# Patient Record
Sex: FEMALE | Race: WHITE | NOT HISPANIC OR LATINO | Employment: UNEMPLOYED | URBAN - METROPOLITAN AREA
[De-identification: names, ages, dates, MRNs, and addresses within clinical notes are randomized per-mention and may not be internally consistent; named-entity substitution may affect disease eponyms.]

---

## 2018-06-28 ENCOUNTER — APPOINTMENT (EMERGENCY)
Dept: RADIOLOGY | Facility: HOSPITAL | Age: 63
DRG: 854 | End: 2018-06-28
Payer: COMMERCIAL

## 2018-06-28 ENCOUNTER — HOSPITAL ENCOUNTER (INPATIENT)
Facility: HOSPITAL | Age: 63
LOS: 23 days | Discharge: HOME HEALTH | DRG: 854 | End: 2018-07-21
Attending: EMERGENCY MEDICINE | Admitting: INTERNAL MEDICINE
Payer: COMMERCIAL

## 2018-06-28 DIAGNOSIS — F11.90 METHADONE USE: ICD-10-CM

## 2018-06-28 DIAGNOSIS — I10 BENIGN ESSENTIAL HYPERTENSION: ICD-10-CM

## 2018-06-28 DIAGNOSIS — F31.9 BIPOLAR AFFECTIVE DISORDER (HCC): ICD-10-CM

## 2018-06-28 DIAGNOSIS — L03.90 CELLULITIS: ICD-10-CM

## 2018-06-28 DIAGNOSIS — M65.031: ICD-10-CM

## 2018-06-28 DIAGNOSIS — IMO0002 TYPE 2 DIABETES MELLITUS, UNCONTROLLED: ICD-10-CM

## 2018-06-28 DIAGNOSIS — L03.113 RIGHT ARM CELLULITIS: Primary | ICD-10-CM

## 2018-06-28 DIAGNOSIS — A49.02 MRSA INFECTION: ICD-10-CM

## 2018-06-28 DIAGNOSIS — E11.9 DM (DIABETES MELLITUS) (HCC): ICD-10-CM

## 2018-06-28 DIAGNOSIS — R73.9 HYPERGLYCEMIA: ICD-10-CM

## 2018-06-28 DIAGNOSIS — L02.413 ABSCESS OF RIGHT FOREARM: ICD-10-CM

## 2018-06-28 PROBLEM — E87.1 HYPONATREMIA: Status: ACTIVE | Noted: 2018-06-28

## 2018-06-28 LAB
ALBUMIN SERPL BCP-MCNC: 2.6 G/DL (ref 3.5–5)
ALP SERPL-CCNC: 126 U/L (ref 46–116)
ALT SERPL W P-5'-P-CCNC: 16 U/L (ref 12–78)
ANION GAP SERPL CALCULATED.3IONS-SCNC: 10 MMOL/L (ref 4–13)
AST SERPL W P-5'-P-CCNC: 24 U/L (ref 5–45)
BASOPHILS # BLD AUTO: 0.01 THOUSANDS/ΜL (ref 0–0.1)
BASOPHILS NFR BLD AUTO: 0 % (ref 0–1)
BILIRUB SERPL-MCNC: 0.5 MG/DL (ref 0.2–1)
BUN SERPL-MCNC: 15 MG/DL (ref 5–25)
CALCIUM SERPL-MCNC: 10 MG/DL (ref 8.3–10.1)
CHLORIDE SERPL-SCNC: 92 MMOL/L (ref 100–108)
CO2 SERPL-SCNC: 26 MMOL/L (ref 21–32)
CREAT SERPL-MCNC: 0.86 MG/DL (ref 0.6–1.3)
EOSINOPHIL # BLD AUTO: 0.04 THOUSAND/ΜL (ref 0–0.61)
EOSINOPHIL NFR BLD AUTO: 0 % (ref 0–6)
ERYTHROCYTE [DISTWIDTH] IN BLOOD BY AUTOMATED COUNT: 13.2 % (ref 11.6–15.1)
GFR SERPL CREATININE-BSD FRML MDRD: 73 ML/MIN/1.73SQ M
GLUCOSE SERPL-MCNC: 450 MG/DL (ref 65–140)
GLUCOSE SERPL-MCNC: 466 MG/DL (ref 65–140)
GLUCOSE SERPL-MCNC: >500 MG/DL (ref 65–140)
HCT VFR BLD AUTO: 32 % (ref 34.8–46.1)
HGB BLD-MCNC: 10.8 G/DL (ref 11.5–15.4)
LYMPHOCYTES # BLD AUTO: 1.42 THOUSANDS/ΜL (ref 0.6–4.47)
LYMPHOCYTES NFR BLD AUTO: 11 % (ref 14–44)
MCH RBC QN AUTO: 27.4 PG (ref 26.8–34.3)
MCHC RBC AUTO-ENTMCNC: 33.8 G/DL (ref 31.4–37.4)
MCV RBC AUTO: 81 FL (ref 82–98)
MONOCYTES # BLD AUTO: 0.52 THOUSAND/ΜL (ref 0.17–1.22)
MONOCYTES NFR BLD AUTO: 4 % (ref 4–12)
NEUTROPHILS # BLD AUTO: 10.82 THOUSANDS/ΜL (ref 1.85–7.62)
NEUTS SEG NFR BLD AUTO: 84 % (ref 43–75)
NRBC BLD AUTO-RTO: 0 /100 WBCS
PLATELET # BLD AUTO: 302 THOUSANDS/UL (ref 149–390)
PMV BLD AUTO: 9.4 FL (ref 8.9–12.7)
POTASSIUM SERPL-SCNC: 4.2 MMOL/L (ref 3.5–5.3)
PROT SERPL-MCNC: 7.9 G/DL (ref 6.4–8.2)
RBC # BLD AUTO: 3.94 MILLION/UL (ref 3.81–5.12)
SODIUM SERPL-SCNC: 128 MMOL/L (ref 136–145)
WBC # BLD AUTO: 12.81 THOUSAND/UL (ref 4.31–10.16)

## 2018-06-28 PROCEDURE — 96375 TX/PRO/DX INJ NEW DRUG ADDON: CPT

## 2018-06-28 PROCEDURE — 96365 THER/PROPH/DIAG IV INF INIT: CPT

## 2018-06-28 PROCEDURE — 82948 REAGENT STRIP/BLOOD GLUCOSE: CPT

## 2018-06-28 PROCEDURE — 99223 1ST HOSP IP/OBS HIGH 75: CPT | Performed by: PHYSICIAN ASSISTANT

## 2018-06-28 PROCEDURE — 93005 ELECTROCARDIOGRAM TRACING: CPT

## 2018-06-28 PROCEDURE — 80053 COMPREHEN METABOLIC PANEL: CPT | Performed by: PHYSICIAN ASSISTANT

## 2018-06-28 PROCEDURE — 71046 X-RAY EXAM CHEST 2 VIEWS: CPT

## 2018-06-28 PROCEDURE — 36415 COLL VENOUS BLD VENIPUNCTURE: CPT | Performed by: PHYSICIAN ASSISTANT

## 2018-06-28 PROCEDURE — 99285 EMERGENCY DEPT VISIT HI MDM: CPT

## 2018-06-28 PROCEDURE — 87040 BLOOD CULTURE FOR BACTERIA: CPT | Performed by: PHYSICIAN ASSISTANT

## 2018-06-28 PROCEDURE — 85025 COMPLETE CBC W/AUTO DIFF WBC: CPT | Performed by: PHYSICIAN ASSISTANT

## 2018-06-28 RX ORDER — INSULIN GLARGINE 100 [IU]/ML
50 INJECTION, SOLUTION SUBCUTANEOUS
COMMUNITY
Start: 2016-11-25 | End: 2018-07-21 | Stop reason: HOSPADM

## 2018-06-28 RX ORDER — ROPINIROLE 1 MG/1
0.5 TABLET, FILM COATED ORAL 3 TIMES DAILY
Status: DISCONTINUED | OUTPATIENT
Start: 2018-06-28 | End: 2018-07-21 | Stop reason: HOSPADM

## 2018-06-28 RX ORDER — MORPHINE SULFATE 4 MG/ML
4 INJECTION, SOLUTION INTRAMUSCULAR; INTRAVENOUS EVERY 4 HOURS PRN
Status: DISCONTINUED | OUTPATIENT
Start: 2018-06-28 | End: 2018-06-29

## 2018-06-28 RX ORDER — VANCOMYCIN HYDROCHLORIDE 1 G/200ML
15 INJECTION, SOLUTION INTRAVENOUS ONCE
Status: COMPLETED | OUTPATIENT
Start: 2018-06-28 | End: 2018-06-28

## 2018-06-28 RX ORDER — SODIUM CHLORIDE 9 MG/ML
75 INJECTION, SOLUTION INTRAVENOUS CONTINUOUS
Status: DISCONTINUED | OUTPATIENT
Start: 2018-06-28 | End: 2018-07-05

## 2018-06-28 RX ORDER — ONDANSETRON 2 MG/ML
4 INJECTION INTRAMUSCULAR; INTRAVENOUS EVERY 6 HOURS PRN
Status: DISCONTINUED | OUTPATIENT
Start: 2018-06-28 | End: 2018-07-21 | Stop reason: HOSPADM

## 2018-06-28 RX ORDER — LAMOTRIGINE 100 MG/1
150 TABLET ORAL DAILY
Status: DISCONTINUED | OUTPATIENT
Start: 2018-06-29 | End: 2018-07-21 | Stop reason: HOSPADM

## 2018-06-28 RX ORDER — GABAPENTIN 300 MG/1
600 CAPSULE ORAL 3 TIMES DAILY
COMMUNITY

## 2018-06-28 RX ORDER — CLONAZEPAM 1 MG/1
1 TABLET ORAL 3 TIMES DAILY
COMMUNITY
End: 2021-04-17 | Stop reason: HOSPADM

## 2018-06-28 RX ORDER — LISINOPRIL 10 MG/1
10 TABLET ORAL
COMMUNITY
Start: 2016-02-10 | End: 2018-07-21 | Stop reason: HOSPADM

## 2018-06-28 RX ORDER — LAMOTRIGINE 150 MG/1
150 TABLET ORAL DAILY
COMMUNITY

## 2018-06-28 RX ORDER — ONDANSETRON 2 MG/ML
4 INJECTION INTRAMUSCULAR; INTRAVENOUS ONCE
Status: COMPLETED | OUTPATIENT
Start: 2018-06-28 | End: 2018-06-28

## 2018-06-28 RX ORDER — IBUPROFEN 400 MG/1
800 TABLET ORAL ONCE
Status: COMPLETED | OUTPATIENT
Start: 2018-06-28 | End: 2018-06-28

## 2018-06-28 RX ORDER — LISINOPRIL 10 MG/1
10 TABLET ORAL DAILY
Status: DISCONTINUED | OUTPATIENT
Start: 2018-06-28 | End: 2018-06-30

## 2018-06-28 RX ORDER — INSULIN GLARGINE 100 [IU]/ML
25 INJECTION, SOLUTION SUBCUTANEOUS EVERY MORNING
Status: DISCONTINUED | OUTPATIENT
Start: 2018-06-29 | End: 2018-06-29

## 2018-06-28 RX ORDER — CALCIUM CARBONATE 200(500)MG
1000 TABLET,CHEWABLE ORAL DAILY PRN
Status: DISCONTINUED | OUTPATIENT
Start: 2018-06-28 | End: 2018-07-21 | Stop reason: HOSPADM

## 2018-06-28 RX ORDER — ACETAMINOPHEN 325 MG/1
975 TABLET ORAL ONCE
Status: COMPLETED | OUTPATIENT
Start: 2018-06-28 | End: 2018-06-28

## 2018-06-28 RX ORDER — ROPINIROLE 0.5 MG/1
0.5 TABLET, FILM COATED ORAL 2 TIMES DAILY
COMMUNITY
End: 2021-04-17 | Stop reason: HOSPADM

## 2018-06-28 RX ORDER — ACETAMINOPHEN 325 MG/1
650 TABLET ORAL EVERY 6 HOURS PRN
Status: DISCONTINUED | OUTPATIENT
Start: 2018-06-28 | End: 2018-07-21 | Stop reason: HOSPADM

## 2018-06-28 RX ORDER — GABAPENTIN 300 MG/1
300 CAPSULE ORAL 3 TIMES DAILY
Status: DISCONTINUED | OUTPATIENT
Start: 2018-06-28 | End: 2018-07-02

## 2018-06-28 RX ORDER — CLONAZEPAM 1 MG/1
1 TABLET ORAL 3 TIMES DAILY
Status: DISCONTINUED | OUTPATIENT
Start: 2018-06-28 | End: 2018-07-21 | Stop reason: HOSPADM

## 2018-06-28 RX ORDER — VANCOMYCIN HYDROCHLORIDE 1 G/200ML
15 INJECTION, SOLUTION INTRAVENOUS EVERY 12 HOURS
Status: DISCONTINUED | OUTPATIENT
Start: 2018-06-29 | End: 2018-06-29

## 2018-06-28 RX ADMIN — SERTRALINE HYDROCHLORIDE 50 MG: 50 TABLET ORAL at 22:40

## 2018-06-28 RX ADMIN — CLONAZEPAM 1 MG: 1 TABLET ORAL at 22:40

## 2018-06-28 RX ADMIN — ROPINIROLE 0.5 MG: 1 TABLET, FILM COATED ORAL at 22:40

## 2018-06-28 RX ADMIN — IBUPROFEN 800 MG: 400 TABLET ORAL at 20:18

## 2018-06-28 RX ADMIN — ACETAMINOPHEN 975 MG: 325 TABLET, FILM COATED ORAL at 20:18

## 2018-06-28 RX ADMIN — SODIUM CHLORIDE 125 ML/HR: 0.9 INJECTION, SOLUTION INTRAVENOUS at 20:17

## 2018-06-28 RX ADMIN — INSULIN LISPRO 10 UNITS: 100 INJECTION, SOLUTION INTRAVENOUS; SUBCUTANEOUS at 22:39

## 2018-06-28 RX ADMIN — GABAPENTIN 300 MG: 300 CAPSULE ORAL at 22:41

## 2018-06-28 RX ADMIN — LISINOPRIL 10 MG: 10 TABLET ORAL at 22:45

## 2018-06-28 RX ADMIN — ONDANSETRON 4 MG: 2 INJECTION INTRAMUSCULAR; INTRAVENOUS at 20:19

## 2018-06-28 RX ADMIN — INSULIN LISPRO 5 UNITS: 100 INJECTION, SOLUTION INTRAVENOUS; SUBCUTANEOUS at 23:49

## 2018-06-28 RX ADMIN — VANCOMYCIN HYDROCHLORIDE 1000 MG: 1 INJECTION, SOLUTION INTRAVENOUS at 20:26

## 2018-06-28 RX ADMIN — HYDROMORPHONE HYDROCHLORIDE 0.5 MG: 1 INJECTION, SOLUTION INTRAMUSCULAR; INTRAVENOUS; SUBCUTANEOUS at 20:23

## 2018-06-29 ENCOUNTER — APPOINTMENT (INPATIENT)
Dept: RADIOLOGY | Facility: HOSPITAL | Age: 63
DRG: 854 | End: 2018-06-29
Payer: COMMERCIAL

## 2018-06-29 ENCOUNTER — ANESTHESIA EVENT (OUTPATIENT)
Dept: PERIOP | Facility: HOSPITAL | Age: 63
End: 2018-06-29

## 2018-06-29 ENCOUNTER — APPOINTMENT (INPATIENT)
Dept: MRI IMAGING | Facility: HOSPITAL | Age: 63
DRG: 854 | End: 2018-06-29
Payer: COMMERCIAL

## 2018-06-29 ENCOUNTER — ANESTHESIA (OUTPATIENT)
Dept: PERIOP | Facility: HOSPITAL | Age: 63
End: 2018-06-29

## 2018-06-29 PROBLEM — M65.031: Status: ACTIVE | Noted: 2018-06-28

## 2018-06-29 PROBLEM — L03.113 RIGHT ARM CELLULITIS: Status: ACTIVE | Noted: 2018-06-28

## 2018-06-29 LAB
ABO GROUP BLD: NORMAL
ANION GAP SERPL CALCULATED.3IONS-SCNC: 9 MMOL/L (ref 4–13)
APTT PPP: 19 SECONDS (ref 24–36)
ATRIAL RATE: 76 BPM
BLD GP AB SCN SERPL QL: NEGATIVE
BUN SERPL-MCNC: 14 MG/DL (ref 5–25)
CALCIUM SERPL-MCNC: 9 MG/DL (ref 8.3–10.1)
CHLORIDE SERPL-SCNC: 100 MMOL/L (ref 100–108)
CO2 SERPL-SCNC: 26 MMOL/L (ref 21–32)
CREAT SERPL-MCNC: 0.63 MG/DL (ref 0.6–1.3)
ERYTHROCYTE [DISTWIDTH] IN BLOOD BY AUTOMATED COUNT: 13.4 % (ref 11.6–15.1)
EST. AVERAGE GLUCOSE BLD GHB EST-MCNC: 338 MG/DL
GFR SERPL CREATININE-BSD FRML MDRD: 97 ML/MIN/1.73SQ M
GLUCOSE SERPL-MCNC: 136 MG/DL (ref 65–140)
GLUCOSE SERPL-MCNC: 211 MG/DL (ref 65–140)
GLUCOSE SERPL-MCNC: 215 MG/DL (ref 65–140)
GLUCOSE SERPL-MCNC: 216 MG/DL (ref 65–140)
GLUCOSE SERPL-MCNC: 223 MG/DL (ref 65–140)
HBA1C MFR BLD: 13.4 % (ref 4.2–6.3)
HCT VFR BLD AUTO: 29.5 % (ref 34.8–46.1)
HGB BLD-MCNC: 9.5 G/DL (ref 11.5–15.4)
INR PPP: 1.07 (ref 0.86–1.17)
MCH RBC QN AUTO: 26.6 PG (ref 26.8–34.3)
MCHC RBC AUTO-ENTMCNC: 32.2 G/DL (ref 31.4–37.4)
MCV RBC AUTO: 83 FL (ref 82–98)
P AXIS: 17 DEGREES
PLATELET # BLD AUTO: 258 THOUSANDS/UL (ref 149–390)
PMV BLD AUTO: 9.1 FL (ref 8.9–12.7)
POTASSIUM SERPL-SCNC: 3.6 MMOL/L (ref 3.5–5.3)
PR INTERVAL: 168 MS
PROTHROMBIN TIME: 14 SECONDS (ref 11.8–14.2)
QRS AXIS: 40 DEGREES
QRSD INTERVAL: 84 MS
QT INTERVAL: 358 MS
QTC INTERVAL: 402 MS
RBC # BLD AUTO: 3.57 MILLION/UL (ref 3.81–5.12)
RH BLD: POSITIVE
SODIUM SERPL-SCNC: 135 MMOL/L (ref 136–145)
SPECIMEN EXPIRATION DATE: NORMAL
T WAVE AXIS: 41 DEGREES
VENTRICULAR RATE: 76 BPM
WBC # BLD AUTO: 11.31 THOUSAND/UL (ref 4.31–10.16)

## 2018-06-29 PROCEDURE — 93010 ELECTROCARDIOGRAM REPORT: CPT | Performed by: INTERNAL MEDICINE

## 2018-06-29 PROCEDURE — 82948 REAGENT STRIP/BLOOD GLUCOSE: CPT

## 2018-06-29 PROCEDURE — 80048 BASIC METABOLIC PNL TOTAL CA: CPT | Performed by: PHYSICIAN ASSISTANT

## 2018-06-29 PROCEDURE — 85027 COMPLETE CBC AUTOMATED: CPT | Performed by: PHYSICIAN ASSISTANT

## 2018-06-29 PROCEDURE — 85730 THROMBOPLASTIN TIME PARTIAL: CPT | Performed by: PHYSICIAN ASSISTANT

## 2018-06-29 PROCEDURE — 99232 SBSQ HOSP IP/OBS MODERATE 35: CPT | Performed by: HOSPITALIST

## 2018-06-29 PROCEDURE — 73090 X-RAY EXAM OF FOREARM: CPT

## 2018-06-29 PROCEDURE — 85610 PROTHROMBIN TIME: CPT | Performed by: PHYSICIAN ASSISTANT

## 2018-06-29 PROCEDURE — 86901 BLOOD TYPING SEROLOGIC RH(D): CPT | Performed by: PHYSICIAN ASSISTANT

## 2018-06-29 PROCEDURE — 86850 RBC ANTIBODY SCREEN: CPT | Performed by: PHYSICIAN ASSISTANT

## 2018-06-29 PROCEDURE — 99222 1ST HOSP IP/OBS MODERATE 55: CPT | Performed by: ORTHOPAEDIC SURGERY

## 2018-06-29 PROCEDURE — 86900 BLOOD TYPING SEROLOGIC ABO: CPT | Performed by: PHYSICIAN ASSISTANT

## 2018-06-29 PROCEDURE — 73218 MRI UPPER EXTREMITY W/O DYE: CPT

## 2018-06-29 PROCEDURE — 83036 HEMOGLOBIN GLYCOSYLATED A1C: CPT | Performed by: PHYSICIAN ASSISTANT

## 2018-06-29 RX ORDER — TRAMADOL HYDROCHLORIDE 50 MG/1
50 TABLET ORAL EVERY 6 HOURS PRN
Status: DISCONTINUED | OUTPATIENT
Start: 2018-06-29 | End: 2018-07-21 | Stop reason: HOSPADM

## 2018-06-29 RX ORDER — INSULIN GLARGINE 100 [IU]/ML
30 INJECTION, SOLUTION SUBCUTANEOUS EVERY MORNING
Status: DISCONTINUED | OUTPATIENT
Start: 2018-06-30 | End: 2018-06-30

## 2018-06-29 RX ORDER — KETOROLAC TROMETHAMINE 30 MG/ML
30 INJECTION, SOLUTION INTRAMUSCULAR; INTRAVENOUS ONCE
Status: COMPLETED | OUTPATIENT
Start: 2018-06-29 | End: 2018-06-29

## 2018-06-29 RX ADMIN — ROPINIROLE 0.5 MG: 1 TABLET, FILM COATED ORAL at 20:53

## 2018-06-29 RX ADMIN — MORPHINE SULFATE 4 MG: 4 INJECTION INTRAVENOUS at 15:24

## 2018-06-29 RX ADMIN — CEFAZOLIN SODIUM 1000 MG: 1 SOLUTION INTRAVENOUS at 20:53

## 2018-06-29 RX ADMIN — CLONAZEPAM 1 MG: 1 TABLET ORAL at 15:24

## 2018-06-29 RX ADMIN — GABAPENTIN 300 MG: 300 CAPSULE ORAL at 20:52

## 2018-06-29 RX ADMIN — SODIUM CHLORIDE 125 ML/HR: 0.9 INJECTION, SOLUTION INTRAVENOUS at 04:55

## 2018-06-29 RX ADMIN — SODIUM CHLORIDE 75 ML/HR: 0.9 INJECTION, SOLUTION INTRAVENOUS at 16:56

## 2018-06-29 RX ADMIN — INSULIN GLARGINE 25 UNITS: 100 INJECTION, SOLUTION SUBCUTANEOUS at 08:49

## 2018-06-29 RX ADMIN — GABAPENTIN 300 MG: 300 CAPSULE ORAL at 08:48

## 2018-06-29 RX ADMIN — VANCOMYCIN HYDROCHLORIDE 1000 MG: 1 INJECTION, SOLUTION INTRAVENOUS at 08:47

## 2018-06-29 RX ADMIN — ROPINIROLE 0.5 MG: 1 TABLET, FILM COATED ORAL at 15:24

## 2018-06-29 RX ADMIN — MORPHINE SULFATE 4 MG: 4 INJECTION INTRAVENOUS at 10:04

## 2018-06-29 RX ADMIN — LAMOTRIGINE 150 MG: 100 TABLET ORAL at 08:48

## 2018-06-29 RX ADMIN — MORPHINE SULFATE 4 MG: 4 INJECTION INTRAVENOUS at 01:29

## 2018-06-29 RX ADMIN — KETOROLAC TROMETHAMINE 30 MG: 30 INJECTION, SOLUTION INTRAMUSCULAR at 07:10

## 2018-06-29 RX ADMIN — SERTRALINE HYDROCHLORIDE 50 MG: 50 TABLET ORAL at 21:02

## 2018-06-29 RX ADMIN — LISINOPRIL 10 MG: 10 TABLET ORAL at 08:48

## 2018-06-29 RX ADMIN — INSULIN LISPRO 1 UNITS: 100 INJECTION, SOLUTION INTRAVENOUS; SUBCUTANEOUS at 12:18

## 2018-06-29 RX ADMIN — MORPHINE SULFATE 4 MG: 4 INJECTION INTRAVENOUS at 05:25

## 2018-06-29 RX ADMIN — ROPINIROLE 0.5 MG: 1 TABLET, FILM COATED ORAL at 08:47

## 2018-06-29 RX ADMIN — CLONAZEPAM 1 MG: 1 TABLET ORAL at 08:47

## 2018-06-29 RX ADMIN — GABAPENTIN 300 MG: 300 CAPSULE ORAL at 15:24

## 2018-06-29 RX ADMIN — CLONAZEPAM 1 MG: 1 TABLET ORAL at 20:52

## 2018-06-29 RX ADMIN — INSULIN LISPRO 1 UNITS: 100 INJECTION, SOLUTION INTRAVENOUS; SUBCUTANEOUS at 07:15

## 2018-06-29 RX ADMIN — HYDROMORPHONE HYDROCHLORIDE 1 MG: 1 INJECTION, SOLUTION INTRAMUSCULAR; INTRAVENOUS; SUBCUTANEOUS at 16:53

## 2018-06-29 RX ADMIN — CEFAZOLIN SODIUM 1000 MG: 1 SOLUTION INTRAVENOUS at 13:08

## 2018-06-29 RX ADMIN — HYDROMORPHONE HYDROCHLORIDE 1 MG: 1 INJECTION, SOLUTION INTRAMUSCULAR; INTRAVENOUS; SUBCUTANEOUS at 20:53

## 2018-06-29 RX ADMIN — TRAMADOL HYDROCHLORIDE 50 MG: 50 TABLET, FILM COATED ORAL at 18:06

## 2018-06-29 NOTE — H&P
History and Physical - [facility]    Patient Information: Alysia Ivy 65 y o  female MRN: [id number]  Unit/Bed#: E5 -01 Encounter: [id number]  Admitting Physician: Luis Alberto Rae PA-C  PCP: Melinda Sanders MD  Date of Admission:  06/28/18    Assessment/Plan:    Hospital Problem List:     Principal Problem:    Cellulitis  Active Problems:    DM (diabetes mellitus) ([address] )    HTN (hypertension)    Hyponatremia      Plan for the Primary Problem(s):  · Cellulitis/abscess right forearm  · Admit to med/surg  Continue Vanco  Check blood cultures  ED spoke with ortho who is planning I&D in AM  NPO after midnight  Plan for Additional Problems:   · DM, uncontrolled- patient admits to being noncompliant with insulin for the last month  Will resume lantus at half her reported home dose (patient is NPO)  Order accuchecks with sliding scale  Check A1C  · HTN, uncontrolled- patient noncompliant with lisinopril, will resume and monitor BP closely  · Hyponatremia- continue IV fluids, recheck labs in AM    VTE Prophylaxis: Pharmacologic VTE Prophylaxis contraindicated due to for surgery tomorrow  / sequential compression device   Code Status: full code  POLST: There is no POLST form on file for this patient (pre-hospital)    Anticipated Length of Stay:  Patient will be admitted on an Inpatient basis with an anticipated length of stay of  Greater than 2 midnights  Justification for Hospital Stay: patient requires IV antibiotics    Total Time for Visit, including Counseling / Coordination of Care: 45 minutes  Greater than 50% of this total time spent on direct patient counseling and coordination of care  Chief Complaint:   Arm cellulitis    History of Present Illness: Alysia Ivy is a 65 y o  female who presents with right arm cellulitis  Patient reports she fell 10 days ago and had a bruise and possibly an abrasion  At some point she noticed redness in the area   She was given amoxicillin by her PCP which she completed  She recently noticed drainage  She denies fever  She has an old tattoo in the area  She admits to high blood sugars and being noncompliant with her insulin for the past month      Review of Systems:    Review of Systems   Constitutional: Negative  HENT: Negative  Eyes: Negative  Respiratory: Negative  Cardiovascular: Negative  Gastrointestinal: Negative  Endocrine: Negative  Genitourinary: Negative  Musculoskeletal:        Right arm pain and swelling   Skin: Positive for color change and wound  Allergic/Immunologic: Negative  Neurological: Negative  Hematological: Negative  Psychiatric/Behavioral: Negative  Past Medical and Surgical History:     History reviewed  No pertinent past medical history  History reviewed  No pertinent surgical history  Meds/Allergies:    Prior to Admission medications    Medication Sig Start Date End Date Taking? Authorizing Provider   clonazePAM (KlonoPIN) 1 mg tablet Take 1 mg by mouth 3 (three) times a day   Yes Historical Provider, MD   gabapentin (NEURONTIN) 300 mg capsule Take 300 mg by mouth 3 (three) times a day   Yes Historical Provider, MD   insulin glargine (LANTUS) 100 units/mL subcutaneous injection Inject 50 Units under the skin 11/25/16  Yes Historical Provider, MD   insulin lispro (HumaLOG) 100 units/mL injection Inject 11 Units under the skin 11/25/16  Yes Historical Provider, MD   lamoTRIgine (LaMICtal) 150 MG tablet Take 150 mg by mouth daily   Yes Historical Provider, MD   lisinopril (ZESTRIL) 10 mg tablet Take 10 mg by mouth 2/10/16  Yes Historical Provider, MD   rOPINIRole (REQUIP) 0 5 mg tablet Take 10 mg by mouth 3 (three) times a day   Yes Historical Provider, MD   sertraline (ZOLOFT) 50 mg tablet Take 50 mg by mouth daily   Yes Historical Provider, MD     I have reviewed home medications with patient personally      Allergies: No Known Allergies    Social History:     Marital Status: Single   Occupation: works in   Patient Pre-hospital Living Situation: lives with family  Patient Pre-hospital Level of Mobility: ambalatory  Patient Pre-hospital Diet Restrictions: none  Substance Use History:   History   Alcohol Use No     History   Smoking Status    Never Smoker   Smokeless Tobacco    Never Used     History   Drug Use No     Comment: hx of       Family History:    non-contributory    Physical Exam:     Vitals:   Blood Pressure: 148/69 (06/28/18 2137)  Pulse: 79 (06/28/18 2137)  Temperature: 97 9 °F (36 6 °C) (06/28/18 1913)  Temp Source: Oral (06/28/18 1913)  Respirations: (!) 26 (06/28/18 2137)  Weight - Scale: 65 3 kg (144 lb) (06/28/18 1913)  SpO2: 97 % (06/28/18 2137)    Physical Exam   Constitutional: She is oriented to person, place, and time  She appears well-developed and well-nourished  No distress  HENT:   Head: Normocephalic and atraumatic  Eyes: Conjunctivae are normal  Pupils are equal, round, and reactive to light  Neck: Normal range of motion  Neck supple  No JVD present  No tracheal deviation present  No thyromegaly present  Cardiovascular: Normal rate and regular rhythm  Pulmonary/Chest: Effort normal and breath sounds normal  No respiratory distress  She has no wheezes  Abdominal: Soft  Bowel sounds are normal  She exhibits no distension  There is no tenderness  Musculoskeletal: She exhibits no deformity  Lymphadenopathy:     She has no cervical adenopathy  Neurological: She is alert and oriented to person, place, and time  Skin: She is not diaphoretic  Right forearm with significant swelling, erythema and purulent drainage   Psychiatric: She has a normal mood and affect  Her behavior is normal    Vitals reviewed  Additional Data:     Lab Results: I have personally reviewed pertinent reports          Results from last 7 days  Lab Units 06/28/18 1948   WBC Thousand/uL 12 81*   HEMOGLOBIN g/dL 10 8*   HEMATOCRIT % 32 0*   PLATELETS Thousands/uL 302   NEUTROS PCT % 84*   LYMPHS PCT % 11*   MONOS PCT % 4   EOS PCT % 0       Results from last 7 days  Lab Units 06/28/18  1948   SODIUM mmol/L 128*   POTASSIUM mmol/L 4 2   CHLORIDE mmol/L 92*   CO2 mmol/L 26   BUN mg/dL 15   CREATININE mg/dL 0 86   CALCIUM mg/dL 10 0   TOTAL PROTEIN g/dL 7 9   BILIRUBIN TOTAL mg/dL 0 50   ALK PHOS U/L 126*   ALT U/L 16   AST U/L 24   GLUCOSE RANDOM mg/dL 466*           Imaging: I have personally reviewed pertinent reports  Xr Chest 2 Views    Result Date: 6/28/2018  Narrative: CHEST INDICATION:   pre surgical  COMPARISON:  9/24/2014 EXAM PERFORMED/VIEWS:  XR CHEST PA & LATERAL FINDINGS: Cardiomediastinal silhouette appears unremarkable  The lungs are clear  No pneumothorax or pleural effusion  Osseous structures appear within normal limits for patient age  Impression: No acute cardiopulmonary disease  Workstation performed: [id number]       EKG, Pathology, and Other Studies Reviewed on Admission:   · EKG: pending    Allscripts / Epic Records Reviewed: Yes     ** Please Note: This note has been constructed using a voice recognition system   **

## 2018-06-29 NOTE — CASE MANAGEMENT
Initial Clinical Review    Admission: Date/Time/Statement: 6/28/18 @ 2104     Orders Placed This Encounter   Procedures    Inpatient Admission (expected length of stay for this patient is greater than two midnights)     Standing Status:   Standing     Number of Occurrences:   1     Order Specific Question:   Admitting Physician     Answer:   Moris Esparza     Order Specific Question:   Level of Care     Answer:   Med Surg [16]     Order Specific Question:   Estimated length of stay     Answer:   More than 2 Midnights     Order Specific Question:   Certification     Answer:   I certify that inpatient services are medically necessary for this patient for a duration of greater than two midnights  See H&P and MD Progress Notes for additional information about the patient's course of treatment  ED: Date/Time/Mode of Arrival:   ED Arrival Information     Expected Arrival Acuity Means of Arrival Escorted By Service Admission Type    - 6/28/2018 19:07 Urgent Walk-In Family Member General Medicine Urgent    Arrival Complaint    arm problem          Chief Complaint:   Chief Complaint   Patient presents with    Cellulitis     Cellulitis to right forearm  Completed full course of Amoxicillin without relief  History of Illness: Dima Stout is a 65 y o  female who presents with right arm cellulitis  Patient reports she fell 10 days ago and had a bruise and possibly an abrasion  At some point she noticed redness in the area  She was given amoxicillin by her PCP which she completed  She recently noticed drainage  She denies fever  She has an old tattoo in the area   She admits to high blood sugars and being noncompliant with her insulin for the past month    ED Vital Signs:   ED Triage Vitals [06/28/18 1913]   Temperature Pulse Respirations Blood Pressure SpO2   97 9 °F (36 6 °C) 101 16 (!) 208/82 98 %      Temp Source Heart Rate Source Patient Position - Orthostatic VS BP Location FiO2 (%)   Oral Monitor Sitting Left arm --      Pain Score       9        Wt Readings from Last 1 Encounters:   06/28/18 65 3 kg (144 lb)       Vital Signs (abnormal):    above    Abnormal Labs/Diagnostic Test Results:    Na  128  Chloride   92  BS  466  Alk phos   136  Albumin   2 6  WBC   12 81  H/H  10 8/32  Abs neutro   10 82  CXR:  NAD    ED Treatment:   Medication Administration from 06/28/2018 1907 to 06/28/2018 2200       Date/Time Order Dose Route Action Action by Comments     06/28/2018 2017 sodium chloride 0 9 % infusion 125 mL/hr Intravenous New Bag Justine Fisher RN      06/28/2018 2018 ibuprofen (MOTRIN) tablet 800 mg 800 mg Oral Given Sonia Pack RN      06/28/2018 2018 acetaminophen (TYLENOL) tablet 975 mg 975 mg Oral Given Justine Fisher RN      06/28/2018 2026 vancomycin (VANCOCIN) IVPB (premix) 1,000 mg 1,000 mg Intravenous New Bag Justine Fisher RN      06/28/2018 2019 ondansetron (ZOFRAN) injection 4 mg 4 mg Intravenous Given Justine Fisher RN      06/28/2018 2023 HYDROmorphone (DILAUDID) injection 0 5 mg 0 5 mg Intravenous Given Sonia Pack RN           Past Medical/Surgical History: Active Ambulatory Problems     Diagnosis Date Noted    No Active Ambulatory Problems     Resolved Ambulatory Problems     Diagnosis Date Noted    No Resolved Ambulatory Problems     No Additional Past Medical History       Admitting Diagnosis: Cellulitis [L03 90]  Right arm cellulitis [L03 113]  Abscess of tendon sheath of forearm, right [M65 031]    Age/Sex: 65 y o  female    · Assessment/Plan: Cellulitis/abscess right forearm  ? Admit to med/surg  Continue Vanco  Check blood cultures  ED spoke with ortho who is planning I&D in AM  NPO after midnight       Plan for Additional Problems:   · DM, uncontrolled- patient admits to being noncompliant with insulin for the last month  Will resume lantus at half her reported home dose (patient is NPO)  Order accuchecks with sliding scale   Check A1C  · HTN, uncontrolled- patient noncompliant with lisinopril, will resume and monitor BP closely  · Hyponatremia- continue IV fluids, recheck labs in AM     VTE Prophylaxis: Pharmacologic VTE Prophylaxis contraindicated due to for surgery tomorrow  / sequential compression device   Code Status: full code  POLST: There is no POLST form on file for this patient (pre-hospital)     Anticipated Length of Stay:  Patient will be admitted on an Inpatient basis with an anticipated length of stay of  Greater than 2 midnights  Justification for Hospital Stay: patient requires IV antibiotics    Admission Orders:   IP   6/28  @     2104  Scheduled Meds:   Current Facility-Administered Medications:  acetaminophen 650 mg Oral Q6H PRN Bakari Cheese, PA-C    calcium carbonate 1,000 mg Oral Daily PRN Bakari Cheese, PA-C    clonazePAM 1 mg Oral TID Bakari Cheese, PA-C    gabapentin 300 mg Oral TID Bakari Cheese, PA-C    insulin glargine 25 Units Subcutaneous QAM Bakari Cheese, PA-C    insulin lispro 1-5 Units Subcutaneous Q6H [facility] Bakari Cheese, PA-C    lamoTRIgine 150 mg Oral Daily Bakari Cheese, PA-C    lisinopril 10 mg Oral Daily Bakari Cheese, PA-C    morphine injection 4 mg Intravenous Q4H PRN Bakari Cheese, PA-C    ondansetron 4 mg Intravenous Q6H PRN Bakari Cheese, PA-C    rOPINIRole 0 5 mg Oral TID Bakari Cheese, PA-C    sertraline 50 mg Oral HS Bakari Cheese, PA-C    sodium chloride 125 mL/hr Intravenous Continuous Marco A Pérez PA-C Last Rate: 125 mL/hr (06/29/18 0455)   vancomycin 15 mg/kg Intravenous Q12H Bakari Cheese, PA-C Last Rate: 1,000 mg (06/29/18 0847)     Continuous Infusions:   sodium chloride 125 mL/hr Last Rate: 125 mL/hr (06/29/18 0455)     PRN Meds:   acetaminophen    calcium carbonate    morphine injection    ondansetron    Cons ortho  Plan  OR    6/29      Thank you,  Saulo Aqq  [address] Utilization Review Department  Phone: [phone];  Fax [phone]  ATTENTION: The Network Utilization Review Department is now centralized for our 9 Facilities  Make a note that we have a new phone and fax numbers for our Department  Please call with any questions or concerns to [phone] and carefully follow the prompts so that you are directed to the right person  All voicemails are confidential  Fax any determinations, approvals, denials, and requests for initial or continue stay review clinical to [phone]  Due to HIGH CALL volume, it would be easier if you could please send faxed requests to expedite your requests and in part, help us provide discharge notifications faster

## 2018-06-29 NOTE — PROGRESS NOTES
Progress Note - Bakari Fernandes 65 y o  female MRN: [id number]    Unit/Bed#: E5 -01 Encounter: [id number]    Principal Problem:    Cellulitis  Active Problems:    DM (diabetes mellitus) ([address] )    HTN (hypertension)    Hyponatremia    Right arm cellulitis    Abscess of tendon sheath of forearm, right      Assessment/Plan:  · Cellulitis/abscess right forearm-MRI ordered to rule out any bony involvement  Currently hemodynamically stable  Patient likely to go to the OR for I and D  Sepsis on presentation secondary to 1 with WBC 00814 and heart rate of 101  This is now improved  Continue IV cefazolin  Await blood culture results      Plan for Additional Problems:   · DM type 2, uncontrolled- patient admits to being noncompliant with insulin for the last month  Will resume lantus at half her reported home dose (patient is NPO)  Order accuchecks with sliding scale  Check [id number] resume of full-dose Lantus after I and D done  · HTN, uncontrolled- patient noncompliant with lisinopril, will resume and monitor BP closely  · Hyponatremia-secondary to cellulitis  continue IV fluids, recheck labs in AM      Subjective:  Patient admitted with right forearm abscess  MRI ordered to rule out any bone involvement  Likely will go to the OR for I and D today  Physical Exam:   Vitals: Blood pressure 163/88, pulse 75, temperature 98 2 °F (36 8 °C), temperature source Temporal, resp  rate 19, weight 65 3 kg (144 lb), SpO2 97 %  ,There is no height or weight on file to calculate BMI  Gen:  Pleasant, non-tachypnic, non-dyspnic  Conversant  Heart: regular rate and rhythm, S1S2 present, no murmur, rub or gallop  Lungs: clear to ausculatation bilaterally  No wheezing, crackless, or rhonchi  No accessory muscle use or respiratory distress  Abd: soft, non-tender, non-distended  NABS, no guarding, rebound or peritoneal signs  Extremities:  Open wound on the right forearm draining pus    Neuro: awake, alert and oriented  Cranial nerves 2-12 intact  Strength and sensation grossly intact  Skin: warm and dry: no petechiae, purpura and rash      LABS:     Results from last 7 days  Lab Units 06/29/18  0958 06/28/18 1948   WBC Thousand/uL 11 31* 12 81*   HEMOGLOBIN g/dL 9 5* 10 8*   HEMATOCRIT % 29 5* 32 0*   PLATELETS Thousands/uL 258 302       Results from last 7 days  Lab Units 06/29/18  0958 06/28/18 1948   SODIUM mmol/L 135* 128*   POTASSIUM mmol/L 3 6 4 2   CHLORIDE mmol/L 100 92*   CO2 mmol/L 26 26   BUN mg/dL 14 15   CREATININE mg/dL 0 63 0 86   GLUCOSE RANDOM mg/dL 216* 466*   CALCIUM mg/dL 9 0 10 0       Intake/Output Summary (Last 24 hours) at 06/29/18 1229  Last data filed at 06/29/18 0455   Gross per 24 hour   Intake              900 ml   Output                0 ml   Net              900 ml           Current Facility-Administered Medications:  acetaminophen 650 mg Oral Q6H PRN Mg Bruno PA-C    calcium carbonate 1,000 mg Oral Daily PRN Mg Bruno PA-C    clonazePAM 1 mg Oral TID Mg Bruno PA-C    gabapentin 300 mg Oral TID Mg Bruno PA-C    insulin glargine 25 Units Subcutaneous QAM Mg Bruno PA-C    insulin lispro 1-5 Units Subcutaneous Q6H [facility] Mg Bruno PA-C    lamoTRIgine 150 mg Oral Daily Mg Bruno PA-C    lisinopril 10 mg Oral Daily Mg Bruno PA-C    morphine injection 4 mg Intravenous Q4H PRN SHI Emery-NEAL    ondansetron 4 mg Intravenous Q6H PRN Mg Bruno PA-C    rOPINIRole 0 5 mg Oral TID SHI Emery-NEAL    sertraline 50 mg Oral HS Mg Bruno PA-C    sodium chloride 125 mL/hr Intravenous Continuous Lugene TABITHA Cm Last Rate: 125 mL/hr (06/29/18 0455)   vancomycin 15 mg/kg Intravenous Q12H Mg Bruno PA-C Last Rate: 1,000 mg (06/29/18 0863)       Family update-daughter in the room-updated

## 2018-06-29 NOTE — CONSULTS
Consultation - Yani Camp 65 y o  female MRN: [id number]  Unit/Bed#: E5 -01 Encounter: [id number]      Assessment/Plan     Assessment:  64 yo female right forearm abscess  Plan:  NPO  To OR possibly today for I&D  Pain control prn  Med mgt per SLIM  X-rays forearm and Mri ordered  IV antibiotics per SLIM      History of Present Illness   Physician Requesting Consult: Meena Joe MD  Reason for Consult / Principal Problem: right forearm abscess  HPI: Neyda Eaton is a 65y o  year old female who presents with right forearm abscess after she fell 10 days ago and hit her arm on the wooden sidding  She did have a cut that got infected  She took her friends amoxicillin  She states she took amoxicillin for 7 days  She states it has gotten worse and yesterday it began draining  She then came to the ER  She denies any fevers chills or sweats  She denies any numbness or tingling in her hand  She denies any pain in her wrist or fingers  She denies any pain in her elbow  She is diabetic and states her sugars have been uncontrolled  Consults    ROS: see HPI, all other systems negative  Historical Information   History reviewed  No pertinent past medical history  History reviewed  No pertinent surgical history  Social History   History   Alcohol Use No     History   Drug Use No     Comment: hx of     History   Smoking Status    Never Smoker   Smokeless Tobacco    Never Used     Family History: History reviewed  No pertinent family history      Meds/Allergies   Prescriptions Prior to Admission   Medication    clonazePAM (KlonoPIN) 1 mg tablet    gabapentin (NEURONTIN) 300 mg capsule    insulin glargine (LANTUS) 100 units/mL subcutaneous injection    insulin lispro (HumaLOG) 100 units/mL injection    lamoTRIgine (LaMICtal) 150 MG tablet    lisinopril (ZESTRIL) 10 mg tablet    rOPINIRole (REQUIP) 0 5 mg tablet    sertraline (ZOLOFT) 50 mg tablet     No Known Allergies    Objective   Vitals: Blood pressure 163/88, pulse 75, temperature 98 2 °F (36 8 °C), temperature source Temporal, resp  rate 19, weight 65 3 kg (144 lb), SpO2 97 %  ,There is no height or weight on file to calculate BMI  Intake/Output Summary (Last 24 hours) at 06/29/18 1124  Last data filed at 06/29/18 0455   Gross per 24 hour   Intake              900 ml   Output                0 ml   Net              900 ml     I/O last 24 hours: In: 900 [I V :900]  Out: -     Invasive Devices     Peripheral Intravenous Line            Peripheral IV 06/28/18 Left Antecubital less than 1 day                PE:  Gen:  Awake and alert  HEENT:  Hearing intact  Heart:  Regular rate  Lungs: no audible wheezing  GI: no abdominal distension    Ortho ExamRUE  Erythema from wrist to elbow   Open wound on forearm that is draining puss  Sensation median, ulnar, radial nerves intact  AROM of fingers and wrist limited  Elbow ROM full  No pain with PROM of fingers, wrist, or elbow   Palpable  radial pulse     Lab Results:   I have personally reviewed pertinent lab results  CBC:   Lab Results   Component Value Date    WBC 11 31 (H) 06/29/2018    HGB 9 5 (L) 06/29/2018    HCT 29 5 (L) 06/29/2018    MCV 83 06/29/2018     06/29/2018    MCH 26 6 (L) 06/29/2018    MCHC 32 2 06/29/2018    RDW 13 4 06/29/2018    MPV 9 1 06/29/2018    NRBC 0 06/28/2018     CMP:   Lab Results   Component Value Date     (L) 06/29/2018     06/29/2018    CO2 26 06/29/2018    ANIONGAP 9 06/29/2018    BUN 14 06/29/2018    CREATININE 0 63 06/29/2018    GLUCOSE 216 (H) 06/29/2018    CALCIUM 9 0 06/29/2018    AST 24 06/28/2018    ALT 16 06/28/2018    ALKPHOS 126 (H) 06/28/2018    PROT 7 9 06/28/2018    BILITOT 0 50 06/28/2018    EGFR 97 06/29/2018     Imaging Studies: I have personally reviewed pertinent reports     and I have personally reviewed pertinent films in PACS  X-rays right forearm- no osseous abnormality   Code Status: Level 1 - Full Code  Advance Directive and Living Will:      Power of :    POLST:

## 2018-06-29 NOTE — PROGRESS NOTES
Patient's fingerstick resulted >500  [place] PA made aware  Given 10U Humalog as scheduled  Fingerstick rechecked 450  Given additional 5U coverage per sliding scale order  Fingerstick rechecked 211  Will continue to monitor and endorse to day shift RN

## 2018-06-29 NOTE — ANESTHESIA PREPROCEDURE EVALUATION
Review of Systems/Medical History  Patient summary reviewed  Chart reviewed      Cardiovascular  Hypertension ,    Pulmonary  Negative pulmonary ROS        GI/Hepatic  Negative GI/hepatic ROS          Negative  ROS        Endo/Other  Diabetes type 2 Insulin,      GYN       Hematology  Negative hematology ROS      Musculoskeletal    Comment: Right arm cellulitis      Neurology  Negative neurology ROS      Psychology           Physical Exam    Airway    Mallampati score: I  TM Distance: <3 FB       Dental   upper dentures,     Cardiovascular  Rhythm: regular, Rate: normal, Cardiovascular exam normal    Pulmonary  Pulmonary exam normal     Other Findings        Anesthesia Plan  ASA Score- 2     Anesthesia Type- general with ASA Monitors  Additional Monitors:   Airway Plan: LMA  Plan Factors- Patient instructed to abstain from smoking on day of procedure  Patient did not smoke on day of surgery  Induction- intravenous  Postoperative Plan- Plan for postoperative opioid use  Informed Consent- Anesthetic plan and risks discussed with patient

## 2018-06-30 ENCOUNTER — ANESTHESIA EVENT (INPATIENT)
Dept: PERIOP | Facility: HOSPITAL | Age: 63
DRG: 854 | End: 2018-06-30
Payer: COMMERCIAL

## 2018-06-30 PROBLEM — L02.413 ABSCESS OF RIGHT FOREARM: Status: ACTIVE | Noted: 2018-06-28

## 2018-06-30 LAB
ANION GAP SERPL CALCULATED.3IONS-SCNC: 6 MMOL/L (ref 4–13)
BASOPHILS # BLD AUTO: 0.02 THOUSANDS/ΜL (ref 0–0.1)
BASOPHILS NFR BLD AUTO: 0 % (ref 0–1)
BUN SERPL-MCNC: 10 MG/DL (ref 5–25)
CALCIUM SERPL-MCNC: 8.9 MG/DL (ref 8.3–10.1)
CHLORIDE SERPL-SCNC: 98 MMOL/L (ref 100–108)
CO2 SERPL-SCNC: 26 MMOL/L (ref 21–32)
CREAT SERPL-MCNC: 0.58 MG/DL (ref 0.6–1.3)
EOSINOPHIL # BLD AUTO: 0.08 THOUSAND/ΜL (ref 0–0.61)
EOSINOPHIL NFR BLD AUTO: 1 % (ref 0–6)
ERYTHROCYTE [DISTWIDTH] IN BLOOD BY AUTOMATED COUNT: 13.3 % (ref 11.6–15.1)
GFR SERPL CREATININE-BSD FRML MDRD: 100 ML/MIN/1.73SQ M
GLUCOSE SERPL-MCNC: 178 MG/DL (ref 65–140)
GLUCOSE SERPL-MCNC: 198 MG/DL (ref 65–140)
GLUCOSE SERPL-MCNC: 204 MG/DL (ref 65–140)
GLUCOSE SERPL-MCNC: 234 MG/DL (ref 65–140)
GLUCOSE SERPL-MCNC: 251 MG/DL (ref 65–140)
GLUCOSE SERPL-MCNC: 267 MG/DL (ref 65–140)
GLUCOSE SERPL-MCNC: 354 MG/DL (ref 65–140)
HCT VFR BLD AUTO: 29.2 % (ref 34.8–46.1)
HGB BLD-MCNC: 9.3 G/DL (ref 11.5–15.4)
LYMPHOCYTES # BLD AUTO: 1.63 THOUSANDS/ΜL (ref 0.6–4.47)
LYMPHOCYTES NFR BLD AUTO: 16 % (ref 14–44)
MCH RBC QN AUTO: 26.3 PG (ref 26.8–34.3)
MCHC RBC AUTO-ENTMCNC: 31.8 G/DL (ref 31.4–37.4)
MCV RBC AUTO: 83 FL (ref 82–98)
MONOCYTES # BLD AUTO: 0.62 THOUSAND/ΜL (ref 0.17–1.22)
MONOCYTES NFR BLD AUTO: 6 % (ref 4–12)
NEUTROPHILS # BLD AUTO: 8.1 THOUSANDS/ΜL (ref 1.85–7.62)
NEUTS SEG NFR BLD AUTO: 78 % (ref 43–75)
NRBC BLD AUTO-RTO: 0 /100 WBCS
PLATELET # BLD AUTO: 266 THOUSANDS/UL (ref 149–390)
PMV BLD AUTO: 8.9 FL (ref 8.9–12.7)
POTASSIUM SERPL-SCNC: 4 MMOL/L (ref 3.5–5.3)
RBC # BLD AUTO: 3.53 MILLION/UL (ref 3.81–5.12)
SODIUM SERPL-SCNC: 130 MMOL/L (ref 136–145)
WBC # BLD AUTO: 10.45 THOUSAND/UL (ref 4.31–10.16)

## 2018-06-30 PROCEDURE — 82948 REAGENT STRIP/BLOOD GLUCOSE: CPT

## 2018-06-30 PROCEDURE — 80048 BASIC METABOLIC PNL TOTAL CA: CPT | Performed by: PHYSICIAN ASSISTANT

## 2018-06-30 PROCEDURE — 85025 COMPLETE CBC W/AUTO DIFF WBC: CPT | Performed by: PHYSICIAN ASSISTANT

## 2018-06-30 PROCEDURE — 99232 SBSQ HOSP IP/OBS MODERATE 35: CPT | Performed by: HOSPITALIST

## 2018-06-30 PROCEDURE — 99231 SBSQ HOSP IP/OBS SF/LOW 25: CPT | Performed by: ORTHOPAEDIC SURGERY

## 2018-06-30 RX ORDER — INSULIN GLARGINE 100 [IU]/ML
35 INJECTION, SOLUTION SUBCUTANEOUS EVERY MORNING
Status: DISCONTINUED | OUTPATIENT
Start: 2018-07-01 | End: 2018-07-02

## 2018-06-30 RX ORDER — LISINOPRIL 5 MG/1
5 TABLET ORAL DAILY
Status: DISCONTINUED | OUTPATIENT
Start: 2018-06-30 | End: 2018-07-03

## 2018-06-30 RX ORDER — LISINOPRIL 20 MG/1
20 TABLET ORAL DAILY
Status: DISCONTINUED | OUTPATIENT
Start: 2018-07-01 | End: 2018-06-30

## 2018-06-30 RX ADMIN — INSULIN LISPRO 5 UNITS: 100 INJECTION, SOLUTION INTRAVENOUS; SUBCUTANEOUS at 22:37

## 2018-06-30 RX ADMIN — INSULIN LISPRO 1 UNITS: 100 INJECTION, SOLUTION INTRAVENOUS; SUBCUTANEOUS at 17:14

## 2018-06-30 RX ADMIN — INSULIN LISPRO 2 UNITS: 100 INJECTION, SOLUTION INTRAVENOUS; SUBCUTANEOUS at 11:34

## 2018-06-30 RX ADMIN — ROPINIROLE 0.5 MG: 1 TABLET, FILM COATED ORAL at 17:15

## 2018-06-30 RX ADMIN — GABAPENTIN 300 MG: 300 CAPSULE ORAL at 08:33

## 2018-06-30 RX ADMIN — ROPINIROLE 0.5 MG: 1 TABLET, FILM COATED ORAL at 21:35

## 2018-06-30 RX ADMIN — HYDROMORPHONE HYDROCHLORIDE 1 MG: 1 INJECTION, SOLUTION INTRAMUSCULAR; INTRAVENOUS; SUBCUTANEOUS at 09:06

## 2018-06-30 RX ADMIN — GABAPENTIN 300 MG: 300 CAPSULE ORAL at 17:15

## 2018-06-30 RX ADMIN — INSULIN LISPRO 3 UNITS: 100 INJECTION, SOLUTION INTRAVENOUS; SUBCUTANEOUS at 22:37

## 2018-06-30 RX ADMIN — HYDROMORPHONE HYDROCHLORIDE 1 MG: 1 INJECTION, SOLUTION INTRAMUSCULAR; INTRAVENOUS; SUBCUTANEOUS at 21:32

## 2018-06-30 RX ADMIN — HYDROMORPHONE HYDROCHLORIDE 1 MG: 1 INJECTION, SOLUTION INTRAMUSCULAR; INTRAVENOUS; SUBCUTANEOUS at 01:04

## 2018-06-30 RX ADMIN — GABAPENTIN 300 MG: 300 CAPSULE ORAL at 21:34

## 2018-06-30 RX ADMIN — HYDROMORPHONE HYDROCHLORIDE 1 MG: 1 INJECTION, SOLUTION INTRAMUSCULAR; INTRAVENOUS; SUBCUTANEOUS at 05:06

## 2018-06-30 RX ADMIN — SODIUM CHLORIDE 75 ML/HR: 0.9 INJECTION, SOLUTION INTRAVENOUS at 08:37

## 2018-06-30 RX ADMIN — LISINOPRIL 5 MG: 5 TABLET ORAL at 08:53

## 2018-06-30 RX ADMIN — INSULIN LISPRO 2 UNITS: 100 INJECTION, SOLUTION INTRAVENOUS; SUBCUTANEOUS at 01:08

## 2018-06-30 RX ADMIN — CLONAZEPAM 1 MG: 1 TABLET ORAL at 21:34

## 2018-06-30 RX ADMIN — INSULIN LISPRO 1 UNITS: 100 INJECTION, SOLUTION INTRAVENOUS; SUBCUTANEOUS at 05:13

## 2018-06-30 RX ADMIN — HYDROMORPHONE HYDROCHLORIDE 1 MG: 1 INJECTION, SOLUTION INTRAMUSCULAR; INTRAVENOUS; SUBCUTANEOUS at 17:15

## 2018-06-30 RX ADMIN — INSULIN GLARGINE 30 UNITS: 100 INJECTION, SOLUTION SUBCUTANEOUS at 08:34

## 2018-06-30 RX ADMIN — LISINOPRIL 10 MG: 10 TABLET ORAL at 08:33

## 2018-06-30 RX ADMIN — CEFAZOLIN SODIUM 1000 MG: 1 SOLUTION INTRAVENOUS at 13:07

## 2018-06-30 RX ADMIN — INSULIN LISPRO 5 UNITS: 100 INJECTION, SOLUTION INTRAVENOUS; SUBCUTANEOUS at 17:13

## 2018-06-30 RX ADMIN — SERTRALINE HYDROCHLORIDE 50 MG: 50 TABLET ORAL at 22:22

## 2018-06-30 RX ADMIN — CEFAZOLIN SODIUM 1000 MG: 1 SOLUTION INTRAVENOUS at 05:06

## 2018-06-30 RX ADMIN — CLONAZEPAM 1 MG: 1 TABLET ORAL at 17:14

## 2018-06-30 RX ADMIN — HYDROMORPHONE HYDROCHLORIDE 1 MG: 1 INJECTION, SOLUTION INTRAMUSCULAR; INTRAVENOUS; SUBCUTANEOUS at 13:08

## 2018-06-30 RX ADMIN — METHADONE HYDROCHLORIDE 15 MG: 10 TABLET ORAL at 08:33

## 2018-06-30 RX ADMIN — LAMOTRIGINE 150 MG: 100 TABLET ORAL at 08:32

## 2018-06-30 RX ADMIN — ROPINIROLE 0.5 MG: 1 TABLET, FILM COATED ORAL at 08:33

## 2018-06-30 RX ADMIN — CLONAZEPAM 1 MG: 1 TABLET ORAL at 08:33

## 2018-06-30 RX ADMIN — TRAMADOL HYDROCHLORIDE 50 MG: 50 TABLET, FILM COATED ORAL at 08:32

## 2018-06-30 RX ADMIN — CEFAZOLIN SODIUM 1000 MG: 1 SOLUTION INTRAVENOUS at 21:35

## 2018-06-30 NOTE — PROGRESS NOTES
"Alize" at [address] [place] [address] ([phone]) [email or web address] Methadone dosage  Last administration 6/28/18  Will endorse to day shift RN

## 2018-06-30 NOTE — PROGRESS NOTES
Progress Note - Orthopedics   Uma Roth 65 y o  female MRN: [id number]  Unit/Bed#: E5 -01 Encounter: [id number]      Assessment & Plan:  Right forearm abscess  1  OR this afternoon or tomorrow morning for I&D  2  Continue IV antibiotics  3  Dressing change daily and prn   4  Ice and elevation  5  Pain medication prn  Subjective: Patient reports pain in the right forearm and persistent drainage  Objective:    Vitals:    06/30/18 0711   BP: (!) 184/82   Pulse: 82   Resp: 19   Temp: 99 3 °F (37 4 °C)   SpO2: 95%       Physical Exam:  Right forearm: 3 cm wound dorsal aspect with grossly purulent drainage  Moderate surrounding erythema  Tenderness to palpation diffusely  Elbow and wrist range of motion limited by pain  NV intact distally  Lab Results   Component Value Date     (L) 06/30/2018    CL 98 (L) 06/30/2018    CO2 26 06/30/2018    BUN 10 06/30/2018    CREATININE 0 58 (L) 06/30/2018    GLUCOSE 178 (H) 06/30/2018    WBC 10 45 (H) 06/30/2018    HGB 9 3 (L) 06/30/2018     06/30/2018    INR 1 07 06/29/2018    PTT 19 (L) 06/29/2018       MRI right forearm 6/29/18: Study terminated early secondary to patient discomfort  Superficial abscess mid-forearm without deep fluid collection

## 2018-06-30 NOTE — PROGRESS NOTES
Progress Note - Noah Bullock 65 y o  female MRN: [id number]    Unit/Bed#: E5 -01 Encounter: [id number]    Principal Problem:    Abscess of right forearm  Active Problems:    Hyponatremia    Right arm cellulitis    Benign essential hypertension    Type 2 diabetes mellitus, uncontrolled (HCC)    Assessment/Plan:    Cellulitis/abscess right forearm-MRI reveals soft tissue swelling along the radial aspect of the right forearm  hemodynamically stable  Patient likely to go to the OR for I and D on 07/01/2018  Jean Pierre Edwards No fever  WBC trending down      Sepsis on presentation secondary to 1 with WBC 19113 and heart rate of 101  This is now improved  Continue IV cefazolin  Await blood culture and wound culture results      Plan for Additional Problems:   · DM type 2, uncontrolled- patient admits to being noncompliant with insulin for the last month  Probably longer than that since glycosylated hemoglobin of 13 4  On Lantus 35 units daily  Blood sugars now ranging between 130 and 180  Her records revealed that she was on Lantus 50 units twice a day and Humalog 11 units 3 times a day with meals at home however compliance is very doubtful  ·  HTN, uncontrolled- patient noncompliant with lisinopril-15 mg daily  Will titrate dose to get blood pressure down to 512-816 systolic  · Hyponatremia-secondary to cellulitis as well as SIADH  continue IV fluids, recheck labs in AM    Hepatitis-C--being followed up by her primary care physician    Bipolar disorder-mood stable  Continue sertraline and lamotrigine              Subjective:  Patient to go to the OR on 07/01/2018 for I&D  Confirmed with the methadone clinic that the patient is on 15 mg of methadone daily  Pain is better controlled 5/10  Has persistent drainage right forearm  Continue IV antibiotics  Patient hemodynamically stable  Blood sugar better controlled with Lantus  Will increase to 35 units in the a m    Hold it tomorrow morning and give her the dose in the afternoon  Physical Exam:   Vitals: Blood pressure (!) 184/82, pulse 82, temperature 99 3 °F (37 4 °C), temperature source Temporal, resp  rate 19, weight 65 3 kg (144 lb), SpO2 95 %  ,There is no height or weight on file to calculate BMI  Gen:  Pleasant, non-tachypnic, non-dyspnic  Conversant  Heart: regular rate and rhythm, S1S2 present, no murmur, rub or gallop  Lungs: clear to ausculatation bilaterally  No wheezing, crackless, or rhonchi  No accessory muscle use or respiratory distress  Abd: soft, non-tender, non-distended  NABS, no guarding, rebound or peritoneal signs  Extremities: no clubbing, cyanosis or edema right forearm 3 cm wound dorsal aspect with purulent drainage  Diffuse tenderness to palpation  Elbow wrist and finger movements restricted  Neuro: awake, alert and oriented  Cranial nerves 2-12 intact  Strength and sensation grossly intact  Skin: warm and dry: no petechiae, purpura and rash      LABS:     Results from last 7 days  Lab Units 06/30/18  0533 06/29/18  0958 06/28/18 1948   WBC Thousand/uL 10 45* 11 31* 12 81*   HEMOGLOBIN g/dL 9 3* 9 5* 10 8*   HEMATOCRIT % 29 2* 29 5* 32 0*   PLATELETS Thousands/uL 266 258 302       Results from last 7 days  Lab Units 06/30/18  0533 06/29/18  0958 06/28/18 1948   SODIUM mmol/L 130* 135* 128*   POTASSIUM mmol/L 4 0 3 6 4 2   CHLORIDE mmol/L 98* 100 92*   CO2 mmol/L 26 26 26   BUN mg/dL 10 14 15   CREATININE mg/dL 0 58* 0 63 0 86   GLUCOSE RANDOM mg/dL 178* 216* 466*   CALCIUM mg/dL 8 9 9 0 10 0       Intake/Output Summary (Last 24 hours) at 06/30/18 0846  Last data filed at 06/30/18 0836   Gross per 24 hour   Intake             1980 ml   Output                0 ml   Net             1980 ml           Current Facility-Administered Medications:  acetaminophen 650 mg Oral Q6H PRN Rain Hernandez PA-C    calcium carbonate 1,000 mg Oral Daily PRN Rain Hernandze PA-C    cefazolin 1,000 mg Intravenous Gallo Tran MD Last Rate: 1,000 mg (06/30/18 0506)   clonazePAM 1 mg Oral TID Millicent Camp PA-C    gabapentin 300 mg Oral TID Millicent Camp PA-C    HYDROmorphone 1 mg Intravenous Q4H PRN Greer Levin MD    [START ON 7/1/2018] insulin glargine 35 Units Subcutaneous QAM Greer Levin MD    insulin lispro 1-5 Units Subcutaneous Q6H [address] Millicent Camp PA-C    lamoTRIgine 150 mg Oral Daily Millicent Camp PA-C    [START ON 7/1/2018] lisinopril 20 mg Oral Daily Maricel Carbajal MD    lisinopril 5 mg Oral Daily Greer Levin MD    methadone 15 mg Oral Daily Maricel Carbajla MD    ondansetron 4 mg Intravenous Q6H PRN Millicent Camp PA-C    rOPINIRole 0 5 mg Oral TID Millicent Camp PA-C    sertraline 50 mg Oral HS Millicent Camp PA-C    sodium chloride 75 mL/hr Intravenous Continuous Greer Levin MD Last Rate: 75 mL/hr (06/30/18 0837)   traMADol 50 mg Oral Q6H PRN Greer Levin MD

## 2018-07-01 ENCOUNTER — ANESTHESIA (INPATIENT)
Dept: PERIOP | Facility: HOSPITAL | Age: 63
DRG: 854 | End: 2018-07-01
Payer: COMMERCIAL

## 2018-07-01 LAB
ANION GAP SERPL CALCULATED.3IONS-SCNC: 10 MMOL/L (ref 4–13)
BASOPHILS # BLD AUTO: 0.02 THOUSANDS/ΜL (ref 0–0.1)
BASOPHILS NFR BLD AUTO: 0 % (ref 0–1)
BUN SERPL-MCNC: 8 MG/DL (ref 5–25)
CALCIUM SERPL-MCNC: 8.9 MG/DL (ref 8.3–10.1)
CHLORIDE SERPL-SCNC: 100 MMOL/L (ref 100–108)
CO2 SERPL-SCNC: 27 MMOL/L (ref 21–32)
CREAT SERPL-MCNC: 0.6 MG/DL (ref 0.6–1.3)
EOSINOPHIL # BLD AUTO: 0.08 THOUSAND/ΜL (ref 0–0.61)
EOSINOPHIL NFR BLD AUTO: 1 % (ref 0–6)
ERYTHROCYTE [DISTWIDTH] IN BLOOD BY AUTOMATED COUNT: 13.1 % (ref 11.6–15.1)
GFR SERPL CREATININE-BSD FRML MDRD: 99 ML/MIN/1.73SQ M
GLUCOSE SERPL-MCNC: 129 MG/DL (ref 65–140)
GLUCOSE SERPL-MCNC: 134 MG/DL (ref 65–140)
GLUCOSE SERPL-MCNC: 140 MG/DL (ref 65–140)
GLUCOSE SERPL-MCNC: 219 MG/DL (ref 65–140)
GLUCOSE SERPL-MCNC: 262 MG/DL (ref 65–140)
GLUCOSE SERPL-MCNC: 343 MG/DL (ref 65–140)
HCT VFR BLD AUTO: 28.3 % (ref 34.8–46.1)
HGB BLD-MCNC: 9.2 G/DL (ref 11.5–15.4)
LYMPHOCYTES # BLD AUTO: 1.85 THOUSANDS/ΜL (ref 0.6–4.47)
LYMPHOCYTES NFR BLD AUTO: 18 % (ref 14–44)
MCH RBC QN AUTO: 26.7 PG (ref 26.8–34.3)
MCHC RBC AUTO-ENTMCNC: 32.5 G/DL (ref 31.4–37.4)
MCV RBC AUTO: 82 FL (ref 82–98)
MONOCYTES # BLD AUTO: 0.53 THOUSAND/ΜL (ref 0.17–1.22)
MONOCYTES NFR BLD AUTO: 5 % (ref 4–12)
NEUTROPHILS # BLD AUTO: 7.56 THOUSANDS/ΜL (ref 1.85–7.62)
NEUTS SEG NFR BLD AUTO: 75 % (ref 43–75)
NRBC BLD AUTO-RTO: 0 /100 WBCS
PLATELET # BLD AUTO: 293 THOUSANDS/UL (ref 149–390)
PMV BLD AUTO: 9.1 FL (ref 8.9–12.7)
POTASSIUM SERPL-SCNC: 3.3 MMOL/L (ref 3.5–5.3)
RBC # BLD AUTO: 3.44 MILLION/UL (ref 3.81–5.12)
SODIUM SERPL-SCNC: 137 MMOL/L (ref 136–145)
WBC # BLD AUTO: 10.04 THOUSAND/UL (ref 4.31–10.16)

## 2018-07-01 PROCEDURE — 80048 BASIC METABOLIC PNL TOTAL CA: CPT | Performed by: HOSPITALIST

## 2018-07-01 PROCEDURE — 87186 SC STD MICRODIL/AGAR DIL: CPT | Performed by: ORTHOPAEDIC SURGERY

## 2018-07-01 PROCEDURE — 82948 REAGENT STRIP/BLOOD GLUCOSE: CPT

## 2018-07-01 PROCEDURE — 87075 CULTR BACTERIA EXCEPT BLOOD: CPT | Performed by: ORTHOPAEDIC SURGERY

## 2018-07-01 PROCEDURE — 87070 CULTURE OTHR SPECIMN AEROBIC: CPT | Performed by: ORTHOPAEDIC SURGERY

## 2018-07-01 PROCEDURE — 87205 SMEAR GRAM STAIN: CPT | Performed by: ORTHOPAEDIC SURGERY

## 2018-07-01 PROCEDURE — 87147 CULTURE TYPE IMMUNOLOGIC: CPT | Performed by: ORTHOPAEDIC SURGERY

## 2018-07-01 PROCEDURE — 0JBG0ZZ EXCISION OF RIGHT LOWER ARM SUBCUTANEOUS TISSUE AND FASCIA, OPEN APPROACH: ICD-10-PCS | Performed by: ORTHOPAEDIC SURGERY

## 2018-07-01 PROCEDURE — 85025 COMPLETE CBC W/AUTO DIFF WBC: CPT | Performed by: HOSPITALIST

## 2018-07-01 PROCEDURE — 11404 EXC TR-EXT B9+MARG 3.1-4 CM: CPT | Performed by: ORTHOPAEDIC SURGERY

## 2018-07-01 PROCEDURE — 87176 TISSUE HOMOGENIZATION CULTR: CPT | Performed by: ORTHOPAEDIC SURGERY

## 2018-07-01 PROCEDURE — 99232 SBSQ HOSP IP/OBS MODERATE 35: CPT | Performed by: HOSPITALIST

## 2018-07-01 RX ORDER — MIDAZOLAM HYDROCHLORIDE 1 MG/ML
INJECTION INTRAMUSCULAR; INTRAVENOUS AS NEEDED
Status: DISCONTINUED | OUTPATIENT
Start: 2018-07-01 | End: 2018-07-01 | Stop reason: SURG

## 2018-07-01 RX ORDER — MAGNESIUM HYDROXIDE 1200 MG/15ML
LIQUID ORAL AS NEEDED
Status: DISCONTINUED | OUTPATIENT
Start: 2018-07-01 | End: 2018-07-01 | Stop reason: HOSPADM

## 2018-07-01 RX ORDER — ONDANSETRON 2 MG/ML
4 INJECTION INTRAMUSCULAR; INTRAVENOUS ONCE AS NEEDED
Status: DISCONTINUED | OUTPATIENT
Start: 2018-07-01 | End: 2018-07-01 | Stop reason: HOSPADM

## 2018-07-01 RX ORDER — FENTANYL CITRATE 50 UG/ML
INJECTION, SOLUTION INTRAMUSCULAR; INTRAVENOUS AS NEEDED
Status: DISCONTINUED | OUTPATIENT
Start: 2018-07-01 | End: 2018-07-01 | Stop reason: SURG

## 2018-07-01 RX ORDER — LABETALOL HYDROCHLORIDE 5 MG/ML
INJECTION, SOLUTION INTRAVENOUS AS NEEDED
Status: DISCONTINUED | OUTPATIENT
Start: 2018-07-01 | End: 2018-07-01 | Stop reason: SURG

## 2018-07-01 RX ORDER — ONDANSETRON 2 MG/ML
INJECTION INTRAMUSCULAR; INTRAVENOUS AS NEEDED
Status: DISCONTINUED | OUTPATIENT
Start: 2018-07-01 | End: 2018-07-01 | Stop reason: SURG

## 2018-07-01 RX ORDER — FENTANYL CITRATE/PF 50 MCG/ML
25 SYRINGE (ML) INJECTION
Status: COMPLETED | OUTPATIENT
Start: 2018-07-01 | End: 2018-07-01

## 2018-07-01 RX ORDER — PROPOFOL 10 MG/ML
INJECTION, EMULSION INTRAVENOUS AS NEEDED
Status: DISCONTINUED | OUTPATIENT
Start: 2018-07-01 | End: 2018-07-01 | Stop reason: SURG

## 2018-07-01 RX ORDER — HYDROMORPHONE HCL 110MG/55ML
0.5 PATIENT CONTROLLED ANALGESIA SYRINGE INTRAVENOUS
Status: COMPLETED | OUTPATIENT
Start: 2018-07-01 | End: 2018-07-01

## 2018-07-01 RX ORDER — SODIUM CHLORIDE 9 MG/ML
INJECTION, SOLUTION INTRAVENOUS AS NEEDED
Status: DISCONTINUED | OUTPATIENT
Start: 2018-07-01 | End: 2018-07-01 | Stop reason: HOSPADM

## 2018-07-01 RX ADMIN — PROPOFOL 200 MG: 10 INJECTION, EMULSION INTRAVENOUS at 08:21

## 2018-07-01 RX ADMIN — LAMOTRIGINE 150 MG: 100 TABLET ORAL at 10:41

## 2018-07-01 RX ADMIN — CEFAZOLIN SODIUM 1000 MG: 1 SOLUTION INTRAVENOUS at 13:04

## 2018-07-01 RX ADMIN — GABAPENTIN 300 MG: 300 CAPSULE ORAL at 21:30

## 2018-07-01 RX ADMIN — GABAPENTIN 300 MG: 300 CAPSULE ORAL at 10:41

## 2018-07-01 RX ADMIN — CLONAZEPAM 1 MG: 1 TABLET ORAL at 21:31

## 2018-07-01 RX ADMIN — METHADONE HYDROCHLORIDE 15 MG: 10 TABLET ORAL at 10:40

## 2018-07-01 RX ADMIN — INSULIN LISPRO 3 UNITS: 100 INJECTION, SOLUTION INTRAVENOUS; SUBCUTANEOUS at 16:14

## 2018-07-01 RX ADMIN — SERTRALINE HYDROCHLORIDE 50 MG: 50 TABLET ORAL at 21:30

## 2018-07-01 RX ADMIN — FENTANYL CITRATE 25 MCG: 50 INJECTION, SOLUTION INTRAMUSCULAR; INTRAVENOUS at 08:33

## 2018-07-01 RX ADMIN — HYDROMORPHONE HYDROCHLORIDE 0.5 MG: 2 INJECTION INTRAMUSCULAR; INTRAVENOUS; SUBCUTANEOUS at 09:44

## 2018-07-01 RX ADMIN — CEFAZOLIN SODIUM 1000 MG: 1 SOLUTION INTRAVENOUS at 05:20

## 2018-07-01 RX ADMIN — INSULIN LISPRO 1 UNITS: 100 INJECTION, SOLUTION INTRAVENOUS; SUBCUTANEOUS at 21:31

## 2018-07-01 RX ADMIN — CLONAZEPAM 1 MG: 1 TABLET ORAL at 10:40

## 2018-07-01 RX ADMIN — ONDANSETRON HYDROCHLORIDE 4 MG: 2 INJECTION, SOLUTION INTRAVENOUS at 08:28

## 2018-07-01 RX ADMIN — HYDROMORPHONE HYDROCHLORIDE 1 MG: 1 INJECTION, SOLUTION INTRAMUSCULAR; INTRAVENOUS; SUBCUTANEOUS at 23:48

## 2018-07-01 RX ADMIN — FENTANYL CITRATE 25 MCG: 50 INJECTION, SOLUTION INTRAMUSCULAR; INTRAVENOUS at 08:31

## 2018-07-01 RX ADMIN — HYDROMORPHONE HYDROCHLORIDE 1 MG: 1 INJECTION, SOLUTION INTRAMUSCULAR; INTRAVENOUS; SUBCUTANEOUS at 02:13

## 2018-07-01 RX ADMIN — FENTANYL CITRATE 25 MCG: 50 INJECTION, SOLUTION INTRAMUSCULAR; INTRAVENOUS at 08:26

## 2018-07-01 RX ADMIN — SODIUM CHLORIDE: 0.9 INJECTION, SOLUTION INTRAVENOUS at 08:57

## 2018-07-01 RX ADMIN — HYDROMORPHONE HYDROCHLORIDE 0.5 MG: 2 INJECTION INTRAMUSCULAR; INTRAVENOUS; SUBCUTANEOUS at 09:57

## 2018-07-01 RX ADMIN — MIDAZOLAM 2 MG: 1 INJECTION INTRAMUSCULAR; INTRAVENOUS at 08:15

## 2018-07-01 RX ADMIN — INSULIN LISPRO 2 UNITS: 100 INJECTION, SOLUTION INTRAVENOUS; SUBCUTANEOUS at 12:16

## 2018-07-01 RX ADMIN — FENTANYL CITRATE 50 MCG: 50 INJECTION, SOLUTION INTRAMUSCULAR; INTRAVENOUS at 09:11

## 2018-07-01 RX ADMIN — SODIUM CHLORIDE 75 ML/HR: 0.9 INJECTION, SOLUTION INTRAVENOUS at 15:09

## 2018-07-01 RX ADMIN — HYDROMORPHONE HYDROCHLORIDE 1 MG: 1 INJECTION, SOLUTION INTRAMUSCULAR; INTRAVENOUS; SUBCUTANEOUS at 12:39

## 2018-07-01 RX ADMIN — FENTANYL CITRATE 25 MCG: 50 INJECTION, SOLUTION INTRAMUSCULAR; INTRAVENOUS at 08:35

## 2018-07-01 RX ADMIN — FENTANYL CITRATE 25 MCG: 50 INJECTION, SOLUTION INTRAMUSCULAR; INTRAVENOUS at 09:26

## 2018-07-01 RX ADMIN — FENTANYL CITRATE 25 MCG: 50 INJECTION, SOLUTION INTRAMUSCULAR; INTRAVENOUS at 08:29

## 2018-07-01 RX ADMIN — FENTANYL CITRATE 25 MCG: 50 INJECTION, SOLUTION INTRAMUSCULAR; INTRAVENOUS at 09:21

## 2018-07-01 RX ADMIN — ROPINIROLE 0.5 MG: 1 TABLET, FILM COATED ORAL at 21:30

## 2018-07-01 RX ADMIN — CEFAZOLIN SODIUM 1000 MG: 1 SOLUTION INTRAVENOUS at 21:29

## 2018-07-01 RX ADMIN — HYDROMORPHONE HYDROCHLORIDE 0.5 MG: 2 INJECTION INTRAMUSCULAR; INTRAVENOUS; SUBCUTANEOUS at 09:51

## 2018-07-01 RX ADMIN — HYDROMORPHONE HYDROCHLORIDE 1 MG: 1 INJECTION, SOLUTION INTRAMUSCULAR; INTRAVENOUS; SUBCUTANEOUS at 16:13

## 2018-07-01 RX ADMIN — CLONAZEPAM 1 MG: 1 TABLET ORAL at 16:13

## 2018-07-01 RX ADMIN — FENTANYL CITRATE 25 MCG: 50 INJECTION, SOLUTION INTRAMUSCULAR; INTRAVENOUS at 09:33

## 2018-07-01 RX ADMIN — HYDROMORPHONE HYDROCHLORIDE 1 MG: 1 INJECTION, SOLUTION INTRAMUSCULAR; INTRAVENOUS; SUBCUTANEOUS at 19:42

## 2018-07-01 RX ADMIN — FENTANYL CITRATE 25 MCG: 50 INJECTION, SOLUTION INTRAMUSCULAR; INTRAVENOUS at 09:30

## 2018-07-01 RX ADMIN — GABAPENTIN 300 MG: 300 CAPSULE ORAL at 16:13

## 2018-07-01 RX ADMIN — LIDOCAINE HYDROCHLORIDE 100 MG: 20 INJECTION, SOLUTION INTRAVENOUS at 08:21

## 2018-07-01 RX ADMIN — HYDROMORPHONE HYDROCHLORIDE 1 MG: 1 INJECTION, SOLUTION INTRAMUSCULAR; INTRAVENOUS; SUBCUTANEOUS at 06:26

## 2018-07-01 RX ADMIN — ROPINIROLE 0.5 MG: 1 TABLET, FILM COATED ORAL at 10:41

## 2018-07-01 RX ADMIN — FENTANYL CITRATE 25 MCG: 50 INJECTION, SOLUTION INTRAMUSCULAR; INTRAVENOUS at 08:42

## 2018-07-01 RX ADMIN — LABETALOL HYDROCHLORIDE 5 MG: 5 INJECTION, SOLUTION INTRAVENOUS at 08:37

## 2018-07-01 RX ADMIN — HYDROMORPHONE HYDROCHLORIDE 0.5 MG: 2 INJECTION INTRAMUSCULAR; INTRAVENOUS; SUBCUTANEOUS at 09:38

## 2018-07-01 RX ADMIN — ROPINIROLE 0.5 MG: 1 TABLET, FILM COATED ORAL at 16:13

## 2018-07-01 RX ADMIN — SODIUM CHLORIDE 75 ML/HR: 0.9 INJECTION, SOLUTION INTRAVENOUS at 21:30

## 2018-07-01 RX ADMIN — LISINOPRIL 15 MG: 5 TABLET ORAL at 10:39

## 2018-07-01 NOTE — PROGRESS NOTES
Progress Note - Rober Jacob 65 y o  female MRN: [id number]    Unit/Bed#: E5 -01 Encounter: [id number]    Principal Problem:    Abscess of right forearm  Active Problems:    Hyponatremia    Right arm cellulitis    Benign essential hypertension    Type 2 diabetes mellitus, uncontrolled ([address] )  Resolved Problems:    * No resolved hospital problems  *      Assessment/Plan:  Cellulitis/abscess right forearm-MRI reveals soft tissue swelling along the radial aspect of the right forearm  hemodynamically stable    Patient underwent I&D of the deep forearm abscess today  Two drains have been placed  A sent for cultures  Continues on cefazolin to which she seems to be responding well patient without any fever or leukocytosis  Await culture results to determine sensitivities and switched to oral antibiotics      Sepsis on presentation secondary to 1 with WBC 67679 and heart rate of 101   This is now improved   Continue IV cefazolin   Await blood culture and wound culture results      Plan for Additional Problems:   DM type 2, uncontrolled- patient admits to being noncompliant with insulin for the last month  Probably longer than that since glycosylated hemoglobin of 13 4  On Lantus 35 units daily and Humalog 3 times with meals  Blood sugars now ranging between 130 and 160  Her records revealed that she was on Lantus 50 units twice a day and Humalog 11 units 3 times a day with meals at home however compliance is very doubtful      HTN, uncontrolled- patient noncompliant with lisinopril-restart at 15 mg daily  Will titrate dose to get blood pressure down to 725-845 systolic      Hyponatremia-secondary to cellulitis as well as SIADH  Resolved     Hepatitis-C--being followed up by her primary care physician     Bipolar disorder-mood stable  Continue sertraline and lamotrigine    On methadone for drug use--confirmed with methadone clinic that she was on 15 mg a day      Await culture results to determine sensitivity to antibiotic          Subjective:  Patient underwent I&D of the right forearm deep abscess  Cultures have been sent  Drains have been placed  Patient complaining of significant pain following the surgery  Will increase her dose of Dilaudid  No fever  Leukocytosis has resolved    Physical Exam:   Vitals: Blood pressure 166/74, pulse 77, temperature 97 6 °F (36 4 °C), temperature source Temporal, resp  rate 17, weight 65 3 kg (144 lb), SpO2 96 %  ,There is no height or weight on file to calculate BMI  Gen:  Pleasant, non-tachypnic, non-dyspnic  Conversant  Heart: regular rate and rhythm, S1S2 present, no murmur, rub or gallop  Lungs: clear to ausculatation bilaterally  No wheezing, crackless, or rhonchi  No accessory muscle use or respiratory distress  Abd: soft, non-tender, non-distended  NABS, no guarding, rebound or peritoneal signs  Extremities:  Right arm bandaged with 2 drains  Neuro: awake, alert and oriented  Cranial nerves 2-12 intact  Strength and sensation grossly intact  Skin: warm and dry: no petechiae, purpura and rash      LABS:     Results from last 7 days  Lab Units 07/01/18  0601 06/30/18  0533 06/29/18  0958   WBC Thousand/uL 10 04 10 45* 11 31*   HEMOGLOBIN g/dL 9 2* 9 3* 9 5*   HEMATOCRIT % 28 3* 29 2* 29 5*   PLATELETS Thousands/uL 293 266 258       Results from last 7 days  Lab Units 07/01/18  0601 06/30/18  0533 06/29/18  0958   SODIUM mmol/L 137 130* 135*   POTASSIUM mmol/L 3 3* 4 0 3 6   CHLORIDE mmol/L 100 98* 100   CO2 mmol/L 27 26 26   BUN mg/dL 8 10 14   CREATININE mg/dL 0 60 0 58* 0 63   GLUCOSE RANDOM mg/dL 129 178* 216*   CALCIUM mg/dL 8 9 8 9 9 0       Intake/Output Summary (Last 24 hours) at 07/01/18 1149  Last data filed at 07/01/18 0919   Gross per 24 hour   Intake              800 ml   Output                0 ml   Net              800 ml           Current Facility-Administered Medications:  acetaminophen 650 mg Oral Q6H PRN Manav Swartz PA-C calcium carbonate 1,000 mg Oral Daily PRN Shanta Bright PA-C    cefazolin 1,000 mg Intravenous Q8H Tan Porter MD Last Rate: Stopped (07/01/18 0750)   clonazePAM 1 mg Oral TID Shanta Bright PA-C    gabapentin 300 mg Oral TID Shanta Bright PA-C    HYDROmorphone 1 mg Intravenous Q4H PRN Maricel Carbajal MD    insulin glargine 35 Units Subcutaneous QAM Maricel Carbajal MD    insulin lispro 1-5 Units Subcutaneous 4x Daily (AC & HS) Maricel Carbajal MD    insulin lispro 5 Units Subcutaneous 4x Daily (AC & HS) Tan Porter MD    lamoTRIgine 150 mg Oral Daily Shanta Bright PA-C    lisinopril 15 mg Oral Daily Maricel Carbajal MD    lisinopril 5 mg Oral Daily Tan Porter MD    methadone 15 mg Oral Daily Maricel Carbajal MD    ondansetron 4 mg Intravenous Q6H PRN Shanta Bright PA-C    rOPINIRole 0 5 mg Oral TID Shanta Bright PA-C    sertraline 50 mg Oral HS Shanta Bright PA-C    sodium chloride 75 mL/hr Intravenous Continuous Tan Porter MD Last Rate: 75 mL/hr (06/30/18 0837)   traMADol 50 mg Oral Q6H PRN Tan Porter MD        Family update-daughter in the room-updated

## 2018-07-01 NOTE — ANESTHESIA POSTPROCEDURE EVALUATION
Post-Op Assessment Note      CV Status:  Stable    Mental Status:  Alert and awake    Hydration Status:  Euvolemic    PONV Controlled:  Controlled    Airway Patency:  Patent    Post Op Vitals Reviewed: Yes          Staff: Anesthesiologist           BP (!) 180/84 (07/01/18 0915)    Temp 98 5 °F (36 9 °C) (07/01/18 0915)    Pulse 71 (07/01/18 0915)   Resp 18 (07/01/18 0915)    SpO2 99 % (07/01/18 0915)

## 2018-07-01 NOTE — ANESTHESIA PREPROCEDURE EVALUATION
Review of Systems/Medical History  Patient summary reviewed  Chart reviewed      Cardiovascular  Hyperlipidemia, Hypertension ,    Pulmonary  Negative pulmonary ROS        GI/Hepatic  Negative GI/hepatic ROS          Negative  ROS        Endo/Other  Diabetes type 2 Insulin,      GYN       Hematology  Negative hematology ROS Anemia ,     Musculoskeletal    Comment: Right arm cellulitis      Neurology  Negative neurology ROS      Psychology           Physical Exam    Airway    Mallampati score: I  TM Distance: <3 FB       Dental   upper dentures,     Cardiovascular  Rhythm: regular, Rate: normal, Cardiovascular exam normal    Pulmonary  Pulmonary exam normal     Other Findings        Anesthesia Plan  ASA Score- 3     Anesthesia Type- general with ASA Monitors  Additional Monitors:   Airway Plan: LMA  Plan Factors- Patient instructed to abstain from smoking on day of procedure  Patient did not smoke on day of surgery  Induction- intravenous  Postoperative Plan- Plan for postoperative opioid use  Informed Consent- Anesthetic plan and risks discussed with patient

## 2018-07-01 NOTE — OP NOTE
OPERATIVE REPORT  PATIENT NAME: Dez Sung    :  1955  MRN: [id number]  Pt Location: [facility] OR ROOM 01    SURGERY DATE: 2018    Surgeon(s) and Role:     Grace Coombs MD - Primary    Pre-Op Diagnosis Codes:     * Abscess of right forearm [L02 413]    Post-Op Diagnosis Codes:     * Abscess of right forearm [L02 413]    Procedure(s) (LRB):  EXCISIONAL DEBRIDEMENT OF SKIN AND SUBCUTANEOUS TISSUE, INCISION AND DRAINAGE RIGHT FOREARM    Specimen(s):  ID Type Source Tests Collected by Time Destination   A : RIGHT FOREARM DEEP ABSCESS Tissue Soft Tissue, Other ANAEROBIC CULTURE AND GRAM STAIN, CULTURE, TISSUE AND GRAM STAIN Bob Stanley MD 2018 [id number]        Estimated Blood Loss:   Minimal    Drains:  Closed/Suction Drain Right Other (Comment) Accordion 10 Fr  (Active)   Number of days: 0     Anesthesia Type:   General    Drains:  Medium Hemovac    Complications:   None    Procedure and Technique:  The patient was identified in the preoperative holding area, the surgical site was identified by the surgeon  The patient was transported to the operating room and transferred to the OR table in a supine position  General anesthesia was administered, and an LMA was placed  The right upper extremity was prepped and draped in the usual sterile fashion  The patient was receiving intravenous antibiotics preoperatively, and no additional antibiotics were indicated prior to incision  Pre-operative range of motion measured under anesthesia was elbow flexion 130°, elbow extension -30° (flexion contracture), forearm supination 10°, forearm pronation 60°, wrist extension 10°, and wrist flexion 50°  Following surgical time-out, two 3-4 cm incisions were performed proximal and distal to the 3 cm dorsal-radial forearm wound, which was excised in an elliptical fashion  Approximately 4 square cm of skin and subcutaneous tissue was excised using the 15 blade scalpel    Aerobic and anaerobic cultures were obtained from the abscess, which extended along the volar surface of the radius and ulnar shafts  The fascia, muscle, tendon, and bone appeared viable  The wound was irrigated with 6 L of sterile saline solution using the pulse   A medium Hemovac drain was inserted in the deep abscess exiting 1 cm distal to the incision  The skin was closed with 0 Prolene suture  The wound was dressed with sterile 4 x 4 gauze and Kerlix  Anesthesia was reversed, and the patient was extubated  The patient was transported to the recovery area in stable condition      Patient Disposition:  PACU  and extubated and stable    SIGNATURE: Felisha Garcia MD  DATE: July 1, 2018  TIME: 9:09 AM

## 2018-07-01 NOTE — PROGRESS NOTES
Progress Note - Orthopedics   Uma Roth 65 y o  female MRN: [id number]  Unit/Bed#: OR POOL Encounter: [id number]      Assessment & Plan:  Right forearm superficial abscess  1  OR today for I&D right forearm  2  Continue IV antibiotics  3  Will send new cultures from OR  4  Ice and elevation  5  Dressing change daily and prn  Subjective: Patient reports persistent pain, swelling, and drainage from her right forearm  Objective:    Vitals:    07/01/18 0714   BP: 166/74   Pulse: 80   Resp: 18   Temp: 98 8 °F (37 1 °C)   SpO2: 97%       Physical Exam:  Right forearm: Moderate soft tissue swelling  Compartments are compressible  Mild to moderate erythema with 3 cm wound dorsal forearm  Moderate purulent drainage  Elbow and wrist ROM limited by pain  Capillary refill is brisk distally  Sensation intact to light touch median, ulnar, and radial nerve        Lab Results   Component Value Date     07/01/2018     07/01/2018    CO2 27 07/01/2018    BUN 8 07/01/2018    CREATININE 0 60 07/01/2018    GLUCOSE 129 07/01/2018    WBC 10 04 07/01/2018    HGB 9 2 (L) 07/01/2018     07/01/2018

## 2018-07-02 LAB
GLUCOSE SERPL-MCNC: 247 MG/DL (ref 65–140)
GLUCOSE SERPL-MCNC: 251 MG/DL (ref 65–140)
GLUCOSE SERPL-MCNC: 294 MG/DL (ref 65–140)
GLUCOSE SERPL-MCNC: 325 MG/DL (ref 65–140)

## 2018-07-02 PROCEDURE — 99024 POSTOP FOLLOW-UP VISIT: CPT | Performed by: PHYSICIAN ASSISTANT

## 2018-07-02 PROCEDURE — 82948 REAGENT STRIP/BLOOD GLUCOSE: CPT

## 2018-07-02 PROCEDURE — 99232 SBSQ HOSP IP/OBS MODERATE 35: CPT | Performed by: INTERNAL MEDICINE

## 2018-07-02 RX ORDER — INSULIN GLARGINE 100 [IU]/ML
40 INJECTION, SOLUTION SUBCUTANEOUS EVERY MORNING
Status: DISCONTINUED | OUTPATIENT
Start: 2018-07-03 | End: 2018-07-03

## 2018-07-02 RX ORDER — GABAPENTIN 300 MG/1
600 CAPSULE ORAL 3 TIMES DAILY
Status: DISCONTINUED | OUTPATIENT
Start: 2018-07-02 | End: 2018-07-21 | Stop reason: HOSPADM

## 2018-07-02 RX ADMIN — INSULIN LISPRO 2 UNITS: 100 INJECTION, SOLUTION INTRAVENOUS; SUBCUTANEOUS at 08:50

## 2018-07-02 RX ADMIN — CLONAZEPAM 1 MG: 1 TABLET ORAL at 21:40

## 2018-07-02 RX ADMIN — CLONAZEPAM 1 MG: 1 TABLET ORAL at 15:44

## 2018-07-02 RX ADMIN — INSULIN GLARGINE 35 UNITS: 100 INJECTION, SOLUTION SUBCUTANEOUS at 08:50

## 2018-07-02 RX ADMIN — CLONAZEPAM 1 MG: 1 TABLET ORAL at 08:49

## 2018-07-02 RX ADMIN — HYDROMORPHONE HYDROCHLORIDE 1 MG: 1 INJECTION, SOLUTION INTRAMUSCULAR; INTRAVENOUS; SUBCUTANEOUS at 15:45

## 2018-07-02 RX ADMIN — ROPINIROLE 0.5 MG: 1 TABLET, FILM COATED ORAL at 21:40

## 2018-07-02 RX ADMIN — INSULIN LISPRO 2 UNITS: 100 INJECTION, SOLUTION INTRAVENOUS; SUBCUTANEOUS at 12:13

## 2018-07-02 RX ADMIN — CEFAZOLIN SODIUM 1000 MG: 1 SOLUTION INTRAVENOUS at 05:23

## 2018-07-02 RX ADMIN — LAMOTRIGINE 150 MG: 100 TABLET ORAL at 08:49

## 2018-07-02 RX ADMIN — INSULIN LISPRO 3 UNITS: 100 INJECTION, SOLUTION INTRAVENOUS; SUBCUTANEOUS at 17:18

## 2018-07-02 RX ADMIN — HYDROMORPHONE HYDROCHLORIDE 1 MG: 1 INJECTION, SOLUTION INTRAMUSCULAR; INTRAVENOUS; SUBCUTANEOUS at 06:32

## 2018-07-02 RX ADMIN — CEFAZOLIN SODIUM 1000 MG: 1 SOLUTION INTRAVENOUS at 21:40

## 2018-07-02 RX ADMIN — SODIUM CHLORIDE 75 ML/HR: 0.9 INJECTION, SOLUTION INTRAVENOUS at 11:44

## 2018-07-02 RX ADMIN — HYDROMORPHONE HYDROCHLORIDE 1 MG: 1 INJECTION, SOLUTION INTRAMUSCULAR; INTRAVENOUS; SUBCUTANEOUS at 03:23

## 2018-07-02 RX ADMIN — HYDROMORPHONE HYDROCHLORIDE 1 MG: 1 INJECTION, SOLUTION INTRAMUSCULAR; INTRAVENOUS; SUBCUTANEOUS at 21:43

## 2018-07-02 RX ADMIN — TRAMADOL HYDROCHLORIDE 50 MG: 50 TABLET, FILM COATED ORAL at 08:47

## 2018-07-02 RX ADMIN — INSULIN LISPRO 2 UNITS: 100 INJECTION, SOLUTION INTRAVENOUS; SUBCUTANEOUS at 21:42

## 2018-07-02 RX ADMIN — HYDROMORPHONE HYDROCHLORIDE 1 MG: 1 INJECTION, SOLUTION INTRAMUSCULAR; INTRAVENOUS; SUBCUTANEOUS at 09:39

## 2018-07-02 RX ADMIN — ROPINIROLE 0.5 MG: 1 TABLET, FILM COATED ORAL at 08:48

## 2018-07-02 RX ADMIN — SERTRALINE HYDROCHLORIDE 50 MG: 50 TABLET ORAL at 21:40

## 2018-07-02 RX ADMIN — GABAPENTIN 300 MG: 300 CAPSULE ORAL at 08:48

## 2018-07-02 RX ADMIN — ROPINIROLE 0.5 MG: 1 TABLET, FILM COATED ORAL at 15:44

## 2018-07-02 RX ADMIN — CEFAZOLIN SODIUM 1000 MG: 1 SOLUTION INTRAVENOUS at 12:12

## 2018-07-02 RX ADMIN — GABAPENTIN 600 MG: 300 CAPSULE ORAL at 21:40

## 2018-07-02 RX ADMIN — METHADONE HYDROCHLORIDE 15 MG: 10 TABLET ORAL at 08:48

## 2018-07-02 RX ADMIN — LISINOPRIL 15 MG: 5 TABLET ORAL at 08:49

## 2018-07-02 RX ADMIN — GABAPENTIN 600 MG: 300 CAPSULE ORAL at 15:44

## 2018-07-02 NOTE — POST OP PROGRESS NOTES
Orthopaedic Surgery - Progress Note  Sabrina Fiore (89 y o  female)   : 1955   MRN: [id number]  Date: 2018   Encounter: [id number]   Unit/Bed#: E5 -01    Assessment / Plan  Postop day 1 status post I and D of right forearm abscess    · Dressing reapplied today  Will continue to monitor drain output at this time  50 ml serous sanguinous fluid emptied from drain this morning  · Continue with Ancef per Medicine at this time culture still pending at this time  · Continue with ice and analgesics as needed  · Continue with activity as tolerated per patient  Subjective  Postop day 1 status post I and D of right forearm abscess  Patient is doing well at this time  She feels overall her pain is somewhat better than yesterday though not greatly improved  After removing dressings for dressing change patient is feels that overall her arm appears much less swollen and much less red  She is able to move her left hand slightly more than she could yesterday those very limited still due to pain  She is denying any numbness or tingling at this time  Vitals  Temp:  [97 6 °F (36 4 °C)-99 °F (37 2 °C)] 98 9 °F (37 2 °C)  HR:  [71-83] 76  Resp:  [15-21] 18  BP: (158-183)/(74-90) 171/75  There is no height or weight on file to calculate BMI  I/O last 24 hours: In: 800 [I V :800]  Out: 50 [Drains:50]    Right Hand & Wrist Exam  Alignment:  Normal resting hand posture  Inspection:  Mild to moderate as expected swelling  Mild edema  Mild erythema  No deformity  Incision Well proximally at this time with no active drainage  Palpation:  Moderate tenderness at The area around the incision and all surfaces of the hand and forearm, though the patient does feel this is improved since yesterday     ROM:  Wrist and hand range of motion decreased at this time again the patient does feel is improved since yesterday but she does move minimally due to her pain  Strength:  Not tested  Stability:  Not tested    Tests: No pertinent positive or negative tests  · Neurovascular:  Sensation intact in Ax/R/M/U nerve distributions  Sensation intact in all digital nerve distributions  Fingers warm and perfused  Lab Results  (I have personally reviewed pertinent lab results )    Results from last 7 days  Lab Units 07/01/18 0601 06/30/18 0533 06/29/18 0958 06/28/18 1948   WBC Thousand/uL 10 04 10 45* 11 31* 12 81*   HEMOGLOBIN g/dL 9 2* 9 3* 9 5* 10 8*   HEMATOCRIT % 28 3* 29 2* 29 5* 32 0*   PLATELETS Thousands/uL 293 266 258 302       Results from last 7 days  Lab Units 06/29/18 0958   PTT seconds 19*   INR  1 07       Results from last 7 days  Lab Units 07/01/18 0601 06/30/18 0533 06/29/18 0958 06/28/18 1948   SODIUM mmol/L 137 130* 135* 128*   POTASSIUM mmol/L 3 3* 4 0 3 6 4 2   CHLORIDE mmol/L 100 98* 100 92*   CO2 mmol/L 27 26 26 26   ANION GAP mmol/L 10 6 9 10   BUN mg/dL 8 10 14 15   CREATININE mg/dL 0 60 0 58* 0 63 0 86   EGFR ml/min/1 73sq m 99 100 97 73   CALCIUM mg/dL 8 9 8 9 9 0 10 0   ALK PHOS U/L  --   --   --  126*   TOTAL PROTEIN g/dL  --   --   --  7 9   ALT U/L  --   --   --  16   AST U/L  --   --   --  24   BILIRUBIN TOTAL mg/dL  --   --   --  0 50   GLUCOSE RANDOM mg/dL 129 178* 216* 466*           Results from last 7 days  Lab Units 07/01/18  0904 06/28/18 2016 06/28/18 1949   BLOOD CULTURE   --  No Growth at 72 hrs  No Growth at 72 hrs     GRAM STAIN RESULT  2+ Polys  1+ Gram positive cocci in pairs  --   --        Momo Fernandes PA-C

## 2018-07-02 NOTE — PROGRESS NOTES
[facility] Internal Medicine Progress Note  Patient: Jose Martinez 65 y o  female   MRN: [id number]  PCP: Mckayla Tatum MD  Unit/Bed#: E5 -01 Encounter: [id number]  Date Of Visit: 18    Assessment:    Principal Problem:    Abscess of right forearm  Active Problems:    Hyponatremia    Right arm cellulitis    Benign essential hypertension    Type 2 diabetes mellitus, uncontrolled ([facility] [address] )      Plan:    1 )  Right upper extremity abscess/cellulitis  -MRI reveals soft tissue swelling along the radial aspect of right forearm  -status post I and D and drain placement on 2018  -will continue with empiric antibiotics cefazolin, awaiting cultures  -orthopedic follow-up appreciated    2 )  Sepsis-present on admission  -with leukocytosis, tachycardia  -now improved    3 )  Diabetes mellitus  -HbA1c 13 4  -patient not compliant with home insulin  -Accu-Chek still elevated there for will increase Lantus to 40 units daily and Humalog to 8 units t i d   -monitor Accu-Cheks, sliding scale for coverage    4 )  Hypertension  -continue with current medications, monitor blood pressure    5 )  Hepatitis-C    6 )  Bipolar disorder  -continue with home medication    7 )  Chronic opioid dependence  -maintained on methadone      Patient Centered Rounds: I have performed bedside rounds with nursing staff today  Time Spent for Care: 20 minutes  More than 50% of total time spent on counseling and coordination of care as described above  Current Length of Stay: 4 day(s)    Current Patient Status: Inpatient   Certification Statement: The patient will continue to require additional inpatient hospital stay due to RUE abscess s/p I&D and drian    Discharge Plan / Estimated Discharge Date: 2-3 days    Code Status: Level 1 - Full Code      Subjective:   Patient seen and examined at bedside, complains of occasional pain and right upper extremity and also difficulty sleeping      Objective:     Vitals:   Temp (24hrs), Av 4 °F (36 9 °C), Min:97 8 °F (36 6 °C), Max:98 9 °F (37 2 °C)    HR:  [76-83] 76  Resp:  [18] 18  BP: (158-171)/(75-81) 171/75  SpO2:  [94 %-97 %] 97 %  There is no height or weight on file to calculate BMI  Input and Output Summary (last 24 hours): Intake/Output Summary (Last 24 hours) at 07/02/18 1143  Last data filed at 07/02/18 0523   Gross per 24 hour   Intake                0 ml   Output               50 ml   Net              -50 ml       Physical Exam:    Constitutional: Patient is oriented to person, place and time, no acute distress  HEENT:  Normocephalic, atraumatic, EOMI, PERRLA, no scleral icterus, no pallor, moist oral mucosa  Neck:  Supple, no masses, no thyromegaly, no bruits Normal range of motion  Lymph nodes:  No lymphadenopathy  Cardiovascular: Normal S1S2, RRR, No murmurs/rubs/gallops appreciated  Pulmonary: Clear to auscultation bilaterally, No rhonchi/rales/wheezing appreciated  Abdominal: Soft, Bowel sounds present, Non-tender, Non-distended, No rebound/guarding, no hepatomegaly   Musculoskeletal: No tenderness/abnormality   Extremities:  No cyanosis, clubbing or edema  Peripheral pulses palpable and equal bilaterally  Neurological: Cranial nerves II-XII grossly intact, sensation intact, otherwise no focal neurological symptoms     Skin:  Right upper extremity with dressing in place, drain in place    Additional Data:     Labs:      Results from last 7 days  Lab Units 07/01/18  0601   WBC Thousand/uL 10 04   HEMOGLOBIN g/dL 9 2*   HEMATOCRIT % 28 3*   PLATELETS Thousands/uL 293   NEUTROS PCT % 75   LYMPHS PCT % 18   MONOS PCT % 5   EOS PCT % 1       Results from last 7 days  Lab Units 07/01/18  0601  06/28/18  1948   SODIUM mmol/L 137  < > 128*   POTASSIUM mmol/L 3 3*  < > 4 2   CHLORIDE mmol/L 100  < > 92*   CO2 mmol/L 27  < > 26   BUN mg/dL 8  < > 15   CREATININE mg/dL 0 60  < > 0 86   CALCIUM mg/dL 8 9  < > 10 0   TOTAL PROTEIN g/dL  --   --  7 9   BILIRUBIN TOTAL mg/dL  --   -- 0  50   ALK PHOS U/L  --   --  126*   ALT U/L  --   --  16   AST U/L  --   --  24   GLUCOSE RANDOM mg/dL 129  < > 466*   < > = values in this interval not displayed  Results from last 7 days  Lab Units 06/29/18  0958   INR  1 07        I Have Reviewed All Lab Data Listed Above  Recent Cultures (last 7 days):       Results from last 7 days  Lab Units 07/01/18  0904 06/28/18 2016 06/28/18  1949   BLOOD CULTURE   --  No Growth at 72 hrs  No Growth at 72 hrs     GRAM STAIN RESULT  2+ Polys  1+ Gram positive cocci in pairs  --   --        Last 24 Hours Medication List:     Current Facility-Administered Medications:  acetaminophen 650 mg Oral Q6H PRN Susie Luke PA-C    calcium carbonate 1,000 mg Oral Daily PRN Susie Luke PA-C    cefazolin 1,000 mg Intravenous Q8H Meena Joe MD Last Rate: 1,000 mg (07/02/18 0523)   clonazePAM 1 mg Oral TID Susie Luke PA-C    gabapentin 600 mg Oral TID Kirill Florence MD    HYDROmorphone 1 mg Intravenous Q3H PRN Meena Joe MD    [START ON 7/3/2018] insulin glargine 40 Units Subcutaneous QAM Kiirll Florence MD    insulin lispro 1-5 Units Subcutaneous 4x Daily (AC & HS) Meena Joe MD    insulin lispro 8 Units Subcutaneous TID AC Kirill Florence MD    lamoTRIgine 150 mg Oral Daily Susie Luke PA-C    lisinopril 15 mg Oral Daily Meena Joe MD    lisinopril 5 mg Oral Daily Meena Joe MD    methadone 15 mg Oral Daily Meena Joe MD    ondansetron 4 mg Intravenous Q6H PRN Susie Luke PA-C    rOPINIRole 0 5 mg Oral TID Susie Luke PA-C    sertraline 50 mg Oral HS Susie Luke PA-C    sodium chloride 75 mL/hr Intravenous Continuous Meena Joe MD Last Rate: Stopped (07/02/18 1141)   traMADol 50 mg Oral Q6H PRN Meena Joe MD         Today, Patient Was Seen By: Kirill Florence MD

## 2018-07-02 NOTE — PLAN OF CARE
Problem: Nutrition/Hydration-ADULT  Goal: Nutrient/Hydration intake appropriate for improving, restoring or maintaining nutritional needs  Monitor and assess patient's nutrition/hydration status for malnutrition (ex- brittle hair, bruises, dry skin, pale skin and conjunctiva, muscle wasting, smooth red tongue, and disorientation)  Collaborate with interdisciplinary team and initiate plan and interventions as ordered  Monitor patient's weight and dietary intake as ordered or per policy  Utilize nutrition screening tool and intervene per policy  Determine patient's food preferences and provide high-protein, high-caloric foods as appropriate  INTERVENTIONS:  - Monitor oral intake, urinary output, labs, and treatment plans  - Assess nutrition and hydration status and recommend course of action  - Evaluate amount of meals eaten  - Assist patient with eating if necessary   - Allow adequate time for meals  - Recommend/ encourage appropriate diets, oral nutritional supplements, and vitamin/mineral supplements  - Order, calculate, and assess calorie counts as needed  - Recommend, monitor, and adjust tube feedings and TPN/PPN based on assessed needs  - Assess need for intravenous fluids  - Provide specific nutrition/hydration education as appropriate  - Include patient/family/caregiver in decisions related to nutrition   Outcome: Progressing  Pt po intake is good, no new changes desired, will yoko to monitor

## 2018-07-03 LAB
BACTERIA BLD CULT: NORMAL
BACTERIA BLD CULT: NORMAL
BACTERIA SPEC ANAEROBE CULT: NORMAL
GLUCOSE SERPL-MCNC: 104 MG/DL (ref 65–140)
GLUCOSE SERPL-MCNC: 117 MG/DL (ref 65–140)
GLUCOSE SERPL-MCNC: 257 MG/DL (ref 65–140)
GLUCOSE SERPL-MCNC: 88 MG/DL (ref 65–140)

## 2018-07-03 PROCEDURE — 82948 REAGENT STRIP/BLOOD GLUCOSE: CPT

## 2018-07-03 PROCEDURE — 99232 SBSQ HOSP IP/OBS MODERATE 35: CPT | Performed by: INTERNAL MEDICINE

## 2018-07-03 PROCEDURE — 99024 POSTOP FOLLOW-UP VISIT: CPT | Performed by: PHYSICIAN ASSISTANT

## 2018-07-03 RX ORDER — LISINOPRIL 20 MG/1
20 TABLET ORAL DAILY
Status: DISCONTINUED | OUTPATIENT
Start: 2018-07-04 | End: 2018-07-21

## 2018-07-03 RX ORDER — INSULIN GLARGINE 100 [IU]/ML
50 INJECTION, SOLUTION SUBCUTANEOUS EVERY MORNING
Status: DISCONTINUED | OUTPATIENT
Start: 2018-07-04 | End: 2018-07-05

## 2018-07-03 RX ORDER — HEPARIN SODIUM 5000 [USP'U]/ML
5000 INJECTION, SOLUTION INTRAVENOUS; SUBCUTANEOUS EVERY 8 HOURS SCHEDULED
Status: DISCONTINUED | OUTPATIENT
Start: 2018-07-03 | End: 2018-07-11

## 2018-07-03 RX ADMIN — HYDROMORPHONE HYDROCHLORIDE 1 MG: 1 INJECTION, SOLUTION INTRAMUSCULAR; INTRAVENOUS; SUBCUTANEOUS at 06:48

## 2018-07-03 RX ADMIN — CLONAZEPAM 1 MG: 1 TABLET ORAL at 23:18

## 2018-07-03 RX ADMIN — SODIUM CHLORIDE 75 ML/HR: 0.9 INJECTION, SOLUTION INTRAVENOUS at 16:22

## 2018-07-03 RX ADMIN — HYDROMORPHONE HYDROCHLORIDE 1 MG: 1 INJECTION, SOLUTION INTRAMUSCULAR; INTRAVENOUS; SUBCUTANEOUS at 16:13

## 2018-07-03 RX ADMIN — LISINOPRIL 15 MG: 5 TABLET ORAL at 08:57

## 2018-07-03 RX ADMIN — GABAPENTIN 600 MG: 300 CAPSULE ORAL at 16:13

## 2018-07-03 RX ADMIN — ROPINIROLE 0.5 MG: 1 TABLET, FILM COATED ORAL at 08:54

## 2018-07-03 RX ADMIN — HYDROMORPHONE HYDROCHLORIDE 1 MG: 1 INJECTION, SOLUTION INTRAMUSCULAR; INTRAVENOUS; SUBCUTANEOUS at 23:19

## 2018-07-03 RX ADMIN — TRAMADOL HYDROCHLORIDE 50 MG: 50 TABLET, FILM COATED ORAL at 18:07

## 2018-07-03 RX ADMIN — ROPINIROLE 0.5 MG: 1 TABLET, FILM COATED ORAL at 23:18

## 2018-07-03 RX ADMIN — HYDROMORPHONE HYDROCHLORIDE 1 MG: 1 INJECTION, SOLUTION INTRAMUSCULAR; INTRAVENOUS; SUBCUTANEOUS at 03:34

## 2018-07-03 RX ADMIN — METHADONE HYDROCHLORIDE 15 MG: 10 TABLET ORAL at 08:55

## 2018-07-03 RX ADMIN — CLONAZEPAM 1 MG: 1 TABLET ORAL at 08:52

## 2018-07-03 RX ADMIN — CLONAZEPAM 1 MG: 1 TABLET ORAL at 16:13

## 2018-07-03 RX ADMIN — HYDROMORPHONE HYDROCHLORIDE 1 MG: 1 INJECTION, SOLUTION INTRAMUSCULAR; INTRAVENOUS; SUBCUTANEOUS at 20:19

## 2018-07-03 RX ADMIN — HEPARIN SODIUM 5000 UNITS: 5000 INJECTION, SOLUTION INTRAVENOUS; SUBCUTANEOUS at 23:18

## 2018-07-03 RX ADMIN — ROPINIROLE 0.5 MG: 1 TABLET, FILM COATED ORAL at 16:13

## 2018-07-03 RX ADMIN — GABAPENTIN 600 MG: 300 CAPSULE ORAL at 23:17

## 2018-07-03 RX ADMIN — CEFAZOLIN SODIUM 1000 MG: 1 SOLUTION INTRAVENOUS at 12:27

## 2018-07-03 RX ADMIN — HEPARIN SODIUM 5000 UNITS: 5000 INJECTION, SOLUTION INTRAVENOUS; SUBCUTANEOUS at 16:13

## 2018-07-03 RX ADMIN — CEFAZOLIN SODIUM 1000 MG: 1 SOLUTION INTRAVENOUS at 23:15

## 2018-07-03 RX ADMIN — GABAPENTIN 600 MG: 300 CAPSULE ORAL at 08:53

## 2018-07-03 RX ADMIN — CEFAZOLIN SODIUM 1000 MG: 1 SOLUTION INTRAVENOUS at 05:38

## 2018-07-03 RX ADMIN — INSULIN LISPRO 2 UNITS: 100 INJECTION, SOLUTION INTRAVENOUS; SUBCUTANEOUS at 12:28

## 2018-07-03 RX ADMIN — SERTRALINE HYDROCHLORIDE 50 MG: 50 TABLET ORAL at 23:17

## 2018-07-03 RX ADMIN — HYDROMORPHONE HYDROCHLORIDE 1 MG: 1 INJECTION, SOLUTION INTRAMUSCULAR; INTRAVENOUS; SUBCUTANEOUS at 10:43

## 2018-07-03 RX ADMIN — LAMOTRIGINE 150 MG: 100 TABLET ORAL at 08:53

## 2018-07-03 RX ADMIN — INSULIN GLARGINE 40 UNITS: 100 INJECTION, SOLUTION SUBCUTANEOUS at 12:27

## 2018-07-03 NOTE — SOCIAL WORK
Met with pt who provided information for initial assessment and discharge planning needs  Pt stated she livesin a multilevel home with 1 TRUPTI and 6 steps to bedroom/bath with her adult daughter and 3 grandchildren (ages 12, 15, &4)  Prior to admission, pt reported to be independent with ambulation and ADLs  No hx of VNA or STR  PCP- Dr Kisha Lawson  Pharmacy- [facility] [address]  Pt goes to a [address]  Pt anticipates to return home when medically cleared  Will follow for discharge planning needs

## 2018-07-03 NOTE — PLAN OF CARE

## 2018-07-03 NOTE — CASE MANAGEMENT
Continued Stay Review    Date/POD#:    7/3/2018   POD  #  2    Vital Signs: BP (!) 184/87 (BP Location: Left arm)   Pulse 71   Temp 99 3 °F (37 4 °C) (Temporal)   Resp 18   Wt 65 3 kg (144 lb)   SpO2 100%     Medication:   Scheduled Meds:   Current Facility-Administered Medications:  acetaminophen 650 mg Oral Q6H PRN Prince Farris PA-C    calcium carbonate 1,000 mg Oral Daily PRN Prince Farris PA-C    cefazolin 1,000 mg Intravenous Q8H Nancy Stover MD Last Rate: 1,000 mg (07/03/18 1227)   clonazePAM 1 mg Oral TID Prince Farris PA-C    gabapentin 600 mg Oral TID Uriah Chandra MD    HYDROmorphone 1 mg Intravenous Q3H PRN Nancy Stover MD    insulin glargine 40 Units Subcutaneous QAM Uriah Chandra MD    insulin lispro 1-5 Units Subcutaneous 4x Daily (AC & HS) Nancy Stover MD    insulin lispro 8 Units Subcutaneous TID AC Uriah Chandra MD    lamoTRIgine 150 mg Oral Daily Prince Farris PA-C    [START ON 7/4/2018] lisinopril 20 mg Oral Daily Uriah Chandra MD    methadone 15 mg Oral Daily Nancy Stover MD    ondansetron 4 mg Intravenous Q6H PRN Prince Farris PA-C    rOPINIRole 0 5 mg Oral TID Prince Farris PA-C    sertraline 50 mg Oral HS Prince Farris PA-C    sodium chloride 75 mL/hr Intravenous Continuous Nancy Stover MD Last Rate: 75 mL/hr (07/02/18 1144)   traMADol 50 mg Oral Q6H PRN Nancy Stover MD      Continuous Infusions:   sodium chloride 75 mL/hr Last Rate: 75 mL/hr (07/02/18 1144)     PRN Meds:   acetaminophen    calcium carbonate    HYDROmorphone    ondansetron    traMADol    Abnormal Labs/Diagnostic Results:    No labs  7/3    Age/Sex: 65 y o  female     Assessment/Plan: Postop day 2 status post I and D of right forearm abscess     · Dressing reapplied today   Will continue to monitor drain output at this time  40 ml serous sanguinous fluid emptied from drain this morning    · Continue with Ancef per Medicine at this time culture still pending at this time   · Continue with ice and analgesics as needed  · Continue with activity as tolerated per patient      Subjective  Postop day 2 status post I and D of right forearm abscess   Patient is doing well at this time is progressing but very slowly  Pain continues to improve and the patient is able to move her arm and hand better than yesterday  She is denying any numbness or tingling at this time    Discharge Plan:    Home    Thank you,  Saulo Ferreira  [facility] Utilization Review Department  Phone: [phone]; Fax [phone]  ATTENTION: The Network Utilization Review Department is now centralized for our 9 Facilities  Make a note that we have a new phone and fax numbers for our Department  Please call with any questions or concerns to [phone] and carefully follow the prompts so that you are directed to the right person  All voicemails are confidential  Fax any determinations, approvals, denials, and requests for initial or continue stay review clinical to [phone]  Due to HIGH CALL volume, it would be easier if you could please send faxed requests to expedite your requests and in part, help us provide discharge notifications faster

## 2018-07-03 NOTE — PROGRESS NOTES
Orthopaedic Surgery - Progress Note  Alysia Ivy (89 y o  female)   : 1955   MRN: [id number]  Date: 7/3/2018   Encounter: [id number]   Unit/Bed#: E5 -01    Assessment / Plan  Postop day 2 status post I and D of right forearm abscess     · Dressing reapplied today  Will continue to monitor drain output at this time  40 ml serous sanguinous fluid emptied from drain this morning  · Continue with Ancef per Medicine at this time culture still pending at this time  · Continue with ice and analgesics as needed  · Continue with activity as tolerated per patient      Subjective  Postop day 2 status post I and D of right forearm abscess  Patient is doing well at this time is progressing but very slowly  Pain continues to improve and the patient is able to move her arm and hand better than yesterday  She is denying any numbness or tingling at this time        Vitals  Temp:  [97 1 °F (36 2 °C)-98 4 °F (36 9 °C)] 98 4 °F (36 9 °C)  HR:  [74] 74  Resp:  [18] 18  BP: (132-148)/(60-65) 148/65  There is no height or weight on file to calculate BMI  I/O last 24 hours: In: 600 [I V :600]  Out: 40 [Drains:40]    · Right Hand & Wrist Exam  · Alignment:  Normal resting hand posture  · Inspection:  Mild to moderate as expected swelling  Mild edema  Mild erythema  No deformity  Incision Well proximally at this time with no active drainage  · Palpation:  Moderate tenderness at The area around the incision and all surfaces of the hand and forearm, though the patient does feel this is improved since yesterday     · ROM:  Wrist and hand range of motion decreased at this time again the patient does feel is improved since yesterday but she does move minimally due to her pain  · Strength:  Not tested  · Stability:  Not tested  · Tests:  No pertinent positive or negative tests  · Neurovascular:  Sensation intact in Ax/R/M/U nerve distributions  Sensation intact in all digital nerve distributions   Fingers warm and perfused  Lab Results  (I have personally reviewed pertinent lab results )    Results from last 7 days  Lab Units 07/01/18  0601 06/30/18  0533 06/29/18  0958 06/28/18 1948   WBC Thousand/uL 10 04 10 45* 11 31* 12 81*   HEMOGLOBIN g/dL 9 2* 9 3* 9 5* 10 8*   HEMATOCRIT % 28 3* 29 2* 29 5* 32 0*   PLATELETS Thousands/uL 293 266 258 302       Results from last 7 days  Lab Units 06/29/18  0958   PTT seconds 19*   INR  1 07       Results from last 7 days  Lab Units 07/01/18  0601 06/30/18  0533 06/29/18  0958 06/28/18 1948   SODIUM mmol/L 137 130* 135* 128*   POTASSIUM mmol/L 3 3* 4 0 3 6 4 2   CHLORIDE mmol/L 100 98* 100 92*   CO2 mmol/L 27 26 26 26   ANION GAP mmol/L 10 6 9 10   BUN mg/dL 8 10 14 15   CREATININE mg/dL 0 60 0 58* 0 63 0 86   EGFR ml/min/1 73sq m 99 100 97 73   CALCIUM mg/dL 8 9 8 9 9 0 10 0   ALK PHOS U/L  --   --   --  126*   TOTAL PROTEIN g/dL  --   --   --  7 9   ALT U/L  --   --   --  16   AST U/L  --   --   --  24   BILIRUBIN TOTAL mg/dL  --   --   --  0 50   GLUCOSE RANDOM mg/dL 129 178* 216* 466*           Results from last 7 days  Lab Units 07/01/18  0904 06/28/18 2016 06/28/18 1949   BLOOD CULTURE   --  No Growth at 72 hrs  No Growth at 72 hrs     GRAM STAIN RESULT  2+ Polys  1+ Gram positive cocci in pairs  --   --        Marissa Arnold PA-C

## 2018-07-03 NOTE — PROGRESS NOTES
[facility] Internal Medicine Progress Note  Patient: Dima Stout 65 y o  female   MRN: [id number]  PCP: Sosa Truong MD  Unit/Bed#: E5 -01 Encounter: [id number]  Date Of Visit: 07/03/18    Assessment:    Principal Problem:    Abscess of right forearm  Active Problems:    Hyponatremia    Right arm cellulitis    Benign essential hypertension    Type 2 diabetes mellitus, uncontrolled ([address] )      Plan:    1 )  Right upper extremity abscess/cellulitis  -MRI reveals soft tissue swelling along the radial aspect of right forearm  -status post I and D and drain placement on 07/01/2018  -pre limb wound cultures revealed Staph aureus, will follow up official  -will continue with empiric antibiotics cefazolin, awaiting cultures  -orthopedic follow-up appreciated     2 )  Sepsis-present on admission  -with leukocytosis, tachycardia  -now improved     3 )  Diabetes mellitus  -HbA1c 13 4  -patient not compliant with home insulin  -Accu-Chek still elevated there for will increase Lantus to 40 units daily and Humalog to 8 units t i d   -monitor Accu-Cheks, sliding scale for coverage     4 )  Hypertension  -continue with current medications, monitor blood pressure     5 )  Hepatitis-C     6 )  Bipolar disorder  -continue with home medication     7 )  Chronic opioid dependence  -maintained on methadone       VTE Pharmacologic Prophylaxis:   Pharmacologic: heparin    Patient Centered Rounds: I have performed bedside rounds with nursing staff today  Time Spent for Care: 20 minutes  More than 50% of total time spent on counseling and coordination of care as described above      Current Length of Stay: 5 day(s)    Current Patient Status: Inpatient   Certification Statement: The patient will continue to require additional inpatient hospital stay due to abscess    Discharge Plan / Estimated Discharge Date: TBD    Code Status: Level 1 - Full Code      Subjective:   Patient seen and examined at bedside, complaining of pain in right arm    Objective:     Vitals:   Temp (24hrs), Av 5 °F (36 9 °C), Min:97 8 °F (36 6 °C), Max:99 3 °F (37 4 °C)    HR:  [71-75] 75  Resp:  [18] 18  BP: (139-184)/(64-87) 139/64  SpO2:  [96 %-100 %] 96 %  There is no height or weight on file to calculate BMI  Input and Output Summary (last 24 hours): Intake/Output Summary (Last 24 hours) at 18 1516  Last data filed at 18 0538   Gross per 24 hour   Intake                0 ml   Output               40 ml   Net              -40 ml       Physical Exam:    Constitutional: Patient is oriented to person, place and time, no acute distress  HEENT:  Normocephalic, atraumatic, EOMI, PERRLA, no scleral icterus, no pallor, moist oral mucosa  Neck:  Supple, no masses, no thyromegaly, no bruits Normal range of motion  Lymph nodes:  No lymphadenopathy  Cardiovascular: Normal S1S2, RRR, No murmurs/rubs/gallops appreciated  Pulmonary: Clear to auscultation bilaterally, No rhonchi/rales/wheezing appreciated  Abdominal: Soft, Bowel sounds present, Non-tender, Non-distended, No rebound/guarding, no hepatomegaly   Musculoskeletal: No tenderness/abnormality   Extremities:  No cyanosis, clubbing or edema  Peripheral pulses palpable and equal bilaterally  Neurological: Cranial nerves II-XII grossly intact, sensation intact, otherwise no focal neurological symptoms     Skin: RUE with dressing and drain in place    Additional Data:     Labs:      Results from last 7 days  Lab Units 18  0601   WBC Thousand/uL 10 04   HEMOGLOBIN g/dL 9 2*   HEMATOCRIT % 28 3*   PLATELETS Thousands/uL 293   NEUTROS PCT % 75   LYMPHS PCT % 18   MONOS PCT % 5   EOS PCT % 1       Results from last 7 days  Lab Units 18  0601  18  1948   SODIUM mmol/L 137  < > 128*   POTASSIUM mmol/L 3 3*  < > 4 2   CHLORIDE mmol/L 100  < > 92*   CO2 mmol/L 27  < > 26   BUN mg/dL 8  < > 15   CREATININE mg/dL 0 60  < > 0 86   CALCIUM mg/dL 8 9  < > 10 0   TOTAL PROTEIN g/dL  --   -- 7  9   BILIRUBIN TOTAL mg/dL  --   --  0 50   ALK PHOS U/L  --   --  126*   ALT U/L  --   --  16   AST U/L  --   --  24   GLUCOSE RANDOM mg/dL 129  < > 466*   < > = values in this interval not displayed  Results from last 7 days  Lab Units 06/29/18  0958   INR  1 07        I Have Reviewed All Lab Data Listed Above  Recent Cultures (last 7 days):       Results from last 7 days  Lab Units 07/01/18  0904 06/28/18 2016 06/28/18  1949   BLOOD CULTURE   --  No Growth After 4 Days  No Growth After 4 Days     GRAM STAIN RESULT  2+ Polys  1+ Gram positive cocci in pairs  --   --        Last 24 Hours Medication List:     Current Facility-Administered Medications:  acetaminophen 650 mg Oral Q6H PRN Marianela Bustos PA-C    calcium carbonate 1,000 mg Oral Daily PRN Marianela Bustos PA-C    cefazolin 1,000 mg Intravenous Q8H Yumiko Rivero MD Last Rate: 1,000 mg (07/03/18 1227)   clonazePAM 1 mg Oral TID Marianela Bustos PA-C    gabapentin 600 mg Oral TID Sanjeev Larson MD    HYDROmorphone 1 mg Intravenous Q3H PRN Yumiko Rivero MD    insulin glargine 40 Units Subcutaneous QAM Sanjeev Larson MD    insulin lispro 1-5 Units Subcutaneous 4x Daily (AC & HS) Yumiko Rivero MD    insulin lispro 8 Units Subcutaneous TID AC Sanjeev Larson MD    lamoTRIgine 150 mg Oral Daily Marianela Bustos PA-C    [START ON 7/4/2018] lisinopril 20 mg Oral Daily Sanjeev Larson MD    methadone 15 mg Oral Daily Yumiko Rivero MD    ondansetron 4 mg Intravenous Q6H PRN Marianela Bustos PA-C    rOPINIRole 0 5 mg Oral TID Marianela Bustos PA-C    sertraline 50 mg Oral HS Marianela Bustos PA-C    sodium chloride 75 mL/hr Intravenous Continuous Yumiko Rivero MD Last Rate: 75 mL/hr (07/02/18 1144)   traMADol 50 mg Oral Q6H PRN Yumiko Rivero MD         Today, Patient Was Seen By: Sanjeev Larson MD

## 2018-07-04 LAB
BACTERIA TISS AEROBE CULT: ABNORMAL
GLUCOSE SERPL-MCNC: 125 MG/DL (ref 65–140)
GLUCOSE SERPL-MCNC: 206 MG/DL (ref 65–140)
GLUCOSE SERPL-MCNC: 61 MG/DL (ref 65–140)
GLUCOSE SERPL-MCNC: 65 MG/DL (ref 65–140)
GLUCOSE SERPL-MCNC: 85 MG/DL (ref 65–140)
GRAM STN SPEC: ABNORMAL
GRAM STN SPEC: ABNORMAL

## 2018-07-04 PROCEDURE — 99223 1ST HOSP IP/OBS HIGH 75: CPT | Performed by: INTERNAL MEDICINE

## 2018-07-04 PROCEDURE — 99024 POSTOP FOLLOW-UP VISIT: CPT | Performed by: ORTHOPAEDIC SURGERY

## 2018-07-04 PROCEDURE — 82948 REAGENT STRIP/BLOOD GLUCOSE: CPT

## 2018-07-04 PROCEDURE — 99232 SBSQ HOSP IP/OBS MODERATE 35: CPT | Performed by: INTERNAL MEDICINE

## 2018-07-04 RX ORDER — VANCOMYCIN HYDROCHLORIDE 1 G/200ML
1000 INJECTION, SOLUTION INTRAVENOUS EVERY 12 HOURS
Status: COMPLETED | OUTPATIENT
Start: 2018-07-04 | End: 2018-07-04

## 2018-07-04 RX ADMIN — SERTRALINE HYDROCHLORIDE 50 MG: 50 TABLET ORAL at 22:26

## 2018-07-04 RX ADMIN — ROPINIROLE 0.5 MG: 1 TABLET, FILM COATED ORAL at 16:53

## 2018-07-04 RX ADMIN — HEPARIN SODIUM 5000 UNITS: 5000 INJECTION, SOLUTION INTRAVENOUS; SUBCUTANEOUS at 06:36

## 2018-07-04 RX ADMIN — HEPARIN SODIUM 5000 UNITS: 5000 INJECTION, SOLUTION INTRAVENOUS; SUBCUTANEOUS at 22:26

## 2018-07-04 RX ADMIN — SODIUM CHLORIDE 75 ML/HR: 0.9 INJECTION, SOLUTION INTRAVENOUS at 20:23

## 2018-07-04 RX ADMIN — ROPINIROLE 0.5 MG: 1 TABLET, FILM COATED ORAL at 22:26

## 2018-07-04 RX ADMIN — HYDROMORPHONE HYDROCHLORIDE 1 MG: 1 INJECTION, SOLUTION INTRAMUSCULAR; INTRAVENOUS; SUBCUTANEOUS at 16:53

## 2018-07-04 RX ADMIN — CLONAZEPAM 1 MG: 1 TABLET ORAL at 16:53

## 2018-07-04 RX ADMIN — GABAPENTIN 600 MG: 300 CAPSULE ORAL at 10:19

## 2018-07-04 RX ADMIN — CEFAZOLIN SODIUM 1000 MG: 1 SOLUTION INTRAVENOUS at 05:01

## 2018-07-04 RX ADMIN — HEPARIN SODIUM 5000 UNITS: 5000 INJECTION, SOLUTION INTRAVENOUS; SUBCUTANEOUS at 13:47

## 2018-07-04 RX ADMIN — GABAPENTIN 600 MG: 300 CAPSULE ORAL at 16:53

## 2018-07-04 RX ADMIN — ROPINIROLE 0.5 MG: 1 TABLET, FILM COATED ORAL at 10:20

## 2018-07-04 RX ADMIN — INSULIN GLARGINE 50 UNITS: 100 INJECTION, SOLUTION SUBCUTANEOUS at 10:27

## 2018-07-04 RX ADMIN — GABAPENTIN 600 MG: 300 CAPSULE ORAL at 22:26

## 2018-07-04 RX ADMIN — HYDROMORPHONE HYDROCHLORIDE 1 MG: 1 INJECTION, SOLUTION INTRAMUSCULAR; INTRAVENOUS; SUBCUTANEOUS at 13:47

## 2018-07-04 RX ADMIN — CLONAZEPAM 1 MG: 1 TABLET ORAL at 22:26

## 2018-07-04 RX ADMIN — HYDROMORPHONE HYDROCHLORIDE 1 MG: 1 INJECTION, SOLUTION INTRAMUSCULAR; INTRAVENOUS; SUBCUTANEOUS at 10:30

## 2018-07-04 RX ADMIN — LAMOTRIGINE 150 MG: 100 TABLET ORAL at 10:18

## 2018-07-04 RX ADMIN — HYDROMORPHONE HYDROCHLORIDE 1 MG: 1 INJECTION, SOLUTION INTRAMUSCULAR; INTRAVENOUS; SUBCUTANEOUS at 20:27

## 2018-07-04 RX ADMIN — VANCOMYCIN HYDROCHLORIDE 1000 MG: 1 INJECTION, SOLUTION INTRAVENOUS at 11:46

## 2018-07-04 RX ADMIN — METHADONE HYDROCHLORIDE 15 MG: 10 TABLET ORAL at 10:19

## 2018-07-04 RX ADMIN — CLONAZEPAM 1 MG: 1 TABLET ORAL at 10:21

## 2018-07-04 RX ADMIN — LISINOPRIL 20 MG: 20 TABLET ORAL at 10:20

## 2018-07-04 RX ADMIN — HYDROMORPHONE HYDROCHLORIDE 1 MG: 1 INJECTION, SOLUTION INTRAMUSCULAR; INTRAVENOUS; SUBCUTANEOUS at 06:37

## 2018-07-04 RX ADMIN — TRAMADOL HYDROCHLORIDE 50 MG: 50 TABLET, FILM COATED ORAL at 12:18

## 2018-07-04 NOTE — CONSULTS
Consultation - Infectious Disease   Maliha Marcus 65 y o  female MRN: [id number]  Unit/Bed#: E5 -01 Encounter: [id number]      IMPRESSION & RECOMMENDATIONS:   Impression/Recommendations: This is a 65 y o  female, with poorly controlled DM, admitted on 06/28 with right forearm cellulitis and abscess  She is status post I&D on 07/01  Response has been slow on IV cefazolin  Wound culture grew MRSA and antibiotic was changed to IV vancomycin yesterday  1   Right forearm cellulitis and abscess  Patient is status post I&D on 07/01  Patient's clinical response has been slow on IV cefazolin  Operative culture grew MRSA  This is the most likely reason for slow response  Antibiotic has not been changed to IV vancomycin  Will monitor patient closely for improvement  She remains systemically well, without sepsis or systemic toxicity  Admission blood cultures remain negative  Continue IV vancomycin  Monitor temperature/WBC  Serial forearm exams  2   Restriction to right finger extension  This is most likely secondary to right forearm abscess  There was no obvious tenosynovitis at surgery  Slow in response is most likely secondary to MRSA, not optimally treated with antibiotic  Will monitor for improvement on IV vancomycin  If patient's finger mobility is not improved on IV vancomycin, she will need imaging of her forearm to assess for tenosynovitis or recurrent abscess  Antibiotic plan as in above  Serial forearm exams  Monitor finger flexion and extension  3   DM, poorly controlled, with hyperglycemia on admission  Hemoglobin A1c is 13 4  I discussed with patient in great detail regarding this  Poorly control hyperglycemia clearly contributes to development of cellulitis and abscess above after the fall above  Blood sugar is much better controlled now  Management per Medicine Service  Hospitalization records reviewed in detail    Discussed with patient and family in detail regarding above plan  Thank you for this consultation  We will follow along with you  HISTORY OF PRESENT ILLNESS:  Reason for Consult:  Right forearm abscess  HPI: Marcelino Hoskins is a 65 y o  female, with poorly controlled DM, fell on her right forearm 10 days prior to admission and developed a bruise there  A few days later, she had noticed redness in the area and was treated with amoxicillin by her PCP  She started noticing increasing right forearm pain, swelling and redness  For these reasons, patient came to the ER on 06/28  On presentation, patient did not have fever but had leukocytosis  She had clinical signs of right forearm cellulitis and abscess  Patient was admitted  IV cefazolin was started  Patient with taking to the OR on 07/01 to undergo I&D  Patient only had modest improvement after surgery  Operative culture subsequently grew MRSA  Antibiotic was changed to vancomycin yesterday  We are asked to evaluate the patient  Since antibiotic change, patient states that the pain in right forearm is a little better  She is able to straighten her fingers better  She is able to flex the fingers and make a full fist now  Temperature stays down  She is tolerating antibiotic well  No nausea, vomiting or diarrhea  No dizziness or hearing loss  REVIEW OF SYSTEMS:  A complete 12 point system-based review was done  Except for what is noted on HPI above, ROS of systems is otherwise negative  PAST MEDICAL HISTORY:  History reviewed  No pertinent past medical history  Past Surgical History:   Procedure Laterality Date    INCISION AND DRAINAGE OF WOUND Right 7/1/2018    Procedure: INCISION AND DRAINAGE (I&D) RIGHT FOREARM;  Surgeon: Fransisco Escobar MD;  Location: [facility] OR;  Service: Orthopedics     Problem list reviewed      FAMILY HISTORY:  Non-contributory    SOCIAL HISTORY:  History   Alcohol Use No     History   Drug Use No     Comment: hx of     History   Smoking Status    Never Smoker Smokeless Tobacco    Never Used       ALLERGIES:  No Known Allergies    MEDICATIONS:  All current active medications have been reviewed  Patient is currently on IV vancomycin  PHYSICAL EXAM:  Vitals:  HR:  [75-78] 78  Resp:  [18] 18  BP: (138-139)/(61-64) 138/61  SpO2:  [96 %-97 %] 97 %  Temp (24hrs), Av 7 °F (36 5 °C), Min:97 5 °F (36 4 °C), Max:97 8 °F (36 6 °C)  Current: Temperature: 97 5 °F (36 4 °C)     Physical Exam:  General:  Well-nourished, well-developed, in no acute distress  Awake, alert and oriented x 3  Eyes:  Conjunctive clear with no hemorrhages or effusions  Oropharynx:  No ulcers, no lesions, pharynx benign, no tonsillitis  Neck:  Supple, no lymphadenopathy, no mass, nontender  Lungs:  Expansion symmetric, no rales, no wheezing, no accessory muscle use  Cardiac:  Regular rate and rhythm, normal S1, normal S2, no murmurs  Abdomen:  Soft, nondistended, non-tender, no HSM  Extremities:  Right forearm with mild edema  Incision clean, with moderate serous sanguinous drainage  Moderate erythema/warmth  Mild to moderate tenderness  No fluctuance  Some restriction to finger extension  Skin:  No rashes, no ulcers  Neurological:  Moves all four extremities spontaneously, sensation grossly intact    LABS, IMAGING, & OTHER STUDIES:  Lab Results:  I have personally reviewed pertinent labs      Results from last 7 days  Lab Units 18  0601 18  0533 18  0958 18  1948   SODIUM mmol/L 137 130* 135* 128*   POTASSIUM mmol/L 3 3* 4 0 3 6 4 2   CHLORIDE mmol/L 100 98* 100 92*   CO2 mmol/L 27 26 26 26   ANION GAP mmol/L 10 6 9 10   BUN mg/dL 8 10 14 15   CREATININE mg/dL 0 60 0 58* 0 63 0 86   EGFR ml/min/1 73sq m 99 100 97 73   GLUCOSE RANDOM mg/dL 129 178* 216* 466*   CALCIUM mg/dL 8 9 8 9 9 0 10 0   AST U/L  --   --   --  24   ALT U/L  --   --   --  16   ALK PHOS U/L  --   --   --  126*   TOTAL PROTEIN g/dL  --   --   --  7 9   BILIRUBIN TOTAL mg/dL  --   --   --  0 50 Results from last 7 days  Lab Units 07/01/18  0601 06/30/18  0533 06/29/18  0958   WBC Thousand/uL 10 04 10 45* 11 31*   HEMOGLOBIN g/dL 9 2* 9 3* 9 5*   PLATELETS Thousands/uL 293 266 258       Results from last 7 days  Lab Units 07/01/18  0904 06/28/18 2016 06/28/18  1949   BLOOD CULTURE   --  No Growth After 5 Days  No Growth After 5 Days  GRAM STAIN RESULT  2+ Polys  1+ Gram positive cocci in pairs  --   --        Imaging Studies:   I have personally reviewed pertinent imaging study reports and images in PACS  CXR reviewed personally  No infiltrates or consolidations  Right forearm x-ray reviewed personally  There is soft tissue edema  No soft tissue gas  No bony abnormalities  Right forearm MRI reviewed personally  Nondiagnostic  EKG, Pathology, and Other Studies:   I have personally reviewed pertinent reports

## 2018-07-04 NOTE — PROGRESS NOTES
[facility] Internal Medicine Progress Note  Patient: Jesús Thorpe 65 y o  female   MRN: [id number]  PCP: Vivian Roque MD  Unit/Bed#: E5 -01 Encounter: [id number]  Date Of Visit: 07/04/18    Assessment:    Principal Problem:    Abscess of right forearm  Active Problems:    Hyponatremia    Right arm cellulitis    Benign essential hypertension    Type 2 diabetes mellitus, uncontrolled ([address] )      Plan:    1 )  Right upper extremity abscess/cellulitis  -MRI reveals soft tissue swelling along the radial aspect of right forearm  -status post I and D and drain placement on 07/01/2018  -cultures revealed MRSA  -antibiotic changed to IV vancomycin, infectious Disease input appreciated  -orthopedic follow-up appreciated     2 )  Sepsis-present on admission  -with leukocytosis, tachycardia  -now improved     3 )  Diabetes mellitus  -HbA1c 13 4  -patient not compliant with home insulin  -insulin changed to Lantus 50 units at bedtime and Humalog 10 units t i d  with improvement of Accu-Cheks  -monitor Accu-Cheks, sliding scale for coverage     4 )  Hypertension  -continue with current medications, monitor blood pressure     5 )  Hepatitis-C     6 )  Bipolar disorder  -continue with home medication     7 )  Chronic opioid dependence  -maintained on methadone    VTE Pharmacologic Prophylaxis:   Pharmacologic: heparin    Patient Centered Rounds: I have performed bedside rounds with nursing staff today  Time Spent for Care: 20 minutes  More than 50% of total time spent on counseling and coordination of care as described above  Current Length of Stay: 6 day(s)    Current Patient Status: Inpatient   Certification Statement: The patient will continue to require additional inpatient hospital stay due to abscess    Discharge Plan / Estimated Discharge Date: 2-3 dasy    Code Status: Level 1 - Full Code      Subjective:   Patient seen and examined at bedside, appears emotional stating she is upset about her daughter  Denies any complaints  Objective:     Vitals:   Temp (24hrs), Av 7 °F (36 5 °C), Min:97 5 °F (36 4 °C), Max:97 8 °F (36 6 °C)    HR:  [75-78] 78  Resp:  [18] 18  BP: (138-139)/(61-64) 138/61  SpO2:  [96 %-97 %] 97 %  There is no height or weight on file to calculate BMI  Input and Output Summary (last 24 hours): Intake/Output Summary (Last 24 hours) at 18 1245  Last data filed at 18 1257   Gross per 24 hour   Intake               50 ml   Output                0 ml   Net               50 ml       Physical Exam:    Constitutional: Patient is oriented to person, place and time, no acute distress  HEENT:  Normocephalic, atraumatic, EOMI, PERRLA, no scleral icterus, no pallor, moist oral mucosa  Neck:  Supple, no masses, no thyromegaly, no bruits Normal range of motion  Lymph nodes:  No lymphadenopathy  Cardiovascular: Normal S1S2, RRR, No murmurs/rubs/gallops appreciated  Pulmonary: Clear to auscultation bilaterally, No rhonchi/rales/wheezing appreciated  Abdominal: Soft, Bowel sounds present, Non-tender, Non-distended, No rebound/guarding, no hepatomegaly   Musculoskeletal: No tenderness/abnormality   Extremities:  No cyanosis, clubbing or edema  Peripheral pulses palpable and equal bilaterally  Neurological: Cranial nerves II-XII grossly intact, sensation intact, otherwise no focal neurological symptoms     Skin:  Right upper extremity dressing    Additional Data:     Labs:      Results from last 7 days  Lab Units 18  0601   WBC Thousand/uL 10 04   HEMOGLOBIN g/dL 9 2*   HEMATOCRIT % 28 3*   PLATELETS Thousands/uL 293   NEUTROS PCT % 75   LYMPHS PCT % 18   MONOS PCT % 5   EOS PCT % 1       Results from last 7 days  Lab Units 18  0601  18  1948   SODIUM mmol/L 137  < > 128*   POTASSIUM mmol/L 3 3*  < > 4 2   CHLORIDE mmol/L 100  < > 92*   CO2 mmol/L 27  < > 26   BUN mg/dL 8  < > 15   CREATININE mg/dL 0 60  < > 0 86   CALCIUM mg/dL 8 9  < > 10 0   TOTAL PROTEIN g/dL  -- --  7 9   BILIRUBIN TOTAL mg/dL  --   --  0 50   ALK PHOS U/L  --   --  126*   ALT U/L  --   --  16   AST U/L  --   --  24   GLUCOSE RANDOM mg/dL 129  < > 466*   < > = values in this interval not displayed  Results from last 7 days  Lab Units 06/29/18  0958   INR  1 07        I Have Reviewed All Lab Data Listed Above  Recent Cultures (last 7 days):       Results from last 7 days  Lab Units 07/01/18  0904 06/28/18 2016 06/28/18  1949   BLOOD CULTURE   --  No Growth After 5 Days  No Growth After 5 Days     GRAM STAIN RESULT  2+ Polys  1+ Gram positive cocci in pairs  --   --        Last 24 Hours Medication List:     Current Facility-Administered Medications:  acetaminophen 650 mg Oral Q6H PRN Manav Swartz PA-C    calcium carbonate 1,000 mg Oral Daily PRN Manav Swartz PA-C    clonazePAM 1 mg Oral TID Manav Swartz PA-C    gabapentin 600 mg Oral TID Sandra Griffith MD    heparin (porcine) 5,000 Units Subcutaneous Q8H [facility] Sandra Griffith MD    HYDROmorphone 1 mg Intravenous Q3H PRN Isabela Chahal MD    insulin glargine 50 Units Subcutaneous QAM Sandra Griffith MD    insulin lispro 1-5 Units Subcutaneous 4x Daily (AC & HS) Isabela Chahal MD    insulin lispro 10 Units Subcutaneous TID AC Sandra Griffith MD    lamoTRIgine 150 mg Oral Daily Manav Swartz PA-C    lisinopril 20 mg Oral Daily Sandra Griffith MD    methadone 15 mg Oral Daily Isabela Chahal MD    ondansetron 4 mg Intravenous Q6H PRN Manav Swartz PA-C    rOPINIRole 0 5 mg Oral TID Manav Swartz PA-C    sertraline 50 mg Oral HS Manav Swartz PA-C    sodium chloride 75 mL/hr Intravenous Continuous Isabela Chahal MD Last Rate: 75 mL/hr (07/03/18 1622)   traMADol 50 mg Oral Q6H PRN Isabela Chahal MD    vancomycin 1,000 mg Intravenous Q12H Jorge Lawson MD Last Rate: 1,000 mg (07/04/18 1146)        Today, Patient Was Seen By: Sandra Griffith MD

## 2018-07-04 NOTE — PROGRESS NOTES
Progress Note - Orthopedics   Alek Brock 65 y o  female MRN: [id number]  Unit/Bed#: E5 -01 Encounter: [id number]      Assessment & Plan:  Right forearm deep abscess, MRSA infection  1  IV antibiotics changed from Ancef to Vancomycin 1 gm IV q12h  2  Await ID recs  3  Dressing changes daily and prn with dry gauze +/- ABD and Kerlix  Avoid occlusive dressings such as Xeroform or Adaptic  4  Hemovac dislodged overnight  Plan was to discontinue drain this morning regardless  5  Will follow for now  Subjective: Patient reports continued improvement with respect to pain and finger movement  She reports that the drain was dislodged overnight while she slept  No numbness or tingling reported  Objective:    Vitals:    07/03/18 2359   BP: 138/61   Pulse: 78   Resp: 18   Temp: 97 5 °F (36 4 °C)   SpO2: 97%       Physical Exam:  Right forearm: Moderate soft tissue swelling and tenderness to palpation diffusely, improved compared with pre-operative exam  Incision is clean and intact  Moderate serosanguinous drainage  Moderate erythema diffusely in the forearm  Elbow, wrist, and finger range of motion is limited by pain with contractures as noted in op note  Forearm compartments are compressible  Capillary refill is less than 2 seconds distally  Sensation intact to light touch median, ulnar, and radial nerve distribution  Tissue culture right forearm 7/1/18: 3+ growth of MRSA  Anaerobic culture right forearm 7/1/18: No anaerobes isolated  Blood culture 6/28/18: No growth x2      No results found for: NA, CL, CO2, BUN, CREATININE, GLUCOSE, WBC, HGB, PLT, PT, INR, PTT

## 2018-07-04 NOTE — PROGRESS NOTES
Pt woke, with complaint of Hemovac that had been placed in her arm after an I/D came out while she was sleeping  Drain has little serosanguinous drainage, approximately 15 mL  The site is covered with gauze, and appears clean and dry except for small area noted earlier in evening  RR NP on call was notified  Will pass on to day shift for assessment by dr Amena Sanchez

## 2018-07-05 LAB
ANION GAP SERPL CALCULATED.3IONS-SCNC: 6 MMOL/L (ref 4–13)
BASOPHILS # BLD AUTO: 0.04 THOUSANDS/ΜL (ref 0–0.1)
BASOPHILS NFR BLD AUTO: 1 % (ref 0–1)
BUN SERPL-MCNC: 4 MG/DL (ref 5–25)
CALCIUM SERPL-MCNC: 8.8 MG/DL (ref 8.3–10.1)
CHLORIDE SERPL-SCNC: 104 MMOL/L (ref 100–108)
CO2 SERPL-SCNC: 28 MMOL/L (ref 21–32)
CREAT SERPL-MCNC: 0.69 MG/DL (ref 0.6–1.3)
EOSINOPHIL # BLD AUTO: 0.18 THOUSAND/ΜL (ref 0–0.61)
EOSINOPHIL NFR BLD AUTO: 3 % (ref 0–6)
ERYTHROCYTE [DISTWIDTH] IN BLOOD BY AUTOMATED COUNT: 13.9 % (ref 11.6–15.1)
GFR SERPL CREATININE-BSD FRML MDRD: 94 ML/MIN/1.73SQ M
GLUCOSE SERPL-MCNC: 105 MG/DL (ref 65–140)
GLUCOSE SERPL-MCNC: 120 MG/DL (ref 65–140)
GLUCOSE SERPL-MCNC: 147 MG/DL (ref 65–140)
GLUCOSE SERPL-MCNC: 191 MG/DL (ref 65–140)
GLUCOSE SERPL-MCNC: 196 MG/DL (ref 65–140)
GLUCOSE SERPL-MCNC: 56 MG/DL (ref 65–140)
GLUCOSE SERPL-MCNC: 94 MG/DL (ref 65–140)
HCT VFR BLD AUTO: 26.8 % (ref 34.8–46.1)
HGB BLD-MCNC: 8.3 G/DL (ref 11.5–15.4)
LYMPHOCYTES # BLD AUTO: 1.85 THOUSANDS/ΜL (ref 0.6–4.47)
LYMPHOCYTES NFR BLD AUTO: 33 % (ref 14–44)
MCH RBC QN AUTO: 26.5 PG (ref 26.8–34.3)
MCHC RBC AUTO-ENTMCNC: 31 G/DL (ref 31.4–37.4)
MCV RBC AUTO: 86 FL (ref 82–98)
MONOCYTES # BLD AUTO: 0.33 THOUSAND/ΜL (ref 0.17–1.22)
MONOCYTES NFR BLD AUTO: 6 % (ref 4–12)
NEUTROPHILS # BLD AUTO: 3.22 THOUSANDS/ΜL (ref 1.85–7.62)
NEUTS SEG NFR BLD AUTO: 57 % (ref 43–75)
NRBC BLD AUTO-RTO: 0 /100 WBCS
PLATELET # BLD AUTO: 448 THOUSANDS/UL (ref 149–390)
PMV BLD AUTO: 8.9 FL (ref 8.9–12.7)
POTASSIUM SERPL-SCNC: 3.6 MMOL/L (ref 3.5–5.3)
RBC # BLD AUTO: 3.13 MILLION/UL (ref 3.81–5.12)
SODIUM SERPL-SCNC: 138 MMOL/L (ref 136–145)
WBC # BLD AUTO: 5.62 THOUSAND/UL (ref 4.31–10.16)

## 2018-07-05 PROCEDURE — 80048 BASIC METABOLIC PNL TOTAL CA: CPT | Performed by: INTERNAL MEDICINE

## 2018-07-05 PROCEDURE — 85025 COMPLETE CBC W/AUTO DIFF WBC: CPT | Performed by: INTERNAL MEDICINE

## 2018-07-05 PROCEDURE — 82948 REAGENT STRIP/BLOOD GLUCOSE: CPT

## 2018-07-05 PROCEDURE — 99233 SBSQ HOSP IP/OBS HIGH 50: CPT | Performed by: INTERNAL MEDICINE

## 2018-07-05 PROCEDURE — 99232 SBSQ HOSP IP/OBS MODERATE 35: CPT | Performed by: INTERNAL MEDICINE

## 2018-07-05 PROCEDURE — 99024 POSTOP FOLLOW-UP VISIT: CPT | Performed by: ORTHOPAEDIC SURGERY

## 2018-07-05 RX ORDER — HYDROMORPHONE HYDROCHLORIDE 2 MG/1
1 TABLET ORAL EVERY 4 HOURS PRN
Status: DISCONTINUED | OUTPATIENT
Start: 2018-07-05 | End: 2018-07-08

## 2018-07-05 RX ORDER — VANCOMYCIN HYDROCHLORIDE 1 G/200ML
15 INJECTION, SOLUTION INTRAVENOUS EVERY 12 HOURS
Status: DISCONTINUED | OUTPATIENT
Start: 2018-07-05 | End: 2018-07-08

## 2018-07-05 RX ORDER — INSULIN GLARGINE 100 [IU]/ML
45 INJECTION, SOLUTION SUBCUTANEOUS EVERY MORNING
Status: DISCONTINUED | OUTPATIENT
Start: 2018-07-05 | End: 2018-07-06

## 2018-07-05 RX ADMIN — HYDROMORPHONE HYDROCHLORIDE 1 MG: 1 INJECTION, SOLUTION INTRAMUSCULAR; INTRAVENOUS; SUBCUTANEOUS at 06:29

## 2018-07-05 RX ADMIN — CLONAZEPAM 1 MG: 1 TABLET ORAL at 15:14

## 2018-07-05 RX ADMIN — HYDROMORPHONE HYDROCHLORIDE 1 MG: 2 TABLET ORAL at 22:16

## 2018-07-05 RX ADMIN — INSULIN LISPRO 1 UNITS: 100 INJECTION, SOLUTION INTRAVENOUS; SUBCUTANEOUS at 11:55

## 2018-07-05 RX ADMIN — ROPINIROLE 0.5 MG: 1 TABLET, FILM COATED ORAL at 22:16

## 2018-07-05 RX ADMIN — HYDROMORPHONE HYDROCHLORIDE 1 MG: 2 TABLET ORAL at 17:44

## 2018-07-05 RX ADMIN — TEDIZOLID PHOSPHATE 200 MG: 200 TABLET, FILM COATED ORAL at 15:39

## 2018-07-05 RX ADMIN — SERTRALINE HYDROCHLORIDE 50 MG: 50 TABLET ORAL at 22:37

## 2018-07-05 RX ADMIN — INSULIN GLARGINE 45 UNITS: 100 INJECTION, SOLUTION SUBCUTANEOUS at 08:44

## 2018-07-05 RX ADMIN — CLONAZEPAM 1 MG: 1 TABLET ORAL at 22:38

## 2018-07-05 RX ADMIN — LISINOPRIL 20 MG: 20 TABLET ORAL at 08:46

## 2018-07-05 RX ADMIN — HEPARIN SODIUM 5000 UNITS: 5000 INJECTION, SOLUTION INTRAVENOUS; SUBCUTANEOUS at 22:17

## 2018-07-05 RX ADMIN — GABAPENTIN 600 MG: 300 CAPSULE ORAL at 15:15

## 2018-07-05 RX ADMIN — GABAPENTIN 600 MG: 300 CAPSULE ORAL at 22:16

## 2018-07-05 RX ADMIN — ROPINIROLE 0.5 MG: 1 TABLET, FILM COATED ORAL at 08:45

## 2018-07-05 RX ADMIN — HYDROMORPHONE HYDROCHLORIDE 1 MG: 1 INJECTION, SOLUTION INTRAMUSCULAR; INTRAVENOUS; SUBCUTANEOUS at 10:03

## 2018-07-05 RX ADMIN — METHADONE HYDROCHLORIDE 15 MG: 10 TABLET ORAL at 08:44

## 2018-07-05 RX ADMIN — CLONAZEPAM 1 MG: 1 TABLET ORAL at 08:45

## 2018-07-05 RX ADMIN — ROPINIROLE 0.5 MG: 1 TABLET, FILM COATED ORAL at 15:15

## 2018-07-05 RX ADMIN — SODIUM CHLORIDE 75 ML/HR: 0.9 INJECTION, SOLUTION INTRAVENOUS at 08:44

## 2018-07-05 RX ADMIN — HEPARIN SODIUM 5000 UNITS: 5000 INJECTION, SOLUTION INTRAVENOUS; SUBCUTANEOUS at 13:27

## 2018-07-05 RX ADMIN — VANCOMYCIN HYDROCHLORIDE 1000 MG: 1 INJECTION, SOLUTION INTRAVENOUS at 11:20

## 2018-07-05 RX ADMIN — HYDROMORPHONE HYDROCHLORIDE 1 MG: 1 INJECTION, SOLUTION INTRAMUSCULAR; INTRAVENOUS; SUBCUTANEOUS at 00:54

## 2018-07-05 RX ADMIN — GABAPENTIN 600 MG: 300 CAPSULE ORAL at 08:44

## 2018-07-05 RX ADMIN — HEPARIN SODIUM 5000 UNITS: 5000 INJECTION, SOLUTION INTRAVENOUS; SUBCUTANEOUS at 06:25

## 2018-07-05 RX ADMIN — LAMOTRIGINE 150 MG: 100 TABLET ORAL at 08:45

## 2018-07-05 NOTE — PROGRESS NOTES
[facility] Internal Medicine Progress Note  Patient: Marcelino Hoskins 65 y o  female   MRN: [id number]  PCP: Tank Shen MD  Unit/Bed#: E5 -01 Encounter: [id number]  Date Of Visit: 07/05/18    Assessment:    Principal Problem:    Abscess of right forearm  Active Problems:    Hyponatremia    Right arm cellulitis    Benign essential hypertension    Type 2 diabetes mellitus, uncontrolled ([facility] [address] )      Plan:    1 )  Right upper extremity abscess/cellulitis  -MRI reveals soft tissue swelling along the radial aspect of right forearm  -status post I and D and drain placement on 07/01/2018  -cultures revealed MRSA  -antibiotic changed to IV vancomycin, however patient currently refusing there for antibiotic changed to tedizolid  -orthopedic follow-up appreciated     2 )  Sepsis-present on admission  -with leukocytosis, tachycardia  -now improved     3 )  Diabetes mellitus  -HbA1c 13 4  -patient not compliant with home insulin  -insulin changed to Lantus 50 units at bedtime and Humalog 10 units t i d  with improvement of Accu-Cheks  -monitor Accu-Cheks, sliding scale for coverage     4 )  Hypertension  -continue with current medications, monitor blood pressure     5 )  Hepatitis-C     6 )  Bipolar disorder  -continue with home medication     7 )  Chronic opioid dependence  -maintained on methadone      VTE Pharmacologic Prophylaxis:   Pharmacologic: heparin    Patient Centered Rounds: I have performed bedside rounds with nursing staff today  Time Spent for Care: 20 minutes  More than 50% of total time spent on counseling and coordination of care as described above      Current Length of Stay: 7 day(s)    Current Patient Status: Inpatient   Certification Statement: The patient will continue to require additional inpatient hospital stay due to MRSA in wound    Discharge Plan / Estimated Discharge Date: 2-3 days    Code Status: Level 1 - Full Code      Subjective:   Patient seen and examined at bedside, currently denies any complaints  Objective:     Vitals:   Temp (24hrs), Av 8 °F (36 6 °C), Min:97 6 °F (36 4 °C), Max:98 °F (36 7 °C)    HR:  [65-74] 74  Resp:  [18] 18  BP: (127-134)/(64-65) 134/64  SpO2:  [95 %-96 %] 96 %  There is no height or weight on file to calculate BMI  Input and Output Summary (last 24 hours): Intake/Output Summary (Last 24 hours) at 18 1421  Last data filed at 18   Gross per 24 hour   Intake              900 ml   Output                0 ml   Net              900 ml       Physical Exam:    Constitutional: Patient is oriented to person, place and time, no acute distress  HEENT:  Normocephalic, atraumatic, EOMI, PERRLA, no scleral icterus, no pallor, moist oral mucosa  Neck:  Supple, no masses, no thyromegaly, no bruits Normal range of motion  Lymph nodes:  No lymphadenopathy  Cardiovascular: Normal S1S2, RRR, No murmurs/rubs/gallops appreciated  Pulmonary: Clear to auscultation bilaterally, No rhonchi/rales/wheezing appreciated  Abdominal: Soft, Bowel sounds present, Non-tender, Non-distended, No rebound/guarding, no hepatomegaly   Musculoskeletal: No tenderness/abnormality   Extremities:  No cyanosis, clubbing or edema  Peripheral pulses palpable and equal bilaterally  Neurological: Cranial nerves II-XII grossly intact, sensation intact, otherwise no focal neurological symptoms     Skin:  Right upper extremity in dressing    Additional Data:     Labs:      Results from last 7 days  Lab Units 18  1205   WBC Thousand/uL 5 62   HEMOGLOBIN g/dL 8 3*   HEMATOCRIT % 26 8*   PLATELETS Thousands/uL 448*   NEUTROS PCT % 57   LYMPHS PCT % 33   MONOS PCT % 6   EOS PCT % 3       Results from last 7 days  Lab Units 18  1205  18  1948   SODIUM mmol/L 138  < > 128*   POTASSIUM mmol/L 3 6  < > 4 2   CHLORIDE mmol/L 104  < > 92*   CO2 mmol/L 28  < > 26   BUN mg/dL 4*  < > 15   CREATININE mg/dL 0 69  < > 0 86   CALCIUM mg/dL 8 8  < > 10 0   TOTAL PROTEIN g/dL  --   -- 7  9   BILIRUBIN TOTAL mg/dL  --   --  0 50   ALK PHOS U/L  --   --  126*   ALT U/L  --   --  16   AST U/L  --   --  24   GLUCOSE RANDOM mg/dL 196*  < > 466*   < > = values in this interval not displayed  Results from last 7 days  Lab Units 06/29/18  0958   INR  1 07        I Have Reviewed All Lab Data Listed Above  Recent Cultures (last 7 days):       Results from last 7 days  Lab Units 07/01/18  0904 06/28/18 2016 06/28/18  1949   BLOOD CULTURE   --  No Growth After 5 Days  No Growth After 5 Days     GRAM STAIN RESULT  2+ Polys  1+ Gram positive cocci in pairs  --   --        Last 24 Hours Medication List:     Current Facility-Administered Medications:  acetaminophen 650 mg Oral Q6H PRN Geroldine Pickle, PA-C    calcium carbonate 1,000 mg Oral Daily PRN Geroldine Pickle, PA-C    clonazePAM 1 mg Oral TID Geroldine Pickle, PA-C    gabapentin 600 mg Oral TID Glorine Leyden, MD    heparin (porcine) 5,000 Units Subcutaneous Q8H [address] Glorine Leyden, MD    HYDROmorphone 1 mg Intravenous Q3H PRN Ann England MD    insulin glargine 45 Units Subcutaneous QAM Glorine Leyden, MD    insulin lispro 1-5 Units Subcutaneous 4x Daily (AC & HS) Ann England MD    insulin lispro 8 Units Subcutaneous TID AC Glorine Leyden, MD    lamoTRIgine 150 mg Oral Daily Geroldine Kyra, PA-C    lisinopril 20 mg Oral Daily Glorine Leyden, MD    methadone 15 mg Oral Daily Ann England MD    NON FORMULARY 200 mg Oral Daily Tan Perez MD    ondansetron 4 mg Intravenous Q6H PRN Geroldine Kyra, PA-C    rOPINIRole 0 5 mg Oral TID Geroldine Pickle, PA-C    sertraline 50 mg Oral HS Geroldine Kyra, PA-C    traMADol 50 mg Oral Q6H PRN Ann England MD    vancomycin 15 mg/kg Intravenous Q12H Tan Perez MD Last Rate: 1,000 mg (07/05/18 1120)        Today, Patient Was Seen By: Glorine Leyden, MD

## 2018-07-05 NOTE — PROGRESS NOTES
Orthopaedic Surgery - Progress Note  Rober Jacob (39 y o  female)   : 1955   MRN: [id number]  Date: 2018   Encounter: [id number]   Unit/Bed#: E5 -01    Assessment / Plan  S/p I&D right forearm abscess on 18    · Continue dry gauze dressing changes daily and prn drainage  · Continue vancomycin per ID service  · Encouraged PROM and AROM of wrist and fingers  · Will continue to monitor closely    Subjective  Reports moderate right forearm pain, but with continued improvement from initial presentation    Vitals  Temp:  [97 6 °F (36 4 °C)-98 °F (36 7 °C)] 97 6 °F (36 4 °C)  HR:  [65-74] 74  Resp:  [18] 18  BP: (127-134)/(64-65) 134/64  There is no height or weight on file to calculate BMI  I/O last 24 hours:   In: 900 [I V :900]  Out: -     Ortho Exam - Right Upper Extremity  · Incision with central area of seropurulent drainage  · Moderate dorsal forearm erythema and tenderness  · + active DF/VF of wrist and fingers  · Wrist ROM neutral to 40 of volarflexion  · Sensation intact R/M/U nerves  · 2+ radial pulse    Lab Results  (I have personally reviewed pertinent lab results )    Results from last 7 days  Lab Units 18  0618  0533 1858 18   WBC Thousand/uL 10 04 10 45* 11 31* 12 81*   HEMOGLOBIN g/dL 9 2* 9 3* 9 5* 10 8*   HEMATOCRIT % 28 3* 29 2* 29 5* 32 0*   PLATELETS Thousands/uL 293 266 258 302       Results from last 7 days  Lab Units 18  0958   PTT seconds 19*   INR  1 07       Results from last 7 days  Lab Units 18  0601 18  0533 18  0958 18   SODIUM mmol/L 137 130* 135* 128*   POTASSIUM mmol/L 3 3* 4 0 3 6 4 2   CHLORIDE mmol/L 100 98* 100 92*   CO2 mmol/L 27 26 26 26   ANION GAP mmol/L 10 6 9 10   BUN mg/dL 8 10 14 15   CREATININE mg/dL 0 60 0 58* 0 63 0 86   EGFR ml/min/1 73sq m 99 100 97 73   CALCIUM mg/dL 8 9 8 9 9 0 10 0   ALK PHOS U/L  --   --   --  126*   TOTAL PROTEIN g/dL  --   --   --  7 9   ALT U/L  --   -- --  16   AST U/L  --   --   --  24   BILIRUBIN TOTAL mg/dL  --   --   --  0 50   GLUCOSE RANDOM mg/dL 129 178* 216* 466*           Results from last 7 days  Lab Units 07/01/18 0904 06/28/18 2016 06/28/18  1949   BLOOD CULTURE   --  No Growth After 5 Days  No Growth After 5 Days     GRAM STAIN RESULT  2+ Polys  1+ Gram positive cocci in pairs  --   --        Klaudia Ward MD

## 2018-07-05 NOTE — PROGRESS NOTES
Addendum:  Notify but RN the patient refuses to have her IV site changed, although it has been in for 6 days  She is also refusing to get blood draw for labs  As stated above, patient is not ready for routine p  o  antibiotic transition yet  With inability to give IV vancomycin, I will go with p o  tedizolid for now  Progress Note - Infectious Disease   Ebb Mitchell 65 y o  female MRN: [id number]  Unit/Bed#: E5 -01 Encounter: [id number]      Impression/Recommendations:  1  Right forearm cellulitis and abscess  Patient is status post I&D on 07/01  Patient's clinical response has been slow on IV cefazolin  Operative culture grew MRSA  This is the most likely reason for slow response  Antibiotic has been changed to IV vancomycin  She is clinically improved on IV vancomycin  She remains systemically well  Admission blood cultures remain negative  Patient is not ready for p  o  antibiotic transition yet  Continue IV vancomycin  Monitor temperature/WBC  Serial forearm exams      2  Restriction to right finger extension  This is most likely secondary to right forearm abscess  There was no obvious tenosynovitis at surgery  Slow in response is most likely secondary to MRSA, not optimally treated with antibiotic  Patient is clinically improving on IV vancomycin  Will continue to monitor closely for now  Antibiotic plan as in above  Serial forearm exams  Monitor finger flexion and extension      3   DM, poorly controlled, with hyperglycemia on admission  Hemoglobin A1c is 13 4  I discussed with patient in great detail regarding this  Poorly control hyperglycemia clearly contributes to development of cellulitis and abscess above after the fall above  Blood sugar is much better controlled now  Management per Medicine Service      Discussed with patient and family in detail regarding above plan      Antibiotics:  Vancomycin # 2    Subjective:  Patient states right forearm pain is better today   She is able to flex and extend his fingers better  Temperature stays down  No further chills  She is tolerating antibiotic well  No nausea, vomiting or diarrhea  No dizziness or hearing loss  Objective:  Vitals:  HR:  [65-74] 74  Resp:  [18] 18  BP: (127-134)/(64-65) 134/64  SpO2:  [95 %-96 %] 96 %  Temp (24hrs), Av 8 °F (36 6 °C), Min:97 6 °F (36 4 °C), Max:98 °F (36 7 °C)  Current: Temperature: 97 6 °F (36 4 °C)    Physical Exam:     General: Awake, alert, cooperative, no distress  Neck:  Supple  No mass  No lymphadenopathy  Lungs: Expansion symmetric, no rales, no wheezing, respirations unlabored  Heart:  Regular rate and rhythm, S1 and S2 normal, no murmur  Abdomen: Soft, nondistended, non-tender, bowel sounds active all four quadrants,        no masses, no organomegaly  Extremities: Right forearm with dressing in place  Mild serosanguineous staining  Improve edema  Improved erythema  No fluctuance  Improved tenderness  Improve ROM of fingers  Skin:  No rash  Neuro: Moves all extremities  Invasive Devices     Drain            Closed/Suction Drain Right Other (Comment) Accordion 10 Fr  4 days                Labs studies:   I have personally reviewed pertinent labs      Results from last 7 days  Lab Units 18  1205 18  0601 18  0533  18  1948   SODIUM mmol/L 138 137 130*  < > 128*   POTASSIUM mmol/L 3 6 3 3* 4 0  < > 4 2   CHLORIDE mmol/L 104 100 98*  < > 92*   CO2 mmol/L 28 27 26  < > 26   ANION GAP mmol/L 6 10 6  < > 10   BUN mg/dL 4* 8 10  < > 15   CREATININE mg/dL 0 69 0 60 0 58*  < > 0 86   EGFR ml/min/1 73sq m 94 99 100  < > 73   GLUCOSE RANDOM mg/dL 196* 129 178*  < > 466*   CALCIUM mg/dL 8 8 8 9 8 9  < > 10 0   AST U/L  --   --   --   --  24   ALT U/L  --   --   --   --  16   ALK PHOS U/L  --   --   --   --  126*   TOTAL PROTEIN g/dL  --   --   --   --  7 9   BILIRUBIN TOTAL mg/dL  --   --   --   --  0 50   < > = values in this interval not displayed  Results from last 7 days  Lab Units 07/05/18  1205 07/01/18  0601 06/30/18  0533   WBC Thousand/uL 5 62 10 04 10 45*   HEMOGLOBIN g/dL 8 3* 9 2* 9 3*   PLATELETS Thousands/uL 448* 293 266       Results from last 7 days  Lab Units 07/01/18  0904 06/28/18 2016 06/28/18  1949   BLOOD CULTURE   --  No Growth After 5 Days  No Growth After 5 Days  GRAM STAIN RESULT  2+ Polys  1+ Gram positive cocci in pairs  --   --        Imaging Studies:   I have personally reviewed pertinent imaging study reports and images in PACS  EKG, Pathology, and Other Studies:   I have personally reviewed pertinent reports

## 2018-07-06 LAB
ANION GAP SERPL CALCULATED.3IONS-SCNC: 6 MMOL/L (ref 4–13)
BASOPHILS # BLD AUTO: 0.04 THOUSANDS/ΜL (ref 0–0.1)
BASOPHILS NFR BLD AUTO: 1 % (ref 0–1)
BUN SERPL-MCNC: 3 MG/DL (ref 5–25)
CALCIUM SERPL-MCNC: 8.7 MG/DL (ref 8.3–10.1)
CHLORIDE SERPL-SCNC: 108 MMOL/L (ref 100–108)
CO2 SERPL-SCNC: 30 MMOL/L (ref 21–32)
CREAT SERPL-MCNC: 0.58 MG/DL (ref 0.6–1.3)
EOSINOPHIL # BLD AUTO: 0.13 THOUSAND/ΜL (ref 0–0.61)
EOSINOPHIL NFR BLD AUTO: 2 % (ref 0–6)
ERYTHROCYTE [DISTWIDTH] IN BLOOD BY AUTOMATED COUNT: 14 % (ref 11.6–15.1)
GFR SERPL CREATININE-BSD FRML MDRD: 100 ML/MIN/1.73SQ M
GLUCOSE SERPL-MCNC: 135 MG/DL (ref 65–140)
GLUCOSE SERPL-MCNC: 222 MG/DL (ref 65–140)
GLUCOSE SERPL-MCNC: 351 MG/DL (ref 65–140)
GLUCOSE SERPL-MCNC: 44 MG/DL (ref 65–140)
GLUCOSE SERPL-MCNC: 51 MG/DL (ref 65–140)
GLUCOSE SERPL-MCNC: 74 MG/DL (ref 65–140)
HCT VFR BLD AUTO: 26.1 % (ref 34.8–46.1)
HGB BLD-MCNC: 8 G/DL (ref 11.5–15.4)
LYMPHOCYTES # BLD AUTO: 2.22 THOUSANDS/ΜL (ref 0.6–4.47)
LYMPHOCYTES NFR BLD AUTO: 38 % (ref 14–44)
MCH RBC QN AUTO: 26.4 PG (ref 26.8–34.3)
MCHC RBC AUTO-ENTMCNC: 30.7 G/DL (ref 31.4–37.4)
MCV RBC AUTO: 86 FL (ref 82–98)
MONOCYTES # BLD AUTO: 0.37 THOUSAND/ΜL (ref 0.17–1.22)
MONOCYTES NFR BLD AUTO: 6 % (ref 4–12)
NEUTROPHILS # BLD AUTO: 3.06 THOUSANDS/ΜL (ref 1.85–7.62)
NEUTS SEG NFR BLD AUTO: 53 % (ref 43–75)
NRBC BLD AUTO-RTO: 0 /100 WBCS
PLATELET # BLD AUTO: 415 THOUSANDS/UL (ref 149–390)
PMV BLD AUTO: 8.8 FL (ref 8.9–12.7)
POTASSIUM SERPL-SCNC: 3.2 MMOL/L (ref 3.5–5.3)
RBC # BLD AUTO: 3.03 MILLION/UL (ref 3.81–5.12)
SODIUM SERPL-SCNC: 144 MMOL/L (ref 136–145)
WBC # BLD AUTO: 5.82 THOUSAND/UL (ref 4.31–10.16)

## 2018-07-06 PROCEDURE — 80048 BASIC METABOLIC PNL TOTAL CA: CPT | Performed by: INTERNAL MEDICINE

## 2018-07-06 PROCEDURE — 99233 SBSQ HOSP IP/OBS HIGH 50: CPT | Performed by: INTERNAL MEDICINE

## 2018-07-06 PROCEDURE — 82948 REAGENT STRIP/BLOOD GLUCOSE: CPT

## 2018-07-06 PROCEDURE — 99024 POSTOP FOLLOW-UP VISIT: CPT | Performed by: PHYSICIAN ASSISTANT

## 2018-07-06 PROCEDURE — 85025 COMPLETE CBC W/AUTO DIFF WBC: CPT | Performed by: INTERNAL MEDICINE

## 2018-07-06 PROCEDURE — 99232 SBSQ HOSP IP/OBS MODERATE 35: CPT | Performed by: INTERNAL MEDICINE

## 2018-07-06 RX ORDER — INSULIN GLARGINE 100 [IU]/ML
30 INJECTION, SOLUTION SUBCUTANEOUS EVERY MORNING
Status: DISCONTINUED | OUTPATIENT
Start: 2018-07-07 | End: 2018-07-19

## 2018-07-06 RX ADMIN — HEPARIN SODIUM 5000 UNITS: 5000 INJECTION, SOLUTION INTRAVENOUS; SUBCUTANEOUS at 22:10

## 2018-07-06 RX ADMIN — CLONAZEPAM 1 MG: 1 TABLET ORAL at 17:03

## 2018-07-06 RX ADMIN — SERTRALINE HYDROCHLORIDE 50 MG: 50 TABLET ORAL at 22:10

## 2018-07-06 RX ADMIN — HEPARIN SODIUM 5000 UNITS: 5000 INJECTION, SOLUTION INTRAVENOUS; SUBCUTANEOUS at 14:34

## 2018-07-06 RX ADMIN — INSULIN LISPRO 3 UNITS: 100 INJECTION, SOLUTION INTRAVENOUS; SUBCUTANEOUS at 22:10

## 2018-07-06 RX ADMIN — VANCOMYCIN HYDROCHLORIDE 1000 MG: 1 INJECTION, SOLUTION INTRAVENOUS at 09:47

## 2018-07-06 RX ADMIN — CLONAZEPAM 1 MG: 1 TABLET ORAL at 09:17

## 2018-07-06 RX ADMIN — METHADONE HYDROCHLORIDE 15 MG: 10 TABLET ORAL at 09:17

## 2018-07-06 RX ADMIN — INSULIN LISPRO 1 UNITS: 100 INJECTION, SOLUTION INTRAVENOUS; SUBCUTANEOUS at 11:40

## 2018-07-06 RX ADMIN — GABAPENTIN 600 MG: 300 CAPSULE ORAL at 20:05

## 2018-07-06 RX ADMIN — LAMOTRIGINE 150 MG: 100 TABLET ORAL at 09:18

## 2018-07-06 RX ADMIN — GABAPENTIN 600 MG: 300 CAPSULE ORAL at 09:16

## 2018-07-06 RX ADMIN — CLONAZEPAM 1 MG: 1 TABLET ORAL at 20:04

## 2018-07-06 RX ADMIN — GABAPENTIN 600 MG: 300 CAPSULE ORAL at 17:00

## 2018-07-06 RX ADMIN — HEPARIN SODIUM 5000 UNITS: 5000 INJECTION, SOLUTION INTRAVENOUS; SUBCUTANEOUS at 07:30

## 2018-07-06 RX ADMIN — VANCOMYCIN HYDROCHLORIDE 1000 MG: 1 INJECTION, SOLUTION INTRAVENOUS at 20:08

## 2018-07-06 RX ADMIN — HYDROMORPHONE HYDROCHLORIDE 1 MG: 2 TABLET ORAL at 07:30

## 2018-07-06 RX ADMIN — HYDROMORPHONE HYDROCHLORIDE 1 MG: 2 TABLET ORAL at 20:06

## 2018-07-06 RX ADMIN — ROPINIROLE 0.5 MG: 1 TABLET, FILM COATED ORAL at 17:01

## 2018-07-06 RX ADMIN — LISINOPRIL 20 MG: 20 TABLET ORAL at 09:18

## 2018-07-06 RX ADMIN — ROPINIROLE 0.5 MG: 1 TABLET, FILM COATED ORAL at 20:04

## 2018-07-06 RX ADMIN — HYDROMORPHONE HYDROCHLORIDE 1 MG: 2 TABLET ORAL at 13:33

## 2018-07-06 RX ADMIN — ROPINIROLE 0.5 MG: 1 TABLET, FILM COATED ORAL at 09:15

## 2018-07-06 RX ADMIN — HYDROMORPHONE HYDROCHLORIDE 1 MG: 2 TABLET ORAL at 03:22

## 2018-07-06 NOTE — PROGRESS NOTES
[facility] Internal Medicine Progress Note  Patient: Dima Stout 65 y o  female   MRN: [id number]  PCP: Sosa Truong MD  Unit/Bed#: E5 -01 Encounter: [id number]  Date Of Visit: 07/06/18    Assessment:    Principal Problem:    Abscess of right forearm  Active Problems:    Hyponatremia    Right arm cellulitis    Benign essential hypertension    Type 2 diabetes mellitus, uncontrolled ([facility] [address] )      Plan:    1 )  Right upper extremity abscess/cellulitis  -MRI reveals soft tissue swelling along the radial aspect of right forearm  -status post I and D and drain placement on 07/01/2018  -cultures revealed MRSA  -antibiotics changed to IV vancomycin as patient agreeable for new IV  -orthopedic follow-up appreciated, patient continues to have drainage from abscess possible OR tomorrow for I and D     2 )  Sepsis-present on admission  -with leukocytosis, tachycardia  -now improved     3 )  Diabetes mellitus  -HbA1c 13 4  -patient not compliant with home insulin  -patient is Accu-Cheks are waxing and waning  -will decrease Lantus to 35 units q day and Humalog to 8 units t i d    -this morning Lantus was held due to hypoglycemia, will monitor Accu-Cheks  And adjust further as needed     4 )  Hypertension  -continue with current medications, monitor blood pressure     5 )  Hepatitis-C     6 )  Bipolar disorder  -continue with home medication     7 )  Chronic opioid dependence  -maintained on methadone      VTE Pharmacologic Prophylaxis:   Pharmacologic: heparin    Patient Centered Rounds: I have performed bedside rounds with nursing staff today  Discussions with Specialists or Other Care Team Provider: George Soto    Time Spent for Care: 20 minutes  More than 50% of total time spent on counseling and coordination of care as described above      Current Length of Stay: 8 day(s)    Current Patient Status: Inpatient   Certification Statement: The patient will continue to require additional inpatient hospital stay due to abscess, mrsa wound    Discharge Plan / Estimated Discharge Date: 2-3 days    Code Status: Level 1 - Full Code      Subjective:   Patient seen and examined at bedside, complaining of some mild right upper extremity pain  Objective:     Vitals:   Temp (24hrs), Av 6 °F (37 °C), Min:98 5 °F (36 9 °C), Max:98 7 °F (37 1 °C)    HR:  [74-76] 75  Resp:  [18] 18  BP: (136-147)/(62-68) 146/66  SpO2:  [95 %-99 %] 99 %  There is no height or weight on file to calculate BMI  Input and Output Summary (last 24 hours): Intake/Output Summary (Last 24 hours) at 18 1637  Last data filed at 18 1100   Gross per 24 hour   Intake              480 ml   Output                0 ml   Net              480 ml       Physical Exam:    Constitutional: Patient is oriented to person, place and time, no acute distress  HEENT:  Normocephalic, atraumatic, EOMI, PERRLA, no scleral icterus, no pallor, moist oral mucosa  Neck:  Supple, no masses, no thyromegaly, no bruits Normal range of motion  Lymph nodes:  No lymphadenopathy  Cardiovascular: Normal S1S2, RRR, No murmurs/rubs/gallops appreciated  Pulmonary: Clear to auscultation bilaterally, No rhonchi/rales/wheezing appreciated  Abdominal: Soft, Bowel sounds present, Non-tender, Non-distended, No rebound/guarding, no hepatomegaly   Musculoskeletal: No tenderness/abnormality   Extremities:  No cyanosis, clubbing or edema  Peripheral pulses palpable and equal bilaterally  Neurological: Cranial nerves II-XII grossly intact, sensation intact, otherwise no focal neurological symptoms     Skin:  Right upper extremity in dressing    Additional Data:     Labs:      Results from last 7 days  Lab Units 18  0604   WBC Thousand/uL 5 82   HEMOGLOBIN g/dL 8 0*   HEMATOCRIT % 26 1*   PLATELETS Thousands/uL 415*   NEUTROS PCT % 53   LYMPHS PCT % 38   MONOS PCT % 6   EOS PCT % 2       Results from last 7 days  Lab Units 18  0604   SODIUM mmol/L 144   POTASSIUM mmol/L 3 2* CHLORIDE mmol/L 108   CO2 mmol/L 30   BUN mg/dL 3*   CREATININE mg/dL 0 58*   CALCIUM mg/dL 8 7   GLUCOSE RANDOM mg/dL 51*            I Have Reviewed All Lab Data Listed Above        Recent Cultures (last 7 days):       Results from last 7 days  Lab Units 07/01/18  0904   GRAM STAIN RESULT  2+ Polys  1+ Gram positive cocci in pairs       Last 24 Hours Medication List:     Current Facility-Administered Medications:  acetaminophen 650 mg Oral Q6H PRN Kade Swenson PA-C    calcium carbonate 1,000 mg Oral Daily PRN Kade Swenson PA-C    clonazePAM 1 mg Oral TID Kade Swenson PA-C    gabapentin 600 mg Oral TID Massimo Allred MD    heparin (porcine) 5,000 Units Subcutaneous [facility] Massimo Allred MD    HYDROmorphone 1 mg Oral Q4H PRN Massimo Allred MD    [START ON 7/7/2018] insulin glargine 30 Units Subcutaneous QAM Massimo Allred MD    insulin lispro 1-5 Units Subcutaneous 4x Daily (AC & HS) Arlyne Schaumann, MD    insulin lispro 8 Units Subcutaneous TID AC Massimo Allred MD    lamoTRIgine 150 mg Oral Daily Kade Swenson PA-C    lisinopril 20 mg Oral Daily Massimo Allred MD    methadone 15 mg Oral Daily Arlyne Schaumann, MD    ondansetron 4 mg Intravenous Q6H PRN Kade Swenson PA-C    rOPINIRole 0 5 mg Oral TID Kade Swenson PA-C    sertraline 50 mg Oral HS Kade Swenson PA-C    traMADol 50 mg Oral Q6H PRN Arlyne Schaumann, MD    vancomycin 15 mg/kg Intravenous Q12H Marcelino Sr MD Last Rate: 1,000 mg (07/06/18 0947)        Today, Patient Was Seen By: Massimo Allred MD

## 2018-07-06 NOTE — PROGRESS NOTES
Progress Note - Infectious Disease   Sabrina Reveal 65 y o  female MRN: [id number]  Unit/Bed#: E5 -01 Encounter: [id number]      Impression/Recommendations:  1   Right forearm cellulitis and abscess   Patient is status post I&D on 07/01   Patient's clinical response has been slow on IV cefazolin   Operative culture grew MRSA   This is the most likely reason for slow response   Antibiotic has been changed to IV vancomycin  She was clinically improving on IV vancomycin  However, forearm is little worse today  This may be secondary to interruption of IV antibiotic course  She remains systemically well  Sandrea Bent blood cultures remain negative  now the patient has a new IV and back on IV antibiotic, will need to monitor forearm closely  She may need another I&D  Continue IV vancomycin  Monitor temperature/WBC  Serial forearm exams  Possible need for further ID      2   Restriction to right finger extension   This is most likely secondary to right forearm abscess   There was no obvious tenosynovitis at surgery   Slow in response is most likely secondary to MRSA, not optimally treated with antibiotic  Patient was clinically improving on IV vancomycin, although a little worse over last 24 hours, as in above  Will continue to monitor closely for now  Antibiotic plan as in above  Serial forearm exams  Monitor finger flexion and extension      3   DM, poorly controlled, with hyperglycemia on admission   Hemoglobin A1c is 13 4   I discussed with patient in great detail regarding this  Myron Billet control hyperglycemia clearly contributes to development of cellulitis and abscess above after the fall above   Blood sugar is much better controlled now  Management per Medicine Service        4   Noncompliance  I discussed with patient in great detail regarding the need to be compliant with treatment plan  Otherwise, her infection can worsen, as we are seeing      Discussed with patient in detail regarding the above plan      Antibiotics:  Vancomycin # 3     Subjective:  Patient is finally agreeable to have and new IV placed  She missed 1 dose of IV vancomycin last night  Patient states right forearm pain is worse today  Mobility of fingers unchanged  Temperature stays down  No further chills  She is tolerating antibiotic well  No nausea, vomiting or diarrhea  No dizziness or hearing loss  Objective:  Vitals:  HR:  [71-76] 74  Resp:  [18] 18  BP: (134-147)/(61-68) 136/62  SpO2:  [95 %-96 %] 96 %  Temp (24hrs), Av 3 °F (36 8 °C), Min:97 8 °F (36 6 °C), Max:98 7 °F (37 1 °C)  Current: Temperature: 98 5 °F (36 9 °C)    Physical Exam:     General: Awake, alert, cooperative, no distress  Neck:  Supple  No mass  No lymphadenopathy  Lungs: Expansion symmetric, no rales, no wheezing, respirations unlabored  Heart:  Regular rate and rhythm, S1 and S2 normal, no murmur  Abdomen: Soft, nondistended, non-tender, bowel sounds active all four quadrants,        no masses, no organomegaly  Extremities: Right forearm wound with further worse edema, erythema, seropurulent drainage and tenderness      Skin:  No rash  Neuro: Moves all extremities  Invasive Devices     Peripheral Intravenous Line            Peripheral IV 18 Left Antecubital less than 1 day          Drain            Closed/Suction Drain Right Other (Comment) Accordion 10 Fr  5 days                Labs studies:   I have personally reviewed pertinent labs      Results from last 7 days  Lab Units 18  0604 18  1205 18  0601   SODIUM mmol/L 144 138 137   POTASSIUM mmol/L 3 2* 3 6 3 3*   CHLORIDE mmol/L 108 104 100   CO2 mmol/L 30 28 27   ANION GAP mmol/L 6 6 10   BUN mg/dL 3* 4* 8   CREATININE mg/dL 0 58* 0 69 0 60   EGFR ml/min/1 73sq m 100 94 99   GLUCOSE RANDOM mg/dL 51* 196* 129   CALCIUM mg/dL 8 7 8 8 8 9       Results from last 7 days  Lab Units 18  0604 18  1205 18  0601   WBC Thousand/uL 5 82 5 62 10 04 HEMOGLOBIN g/dL 8 0* 8 3* 9 2*   PLATELETS Thousands/uL 415* 448* 293       Results from last 7 days  Lab Units 07/01/18  0904   GRAM STAIN RESULT  2+ Polys  1+ Gram positive cocci in pairs       Imaging Studies:   I have personally reviewed pertinent imaging study reports and images in PACS  EKG, Pathology, and Other Studies:   I have personally reviewed pertinent reports

## 2018-07-06 NOTE — PROGRESS NOTES
Progress Note - Orthopedics   Guadalupe Jordan 65 y o  female MRN: [id number]  Unit/Bed#: E5 -01 Encounter: [id number]    Assessment:  64 yo female s/p I&D right forearm abscess on 7/1/18    Plan:  -continue dry gauze dressing changes daily and prn drainage  -OT- encouraged PROM and AROM of wrist and fingers  Continue vancomycin per ID service  -med mgt per SLIM  -NPO after midnight- possible OR tomorrow for wash out       Subjective: Pt seen and examined  Complains of forearm pain, but with improvement  Vitals: Blood pressure 136/62, pulse 74, temperature 98 5 °F (36 9 °C), temperature source Temporal, resp  rate 18, weight 65 3 kg (144 lb), SpO2 96 %  ,There is no height or weight on file to calculate BMI  No intake or output data in the 24 hours ending 07/06/18 0824    Invasive Devices     Drain            Closed/Suction Drain Right Other (Comment) Accordion 10 Fr  4 days                Ortho Exam: RUE  -moderate dorsal forearm erythema and tenderness  -small area proximal to incision, no open, no draining, pustule  -wrist ROM limited  -sensation R/M/U nerves intact  -palpable radial pulse   -incision with central area of seropurulent drainage     Lab, Imaging and other studies:   I have personally reviewed pertinent lab results    CBC:   Lab Results   Component Value Date    WBC 5 82 07/06/2018    HGB 8 0 (L) 07/06/2018    HCT 26 1 (L) 07/06/2018    MCV 86 07/06/2018     (H) 07/06/2018    MCH 26 4 (L) 07/06/2018    MCHC 30 7 (L) 07/06/2018    RDW 14 0 07/06/2018    MPV 8 8 (L) 07/06/2018    NRBC 0 07/06/2018     CMP:   Lab Results   Component Value Date     07/06/2018     07/06/2018    CO2 30 07/06/2018    ANIONGAP 6 07/06/2018    BUN 3 (L) 07/06/2018    CREATININE 0 58 (L) 07/06/2018    GLUCOSE 51 (L) 07/06/2018    CALCIUM 8 7 07/06/2018    EGFR 100 07/06/2018

## 2018-07-07 ENCOUNTER — ANESTHESIA (INPATIENT)
Dept: PERIOP | Facility: HOSPITAL | Age: 63
DRG: 854 | End: 2018-07-07
Payer: COMMERCIAL

## 2018-07-07 ENCOUNTER — ANESTHESIA EVENT (INPATIENT)
Dept: PERIOP | Facility: HOSPITAL | Age: 63
DRG: 854 | End: 2018-07-07
Payer: COMMERCIAL

## 2018-07-07 PROBLEM — M65.031: Status: ACTIVE | Noted: 2018-06-28

## 2018-07-07 LAB
ABO GROUP BLD: NORMAL
BLD GP AB SCN SERPL QL: NEGATIVE
GLUCOSE SERPL-MCNC: 157 MG/DL (ref 65–140)
GLUCOSE SERPL-MCNC: 165 MG/DL (ref 65–140)
GLUCOSE SERPL-MCNC: 180 MG/DL (ref 65–140)
GLUCOSE SERPL-MCNC: 99 MG/DL (ref 65–140)
RH BLD: POSITIVE
SPECIMEN EXPIRATION DATE: NORMAL
VANCOMYCIN TROUGH SERPL-MCNC: 10.5 UG/ML (ref 10–20)

## 2018-07-07 PROCEDURE — 99232 SBSQ HOSP IP/OBS MODERATE 35: CPT | Performed by: INTERNAL MEDICINE

## 2018-07-07 PROCEDURE — 86900 BLOOD TYPING SEROLOGIC ABO: CPT | Performed by: ANESTHESIOLOGY

## 2018-07-07 PROCEDURE — 86923 COMPATIBILITY TEST ELECTRIC: CPT

## 2018-07-07 PROCEDURE — 82948 REAGENT STRIP/BLOOD GLUCOSE: CPT

## 2018-07-07 PROCEDURE — 80202 ASSAY OF VANCOMYCIN: CPT | Performed by: INTERNAL MEDICINE

## 2018-07-07 PROCEDURE — 86850 RBC ANTIBODY SCREEN: CPT | Performed by: ANESTHESIOLOGY

## 2018-07-07 PROCEDURE — 0KB90ZZ EXCISION OF RIGHT LOWER ARM AND WRIST MUSCLE, OPEN APPROACH: ICD-10-PCS | Performed by: ORTHOPAEDIC SURGERY

## 2018-07-07 PROCEDURE — 86901 BLOOD TYPING SEROLOGIC RH(D): CPT | Performed by: ANESTHESIOLOGY

## 2018-07-07 PROCEDURE — 25028 I&D F/ARM&/WRST DP ABSC/HMTM: CPT | Performed by: ORTHOPAEDIC SURGERY

## 2018-07-07 PROCEDURE — 99024 POSTOP FOLLOW-UP VISIT: CPT | Performed by: ORTHOPAEDIC SURGERY

## 2018-07-07 RX ORDER — LORAZEPAM 2 MG/ML
0.5 INJECTION INTRAMUSCULAR
Status: DISCONTINUED | OUTPATIENT
Start: 2018-07-07 | End: 2018-07-07 | Stop reason: HOSPADM

## 2018-07-07 RX ORDER — MAGNESIUM HYDROXIDE 1200 MG/15ML
LIQUID ORAL AS NEEDED
Status: DISCONTINUED | OUTPATIENT
Start: 2018-07-07 | End: 2018-07-07 | Stop reason: HOSPADM

## 2018-07-07 RX ORDER — PROPOFOL 10 MG/ML
INJECTION, EMULSION INTRAVENOUS AS NEEDED
Status: DISCONTINUED | OUTPATIENT
Start: 2018-07-07 | End: 2018-07-07 | Stop reason: SURG

## 2018-07-07 RX ORDER — SODIUM CHLORIDE 9 MG/ML
INJECTION, SOLUTION INTRAVENOUS CONTINUOUS PRN
Status: DISCONTINUED | OUTPATIENT
Start: 2018-07-07 | End: 2018-07-07 | Stop reason: SURG

## 2018-07-07 RX ORDER — ONDANSETRON 2 MG/ML
INJECTION INTRAMUSCULAR; INTRAVENOUS AS NEEDED
Status: DISCONTINUED | OUTPATIENT
Start: 2018-07-07 | End: 2018-07-07 | Stop reason: SURG

## 2018-07-07 RX ORDER — ONDANSETRON 2 MG/ML
4 INJECTION INTRAMUSCULAR; INTRAVENOUS EVERY 6 HOURS PRN
Status: DISCONTINUED | OUTPATIENT
Start: 2018-07-07 | End: 2018-07-07 | Stop reason: HOSPADM

## 2018-07-07 RX ORDER — FENTANYL CITRATE 50 UG/ML
INJECTION, SOLUTION INTRAMUSCULAR; INTRAVENOUS AS NEEDED
Status: DISCONTINUED | OUTPATIENT
Start: 2018-07-07 | End: 2018-07-07 | Stop reason: SURG

## 2018-07-07 RX ORDER — LIDOCAINE HYDROCHLORIDE 10 MG/ML
INJECTION, SOLUTION INFILTRATION; PERINEURAL AS NEEDED
Status: DISCONTINUED | OUTPATIENT
Start: 2018-07-07 | End: 2018-07-07 | Stop reason: SURG

## 2018-07-07 RX ADMIN — CLONAZEPAM 1 MG: 1 TABLET ORAL at 08:16

## 2018-07-07 RX ADMIN — VANCOMYCIN HYDROCHLORIDE 1000 MG: 1 INJECTION, SOLUTION INTRAVENOUS at 22:46

## 2018-07-07 RX ADMIN — ROPINIROLE 0.5 MG: 1 TABLET, FILM COATED ORAL at 20:27

## 2018-07-07 RX ADMIN — GABAPENTIN 600 MG: 300 CAPSULE ORAL at 20:27

## 2018-07-07 RX ADMIN — HYDROMORPHONE HYDROCHLORIDE 0.5 MG: 1 INJECTION, SOLUTION INTRAMUSCULAR; INTRAVENOUS; SUBCUTANEOUS at 12:48

## 2018-07-07 RX ADMIN — LISINOPRIL 20 MG: 20 TABLET ORAL at 08:12

## 2018-07-07 RX ADMIN — HYDROMORPHONE HYDROCHLORIDE 0.5 MG: 1 INJECTION, SOLUTION INTRAMUSCULAR; INTRAVENOUS; SUBCUTANEOUS at 12:27

## 2018-07-07 RX ADMIN — VANCOMYCIN HYDROCHLORIDE 1000 MG: 1 INJECTION, SOLUTION INTRAVENOUS at 07:55

## 2018-07-07 RX ADMIN — LAMOTRIGINE 150 MG: 100 TABLET ORAL at 08:10

## 2018-07-07 RX ADMIN — INSULIN LISPRO 1 UNITS: 100 INJECTION, SOLUTION INTRAVENOUS; SUBCUTANEOUS at 22:21

## 2018-07-07 RX ADMIN — LORAZEPAM 0.5 MG: 2 INJECTION INTRAMUSCULAR; INTRAVENOUS at 13:17

## 2018-07-07 RX ADMIN — INSULIN GLARGINE 30 UNITS: 100 INJECTION, SOLUTION SUBCUTANEOUS at 08:14

## 2018-07-07 RX ADMIN — METHADONE HYDROCHLORIDE 15 MG: 10 TABLET ORAL at 08:11

## 2018-07-07 RX ADMIN — LIDOCAINE HYDROCHLORIDE 60 MG: 10 INJECTION, SOLUTION INFILTRATION; PERINEURAL at 10:55

## 2018-07-07 RX ADMIN — FENTANYL CITRATE 50 MCG: 50 INJECTION, SOLUTION INTRAMUSCULAR; INTRAVENOUS at 11:11

## 2018-07-07 RX ADMIN — ROPINIROLE 0.5 MG: 1 TABLET, FILM COATED ORAL at 08:10

## 2018-07-07 RX ADMIN — SERTRALINE HYDROCHLORIDE 50 MG: 50 TABLET ORAL at 23:46

## 2018-07-07 RX ADMIN — CLONAZEPAM 1 MG: 1 TABLET ORAL at 20:30

## 2018-07-07 RX ADMIN — GABAPENTIN 600 MG: 300 CAPSULE ORAL at 16:05

## 2018-07-07 RX ADMIN — CLONAZEPAM 1 MG: 1 TABLET ORAL at 16:05

## 2018-07-07 RX ADMIN — HYDROMORPHONE HYDROCHLORIDE 1 MG: 2 TABLET ORAL at 00:42

## 2018-07-07 RX ADMIN — SODIUM CHLORIDE: 0.9 INJECTION, SOLUTION INTRAVENOUS at 10:45

## 2018-07-07 RX ADMIN — ROPINIROLE 0.5 MG: 1 TABLET, FILM COATED ORAL at 16:03

## 2018-07-07 RX ADMIN — HYDROMORPHONE HYDROCHLORIDE 0.5 MG: 1 INJECTION, SOLUTION INTRAMUSCULAR; INTRAVENOUS; SUBCUTANEOUS at 13:09

## 2018-07-07 RX ADMIN — FENTANYL CITRATE 50 MCG: 50 INJECTION, SOLUTION INTRAMUSCULAR; INTRAVENOUS at 10:59

## 2018-07-07 RX ADMIN — LORAZEPAM 0.5 MG: 2 INJECTION INTRAMUSCULAR; INTRAVENOUS at 12:58

## 2018-07-07 RX ADMIN — HYDROMORPHONE HYDROCHLORIDE 1 MG: 2 TABLET ORAL at 21:32

## 2018-07-07 RX ADMIN — HYDROMORPHONE HYDROCHLORIDE 0.5 MG: 1 INJECTION, SOLUTION INTRAMUSCULAR; INTRAVENOUS; SUBCUTANEOUS at 13:32

## 2018-07-07 RX ADMIN — GABAPENTIN 600 MG: 300 CAPSULE ORAL at 08:10

## 2018-07-07 RX ADMIN — HEPARIN SODIUM 5000 UNITS: 5000 INJECTION, SOLUTION INTRAVENOUS; SUBCUTANEOUS at 21:41

## 2018-07-07 RX ADMIN — ONDANSETRON HYDROCHLORIDE 4 MG: 2 INJECTION, SOLUTION INTRAVENOUS at 11:03

## 2018-07-07 RX ADMIN — INSULIN LISPRO 1 UNITS: 100 INJECTION, SOLUTION INTRAVENOUS; SUBCUTANEOUS at 07:56

## 2018-07-07 RX ADMIN — PROPOFOL 150 MG: 10 INJECTION, EMULSION INTRAVENOUS at 10:55

## 2018-07-07 RX ADMIN — FENTANYL CITRATE 50 MCG: 50 INJECTION, SOLUTION INTRAMUSCULAR; INTRAVENOUS at 10:53

## 2018-07-07 RX ADMIN — HEPARIN SODIUM 5000 UNITS: 5000 INJECTION, SOLUTION INTRAVENOUS; SUBCUTANEOUS at 05:26

## 2018-07-07 RX ADMIN — FENTANYL CITRATE 50 MCG: 50 INJECTION, SOLUTION INTRAMUSCULAR; INTRAVENOUS at 11:17

## 2018-07-07 RX ADMIN — INSULIN LISPRO 1 UNITS: 100 INJECTION, SOLUTION INTRAVENOUS; SUBCUTANEOUS at 16:08

## 2018-07-07 RX ADMIN — TRAMADOL HYDROCHLORIDE 50 MG: 50 TABLET, FILM COATED ORAL at 16:04

## 2018-07-07 NOTE — ANESTHESIA POSTPROCEDURE EVALUATION
Post-Op Assessment Note      CV Status:  Stable    Mental Status:  Alert, awake and anxious    Hydration Status:  Euvolemic    PONV Controlled:  Controlled    Airway Patency:  Patent    Post Op Vitals Reviewed: Yes          Staff: Anesthesiologist           BP     Temp      Pulse     Resp      SpO2

## 2018-07-07 NOTE — OP NOTE
OPERATIVE REPORT    PATIENT NAME: Rober Jacob   :  1955  MRN: [id number]  Pt Location: [facility] OR ROOM 02    SURGERY DATE: 2018    SURGEON(S) and ROLE:  Primary: Irma Yeh MD    NOTE:  No qualified resident or physician assistant was available for the case  PREOPERATIVE DIAGNOSES:  Right Forearm Abscess    POSTOPERATIVE DIAGNOSES:  Right Forearm Abscess  Right Forearm Muscle Necrosis    PROCEDURES:  Right Forearm Irrigation and Excisional Debridement      ANESTHESIA TYPE:  General endotracheal    ANESTHESIA STAFF:   Anesthesiologist: Carlos Thomas DO  CRNA: Chandana Turner CRNA    ESTIMATED BLOOD LOSS:  30 mL    TOURNIQUET TIME:  na    PERIOPERATIVE ANTIBIOTICS:  Pt received scheduled vancomycin within 1 hour of surgery    IMPLANTS:  none    * No implants in log *    SPECIMENS:  * No specimens in log *    DRAINS:  None      OPERATIVE INDICATIONS:  The patient is a 65 y o  female with persistent right forearm pain and purulent drainage after undergoing incision and drainage of a dorsal forearm abscess  Surgical treatment was indicated due to persistent wound drainage and infection  After a thorough discussion of the potential risks, benefits, and alternative treatments, the patient agreed to proceed with surgery  The patient understands that the risks of surgery include, but are not limited to: infection, neurovascular injury, wound healing complications, venous thromboembolism, persistent pain, stiffness, instability, recurrence of symptoms, potential need for additional surgeries, and loss of limb or life  Oral and written consent for surgery was obtained from the patient preoperatively  PROCEDURE AND TECHNIQUE:  On the day of surgery, the patient was identified in the preoperative holding area  The operative site was marked by the surgeon  The patient was taken into the operating room    A time-out was conducted to confirm the patient's identity, the operative site, and the proposed procedure  The patient was anesthetized, and perioperative antibiotics were administered  The patient was positioned supine on the OR table  All bony prominences were padded  A tourniquet was not used  The operative site was prepped and draped using standard sterile technique  The existing incision on the dorsal forearm was opened and extended proximally and distally  There was purulent fluid expressed  There was moderate muscle necrosis present, as demonstrated by lack of contractility and dusky color  The ECRL and ECRB were predominantly affected on the dorsal side of the forearm  The wound bed extended radially to the volar compartment, where the FPL was also found to lack contractility  All non-viable muscle was debrided with metzenbaum scissors  3 L of sterile saline were irrigated through the wound  The proximal and distal aspects of the wound were closed with 2-0 nylon  A wound vac was applied to the central portion of the wound with a second sponge placed deep within the dorsal compartment  The radial nerve and artery and the median nerve were visualized and protected throughout the procedure  The drapes were removed and the patient was awakened from anesthesia        COMPLICATIONS:  None    PATIENT DISPOSITION:  PACU       SIGNATURE:  Jerome Oleary MD  DATE:  July 7, 2018  TIME:  5:31 PM

## 2018-07-07 NOTE — PROGRESS NOTES
[facility] Internal Medicine Progress Note  Patient: Adrianna Olvera 65 y o  female   MRN: [id number]  PCP: Neli Lemos MD  Unit/Bed#: OR [place] Encounter: [id number]  Date Of Visit: 07/07/18    Assessment:    Principal Problem:    Abscess of right forearm  Active Problems:    Hyponatremia    Right arm cellulitis    Benign essential hypertension    Type 2 diabetes mellitus, uncontrolled ([address] )    Abscess of tendon sheath of forearm, right      Plan:    1 )  Right upper extremity abscess/cellulitis  -MRI reveals soft tissue swelling along the radial aspect of right forearm  -status post I and D and drain placement on 07/01/2018  -cultures revealed MRSA  -antibiotics changed to IV vancomycin as patient agreeable for new IV  -orthopedic follow-up appreciated, patient continues to have drainage from abscess possible OR tomorrow for I and D     2 )  Sepsis-present on admission  -with leukocytosis, tachycardia  -now improved     3 )  Diabetes mellitus  -HbA1c 13 4  -patient not compliant with home insulin  -patient is Accu-Cheks are waxing and waning  -will decrease Lantus to 35 units q day and Humalog to 8 units t i d    -this morning Lantus was held for OR    4 )  Hypertension  -continue with current medications, monitor blood pressure     5 )  Hepatitis-C     6 )  Bipolar disorder  -continue with home medication     7 )  Chronic opioid dependence  -maintained on methadone       VTE Pharmacologic Prophylaxis:   Pharmacologic: heparin    Patient Centered Rounds: I have performed bedside rounds with nursing staff today  Time Spent for Care: 20 minutes  More than 50% of total time spent on counseling and coordination of care as described above      Current Length of Stay: 9 day(s)    Current Patient Status: Inpatient   Certification Statement: The patient will continue to require additional inpatient hospital stay due to MRSA abscess    Discharge Plan / Estimated Discharge Date: TBD    Code Status: Level 1 - Full Code      Subjective:   Patient seen and examined at bedside, currently resting comfortably in bed, denies any complaints    Objective:     Vitals:   Temp (24hrs), Av 5 °F (36 9 °C), Min:97 6 °F (36 4 °C), Max:100 °F (37 8 °C)    HR:  [72-76] 76  Resp:  [18] 18  BP: (144-178)/(66-86) 178/86  SpO2:  [94 %-99 %] 94 %  There is no height or weight on file to calculate BMI  Input and Output Summary (last 24 hours):     No intake or output data in the 24 hours ending 18 1128    Physical Exam:    Constitutional: Patient is oriented to person, place and time, no acute distress  HEENT:  Normocephalic, atraumatic, EOMI, PERRLA, no scleral icterus, no pallor, moist oral mucosa  Neck:  Supple, no masses, no thyromegaly, no bruits Normal range of motion  Lymph nodes:  No lymphadenopathy  Cardiovascular: Normal S1S2, RRR, No murmurs/rubs/gallops appreciated  Pulmonary: Clear to auscultation bilaterally, No rhonchi/rales/wheezing appreciated  Abdominal: Soft, Bowel sounds present, Non-tender, Non-distended, No rebound/guarding, no hepatomegaly   Musculoskeletal: No tenderness/abnormality   Extremities:  No cyanosis, clubbing or edema  Peripheral pulses palpable and equal bilaterally  Neurological: Cranial nerves II-XII grossly intact, sensation intact, otherwise no focal neurological symptoms  Skin:  Right upper extremity in dressing    Additional Data:     Labs:      Results from last 7 days  Lab Units 18  0604   WBC Thousand/uL 5 82   HEMOGLOBIN g/dL 8 0*   HEMATOCRIT % 26 1*   PLATELETS Thousands/uL 415*   NEUTROS PCT % 53   LYMPHS PCT % 38   MONOS PCT % 6   EOS PCT % 2       Results from last 7 days  Lab Units 18  0604   SODIUM mmol/L 144   POTASSIUM mmol/L 3 2*   CHLORIDE mmol/L 108   CO2 mmol/L 30   BUN mg/dL 3*   CREATININE mg/dL 0 58*   CALCIUM mg/dL 8 7   GLUCOSE RANDOM mg/dL 51*            I Have Reviewed All Lab Data Listed Above        Recent Cultures (last 7 days):       Results from last 7 days  Lab Units 07/01/18  0904   GRAM STAIN RESULT  2+ Polys  1+ Gram positive cocci in pairs       Last 24 Hours Medication List:     Current Facility-Administered Medications:  [MAR Hold] acetaminophen 650 mg Oral Q6H PRN Kourtney Arriaga PA-C    [MAR Hold] calcium carbonate 1,000 mg Oral Daily PRN Kourtney Arriaga PA-C    [MAR Hold] clonazePAM 1 mg Oral TID Kourtney Arriaga PA-C    [facility] Hold] gabapentin 600 mg Oral TID Rebeka Rosado MD    [facility] Hold] heparin (porcine) 5,000 Units Subcutaneous [facility] Rebeka Rosado MD    HYDROmorphone 0 5 mg Intravenous Q10 Min PRN Abelardo Walsh DO    [facility] Hold] HYDROmorphone 1 mg Oral Q4H PRN Rebeka Rosado MD    [facility] Hold] insulin glargine 30 Units Subcutaneous QAM Rebeka Rosado MD    [facility] Hold] insulin lispro 1-5 Units Subcutaneous 4x Daily (AC & HS) Dorina Denver, MD    [MAR Hold] insulin lispro 8 Units Subcutaneous TID AC Rebeka Rosado MD    [facility] Hold] lamoTRIgine 150 mg Oral Daily Kourtney Arriaga PA-C    [MAR Hold] lisinopril 20 mg Oral Daily Rebeka Rosado MD    [facility] Hold] methadone 15 mg Oral Daily Dorina Denver, MD    [MAR Hold] ondansetron 4 mg Intravenous Q6H PRN Kourtney Arriaga PA-C    ondansetron 4 mg Intravenous Q6H PRN Abelardo Walsh DO    [MAR Hold] rOPINIRole 0 5 mg Oral TID Kourtney Arriaga PA-C    [facility] Hold] sertraline 50 mg Oral HS Kourtney Arriaga PA-C    sodium chloride   PRN Matt Escobar MD    sodium chloride   PRN Matt Escobar MD    [facility] Hold] traMADol 50 mg Oral Q6H PRN Dorina Denver, MD    [facility] Hold] vancomycin 15 mg/kg Intravenous Q12H Reva Voss MD Last Rate: 1,000 mg (07/07/18 6527)     Facility-Administered Medications Ordered in Other Encounters:  fentanyl citrate (PF)  Intravenous PRN [place] Anis, CRNA   lidocaine   PRN Inga Anis, CRNA   ondansetron   PRN [place] Anis, CRNA   propofol  Intravenous PRN Inga Anis, CRNA   sodium chloride   Continuous PRN [place] Dominic, CRNA        Today, Patient Was Seen By: Romeo Monaco Brent Neville MD

## 2018-07-07 NOTE — OCCUPATIONAL THERAPY NOTE
Occupational Therapy Cancellation Note        OT consult received  Pt currently in OR for I&D and not available for eval at this time  Will follow to complete OT evaluation      Yessi Hu MS, OTR/L

## 2018-07-07 NOTE — ANESTHESIA PREPROCEDURE EVALUATION
Review of Systems/Medical History  Patient summary reviewed  Chart reviewed  No history of anesthetic complications     Cardiovascular  Hyperlipidemia, Hypertension poorly controlled,    Pulmonary  Negative pulmonary ROS        GI/Hepatic  Negative GI/hepatic ROS          Negative  ROS        Endo/Other  Diabetes poorly controlled type 1 Insulin, History of thyroid disease , hypothyroidism,   Comment: HgbA1C  13 4     GYN       Hematology  Negative hematology ROS Anemia ,     Musculoskeletal    Comment: Right arm cellulitis      Neurology      Comment: On methadone therapy  Psychology   Anxiety, Depression , bipolar disorder and being treated for depression,              Physical Exam    Airway    Mallampati score: II  TM Distance: <3 FB       Dental   upper dentures,     Cardiovascular  Rhythm: regular, Rate: normal, Cardiovascular exam normal    Pulmonary  Pulmonary exam normal     Other Findings        Anesthesia Plan  ASA Score- 3     Anesthesia Type- general with ASA Monitors  Additional Monitors:   Airway Plan: LMA  Plan Factors- Patient instructed to abstain from smoking on day of procedure  Patient did not smoke on day of surgery  Induction- intravenous  Postoperative Plan- Plan for postoperative opioid use  Informed Consent- Anesthetic plan and risks discussed with patient

## 2018-07-07 NOTE — PROGRESS NOTES
Orthopaedic Surgery - Progress Note  Maliha Marcus (72 y o  female)   : 1955   MRN: [id number]  Date: 2018   Encounter: [id number]   Unit/Bed#: E5 -01    Assessment / Plan  S/p I&D right forearm abscess 18, now with persistent infection    · Plan return to OR today for repeat I&D with wound vac placement  · NPO  · Continue IV vancomycin per ID service  · Consent signed on chart      Subjective  Reports persistent moderate right forearm pain  Initially she felt better after her I&D on 18, but she has not progressed for the past 48 hours and has had persistent drainage from her wound  Vitals  Temp:  [97 6 °F (36 4 °C)-100 °F (37 8 °C)] 97 8 °F (36 6 °C)  HR:  [72-76] 76  Resp:  [18] 18  BP: (144-178)/(66-86) 178/86  There is no height or weight on file to calculate BMI  I/O last 24 hours:   In: 480 [P O :480]  Out: -     Ortho Exam - Right Upper Extremity  · Wound has purulent drainage from central portion  · Significant tenderness at right forearm dorsally  · No volar forearm tenderness  · Sensation intact R/M/U nerves  · Able to F/E wrist and fingers actively, but with limited ROM due to pain  · Brisk cap refill all fingertips    Lab Results  (I have personally reviewed pertinent lab results )    Results from last 7 days  Lab Units 18  0604 18  1205 18  0601   WBC Thousand/uL 5 82 5 62 10 04   HEMOGLOBIN g/dL 8 0* 8 3* 9 2*   HEMATOCRIT % 26 1* 26 8* 28 3*   PLATELETS Thousands/uL 415* 448* 293           Results from last 7 days  Lab Units 18  0604 18  1205 18  0601   SODIUM mmol/L 144 138 137   POTASSIUM mmol/L 3 2* 3 6 3 3*   CHLORIDE mmol/L 108 104 100   CO2 mmol/L 30 28 27   ANION GAP mmol/L 6 6 10   BUN mg/dL 3* 4* 8   CREATININE mg/dL 0 58* 0 69 0 60   EGFR ml/min/1 73sq m 100 94 99   CALCIUM mg/dL 8 7 8 8 8 9   GLUCOSE RANDOM mg/dL 51* 196* 129           Results from last 7 days  Lab Units 18  0904   GRAM STAIN RESULT  2+ Polys  1+ Gram positive cocci in pairs       Irma Yeh MD

## 2018-07-08 ENCOUNTER — ANESTHESIA EVENT (INPATIENT)
Dept: PERIOP | Facility: HOSPITAL | Age: 63
DRG: 854 | End: 2018-07-08
Payer: COMMERCIAL

## 2018-07-08 LAB
GLUCOSE SERPL-MCNC: 100 MG/DL (ref 65–140)
GLUCOSE SERPL-MCNC: 121 MG/DL (ref 65–140)
GLUCOSE SERPL-MCNC: 173 MG/DL (ref 65–140)
GLUCOSE SERPL-MCNC: 197 MG/DL (ref 65–140)
GLUCOSE SERPL-MCNC: 59 MG/DL (ref 65–140)
GLUCOSE SERPL-MCNC: 64 MG/DL (ref 65–140)
GLUCOSE SERPL-MCNC: 87 MG/DL (ref 65–140)

## 2018-07-08 PROCEDURE — 97166 OT EVAL MOD COMPLEX 45 MIN: CPT

## 2018-07-08 PROCEDURE — 99232 SBSQ HOSP IP/OBS MODERATE 35: CPT | Performed by: INTERNAL MEDICINE

## 2018-07-08 PROCEDURE — 82948 REAGENT STRIP/BLOOD GLUCOSE: CPT

## 2018-07-08 PROCEDURE — G8987 SELF CARE CURRENT STATUS: HCPCS

## 2018-07-08 PROCEDURE — G8988 SELF CARE GOAL STATUS: HCPCS

## 2018-07-08 PROCEDURE — 99024 POSTOP FOLLOW-UP VISIT: CPT | Performed by: ORTHOPAEDIC SURGERY

## 2018-07-08 RX ORDER — NYSTATIN 100000 [USP'U]/G
POWDER TOPICAL 3 TIMES DAILY
Status: DISCONTINUED | OUTPATIENT
Start: 2018-07-08 | End: 2018-07-21 | Stop reason: HOSPADM

## 2018-07-08 RX ORDER — ONDANSETRON 4 MG/1
4 TABLET, ORALLY DISINTEGRATING ORAL EVERY 6 HOURS PRN
Status: DISCONTINUED | OUTPATIENT
Start: 2018-07-08 | End: 2018-07-21 | Stop reason: HOSPADM

## 2018-07-08 RX ADMIN — NYSTATIN: 100000 POWDER TOPICAL at 01:31

## 2018-07-08 RX ADMIN — HYDROMORPHONE HYDROCHLORIDE 1 MG: 2 TABLET ORAL at 13:03

## 2018-07-08 RX ADMIN — LAMOTRIGINE 150 MG: 100 TABLET ORAL at 09:07

## 2018-07-08 RX ADMIN — NYSTATIN: 100000 POWDER TOPICAL at 09:06

## 2018-07-08 RX ADMIN — METHADONE HYDROCHLORIDE 15 MG: 10 TABLET ORAL at 09:07

## 2018-07-08 RX ADMIN — NYSTATIN: 100000 POWDER TOPICAL at 17:12

## 2018-07-08 RX ADMIN — CLONAZEPAM 1 MG: 1 TABLET ORAL at 09:10

## 2018-07-08 RX ADMIN — GABAPENTIN 600 MG: 300 CAPSULE ORAL at 09:09

## 2018-07-08 RX ADMIN — GABAPENTIN 600 MG: 300 CAPSULE ORAL at 17:13

## 2018-07-08 RX ADMIN — VANCOMYCIN HYDROCHLORIDE 1250 MG: 5 INJECTION, POWDER, LYOPHILIZED, FOR SOLUTION INTRAVENOUS at 19:50

## 2018-07-08 RX ADMIN — TRAMADOL HYDROCHLORIDE 50 MG: 50 TABLET, FILM COATED ORAL at 09:19

## 2018-07-08 RX ADMIN — CLONAZEPAM 1 MG: 1 TABLET ORAL at 17:13

## 2018-07-08 RX ADMIN — ACETAMINOPHEN 650 MG: 325 TABLET, FILM COATED ORAL at 19:41

## 2018-07-08 RX ADMIN — HYDROMORPHONE HYDROCHLORIDE 1 MG: 2 TABLET ORAL at 19:40

## 2018-07-08 RX ADMIN — ROPINIROLE 0.5 MG: 1 TABLET, FILM COATED ORAL at 09:09

## 2018-07-08 RX ADMIN — GABAPENTIN 600 MG: 300 CAPSULE ORAL at 21:22

## 2018-07-08 RX ADMIN — LISINOPRIL 20 MG: 20 TABLET ORAL at 09:09

## 2018-07-08 RX ADMIN — ROPINIROLE 0.5 MG: 1 TABLET, FILM COATED ORAL at 17:11

## 2018-07-08 RX ADMIN — HYDROMORPHONE HYDROCHLORIDE 1 MG: 2 TABLET ORAL at 06:36

## 2018-07-08 RX ADMIN — HEPARIN SODIUM 5000 UNITS: 5000 INJECTION, SOLUTION INTRAVENOUS; SUBCUTANEOUS at 06:37

## 2018-07-08 RX ADMIN — INSULIN LISPRO 1 UNITS: 100 INJECTION, SOLUTION INTRAVENOUS; SUBCUTANEOUS at 21:39

## 2018-07-08 RX ADMIN — CLONAZEPAM 1 MG: 1 TABLET ORAL at 21:25

## 2018-07-08 RX ADMIN — ROPINIROLE 0.5 MG: 1 TABLET, FILM COATED ORAL at 21:22

## 2018-07-08 RX ADMIN — SERTRALINE HYDROCHLORIDE 50 MG: 50 TABLET ORAL at 21:22

## 2018-07-08 RX ADMIN — ONDANSETRON 4 MG: 4 TABLET, ORALLY DISINTEGRATING ORAL at 22:47

## 2018-07-08 RX ADMIN — INSULIN GLARGINE 30 UNITS: 100 INJECTION, SOLUTION SUBCUTANEOUS at 09:06

## 2018-07-08 NOTE — OCCUPATIONAL THERAPY NOTE
OccupationalTherapy Evaluation     Patient Name: Guadalupe Jordan  KCAOJ'R Date: 7/8/2018  Problem List  Patient Active Problem List   Diagnosis    Hyponatremia    Right arm cellulitis    Abscess of right forearm    Bipolar affective disorder ([facility] [address] )    Corn or callus    Benign essential hypertension    Hyperlipidemia    Hypothyroidism    Persistent insomnia    Type 2 diabetes mellitus, uncontrolled ([facility] [address] )    Visual loss    Abscess of tendon sheath of forearm, right     Past Medical History  History reviewed  No pertinent past medical history  Past Surgical History  Past Surgical History:   Procedure Laterality Date    INCISION AND DRAINAGE OF WOUND Right 7/1/2018    Procedure: INCISION AND DRAINAGE (I&D) RIGHT FOREARM;  Surgeon: Brittany Hutchison MD;  Location: [facility];  Service: Orthopedics           07/08/18 1103   Note Type   Note type Eval/Treat   Restrictions/Precautions   Weight Bearing Precautions Per Order No   Other Precautions Cognitive; Bed Alarm; Fall Risk;Pain;Multiple lines;Contact/isolation  (wound vac)   Pain Assessment   Pain Assessment 0-10   Pain Score Worst Possible Pain   Pain Type Surgical pain;Acute pain   Pain Location Arm;Hand   Pain Orientation Right   Pain Descriptors Throbbing   Hospital Pain Intervention(s) Ambulation/increased activity;Repositioned   Response to Interventions tolerated   Pain Rating: FLACC (Rest) - Face 1   Pain Rating: FLACC (Rest) - Legs 1   Pain Rating: FLACC (Rest) - Activity 1   Pain Rating: FLACC (Rest) - Cry 1   Pain Rating: FLACC (Rest) - Consolability 1   Score: FLACC (Rest) 5   Home Living   Type of Home House   Home Layout One level  (0STE)   Bathroom Shower/Tub Tub/shower unit   Bathroom Toilet Standard   Bathroom Accessibility Accessible   Home Equipment (no DME)   Additional Comments pt reports takes bus and other transportation    Prior Function   Level of [place] Independent with ADLs and functional mobility   Lives With Daughter  ([email or web address])   [address] Help From Family   ADL Assistance Independent   IADLs Independent   Falls in the last 6 months 1 to 4   Vocational Retired   Comments pt reports babysits granddaughter during the day; reports independent PTA   Lifestyle   Autonomy per pt independent w/ ADLs, independent w/ IADLs, independent w/ functional transfers and mobility w/ no AD, use of public transportation   Reciprocal Relationships daughter and granddaughter   Service to Others retired helped put in floors w/    Intrinsic Gratification spending time w/ grandkids   ADL   Where Assessed Edge of bed   Eating Assistance 4  Minimal Assistance   Grooming Assistance 4  Minimal Assistance   UB Bathing Assistance 3  Moderate Assistance   LB [address] 3  Moderate Assistance   [address] 3  Moderate Assistance    [facility] 3  Moderate [address]  3  Moderate Assistance   Bed Mobility   Supine to Sit 4  Minimal assistance   Additional items Assist x 1; Increased time required;Verbal cues;LE management; Bedrails;HOB elevated   Sit to Supine 4  Minimal assistance   Additional items Assist x 1; Increased time required;Verbal cues; Bedrails   Additional Comments increased time to complete   Transfers   Sit to Stand 4  Minimal assistance   Additional items Assist x 1; Increased time required;Verbal cues   Stand to Sit 4  Minimal assistance   Additional items Assist x 1; Increased time required;Verbal cues   Additional Comments VCs for positioning and safety   Functional Mobility   Functional Mobility 3  Moderate assistance   Additional Comments assist x1 handheld assist w/ cues for safety; pt very unsteady   Additional items Hand hold assistance   Balance   Static Sitting Fair   Dynamic Sitting Fair -   Static Standing Fair -   Dynamic Standing Poor +   Ambulatory Poor   Activity Tolerance   Activity Tolerance Patient limited by fatigue;Patient limited by pain   Nurse Made Aware appropriate to see per Angie VEGA   RUE Assessment   RUE Assessment X   RUE Overall AROM   R Shoulder Flexion 60   R Shoulder Extension 60   R Elbow Flexion 60   R Elbow Extension 20   R Elbow Supination 10   R Elbow Pronation 10   R Finger Extension 0   R Finger ABduction 0   R Finger ADduction 0   LUE Assessment   LUE Assessment WFL  (impaired shoulder elevation, 3+/5)   Hand Function   Gross Motor Coordination Impaired   Fine Motor Coordination Impaired  (impaired L hand)   Sensation   Additional Comments sensation intact on L hand   Vision-Basic Assessment   Current Vision No visual deficits   Vision - Complex Assessment   Ocular Range of Motion WFL   Acuity Able to read clock/calendar on wall without difficulty   Cognition   Overall Cognitive Status Impaired   Arousal/Participation Responsive;Lethargic   Attention Attends with cues to redirect   Orientation Level Oriented to person;Oriented to place;Oriented to time  (not oriented to specific date)   Memory Decreased recall of recent events;Decreased recall of precautions   Following Commands Follows one step commands with increased time or repetition   Comments pt w/ increased lethargy, impaired insight, impaired safety awareness; pt lethargic and closing eyes t/o session requiring cues to respond and daughter answers most questions as pt closing eyes t/o   Assessment   Limitation Decreased ADL status; Decreased UE ROM; Decreased UE strength;Decreased Safe judgement during ADL;Decreased cognition;Decreased endurance;Decreased self-care trans;Decreased high-level ADLs   Prognosis Good   Assessment Pt is a 65 y o  female seen for OT evaluation s/p admit to [address] on 6/28/2018 w/ cellulitis/ Abscess of right forearm  Comorbidities affecting pt's functional performance at time of assessment include: DM (uncontrolled), HTN, hyponatremia  Pt w/ I&D on 7/1 and recent I&D on 7/7 w/ wound vac placement on R forearm w/ ace wrap/splint in place   Personal factors affecting pt at time of IE include:fall risk, increased lethargy, noncompliant w/ medications  Prior to admission, pt was living w/ daughter and reports alone during day except now since school is out and baby sits granddaughter during day  Pt reports independent w/ ADLs, independent w/ functional transfers and mobility w/ no AD, independent IADLs, use of public transportation  Upon evaluation: Pt requires MIN assist supine<>sit bed mobility, MIN assist sit<>stand w/ VCs for positioning, MOD assist LB ADLS, MOD assist functional mobility w/ hand held assist and unsteadiness, MOD assist UB ADLS 2* the following deficits impacting occupational performance: increased pain, decreased strength and endurance, impaired activity tolerance, decreased functional reach, impaired AROM of R digits &UE, impaired balance, impaired cognition (lethargy,closing eyes t/o session, wound vac placement, decreased insight and safety awareness)  Pt to benefit from continued skilled OT tx while in the hospital to address deficits as defined above and maximize level of functional independence w ADL's and functional mobility  Occupational Performance areas to address include: grooming, bathing/shower, toilet hygiene, dressing, functional mobility, clothing management, cleaning and meal prep, R UE hand and digit ROM exercises, formal cognitive assessment, home safety education  Pt educated on importance of exercising digits; pt reports she will try, completed PROM of extension of R digits x3 and pt reported increased pain; however while seated EOB pt WB through digits  Pt educated on importance on elevating R UE on pillow w/ digits extended and educated on importance of moving digits to prevent contracture formation; pt verbalized understanding  From OT standpoint, recommendation at time of d/c would be home w/ family support and outpatient OT for R UE     Goals   Patient Goals "go home"   LTG Time Frame 10-14   Long Term Goal please see below goals   Plan   Treatment Interventions ADL retraining;Functional transfer training;UE strengthening/ROM; Endurance training;Equipment evaluation/education;Cognitive reorientation;Patient/family training; Compensatory technique education; Energy conservation; Activityengagement   Goal Expiration Date 07/22/18   OT Frequency 3-5x/wk   Recommendation   OT Discharge Recommendation Outpatient OT   Barthel Index   Feeding 5   Bathing 0   Grooming Score 0   Dressing Score 5   Bladder Score 10   Bowels Score 10   Toilet Use Score 5   Transfers (Bed/Chair) Score 10   Mobility (Level Surface) Score 0   Stairs Score 0   Barthel Index Score 45   Modified [place] Scale   Modified [place] Scale 4     Occupational Therapy Goals to be met in 10-14 days:  1) Pt will improve activity tolerance to G for min 30 min txment sessions  2) Pt will complete ADLs/self care w/ mod I   3) Pt will complete toileting w/ mod I w/ G hygiene/thoroughness using DME PRN  4) Pt will improve functional transfers on/off all surfaces using DME PRN w/ G balance/safety including toileting w/ mod I  5) Pt will improve fx'l mobility during I/ADl/leisure tasks  In homelike environment using DME PRN w/ g balance/safety w/ mod I  6) Pt will engage in ongoing cognitive assessment w/ G participation to A w/ safe d/c planning/recommendations  7) Pt will demonstrate G carryover of pt/caregiver education and training as appropriate w/ mod I  w/ G tolerance  8) Pt will engage in depression screen/leisure interest checklist w/ G participation to monitor s/s depression and ID 3 positive coping strategies to A w/ emotional regulation and management  9) Pt will demonstrate improved bed mobility to MOD I to enhance ADLs  10) Pt will demonstrate improved standing tolerance to 3-5 minutes during functional tasks w/ no LOB to enhance ADL performance  11) Pt will demonstrate improved R UE strength by 1 MMT grade to enhance ADLS and functional transfers  12) Pt will demonstrate improved R digit flexion, wrist flexion/extension, forearm pronation/supination to [facility] to enhance ADLs and functional activities; pt will complete home exercise program at [facility]    Documentation completed by: Sharri Zimmerman MS, OTR/L

## 2018-07-08 NOTE — PROGRESS NOTES
Addendum:  Increase vancomycin dose for low trough level  Progress Note - Infectious Disease   Dania Marti 65 y o  female MRN: [id number]  Unit/Bed#: E5 -01 Encounter: [id number]      Impression/Recommendations:  1   Right forearm cellulitis and abscess   Patient is status post I&D on    Operative culture grew MRSA  Patient's response on IV antibiotic has been slow  She is now status post repeat I&D, with wound left open with VAC in place  She remains clinically and systemically well  Continue IV vancomycin  Monitor temperature/WBC  Serial forearm exams  Possible need for further ID      2   Right forearm muscle necrosis, noted at surgery  This is secondary to MRSA abscess above  Consider mild cystitis  Antibiotic plan as in above  Will most likely treat patient with them longer course of IV antibiotic  Serial exams      3   DM, poorly controlled, with hyperglycemia on admission   Hemoglobin A1c is 13 4   I discussed with patient in great detail regarding this  Nathan Sayres control hyperglycemia clearly contributes to development of cellulitis and abscess above after the fall above   Blood sugar is much better controlled now  Management per Medicine Service       4   Noncompliance  I discussed with patient in great detail regarding the need to be compliant with treatment plan  Otherwise, her infection can worsen, as we are seeing      Discussed with patient in detail regarding the above plan      Antibiotics:  Vancomycin # 5     Subjective:  Patient is status post repeat right forearm I and D yesterday  Pain in right forearm is a little worse today, as expected  Temperature stays down   No further chills  She is tolerating antibiotic well   No nausea, vomiting or diarrhea   No dizziness or hearing loss      Objective:  Vitals:  HR:  [73-86] 86  Resp:  [16-20] 18  BP: (148-176)/() 163/71  SpO2:  [92 %-97 %] 92 %  Temp (24hrs), Av 1 °F (36 7 °C), Min:97 7 °F (36 5 °C), Max:99 3 °F (37 4 °C)  Current: Temperature: 98 2 °F (36 8 °C)    Physical Exam:     General: Awake, alert, cooperative, no distress  Neck:  Supple  No mass  No lymphadenopathy  Lungs: Expansion symmetric, no rales, no wheezing, respirations unlabored  Heart:  Regular rate and rhythm, S1 and S2 normal, no murmur  Abdomen: Soft, nondistended, non-tender, bowel sounds active all four quadrants,        no masses, no organomegaly  Extremities: Right forearm with VAC in place  No purulence  Mild to moderate edema  Moderate tenderness  Decreased ROM of fingers  Skin:  No rash  Neuro: Moves all extremities  Invasive Devices     Peripheral Intravenous Line            Peripheral IV 07/07/18 Left Hand less than 1 day          Drain            Closed/Suction Drain Right Other (Comment) Accordion 10 Fr  7 days    Negative Pressure Wound Therapy (V A C ) Arm Right less than 1 day                Labs studies:   I have personally reviewed pertinent labs  Results from last 7 days  Lab Units 07/06/18  0604 07/05/18  1205   SODIUM mmol/L 144 138   POTASSIUM mmol/L 3 2* 3 6   CHLORIDE mmol/L 108 104   CO2 mmol/L 30 28   ANION GAP mmol/L 6 6   BUN mg/dL 3* 4*   CREATININE mg/dL 0 58* 0 69   EGFR ml/min/1 73sq m 100 94   GLUCOSE RANDOM mg/dL 51* 196*   CALCIUM mg/dL 8 7 8 8       Results from last 7 days  Lab Units 07/06/18  0604 07/05/18  1205   WBC Thousand/uL 5 82 5 62   HEMOGLOBIN g/dL 8 0* 8 3*   PLATELETS Thousands/uL 415* 448*           Imaging Studies:   I have personally reviewed pertinent imaging study reports and images in PACS  EKG, Pathology, and Other Studies:   I have personally reviewed pertinent reports

## 2018-07-08 NOTE — PROGRESS NOTES
Orthopaedic Surgery - Progress Note  Jose Martinez (89 y o  female)   : 1955   MRN: [id number]  Date: 2018   Encounter: [id number]   Unit/Bed#: E5 -01    Assessment / Plan  S/p I&D right forearm abscess on 18    S/p I&D right forearm abscess with excisional debridement of non-viable muscle on 18    · Will plan for return to OR on 18 for repeat I&D with possible wound closure vs  Vac change  · NPO after midnight  · Continue wound vac dressing and splint   · Elevate R arm at heart level  · Encourage AROM and PROM of all fingers  · Continue vancomycin per ID    Subjective  Reports moderate right forearm pain  Denies numbness in the hand / fingers  Vitals  Temp:  [97 7 °F (36 5 °C)-99 3 °F (37 4 °C)] 98 2 °F (36 8 °C)  HR:  [73-86] 86  Resp:  [16-20] 18  BP: (148-176)/() 163/71  There is no height or weight on file to calculate BMI  I/O last 24 hours: In: 1400 [P O :600;  I V :800]  Out: 700 [Urine:200; Blood:500]    Ortho Exam - Right Upper Extremity  · Vac dressing and splint in place  · Marked edema in the fingers / hand  · Sensation intact R/M/U nerves  · Brisk cap refill all fingertips  · Able to actively extend all fingers, but limited by pain  · No detectable active finger or thumb extension    Lab Results  (I have personally reviewed pertinent lab results )    Results from last 7 days  Lab Units 18  0604 18  1205   WBC Thousand/uL 5 82 5 62   HEMOGLOBIN g/dL 8 0* 8 3*   HEMATOCRIT % 26 1* 26 8*   PLATELETS Thousands/uL 415* 448*           Results from last 7 days  Lab Units 18  0604 18  1205   SODIUM mmol/L 144 138   POTASSIUM mmol/L 3 2* 3 6   CHLORIDE mmol/L 108 104   CO2 mmol/L 30 28   ANION GAP mmol/L 6 6   BUN mg/dL 3* 4*   CREATININE mg/dL 0 58* 0 69   EGFR ml/min/1 73sq m 100 94   CALCIUM mg/dL 8 7 8 8   GLUCOSE RANDOM mg/dL 51* 196*               Segundo Moore MD

## 2018-07-08 NOTE — PROGRESS NOTES
Patient resting comfortably in bed, granddaughter at bedside  Pt states pain has decreased a bit  Wound vac running, minimal output  No changes from previous assessment, will continue to monitor for changes

## 2018-07-08 NOTE — PLAN OF CARE
Problem: OCCUPATIONAL THERAPY ADULT  Goal: Performs self-care activities at highest level of function for planned discharge setting  See evaluation for individualized goals  Treatment Interventions: ADL retraining, Functional transfer training, UE strengthening/ROM, Endurance training, Equipment evaluation/education, Cognitive reorientation, Patient/family training, Compensatory technique education, Energy conservation, Activityengagement          See flowsheet documentation for full assessment, interventions and recommendations  Limitation: Decreased ADL status, Decreased UE ROM, Decreased UE strength, Decreased Safe judgement during ADL, Decreased cognition, Decreased endurance, Decreased self-care trans, Decreased high-level ADLs  Prognosis: Good  Assessment: Pt is a 65 y o  female seen for OT evaluation s/p admit to [facility] on 6/28/2018 w/ cellulitis/ Abscess of right forearm  Comorbidities affecting pt's functional performance at time of assessment include: DM (uncontrolled), HTN, hyponatremia  Pt w/ I&D on 7/1 and recent I&D on 7/7 w/ wound vac placement on R forearm w/ ace wrap/splint in place  Personal factors affecting pt at time of IE include:fall risk, increased lethargy, noncompliant w/ medications  Prior to admission, pt was living w/ daughter and reports alone during day except now since school is out and baby sits granddaughter during day  Pt reports independent w/ ADLs, independent w/ functional transfers and mobility w/ no AD, independent IADLs, use of public transportation   Upon evaluation: Pt requires MIN assist supine<>sit bed mobility, MIN assist sit<>stand w/ VCs for positioning, MOD assist LB ADLS, MOD assist functional mobility w/ hand held assist and unsteadiness, MOD assist UB ADLS 2* the following deficits impacting occupational performance: increased pain, decreased strength and endurance, impaired activity tolerance, decreased functional reach, impaired AROM of R digits &UE, impaired balance, impaired cognition (lethargy,closing eyes t/o session, decreased insight and safety awareness)  Pt to benefit from continued skilled OT tx while in the hospital to address deficits as defined above and maximize level of functional independence w ADL's and functional mobility  Occupational Performance areas to address include: grooming, bathing/shower, toilet hygiene, dressing, functional mobility, clothing management, cleaning and meal prep, R UE hand and digit ROM exercises, formal cognitive assessment, home safety education  Pt educated on importance of exercising digits; pt reports she will try, completed PROM of extension of R digits x3 and pt reported increased pain; however while seated EOB pt WB through digits  Pt educated on importance on elevating R UE on pillow w/ digits extended and educated on importance of moving digits to prevent contracture formation; pt verbalized understanding    From OT standpoint, recommendation at time of d/c would be home w/ family support and outpatient OT for R UE      OT Discharge Recommendation: Outpatient OT         Comments: Ovidio Waldrop MS, OTR/L

## 2018-07-08 NOTE — PROGRESS NOTES
[facility] Internal Medicine Progress Note  Patient: Won Rosario 65 y o  female   MRN: [id number]  PCP: Holli Luke MD  Unit/Bed#: E5 -01 Encounter: [id number]  Date Of Visit: 07/08/18    Assessment:    Principal Problem:    Abscess of right forearm  Active Problems:    Hyponatremia    Right arm cellulitis    Benign essential hypertension    Type 2 diabetes mellitus, uncontrolled ([address] )    Abscess of tendon sheath of forearm, right      Plan:    1 )  Right upper extremity abscess/cellulitis  -MRI reveals soft tissue swelling along the radial aspect of right forearm  -status post I and D and drain placement on 07/01/2018, second I&D on 7/7/18  -plan for OR on 7/9/18 for repeat I&D with possible wound closure v  Vac change  -NPO past midnight  -c/w Vancomycin for MRSA in wound culture    2 )  Sepsis-present on admission  -with leukocytosis, tachycardia  -now improved     3 )  Diabetes mellitus  -HbA1c 13 4, noncompliant with insulib  -accuchecks [phone]-  -patient is Accu-Cheks are waxing and waning  -c/w Lantus 30U and lispro 8U TID     4 )  Hypertension  -continue with current medications, monitor blood pressure     5 )  Hepatitis-C     6 )  Bipolar disorder  -continue with home medication     7 )  Chronic opioid dependence  -maintained on methadone       VTE Pharmacologic Prophylaxis:   Pharmacologic: heparin    Patient Centered Rounds: I have performed bedside rounds with nursing staff today  Time Spent for Care: 20 minutes  More than 50% of total time spent on counseling and coordination of care as described above      Current Length of Stay: 10 day(s)    Current Patient Status: Inpatient   Certification Statement: The patient will continue to require additional inpatient hospital stay due to RUE abscess, MRSA in wound    Discharge Plan / Estimated Discharge Date: TBD    Code Status: Level 1 - Full Code      Subjective:   Patient seen and examined at bedside, resting comfortably in bed, denies any complaints    Objective:     Vitals:   Temp (24hrs), Av 1 °F (36 7 °C), Min:97 7 °F (36 5 °C), Max:99 3 °F (37 4 °C)    HR:  [73-86] 86  Resp:  [16-18] 18  BP: (148-176)/(65-87) 163/71  SpO2:  [92 %-97 %] 92 %  There is no height or weight on file to calculate BMI  Input and Output Summary (last 24 hours): Intake/Output Summary (Last 24 hours) at 18 1252  Last data filed at 18 0919   Gross per 24 hour   Intake              700 ml   Output              200 ml   Net              500 ml       Physical Exam:    Constitutional: Patient is oriented to person, place and time, no acute distress  HEENT:  Normocephalic, atraumatic, EOMI, PERRLA, no scleral icterus, no pallor, moist oral mucosa  Neck:  Supple, no masses, no thyromegaly, no bruits Normal range of motion  Lymph nodes:  No lymphadenopathy  Cardiovascular: Normal S1S2, RRR, No murmurs/rubs/gallops appreciated  Pulmonary: Clear to auscultation bilaterally, No rhonchi/rales/wheezing appreciated  Abdominal: Soft, Bowel sounds present, Non-tender, Non-distended, No rebound/guarding, no hepatomegaly   Musculoskeletal: No tenderness/abnormality   Extremities:  No cyanosis, clubbing or edema  Peripheral pulses palpable and equal bilaterally  Neurological: Cranial nerves II-XII grossly intact, sensation intact, otherwise no focal neurological symptoms  Skin: RUE in dressing, wound vac in place    Additional Data:     Labs:      Results from last 7 days  Lab Units 18  0604   WBC Thousand/uL 5 82   HEMOGLOBIN g/dL 8 0*   HEMATOCRIT % 26 1*   PLATELETS Thousands/uL 415*   NEUTROS PCT % 53   LYMPHS PCT % 38   MONOS PCT % 6   EOS PCT % 2       Results from last 7 days  Lab Units 18  0604   SODIUM mmol/L 144   POTASSIUM mmol/L 3 2*   CHLORIDE mmol/L 108   CO2 mmol/L 30   BUN mg/dL 3*   CREATININE mg/dL 0 58*   CALCIUM mg/dL 8 7   GLUCOSE RANDOM mg/dL 51*            I Have Reviewed All Lab Data Listed Above        Recent Cultures (last 7 days):           Last 24 Hours Medication List:     Current Facility-Administered Medications:  acetaminophen 650 mg Oral Q6H PRN Becca Pila, PA-C   calcium carbonate 1,000 mg Oral Daily PRN Becca Pila, PA-C   clonazePAM 1 mg Oral TID Becca Pila, PA-C   gabapentin 600 mg Oral TID Jc Julio MD   heparin (porcine) 5,000 Units Subcutaneous [facility] Jc Julio MD   HYDROmorphone 1 mg Oral Q4H PRN Jc Julio MD   insulin glargine 30 Units Subcutaneous QAM Jc Julio MD   insulin lispro 1-5 Units Subcutaneous 4x Daily (AC & HS) Fredis Rush MD   insulin lispro 8 Units Subcutaneous TID AC Jc Julio MD   lamoTRIgine 150 mg Oral Daily Becca Pila, PA-C   lisinopril 20 mg Oral Daily Jc Julio MD   methadone 15 mg Oral Daily Fredis Rush MD   nystatin  Topical TID HilbertSKY [place]   ondansetron 4 mg Intravenous Q6H PRN Becca Pila, PA-C   rOPINIRole 0 5 mg Oral TID Becca Pila, PA-C   sertraline 50 mg Oral HS Becca Pila, PA-C   traMADol 50 mg Oral Q6H PRN Fredis Rush MD   vancomycin 17 5 mg/kg Intravenous Q12H Leonel Spring MD        Today, Patient Was Seen By: Jc Julio MD

## 2018-07-09 ENCOUNTER — ANESTHESIA (INPATIENT)
Dept: PERIOP | Facility: HOSPITAL | Age: 63
DRG: 854 | End: 2018-07-09
Payer: COMMERCIAL

## 2018-07-09 LAB
ANION GAP SERPL CALCULATED.3IONS-SCNC: 5 MMOL/L (ref 4–13)
BASOPHILS # BLD AUTO: 0.02 THOUSANDS/ΜL (ref 0–0.1)
BASOPHILS NFR BLD AUTO: 0 % (ref 0–1)
BUN SERPL-MCNC: 8 MG/DL (ref 5–25)
CALCIUM SERPL-MCNC: 9.1 MG/DL (ref 8.3–10.1)
CHLORIDE SERPL-SCNC: 103 MMOL/L (ref 100–108)
CO2 SERPL-SCNC: 34 MMOL/L (ref 21–32)
CREAT SERPL-MCNC: 0.58 MG/DL (ref 0.6–1.3)
EOSINOPHIL # BLD AUTO: 0.12 THOUSAND/ΜL (ref 0–0.61)
EOSINOPHIL NFR BLD AUTO: 2 % (ref 0–6)
ERYTHROCYTE [DISTWIDTH] IN BLOOD BY AUTOMATED COUNT: 15.1 % (ref 11.6–15.1)
GFR SERPL CREATININE-BSD FRML MDRD: 100 ML/MIN/1.73SQ M
GLUCOSE SERPL-MCNC: 132 MG/DL (ref 65–140)
GLUCOSE SERPL-MCNC: 170 MG/DL (ref 65–140)
GLUCOSE SERPL-MCNC: 198 MG/DL (ref 65–140)
GLUCOSE SERPL-MCNC: 88 MG/DL (ref 65–140)
GLUCOSE SERPL-MCNC: 92 MG/DL (ref 65–140)
HCT VFR BLD AUTO: 22.4 % (ref 34.8–46.1)
HGB BLD-MCNC: 6.9 G/DL (ref 11.5–15.4)
LYMPHOCYTES # BLD AUTO: 1.54 THOUSANDS/ΜL (ref 0.6–4.47)
LYMPHOCYTES NFR BLD AUTO: 23 % (ref 14–44)
MCH RBC QN AUTO: 27 PG (ref 26.8–34.3)
MCHC RBC AUTO-ENTMCNC: 30.8 G/DL (ref 31.4–37.4)
MCV RBC AUTO: 88 FL (ref 82–98)
MONOCYTES # BLD AUTO: 0.54 THOUSAND/ΜL (ref 0.17–1.22)
MONOCYTES NFR BLD AUTO: 8 % (ref 4–12)
NEUTROPHILS # BLD AUTO: 4.54 THOUSANDS/ΜL (ref 1.85–7.62)
NEUTS SEG NFR BLD AUTO: 67 % (ref 43–75)
NRBC BLD AUTO-RTO: 0 /100 WBCS
PLATELET # BLD AUTO: 408 THOUSANDS/UL (ref 149–390)
PMV BLD AUTO: 8.9 FL (ref 8.9–12.7)
POTASSIUM SERPL-SCNC: 3.8 MMOL/L (ref 3.5–5.3)
RBC # BLD AUTO: 2.56 MILLION/UL (ref 3.81–5.12)
SODIUM SERPL-SCNC: 142 MMOL/L (ref 136–145)
WBC # BLD AUTO: 6.76 THOUSAND/UL (ref 4.31–10.16)

## 2018-07-09 PROCEDURE — 99233 SBSQ HOSP IP/OBS HIGH 50: CPT | Performed by: INTERNAL MEDICINE

## 2018-07-09 PROCEDURE — 99024 POSTOP FOLLOW-UP VISIT: CPT | Performed by: ORTHOPAEDIC SURGERY

## 2018-07-09 PROCEDURE — 80048 BASIC METABOLIC PNL TOTAL CA: CPT | Performed by: INTERNAL MEDICINE

## 2018-07-09 PROCEDURE — 99232 SBSQ HOSP IP/OBS MODERATE 35: CPT | Performed by: INTERNAL MEDICINE

## 2018-07-09 PROCEDURE — 85025 COMPLETE CBC W/AUTO DIFF WBC: CPT | Performed by: INTERNAL MEDICINE

## 2018-07-09 PROCEDURE — 82948 REAGENT STRIP/BLOOD GLUCOSE: CPT

## 2018-07-09 RX ADMIN — ROPINIROLE 0.5 MG: 1 TABLET, FILM COATED ORAL at 09:55

## 2018-07-09 RX ADMIN — CLONAZEPAM 1 MG: 1 TABLET ORAL at 21:05

## 2018-07-09 RX ADMIN — NYSTATIN: 100000 POWDER TOPICAL at 16:19

## 2018-07-09 RX ADMIN — GABAPENTIN 600 MG: 300 CAPSULE ORAL at 21:04

## 2018-07-09 RX ADMIN — INSULIN LISPRO 1 UNITS: 100 INJECTION, SOLUTION INTRAVENOUS; SUBCUTANEOUS at 12:39

## 2018-07-09 RX ADMIN — TRAMADOL HYDROCHLORIDE 50 MG: 50 TABLET, FILM COATED ORAL at 21:18

## 2018-07-09 RX ADMIN — LAMOTRIGINE 150 MG: 100 TABLET ORAL at 09:56

## 2018-07-09 RX ADMIN — SERTRALINE HYDROCHLORIDE 50 MG: 50 TABLET ORAL at 21:04

## 2018-07-09 RX ADMIN — VANCOMYCIN HYDROCHLORIDE 1250 MG: 5 INJECTION, POWDER, LYOPHILIZED, FOR SOLUTION INTRAVENOUS at 19:40

## 2018-07-09 RX ADMIN — HEPARIN SODIUM 5000 UNITS: 5000 INJECTION, SOLUTION INTRAVENOUS; SUBCUTANEOUS at 21:12

## 2018-07-09 RX ADMIN — GABAPENTIN 600 MG: 300 CAPSULE ORAL at 16:18

## 2018-07-09 RX ADMIN — INSULIN LISPRO 1 UNITS: 100 INJECTION, SOLUTION INTRAVENOUS; SUBCUTANEOUS at 21:11

## 2018-07-09 RX ADMIN — NYSTATIN: 100000 POWDER TOPICAL at 09:57

## 2018-07-09 RX ADMIN — GABAPENTIN 600 MG: 300 CAPSULE ORAL at 09:56

## 2018-07-09 RX ADMIN — METHADONE HYDROCHLORIDE 15 MG: 10 TABLET ORAL at 09:54

## 2018-07-09 RX ADMIN — LISINOPRIL 20 MG: 20 TABLET ORAL at 09:56

## 2018-07-09 RX ADMIN — HEPARIN SODIUM 5000 UNITS: 5000 INJECTION, SOLUTION INTRAVENOUS; SUBCUTANEOUS at 13:21

## 2018-07-09 RX ADMIN — VANCOMYCIN HYDROCHLORIDE 1250 MG: 5 INJECTION, POWDER, LYOPHILIZED, FOR SOLUTION INTRAVENOUS at 11:05

## 2018-07-09 RX ADMIN — CLONAZEPAM 1 MG: 1 TABLET ORAL at 09:54

## 2018-07-09 RX ADMIN — NYSTATIN: 100000 POWDER TOPICAL at 21:18

## 2018-07-09 RX ADMIN — ROPINIROLE 0.5 MG: 1 TABLET, FILM COATED ORAL at 21:03

## 2018-07-09 NOTE — PROGRESS NOTES
Pt's IV infiltrated, so IV was removed from Left hand  Pt is a known difficult stick so the ED Gisela Ballard) was contacted, and attempted IV insertion 2 X  Unable to get a good vein  Bianca Mchugh  recommends possible PICC  RR NP on call Tino Ledesma), who stated this would need to wait till the morning for placement  Hosp Supervisor notified(Melissa), who states that she will have ICU come up in AM to attempt another IV, as patient has a 0730 antibiotic IV to be administered, and blood to be drawn    Will follow up with following nurse in AM

## 2018-07-09 NOTE — PROGRESS NOTES
Called RR NP Samantha Tai on call for change in mental status for patient  Pt appears to be more lethargic than normal, able to be woken, but nods off within seconds  Unable to process thoughts and needs repeating  Had a fever of 101 2 at 1900  Was given tylenol, and reassessed, temp down to  99 8 and 99 6 at 2227  Granddaughter reports she seems more tired than normal   NP states to stop giving dilaudid and other mind-altering medications  Will continue to monitor, and reassess frequently during night

## 2018-07-09 NOTE — PROGRESS NOTES
Orthopaedic Surgery - Progress Note  Chris Lowe (89 y o  female)   : 1955   MRN: [id number]  Date: 2018   Encounter: [id number]   Unit/Bed#: E5 -01    Assessment / Plan  S/p I&D right forearm abscess on 18    S/p I&D right forearm abscess with excisional debridement of non-viable muscle on 18    · Will plan for return to OR today for repeat I&D with possible wound closure vs  Vac change  · Encourage AROM and PROM of all fingers  · Continue vancomycin per ID    Subjective  Reports moderate right forearm pain  Denies numbness in the hand / fingers  No interval changes from yesterday  Vitals  Temp:  [97 9 °F (36 6 °C)-101 2 °F (38 4 °C)] 97 9 °F (36 6 °C)  HR:  [80-94] 80  Resp:  [18-24] 18  BP: (123-139)/(58-70) 130/70  There is no height or weight on file to calculate BMI  I/O last 24 hours:   In: 480 [P O :480]  Out: 0     Ortho Exam - Right Upper Extremity  · Vac dressing and splint in place  · Marked edema in the fingers / hand  · Sensation intact R/M/U nerves  · Brisk cap refill all fingertips  · Able to actively extend all fingers, but limited by pain  · No detectable active finger or thumb extension    Lab Results  (I have personally reviewed pertinent lab results )    Results from last 7 days  Lab Units 18  1038 18  0604 18  1205   WBC Thousand/uL 6 76 5 82 5 62   HEMOGLOBIN g/dL 6 9* 8 0* 8 3*   HEMATOCRIT % 22 4* 26 1* 26 8*   PLATELETS Thousands/uL 408* 415* 448*           Results from last 7 days  Lab Units 18  1038 18  0604 18  1205   SODIUM mmol/L 142 144 138   POTASSIUM mmol/L 3 8 3 2* 3 6   CHLORIDE mmol/L 103 108 104   CO2 mmol/L 34* 30 28   ANION GAP mmol/L 5 6 6   BUN mg/dL 8 3* 4*   CREATININE mg/dL 0 58* 0 58* 0 69   EGFR ml/min/1 73sq m 100 100 94   CALCIUM mg/dL 9 1 8 7 8 8   GLUCOSE RANDOM mg/dL 92 51* 196*               Rajeev Vizcarra MD

## 2018-07-09 NOTE — PROGRESS NOTES
Dr Endy Vega aware hemoglobin 6 9 and patient eating non complaint for or today? Vitals stable call bell in reach

## 2018-07-09 NOTE — PROGRESS NOTES
Patient upset about iv not being in difficult stick placed by icu nurse  Patient aware npo still non complaint eating  Patient angry verbally aggressive  Granddaughter at bedside  Contact isolation maintained  Right arm wound vac intact at 125mmhg continous  Aware to call when ambulating  Nurse call bell in reach

## 2018-07-09 NOTE — PROGRESS NOTES
The patient ate lunch today and so surgery was cancelled by anesthesia  She explained that she "was hungry "  I reminded her that she has an active infection with muscle loss and an open wound involving her forearm, and that this may cause permanent functional loss if not treated appropriately  I will discuss the situation with our hand specialist at [facility], as I feel she will ultimately require his expertise

## 2018-07-09 NOTE — PROGRESS NOTES
Progress Note - Infectious Disease   Irene Jeffery 65 y o  female MRN: [id number]  Unit/Bed#: E5 -01 Encounter: [id number]      Impression/Recommendations:  1   Right forearm cellulitis and abscess   Patient is status post I&D on , and repeat I&D on    Operative culture grew MRSA  Patient's response on IV antibiotic has been slow  She is slowly improving again  Continue IV vancomycin  Monitor temperature/WBC  Serial forearm exams      2   Right forearm muscle necrosis, noted at surgery  This is secondary to MRSA abscess above  Consider myositis  Patient will most likely need a longer course of IV antibiotic, perhaps 2 weeks  Given lack of per for IV access, will answer PICC  Antibiotic plan as in above  Serial exams  Treat x6 weeks total, another 12 days  PICC insertion      3   DM, poorly controlled, with hyperglycemia on admission   Hemoglobin A1c is 13 4   I discussed with patient in great detail regarding this  Michial Barrientos control hyperglycemia clearly contributes to development of cellulitis and abscess above after the fall above   Blood sugar is much better controlled now  Management per [address] discussed with patient in great detail regarding the need to be compliant with treatment plan   Otherwise, her infection can worsen, as we are seeing      Discussed with patient and family in detail regarding the above plan      Antibiotics:  Vancomycin # 6  POD # 2     Subjective:  Patient lost IV access again  She has not received IV antibiotics since yesterday afternoon     Pain in right forearm is improved  Temperature was up last night, down today   No further chills  She is tolerating antibiotic well   No nausea, vomiting or diarrhea   No dizziness or hearing loss      Objective:  Vitals:  HR:  [80-94] 80  Resp:  [18-24] 18  BP: (123-139)/(58-70) 130/70  SpO2:  [89 %-93 %] 89 %  Temp (24hrs), Av 2 °F (37 3 °C), Min:97 9 °F (36 6 °C), Max:101 2 °F (38 4 °C)  Current: Temperature: 97 9 °F (36 6 °C)    Physical Exam:     General: Awake, alert, cooperative, no distress  Neck:  Supple  No mass  No lymphadenopathy  Lungs: Expansion symmetric, no rales, no wheezing, respirations unlabored  Heart:  Regular rate and rhythm, S1 and S2 normal, no murmur  Abdomen: Soft, nondistended, non-tender, bowel sounds active all four quadrants,        no masses, no organomegaly  Extremities: Right forearm with dressing in place  Dressing is dry  Stable edema  Stable tenderness  Slowly improving ROM of fingers again  Skin:  No rash  Neuro: Moves all extremities  Invasive Devices     Peripheral Intravenous Line            Peripheral IV 07/09/18 Left Arm less than 1 day          Drain            Closed/Suction Drain Right Other (Comment) Accordion 10 Fr  8 days    Negative Pressure Wound Therapy (V A C ) Arm Right 1 day                Labs studies:   I have personally reviewed pertinent labs  Results from last 7 days  Lab Units 07/06/18  0604 07/05/18  1205   SODIUM mmol/L 144 138   POTASSIUM mmol/L 3 2* 3 6   CHLORIDE mmol/L 108 104   CO2 mmol/L 30 28   ANION GAP mmol/L 6 6   BUN mg/dL 3* 4*   CREATININE mg/dL 0 58* 0 69   EGFR ml/min/1 73sq m 100 94   GLUCOSE RANDOM mg/dL 51* 196*   CALCIUM mg/dL 8 7 8 8       Results from last 7 days  Lab Units 07/09/18  1038 07/06/18  0604 07/05/18  1205   WBC Thousand/uL 6 76 5 82 5 62   HEMOGLOBIN g/dL 6 9* 8 0* 8 3*   PLATELETS Thousands/uL 408* 415* 448*           Imaging Studies:   I have personally reviewed pertinent imaging study reports and images in PACS  EKG, Pathology, and Other Studies:   I have personally reviewed pertinent reports

## 2018-07-09 NOTE — PROGRESS NOTES
Patient received two new visitors at approximately [phone]  Visitors decided to give pt a bath  Since patient was due for a Chlorhexidine bath in AM, the visitors stated they would do this for patient  When they were completed, and patient was back in bed, ICU IV team had come up to put IV into patient  Vein finder was used, and found what appeared to be a good spot for IV  At that point, patients two visitors stated they needed to put lotion and come the hair of the patient, and told the IV team to wait while they did this  They pulled the curtain  The IV team was not able to wait, as they had other commitments  They left, and called this RN reporting this  I returned to the patient's room to see why they had not allowed the IV placement first   Pt stated she is sick of being stuck, and wants a PICC line  It was explained to the patient that an IV was going to be placed so she could stay on schedule with her antibiotics, with next one due at 0730, and she had a blood draw scheduled in AM also  Pt's visitors became upset and stated they needed to know exactly when doctor was going to show up, and when this PICC line was going to be placed and that they demanded we stay on schedule with the antibiotics, even though no IV was available at this time  Visitors accused staff of neglecting patient  Visitors stated they were certified nurses, (later stated they were CNA's) and observed a bed sore on the behind of patient  This RN inspected area indicated, and there is a reddened area, which was blanchable  No pressure ulcer was observed  Pillows were placed to turn the patient to the right  Patient states she is no longer able to ambulate, she is too weak, and asked to be turned every two hours  I recommend the patient be placed on bed alarm so that she does not ambulate as she feels un steady  We put her on bed alarm, and asked the patient to call whenever she needed to urinate    Patient's visitors and patient asked that patient no longer be considered independent, and needs to be bathed daily by staff and have her sheets changed by staff  This writer has spoken to the ICU IV team, and they will attempt to return depending on the work flow in the ICU  Patient has stated that she will allow them to try again, but she would really like a PICC line  This entire situation was witnessed by Lynn Jaime  Patient's visitors left, but stated they would be following up with doctor tomorrow regarding patient's care

## 2018-07-10 LAB
GLUCOSE SERPL-MCNC: 108 MG/DL (ref 65–140)
GLUCOSE SERPL-MCNC: 121 MG/DL (ref 65–140)
GLUCOSE SERPL-MCNC: 128 MG/DL (ref 65–140)
GLUCOSE SERPL-MCNC: 133 MG/DL (ref 65–140)
GLUCOSE SERPL-MCNC: 182 MG/DL (ref 65–140)

## 2018-07-10 PROCEDURE — 99233 SBSQ HOSP IP/OBS HIGH 50: CPT | Performed by: INTERNAL MEDICINE

## 2018-07-10 PROCEDURE — 36569 INSJ PICC 5 YR+ W/O IMAGING: CPT

## 2018-07-10 PROCEDURE — C1751 CATH, INF, PER/CENT/MIDLINE: HCPCS

## 2018-07-10 PROCEDURE — 82948 REAGENT STRIP/BLOOD GLUCOSE: CPT

## 2018-07-10 PROCEDURE — 99232 SBSQ HOSP IP/OBS MODERATE 35: CPT | Performed by: INTERNAL MEDICINE

## 2018-07-10 PROCEDURE — 97535 SELF CARE MNGMENT TRAINING: CPT

## 2018-07-10 PROCEDURE — 97530 THERAPEUTIC ACTIVITIES: CPT

## 2018-07-10 PROCEDURE — 05HC33Z INSERTION OF INFUSION DEVICE INTO LEFT BASILIC VEIN, PERCUTANEOUS APPROACH: ICD-10-PCS | Performed by: INTERNAL MEDICINE

## 2018-07-10 RX ADMIN — HEPARIN SODIUM 5000 UNITS: 5000 INJECTION, SOLUTION INTRAVENOUS; SUBCUTANEOUS at 13:53

## 2018-07-10 RX ADMIN — VANCOMYCIN HYDROCHLORIDE 1250 MG: 5 INJECTION, POWDER, LYOPHILIZED, FOR SOLUTION INTRAVENOUS at 20:13

## 2018-07-10 RX ADMIN — ROPINIROLE 0.5 MG: 1 TABLET, FILM COATED ORAL at 09:07

## 2018-07-10 RX ADMIN — INSULIN GLARGINE 30 UNITS: 100 INJECTION, SOLUTION SUBCUTANEOUS at 09:06

## 2018-07-10 RX ADMIN — HEPARIN SODIUM 5000 UNITS: 5000 INJECTION, SOLUTION INTRAVENOUS; SUBCUTANEOUS at 05:43

## 2018-07-10 RX ADMIN — NYSTATIN: 100000 POWDER TOPICAL at 20:10

## 2018-07-10 RX ADMIN — CLONAZEPAM 1 MG: 1 TABLET ORAL at 16:46

## 2018-07-10 RX ADMIN — NYSTATIN: 100000 POWDER TOPICAL at 16:45

## 2018-07-10 RX ADMIN — METHADONE HYDROCHLORIDE 15 MG: 10 TABLET ORAL at 09:08

## 2018-07-10 RX ADMIN — LISINOPRIL 20 MG: 20 TABLET ORAL at 09:08

## 2018-07-10 RX ADMIN — GABAPENTIN 600 MG: 300 CAPSULE ORAL at 20:18

## 2018-07-10 RX ADMIN — LAMOTRIGINE 150 MG: 100 TABLET ORAL at 09:07

## 2018-07-10 RX ADMIN — INSULIN LISPRO 1 UNITS: 100 INJECTION, SOLUTION INTRAVENOUS; SUBCUTANEOUS at 09:06

## 2018-07-10 RX ADMIN — SERTRALINE HYDROCHLORIDE 50 MG: 50 TABLET ORAL at 21:39

## 2018-07-10 RX ADMIN — TRAMADOL HYDROCHLORIDE 50 MG: 50 TABLET, FILM COATED ORAL at 17:35

## 2018-07-10 RX ADMIN — HEPARIN SODIUM 5000 UNITS: 5000 INJECTION, SOLUTION INTRAVENOUS; SUBCUTANEOUS at 21:38

## 2018-07-10 RX ADMIN — VANCOMYCIN HYDROCHLORIDE 1250 MG: 5 INJECTION, POWDER, LYOPHILIZED, FOR SOLUTION INTRAVENOUS at 09:13

## 2018-07-10 RX ADMIN — HYDROMORPHONE HYDROCHLORIDE 0.5 MG: 1 INJECTION, SOLUTION INTRAMUSCULAR; INTRAVENOUS; SUBCUTANEOUS at 20:13

## 2018-07-10 RX ADMIN — TRAMADOL HYDROCHLORIDE 50 MG: 50 TABLET, FILM COATED ORAL at 05:44

## 2018-07-10 RX ADMIN — ROPINIROLE 0.5 MG: 1 TABLET, FILM COATED ORAL at 20:19

## 2018-07-10 RX ADMIN — CLONAZEPAM 1 MG: 1 TABLET ORAL at 20:19

## 2018-07-10 RX ADMIN — GABAPENTIN 600 MG: 300 CAPSULE ORAL at 09:07

## 2018-07-10 RX ADMIN — GABAPENTIN 600 MG: 300 CAPSULE ORAL at 16:41

## 2018-07-10 RX ADMIN — NYSTATIN: 100000 POWDER TOPICAL at 09:17

## 2018-07-10 RX ADMIN — ROPINIROLE 0.5 MG: 1 TABLET, FILM COATED ORAL at 16:40

## 2018-07-10 RX ADMIN — CLONAZEPAM 1 MG: 1 TABLET ORAL at 09:11

## 2018-07-10 NOTE — PROCEDURES
PICC Line Insertion  Date/Time: 7/10/2018 9:00 AM  Performed by: Keven Wade by: Zulay Dillon     Patient location:  Bedside  Consent:     Consent obtained:  Written    Consent given by:  Patient    Procedural risks discussed: consent obtained by physician  [place] protocol:     Procedure explained and questions answered to patient or proxy's satisfaction: yes      Relevant documents present and verified: yes      Test results available and properly labeled: yes      Radiology Images displayed and confirmed  If images not available, report reviewed: yes      Required blood products, implants, devices, and special equipment available: yes      Site/side marked: yes      Immediately prior to procedure, a time out was called: yes      Patient identity confirmed:  Verbally with patient, arm band, provided demographic data and hospital-assigned identification number  Pre-procedure details:     Hand hygiene: Hand hygiene performed prior to insertion      Sterile barrier technique: All elements of maximal sterile technique followed      Skin preparation:  ChloraPrep    Skin preparation agent: Skin preparation agent completely dried prior to procedure    Indications:     PICC line indications: long term antibiotics    Anesthesia (see MAR for exact dosages):      Anesthesia method:  Local infiltration (3ml)    Local anesthetic:  Lidocaine 1% w/o epi  Procedure details:     Location:  [place]    Vessel type: vein      Laterality:  Left    Site selection rationale:  RUE cellulitis    Patient position:  Flat    Procedural supplies:  Double lumen    Catheter size:  5 Fr    Landmarks identified: yes      Ultrasound guidance: yes      Sterile ultrasound techniques: Sterile gel and sterile probe covers were used      Number of attempts:  1    Successful placement: yes      Vessel of catheter tip end:  Sherlock 3CG confirmed (sherlock 3cg procedure record confirmed placement sent to medical records)    Total catheter length (cm):  42    Catheter out on skin (cm):  2    Max flow rate:  999ml/hr    Arm circumference:  27cm  Post-procedure details:     Post-procedure:  Dressing applied and securement device placed    Assessment:  Blood return through all ports and free fluid flow (sherlock 3cg confirmed placement)    Post-procedure complications: none      Patient tolerance of procedure:   Tolerated well, no immediate complications  Comments:      DAVID flynn for picc placement  Lot#[id number] 2019-08-31

## 2018-07-10 NOTE — PLAN OF CARE
Problem: OCCUPATIONAL THERAPY ADULT  Goal: Performs self-care activities at highest level of function for planned discharge setting  See evaluation for individualized goals  Treatment Interventions: ADL retraining, Functional transfer training, UE strengthening/ROM, Endurance training, Equipment evaluation/education, Cognitive reorientation, Patient/family training, Compensatory technique education, Energy conservation, Activityengagement          See flowsheet documentation for full assessment, interventions and recommendations  Outcome: Progressing  Limitation: Decreased ADL status, Decreased UE ROM, Decreased UE strength, Decreased Safe judgement during ADL, Decreased cognition, Decreased endurance, Decreased self-care trans, Decreased high-level ADLs  Prognosis: Good  Assessment: Pt was seen for skilled OT with focus on completion of self care tasks, functional mobility, review of fall prevention, completion of the [facility] Cognitive Assessment version 7 1 and review of current plan of care  Pt scored 20/30 on the MOCA indicating mild cognitive deficits  Deficits noted in areas of visuospatial/executive functioning scoring 2/5, 1/3 in area of attention, delayed recall 4/5 and orientation 4/6  Pt displayed increased fatigue and slurred speech this tx session  Reported findings to nursing staff  See above levels of A required for all functional tasks  Pt with LOB with functional transfers and self toileting  Poor attention to tasks noted warranting redirection and hands on A  Pt will require further rehab based on recent changes with functional balance  Reported all findings to nursing staff and Physical Therapy        OT Discharge Recommendation: Short Term Rehab         Comments: Laurie Petit, [address]

## 2018-07-10 NOTE — OCCUPATIONAL THERAPY NOTE
Occupational Therapy Treatment Note:         07/10/18 1507   Restrictions/Precautions   Weight Bearing Precautions Per Order No   Other Precautions Fall Risk;Pain;Multiple lines;Cognitive; Chair Alarm; Bed Alarm   Pain Assessment   Pain Assessment 0-10   Pain Score 5   Pain Type Chronic pain   Pain Location Arm   Pain Orientation Right   ADL   Where Assessed Edge of bed   Grooming Assistance 4  Minimal Assistance   Grooming Deficit Setup; Impulsive;Steadying;Verbal cueing;Supervision/safety; Increased time to complete;Wash/dry hands; Wash/dry face;Brushing hair   Grooming Comments cues for safety required   LB Dressing Assistance 4  Minimal Assistance   LB Dressing Deficit Setup; Requires assistive device for steadying;Steadying; Increased time to complete;Supervision/safety;Verbal cueing; Don/doff L sock; Don/doff R sock   LB Dressing Comments cues for safety reqiured  Toileting Assistance  4  Minimal Assistance   Toileting Deficit Setup;Steadying;Verbal cueing;Supervison/safety; Increased time to complete;Clothing management down;Clothing management up;Perineal hygiene; Bedside commode   Toileting Comments cues for safety required  Functional Standing Tolerance   Time 5 mins  Activity dynamic stand balance    Comments cues for safety required to achieve    Bed Mobility   Supine to Sit 4  Minimal assistance   Additional items Assist x 1   Sit to Supine 4  Minimal assistance   Additional items Assist x 1   Additional Comments cues for safety required      Transfers   Sit to Stand 4  Minimal assistance   Additional items Assist x 1   Stand to Sit 4  Minimal assistance   Additional items Assist x 1   Stand pivot 4  Minimal assistance   Additional items Assist x 1   Toilet transfer 4  Minimal assistance   Additional items Assist x 1   Functional Mobility   Functional Mobility 3  Moderate assistance   Additional Comments x1   Additional items (hand held A required  )   Cognition   Overall Cognitive Status Impaired Arousal/Participation Responsive   Attention Attends with cues to redirect   Orientation Level Oriented to person;Oriented to place   Memory Decreased short term memory;Decreased recall of precautions;Decreased recall of recent events   Following Commands Follows multistep commands without difficulty   Activity Tolerance   Activity Tolerance Patient limited by fatigue;Patient limited by pain   Medical Staff Made Aware Reported all findings to nursing staff  Assessment   Assessment Pt was seen for skilled OT with focus on completion of self care tasks, functional mobility, review of fall prevention, completion of the [facility] Cognitive Assessment version 7 1 and review of current plan of care  Pt scored 20/30 on the MOCA indicating mild cognitive deficits  Deficits noted in areas of visuospatial/executive functioning scoring 2/5, 1/3 in area of attention, delayed recall 4/5 and orientation 4/6  Pt displayed increased fatigue and slurred speech this tx session  Reported findings to nursing staff  See above levels of A required for all functional tasks  Pt with LOB with functional transfers and self toileting  Poor attention to tasks noted warranting redirection and hands on A  Pt will require further rehab based on recent changes with functional balance  Reported all findings to nursing staff and Physical Therapy  Plan   Treatment Interventions ADL retraining;Functional transfer training; Endurance training;Cognitive reorientation   Goal Expiration Date 07/22/18   Treatment Day 1   OT Frequency 3-5x/wk   Recommendation   OT Discharge Recommendation Short Term Rehab   Barthel Index   Feeding 5   Bathing 0   Grooming Score 0   Dressing Score 5   Bladder Score 10   Bowels Score 10   Toilet Use Score 5   Transfers (Bed/Chair) Score 10   Mobility (Level Surface) Score 0   Stairs Score 0   Barthel Index Score 45   Modified Mary Ann Scale   Modified Mary Ann Scale 4   Sabrina Marion, [address]

## 2018-07-10 NOTE — PROGRESS NOTES
[facility] Internal Medicine Progress Note  Patient: Adrianna Olvera 65 y o  female   MRN: [id number]  PCP: Neli Lemos MD  Unit/Bed#: E5 -01 Encounter: [id number]  Date Of Visit: 07/10/18    Assessment:    Principal Problem:    Abscess of right forearm  Active Problems:    Hyponatremia    Right arm cellulitis    Benign essential hypertension    Type 2 diabetes mellitus, uncontrolled ([address] )    Abscess of tendon sheath of forearm, right      Plan:    1 )  Right upper extremity abscess/cellulitis  -MRI reveals soft tissue swelling along the radial aspect of right forearm  -status post I and D and drain placement on 07/01/2018, second I&D on 7/7/18  -plan for OR on 7/9/18 for repeat I&D with possible wound closure v  Vac change  -NPO past midnight, was canceled by patient "I was hungry and I was not going to wait for her procedure so I 8 my food "  I had extensive conversation with patient in the posterior seriousness of her condition and explained that it would not be in her best interest given findings of MRI to further delay her surgery  Patient is tomorrow for a washout tomorrow versus her state as per orthopedics    -c/w Vancomycin for MRSA in wound culture    2 )  Sepsis-present on admission  -with leukocytosis, tachycardia  -now improved     3 )  Diabetes mellitus  -HbA1c 13 4, noncompliant with insulib  -accuchecks 700-980-  -patient is Accu-Cheks are waxing and waning  -c/w Lantus 30U and lispro 8U TID     4 )  Hypertension  -continue with current medications, monitor blood pressure     5 )  Hepatitis-C  Established diagnosis, I did explain the importance of screening for hyper cell carcinoma and appropriate treatment      6 )  Bipolar disorder  -continue with home medication  Mood and affect is currently within normal limits, denies suicidal or homicidal ideation      7 )  Chronic opioid dependence  -maintained on methadone     8 ) anemia of chronic disease  I discussed the hemoglobin resolved including hemoglobin of 6 9/dL over transfusion  Patient does not want it today will consider if needed of surgery  She has long-standing history of anemia she is currently asymptomatic, repeat labs in a m  If you agree we will would like to transfuse 1-2 units    Disposition: Plan for washout tomorrow versus Thursday, case discussed with orthopedic surgery in detail and subsequently with a patient  VTE Pharmacologic Prophylaxis:   Pharmacologic: heparin    Patient Centered Rounds: I have performed bedside rounds with nursing staff today  Time Spent for Care: 20 minutes  More than 50% of total time spent on counseling and coordination of care as described above  Current Length of Stay: 12 day(s)    Current Patient Status: Inpatient   Certification Statement: The patient will continue to require additional inpatient hospital stay due to RUE abscess, MRSA in wound    Discharge Plan / Estimated Discharge Date: TBD    Code Status: Level 1 - Full Code      Subjective:   No complaints today    Objective:     Vitals:   Temp (24hrs), Av °F (37 2 °C), Min:98 3 °F (36 8 °C), Max:99 9 °F (37 7 °C)    HR:  [78-93] 78  Resp:  [18-20] 20  BP: (132-145)/(59-71) 145/66  SpO2:  [91 %-98 %] 94 %  There is no height or weight on file to calculate BMI  Input and Output Summary (last 24 hours): Intake/Output Summary (Last 24 hours) at 07/10/18 1418  Last data filed at 07/10/18 1353   Gross per 24 hour   Intake                0 ml   Output              200 ml   Net             -200 ml       Physical Exam:    Constitutional: Patient is oriented to person, place and time, no acute distress  HEENT:  Normocephalic, atraumatic, EOMI, PERRLA, no scleral icterus, no pallor, moist oral mucosa  Neck:  Supple, no masses, no thyromegaly, no bruits Normal range of motion  Lymph nodes:  No lymphadenopathy  Cardiovascular: Normal S1S2, RRR, No murmurs/rubs/gallops appreciated    Pulmonary: Clear to auscultation bilaterally, No rhonchi/rales/wheezing appreciated  Abdominal: Soft, Bowel sounds present, Non-tender, Non-distended, No rebound/guarding, no hepatomegaly   Musculoskeletal: No tenderness/abnormality   Extremities:  No cyanosis, clubbing or edema  Peripheral pulses palpable and equal bilaterally, right upper extremity with a cast  Neurological: Cranial nerves II-XII grossly intact, sensation intact, otherwise no focal neurological symptoms  Skin: RUE in dressing, wound vac in place    Additional Data:     Labs:      Results from last 7 days  Lab Units 07/09/18  1038   WBC Thousand/uL 6 76   HEMOGLOBIN g/dL 6 9*   HEMATOCRIT % 22 4*   PLATELETS Thousands/uL 408*   NEUTROS PCT % 67   LYMPHS PCT % 23   MONOS PCT % 8   EOS PCT % 2       Results from last 7 days  Lab Units 07/09/18  1038   SODIUM mmol/L 142   POTASSIUM mmol/L 3 8   CHLORIDE mmol/L 103   CO2 mmol/L 34*   BUN mg/dL 8   CREATININE mg/dL 0 58*   CALCIUM mg/dL 9 1   GLUCOSE RANDOM mg/dL 92            I Have Reviewed All Lab Data Listed Above        Recent Cultures (last 7 days):           Last 24 Hours Medication List:     Current Facility-Administered Medications:  acetaminophen 650 mg Oral Q6H PRN Geroldine Kyra, PA-C    calcium carbonate 1,000 mg Oral Daily PRN Geroldine Eusebiole, PA-C    clonazePAM 1 mg Oral TID Geroldine Eusebiole, PA-C    gabapentin 600 mg Oral TID Glorine Leyden, MD    heparin (porcine) 5,000 Units Subcutaneous [facility] Glorine Leyden, MD    insulin glargine 30 Units Subcutaneous QAM Glorine Leyden, MD    insulin lispro 1-5 Units Subcutaneous 4x Daily (AC & HS) Ann England MD    insulin lispro 8 Units Subcutaneous TID AC Glorine Leyden, MD    lamoTRIgine 150 mg Oral Daily Geroldine Kyra, PA-C    lisinopril 20 mg Oral Daily Glorine Leyden, MD    methadone 15 mg Oral Daily Ann England MD    nystatin  Topical TID SKY Bradshaw    ondansetron 4 mg Intravenous Q6H PRN Geroldine Kyra, TABITHA    ondansetron 4 mg Oral Q6H PRN SKY Browning rOPINIRole 0 5 mg Oral TID Millicent Camp PA-C    sertraline 50 mg Oral HS Millicent Camp PA-C    traMADol 50 mg Oral Q6H PRN Greer Levin MD    vancomycin 17 5 mg/kg Intravenous Q12H Laurent Laguerre MD Last Rate: 1,250 mg (07/10/18 0913)        Today, Patient Was Seen By: Dale Coello MD

## 2018-07-10 NOTE — PROGRESS NOTES
[facility] Internal Medicine Progress Note  Patient: Guadalupe Jordan 65 y o  female   MRN: [id number]  PCP: Bhupinder Alvarez MD  Unit/Bed#: E5 -01 Encounter: [id number]  Date Of Visit: 07/09/18    Assessment:    Principal Problem:    Abscess of right forearm  Active Problems:    Hyponatremia    Right arm cellulitis    Benign essential hypertension    Type 2 diabetes mellitus, uncontrolled ([address] )    Abscess of tendon sheath of forearm, right      Plan:    1 )  Right upper extremity abscess/cellulitis  -MRI reveals soft tissue swelling along the radial aspect of right forearm  -status post I and D and drain placement on 07/01/2018, second I&D on 7/7/18  -plan for OR on 7/9/18 for repeat I&D with possible wound closure v  Vac change  -NPO past midnight, was canceled by patient "I was hungry and I was not going to wait for her procedure so I 8 my food "  I had extensive conversation with patient in the posterior seriousness of her condition and explained that it would not be in her best interest given findings of MRI to further delay her surgery    -c/w Vancomycin for MRSA in wound culture    2 )  Sepsis-present on admission  -with leukocytosis, tachycardia  -now improved     3 )  Diabetes mellitus  -HbA1c 13 4, noncompliant with insulib  -accuchecks [phone]-  -patient is Accu-Cheks are waxing and waning  -c/w Lantus 30U and lispro 8U TID     4 )  Hypertension  -continue with current medications, monitor blood pressure     5 )  Hepatitis-C  Established diagnosis, I did explain the importance of screening for hyper cell carcinoma and appropriate treatment      6 )  Bipolar disorder  -continue with home medication  Mood and affect is currently within normal limits, denies suicidal or homicidal ideation      7 )  Chronic opioid dependence  -maintained on methadone     8 ) anemia of chronic disease  I discussed the hemoglobin resolved including hemoglobin of 6 9/dL over transfusion  Patient does not want it today will consider if needed of surgery  She has long-standing history of anemia she is currently asymptomatic, repeat labs in a m  If you agree we will would like to transfuse 1-2 units    VTE Pharmacologic Prophylaxis:   Pharmacologic: heparin    Patient Centered Rounds: I have performed bedside rounds with nursing staff today  Time Spent for Care: 20 minutes  More than 50% of total time spent on counseling and coordination of care as described above  Current Length of Stay: 11 day(s)    Current Patient Status: Inpatient   Certification Statement: The patient will continue to require additional inpatient hospital stay due to RUE abscess, MRSA in wound    Discharge Plan / Estimated Discharge Date: TBD    Code Status: Level 1 - Full Code      Subjective:   No complaints today    Objective:     Vitals:   Temp (24hrs), Av 8 °F (37 1 °C), Min:97 9 °F (36 6 °C), Max:99 6 °F (37 6 °C)    HR:  [80-93] 87  Resp:  [18-20] 20  BP: (123-136)/(58-71) 132/59  SpO2:  [89 %-98 %] 98 %  There is no height or weight on file to calculate BMI  Input and Output Summary (last 24 hours):     No intake or output data in the 24 hours ending 18 [id number]    Physical Exam:    Constitutional: Patient is oriented to person, place and time, no acute distress  HEENT:  Normocephalic, atraumatic, EOMI, PERRLA, no scleral icterus, no pallor, moist oral mucosa  Neck:  Supple, no masses, no thyromegaly, no bruits Normal range of motion  Lymph nodes:  No lymphadenopathy  Cardiovascular: Normal S1S2, RRR, No murmurs/rubs/gallops appreciated  Pulmonary: Clear to auscultation bilaterally, No rhonchi/rales/wheezing appreciated  Abdominal: Soft, Bowel sounds present, Non-tender, Non-distended, No rebound/guarding, no hepatomegaly   Musculoskeletal: No tenderness/abnormality   Extremities:  No cyanosis, clubbing or edema   Peripheral pulses palpable and equal bilaterally  Neurological: Cranial nerves II-XII grossly intact, sensation intact, otherwise no focal neurological symptoms  Skin: RUE in dressing, wound vac in place    Additional Data:     Labs:      Results from last 7 days  Lab Units 07/09/18  1038   WBC Thousand/uL 6 76   HEMOGLOBIN g/dL 6 9*   HEMATOCRIT % 22 4*   PLATELETS Thousands/uL 408*   NEUTROS PCT % 67   LYMPHS PCT % 23   MONOS PCT % 8   EOS PCT % 2       Results from last 7 days  Lab Units 07/09/18  1038   SODIUM mmol/L 142   POTASSIUM mmol/L 3 8   CHLORIDE mmol/L 103   CO2 mmol/L 34*   BUN mg/dL 8   CREATININE mg/dL 0 58*   CALCIUM mg/dL 9 1   GLUCOSE RANDOM mg/dL 92            I Have Reviewed All Lab Data Listed Above        Recent Cultures (last 7 days):           Last 24 Hours Medication List:     Current Facility-Administered Medications:  acetaminophen 650 mg Oral Q6H PRN SHI Berger-NEAL    calcium carbonate 1,000 mg Oral Daily PRN Kristopher Foster PA-C    clonazePAM 1 mg Oral TID SHI Berger-NEAL    gabapentin 600 mg Oral TID Damari Burnett MD    heparin (porcine) 5,000 Units Subcutaneous [facility] Damari Burnett MD    insulin glargine 30 Units Subcutaneous QAM Damari Burnett MD    insulin lispro 1-5 Units Subcutaneous 4x Daily (AC & HS) Vladimir Sotelo MD    insulin lispro 8 Units Subcutaneous TID AC Damari Burnett MD    lamoTRIgine 150 mg Oral Daily Kristopher Foster PA-C    lisinopril 20 mg Oral Daily Damari Burnett MD    methadone 15 mg Oral Daily Vladimir Sotelo MD    nystatin  Topical TID SKY Dickey    ondansetron 4 mg Intravenous Q6H PRN Kristopher Foster PA-C    ondansetron 4 mg Oral Q6H PRN SKY Elaine    rOPINIRole 0 5 mg Oral TID Kristopher Foster PA-C    sertraline 50 mg Oral HS Kristopher Foster PA-C    traMADol 50 mg Oral Q6H PRN Vladimir Sotelo MD    vancomycin 17 5 mg/kg Intravenous Q12H Miladis Hurtado MD Last Rate: 1,250 mg (07/09/18 1940)        Today, Patient Was Seen By: Cristiano Aguilar MD

## 2018-07-10 NOTE — PROGRESS NOTES
Progress Note - Infectious Disease   Cindy Lizama 65 y o  female MRN: [id number]  Unit/Bed#: E5 -01 Encounter: [id number]      Impression/Recommendations:  1   Right forearm cellulitis and abscess   Patient is status post I&D on , and repeat I&D on    Operative culture grew MRSA   Patient's response on IV antibiotic has been slow  She is slowly improving again  Continue IV vancomycin  Monitor temperature/WBC  Serial forearm exams      2   Right forearm muscle necrosis, noted at surgery   This is secondary to MRSA abscess above   Consider myositis  Will treat with somewhat prolonged IV antibiotic course  Antibiotic plan as in above  Serial exams  Treat x 2 weeks total, another 11 days      3   DM, poorly controlled, with hyperglycemia on admission   Hemoglobin A1c is 13 4   I discussed with patient in great detail regarding this   Poorly control hyperglycemia clearly contributes to development of cellulitis and abscess above after the fall above   Blood sugar is much better controlled now  Management per Medicine Service      [address] discussed with patient in great detail regarding the need to be compliant with treatment plan   Otherwise, her infection can worsen, as we are seeing      Discussed with patient and family in detail regarding the above plan      Antibiotics:  Vancomycin # 7  POD # 3     Subjective:  Patient's right forearm pain is slowly improving  Temperature w down again   No further chills  She is tolerating antibiotic well   No nausea, vomiting or diarrhea   No dizziness or hearing loss  Objective:  Vitals:  HR:  [78-93] 78  Resp:  [18-20] 20  BP: (132-145)/(59-71) 145/66  SpO2:  [91 %-98 %] 94 %  Temp (24hrs), Av °F (37 2 °C), Min:98 3 °F (36 8 °C), Max:99 9 °F (37 7 °C)  Current: Temperature: 99 9 °F (37 7 °C)    Physical Exam:     General: Awake, alert, cooperative, no distress  Neck:  Supple  No mass  No lymphadenopathy     Lungs: Expansion symmetric, no rales, no wheezing, respirations unlabored  Heart:  Regular rate and rhythm, S1 and S2 normal, no murmur  Abdomen: Soft, nondistended, non-tender, bowel sounds active all four quadrants,        no masses, no organomegaly  Extremities: Right arm with dressing in place  Dressing is dry  Improve edema  Improved tenderness  Improved ROM of fingers  Skin:  No rash  Neuro: Moves all extremities  Invasive Devices     Peripherally Inserted Central Catheter Line            PICC Line 93/74/27 Left Basilic less than 1 day          Drain            Closed/Suction Drain Right Other (Comment) Accordion 10 Fr  9 days    Negative Pressure Wound Therapy (V A C ) Arm Right 3 days                Labs studies:   I have personally reviewed pertinent labs  Results from last 7 days  Lab Units 07/09/18  1038 07/06/18  0604 07/05/18  1205   SODIUM mmol/L 142 144 138   POTASSIUM mmol/L 3 8 3 2* 3 6   CHLORIDE mmol/L 103 108 104   CO2 mmol/L 34* 30 28   ANION GAP mmol/L 5 6 6   BUN mg/dL 8 3* 4*   CREATININE mg/dL 0 58* 0 58* 0 69   EGFR ml/min/1 73sq m 100 100 94   GLUCOSE RANDOM mg/dL 92 51* 196*   CALCIUM mg/dL 9 1 8 7 8 8       Results from last 7 days  Lab Units 07/09/18  1038 07/06/18  0604 07/05/18  1205   WBC Thousand/uL 6 76 5 82 5 62   HEMOGLOBIN g/dL 6 9* 8 0* 8 3*   PLATELETS Thousands/uL 408* 415* 448*           Imaging Studies:   I have personally reviewed pertinent imaging study reports and images in PACS  EKG, Pathology, and Other Studies:   I have personally reviewed pertinent reports

## 2018-07-11 LAB
ABO GROUP BLD BPU: NORMAL
ABO GROUP BLD: NORMAL
ANION GAP SERPL CALCULATED.3IONS-SCNC: 4 MMOL/L (ref 4–13)
BASOPHILS # BLD AUTO: 0.01 THOUSANDS/ΜL (ref 0–0.1)
BASOPHILS NFR BLD AUTO: 0 % (ref 0–1)
BLD GP AB SCN SERPL QL: NEGATIVE
BPU ID: NORMAL
BUN SERPL-MCNC: 11 MG/DL (ref 5–25)
CALCIUM SERPL-MCNC: 8.9 MG/DL (ref 8.3–10.1)
CHLORIDE SERPL-SCNC: 105 MMOL/L (ref 100–108)
CO2 SERPL-SCNC: 32 MMOL/L (ref 21–32)
CREAT SERPL-MCNC: 0.72 MG/DL (ref 0.6–1.3)
EOSINOPHIL # BLD AUTO: 0.12 THOUSAND/ΜL (ref 0–0.61)
EOSINOPHIL NFR BLD AUTO: 2 % (ref 0–6)
ERYTHROCYTE [DISTWIDTH] IN BLOOD BY AUTOMATED COUNT: 15.2 % (ref 11.6–15.1)
GFR SERPL CREATININE-BSD FRML MDRD: 91 ML/MIN/1.73SQ M
GLUCOSE SERPL-MCNC: 106 MG/DL (ref 65–140)
GLUCOSE SERPL-MCNC: 106 MG/DL (ref 65–140)
GLUCOSE SERPL-MCNC: 111 MG/DL (ref 65–140)
GLUCOSE SERPL-MCNC: 72 MG/DL (ref 65–140)
GLUCOSE SERPL-MCNC: 85 MG/DL (ref 65–140)
HCT VFR BLD AUTO: 21.3 % (ref 34.8–46.1)
HGB BLD-MCNC: 6.3 G/DL (ref 11.5–15.4)
LYMPHOCYTES # BLD AUTO: 1.49 THOUSANDS/ΜL (ref 0.6–4.47)
LYMPHOCYTES NFR BLD AUTO: 30 % (ref 14–44)
MCH RBC QN AUTO: 26.3 PG (ref 26.8–34.3)
MCHC RBC AUTO-ENTMCNC: 29.6 G/DL (ref 31.4–37.4)
MCV RBC AUTO: 89 FL (ref 82–98)
MONOCYTES # BLD AUTO: 0.33 THOUSAND/ΜL (ref 0.17–1.22)
MONOCYTES NFR BLD AUTO: 7 % (ref 4–12)
NEUTROPHILS # BLD AUTO: 2.97 THOUSANDS/ΜL (ref 1.85–7.62)
NEUTS SEG NFR BLD AUTO: 60 % (ref 43–75)
NRBC BLD AUTO-RTO: 0 /100 WBCS
PLATELET # BLD AUTO: 360 THOUSANDS/UL (ref 149–390)
PMV BLD AUTO: 8.9 FL (ref 8.9–12.7)
POTASSIUM SERPL-SCNC: 3.8 MMOL/L (ref 3.5–5.3)
RBC # BLD AUTO: 2.4 MILLION/UL (ref 3.81–5.12)
RH BLD: POSITIVE
SODIUM SERPL-SCNC: 141 MMOL/L (ref 136–145)
SPECIMEN EXPIRATION DATE: NORMAL
UNIT DISPENSE STATUS: NORMAL
UNIT PRODUCT CODE: NORMAL
UNIT RH: NORMAL
WBC # BLD AUTO: 4.92 THOUSAND/UL (ref 4.31–10.16)

## 2018-07-11 PROCEDURE — 82948 REAGENT STRIP/BLOOD GLUCOSE: CPT

## 2018-07-11 PROCEDURE — 85025 COMPLETE CBC W/AUTO DIFF WBC: CPT | Performed by: ORTHOPAEDIC SURGERY

## 2018-07-11 PROCEDURE — P9021 RED BLOOD CELLS UNIT: HCPCS

## 2018-07-11 PROCEDURE — 97606 NEG PRS WND THER DME>50 SQCM: CPT | Performed by: ORTHOPAEDIC SURGERY

## 2018-07-11 PROCEDURE — 86901 BLOOD TYPING SEROLOGIC RH(D): CPT | Performed by: INTERNAL MEDICINE

## 2018-07-11 PROCEDURE — 99024 POSTOP FOLLOW-UP VISIT: CPT | Performed by: ORTHOPAEDIC SURGERY

## 2018-07-11 PROCEDURE — 86923 COMPATIBILITY TEST ELECTRIC: CPT

## 2018-07-11 PROCEDURE — 99232 SBSQ HOSP IP/OBS MODERATE 35: CPT | Performed by: INTERNAL MEDICINE

## 2018-07-11 PROCEDURE — 86900 BLOOD TYPING SEROLOGIC ABO: CPT | Performed by: INTERNAL MEDICINE

## 2018-07-11 PROCEDURE — 86850 RBC ANTIBODY SCREEN: CPT | Performed by: INTERNAL MEDICINE

## 2018-07-11 PROCEDURE — 99233 SBSQ HOSP IP/OBS HIGH 50: CPT | Performed by: INTERNAL MEDICINE

## 2018-07-11 PROCEDURE — 80048 BASIC METABOLIC PNL TOTAL CA: CPT | Performed by: ORTHOPAEDIC SURGERY

## 2018-07-11 PROCEDURE — 30233N1 TRANSFUSION OF NONAUTOLOGOUS RED BLOOD CELLS INTO PERIPHERAL VEIN, PERCUTANEOUS APPROACH: ICD-10-PCS | Performed by: INTERNAL MEDICINE

## 2018-07-11 PROCEDURE — 0XJD0ZZ INSPECTION OF RIGHT LOWER ARM, OPEN APPROACH: ICD-10-PCS | Performed by: ORTHOPAEDIC SURGERY

## 2018-07-11 PROCEDURE — 01JY0ZZ INSPECTION OF PERIPHERAL NERVE, OPEN APPROACH: ICD-10-PCS | Performed by: ORTHOPAEDIC SURGERY

## 2018-07-11 RX ORDER — MAGNESIUM HYDROXIDE 1200 MG/15ML
LIQUID ORAL AS NEEDED
Status: DISCONTINUED | OUTPATIENT
Start: 2018-07-11 | End: 2018-07-11 | Stop reason: HOSPADM

## 2018-07-11 RX ORDER — SODIUM CHLORIDE 9 MG/ML
125 INJECTION, SOLUTION INTRAVENOUS CONTINUOUS
Status: DISCONTINUED | OUTPATIENT
Start: 2018-07-11 | End: 2018-07-11

## 2018-07-11 RX ORDER — CEFAZOLIN SODIUM 1 G/3ML
INJECTION, POWDER, FOR SOLUTION INTRAMUSCULAR; INTRAVENOUS AS NEEDED
Status: DISCONTINUED | OUTPATIENT
Start: 2018-07-11 | End: 2018-07-11 | Stop reason: SURG

## 2018-07-11 RX ORDER — ONDANSETRON 2 MG/ML
4 INJECTION INTRAMUSCULAR; INTRAVENOUS EVERY 6 HOURS PRN
Status: DISCONTINUED | OUTPATIENT
Start: 2018-07-11 | End: 2018-07-11 | Stop reason: HOSPADM

## 2018-07-11 RX ORDER — PROPOFOL 10 MG/ML
INJECTION, EMULSION INTRAVENOUS AS NEEDED
Status: DISCONTINUED | OUTPATIENT
Start: 2018-07-11 | End: 2018-07-11 | Stop reason: SURG

## 2018-07-11 RX ORDER — MIDAZOLAM HYDROCHLORIDE 1 MG/ML
INJECTION INTRAMUSCULAR; INTRAVENOUS AS NEEDED
Status: DISCONTINUED | OUTPATIENT
Start: 2018-07-11 | End: 2018-07-11 | Stop reason: SURG

## 2018-07-11 RX ORDER — HYDROMORPHONE HYDROCHLORIDE 2 MG/ML
INJECTION, SOLUTION INTRAMUSCULAR; INTRAVENOUS; SUBCUTANEOUS AS NEEDED
Status: DISCONTINUED | OUTPATIENT
Start: 2018-07-11 | End: 2018-07-11 | Stop reason: SURG

## 2018-07-11 RX ORDER — FENTANYL CITRATE 50 UG/ML
INJECTION, SOLUTION INTRAMUSCULAR; INTRAVENOUS AS NEEDED
Status: DISCONTINUED | OUTPATIENT
Start: 2018-07-11 | End: 2018-07-11 | Stop reason: SURG

## 2018-07-11 RX ORDER — SODIUM CHLORIDE 9 MG/ML
75 INJECTION, SOLUTION INTRAVENOUS CONTINUOUS
Status: DISCONTINUED | OUTPATIENT
Start: 2018-07-11 | End: 2018-07-19

## 2018-07-11 RX ORDER — ONDANSETRON 2 MG/ML
INJECTION INTRAMUSCULAR; INTRAVENOUS AS NEEDED
Status: DISCONTINUED | OUTPATIENT
Start: 2018-07-11 | End: 2018-07-11 | Stop reason: SURG

## 2018-07-11 RX ADMIN — VANCOMYCIN HYDROCHLORIDE 1250 MG: 5 INJECTION, POWDER, LYOPHILIZED, FOR SOLUTION INTRAVENOUS at 11:11

## 2018-07-11 RX ADMIN — HYDROMORPHONE HYDROCHLORIDE 0.5 MG: 1 INJECTION, SOLUTION INTRAMUSCULAR; INTRAVENOUS; SUBCUTANEOUS at 19:34

## 2018-07-11 RX ADMIN — METHADONE HYDROCHLORIDE 15 MG: 10 TABLET ORAL at 08:20

## 2018-07-11 RX ADMIN — SODIUM CHLORIDE: 0.9 INJECTION, SOLUTION INTRAVENOUS at 16:16

## 2018-07-11 RX ADMIN — GABAPENTIN 600 MG: 300 CAPSULE ORAL at 08:20

## 2018-07-11 RX ADMIN — HYDROMORPHONE HYDROCHLORIDE 0.5 MG: 1 INJECTION, SOLUTION INTRAMUSCULAR; INTRAVENOUS; SUBCUTANEOUS at 16:53

## 2018-07-11 RX ADMIN — GABAPENTIN 600 MG: 300 CAPSULE ORAL at 22:09

## 2018-07-11 RX ADMIN — HYDROMORPHONE HYDROCHLORIDE 0.5 MG: 1 INJECTION, SOLUTION INTRAMUSCULAR; INTRAVENOUS; SUBCUTANEOUS at 03:48

## 2018-07-11 RX ADMIN — CLONAZEPAM 1 MG: 1 TABLET ORAL at 08:20

## 2018-07-11 RX ADMIN — PROPOFOL 200 MG: 10 INJECTION, EMULSION INTRAVENOUS at 14:57

## 2018-07-11 RX ADMIN — HYDROMORPHONE HYDROCHLORIDE 0.5 MG: 1 INJECTION, SOLUTION INTRAMUSCULAR; INTRAVENOUS; SUBCUTANEOUS at 09:48

## 2018-07-11 RX ADMIN — FENTANYL CITRATE 100 MCG: 50 INJECTION, SOLUTION INTRAMUSCULAR; INTRAVENOUS at 14:57

## 2018-07-11 RX ADMIN — NYSTATIN: 100000 POWDER TOPICAL at 22:10

## 2018-07-11 RX ADMIN — LIDOCAINE HYDROCHLORIDE 40 MG: 20 INJECTION, SOLUTION INTRAVENOUS at 14:57

## 2018-07-11 RX ADMIN — ROPINIROLE 0.5 MG: 1 TABLET, FILM COATED ORAL at 22:09

## 2018-07-11 RX ADMIN — HEPARIN SODIUM 5000 UNITS: 5000 INJECTION, SOLUTION INTRAVENOUS; SUBCUTANEOUS at 05:54

## 2018-07-11 RX ADMIN — SODIUM CHLORIDE 125 ML/HR: 0.9 INJECTION, SOLUTION INTRAVENOUS at 14:25

## 2018-07-11 RX ADMIN — ROPINIROLE 0.5 MG: 1 TABLET, FILM COATED ORAL at 08:21

## 2018-07-11 RX ADMIN — CEFAZOLIN 1000 MG: 1 INJECTION, POWDER, FOR SOLUTION INTRAVENOUS at 14:58

## 2018-07-11 RX ADMIN — CLONAZEPAM 1 MG: 1 TABLET ORAL at 22:10

## 2018-07-11 RX ADMIN — HYDROMORPHONE HYDROCHLORIDE 0.5 MG: 2 INJECTION, SOLUTION INTRAMUSCULAR; INTRAVENOUS; SUBCUTANEOUS at 16:27

## 2018-07-11 RX ADMIN — MIDAZOLAM 2 MG: 1 INJECTION INTRAMUSCULAR; INTRAVENOUS at 14:53

## 2018-07-11 RX ADMIN — HYDROMORPHONE HYDROCHLORIDE 0.5 MG: 1 INJECTION, SOLUTION INTRAMUSCULAR; INTRAVENOUS; SUBCUTANEOUS at 17:03

## 2018-07-11 RX ADMIN — HYDROMORPHONE HYDROCHLORIDE 0.5 MG: 1 INJECTION, SOLUTION INTRAMUSCULAR; INTRAVENOUS; SUBCUTANEOUS at 17:26

## 2018-07-11 RX ADMIN — LISINOPRIL 20 MG: 20 TABLET ORAL at 08:20

## 2018-07-11 RX ADMIN — ONDANSETRON HYDROCHLORIDE 4 MG: 2 INJECTION, SOLUTION INTRAVENOUS at 16:17

## 2018-07-11 RX ADMIN — LAMOTRIGINE 150 MG: 100 TABLET ORAL at 08:21

## 2018-07-11 RX ADMIN — VANCOMYCIN HYDROCHLORIDE 1250 MG: 5 INJECTION, POWDER, LYOPHILIZED, FOR SOLUTION INTRAVENOUS at 19:35

## 2018-07-11 RX ADMIN — HYDROMORPHONE HYDROCHLORIDE 0.5 MG: 1 INJECTION, SOLUTION INTRAMUSCULAR; INTRAVENOUS; SUBCUTANEOUS at 17:36

## 2018-07-11 RX ADMIN — SERTRALINE HYDROCHLORIDE 50 MG: 50 TABLET ORAL at 22:09

## 2018-07-11 RX ADMIN — TRAMADOL HYDROCHLORIDE 50 MG: 50 TABLET, FILM COATED ORAL at 22:09

## 2018-07-11 NOTE — PROGRESS NOTES
Progress Note - Orthopedics   Noah Bullock 65 y o  female MRN: [id number]  Unit/Bed#: OR POOL Encounter: [id number]    Assessment:  S/p I&D R forearm abscess 7/1/18, s/p I&D right forearm abscess with excisional debridement of nonviable tissue on 07/07/2018    Plan:  Return to the OR for removal of VAC, irrigation debridement of right forearm, possible re-application of wound VAC  Discussed treatment options with the patient as well as risks and benefits  She elected to move forward with surgical treatment  Risks include but are not limited to infection, blood clot, wound healing problems, blood loss, contracture, permanent dysfunction of right arm and/or hand, persistent pain and stiffness, need for additional surgery, damage to blood vessels and nerves, heart attack, stroke, death  The patient understood and agreed to by oral route consent  NPO  Pain control  Patient received 2 units of packed red blood cells today, previously was refusing blood transfusion  Discussed with SLIM-benefits of surgery outweigh risks, OK to proceed to OR    Subjective:  Patient seen examined  Patient admits right forearm pain  It is a 9/10  She is having difficulty with ROM of fingers  Vitals: Blood pressure 162/75, pulse 71, temperature (!) 96 7 °F (35 9 °C), temperature source Temporal, resp  rate 14, weight 65 3 kg (144 lb), SpO2 94 %  ,There is no height or weight on file to calculate BMI  Intake/Output Summary (Last 24 hours) at 07/11/18 1437  Last data filed at 07/11/18 1431   Gross per 24 hour   Intake              100 ml   Output                0 ml   Net              100 ml       Invasive Devices     Peripherally Inserted Central Catheter Line            PICC Line 22/18/50 Left Basilic 1 day          Drain            Closed/Suction Drain Right Other (Comment) Accordion 10 Fr   10 days    Negative Pressure Wound Therapy (V A C ) Arm Right 4 days                Ortho Exam:  RUE:  Median/radial/ulnar nerves sensory intact, minimal finger F/E, pain with passive extension, no pain with passive flexion, brisk capillary refill  Wound vac and splint intact    Lab, Imaging and other studies:   CBC:   Lab Results   Component Value Date    WBC 4 92 07/11/2018    HGB 6 3 (LL) 07/11/2018    HCT 21 3 (L) 07/11/2018    MCV 89 07/11/2018     07/11/2018    MCH 26 3 (L) 07/11/2018    MCHC 29 6 (L) 07/11/2018    RDW 15 2 (H) 07/11/2018    MPV 8 9 07/11/2018    NRBC 0 07/11/2018     CMP:   Lab Results   Component Value Date     07/11/2018     07/11/2018    CO2 32 07/11/2018    ANIONGAP 4 07/11/2018    BUN 11 07/11/2018    CREATININE 0 72 07/11/2018    GLUCOSE 106 07/11/2018    CALCIUM 8 9 07/11/2018    EGFR 91 07/11/2018

## 2018-07-11 NOTE — OP NOTE
OPERATIVE REPORT  PATIENT NAME: Dez Sung    :  1955  MRN: [id number]  Pt Location: [facility] OR ROOM 03    SURGERY DATE: 2018    Surgeon(s) and Role:     DO Siri Todd Primary    Preop Diagnosis:  Right forearm abscess    Post-Op Diagnosis Codes:   Right forearm abscess    Procedure:   Irrigation and debridement right forearm with exchange of wound vac    Specimen(s):  * No specimens in log *    Estimated Blood Loss:   Minimal    Drains:  Closed/Suction Drain Right Other (Comment) Accordion 10 Fr  (Active)   Site Description Unable to view 7/3/2018  5:38 AM   Dressing Status Old drainage 7/3/2018  5:38 AM   Drainage Appearance Serosanguineous 7/3/2018  5:38 AM   Status Accordion suction 7/3/2018  5:38 AM   Output (mL) 40 mL 7/3/2018  5:38 AM   Number of days: 10       Negative Pressure Wound Therapy (V A C ) Arm Right (Active)   Unit Type wound vac 2018  4:43 PM   Black foam- # applied 1 2018  4:08 PM   Cycle Continuous 2018  4:43 PM   Target Pressure (mmHg) 125 2018  4:43 PM   Dressing Status Clean;Dry; Intact 2018  4:43 PM   Black foam- # removed 2 2018  4:08 PM   Output (mL) 0 mL 2018 12:45 PM   Number of days: 4       Anesthesia Type:   GA     Operative Indications:  Right forearm abscess    Operative Findings:  See below    Complications:   None    Procedure and Technique:  INDICATIONS FOR PROCEDURE:  The patients is a 65 y o  yo female who presented with a right forearm abscess  She underwent an I&D on  with drain placement and then a repeat I&D on  with wound vac placement  It was felt best to return to the OR for repeat I&D and wound vac exchange  Lakesha Yip Extensive counseling in regards to the reasons for surgical intervention as well as the risks and benefits of surgery were reviewed    The risks include, but are not limited to further infection, extensive blood loss, blood clots, wound healing problems, need for additional surgery, persistent pain and stiffness, contracture, loss of function to right arm and/or hand, heart attack, stroke, death  The patient understood and agreed to by oral and written consent  OPERATIVE PROCEDURE:  The patient was identified as Maliha Marcus by Her ID bracelet by the surgical staff in the preoperative area at [address] [facility]   The patient was wheeled back to the surgical room and placed on the operative table  Anesthesia was administered  The patient remained in the supine position and all bony prominences were carefully protected  The wound vac was removed  The patient was noted to have some discoloration over the radial and volar aspect of her forearm adjacent to the wound with some induration  A skin tear was noted adjacent to the wound  The right forearm was then prepped and draped in the usual sterile fashion  A timeout was performed where the patients name and surgical site were once again identified  It was made sure that the deep and superficial sponges were removed  Some sutures were removed to gain access  The wound was explored  It was about 24cm long and at its widest section it was about 6cm wide over the dorsum of the right forearm  The fluid encountered appeared serous, not purulent  A small amount of necrotic tissue was removed  Otherwise the soft tissues and muscle appeared healthy  The radial artery was found intact  A tendon without proximal attachment was encountered  After testing it it appeared to be the FPL  This was left in place  It appeared healthy otherwise  9L of NSS was irrigated through the wound  The first 3L had bacitracin  The ends of the incision were approximated with nylon suture  One superficial black wound vac sponge was placed in the remaining wound  It measured approximately 12 x 6 x 3 cm  Gauze was placed over the sutures to protect them  The wound vac was attached and appeared to be working well        The patient was awakened and transferred to a hospital bed and then to the recovery room in stable condition  I attest that I was present and performed this procedure        Patient Disposition:  extubated and stable    SIGNATURE: Sonido Zavala,   DATE: July 11, 2018  TIME: 6:19 PM

## 2018-07-11 NOTE — PROGRESS NOTES
Progress Note - Orthopedics   Monae Baker 65 y o  female MRN: [id number]  Unit/Bed#: OR POOL Encounter: [id number]    Assessment:  S/p I&D forearm abscess 7/1/18, s/p I7D right forearm abscess with excisional debridement of nonviable tissue on 7/7/18, S/p I&D right forearm 7/11/18    Plan:  Pain control prn  Med mgt per SLIM  Pt received 2 units of packed red blood cells today before surgery, previously pt was refusing a blood transfusion  Monitor hemogoblin  Continue IV antibiotics as per ID  Maintain splint       Subjective: PT seen and examined in PACU  She complains of some pan in her right hand  No other complaints  Vitals: Blood pressure 162/75, pulse 71, temperature (!) 96 7 °F (35 9 °C), temperature source Temporal, resp  rate 14, weight 65 3 kg (144 lb), SpO2 94 %  ,There is no height or weight on file to calculate BMI  Intake/Output Summary (Last 24 hours) at 07/11/18 1649  Last data filed at 07/11/18 1616   Gross per 24 hour   Intake             1100 ml   Output                0 ml   Net             1100 ml       Invasive Devices     Peripherally Inserted Central Catheter Line            PICC Line 62/54/24 Left Basilic 1 day          Drain            Closed/Suction Drain Right Other (Comment) Accordion 10 Fr  10 days    Negative Pressure Wound Therapy (V A C ) Arm Right 4 days                Ortho Exam: RUE  -splint C/D/I  -sensation Median, ulnar, radial nerves intact  -brisk cap refill  -ROM of fingers limited  -vac dressing in place      Lab, Imaging and other studies:   I have personally reviewed pertinent lab results    CBC:   Lab Results   Component Value Date    WBC 4 92 07/11/2018    HGB 6 3 (LL) 07/11/2018    HCT 21 3 (L) 07/11/2018    MCV 89 07/11/2018     07/11/2018    MCH 26 3 (L) 07/11/2018    MCHC 29 6 (L) 07/11/2018    RDW 15 2 (H) 07/11/2018    MPV 8 9 07/11/2018    NRBC 0 07/11/2018     CMP:   Lab Results   Component Value Date     07/11/2018     07/11/2018 CO2 32 07/11/2018    ANIONGAP 4 07/11/2018    BUN 11 07/11/2018    CREATININE 0 72 07/11/2018    GLUCOSE 106 07/11/2018    CALCIUM 8 9 07/11/2018    EGFR 91 07/11/2018

## 2018-07-11 NOTE — PROGRESS NOTES
Progress Note - Infectious Disease   Shell Reese 65 y o  female MRN: [id number]  Unit/Bed#: OR [place] Encounter: [id number]      Impression/Recommendations:  1   Right forearm cellulitis and abscess   Patient is status post I&D on , and repeat I&D on    Operative culture grew MRSA   Patient's response on IV antibiotic has been slow   She is slowly improving again  Continue IV vancomycin  Recheck level  Monitor temperature/WBC  Serial forearm exams      2   Right forearm muscle necrosis, noted at surgery   This is secondary to MRSA abscess above   Consider myositis  Will treat with somewhat prolonged IV antibiotic course  Antibiotic plan as in above  Serial exams  Treat x 2 weeks total, another 10 days  Repeat I&D per Orthopedic Service      3   DM, poorly controlled, with hyperglycemia on admission   Hemoglobin A1c is 13 4   I discussed with patient in great detail regarding this  Kathi Earthly control hyperglycemia clearly contributes to development of cellulitis and abscess above   Blood sugar is much better controlled now  Management per Medicine Service       4   Noncompliance        Discussed with patient and family in detail regarding the above plan      Antibiotics:  Vancomycin # 8  POD # 4     Subjective:  Patient's right forearm pain continues to improve  Temperature stays down   No further chills  She is tolerating antibiotic well   No nausea, vomiting or diarrhea   No dizziness or hearing loss  Objective:  Vitals:  HR:  [71-88] 71  Resp:  [13-20] 14  BP: (113-170)/(57-77) 162/75  SpO2:  [91 %-95 %] 94 %  Temp (24hrs), Av 6 °F (36 4 °C), Min:96 3 °F (35 7 °C), Max:98 8 °F (37 1 °C)  Current: Temperature: (!) 96 7 °F (35 9 °C)    Physical Exam:     General: Awake, alert, cooperative, no distress  Neck:  Supple  No mass  No lymphadenopathy  Lungs: Expansion symmetric, no rales, no wheezing, respirations unlabored     Heart:  Regular rate and rhythm, S1 and S2 normal, no murmur  Abdomen: Soft, nondistended, non-tender, bowel sounds active all four quadrants,        no masses, no organomegaly  Extremities: Right forearm wound with VAC in place  No purulence  Improve edema  Improved tenderness  Slowly improving ROM of fingers  Skin:  No rash  Neuro: Moves all extremities  Invasive Devices     Peripherally Inserted Central Catheter Line            PICC Line 80/55/15 Left Basilic 1 day          Drain            Closed/Suction Drain Right Other (Comment) Accordion 10 Fr  10 days          Airway            Supraglottic Airway LMA 3 less than 1 day                Labs studies:   I have personally reviewed pertinent labs  Results from last 7 days  Lab Units 07/11/18  0559 07/09/18  1038 07/06/18  0604   SODIUM mmol/L 141 142 144   POTASSIUM mmol/L 3 8 3 8 3 2*   CHLORIDE mmol/L 105 103 108   CO2 mmol/L 32 34* 30   ANION GAP mmol/L 4 5 6   BUN mg/dL 11 8 3*   CREATININE mg/dL 0 72 0 58* 0 58*   EGFR ml/min/1 73sq m 91 100 100   GLUCOSE RANDOM mg/dL 106 92 51*   CALCIUM mg/dL 8 9 9 1 8 7       Results from last 7 days  Lab Units 07/11/18  0558 07/09/18  1038 07/06/18  0604   WBC Thousand/uL 4 92 6 76 5 82   HEMOGLOBIN g/dL 6 3* 6 9* 8 0*   PLATELETS Thousands/uL 360 408* 415*           Imaging Studies:   I have personally reviewed pertinent imaging study reports and images in PACS  EKG, Pathology, and Other Studies:   I have personally reviewed pertinent reports

## 2018-07-11 NOTE — PROGRESS NOTES
[facility] Internal Medicine Progress Note  Patient: Rober Jacob 65 y o  female   MRN: [id number]  PCP: Alesha Wagner MD  Unit/Bed#: E5 -01 Encounter: [id number]  Date Of Visit: 07/11/18    Assessment:    Principal Problem:    Abscess of right forearm  Active Problems:    Hyponatremia    Right arm cellulitis    Benign essential hypertension    Type 2 diabetes mellitus, uncontrolled ([address] )    Abscess of tendon sheath of forearm, right      Plan:    1 )  Right upper extremity abscess/cellulitis  -MRI reveals soft tissue swelling along the radial aspect of right forearm  -status post I and D and drain placement on 07/01/2018, second I&D on 7/7/18  -plan for OR on 7/9/18 for repeat I&D with possible wound closure v  Vac change  -NPO past midnight, was canceled by patient "I was hungry and I was not going to wait for her procedure so I 8 my food "  I had extensive conversation with patient in the posterior seriousness of her condition and explained that it would not be in her best interest given findings of MRI to further delay her surgery  Patient is tomorrow for a washout tomorrow versus her state as per orthopedics    -c/w Vancomycin for MRSA in wound culture    2 )  Sepsis-present on admission  -with leukocytosis, tachycardia  -now improved     3 )  Diabetes mellitus  -HbA1c 13 4, noncompliant with insulib  -accuchecks [phone]  -patient is Accu-Cheks are waxing and waning  -c/w Lantus 30U and lispro 8U TID     4 )  Hypertension  -continue with current medications, monitor blood pressure     5 )  Hepatitis-C  Established diagnosis, I did explain the importance of screening for hyper cell carcinoma and appropriate treatment      6 )  Bipolar disorder  -continue with home medication  Mood and affect is currently within normal limits, denies suicidal or homicidal ideation      7 )  Chronic opioid dependence  -maintained on methadone     8 ) anemia of chronic disease  Transfusion 2 units today  If hemoglobin drops to less than 7 g/dL postop may consider another 2 units  "Last surgery 500+ cc were lost   "      Patient is Risk stratified as high risk for noncardiac surgery she does Understands risks and benefits associated with procedure and willing to proceed forward  This was discussed with surgical team as well  Disposition: Plan for washout Today may need further antibiotic course awaiting for ID to recommend final plans in terms of IV antibiotics, given history of IV drug abuse most likely patient will need to stay inpatient for the duration of antibiotic, case discussed with orthopedic surgery in detail and subsequently with a patient  VTE Pharmacologic Prophylaxis:   Pharmacologic: heparin    Patient Centered Rounds: I have performed bedside rounds with nursing staff today  Time Spent for Care: 20 minutes  More than 50% of total time spent on counseling and coordination of care as described above  Current Length of Stay: 13 day(s)    Current Patient Status: Inpatient   Certification Statement: The patient will continue to require additional inpatient hospital stay due to RUE abscess, MRSA in wound    Discharge Plan / Estimated Discharge Date: TBD    Code Status: Level 1 - Full Code      Subjective:   No complaints today  Afebrile and stable overnight  Objective:     Vitals:   Temp (24hrs), Av 7 °F (36 5 °C), Min:96 3 °F (35 7 °C), Max:98 8 °F (37 1 °C)    HR:  [73-88] 74  Resp:  [13-20] 15  BP: (113-170)/(57-77) 170/77  SpO2:  [91 %-95 %] 94 %  There is no height or weight on file to calculate BMI  Input and Output Summary (last 24 hours):        Intake/Output Summary (Last 24 hours) at 18 1220  Last data filed at 07/10/18 1353   Gross per 24 hour   Intake                0 ml   Output              200 ml   Net             -200 ml       Physical Exam:    Constitutional: Patient is oriented to person, place and time, no acute distress  HEENT:  Normocephalic, atraumatic, EOMI, PERRLA, no scleral icterus, no pallor, moist oral mucosa  Neck:  Supple, no masses, no thyromegaly, no bruits Normal range of motion  Lymph nodes:  No lymphadenopathy  Cardiovascular: Normal S1S2, RRR, No murmurs/rubs/gallops appreciated  Pulmonary: Clear to auscultation bilaterally, No rhonchi/rales/wheezing appreciated  Abdominal: Soft, Bowel sounds present, Non-tender, Non-distended, No rebound/guarding, no hepatomegaly   Musculoskeletal: No tenderness/abnormality   Extremities:  No cyanosis, clubbing or edema  Peripheral pulses palpable and equal bilaterally, right upper extremity with a cast  Neurological: Cranial nerves II-XII grossly intact, sensation intact, otherwise no focal neurological symptoms  Skin: RUE in dressing, wound vac in place    Additional Data:     Labs:      Results from last 7 days  Lab Units 07/11/18  0558   WBC Thousand/uL 4 92   HEMOGLOBIN g/dL 6 3*   HEMATOCRIT % 21 3*   PLATELETS Thousands/uL 360   NEUTROS PCT % 60   LYMPHS PCT % 30   MONOS PCT % 7   EOS PCT % 2       Results from last 7 days  Lab Units 07/11/18  0559   SODIUM mmol/L 141   POTASSIUM mmol/L 3 8   CHLORIDE mmol/L 105   CO2 mmol/L 32   BUN mg/dL 11   CREATININE mg/dL 0 72   CALCIUM mg/dL 8 9   GLUCOSE RANDOM mg/dL 106            I Have Reviewed All Lab Data Listed Above        Recent Cultures (last 7 days):           Last 24 Hours Medication List:     Current Facility-Administered Medications:  acetaminophen 650 mg Oral Q6H PRN Becca Pila, PA-C    calcium carbonate 1,000 mg Oral Daily PRN Becca Pila, PA-C    clonazePAM 1 mg Oral TID Becca Pila, PA-C    gabapentin 600 mg Oral TID Jc Julio MD    HYDROmorphone 0 5 mg Intravenous Q6H PRN SKY Mcginnis    insulin glargine 30 Units Subcutaneous QAM Jc Julio MD    insulin lispro 1-5 Units Subcutaneous 4x Daily (AC & HS) Fredis Rush MD    insulin lispro 8 Units Subcutaneous TID AC Jc Julio MD    lamoTRIgine 150 mg Oral Daily Manav Swartz PA-C    lisinopril 20 mg Oral Daily Sandra Griffith MD    methadone 15 mg Oral Daily Isabela Chahal MD    nystatin  Topical TID SKY Blackwood    ondansetron 4 mg Intravenous Q6H PRN Manav Swartz PA-C    ondansetron 4 mg Oral Q6H PRN SKY Gamez    rOPINIRole 0 5 mg Oral TID Manav Swartz PA-C    sertraline 50 mg Oral HS Manav Swartz PA-C    traMADol 50 mg Oral Q6H PRN Isabela Chahal MD    vancomycin 17 5 mg/kg Intravenous Q12H Patrick Smith MD Last Rate: 1,250 mg (07/11/18 1111)        Today, Patient Was Seen By: Reza Medina MD

## 2018-07-11 NOTE — PROGRESS NOTES
I spoke with the patient prior to surgery in preop holding  She did not want anyone called after surgery

## 2018-07-12 ENCOUNTER — APPOINTMENT (INPATIENT)
Dept: RADIOLOGY | Facility: HOSPITAL | Age: 63
DRG: 854 | End: 2018-07-12
Payer: COMMERCIAL

## 2018-07-12 PROBLEM — D64.9 ANEMIA: Status: ACTIVE | Noted: 2018-07-12

## 2018-07-12 LAB
ABO GROUP BLD BPU: NORMAL
ANION GAP SERPL CALCULATED.3IONS-SCNC: 7 MMOL/L (ref 4–13)
BASOPHILS # BLD AUTO: 0.03 THOUSANDS/ΜL (ref 0–0.1)
BASOPHILS NFR BLD AUTO: 1 % (ref 0–1)
BPU ID: NORMAL
BUN SERPL-MCNC: 9 MG/DL (ref 5–25)
CALCIUM SERPL-MCNC: 9.1 MG/DL (ref 8.3–10.1)
CHLORIDE SERPL-SCNC: 104 MMOL/L (ref 100–108)
CO2 SERPL-SCNC: 29 MMOL/L (ref 21–32)
CREAT SERPL-MCNC: 0.53 MG/DL (ref 0.6–1.3)
EOSINOPHIL # BLD AUTO: 0.12 THOUSAND/ΜL (ref 0–0.61)
EOSINOPHIL NFR BLD AUTO: 2 % (ref 0–6)
ERYTHROCYTE [DISTWIDTH] IN BLOOD BY AUTOMATED COUNT: 15.5 % (ref 11.6–15.1)
GFR SERPL CREATININE-BSD FRML MDRD: 103 ML/MIN/1.73SQ M
GLUCOSE SERPL-MCNC: 100 MG/DL (ref 65–140)
GLUCOSE SERPL-MCNC: 116 MG/DL (ref 65–140)
GLUCOSE SERPL-MCNC: 124 MG/DL (ref 65–140)
GLUCOSE SERPL-MCNC: 86 MG/DL (ref 65–140)
GLUCOSE SERPL-MCNC: 92 MG/DL (ref 65–140)
HCT VFR BLD AUTO: 27.1 % (ref 34.8–46.1)
HGB BLD-MCNC: 8.4 G/DL (ref 11.5–15.4)
LYMPHOCYTES # BLD AUTO: 1.65 THOUSANDS/ΜL (ref 0.6–4.47)
LYMPHOCYTES NFR BLD AUTO: 30 % (ref 14–44)
MCH RBC QN AUTO: 27.4 PG (ref 26.8–34.3)
MCHC RBC AUTO-ENTMCNC: 31 G/DL (ref 31.4–37.4)
MCV RBC AUTO: 88 FL (ref 82–98)
MONOCYTES # BLD AUTO: 0.3 THOUSAND/ΜL (ref 0.17–1.22)
MONOCYTES NFR BLD AUTO: 5 % (ref 4–12)
NEUTROPHILS # BLD AUTO: 3.45 THOUSANDS/ΜL (ref 1.85–7.62)
NEUTS SEG NFR BLD AUTO: 62 % (ref 43–75)
NRBC BLD AUTO-RTO: 0 /100 WBCS
PLATELET # BLD AUTO: 316 THOUSANDS/UL (ref 149–390)
PMV BLD AUTO: 8.9 FL (ref 8.9–12.7)
POTASSIUM SERPL-SCNC: 4.1 MMOL/L (ref 3.5–5.3)
RBC # BLD AUTO: 3.07 MILLION/UL (ref 3.81–5.12)
SODIUM SERPL-SCNC: 140 MMOL/L (ref 136–145)
UNIT DISPENSE STATUS: NORMAL
UNIT PRODUCT CODE: NORMAL
UNIT RH: NORMAL
VANCOMYCIN TROUGH SERPL-MCNC: 15.5 UG/ML (ref 10–20)
WBC # BLD AUTO: 5.55 THOUSAND/UL (ref 4.31–10.16)

## 2018-07-12 PROCEDURE — 99232 SBSQ HOSP IP/OBS MODERATE 35: CPT | Performed by: FAMILY MEDICINE

## 2018-07-12 PROCEDURE — 85025 COMPLETE CBC W/AUTO DIFF WBC: CPT | Performed by: PHYSICIAN ASSISTANT

## 2018-07-12 PROCEDURE — 73060 X-RAY EXAM OF HUMERUS: CPT

## 2018-07-12 PROCEDURE — 73090 X-RAY EXAM OF FOREARM: CPT

## 2018-07-12 PROCEDURE — 99232 SBSQ HOSP IP/OBS MODERATE 35: CPT | Performed by: INTERNAL MEDICINE

## 2018-07-12 PROCEDURE — 99024 POSTOP FOLLOW-UP VISIT: CPT | Performed by: ORTHOPAEDIC SURGERY

## 2018-07-12 PROCEDURE — 80048 BASIC METABOLIC PNL TOTAL CA: CPT | Performed by: PHYSICIAN ASSISTANT

## 2018-07-12 PROCEDURE — 82948 REAGENT STRIP/BLOOD GLUCOSE: CPT

## 2018-07-12 PROCEDURE — 80202 ASSAY OF VANCOMYCIN: CPT | Performed by: INTERNAL MEDICINE

## 2018-07-12 RX ADMIN — CLONAZEPAM 1 MG: 1 TABLET ORAL at 21:27

## 2018-07-12 RX ADMIN — VANCOMYCIN HYDROCHLORIDE 1250 MG: 5 INJECTION, POWDER, LYOPHILIZED, FOR SOLUTION INTRAVENOUS at 08:41

## 2018-07-12 RX ADMIN — ROPINIROLE 0.5 MG: 1 TABLET, FILM COATED ORAL at 16:42

## 2018-07-12 RX ADMIN — CLONAZEPAM 1 MG: 1 TABLET ORAL at 16:42

## 2018-07-12 RX ADMIN — GABAPENTIN 600 MG: 300 CAPSULE ORAL at 16:42

## 2018-07-12 RX ADMIN — SODIUM CHLORIDE 125 ML/HR: 0.9 INJECTION, SOLUTION INTRAVENOUS at 08:48

## 2018-07-12 RX ADMIN — VANCOMYCIN HYDROCHLORIDE 1250 MG: 5 INJECTION, POWDER, LYOPHILIZED, FOR SOLUTION INTRAVENOUS at 19:59

## 2018-07-12 RX ADMIN — SERTRALINE HYDROCHLORIDE 50 MG: 50 TABLET ORAL at 21:28

## 2018-07-12 RX ADMIN — GABAPENTIN 600 MG: 300 CAPSULE ORAL at 08:29

## 2018-07-12 RX ADMIN — NYSTATIN: 100000 POWDER TOPICAL at 15:42

## 2018-07-12 RX ADMIN — HYDROMORPHONE HYDROCHLORIDE 0.5 MG: 1 INJECTION, SOLUTION INTRAMUSCULAR; INTRAVENOUS; SUBCUTANEOUS at 16:41

## 2018-07-12 RX ADMIN — HYDROMORPHONE HYDROCHLORIDE 0.5 MG: 1 INJECTION, SOLUTION INTRAMUSCULAR; INTRAVENOUS; SUBCUTANEOUS at 01:54

## 2018-07-12 RX ADMIN — ROPINIROLE 0.5 MG: 1 TABLET, FILM COATED ORAL at 08:26

## 2018-07-12 RX ADMIN — METHADONE HYDROCHLORIDE 15 MG: 10 TABLET ORAL at 08:26

## 2018-07-12 RX ADMIN — LAMOTRIGINE 150 MG: 100 TABLET ORAL at 08:27

## 2018-07-12 RX ADMIN — HYDROMORPHONE HYDROCHLORIDE 0.5 MG: 1 INJECTION, SOLUTION INTRAMUSCULAR; INTRAVENOUS; SUBCUTANEOUS at 08:37

## 2018-07-12 RX ADMIN — INSULIN GLARGINE 30 UNITS: 100 INJECTION, SOLUTION SUBCUTANEOUS at 08:29

## 2018-07-12 RX ADMIN — LISINOPRIL 20 MG: 20 TABLET ORAL at 08:28

## 2018-07-12 RX ADMIN — CLONAZEPAM 1 MG: 1 TABLET ORAL at 08:29

## 2018-07-12 RX ADMIN — HYDROMORPHONE HYDROCHLORIDE 0.5 MG: 1 INJECTION, SOLUTION INTRAMUSCULAR; INTRAVENOUS; SUBCUTANEOUS at 23:28

## 2018-07-12 RX ADMIN — ROPINIROLE 0.5 MG: 1 TABLET, FILM COATED ORAL at 21:26

## 2018-07-12 RX ADMIN — TRAMADOL HYDROCHLORIDE 50 MG: 50 TABLET, FILM COATED ORAL at 11:58

## 2018-07-12 RX ADMIN — NYSTATIN: 100000 POWDER TOPICAL at 21:28

## 2018-07-12 RX ADMIN — GABAPENTIN 600 MG: 300 CAPSULE ORAL at 21:28

## 2018-07-12 RX ADMIN — NYSTATIN: 100000 POWDER TOPICAL at 08:38

## 2018-07-12 NOTE — PROGRESS NOTES
[facility] Internal Medicine Progress Note  Patient: Yoseph Peguero 65 y o  female   MRN: [id number]  PCP: Kasey Yadav MD  Unit/Bed#: E5 -01 Encounter: [id number]  Date Of Visit: 07/12/18    Assessment:    Principal Problem:    Abscess of right forearm  Active Problems:    Hyponatremia    Right arm cellulitis    Benign essential hypertension    Type 2 diabetes mellitus, uncontrolled ([address] )    Abscess of tendon sheath of forearm, right      Plan:    1  Abscess of the right forearm/cellulitis with possible right forearm muscle necrosis  MRI reveals soft tissue swelling along the radial aspect of the right forearm-status post I&D on 07/01 and 7/7  Status post I&D yesterday by Orthopedics  Continue with the antibiotics  Wound culture growing MRSA-  Antibiotics per Infectious Disease  2  Sepsis-present at the time of admission-appears to be improving  3  Diabetes mellitus type 2-blood sugar acceptable currently  Hemoglobin A1c 13 4 at the time of admission  4  Anemia-status post packed RBCs yesterday  Hemoglobin improving at 8 4 today  The we secondary to acute blood loss anemia  5  Hypertension-monitor blood pressure  6  Hepatitis-C-follow-up with PCP/Gastroenterology as an outpatient  Trend LFTs  7  Bipolar disorder-continue with home medication  8  Chronic opioid dependence-maintained on methadone       VTE Pharmacologic Prophylaxis:   Pharmacologic: on hold due to anemia  Mechanical VTE Prophylaxis in Place: Yes    Patient Centered Rounds: I have performed bedside rounds with nursing staff today  Discussions with Specialists or Other Care Team Provider:     Education and Discussions with Family / Patient: daughter in the room    Time Spent for Care: 30 minutes  More than 50% of total time spent on counseling and coordination of care as described above      Current Length of Stay: 14 day(s)    Current Patient Status: Inpatient   Certification Statement: The patient will continue to require additional inpatient hospital stay due to Continued need for IV antibiotics    Discharge Plan / Estimated Discharge Date:     Code Status: Level 1 - Full Code      Subjective:   Patient seen and examined  Nursing notified that patient had a fall earlier  Patient was trying to go to the bathroom and he fell on his right upper extremity  Complaining of pain in the arm and forearm on the right side  Denies hitting her head  No neck pain or pain in the pelvis  Patient reported that her daughter was able to help her off of the floor and to the bed  Objective:     Vitals:   Temp (24hrs), Av 6 °F (37 °C), Min:98 °F (36 7 °C), Max:99 3 °F (37 4 °C)    HR:  [67-92] 67  Resp:  [13-18] 18  BP: (140-180)/(58-87) 162/75  SpO2:  [91 %-98 %] 93 %  Body mass index is 23 24 kg/m²  Input and Output Summary (last 24 hours): Intake/Output Summary (Last 24 hours) at 18 1627  Last data filed at 18 1759   Gross per 24 hour   Intake              300 ml   Output              100 ml   Net              200 ml       Physical Exam:     Physical Exam   Constitutional: She is oriented to person, place, and time  She appears well-developed  HENT:   Head: Normocephalic and atraumatic  Eyes: Pupils are equal, round, and reactive to light  Neck: Neck supple  No tenderness on palpation   Cardiovascular: Normal rate and regular rhythm  No murmur heard  Pulmonary/Chest: Effort normal and breath sounds normal    Abdominal: Soft  Bowel sounds are normal  She exhibits no distension  Musculoskeletal: Normal range of motion  Right upper extremity in splint/VAC dressing   Neurological: She is alert and oriented to person, place, and time  No cranial nerve deficit  No gross neurological deficits   Skin: Skin is warm and dry             Additional Data:     Labs:      Results from last 7 days  Lab Units 18  0616   WBC Thousand/uL 5 55   HEMOGLOBIN g/dL 8 4*   HEMATOCRIT % 27 1*   PLATELETS Thousands/uL 316   NEUTROS PCT % 62   LYMPHS PCT % 30   MONOS PCT % 5   EOS PCT % 2       Results from last 7 days  Lab Units 07/12/18  0616   SODIUM mmol/L 140   POTASSIUM mmol/L 4 1   CHLORIDE mmol/L 104   CO2 mmol/L 29   BUN mg/dL 9   CREATININE mg/dL 0 53*   CALCIUM mg/dL 9 1   GLUCOSE RANDOM mg/dL 116           * I Have Reviewed All Lab Data Listed Above  * Additional Pertinent Lab Tests Reviewed:  [address] Admission Reviewed    Imaging:    Imaging Reports Reviewed Today Include:   Imaging Personally Reviewed by Myself Includes:     Recent Cultures (last 7 days):           Last 24 Hours Medication List:     Current Facility-Administered Medications:  acetaminophen 650 mg Oral Q6H PRN Prince Farris PA-C    calcium carbonate 1,000 mg Oral Daily PRN Prince Farris PA-C    clonazePAM 1 mg Oral TID Prince Farris PA-C    gabapentin 600 mg Oral TID Uriah Chandra MD    HYDROmorphone 0 5 mg Intravenous Q6H PRN SKY Estevez    insulin glargine 30 Units Subcutaneous QAM Uriah Chandra MD    insulin lispro 1-5 Units Subcutaneous 4x Daily (AC & HS) Nancy Stover MD    insulin lispro 8 Units Subcutaneous TID AC Uriah Chandra MD    lamoTRIgine 150 mg Oral Daily Prince Farris PA-C    lisinopril 20 mg Oral Daily Uriah Chandra MD    methadone 15 mg Oral Daily Nancy Stover MD    nystatin  Topical TID SKY Beasley    ondansetron 4 mg Intravenous Q6H PRN Prince Farris PA-C    ondansetron 4 mg Oral Q6H PRN SKY Estevez    rOPINIRole 0 5 mg Oral TID Prince Farris PA-C    sertraline 50 mg Oral HS Prince Farris PA-C    sodium chloride 125 mL/hr Intravenous Continuous Jann Ying PA-C Last Rate: 125 mL/hr (07/12/18 0848)   traMADol 50 mg Oral Q6H PRN Nancy Stover MD    vancomycin 17 5 mg/kg Intravenous Q12H Vandana Red MD Last Rate: 1,250 mg (07/12/18 0841)        Today, Patient Was Seen By: Mahnaz Galaviz MD    ** Please Note: This note has been constructed using a voice recognition system   **

## 2018-07-12 NOTE — PROGRESS NOTES
Progress Note - Orthopedics   Bakari Fernandes 65 y o  female MRN: [id number]  Unit/Bed#: E5 -01 Encounter: [id number]    Assessment:  64 yo female s/p I&D forearm abscess 7/1/18, s/p I&D right forearm with excisional debridement of nonviable tissue on 7/7/18, s/p I&D right forearm on 7/11/18    Plan:  Pain control prn  Continue IV antibiotics as per ID  Maintain splint  Med mgt per SLIM  Monitor hgb- pt received 2 units of PRBC before surgery yesterday, hgb today 8 4  Ok to D/c from ortho standpoint  Pt will need home health care with wound vac changes M/W/F  Continue IV antibiotics per ID  F/u with Dr Lucillie Cabot     Subjective: PT seen and examined today  She complains of pain in her right arm  She is concerned that  she can not have surgery today due to her pain level  No other complaints      Vitals: Blood pressure 166/82, pulse 76, temperature 98 °F (36 7 °C), temperature source Temporal, resp  rate 18, weight 65 3 kg (144 lb), SpO2 91 %  ,There is no height or weight on file to calculate BMI  Intake/Output Summary (Last 24 hours) at 07/12/18 0749  Last data filed at 07/11/18 1759   Gross per 24 hour   Intake             1400 ml   Output              100 ml   Net             1300 ml       Invasive Devices     Peripherally Inserted Central Catheter Line            PICC Line 83/95/56 Left Basilic 1 day          Drain            Closed/Suction Drain Right Other (Comment) Accordion 10 Fr  10 days    Negative Pressure Wound Therapy (V A C ) Arm Right 4 days                Ortho Exam: RUE  -splint C/D/I  -sensation median/ulnar/radial nerves intact  -brisk cap refill  -ROM fingers limited     Lab, Imaging and other studies:   I have personally reviewed pertinent lab results    CBC:   Lab Results   Component Value Date    WBC 5 55 07/12/2018    HGB 8 4 (L) 07/12/2018    HCT 27 1 (L) 07/12/2018    MCV 88 07/12/2018     07/12/2018    MCH 27 4 07/12/2018    MCHC 31 0 (L) 07/12/2018    RDW 15 5 (H) 07/12/2018    MPV 8 9 07/12/2018    NRBC 0 07/12/2018     CMP:   Lab Results   Component Value Date     07/12/2018     07/12/2018    CO2 29 07/12/2018    ANIONGAP 7 07/12/2018    BUN 9 07/12/2018    CREATININE 0 53 (L) 07/12/2018    GLUCOSE 116 07/12/2018    CALCIUM 9 1 07/12/2018    EGFR 103 07/12/2018

## 2018-07-13 LAB
ALBUMIN SERPL BCP-MCNC: 2.3 G/DL (ref 3.5–5)
ALP SERPL-CCNC: 82 U/L (ref 46–116)
ALT SERPL W P-5'-P-CCNC: 8 U/L (ref 12–78)
ANION GAP SERPL CALCULATED.3IONS-SCNC: 7 MMOL/L (ref 4–13)
AST SERPL W P-5'-P-CCNC: 19 U/L (ref 5–45)
BASOPHILS # BLD AUTO: 0.03 THOUSANDS/ΜL (ref 0–0.1)
BASOPHILS NFR BLD AUTO: 1 % (ref 0–1)
BILIRUB SERPL-MCNC: 0.41 MG/DL (ref 0.2–1)
BUN SERPL-MCNC: 9 MG/DL (ref 5–25)
CALCIUM SERPL-MCNC: 9.1 MG/DL (ref 8.3–10.1)
CHLORIDE SERPL-SCNC: 104 MMOL/L (ref 100–108)
CO2 SERPL-SCNC: 30 MMOL/L (ref 21–32)
CREAT SERPL-MCNC: 0.63 MG/DL (ref 0.6–1.3)
EOSINOPHIL # BLD AUTO: 0.14 THOUSAND/ΜL (ref 0–0.61)
EOSINOPHIL NFR BLD AUTO: 3 % (ref 0–6)
ERYTHROCYTE [DISTWIDTH] IN BLOOD BY AUTOMATED COUNT: 15.6 % (ref 11.6–15.1)
GFR SERPL CREATININE-BSD FRML MDRD: 97 ML/MIN/1.73SQ M
GLUCOSE SERPL-MCNC: 101 MG/DL (ref 65–140)
GLUCOSE SERPL-MCNC: 116 MG/DL (ref 65–140)
GLUCOSE SERPL-MCNC: 124 MG/DL (ref 65–140)
GLUCOSE SERPL-MCNC: 140 MG/DL (ref 65–140)
GLUCOSE SERPL-MCNC: 81 MG/DL (ref 65–140)
HCT VFR BLD AUTO: 28.2 % (ref 34.8–46.1)
HGB BLD-MCNC: 8.7 G/DL (ref 11.5–15.4)
LYMPHOCYTES # BLD AUTO: 1.71 THOUSANDS/ΜL (ref 0.6–4.47)
LYMPHOCYTES NFR BLD AUTO: 35 % (ref 14–44)
MCH RBC QN AUTO: 26.9 PG (ref 26.8–34.3)
MCHC RBC AUTO-ENTMCNC: 30.9 G/DL (ref 31.4–37.4)
MCV RBC AUTO: 87 FL (ref 82–98)
MONOCYTES # BLD AUTO: 0.32 THOUSAND/ΜL (ref 0.17–1.22)
MONOCYTES NFR BLD AUTO: 7 % (ref 4–12)
NEUTROPHILS # BLD AUTO: 2.65 THOUSANDS/ΜL (ref 1.85–7.62)
NEUTS SEG NFR BLD AUTO: 55 % (ref 43–75)
NRBC BLD AUTO-RTO: 0 /100 WBCS
PLATELET # BLD AUTO: 304 THOUSANDS/UL (ref 149–390)
PMV BLD AUTO: 9.1 FL (ref 8.9–12.7)
POTASSIUM SERPL-SCNC: 4 MMOL/L (ref 3.5–5.3)
PROT SERPL-MCNC: 6.9 G/DL (ref 6.4–8.2)
RBC # BLD AUTO: 3.23 MILLION/UL (ref 3.81–5.12)
SODIUM SERPL-SCNC: 141 MMOL/L (ref 136–145)
WBC # BLD AUTO: 4.85 THOUSAND/UL (ref 4.31–10.16)

## 2018-07-13 PROCEDURE — 82948 REAGENT STRIP/BLOOD GLUCOSE: CPT

## 2018-07-13 PROCEDURE — 80053 COMPREHEN METABOLIC PANEL: CPT | Performed by: FAMILY MEDICINE

## 2018-07-13 PROCEDURE — 85025 COMPLETE CBC W/AUTO DIFF WBC: CPT | Performed by: FAMILY MEDICINE

## 2018-07-13 PROCEDURE — 99232 SBSQ HOSP IP/OBS MODERATE 35: CPT | Performed by: INTERNAL MEDICINE

## 2018-07-13 PROCEDURE — 99233 SBSQ HOSP IP/OBS HIGH 50: CPT | Performed by: INTERNAL MEDICINE

## 2018-07-13 PROCEDURE — 99024 POSTOP FOLLOW-UP VISIT: CPT | Performed by: ORTHOPAEDIC SURGERY

## 2018-07-13 RX ORDER — HYDRALAZINE HYDROCHLORIDE 20 MG/ML
10 INJECTION INTRAMUSCULAR; INTRAVENOUS EVERY 6 HOURS PRN
Status: DISCONTINUED | OUTPATIENT
Start: 2018-07-13 | End: 2018-07-21 | Stop reason: HOSPADM

## 2018-07-13 RX ADMIN — GABAPENTIN 600 MG: 300 CAPSULE ORAL at 10:09

## 2018-07-13 RX ADMIN — VANCOMYCIN HYDROCHLORIDE 1250 MG: 5 INJECTION, POWDER, LYOPHILIZED, FOR SOLUTION INTRAVENOUS at 07:52

## 2018-07-13 RX ADMIN — CLONAZEPAM 1 MG: 1 TABLET ORAL at 21:12

## 2018-07-13 RX ADMIN — ROPINIROLE 0.5 MG: 1 TABLET, FILM COATED ORAL at 10:07

## 2018-07-13 RX ADMIN — GABAPENTIN 600 MG: 300 CAPSULE ORAL at 16:17

## 2018-07-13 RX ADMIN — GABAPENTIN 600 MG: 300 CAPSULE ORAL at 21:12

## 2018-07-13 RX ADMIN — LAMOTRIGINE 150 MG: 100 TABLET ORAL at 10:10

## 2018-07-13 RX ADMIN — HYDROMORPHONE HYDROCHLORIDE 0.5 MG: 1 INJECTION, SOLUTION INTRAMUSCULAR; INTRAVENOUS; SUBCUTANEOUS at 06:29

## 2018-07-13 RX ADMIN — SERTRALINE HYDROCHLORIDE 50 MG: 50 TABLET ORAL at 21:12

## 2018-07-13 RX ADMIN — NYSTATIN: 100000 POWDER TOPICAL at 10:12

## 2018-07-13 RX ADMIN — CLONAZEPAM 1 MG: 1 TABLET ORAL at 16:17

## 2018-07-13 RX ADMIN — LISINOPRIL 20 MG: 20 TABLET ORAL at 10:09

## 2018-07-13 RX ADMIN — ROPINIROLE 0.5 MG: 1 TABLET, FILM COATED ORAL at 21:11

## 2018-07-13 RX ADMIN — HYDROMORPHONE HYDROCHLORIDE 0.5 MG: 1 INJECTION, SOLUTION INTRAMUSCULAR; INTRAVENOUS; SUBCUTANEOUS at 16:10

## 2018-07-13 RX ADMIN — CLONAZEPAM 1 MG: 1 TABLET ORAL at 10:10

## 2018-07-13 RX ADMIN — INSULIN GLARGINE 30 UNITS: 100 INJECTION, SOLUTION SUBCUTANEOUS at 10:18

## 2018-07-13 RX ADMIN — NYSTATIN 1 APPLICATION: 100000 POWDER TOPICAL at 21:13

## 2018-07-13 RX ADMIN — ROPINIROLE 0.5 MG: 1 TABLET, FILM COATED ORAL at 16:17

## 2018-07-13 RX ADMIN — NYSTATIN: 100000 POWDER TOPICAL at 16:18

## 2018-07-13 RX ADMIN — VANCOMYCIN HYDROCHLORIDE 1250 MG: 5 INJECTION, POWDER, LYOPHILIZED, FOR SOLUTION INTRAVENOUS at 21:11

## 2018-07-13 RX ADMIN — METHADONE HYDROCHLORIDE 15 MG: 10 TABLET ORAL at 10:08

## 2018-07-13 RX ADMIN — TRAMADOL HYDROCHLORIDE 50 MG: 50 TABLET, FILM COATED ORAL at 10:18

## 2018-07-13 NOTE — PROGRESS NOTES
Progress Note - Infectious Disease   Cindy Lizama 65 y o  female MRN: [id number]  Unit/Bed#: E5 -01 Encounter: [id number]      Impression/Recommendations:  1   Right forearm cellulitis and abscess   Patient is status post I&D   Operative culture grew MRSA   Patient is slowly improving  Continue IV vancomycin    Monitor temperature/WBC  Serial forearm exams      2   Right forearm muscle necrosis, noted at surgery   This is secondary to MRSA abscess above   Consider myositis   Will treat with somewhat prolonged IV antibiotic course  Antibiotic plan as in above  Serial exams  Treat x 2 weeks total, another 8 days  Orthopedics plan for no further I&D noted      3   DM, poorly controlled, with hyperglycemia on admission   Hemoglobin A1c is 13 4   I discussed with patient in great detail regarding this  Lenice  control hyperglycemia clearly contributes to development of cellulitis and abscess above   Blood sugar is much better controlled now  Management per Medicine Service       4   Noncompliance        Discussed with patient and family in detail regarding the above plan      Antibiotics:  Vancomycin # 10  POD # 6     Subjective:  Patient's right forearm pain continues to improve  She is moving her fingers better  Temperature stays down   No further chills  She is tolerating antibiotic well   No nausea, vomiting or diarrhea   No dizziness or hearing loss  Objective:  Vitals:  HR:  [67-69] 69  Resp:  [18-20] 20  BP: (162-180)/(75-82) 180/76  SpO2:  [93 %-97 %] 97 %  Temp (24hrs), Av 8 °F (36 6 °C), Min:97 5 °F (36 4 °C), Max:98 2 °F (36 8 °C)  Current: Temperature: 97 5 °F (36 4 °C)    Physical Exam:     General: Awake, alert, cooperative, no distress  Neck:  Supple  No mass  No lymphadenopathy  Lungs: Expansion symmetric, no rales, no wheezing, respirations unlabored  Heart:  Regular rate and rhythm, S1 and S2 normal, no murmur     Abdomen: Soft, nondistended, non-tender, bowel sounds active all four quadrants,        no masses, no organomegaly  Extremities: Right forearm with VAC in place  No purulence  Improve edema  Improved tenderness  Improved ROM of fingers  Skin:  No rash  Neuro: Moves all extremities  Invasive Devices     Peripherally Inserted Central Catheter Line            PICC Line 02/21/00 Left Basilic 3 days          Drain            Closed/Suction Drain Right Other (Comment) Accordion 10 Fr  12 days    Negative Pressure Wound Therapy (V A C ) Arm Right 6 days                Labs studies:   I have personally reviewed pertinent labs  Results from last 7 days  Lab Units 07/13/18  0638 07/12/18  0616 07/11/18  0559   SODIUM mmol/L 141 140 141   POTASSIUM mmol/L 4 0 4 1 3 8   CHLORIDE mmol/L 104 104 105   CO2 mmol/L 30 29 32   ANION GAP mmol/L 7 7 4   BUN mg/dL 9 9 11   CREATININE mg/dL 0 63 0 53* 0 72   EGFR ml/min/1 73sq m 97 103 91   GLUCOSE RANDOM mg/dL 116 116 106   CALCIUM mg/dL 9 1 9 1 8 9   AST U/L 19  --   --    ALT U/L 8*  --   --    ALK PHOS U/L 82  --   --    TOTAL PROTEIN g/dL 6 9  --   --    BILIRUBIN TOTAL mg/dL 0 41  --   --        Results from last 7 days  Lab Units 07/13/18  0638 07/12/18  0616 07/11/18  0558   WBC Thousand/uL 4 85 5 55 4 92   HEMOGLOBIN g/dL 8 7* 8 4* 6 3*   PLATELETS Thousands/uL 304 316 360           Imaging Studies:   I have personally reviewed pertinent imaging study reports and images in PACS  EKG, Pathology, and Other Studies:   I have personally reviewed pertinent reports

## 2018-07-13 NOTE — PROGRESS NOTES
[facility] Internal Medicine Progress Note  Patient: Bakari Fernandes 65 y o  female   MRN: [id number]  PCP: Andrew Serrano MD  Unit/Bed#: E5 -01 Encounter: [id number]  Date Of Visit: 07/13/18    Assessment:    Principal Problem:    Abscess of right forearm  Active Problems:    Hyponatremia    Right arm cellulitis    Benign essential hypertension    Type 2 diabetes mellitus, uncontrolled ([address] )    Abscess of tendon sheath of forearm, right    Anemia      Plan:    1 )  Right upper extremity abscess/cellulitis  -MRI reveals soft tissue swelling along the radial aspect of right forearm  -status post I and D and drain placement on 07/01/2018, second I&D on 7/9/18  Case discussed with pulse infection disease and orthopedic surgery  As per infectious disease recommendations patient will be here receiving current antibiotic regimen until 07/20/2018       2 )  Sepsis-present on admission  -with leukocytosis, tachycardia  -now improved     3 )  Diabetes mellitus  -HbA1c 13 4, noncompliant with insulib  -accuchecks [phone]  -patient is Accu-Cheks are waxing and waning  -c/w Lantus 30U and lispro 8U TID     4 )  Hypertension  -continue with current medications, monitor blood pressure     5 )  Hepatitis-C  Established diagnosis, I did explain the importance of screening for hyper cell carcinoma and appropriate treatment      6 )  Bipolar disorder  -continue with home medication  Mood and affect is currently within normal limits, denies suicidal or homicidal ideation      7 )  Chronic opioid dependence  -maintained on methadone     8 ) anemia of chronic disease  If hemoglobin drops to less than 7 g/dL postop may consider another 2 units  "Last surgery 500+ cc were lost   "        Disposition:  At this time we plan to continue current IV antibiotics, given history of IV drug abuse most likely patient will need to stay inpatient for the duration of antibiotic, case discussed with orthopedic surgery in detail and subsequently with a patient  Patient require IV vancomycin treatment until 18 will discuss this with case management to see if skilled nursing facility would be available  VTE Pharmacologic Prophylaxis:   Pharmacologic: heparin    Patient Centered Rounds: I have performed bedside rounds with nursing staff today  Time Spent for Care: 20 minutes  More than 50% of total time spent on counseling and coordination of care as described above  Current Length of Stay: 15 day(s)    Current Patient Status: Inpatient   Certification Statement: The patient will continue to require additional inpatient hospital stay due to RUE abscess, MRSA in wound    Discharge Plan / Estimated Discharge Date: TBD    Code Status: Level 1 - Full Code      Subjective:   No complaints today  Afebrile and stable overnight  Objective:     Vitals:   Temp (24hrs), Av 8 °F (36 6 °C), Min:97 5 °F (36 4 °C), Max:98 2 °F (36 8 °C)    HR:  [67-69] 69  Resp:  [18-20] 20  BP: (162-180)/(75-82) 180/76  SpO2:  [93 %-97 %] 97 %  Body mass index is 23 24 kg/m²  Input and Output Summary (last 24 hours):     No intake or output data in the 24 hours ending 18 1442    Physical Exam:    Constitutional: Patient is oriented to person, place and time, no acute distress  HEENT:  Normocephalic, atraumatic, EOMI, PERRLA, no scleral icterus, no pallor, moist oral mucosa  Neck:  Supple, no masses, no thyromegaly, no bruits Normal range of motion  Lymph nodes:  No lymphadenopathy  Cardiovascular: Normal S1S2, RRR, No murmurs/rubs/gallops appreciated  Pulmonary: Clear to auscultation bilaterally, No rhonchi/rales/wheezing appreciated  Abdominal: Soft, Bowel sounds present, Non-tender, Non-distended, No rebound/guarding, no hepatomegaly   Musculoskeletal: No tenderness/abnormality   Extremities:  No cyanosis, clubbing or edema   Peripheral pulses palpable and equal bilaterally, right upper extremity with a cast  Neurological: Cranial nerves II-XII grossly intact, sensation intact, otherwise no focal neurological symptoms  Skin: RUE in dressing, wound vac in place    Additional Data:     Labs:      Results from last 7 days  Lab Units 07/13/18  0638   WBC Thousand/uL 4 85   HEMOGLOBIN g/dL 8 7*   HEMATOCRIT % 28 2*   PLATELETS Thousands/uL 304   NEUTROS PCT % 55   LYMPHS PCT % 35   MONOS PCT % 7   EOS PCT % 3       Results from last 7 days  Lab Units 07/13/18  0638   SODIUM mmol/L 141   POTASSIUM mmol/L 4 0   CHLORIDE mmol/L 104   CO2 mmol/L 30   BUN mg/dL 9   CREATININE mg/dL 0 63   CALCIUM mg/dL 9 1   TOTAL PROTEIN g/dL 6 9   BILIRUBIN TOTAL mg/dL 0 41   ALK PHOS U/L 82   ALT U/L 8*   AST U/L 19   GLUCOSE RANDOM mg/dL 116            I Have Reviewed All Lab Data Listed Above        Recent Cultures (last 7 days):           Last 24 Hours Medication List:     Current Facility-Administered Medications:  acetaminophen 650 mg Oral Q6H PRN Jeff Heart, PA-C    calcium carbonate 1,000 mg Oral Daily PRN Jeff Heart, PA-C    clonazePAM 1 mg Oral TID Jeff Heart, PA-C    gabapentin 600 mg Oral TID Jenae Silverio MD    hydrALAZINE 10 mg Intravenous Q6H PRN Marisol Nelson MD    HYDROmorphone 0 5 mg Intravenous Q6H PRN SKY Qureshi    insulin glargine 30 Units Subcutaneous QAM Jenae Silverio MD    insulin lispro 1-5 Units Subcutaneous 4x Daily (AC & HS) Donnie Coyne MD    insulin lispro 8 Units Subcutaneous TID AC Jenae Silverio MD    lamoTRIgine 150 mg Oral Daily Jeff Heart, TABITHA    lisinopril 20 mg Oral Daily Jenae Silverio MD    methadone 15 mg Oral Daily Donnie Coyne MD    nystatin  Topical TID SKY Garces    ondansetron 4 mg Intravenous Q6H PRN Jeff Heart, TABITHA    ondansetron 4 mg Oral Q6H PRN SKY Qureshi    rOPINIRole 0 5 mg Oral TID Jeff Heart, PA-C    sertraline 50 mg Oral HS Jeff Heart, PA-C    sodium chloride 125 mL/hr Intravenous Continuous [place] Park[place] Verma PA-C Last Rate: Stopped (07/12/18 1810) traMADol 50 mg Oral Q6H PRN Zenaida Krishnamurthy MD    vancomycin 17 5 mg/kg Intravenous Q12H Shandra Rodriguez MD Last Rate: 1,250 mg (07/13/18 1162)        Today, Patient Was Seen By: Helena Burton MD

## 2018-07-13 NOTE — SOCIAL WORK
Call received from [facility] stating that pt's wound vac will need to be changed MWF  Pt is still currently on IV Vanco and will be needing about 9 more days of this and will plan for discharge once completed  Pt unable to go home with pic as pt has a hx of DA  Will continue to follow

## 2018-07-13 NOTE — PROGRESS NOTES
Progress Note - Orthopedics   Monae Baker 65 y o  female MRN: [id number]  Unit/Bed#: E5 -01 Encounter: [id number]    Assessment:  66 yo female s/p I&D forearm abscess 7/1/18, s/p I&D right forearm with excisional debridement of nonviable tissue on 7/7/18, s/p I&D right forearm on 7/11/18     Plan:  No new fractures  Do not think any further I&D needed at this time  Recommend wound care for wound vac changes Q Monday, Wednesday, Friday  F/u with hand specialist- Dr Anupam Lezama as outpatient  Pain control prn  abx per ID  Maintain splint for now, may remove for wound vac changes and then replaced  Med mgt per SLIM    Subjective:  Patient seen examined this morning  She had a fall last night  She states she had x-rays  At this time she admits right forearm pain  She has no other complaints today  Vitals: Blood pressure 158/80, pulse 91, temperature 98 4 °F (36 9 °C), temperature source Temporal, resp  rate 18, height 5' 6" (1 676 m), weight 65 3 kg (144 lb), SpO2 (!) 88 %  ,Body mass index is 23 24 kg/m²      No intake or output data in the 24 hours ending 07/13/18 1642    Invasive Devices     Peripherally Inserted Central Catheter Line            PICC Line 49/36/88 Left Basilic 3 days          Drain            Closed/Suction Drain Right Other (Comment) Accordion 10 Fr  12 days    Negative Pressure Wound Therapy (V A C ) Arm Right 6 days                Ortho Exam:   RUE:  Drsg: C/D/I, wound vac intact, sensation grossly intact radial, ulnar, median nerves, limited flexion extension of fingers and thumb secondary to pain, brisk capillary refill    Lab, Imaging and other studies:   CBC:   Lab Results   Component Value Date    WBC 4 85 07/13/2018    HGB 8 7 (L) 07/13/2018    HCT 28 2 (L) 07/13/2018    MCV 87 07/13/2018     07/13/2018    MCH 26 9 07/13/2018    MCHC 30 9 (L) 07/13/2018    RDW 15 6 (H) 07/13/2018    MPV 9 1 07/13/2018    NRBC 0 07/13/2018     CMP:   Lab Results   Component Value Date  07/13/2018     07/13/2018    CO2 30 07/13/2018    ANIONGAP 7 07/13/2018    BUN 9 07/13/2018    CREATININE 0 63 07/13/2018    GLUCOSE 116 07/13/2018    CALCIUM 9 1 07/13/2018    AST 19 07/13/2018    ALT 8 (L) 07/13/2018    ALKPHOS 82 07/13/2018    PROT 6 9 07/13/2018    BILITOT 0 41 07/13/2018    EGFR 97 07/13/2018         x-ray right humerus:  No acute osseous abnormality  X-ray right forearm:  No acute osseous abnormality

## 2018-07-13 NOTE — CASE MANAGEMENT
Continued Stay Review    Date/POD#:    7/13/2018    POD  #   2    Vital Signs: BP (!) 180/76 (BP Location: Left arm)   Pulse 69   Temp 97 5 °F (36 4 °C) (Temporal)   Resp 20   Ht 5' 6" (1 676 m)   Wt 65 3 kg (144 lb)   SpO2 97%   BMI 23 24 kg/m²     Medication:   Scheduled Meds:   Current Facility-Administered Medications:  acetaminophen 650 mg Oral Q6H PRN Kristopher Foster PA-C    calcium carbonate 1,000 mg Oral Daily PRN Kristopher Foster PA-C    clonazePAM 1 mg Oral TID Kristopher Foster PA-C    gabapentin 600 mg Oral TID Damari Burnett MD    hydrALAZINE 10 mg Intravenous Q6H PRN Cristiano Aguilar MD    HYDROmorphone 0 5 mg Intravenous Q6H PRN SKY Elaine    insulin glargine 30 Units Subcutaneous QAM Damari Burnett MD    insulin lispro 1-5 Units Subcutaneous 4x Daily (AC & HS) Vladimir Sotelo MD    insulin lispro 8 Units Subcutaneous TID AC Damari Burnett MD    lamoTRIgine 150 mg Oral Daily Kristopher Foster PA-C    lisinopril 20 mg Oral Daily Damari Burnett MD    methadone 15 mg Oral Daily Vladimir Sotelo MD    nystatin  Topical TID SKY Dickey    ondansetron 4 mg Intravenous Q6H PRN Kristopher Foster PA-C    ondansetron 4 mg Oral Q6H PRN SKY Elaine    rOPINIRole 0 5 mg Oral TID Kristopher Foster PA-C    sertraline 50 mg Oral HS Kristopher Foster PA-C    sodium chloride 125 mL/hr Intravenous Continuous Stefania Lira PA-C Last Rate: Stopped (07/12/18 1810)   traMADol 50 mg Oral Q6H PRN Vladimir Sotelo MD    vancomycin 17 5 mg/kg Intravenous Q12H Miladis Hurtado MD Last Rate: 1,250 mg (07/13/18 0752)     Continuous Infusions:   sodium chloride 125 mL/hr Last Rate: Stopped (07/12/18 1810)     PRN Meds:   acetaminophen    calcium carbonate    hydrALAZINE    HYDROmorphone    ondansetron    ondansetron    traMADol    Abnormal Labs/Diagnostic Results:   H/H    8 7/28 2  RBC    3 23  Albumin   2 3    Age/Sex: 65 y o  female     Assessment/Plan: 66 yo female s/p I&D forearm abscess 7/1/18, s/p I&D right forearm with excisional debridement of nonviable tissue on 7/7/18, s/p I&D right forearm on 7/11/18     Plan:  Do not think any further I&D needed at this time  Recommend wound care for wound vac changes Q Monday, Wednesday, Friday  F/u with hand specialist- Dr Ignacio Liao as outpatient  Pain control prn  abx per ID  Maintain splint for now, may remove for wound vac changes and then replaced  Med mgt per SLIM  Received PRBCs yesterday, monitor hgb    Discharge Plan:    Home    Thank you,  Saulo Aqq  [facility] Utilization Review Department  Phone: [phone]; Fax [phone]  ATTENTION: The Network Utilization Review Department is now centralized for our 9 Facilities  Make a note that we have a new phone and fax numbers for our Department  Please call with any questions or concerns to [phone] and carefully follow the prompts so that you are directed to the right person  All voicemails are confidential  Fax any determinations, approvals, denials, and requests for initial or continue stay review clinical to [phone]  Due to HIGH CALL volume, it would be easier if you could please send faxed requests to expedite your requests and in part, help us provide discharge notifications faster

## 2018-07-13 NOTE — CONSULTS
Consult Note - Wound   Alysia Ivy 65 y o  female MRN: [id number]  Unit/Bed#: E5 -01 Encounter: [id number]      History and Present Illness:  65year old female presented to the hospital with right arm cellulitis status post a fall  Patient has history of uncontrolled diabetes  Assessment Findings:   Right arm is erythematous and tender  Mild (resolving) edema per nursing team   VAC dressing intact draining scant serosanguinous drainage  Patient pre-medicated for dressing change by primary RN, Jamshid Talavera  VAC dressing saturated with saline and removed  Open aspect of wound measures 12 x 4 5 x 2 with visible tendon  Tissue around tendon is beefy red with granulation  No purulent drainage noted  Proximally there is an incision approximated with sutures that extends 9cm toward 12 o'clock  Distally there is an incision approximated with sutures that extends 2cm toward 6 o'clock  Periwound is fragile, erythematous and very tender  Wound irrigated with saline  3m no sting skin barrier applied to periwound  Adaptic placed in wound bed and covered with 1 piece of black sponge  VAC drape and trac pad applied  Suction with no leak at 125 mmHG attained  Patient tolerated the procedure fair--crying at times due to pain, held primary RNs hand for entire procedure  Splint and ACE wrap applied per orthopedics  Right hand is edematous and stiff--patient has little movement of right fingers  Plan:   1  Elevate right arm on pillows  2  VAC dressing change every Monday, Wednesday, and Friday  Negative Pressure Wound Therapy (V A C ) Arm Right (Active)   Unit Type Ulta 7/13/2018  4:00 PM   Black foam- # applied 1 7/13/2018  4:00 PM   White foam- # applied 0 7/13/2018  4:00 PM   Nonadherent (Adaptic)- # applied 1 7/13/2018  4:00 PM   Other- # applied 0 7/13/2018  4:00 PM   Cycle Continuous; On 7/13/2018  4:00 PM   Target Pressure (mmHg) 125 7/13/2018  4:00 PM   Canister Changed No 7/13/2018 4:00 PM   Dressing Status Clean;Dry; Intact 7/13/2018  4:00 PM   Black foam- # removed 1 7/13/2018  4:00 PM   White foam- # removed 0 7/13/2018  4:00 PM   Nonadherent (Adaptic)- # removed 0 7/13/2018  4:00 PM   Other- # removed 0 7/13/2018  4:00 PM   Output (mL) 0 mL 7/8/2018 12:45 PM       Incision 07/01/18 Arm Right (Active)   Incision Description Clean;Bleeding; Other (Comment) 7/13/2018  4:00 PM   Mari-wound Assessment Fragile;Erythema;Edema 7/13/2018  4:00 PM   Wound Length (cm) 12 cm 7/13/2018  4:00 PM   Wound Width (cm) 4 5 cm 7/13/2018  4:00 PM   Wound Depth (cm) 2 7/13/2018  4:00 PM   Calculated Wound Volume (cm^3) 108 cm^3 7/13/2018  4:00 PM   Closure Unapproximated 7/13/2018  4:00 PM   Drainage Amount Scant 7/13/2018  4:00 PM   Drainage Description Serosanguineous 7/13/2018  4:00 PM   Treatments Cleansed;Irrigation with NSS;Site care 7/13/2018  4:00 PM   Dressing Wound V A C  7/13/2018  4:00 PM   Dressing Changed Changed 7/13/2018  4:00 PM   Patient Tolerance Tolerated well 7/13/2018  4:00 PM   Dressing Status Clean;Dry; Intact 7/13/2018  4:00 PM     Rhina BENITEZN, RN, [employer]

## 2018-07-13 NOTE — PROGRESS NOTES
Progress Note - Infectious Disease   Bakari Fernandes 65 y o  female MRN: [id number]  Unit/Bed#: E5 -01 Encounter: [id number]      Impression/Recommendations:  1   Right forearm cellulitis and abscess   Patient is status post I&D on , and repeat I&D on    Operative culture grew MRSA   Patient's response on IV antibiotic has been slow   She is slowly improving again  Continue IV vancomycin  level therapeutic  Monitor temperature/WBC  Serial forearm exams      2   Right forearm muscle necrosis, noted at surgery   This is secondary to MRSA abscess above   Consider myositis   Will treat with somewhat prolonged IV antibiotic course  Antibiotic plan as in above  Serial exams  Treat x 2 weeks total, another 9 days  Orthopedics plan for no further I&D noted      3   DM, poorly controlled, with hyperglycemia on admission   Hemoglobin A1c is 13 4   I discussed with patient in great detail regarding this  Allyne Jewels control hyperglycemia clearly contributes to development of cellulitis and abscess above   Blood sugar is much better controlled now  Management per Medicine Service       4   Noncompliance        Discussed with patient and family in detail regarding the above plan      Antibiotics:  Vancomycin # 9  POD # 5     Subjective:  Patient's right forearm pain continues to improve  She is moving her fingers better  Temperature stays down   No further chills  She is tolerating antibiotic well   No nausea, vomiting or diarrhea   No dizziness or hearing loss  Objective:  Vitals:  HR:  [67-80] 67  Resp:  [18] 18  BP: (162-166)/(64-82) 162/75  SpO2:  [91 %-98 %] 93 %  Temp (24hrs), Av 3 °F (36 8 °C), Min:98 °F (36 7 °C), Max:98 7 °F (37 1 °C)  Current: Temperature: 98 2 °F (36 8 °C)    Physical Exam:     General: Awake, alert, cooperative, no distress  Neck:  Supple  No mass  No lymphadenopathy  Lungs: Expansion symmetric, no rales, no wheezing, respirations unlabored     Heart:  Regular rate and rhythm, S1 and S2 normal, no murmur  Abdomen: Soft, nondistended, non-tender, bowel sounds active all four quadrants,        no masses, no organomegaly  Extremities: Right forearm wound with VAC in place  No purulence  Decreased edema  Decreased tenderness  Improved ROM of fingers  Skin:  No rash  Neuro: Moves all extremities  Invasive Devices     Peripherally Inserted Central Catheter Line            PICC Line 58/64/20 Left Basilic 2 days          Drain            Closed/Suction Drain Right Other (Comment) Accordion 10 Fr  11 days    Negative Pressure Wound Therapy (V A C ) Arm Right 5 days                Labs studies:   I have personally reviewed pertinent labs  Results from last 7 days  Lab Units 07/12/18  0616 07/11/18  0559 07/09/18  1038   SODIUM mmol/L 140 141 142   POTASSIUM mmol/L 4 1 3 8 3 8   CHLORIDE mmol/L 104 105 103   CO2 mmol/L 29 32 34*   ANION GAP mmol/L 7 4 5   BUN mg/dL 9 11 8   CREATININE mg/dL 0 53* 0 72 0 58*   EGFR ml/min/1 73sq m 103 91 100   GLUCOSE RANDOM mg/dL 116 106 92   CALCIUM mg/dL 9 1 8 9 9 1       Results from last 7 days  Lab Units 07/12/18  0616 07/11/18  0558 07/09/18  1038   WBC Thousand/uL 5 55 4 92 6 76   HEMOGLOBIN g/dL 8 4* 6 3* 6 9*   PLATELETS Thousands/uL 316 360 408*           Imaging Studies:   I have personally reviewed pertinent imaging study reports and images in PACS  EKG, Pathology, and Other Studies:   I have personally reviewed pertinent reports

## 2018-07-14 LAB
GLUCOSE SERPL-MCNC: 105 MG/DL (ref 65–140)
GLUCOSE SERPL-MCNC: 111 MG/DL (ref 65–140)
GLUCOSE SERPL-MCNC: 138 MG/DL (ref 65–140)
GLUCOSE SERPL-MCNC: 81 MG/DL (ref 65–140)

## 2018-07-14 PROCEDURE — 99233 SBSQ HOSP IP/OBS HIGH 50: CPT | Performed by: INTERNAL MEDICINE

## 2018-07-14 PROCEDURE — 82948 REAGENT STRIP/BLOOD GLUCOSE: CPT

## 2018-07-14 PROCEDURE — 99232 SBSQ HOSP IP/OBS MODERATE 35: CPT | Performed by: INTERNAL MEDICINE

## 2018-07-14 RX ADMIN — GABAPENTIN 600 MG: 300 CAPSULE ORAL at 15:40

## 2018-07-14 RX ADMIN — ROPINIROLE 0.5 MG: 1 TABLET, FILM COATED ORAL at 15:40

## 2018-07-14 RX ADMIN — GABAPENTIN 600 MG: 300 CAPSULE ORAL at 08:54

## 2018-07-14 RX ADMIN — VANCOMYCIN HYDROCHLORIDE 1250 MG: 5 INJECTION, POWDER, LYOPHILIZED, FOR SOLUTION INTRAVENOUS at 21:00

## 2018-07-14 RX ADMIN — NYSTATIN: 100000 POWDER TOPICAL at 15:44

## 2018-07-14 RX ADMIN — SERTRALINE HYDROCHLORIDE 50 MG: 50 TABLET ORAL at 22:09

## 2018-07-14 RX ADMIN — CLONAZEPAM 1 MG: 1 TABLET ORAL at 08:54

## 2018-07-14 RX ADMIN — HYDROMORPHONE HYDROCHLORIDE 0.5 MG: 1 INJECTION, SOLUTION INTRAMUSCULAR; INTRAVENOUS; SUBCUTANEOUS at 08:24

## 2018-07-14 RX ADMIN — METHADONE HYDROCHLORIDE 15 MG: 10 TABLET ORAL at 08:54

## 2018-07-14 RX ADMIN — INSULIN GLARGINE 30 UNITS: 100 INJECTION, SOLUTION SUBCUTANEOUS at 08:53

## 2018-07-14 RX ADMIN — GABAPENTIN 600 MG: 300 CAPSULE ORAL at 22:00

## 2018-07-14 RX ADMIN — LAMOTRIGINE 150 MG: 100 TABLET ORAL at 08:53

## 2018-07-14 RX ADMIN — ROPINIROLE 0.5 MG: 1 TABLET, FILM COATED ORAL at 08:52

## 2018-07-14 RX ADMIN — CLONAZEPAM 1 MG: 1 TABLET ORAL at 15:40

## 2018-07-14 RX ADMIN — CLONAZEPAM 1 MG: 1 TABLET ORAL at 22:00

## 2018-07-14 RX ADMIN — NYSTATIN: 100000 POWDER TOPICAL at 08:56

## 2018-07-14 RX ADMIN — VANCOMYCIN HYDROCHLORIDE 1250 MG: 5 INJECTION, POWDER, LYOPHILIZED, FOR SOLUTION INTRAVENOUS at 08:45

## 2018-07-14 RX ADMIN — HYDROMORPHONE HYDROCHLORIDE 0.5 MG: 1 INJECTION, SOLUTION INTRAMUSCULAR; INTRAVENOUS; SUBCUTANEOUS at 02:29

## 2018-07-14 RX ADMIN — TRAMADOL HYDROCHLORIDE 50 MG: 50 TABLET, FILM COATED ORAL at 17:23

## 2018-07-14 RX ADMIN — NYSTATIN: 100000 POWDER TOPICAL at 22:00

## 2018-07-14 RX ADMIN — ROPINIROLE 0.5 MG: 1 TABLET, FILM COATED ORAL at 22:00

## 2018-07-14 RX ADMIN — LISINOPRIL 20 MG: 20 TABLET ORAL at 08:54

## 2018-07-14 RX ADMIN — HYDROMORPHONE HYDROCHLORIDE 0.5 MG: 1 INJECTION, SOLUTION INTRAMUSCULAR; INTRAVENOUS; SUBCUTANEOUS at 19:32

## 2018-07-14 NOTE — PROGRESS NOTES
Progress Note - Infectious Disease   Sabrina Reveal 65 y o  female MRN: [id number]  Unit/Bed#: E5 -01 Encounter: [id number]      Impression/Recommendations:  1   Right forearm cellulitis and abscess   Patient is status post I&D   Operative culture grew MRSA   Patient is slowly improving  Continue IV vancomycin    Monitor temperature/WBC  Serial forearm exams      2   Right forearm muscle necrosis, noted at surgery   This is secondary to MRSA abscess above   Consider myositis   Will treat with somewhat prolonged IV antibiotic course  Antibiotic plan as in above  Serial exams  Treat x 2 weeks total, another 7 days  Orthopedics plan for no further I&D noted  Continue VAC per Orthopedics Service      3   DM, poorly controlled, with hyperglycemia on admission   Hemoglobin A1c is 13 4   I discussed with patient in great detail regarding this  Jacqulin Billet control hyperglycemia clearly contributes to development of cellulitis and abscess above   Blood sugar is much better controlled now  Management per Medicine Service       4   Noncompliance        Discussed with patient and family in detail regarding the above plan  Discussed with Dr Ryan Gifford from [facility] earlier      Antibiotics:  Vancomycin # 11  POD # 7     Subjective:  Patient tolerated VAC change at bedside yesterday  Her right forearm pain continues to improve  She is moving her fingers better  Temperature stays down   No further chills  She is tolerating antibiotic well   No nausea, vomiting or diarrhea   No dizziness or hearing loss  Objective:  Vitals:  HR:  [68-86] 86  Resp:  [18-19] 19  BP: (152-163)/(70-92) 163/92  SpO2:  [96 %-98 %] 98 %  Temp (24hrs), Av °F (37 2 °C), Min:98 8 °F (37 1 °C), Max:99 2 °F (37 3 °C)  Current: Temperature: 99 2 °F (37 3 °C)    Physical Exam:     General: Awake, alert, cooperative, no distress  Neck:  Supple  No mass  No lymphadenopathy     Lungs: Expansion symmetric, no rales, no wheezing, respirations unlabored  Heart:  Regular rate and rhythm, S1 and S2 normal, no murmur  Abdomen: Soft, nondistended, non-tender, bowel sounds active all four quadrants,        no masses, no organomegaly  Extremities: Right forearm with VAC in place  No purulence  Improved edema  Improved erythema  Improved tenderness  ROM of fingers better  Skin:  No rash  Neuro: Moves all extremities  Invasive Devices     Peripherally Inserted Central Catheter Line            PICC Line 63/41/38 Left Basilic 4 days          Drain            Closed/Suction Drain Right Other (Comment) Accordion 10 Fr  13 days    Negative Pressure Wound Therapy (V A C ) Arm Right 7 days                Labs studies:   I have personally reviewed pertinent labs  Results from last 7 days  Lab Units 07/13/18  0638 07/12/18  0616 07/11/18  0559   SODIUM mmol/L 141 140 141   POTASSIUM mmol/L 4 0 4 1 3 8   CHLORIDE mmol/L 104 104 105   CO2 mmol/L 30 29 32   ANION GAP mmol/L 7 7 4   BUN mg/dL 9 9 11   CREATININE mg/dL 0 63 0 53* 0 72   EGFR ml/min/1 73sq m 97 103 91   GLUCOSE RANDOM mg/dL 116 116 106   CALCIUM mg/dL 9 1 9 1 8 9   AST U/L 19  --   --    ALT U/L 8*  --   --    ALK PHOS U/L 82  --   --    TOTAL PROTEIN g/dL 6 9  --   --    BILIRUBIN TOTAL mg/dL 0 41  --   --        Results from last 7 days  Lab Units 07/13/18  0638 07/12/18  0616 07/11/18  0558   WBC Thousand/uL 4 85 5 55 4 92   HEMOGLOBIN g/dL 8 7* 8 4* 6 3*   PLATELETS Thousands/uL 304 316 360           Imaging Studies:   I have personally reviewed pertinent imaging study reports and images in PACS  EKG, Pathology, and Other Studies:   I have personally reviewed pertinent reports

## 2018-07-14 NOTE — PROGRESS NOTES
[facility] Internal Medicine Progress Note  Patient: Dez Sung 65 y o  female   MRN: [id number]  PCP: Ventura Hopper MD  Unit/Bed#: E5 -01 Encounter: [id number]  Date Of Visit: 07/14/18    Assessment:    Principal Problem:    Abscess of right forearm  Active Problems:    Hyponatremia    Right arm cellulitis    Benign essential hypertension    Type 2 diabetes mellitus, uncontrolled ([address] )    Abscess of tendon sheath of forearm, right    Anemia      Plan:    1 )  Right upper extremity abscess/cellulitis  -MRI reveals soft tissue swelling along the radial aspect of right forearm  -status post I and D and drain placement on 07/01/2018, second I&D on 7/11/18  Case discussed with pulse infection disease and orthopedic surgery  As per infectious disease recommendations patient will be here receiving current antibiotic regimen until 07/20/2018       2 )  Sepsis-present on admission  -with leukocytosis, tachycardia  -now improved     3 )  Diabetes mellitus  -HbA1c 13 4, noncompliant with insulib  -accuchecks [phone]  -patient is Accu-Cheks are waxing and waning  -c/w Lantus 30U and lispro 8U TID     4 )  Hypertension  -continue with current medications, monitor blood pressure     5 )  Hepatitis-C  Established diagnosis, I did explain the importance of screening for hyper cell carcinoma and appropriate treatment      6 )  Bipolar disorder  -continue with home medication  Mood and affect is currently within normal limits, denies suicidal or homicidal ideation      7 )  Chronic opioid dependence  -maintained on methadone     8 ) anemia of chronic disease  If hemoglobin drops to less than 7 g/dL postop may consider another 2 units  "Last surgery 500+ cc were lost   "        Disposition:  At this time we plan to continue current IV antibiotics, given history of IV drug abuse most likely patient will need to stay inpatient for the duration of antibiotic, case discussed with orthopedic surgery in detail and subsequently with a patient  Patient require IV vancomycin treatment until 18 will discuss this with case management to see if skilled nursing facility would be available  VTE Pharmacologic Prophylaxis:   Pharmacologic: heparin    Patient Centered Rounds: I have performed bedside rounds with nursing staff today  Time Spent for Care: 20 minutes  More than 50% of total time spent on counseling and coordination of care as described above  Current Length of Stay: 16 day(s)    Current Patient Status: Inpatient   Certification Statement: The patient will continue to require additional inpatient hospital stay due to RUE abscess, MRSA in wound    Discharge Plan / Estimated Discharge Date: TBD    Code Status: Level 1 - Full Code      Subjective:   No complaints today  Afebrile and stable overnight  Objective:     Vitals:   Temp (24hrs), Av 7 °F (37 1 °C), Min:98 1 °F (36 7 °C), Max:99 2 °F (37 3 °C)    HR:  [68-86] 86  Resp:  [18-20] 19  BP: (152-167)/(70-92) 163/92  SpO2:  [96 %-98 %] 98 %  Body mass index is 23 24 kg/m²  Input and Output Summary (last 24 hours): Intake/Output Summary (Last 24 hours) at 18 1459  Last data filed at 18 2307   Gross per 24 hour   Intake              960 ml   Output                0 ml   Net              960 ml       Physical Exam:    Constitutional: Patient is oriented to person, place and time, no acute distress  HEENT:  Normocephalic, atraumatic, EOMI, PERRLA, no scleral icterus, no pallor, moist oral mucosa  Neck:  Supple, no masses, no thyromegaly, no bruits Normal range of motion  Lymph nodes:  No lymphadenopathy  Cardiovascular: Normal S1S2, RRR, No murmurs/rubs/gallops appreciated    Pulmonary: Clear to auscultation bilaterally, No rhonchi/rales/wheezing appreciated  Abdominal: Soft, Bowel sounds present, Non-tender, Non-distended, No rebound/guarding, no hepatomegaly   Musculoskeletal: No tenderness/abnormality   Extremities:  No cyanosis, clubbing or edema  Peripheral pulses palpable and equal bilaterally, right upper extremity with a cast  Neurological: Cranial nerves II-XII grossly intact, sensation intact, otherwise no focal neurological symptoms  Skin: RUE in dressing, wound vac in place    Additional Data:     Labs:      Results from last 7 days  Lab Units 07/13/18  0638   WBC Thousand/uL 4 85   HEMOGLOBIN g/dL 8 7*   HEMATOCRIT % 28 2*   PLATELETS Thousands/uL 304   NEUTROS PCT % 55   LYMPHS PCT % 35   MONOS PCT % 7   EOS PCT % 3       Results from last 7 days  Lab Units 07/13/18  0638   SODIUM mmol/L 141   POTASSIUM mmol/L 4 0   CHLORIDE mmol/L 104   CO2 mmol/L 30   BUN mg/dL 9   CREATININE mg/dL 0 63   CALCIUM mg/dL 9 1   TOTAL PROTEIN g/dL 6 9   BILIRUBIN TOTAL mg/dL 0 41   ALK PHOS U/L 82   ALT U/L 8*   AST U/L 19   GLUCOSE RANDOM mg/dL 116            I Have Reviewed All Lab Data Listed Above        Recent Cultures (last 7 days):           Last 24 Hours Medication List:     Current Facility-Administered Medications:  acetaminophen 650 mg Oral Q6H PRN [facility], PA-C    calcium carbonate 1,000 mg Oral Daily PRN [facility], PA-C    clonazePAM 1 mg Oral TID [facility], PA-C    gabapentin 600 mg Oral TID Analia Devi MD    hydrALAZINE 10 mg Intravenous Q6H PRN Patricia Stiles MD    HYDROmorphone 0 5 mg Intravenous Q6H PRN SKY Woodward    insulin glargine 30 Units Subcutaneous QAM Analia Devi MD    insulin lispro 1-5 Units Subcutaneous 4x Daily (AC & HS) Anjum Stern MD    insulin lispro 8 Units Subcutaneous TID AC Analia Devi MD    lamoTRIgine 150 mg Oral Daily [facility], TABITHA    lisinopril 20 mg Oral Daily Analia Devi MD    methadone 15 mg Oral Daily Anjum Stern MD    nystatin  Topical TID SKY Young    ondansetron 4 mg Intravenous Q6H PRN [facility], PA-NEAL    ondansetron 4 mg Oral Q6H PRN SKY Woodward    rOPINIRole 0 5 mg Oral TID [facility], PA-C    sertraline 50 mg Oral HS Bakarisydney Alex PA-C    sodium chloride 125 mL/hr Intravenous Continuous Lodema TABITHA Romero Last Rate: Stopped (07/12/18 1810)   traMADol 50 mg Oral Q6H PRN Ramez Carrasquillo MD    vancomycin 17 5 mg/kg Intravenous Q12H Sinai Maria MD Last Rate: Stopped (07/14/18 1210)        Today, Patient Was Seen By: Suma Montenegro MD

## 2018-07-15 LAB
GLUCOSE SERPL-MCNC: 109 MG/DL (ref 65–140)
GLUCOSE SERPL-MCNC: 127 MG/DL (ref 65–140)
GLUCOSE SERPL-MCNC: 131 MG/DL (ref 65–140)
GLUCOSE SERPL-MCNC: 139 MG/DL (ref 65–140)

## 2018-07-15 PROCEDURE — 97530 THERAPEUTIC ACTIVITIES: CPT

## 2018-07-15 PROCEDURE — 99232 SBSQ HOSP IP/OBS MODERATE 35: CPT | Performed by: INTERNAL MEDICINE

## 2018-07-15 PROCEDURE — 97535 SELF CARE MNGMENT TRAINING: CPT

## 2018-07-15 PROCEDURE — 99233 SBSQ HOSP IP/OBS HIGH 50: CPT | Performed by: INTERNAL MEDICINE

## 2018-07-15 PROCEDURE — 82948 REAGENT STRIP/BLOOD GLUCOSE: CPT

## 2018-07-15 RX ADMIN — NYSTATIN: 100000 POWDER TOPICAL at 20:15

## 2018-07-15 RX ADMIN — ROPINIROLE 0.5 MG: 1 TABLET, FILM COATED ORAL at 16:40

## 2018-07-15 RX ADMIN — INSULIN GLARGINE 30 UNITS: 100 INJECTION, SOLUTION SUBCUTANEOUS at 08:10

## 2018-07-15 RX ADMIN — SERTRALINE HYDROCHLORIDE 50 MG: 50 TABLET ORAL at 22:51

## 2018-07-15 RX ADMIN — LISINOPRIL 20 MG: 20 TABLET ORAL at 08:05

## 2018-07-15 RX ADMIN — CLONAZEPAM 1 MG: 1 TABLET ORAL at 20:14

## 2018-07-15 RX ADMIN — HYDROMORPHONE HYDROCHLORIDE 0.5 MG: 1 INJECTION, SOLUTION INTRAMUSCULAR; INTRAVENOUS; SUBCUTANEOUS at 21:10

## 2018-07-15 RX ADMIN — GABAPENTIN 600 MG: 300 CAPSULE ORAL at 16:40

## 2018-07-15 RX ADMIN — TRAMADOL HYDROCHLORIDE 50 MG: 50 TABLET, FILM COATED ORAL at 15:54

## 2018-07-15 RX ADMIN — ROPINIROLE 0.5 MG: 1 TABLET, FILM COATED ORAL at 20:12

## 2018-07-15 RX ADMIN — GABAPENTIN 600 MG: 300 CAPSULE ORAL at 08:04

## 2018-07-15 RX ADMIN — HYDROMORPHONE HYDROCHLORIDE 0.5 MG: 1 INJECTION, SOLUTION INTRAMUSCULAR; INTRAVENOUS; SUBCUTANEOUS at 03:50

## 2018-07-15 RX ADMIN — CLONAZEPAM 1 MG: 1 TABLET ORAL at 08:06

## 2018-07-15 RX ADMIN — VANCOMYCIN HYDROCHLORIDE 1250 MG: 5 INJECTION, POWDER, LYOPHILIZED, FOR SOLUTION INTRAVENOUS at 08:10

## 2018-07-15 RX ADMIN — METHADONE HYDROCHLORIDE 15 MG: 10 TABLET ORAL at 08:05

## 2018-07-15 RX ADMIN — CLONAZEPAM 1 MG: 1 TABLET ORAL at 16:40

## 2018-07-15 RX ADMIN — TRAMADOL HYDROCHLORIDE 50 MG: 50 TABLET, FILM COATED ORAL at 08:04

## 2018-07-15 RX ADMIN — GABAPENTIN 600 MG: 300 CAPSULE ORAL at 20:12

## 2018-07-15 RX ADMIN — NYSTATIN: 100000 POWDER TOPICAL at 08:10

## 2018-07-15 RX ADMIN — VANCOMYCIN HYDROCHLORIDE 1250 MG: 5 INJECTION, POWDER, LYOPHILIZED, FOR SOLUTION INTRAVENOUS at 20:11

## 2018-07-15 RX ADMIN — LAMOTRIGINE 150 MG: 100 TABLET ORAL at 08:05

## 2018-07-15 RX ADMIN — ROPINIROLE 0.5 MG: 1 TABLET, FILM COATED ORAL at 08:04

## 2018-07-15 RX ADMIN — NYSTATIN 1 APPLICATION: 100000 POWDER TOPICAL at 16:43

## 2018-07-15 NOTE — PROGRESS NOTES
Agree with the previous nurse assessment  Pain medication was given  Pt is laying in bed comfortably  Call bell and water within reach

## 2018-07-15 NOTE — PROGRESS NOTES
[facility] Internal Medicine Progress Note  Patient: Dariana Older 65 y o  female   MRN: [id number]  PCP: Leyla Delvalle MD  Unit/Bed#: E5 -01 Encounter: [id number]  Date Of Visit: 07/15/18    Assessment:    Principal Problem:    Abscess of right forearm  Active Problems:    Hyponatremia    Right arm cellulitis    Benign essential hypertension    Type 2 diabetes mellitus, uncontrolled ([address] )    Abscess of tendon sheath of forearm, right    Anemia      Plan:    1 )  Right upper extremity abscess/cellulitis  -MRI reveals soft tissue swelling along the radial aspect of right forearm concerning for myositis  -status post I and D and drain placement on 07/01/2018, second I&D on 7/11/18  Case discussed with pulse infection disease and orthopedic surgery  No further procedures currently planned by orthopedic surgery  As per infectious disease recommendations patient will be here receiving current antibiotic regimen until 07/20/2018  Case discussed with patient and patient's father was currently by bedside  All parties agreeable and patient agrees to stay here to continue antibiotic regimen/vancomycin until 07/20/18    2 )  Sepsis-present on admission  -with leukocytosis, tachycardia  -now improved/resolving     3 )  Diabetes mellitus  -HbA1c 13 4, noncompliant with insulib  -accuchecks [phone]  -patient is Accu-Cheks are waxing and waning  -c/w Lantus 30U and lispro 8U TID  -extreme noncompliance including "eating when I can and what ever I can"appropriate last saw the facial modification has been discussed with the patient and family, I also expressed my concerns about delayed wound healing due to ongoing hyperglycemia patient expressed verbal understanding  Patient's current blood glucose level has excellent control indicating elevated hemoglobin A1c is purely due to noncompliance at home        4 )  Hypertension  -continue with current medications, monitor blood pressure, Currently normotensive      5 )  Hepatitis-C  Established diagnosis, I did explain the importance of screening for hyper cell carcinoma and appropriate treatment  Patient follow-up with gastroenterology on outpatient basis      6 )  Bipolar disorder  -continue with home medication  Mood and affect is currently within normal limits, denies suicidal or homicidal ideation      7 )  Chronic opioid dependence  -maintained on methadone, or she does have occasional spike in pain currently pain control is excellent      8 ) anemia of chronic disease  If hemoglobin drops to less than 7 g/dL postop may consider another 2 units  "Last surgery 500+ cc were lost   "    9 )Polysubstance abuse: It is critical that patient stops drinking, smoking, and using narcotics in order to avoid further damage to the heart and overall health  Although patient has been previously educated about the negative effects of substance abuse regarding to overall health, heart failure, etc multiple times in the past  Patient continues to use  We are requesting a consult with SW to discuss various drug rehab programs within the community that patient can access  If patient is agreeable to go into rehab  We will continue with the nicotine patch for smoking cessation and encourage patient to stop drinking, smoking, and polysubstance abuse  We have ordered a urine drug screen to document for this hospital visit  Well continue to monitor patient for signs symptoms of withdrawal and treat as needed  For now continue with current methadone dose  Disposition: At this time we plan to continue current IV antibiotics, given history of IV drug abuse most likely patient will need to stay inpatient for the duration of antibiotic, case discussed with orthopedic surgery in detail and subsequently with a patient   Patient require IV vancomycin treatment until 07/20/18 will discuss this with case management to see if skilled nursing facility would be available  VTE Pharmacologic Prophylaxis:   Pharmacologic: heparin    Patient Centered Rounds: I have performed bedside rounds with nursing staff today  Time Spent for Care: 20 minutes  More than 50% of total time spent on counseling and coordination of care as described above  Current Length of Stay: 17 day(s)    Current Patient Status: Inpatient   Certification Statement: The patient will continue to require additional inpatient hospital stay due to RUE abscess, MRSA in wound    Discharge Plan / Estimated Discharge Date: TBD    Code Status: Level 1 - Full Code      Subjective:   No complaints today  Afebrile and stable overnight  Objective:     Vitals:   Temp (24hrs), Av 9 °F (36 6 °C), Min:97 6 °F (36 4 °C), Max:98 1 °F (36 7 °C)    HR:  [78-79] 78  Resp:  [19-20] 19  BP: (152-157)/(67-83) 157/83  SpO2:  [97 %-98 %] 98 %  Body mass index is 23 24 kg/m²  Input and Output Summary (last 24 hours):     No intake or output data in the 24 hours ending 07/15/18 1200    Physical Exam:    Constitutional: Patient is oriented to person, place and time, no acute distress  HEENT:  Normocephalic, atraumatic, EOMI, PERRLA, no scleral icterus, no pallor, moist oral mucosa, extremely poor dentition noted  Neck:  Supple, no masses, no thyromegaly, no bruits Normal range of motion  ,  Lymph nodes:  No lymphadenopathy  Cardiovascular: Normal S1S2, RRR, No murmurs/rubs/gallops appreciated  Pulmonary: Clear to auscultation bilaterally, No rhonchi/rales/wheezing appreciated  Abdominal: Soft, Bowel sounds present, Non-tender, Non-distended, No rebound/guarding, no hepatomegaly   Musculoskeletal: No tenderness/abnormality   Extremities:  No cyanosis, clubbing or edema  Peripheral pulses palpable and equal bilaterally, right upper extremity with a cast, Right forearm with VAC in place  No purulence noted, significantly improved edema noted  Improved erythema  Improved tenderness    ROM of fingers better  Neurological: Cranial nerves II-XII grossly intact, sensation intact, otherwise no focal neurological symptoms  Skin: RUE in dressing, wound vac in place    Additional Data:     Labs:      Results from last 7 days  Lab Units 07/13/18  0638   WBC Thousand/uL 4 85   HEMOGLOBIN g/dL 8 7*   HEMATOCRIT % 28 2*   PLATELETS Thousands/uL 304   NEUTROS PCT % 55   LYMPHS PCT % 35   MONOS PCT % 7   EOS PCT % 3       Results from last 7 days  Lab Units 07/13/18  0638   SODIUM mmol/L 141   POTASSIUM mmol/L 4 0   CHLORIDE mmol/L 104   CO2 mmol/L 30   BUN mg/dL 9   CREATININE mg/dL 0 63   CALCIUM mg/dL 9 1   TOTAL PROTEIN g/dL 6 9   BILIRUBIN TOTAL mg/dL 0 41   ALK PHOS U/L 82   ALT U/L 8*   AST U/L 19   GLUCOSE RANDOM mg/dL 116            I Have Reviewed All Lab Data Listed Above        Recent Cultures (last 7 days):           Last 24 Hours Medication List:     Current Facility-Administered Medications:  acetaminophen 650 mg Oral Q6H PRN Kristopher Foster PA-C    calcium carbonate 1,000 mg Oral Daily PRN Kristopher Foster PA-C    clonazePAM 1 mg Oral TID Kristopher Foster PA-C    gabapentin 600 mg Oral TID Damari Burnett MD    hydrALAZINE 10 mg Intravenous Q6H PRN Cristiano Aguilar MD    HYDROmorphone 0 5 mg Intravenous Q6H PRN SKY Elaine    insulin glargine 30 Units Subcutaneous QAM Damari Burnett MD    insulin lispro 1-5 Units Subcutaneous 4x Daily (AC & HS) Vldaimir Sotelo MD    insulin lispro 8 Units Subcutaneous TID AC Damari Burnett MD    lamoTRIgine 150 mg Oral Daily Kristopher Foster PA-C    lisinopril 20 mg Oral Daily Damari Burnett MD    methadone 15 mg Oral Daily Vladimir Sotelo MD    nystatin  Topical TID SKY Dickey    ondansetron 4 mg Intravenous Q6H PRN Kristopher Foster PA-C    ondansetron 4 mg Oral Q6H PRN SKY Elaine    rOPINIRole 0 5 mg Oral TID Kristopher Foster PA-C    sertraline 50 mg Oral HS Kristopher Foster PA-C    sodium chloride 125 mL/hr Intravenous Continuous Stefania Lira TABITHA Last Rate: Stopped (07/12/18 1810)   traMADol 50 mg Oral Q6H PRN Donnie Coyne MD    vancomycin 17 5 mg/kg Intravenous Q12H Chaparro Yoo MD Last Rate: 1,250 mg (07/15/18 0810)        Today, Patient Was Seen By: Marisol Nelson MD

## 2018-07-15 NOTE — PLAN OF CARE
Problem: OCCUPATIONAL THERAPY ADULT  Goal: Performs self-care activities at highest level of function for planned discharge setting  See evaluation for individualized goals  Treatment Interventions: ADL retraining, Functional transfer training, UE strengthening/ROM, Endurance training, Equipment evaluation/education, Cognitive reorientation, Patient/family training, Compensatory technique education, Energy conservation, Activityengagement          See flowsheet documentation for full assessment, interventions and recommendations  Outcome: Progressing  Limitation: Decreased ADL status, Decreased UE ROM, Decreased UE strength, Decreased Safe judgement during ADL, Decreased cognition, Decreased endurance, Decreased self-care trans, Decreased high-level ADLs  Prognosis: Good  Assessment: Pt was seen for skilled OT with focus on completion of self care tasks, functional mobility, review of fall prevention and review of current plan of care  Min A overall for self care tasks, and functional transfers  Cues for safety required due to impulsive movements noted  Pt was educated on one handed dressing techs  F carry over noted with returned demonstration  Further review will be required  Pt reports having fear of having her, "arm", worked on without pain medications  Reported findings to nursing staff  Pt with limited safety awareness  No LOB noted with functional reach instance  Pt does not require use of AD with functional mobility  Reviewed fall prevention checklist with Pt to carry over with functional tasks   Anjelica Landry [address]     OT Discharge Recommendation: Short Term Rehab         Comments: Anjeliac Landry [address]

## 2018-07-15 NOTE — OCCUPATIONAL THERAPY NOTE
Occupational Therapy Treatment Note:         07/15/18 1410   Restrictions/Precautions   Other Precautions Fall Risk;Pain;Cognitive   Pain Assessment   Pain Assessment 0-10   Pain Score 6   Pain Type Surgical pain   Pain Location Arm   Pain Orientation Right   ADL   Where Assessed Edge of bed   Grooming Assistance 5  Supervision/Setup   Grooming Deficit Setup   LB Bathing Assistance 5  Supervision/Setup   LB Bathing Deficit Setup;Steadying;Verbal cueing;Supervision/safety; Increased time to complete;Perineal area; Buttocks   UB Dressing Assistance 4  Minimal Assistance   UB Dressing Deficit Setup;Verbal cueing;Supervision/safety; Thread RUE; Increased time to complete   UB Dressing Comments cues for use of one handed dressing techs provided  LB Dressing Assistance 4  Minimal Assistance   LB Dressing Deficit Setup;Steadying; Requires assistive device for steadying;Supervision/safety;Verbal cueing; Increased time to complete; Don/doff L sock; Don/doff R sock; Thread LLE into underwear; Thread RLE into underwear;Pull up over hips   LB Dressing Comments cues for safety required  Toileting Assistance  4  Minimal Assistance   Toileting Deficit Setup;Steadying;Verbal cueing;Supervison/safety; Increased time to complete; Bedside commode;Clothing management up;Clothing management down;Perineal hygiene   Functional Standing Tolerance   Time 4 mins  Activity dynamic stand balance activity  Comments cues for safety required  Bed Mobility   Supine to Sit 4  Minimal assistance   Additional items Assist x 1   Sit to Supine 4  Minimal assistance   Additional items Assist x 1   Transfers   Sit to Stand 4  Minimal assistance   Additional items Assist x 1   Stand to Sit 4  Minimal assistance   Additional items Assist x 1   Stand pivot 4  Minimal assistance   Additional items Assist x 1   Toilet transfer 4  Minimal assistance   Additional items Assist x 1   Additional Comments cues for safety required      Cognition   Overall Cognitive Status Impaired   Arousal/Participation Responsive; Cooperative   Attention Attends with cues to redirect   Orientation Level Oriented X4   Memory Decreased recall of precautions;Decreased short term memory;Decreased recall of recent events   Following Commands Follows multistep commands with increased time or repetition   Additional Activities   Additional Activities (reviewed current plan of care  )   Additional Activities Comments Pt reports having F understanding   Activity Tolerance   Activity Tolerance Patient limited by fatigue;Patient limited by pain   Medical Staff Made Aware Reported all findings to nursing staff  Assessment   Assessment Pt was seen for skilled OT with focus on completion of self care tasks, functional mobility, review of fall prevention and review of current plan of care  Min A overall for self care tasks, and functional transfers  Cues for safety required due to impulsive movements noted  Pt was educated on one handed dressing techs  F carry over noted with returned demonstration  Further review will be required  Pt reports having fear of having her, "arm", worked on without pain medications  Reported findings to nursing staff  Pt with limited safety awareness  No LOB noted with functional reach instance  Pt does not require use of AD with functional mobility  Reviewed fall prevention checklist with Pt to carry over with functional tasks  JEAN Grossman   Plan   Treatment Interventions ADL retraining;Functional transfer training; Endurance training;Cognitive reorientation   Goal Expiration Date 07/22/18   Treatment Day 2   OT Frequency 3-5x/wk   Recommendation   OT Discharge Recommendation Short Term Rehab   Barthel Index   Feeding 5   Bathing 0   Grooming Score 0   Dressing Score 5   Bladder Score 10   Bowels Score 10   Toilet Use Score 5   Transfers (Bed/Chair) Score 10   Mobility (Level Surface) Score 0   Stairs Score 0   Barthel Index Score 45   Modified [place] Scale   Modified [place] Scale [address] [facility], [address]

## 2018-07-15 NOTE — PROGRESS NOTES
Progress Note - Infectious Disease   Shell Reese 65 y o  female MRN: [id number]  Unit/Bed#: E5 -01 Encounter: [id number]      Impression/Recommendations:  1   Right forearm cellulitis and abscess   Patient is status post I&D   Operative culture grew MRSA   Patient is slowly improving  Continue IV vancomycin    Monitor temperature/WBC  Serial forearm exams      2   Right forearm muscle necrosis, noted at surgery   This is secondary to MRSA abscess above   Consider myositis   Will treat with somewhat prolonged IV antibiotic course  Antibiotic plan as in above  Serial exams  Treat x 2 weeks total, another 6 days  Orthopedics plan for no further I&D noted  Continue VAC per Orthopedics Service      3   DM, poorly controlled, with hyperglycemia on admission   Hemoglobin A1c is 13 4   I discussed with patient in great detail regarding this  Kathi Earthly control hyperglycemia clearly contributes to development of cellulitis and abscess above   Blood sugar is much better controlled now  Management per Medicine Service       4   Noncompliance        Discussed with patient and family in detail regarding the above plan      Antibiotics:  Vancomycin # 12  POD # 8     Subjective:  Patient  is comfortable  Her right forearm pain continues to improve  She is moving her fingers better  Temperature stays down   No further chills  She is tolerating antibiotic well   No nausea, vomiting or diarrhea   No dizziness or hearing loss  Objective:  Vitals:  HR:  [78-79] 78  Resp:  [19-20] 19  BP: (152-157)/(67-83) 157/83  SpO2:  [97 %-98 %] 98 %  Temp (24hrs), Av 9 °F (36 6 °C), Min:97 6 °F (36 4 °C), Max:98 1 °F (36 7 °C)  Current: Temperature: 97 6 °F (36 4 °C)    Physical Exam:     General: Awake, alert, cooperative, no distress  Neck:  Supple  No mass  No lymphadenopathy  Lungs: Expansion symmetric, no rales, no wheezing, respirations unlabored     Heart:  Regular rate and rhythm, S1 and S2 normal, no murmur  Abdomen: Soft, nondistended, non-tender, bowel sounds active all four quadrants,        no masses, no organomegaly  Extremities: Improved right arm edema  Wound with VAC  No purulence  Improved tenderness  Improved ROM of fingers  Skin:  No rash  Neuro: Moves all extremities  Invasive Devices     Peripherally Inserted Central Catheter Line            PICC Line 88/87/77 Left Basilic 5 days          Drain            Closed/Suction Drain Right Other (Comment) Accordion 10 Fr  14 days    Negative Pressure Wound Therapy (V A C ) Arm Right 8 days                Labs studies:   I have personally reviewed pertinent labs  Results from last 7 days  Lab Units 07/13/18  0638 07/12/18  0616 07/11/18  0559   SODIUM mmol/L 141 140 141   POTASSIUM mmol/L 4 0 4 1 3 8   CHLORIDE mmol/L 104 104 105   CO2 mmol/L 30 29 32   ANION GAP mmol/L 7 7 4   BUN mg/dL 9 9 11   CREATININE mg/dL 0 63 0 53* 0 72   EGFR ml/min/1 73sq m 97 103 91   GLUCOSE RANDOM mg/dL 116 116 106   CALCIUM mg/dL 9 1 9 1 8 9   AST U/L 19  --   --    ALT U/L 8*  --   --    ALK PHOS U/L 82  --   --    TOTAL PROTEIN g/dL 6 9  --   --    BILIRUBIN TOTAL mg/dL 0 41  --   --        Results from last 7 days  Lab Units 07/13/18  0638 07/12/18  0616 07/11/18  0558   WBC Thousand/uL 4 85 5 55 4 92   HEMOGLOBIN g/dL 8 7* 8 4* 6 3*   PLATELETS Thousands/uL 304 316 360           Imaging Studies:   I have personally reviewed pertinent imaging study reports and images in PACS  EKG, Pathology, and Other Studies:   I have personally reviewed pertinent reports

## 2018-07-16 LAB
ANION GAP SERPL CALCULATED.3IONS-SCNC: 6 MMOL/L (ref 4–13)
BASOPHILS # BLD AUTO: 0.04 THOUSANDS/ΜL (ref 0–0.1)
BASOPHILS NFR BLD AUTO: 1 % (ref 0–1)
BUN SERPL-MCNC: 20 MG/DL (ref 5–25)
CALCIUM SERPL-MCNC: 10.1 MG/DL (ref 8.3–10.1)
CHLORIDE SERPL-SCNC: 102 MMOL/L (ref 100–108)
CO2 SERPL-SCNC: 28 MMOL/L (ref 21–32)
CREAT SERPL-MCNC: 0.7 MG/DL (ref 0.6–1.3)
EOSINOPHIL # BLD AUTO: 0.23 THOUSAND/ΜL (ref 0–0.61)
EOSINOPHIL NFR BLD AUTO: 4 % (ref 0–6)
ERYTHROCYTE [DISTWIDTH] IN BLOOD BY AUTOMATED COUNT: 16.1 % (ref 11.6–15.1)
GFR SERPL CREATININE-BSD FRML MDRD: 94 ML/MIN/1.73SQ M
GLUCOSE SERPL-MCNC: 103 MG/DL (ref 65–140)
GLUCOSE SERPL-MCNC: 110 MG/DL (ref 65–140)
GLUCOSE SERPL-MCNC: 115 MG/DL (ref 65–140)
GLUCOSE SERPL-MCNC: 128 MG/DL (ref 65–140)
GLUCOSE SERPL-MCNC: 63 MG/DL (ref 65–140)
GLUCOSE SERPL-MCNC: 80 MG/DL (ref 65–140)
HCT VFR BLD AUTO: 34.7 % (ref 34.8–46.1)
HGB BLD-MCNC: 10.9 G/DL (ref 11.5–15.4)
LYMPHOCYTES # BLD AUTO: 2.12 THOUSANDS/ΜL (ref 0.6–4.47)
LYMPHOCYTES NFR BLD AUTO: 34 % (ref 14–44)
MCH RBC QN AUTO: 27.9 PG (ref 26.8–34.3)
MCHC RBC AUTO-ENTMCNC: 31.4 G/DL (ref 31.4–37.4)
MCV RBC AUTO: 89 FL (ref 82–98)
MONOCYTES # BLD AUTO: 0.32 THOUSAND/ΜL (ref 0.17–1.22)
MONOCYTES NFR BLD AUTO: 5 % (ref 4–12)
NEUTROPHILS # BLD AUTO: 3.56 THOUSANDS/ΜL (ref 1.85–7.62)
NEUTS SEG NFR BLD AUTO: 57 % (ref 43–75)
NRBC BLD AUTO-RTO: 0 /100 WBCS
PLATELET # BLD AUTO: 395 THOUSANDS/UL (ref 149–390)
PMV BLD AUTO: 9.4 FL (ref 8.9–12.7)
POTASSIUM SERPL-SCNC: 4.8 MMOL/L (ref 3.5–5.3)
RBC # BLD AUTO: 3.91 MILLION/UL (ref 3.81–5.12)
SODIUM SERPL-SCNC: 136 MMOL/L (ref 136–145)
WBC # BLD AUTO: 6.27 THOUSAND/UL (ref 4.31–10.16)

## 2018-07-16 PROCEDURE — 85025 COMPLETE CBC W/AUTO DIFF WBC: CPT | Performed by: INTERNAL MEDICINE

## 2018-07-16 PROCEDURE — 99232 SBSQ HOSP IP/OBS MODERATE 35: CPT | Performed by: INTERNAL MEDICINE

## 2018-07-16 PROCEDURE — 80048 BASIC METABOLIC PNL TOTAL CA: CPT | Performed by: INTERNAL MEDICINE

## 2018-07-16 PROCEDURE — 82948 REAGENT STRIP/BLOOD GLUCOSE: CPT

## 2018-07-16 RX ORDER — HEPARIN SODIUM 5000 [USP'U]/ML
5000 INJECTION, SOLUTION INTRAVENOUS; SUBCUTANEOUS EVERY 12 HOURS SCHEDULED
Status: DISCONTINUED | OUTPATIENT
Start: 2018-07-16 | End: 2018-07-21 | Stop reason: HOSPADM

## 2018-07-16 RX ADMIN — NYSTATIN: 100000 POWDER TOPICAL at 08:15

## 2018-07-16 RX ADMIN — ROPINIROLE 0.5 MG: 1 TABLET, FILM COATED ORAL at 08:13

## 2018-07-16 RX ADMIN — METHADONE HYDROCHLORIDE 15 MG: 10 TABLET ORAL at 08:14

## 2018-07-16 RX ADMIN — VANCOMYCIN HYDROCHLORIDE 1250 MG: 5 INJECTION, POWDER, LYOPHILIZED, FOR SOLUTION INTRAVENOUS at 08:15

## 2018-07-16 RX ADMIN — TRAMADOL HYDROCHLORIDE 50 MG: 50 TABLET, FILM COATED ORAL at 01:22

## 2018-07-16 RX ADMIN — SERTRALINE HYDROCHLORIDE 50 MG: 50 TABLET ORAL at 22:10

## 2018-07-16 RX ADMIN — CLONAZEPAM 1 MG: 1 TABLET ORAL at 17:23

## 2018-07-16 RX ADMIN — ROPINIROLE 0.5 MG: 1 TABLET, FILM COATED ORAL at 17:22

## 2018-07-16 RX ADMIN — ROPINIROLE 0.5 MG: 1 TABLET, FILM COATED ORAL at 20:26

## 2018-07-16 RX ADMIN — HYDROMORPHONE HYDROCHLORIDE 0.5 MG: 1 INJECTION, SOLUTION INTRAMUSCULAR; INTRAVENOUS; SUBCUTANEOUS at 04:54

## 2018-07-16 RX ADMIN — LISINOPRIL 20 MG: 20 TABLET ORAL at 08:15

## 2018-07-16 RX ADMIN — GABAPENTIN 600 MG: 300 CAPSULE ORAL at 17:22

## 2018-07-16 RX ADMIN — CLONAZEPAM 1 MG: 1 TABLET ORAL at 20:26

## 2018-07-16 RX ADMIN — NYSTATIN: 100000 POWDER TOPICAL at 20:27

## 2018-07-16 RX ADMIN — GABAPENTIN 600 MG: 300 CAPSULE ORAL at 08:13

## 2018-07-16 RX ADMIN — HEPARIN SODIUM 5000 UNITS: 5000 INJECTION, SOLUTION INTRAVENOUS; SUBCUTANEOUS at 20:27

## 2018-07-16 RX ADMIN — SODIUM CHLORIDE 75 ML/HR: 0.9 INJECTION, SOLUTION INTRAVENOUS at 14:29

## 2018-07-16 RX ADMIN — LAMOTRIGINE 150 MG: 100 TABLET ORAL at 08:14

## 2018-07-16 RX ADMIN — NYSTATIN: 100000 POWDER TOPICAL at 17:23

## 2018-07-16 RX ADMIN — GABAPENTIN 600 MG: 300 CAPSULE ORAL at 20:25

## 2018-07-16 RX ADMIN — CLONAZEPAM 1 MG: 1 TABLET ORAL at 08:13

## 2018-07-16 RX ADMIN — VANCOMYCIN HYDROCHLORIDE 1250 MG: 5 INJECTION, POWDER, LYOPHILIZED, FOR SOLUTION INTRAVENOUS at 20:27

## 2018-07-16 RX ADMIN — HYDROMORPHONE HYDROCHLORIDE 1 MG: 1 INJECTION, SOLUTION INTRAMUSCULAR; INTRAVENOUS; SUBCUTANEOUS at 13:49

## 2018-07-16 RX ADMIN — HEPARIN SODIUM 5000 UNITS: 5000 INJECTION, SOLUTION INTRAVENOUS; SUBCUTANEOUS at 10:26

## 2018-07-16 RX ADMIN — HYDROMORPHONE HYDROCHLORIDE 1 MG: 1 INJECTION, SOLUTION INTRAMUSCULAR; INTRAVENOUS; SUBCUTANEOUS at 20:34

## 2018-07-16 RX ADMIN — INSULIN GLARGINE 30 UNITS: 100 INJECTION, SOLUTION SUBCUTANEOUS at 08:31

## 2018-07-16 NOTE — WOUND OSTOMY CARE
Progress Note - Wound   Alysia Acrtato 65 y o  female MRN: [id number]  Unit/Bed#: E5 -01 Encounter: [id number]      Assessment:   Patient seen for wound assessment and VAC dressing change  Patient pre-medicated and sterile normal saline was instilled in VAC sponge--soaked for 25 minutes to decrease sponge removal pain  Right arm is erythematous (imprvoved from previous assessment)  Scant amount of serosanguinous drainage noted in VAC drainage canister  Open wound with approximated incision proximally and distally with sutures intact  Open aspect of wound measures 12 5 x 4 2 x 0 4 cm with exposed tendon  There is a 3 x 0 5 cm darkened area of tendon proximally  Wound bed is otherwise 100% beefy red granulation tissue  Wound irrigated with normal saline solution  3m no sting skin barrier film placed to periwound  Thin layer of white foam (1 piece) placed over exposed tendon and covered with 1 piece black sponge  VAC drape and trac pad applied  Suction resumed to 125 mmHg  Patient tolerated procedure better than Friday  Splint and ACE wrap applied per orthopedics  1 piece of Optifoam taped in placed around edge of splint where a sharp edge was noted  Patient continues to have limited movement of her right fingers--recommend OT consultation  Plan:   1  Elevate right arm on pillows  2  VAC dressing change every Monday, Wednesday, and Friday    Covering orthopedic Josselin OSULLIVAN, made aware of area of darkened exposed tendon and placement of white foam via tiger text  Vitals: Blood pressure 138/65, pulse 71, temperature 97 5 °F (36 4 °C), temperature source Temporal, resp  rate 19, height 5' 6" (1 676 m), weight 65 3 kg (144 lb), SpO2 96 %  ,Body mass index is 23 24 kg/m²      Negative Pressure Wound Therapy (V A C ) Arm Right (Active)   Unit Type Ulta 7/16/2018  1:00 PM   Black foam- # applied 1 7/16/2018  1:00 PM   White foam- # applied 1 7/16/2018  1:00 PM   Nonadherent (Adaptic)- # applied 0 7/16/2018  1:00 PM   Other- # applied 0 7/16/2018  1:00 PM   Cycle Continuous; On 7/16/2018  1:00 PM   Target Pressure (mmHg) 125 7/16/2018  1:00 PM   Canister Changed Yes 7/16/2018  1:00 PM   Dressing Status Clean;Dry; Intact 7/16/2018  1:00 PM   Black foam- # removed 1 7/16/2018  1:00 PM   White foam- # removed 0 7/16/2018  1:00 PM   Nonadherent (Adaptic)- # removed 1 7/16/2018  1:00 PM   Other- # removed 0 7/16/2018  1:00 PM   Output (mL) 50 mL 7/16/2018  1:00 PM       Incision 07/01/18 Arm Right (Active)   Incision Description Clean 7/16/2018  1:00 PM   Color Percentage 100% beefy red with exposed tendon  3 x 0 5 cm area of proximal exposed tendon is dark (brown) 7/16/2018  1:00 PM   Mari-wound Assessment Clean;Fragile;Erythema 7/16/2018  1:00 PM   Wound Length (cm) 1 5 cm 7/16/2018  1:00 PM   Wound Width (cm) 4 2 cm 7/16/2018  1:00 PM   Wound Depth (cm) 0 4 7/16/2018  1:00 PM   Calculated Wound Volume (cm^3) 2 52 cm^3 7/16/2018  1:00 PM   Change in Wound Size % 97 67 7/16/2018  1:00 PM   Closure Unapproximated 7/16/2018  1:00 PM   Drainage Amount Scant 7/16/2018  1:00 PM   Drainage Description Serosanguineous 7/16/2018  1:00 PM   Treatments Cleansed;Irrigation with NSS;Site care 7/16/2018  1:00 PM   Dressing Wound V A C  7/16/2018  1:00 PM   Dressing Changed Changed 7/16/2018  1:00 PM   Patient Tolerance Tolerated well 7/16/2018  1:00 PM   Dressing Status Dry;Clean; Intact 7/16/2018  1:00 PM     Priscilla Kwong BSN, RN, [employer]

## 2018-07-16 NOTE — PROGRESS NOTES
[facility] Internal Medicine Progress Note  Patient: Adrianna Olvera 65 y o  female   MRN: [id number]  PCP: Neli Lemos MD  Unit/Bed#: E5 -01 Encounter: [id number]  Date Of Visit: 18    Assessment:    Principal Problem:    Abscess of right forearm  Active Problems:    Hyponatremia    Right arm cellulitis    Benign essential hypertension    Type 2 diabetes mellitus, uncontrolled (HCC)    Abscess of tendon sheath of forearm, right    Anemia      Plan:    · Abscess of right forearm continues with wound VAC and dressing changed  will continue treatment for MRSA status post I and D through  has PICC line cannot be discharged home due to drug abuse history/she agrees to stay here till antibiotic course finished  · Diabetes mellitus type 2 poorly controlled due to noncompliance with glycohemoglobin as high as 13 4 adequate with coverage scale here and Lantus 30 units along with mealtime 8 units Humalog  · Hypertension continue lisinopril 20 mg daily  · Hepatitis-C is been explained to her the risk and and need for screening for hepatic carcinoma and suggested get GI follow-up  · Bipolar disorder continue Lamictal sertraline  · Chronic pain on methadone maintenance here, Requip, gabapentin also clonazepam      VTE Pharmacologic Prophylaxis:   Pharmacologic: Heparin  Mechanical VTE Prophylaxis in Place: Yes    Discussions with Specialists or Other Care Team Provider:  No    Time Spent for Care: 30 minutes  More than 50% of total time spent on counseling and coordination of care as described above  Subjective:   Some discomfort refer to the right forearm but pain relief mostly in check with methadone left arm PICC line in place stable    Objective:     Vitals:   Temp (24hrs), Av 6 °F (36 4 °C), Min:97 4 °F (36 3 °C), Max:97 9 °F (36 6 °C)    HR:  [63-78] 68  Resp:  [19-20] 20  BP: (157-158)/(72-93) 157/72  SpO2:  [97 %-98 %] 97 %  Body mass index is 23 24 kg/m²       Input and Output Summary (last 24 hours):     No intake or output data in the 24 hours ending 07/16/18 9042    Physical Exam:     Physical Exam:   General appearance: alert, appears stated age and cooperative  Head: Normocephalic, without obvious abnormality, atraumatic  Lungs: clear to auscultation bilaterally  Heart: regular rate and rhythm  Abdomen: soft, non-tender; bowel sounds normal; no masses,  no organomegaly  Back: negative  Extremities: Right forearm with Ace wrap and wound VAC in place and extremities normal, atraumatic, no cyanosis or edema  Neurologic: Grossly normal      Additional Data:     Labs:      Results from last 7 days  Lab Units 07/16/18  0451   WBC Thousand/uL 6 27   HEMOGLOBIN g/dL 10 9*   HEMATOCRIT % 34 7*   PLATELETS Thousands/uL 395*   NEUTROS PCT % 57   LYMPHS PCT % 34   MONOS PCT % 5   EOS PCT % 4       Results from last 7 days  Lab Units 07/16/18  0451 07/13/18  0638   SODIUM mmol/L 136 141   POTASSIUM mmol/L 4 8 4 0   CHLORIDE mmol/L 102 104   CO2 mmol/L 28 30   BUN mg/dL 20 9   CREATININE mg/dL 0 70 0 63   CALCIUM mg/dL 10 1 9 1   TOTAL PROTEIN g/dL  --  6 9   BILIRUBIN TOTAL mg/dL  --  0 41   ALK PHOS U/L  --  82   ALT U/L  --  8*   AST U/L  --  19   GLUCOSE RANDOM mg/dL 115 116           * I Have Reviewed All Lab Data Listed Above  * Additional Pertinent Lab Tests Reviewed: All Labs For Current Hospital Admission Reviewed    Imaging:  Xr Chest 2 Views    Result Date: 6/28/2018  Narrative: CHEST INDICATION:   pre surgical  COMPARISON:  9/24/2014 EXAM PERFORMED/VIEWS:  XR CHEST PA & LATERAL FINDINGS: Cardiomediastinal silhouette appears unremarkable  The lungs are clear  No pneumothorax or pleural effusion  Osseous structures appear within normal limits for patient age  Impression: No acute cardiopulmonary disease  Workstation performed: [id number]     Xr Humerus Right    Result Date: 7/13/2018  Narrative: RIGHT HUMERUS INDICATION:   Status post fall with pain   COMPARISON:  None VIEWS:  XR HUMERUS RIGHT Images: 3 FINDINGS: There is no acute fracture or dislocation  No degenerative changes  No lytic or blastic lesions are seen  Soft tissues are unremarkable  Impression: No acute osseous abnormality  Workstation performed: [id number]     Xr Forearm 2 Vw Right    Result Date: 7/13/2018  Narrative: RIGHT FOREARM INDICATION:   Status post fall with new pain  COMPARISON:  None VIEWS:  XR FOREARM 2 VW RIGHT Images: 2 FINDINGS: There is no acute fracture or dislocation  No degenerative changes  No lytic or blastic lesions are seen  There is a suction drain projecting over the mid forearm  Previously seen soft tissue swelling no longer present  Impression: No acute osseous abnormality  Workstation performed: [id number]     Xr Forearm 2 Vw Right    Result Date: 6/29/2018  Narrative: RIGHT FOREARM INDICATION:   right arm abscess  Cellulitis  COMPARISON:  None VIEWS:  XR FOREARM 2 VW RIGHT FINDINGS: There is no acute fracture or dislocation  No degenerative changes  No lytic or blastic lesions are seen  Cellulitis along the lateral and dorsal aspect of the forearm with a focal lump in the mid region  No gas  No osseous involvement  Impression: Cellulitis along the lateral and dorsal aspect of the forearm with a focal lump in the mid region  No gas  No osseous involvement  Abscess cannot be excluded on plain film and if there is continued clinical concern, consider ultrasound  Workstation performed: [id number]     Mri Forearm Right Wo Contrast    Result Date: 6/29/2018  Narrative: MRI RIGHT UPPER EXTREMITY WITHOUT CONTRAST - ( ) INDICATION:   abscess  COMPARISON:  None  TECHNIQUE:  MR examination of the right forearm was performed  Pulse sequences IV contrast was not given  Scan was performed on a 1 5 Selin Unit   FINDINGS: Unfortunately the patient could not tolerate the procedure secondary to discomfort and only localizer images and coronal T1 images were obtained prior to the termination of the exam  SUBCUTANEOUS TISSUES: There is subcutaneous edema of the soft tissues of the radial aspect of the right forearm  Questionable 1 9 x 1 3 cm collection along the radial aspect of the right forearm at the level of the distal diaphysis  BONES:  No fracture, dislocation or destructive osseous lesion on this limited evaluation  ARTICULAR SURFACES:  Unremarkable given exam limitations  VISUALIZED MUSCULATURE:  Poorly evaluated on single sequence  OTHER SOFT TISSUES:  Unremarkable  OTHER PERTINENT FINDINGS:  None  Impression: Nondiagnostic exam as only coronal T1 images were obtained before patient had to stop the exam secondary to discomfort  There is soft tissue swelling present along the radial aspect of the right forearm  Workstation performed: [id number]     Imaging Reports Reviewed Today Include:  Reviewed MRI of right upper extremity June 29th  Imaging Personally Reviewed by Myself Includes:    No results found       Recent Cultures (last 7 days):           Last 24 Hours Medication List:     Current Facility-Administered Medications:  acetaminophen 650 mg Oral Q6H PRN Yani Elizabeth PA-C    calcium carbonate 1,000 mg Oral Daily PRN Yani Elizabeth PA-C    clonazePAM 1 mg Oral TID Yani Elizabeth PA-C    gabapentin 600 mg Oral TID Ryan Guevara MD    hydrALAZINE 10 mg Intravenous Q6H PRN Claudette Blare, MD    HYDROmorphone 0 5 mg Intravenous Q6H PRN SKY Olivares    insulin glargine 30 Units Subcutaneous QAM Ryan Guevara MD    insulin lispro 1-5 Units Subcutaneous 4x Daily (AC & HS) David Arteaga MD    insulin lispro 8 Units Subcutaneous TID AC Ryan Guevara MD    lamoTRIgine 150 mg Oral Daily Yani Elizabeth PA-C    lisinopril 20 mg Oral Daily Ryan Guevara MD    methadone 15 mg Oral Daily David Arteaga MD    nystatin  Topical TID SKY Lopez    ondansetron 4 mg Intravenous Q6H PRN Yani Elizabeth PA-C    ondansetron 4 mg Oral Q6H PRN SKY Olivares    rOPINIRole 0 5 mg Oral TID SHI Greene-C    sertraline 50 mg Oral HS Becca Latonya, PA-C    sodium chloride 125 mL/hr Intravenous Continuous Zeus White PA-C Last Rate: Stopped (07/12/18 1810)   traMADol 50 mg Oral Q6H PRN Fredis Rush MD    vancomycin 17 5 mg/kg Intravenous Q12H Leonel Spring MD Last Rate: 1,250 mg (07/16/18 0815)        Today, Patient Was Seen By: Kristofer Pantoja MD    ** Please Note: Dragon 360 Dictation voice to text software may have been used in the creation of this document   **

## 2018-07-16 NOTE — WOUND OSTOMY CARE
Attempted to see patient for [facility] dressing change--patient refusing to have VAC dressing changed "unless I am knocked out   asleep "  Explained VAC dressing procedure to patient and that it is typically not performed with the patient asleep  Reviewed pain medication will be give just prior to dressing change and that adaptic was placed under VAC during last change which should aid in less painful removal   Reviewed that we will soak the sponge for 30 minutes with saline prior to VAC sponge removal to decrease pain  Patient agreeable to "try changing it one more time only if more pain medication is provided "  Also states if that does not help, patient will refuse any further VAC changes at bedside  Discussed patient concerns with Dr Luis F Marin medication increased for dressing changes  Plan in place to change dressing between 1-2 pm this afternoon        Miguel A Rose BSN, RN, Eric Tam ()

## 2018-07-16 NOTE — PROGRESS NOTES
Progress Note - Infectious Disease   Gianni Chowdary 65 y o  female MRN: [id number]  Unit/Bed#: E5 -01 Encounter: [id number]      Impression/Recommendations:  1   Right forearm cellulitis and abscess   Patient is status post I&D   Operative culture grew MRSA   Patient is slowly improving  Continue IV vancomycin    Monitor temperature/WBC  Serial forearm exams      2   Right forearm muscle necrosis, noted at surgery   This is secondary to MRSA abscess above   Consider myositis   Will treat with somewhat prolonged IV antibiotic course  Antibiotic plan as in above  Serial exams  Treat x 2 weeks total, another 5 days  Orthopedics plan for no further I&D noted  Continue VAC per Orthopedics Service      3   DM, poorly controlled, with hyperglycemia on admission   Hemoglobin A1c is 13 4   I discussed with patient in great detail regarding this  Theola Justice control hyperglycemia clearly contributes to development of cellulitis and abscess above   Blood sugar is much better controlled now  Management per Medicine Service       4   Noncompliance        Discussed with patient and family in detail regarding the above plan      Antibiotics:  Vancomycin # 13  POD # 9     Subjective:  Patient  is comfortable at rest   Her right forearm pain continues to improve  She is moving her fingers better  Temperature stays down   No further chills  She is tolerating antibiotic well   No nausea, vomiting or diarrhea   No dizziness or hearing loss  Patient's refusal of VAC change at bedside noted  Objective:  Vitals:  HR:  [63-68] 68  Resp:  [20] 20  BP: (157-158)/(72-93) 157/72  SpO2:  [97 %] 97 %  Temp (24hrs), Av 7 °F (36 5 °C), Min:97 4 °F (36 3 °C), Max:97 9 °F (36 6 °C)  Current: Temperature: 97 9 °F (36 6 °C)    Physical Exam:     General: Awake, alert, cooperative, no distress  Neck:  Supple  No mass  No lymphadenopathy     Lungs: Expansion symmetric, no rales, no wheezing, respirations unlabored  Heart:  Regular rate and rhythm, S1 and S2 normal, no murmur  Abdomen: Soft, nondistended, non-tender, bowel sounds active all four quadrants,        no masses, no organomegaly  Extremities: Right forearm wound with VAC  No purulence  Improve edema  Improved tenderness  Slowly improving ROM of fingers  Skin:  No rash  Neuro: Moves all extremities  Invasive Devices     Peripherally Inserted Central Catheter Line            PICC Line 35/16/17 Left Basilic 6 days          Drain            Closed/Suction Drain Right Other (Comment) Accordion 10 Fr  15 days    Negative Pressure Wound Therapy (V A C ) Arm Right 9 days                Labs studies:   I have personally reviewed pertinent labs  Results from last 7 days  Lab Units 07/16/18  0451 07/13/18  9378 07/12/18  0616   SODIUM mmol/L 136 141 140   POTASSIUM mmol/L 4 8 4 0 4 1   CHLORIDE mmol/L 102 104 104   CO2 mmol/L 28 30 29   ANION GAP mmol/L 6 7 7   BUN mg/dL 20 9 9   CREATININE mg/dL 0 70 0 63 0 53*   EGFR ml/min/1 73sq m 94 97 103   GLUCOSE RANDOM mg/dL 115 116 116   CALCIUM mg/dL 10 1 9 1 9 1   AST U/L  --  19  --    ALT U/L  --  8*  --    ALK PHOS U/L  --  82  --    TOTAL PROTEIN g/dL  --  6 9  --    BILIRUBIN TOTAL mg/dL  --  0 41  --        Results from last 7 days  Lab Units 07/16/18  0451 07/13/18  0638 07/12/18  0616   WBC Thousand/uL 6 27 4 85 5 55   HEMOGLOBIN g/dL 10 9* 8 7* 8 4*   PLATELETS Thousands/uL 395* 304 316           Imaging Studies:   I have personally reviewed pertinent imaging study reports and images in PACS  EKG, Pathology, and Other Studies:   I have personally reviewed pertinent reports

## 2018-07-17 LAB
ANION GAP SERPL CALCULATED.3IONS-SCNC: 9 MMOL/L (ref 4–13)
BASOPHILS # BLD AUTO: 0.03 THOUSANDS/ΜL (ref 0–0.1)
BASOPHILS NFR BLD AUTO: 1 % (ref 0–1)
BUN SERPL-MCNC: 24 MG/DL (ref 5–25)
CALCIUM SERPL-MCNC: 9.2 MG/DL (ref 8.3–10.1)
CHLORIDE SERPL-SCNC: 106 MMOL/L (ref 100–108)
CO2 SERPL-SCNC: 24 MMOL/L (ref 21–32)
CREAT SERPL-MCNC: 0.68 MG/DL (ref 0.6–1.3)
EOSINOPHIL # BLD AUTO: 0.15 THOUSAND/ΜL (ref 0–0.61)
EOSINOPHIL NFR BLD AUTO: 4 % (ref 0–6)
ERYTHROCYTE [DISTWIDTH] IN BLOOD BY AUTOMATED COUNT: 16 % (ref 11.6–15.1)
GFR SERPL CREATININE-BSD FRML MDRD: 95 ML/MIN/1.73SQ M
GLUCOSE SERPL-MCNC: 118 MG/DL (ref 65–140)
GLUCOSE SERPL-MCNC: 159 MG/DL (ref 65–140)
GLUCOSE SERPL-MCNC: 320 MG/DL (ref 65–140)
GLUCOSE SERPL-MCNC: 71 MG/DL (ref 65–140)
GLUCOSE SERPL-MCNC: 78 MG/DL (ref 65–140)
HCT VFR BLD AUTO: 38.6 % (ref 34.8–46.1)
HGB BLD-MCNC: 11.9 G/DL (ref 11.5–15.4)
LYMPHOCYTES # BLD AUTO: 1.41 THOUSANDS/ΜL (ref 0.6–4.47)
LYMPHOCYTES NFR BLD AUTO: 37 % (ref 14–44)
MCH RBC QN AUTO: 27.5 PG (ref 26.8–34.3)
MCHC RBC AUTO-ENTMCNC: 30.8 G/DL (ref 31.4–37.4)
MCV RBC AUTO: 89 FL (ref 82–98)
MONOCYTES # BLD AUTO: 0.18 THOUSAND/ΜL (ref 0.17–1.22)
MONOCYTES NFR BLD AUTO: 5 % (ref 4–12)
NEUTROPHILS # BLD AUTO: 2.04 THOUSANDS/ΜL (ref 1.85–7.62)
NEUTS SEG NFR BLD AUTO: 54 % (ref 43–75)
NRBC BLD AUTO-RTO: 0 /100 WBCS
PLATELET # BLD AUTO: 292 THOUSANDS/UL (ref 149–390)
PMV BLD AUTO: 9.5 FL (ref 8.9–12.7)
POTASSIUM SERPL-SCNC: 4.7 MMOL/L (ref 3.5–5.3)
RBC # BLD AUTO: 4.32 MILLION/UL (ref 3.81–5.12)
SODIUM SERPL-SCNC: 139 MMOL/L (ref 136–145)
WBC # BLD AUTO: 3.81 THOUSAND/UL (ref 4.31–10.16)

## 2018-07-17 PROCEDURE — 82948 REAGENT STRIP/BLOOD GLUCOSE: CPT

## 2018-07-17 PROCEDURE — 99232 SBSQ HOSP IP/OBS MODERATE 35: CPT | Performed by: INTERNAL MEDICINE

## 2018-07-17 PROCEDURE — 80048 BASIC METABOLIC PNL TOTAL CA: CPT | Performed by: INTERNAL MEDICINE

## 2018-07-17 PROCEDURE — 85025 COMPLETE CBC W/AUTO DIFF WBC: CPT | Performed by: INTERNAL MEDICINE

## 2018-07-17 RX ADMIN — SERTRALINE HYDROCHLORIDE 50 MG: 50 TABLET ORAL at 21:01

## 2018-07-17 RX ADMIN — CLONAZEPAM 1 MG: 1 TABLET ORAL at 20:50

## 2018-07-17 RX ADMIN — CLONAZEPAM 1 MG: 1 TABLET ORAL at 08:14

## 2018-07-17 RX ADMIN — NYSTATIN: 100000 POWDER TOPICAL at 20:51

## 2018-07-17 RX ADMIN — LAMOTRIGINE 150 MG: 100 TABLET ORAL at 08:12

## 2018-07-17 RX ADMIN — GABAPENTIN 600 MG: 300 CAPSULE ORAL at 08:13

## 2018-07-17 RX ADMIN — ROPINIROLE 0.5 MG: 1 TABLET, FILM COATED ORAL at 20:50

## 2018-07-17 RX ADMIN — HEPARIN SODIUM 5000 UNITS: 5000 INJECTION, SOLUTION INTRAVENOUS; SUBCUTANEOUS at 20:51

## 2018-07-17 RX ADMIN — ROPINIROLE 0.5 MG: 1 TABLET, FILM COATED ORAL at 15:36

## 2018-07-17 RX ADMIN — NYSTATIN: 100000 POWDER TOPICAL at 08:15

## 2018-07-17 RX ADMIN — INSULIN LISPRO 1 UNITS: 100 INJECTION, SOLUTION INTRAVENOUS; SUBCUTANEOUS at 08:14

## 2018-07-17 RX ADMIN — INSULIN GLARGINE 30 UNITS: 100 INJECTION, SOLUTION SUBCUTANEOUS at 08:15

## 2018-07-17 RX ADMIN — GABAPENTIN 600 MG: 300 CAPSULE ORAL at 20:53

## 2018-07-17 RX ADMIN — HYDROMORPHONE HYDROCHLORIDE 1 MG: 1 INJECTION, SOLUTION INTRAMUSCULAR; INTRAVENOUS; SUBCUTANEOUS at 09:57

## 2018-07-17 RX ADMIN — HYDROMORPHONE HYDROCHLORIDE 1 MG: 1 INJECTION, SOLUTION INTRAMUSCULAR; INTRAVENOUS; SUBCUTANEOUS at 20:52

## 2018-07-17 RX ADMIN — HEPARIN SODIUM 5000 UNITS: 5000 INJECTION, SOLUTION INTRAVENOUS; SUBCUTANEOUS at 08:12

## 2018-07-17 RX ADMIN — LISINOPRIL 20 MG: 20 TABLET ORAL at 08:13

## 2018-07-17 RX ADMIN — VANCOMYCIN HYDROCHLORIDE 1250 MG: 5 INJECTION, POWDER, LYOPHILIZED, FOR SOLUTION INTRAVENOUS at 20:51

## 2018-07-17 RX ADMIN — NYSTATIN: 100000 POWDER TOPICAL at 15:37

## 2018-07-17 RX ADMIN — METHADONE HYDROCHLORIDE 15 MG: 10 TABLET ORAL at 08:13

## 2018-07-17 RX ADMIN — VANCOMYCIN HYDROCHLORIDE 1250 MG: 5 INJECTION, POWDER, LYOPHILIZED, FOR SOLUTION INTRAVENOUS at 08:15

## 2018-07-17 RX ADMIN — ROPINIROLE 0.5 MG: 1 TABLET, FILM COATED ORAL at 08:13

## 2018-07-17 RX ADMIN — CLONAZEPAM 1 MG: 1 TABLET ORAL at 15:36

## 2018-07-17 RX ADMIN — INSULIN LISPRO 3 UNITS: 100 INJECTION, SOLUTION INTRAVENOUS; SUBCUTANEOUS at 21:01

## 2018-07-17 RX ADMIN — GABAPENTIN 600 MG: 300 CAPSULE ORAL at 15:35

## 2018-07-17 NOTE — CASE MANAGEMENT
Continued Stay Review    Date/POD#:    7/17/2018   POD   #  6    Vital Signs: /63 (BP Location: Left arm)   Pulse 65   Temp 98 4 °F (36 9 °C) (Temporal)   Resp 20   Ht 5' 6" (1 676 m)   Wt 65 3 kg (144 lb)   SpO2 98%   BMI 23 24 kg/m²     Medication:   Scheduled Meds:   Current Facility-Administered Medications:  acetaminophen 650 mg Oral Q6H PRN Delcia Lines, PA-C    calcium carbonate 1,000 mg Oral Daily PRN Delcia Lines, PA-C    clonazePAM 1 mg Oral TID Delcia Lines, PA-C    gabapentin 600 mg Oral TID Minda Armando MD    heparin (porcine) 5,000 Units Subcutaneous Q12H [address] Avelino Rico MD    hydrALAZINE 10 mg Intravenous Q6H PRN Tez Delgado MD    HYDROmorphone 1 mg Intravenous Q6H PRN Avelino Rico MD    insulin glargine 30 Units Subcutaneous QAM Minda Armando MD    insulin lispro 1-5 Units Subcutaneous 4x Daily (AC & HS) Jeffrey Flood MD    insulin lispro 8 Units Subcutaneous TID AC Minda Armando MD    lamoTRIgine 150 mg Oral Daily Delcia Arcenio, TABITHA    lisinopril 20 mg Oral Daily Minda Armando MD    methadone 15 mg Oral Daily Jeffrey Flood MD    nystatin  Topical TID SKY Valles    ondansetron 4 mg Intravenous Q6H PRN Delcia Lines, PA-C    ondansetron 4 mg Oral Q6H PRN SKY Salgado    rOPINIRole 0 5 mg Oral TID Delcia Lines, PA-C    sertraline 50 mg Oral HS Delcia Lines, PA-C    sodium chloride 75 mL/hr Intravenous Continuous Avelino Rico MD Last Rate: 75 mL/hr (07/16/18 1429)   traMADol 50 mg Oral Q6H PRN Jeffrey Flood MD    vancomycin 17 5 mg/kg Intravenous Q12H Jayme Guzman MD Last Rate: 1,250 mg (07/17/18 0815)     Continuous Infusions:   sodium chloride 75 mL/hr Last Rate: 75 mL/hr (07/16/18 1429)     PRN Meds:   acetaminophen    calcium carbonate    hydrALAZINE    HYDROmorphone    ondansetron    ondansetron    traMADol    Abnormal Labs/Diagnostic Results:   WBC   3 81    Age/Sex: 65 y o  female     · Assessment/Plan: Abscess of right forearm continues with wound VAC and dressing changed Donna Lorenzana by wound care every 3rd day will continue treatment for MRSA status post I and D through July 20th has PICC line cannot be discharged home due to drug abuse history/she agrees to stay here till antibiotic course finished  · Diabetes mellitus type 2 poorly controlled due to noncompliance with glycohemoglobin as high as 13 4 adequate with coverage scale here and Lantus 30 units along with mealtime 8 units Humalog and significantly improved  · Hypertension continue lisinopril 20 mg daily  · Hepatitis-C is been explained to her the risk and and need for screening for hepatic carcinoma and suggested get GI follow-up  · Bipolar disorder continue Lamictal sertraline  · Chronic pain on methadone maintenance here, Requip, gabapentin also clonazepam needs increased dosing of Dilaudid prior to wound care over right forearm       Discharge Plan:    Home    Thank you,  Saulo Ferreira  [facility] Utilization Review Department  Phone: [phone]; Fax [phone]  ATTENTION: The Network Utilization Review Department is now centralized for our 9 Facilities  Make a note that we have a new phone and fax numbers for our Department  Please call with any questions or concerns to [phone] and carefully follow the prompts so that you are directed to the right person  All voicemails are confidential  Fax any determinations, approvals, denials, and requests for initial or continue stay review clinical to [phone]  Due to HIGH CALL volume, it would be easier if you could please send faxed requests to expedite your requests and in part, help us provide discharge notifications faster

## 2018-07-17 NOTE — PROGRESS NOTES
Progress Note - Infectious Disease   Uma Roth 65 y o  female MRN: [id number]  Unit/Bed#: E5 -01 Encounter: [id number]      Impression/Recommendations:  1   Right forearm cellulitis and abscess   Patient is status post I&D   Operative culture grew MRSA   Patient is slowly improving  Continue IV vancomycin    Monitor temperature/WBC  Serial forearm exams      2   Right forearm muscle necrosis, noted at surgery   This is secondary to MRSA abscess above   Consider myositis   Will treat with somewhat prolonged IV antibiotic course  Darkened tendon at [facility] dressing change yesterday noted  Antibiotic plan as in above  Serial exams  Treat x 2 weeks total, another 4 days  Continue VAC per Orthopedics Service      3   DM, poorly controlled, with hyperglycemia on admission   Hemoglobin A1c is 13 4   I discussed with patient in great detail regarding this  Audrey Shi control hyperglycemia clearly contributes to development of cellulitis and abscess above   Blood sugar is much better controlled now  Management per Medicine Service       4   Noncompliance        Discussed with patient and family in detail regarding the above plan      Antibiotics:  Vancomycin # 14  POD # 10     Subjective:  Patient consented to vac dressing change yesterday afternoon  At present, pain is well controlled  She is moving her fingers better  Temperature stays down   No further chills  She is tolerating antibiotic well   No nausea, vomiting or diarrhea   No dizziness or hearing loss  Objective:  Vitals:  HR:  [65-71] 65  Resp:  [18-20] 20  BP: (138-146)/(63-67) 146/63  SpO2:  [95 %-98 %] 98 %  Temp (24hrs), Av °F (36 7 °C), Min:97 5 °F (36 4 °C), Max:98 4 °F (36 9 °C)  Current: Temperature: 98 4 °F (36 9 °C)    Physical Exam:     General: Awake, alert, cooperative, no distress  Neck:  Supple  No mass  No lymphadenopathy  Lungs: Expansion symmetric, no rales, no wheezing, respirations unlabored     Heart:  Regular rate and rhythm, S1 and S2 normal, no murmur  Abdomen: Soft, nondistended, non-tender, bowel sounds active all four quadrants,        no masses, no organomegaly  Extremities: Right arm wound with VAC minimal serous sanguinous drainage  Improve edema  Improved tenderness  Improving finger ROM  Skin:  No rash  Neuro: Moves all extremities  Invasive Devices     Peripherally Inserted Central Catheter Line            PICC Line 87/81/36 Left Basilic 7 days          Drain            Closed/Suction Drain Right Other (Comment) Accordion 10 Fr  16 days    Negative Pressure Wound Therapy (V A C ) Arm Right 10 days                Labs studies:   I have personally reviewed pertinent labs  Results from last 7 days  Lab Units 07/17/18  0538 07/16/18  0451 07/13/18  0638   SODIUM mmol/L 139 136 141   POTASSIUM mmol/L 4 7 4 8 4 0   CHLORIDE mmol/L 106 102 104   CO2 mmol/L 24 28 30   ANION GAP mmol/L 9 6 7   BUN mg/dL 24 20 9   CREATININE mg/dL 0 68 0 70 0 63   EGFR ml/min/1 73sq m 95 94 97   GLUCOSE RANDOM mg/dL 118 115 116   CALCIUM mg/dL 9 2 10 1 9 1   AST U/L  --   --  19   ALT U/L  --   --  8*   ALK PHOS U/L  --   --  82   TOTAL PROTEIN g/dL  --   --  6 9   BILIRUBIN TOTAL mg/dL  --   --  0 41       Results from last 7 days  Lab Units 07/17/18  0836 07/16/18  0451 07/13/18  0638   WBC Thousand/uL 3 81* 6 27 4 85   HEMOGLOBIN g/dL 11 9 10 9* 8 7*   PLATELETS Thousands/uL 292 395* 304           Imaging Studies:   I have personally reviewed pertinent imaging study reports and images in PACS  EKG, Pathology, and Other Studies:   I have personally reviewed pertinent reports

## 2018-07-17 NOTE — PROGRESS NOTES
[facility] Internal Medicine Progress Note  Patient: Uma Roth 65 y o  female   MRN: [id number]  PCP: Lisa Garcia MD  Unit/Bed#: E5 -01 Encounter: [id number]  Date Of Visit: 18    Assessment:    Principal Problem:    Abscess of right forearm  Active Problems:    Hyponatremia    Right arm cellulitis    Benign essential hypertension    Type 2 diabetes mellitus, uncontrolled ([address] )    Abscess of tendon sheath of forearm, right    Anemia      Plan:    · Abscess of right forearm continues with wound VAC and dressing changed Lou Shaker by wound care every 3rd day will continue treatment for MRSA status post I and D through  has PICC line cannot be discharged home due to drug abuse history/she agrees to stay here till antibiotic course finished  · Diabetes mellitus type 2 poorly controlled due to noncompliance with glycohemoglobin as high as 13 4 adequate with coverage scale here and Lantus 30 units along with mealtime 8 units Humalog and significantly improved  · Hypertension continue lisinopril 20 mg daily  · Hepatitis-C is been explained to her the risk and and need for screening for hepatic carcinoma and suggested get GI follow-up  · Bipolar disorder continue Lamictal sertraline  · Chronic pain on methadone maintenance here, Requip, gabapentin also clonazepam needs increased dosing of Dilaudid prior to wound care over right forearm      VTE Pharmacologic Prophylaxis:   Pharmacologic: Heparin  Mechanical VTE Prophylaxis in Place: Yes    Discussions with Specialists or Other Care Team Provider:  No    Time Spent for Care: 30 minutes  More than 50% of total time spent on counseling and coordination of care as described above        Subjective:   Some discomfort refer to the right forearm but pain relief mostly in check with methadone left arm PICC line in place stable    Objective:     Vitals:   Temp (24hrs), Av °F (36 7 °C), Min:97 5 °F (36 4 °C), Max:98 4 °F (36 9 °C)    HR:  [65-71] 65  Resp:  [18-20] 20  BP: (138-146)/(63-67) 146/63  SpO2:  [95 %-98 %] 98 %  Body mass index is 23 24 kg/m²  Input and Output Summary (last 24 hours): Intake/Output Summary (Last 24 hours) at 07/17/18 0818  Last data filed at 07/16/18 1300   Gross per 24 hour   Intake                0 ml   Output               50 ml   Net              -50 ml       Physical Exam:     Physical Exam:   General appearance: alert, appears stated age and cooperative  Head: Normocephalic, without obvious abnormality, atraumatic  Lungs: clear to auscultation bilaterally  Heart: regular rate and rhythm  Abdomen: soft, non-tender; bowel sounds normal; no masses,  no organomegaly  Back: negative  Extremities: Right forearm with Ace wrap and wound VAC in place and extremities normal, atraumatic, no cyanosis or edema  Neurologic: Grossly normal      Additional Data:     Labs:      Results from last 7 days  Lab Units 07/16/18  0451   WBC Thousand/uL 6 27   HEMOGLOBIN g/dL 10 9*   HEMATOCRIT % 34 7*   PLATELETS Thousands/uL 395*   NEUTROS PCT % 57   LYMPHS PCT % 34   MONOS PCT % 5   EOS PCT % 4       Results from last 7 days  Lab Units 07/17/18  0538  07/13/18  0638   SODIUM mmol/L 139  < > 141   POTASSIUM mmol/L 4 7  < > 4 0   CHLORIDE mmol/L 106  < > 104   CO2 mmol/L 24  < > 30   BUN mg/dL 24  < > 9   CREATININE mg/dL 0 68  < > 0 63   CALCIUM mg/dL 9 2  < > 9 1   TOTAL PROTEIN g/dL  --   --  6 9   BILIRUBIN TOTAL mg/dL  --   --  0 41   ALK PHOS U/L  --   --  82   ALT U/L  --   --  8*   AST U/L  --   --  19   GLUCOSE RANDOM mg/dL 118  < > 116   < > = values in this interval not displayed  * I Have Reviewed All Lab Data Listed Above  * Additional Pertinent Lab Tests Reviewed:  All Labs For Current Hospital Admission Reviewed    Imaging:  Xr Chest 2 Views    Result Date: 6/28/2018  Narrative: CHEST INDICATION:   pre surgical  COMPARISON:  9/24/2014 EXAM PERFORMED/VIEWS:  XR CHEST PA & LATERAL FINDINGS: Cardiomediastinal silhouette appears unremarkable  The lungs are clear  No pneumothorax or pleural effusion  Osseous structures appear within normal limits for patient age  Impression: No acute cardiopulmonary disease  Workstation performed: [id number]     Xr Humerus Right    Result Date: 7/13/2018  Narrative: RIGHT HUMERUS INDICATION:   Status post fall with pain  COMPARISON:  None VIEWS:  XR HUMERUS RIGHT Images: 3 FINDINGS: There is no acute fracture or dislocation  No degenerative changes  No lytic or blastic lesions are seen  Soft tissues are unremarkable  Impression: No acute osseous abnormality  Workstation performed: [id number]     Xr Forearm 2 Vw Right    Result Date: 7/13/2018  Narrative: RIGHT FOREARM INDICATION:   Status post fall with new pain  COMPARISON:  None VIEWS:  XR FOREARM 2 VW RIGHT Images: 2 FINDINGS: There is no acute fracture or dislocation  No degenerative changes  No lytic or blastic lesions are seen  There is a suction drain projecting over the mid forearm  Previously seen soft tissue swelling no longer present  Impression: No acute osseous abnormality  Workstation performed: [id number]     Xr Forearm 2 Vw Right    Result Date: 6/29/2018  Narrative: RIGHT FOREARM INDICATION:   right arm abscess  Cellulitis  COMPARISON:  None VIEWS:  XR FOREARM 2 VW RIGHT FINDINGS: There is no acute fracture or dislocation  No degenerative changes  No lytic or blastic lesions are seen  Cellulitis along the lateral and dorsal aspect of the forearm with a focal lump in the mid region  No gas  No osseous involvement  Impression: Cellulitis along the lateral and dorsal aspect of the forearm with a focal lump in the mid region  No gas  No osseous involvement  Abscess cannot be excluded on plain film and if there is continued clinical concern, consider ultrasound   Workstation performed: [id number]     Mri Forearm Right Wo Contrast    Result Date: 6/29/2018  Narrative: MRI RIGHT UPPER EXTREMITY WITHOUT CONTRAST - ( ) INDICATION:   abscess  COMPARISON:  None  TECHNIQUE:  MR examination of the right forearm was performed  Pulse sequences IV contrast was not given  Scan was performed on a 1 5 Selin Unit  FINDINGS: Unfortunately the patient could not tolerate the procedure secondary to discomfort and only localizer images and coronal T1 images were obtained prior to the termination of the exam  SUBCUTANEOUS TISSUES: There is subcutaneous edema of the soft tissues of the radial aspect of the right forearm  Questionable 1 9 x 1 3 cm collection along the radial aspect of the right forearm at the level of the distal diaphysis  BONES:  No fracture, dislocation or destructive osseous lesion on this limited evaluation  ARTICULAR SURFACES:  Unremarkable given exam limitations  VISUALIZED MUSCULATURE:  Poorly evaluated on single sequence  OTHER SOFT TISSUES:  Unremarkable  OTHER PERTINENT FINDINGS:  None  Impression: Nondiagnostic exam as only coronal T1 images were obtained before patient had to stop the exam secondary to discomfort  There is soft tissue swelling present along the radial aspect of the right forearm  Workstation performed: [id number]     Imaging Reports Reviewed Today Include:  Reviewed MRI of right upper extremity June 29th  Imaging Personally Reviewed by Myself Includes:    No results found       Recent Cultures (last 7 days):           Last 24 Hours Medication List:     Current Facility-Administered Medications:  acetaminophen 650 mg Oral Q6H PRN Kristopher Foster PA-C    calcium carbonate 1,000 mg Oral Daily PRN Kristopher Foster PA-C    clonazePAM 1 mg Oral TID Kristopher Foster PA-C    gabapentin 600 mg Oral TID Damari Burnett MD    heparin (porcine) 5,000 Units Subcutaneous Q12H [address] Guillermo Spring MD    hydrALAZINE 10 mg Intravenous Q6H PRN Cristiano Aguilar MD    HYDROmorphone 1 mg Intravenous Q6H PRN Guillermo pSring MD    insulin glargine 30 Units Subcutaneous QAM Damari Burnett MD    insulin lispro 1-5 Units Subcutaneous 4x Daily (AC & HS) Dorina Denver, MD    insulin lispro 8 Units Subcutaneous TID AC Rebeka Rosado MD    lamoTRIgine 150 mg Oral Daily Kourtney CranTABITHA maya    lisinopril 20 mg Oral Daily Rebeka Rosado MD    methadone 15 mg Oral Daily Dorina Denver, MD    nystatin  Topical TID SKY Ruggiero    ondansetron 4 mg Intravenous Q6H PRN Kourtney Crank, PA-NEAL    ondansetron 4 mg Oral Q6H PRN SKY Baker    rOPINIRole 0 5 mg Oral TID Kourtney Crank, TABITHA    sertraline 50 mg Oral HS Kourtney Cranfrancesco, TABITHA    sodium chloride 75 mL/hr Intravenous Continuous Ramses De Guzman MD Last Rate: 75 mL/hr (07/16/18 1429)   traMADol 50 mg Oral Q6H PRN Dorina Denver, MD    vancomycin 17 5 mg/kg Intravenous Q12H Reva Voss MD Last Rate: 1,250 mg (07/17/18 0815)        Today, Patient Was Seen By: Ramses De Guzman MD    ** Please Note: Dragon 360 Dictation voice to text software may have been used in the creation of this document   **

## 2018-07-18 LAB
ANION GAP SERPL CALCULATED.3IONS-SCNC: 6 MMOL/L (ref 4–13)
BASOPHILS # BLD AUTO: 0.04 THOUSANDS/ΜL (ref 0–0.1)
BASOPHILS NFR BLD AUTO: 1 % (ref 0–1)
BUN SERPL-MCNC: 23 MG/DL (ref 5–25)
CALCIUM SERPL-MCNC: 9.7 MG/DL (ref 8.3–10.1)
CHLORIDE SERPL-SCNC: 106 MMOL/L (ref 100–108)
CO2 SERPL-SCNC: 28 MMOL/L (ref 21–32)
CREAT SERPL-MCNC: 0.71 MG/DL (ref 0.6–1.3)
EOSINOPHIL # BLD AUTO: 0.13 THOUSAND/ΜL (ref 0–0.61)
EOSINOPHIL NFR BLD AUTO: 3 % (ref 0–6)
ERYTHROCYTE [DISTWIDTH] IN BLOOD BY AUTOMATED COUNT: 16 % (ref 11.6–15.1)
GFR SERPL CREATININE-BSD FRML MDRD: 92 ML/MIN/1.73SQ M
GLUCOSE SERPL-MCNC: 106 MG/DL (ref 65–140)
GLUCOSE SERPL-MCNC: 110 MG/DL (ref 65–140)
GLUCOSE SERPL-MCNC: 115 MG/DL (ref 65–140)
GLUCOSE SERPL-MCNC: 141 MG/DL (ref 65–140)
GLUCOSE SERPL-MCNC: 61 MG/DL (ref 65–140)
HCT VFR BLD AUTO: 32 % (ref 34.8–46.1)
HGB BLD-MCNC: 10 G/DL (ref 11.5–15.4)
LYMPHOCYTES # BLD AUTO: 1.84 THOUSANDS/ΜL (ref 0.6–4.47)
LYMPHOCYTES NFR BLD AUTO: 40 % (ref 14–44)
MCH RBC QN AUTO: 27.9 PG (ref 26.8–34.3)
MCHC RBC AUTO-ENTMCNC: 31.3 G/DL (ref 31.4–37.4)
MCV RBC AUTO: 89 FL (ref 82–98)
MONOCYTES # BLD AUTO: 0.25 THOUSAND/ΜL (ref 0.17–1.22)
MONOCYTES NFR BLD AUTO: 6 % (ref 4–12)
NEUTROPHILS # BLD AUTO: 2.31 THOUSANDS/ΜL (ref 1.85–7.62)
NEUTS SEG NFR BLD AUTO: 51 % (ref 43–75)
NRBC BLD AUTO-RTO: 0 /100 WBCS
PLATELET # BLD AUTO: 244 THOUSANDS/UL (ref 149–390)
PMV BLD AUTO: 9.7 FL (ref 8.9–12.7)
POTASSIUM SERPL-SCNC: 4.2 MMOL/L (ref 3.5–5.3)
RBC # BLD AUTO: 3.59 MILLION/UL (ref 3.81–5.12)
SODIUM SERPL-SCNC: 140 MMOL/L (ref 136–145)
WBC # BLD AUTO: 4.57 THOUSAND/UL (ref 4.31–10.16)

## 2018-07-18 PROCEDURE — 99232 SBSQ HOSP IP/OBS MODERATE 35: CPT | Performed by: INTERNAL MEDICINE

## 2018-07-18 PROCEDURE — 82948 REAGENT STRIP/BLOOD GLUCOSE: CPT

## 2018-07-18 PROCEDURE — 97535 SELF CARE MNGMENT TRAINING: CPT

## 2018-07-18 PROCEDURE — 97530 THERAPEUTIC ACTIVITIES: CPT

## 2018-07-18 PROCEDURE — 80048 BASIC METABOLIC PNL TOTAL CA: CPT | Performed by: INTERNAL MEDICINE

## 2018-07-18 PROCEDURE — 85025 COMPLETE CBC W/AUTO DIFF WBC: CPT | Performed by: INTERNAL MEDICINE

## 2018-07-18 PROCEDURE — 99024 POSTOP FOLLOW-UP VISIT: CPT | Performed by: ORTHOPAEDIC SURGERY

## 2018-07-18 RX ADMIN — LAMOTRIGINE 150 MG: 100 TABLET ORAL at 08:15

## 2018-07-18 RX ADMIN — ROPINIROLE 0.5 MG: 1 TABLET, FILM COATED ORAL at 20:37

## 2018-07-18 RX ADMIN — CLONAZEPAM 1 MG: 1 TABLET ORAL at 08:22

## 2018-07-18 RX ADMIN — LISINOPRIL 20 MG: 20 TABLET ORAL at 08:16

## 2018-07-18 RX ADMIN — INSULIN GLARGINE 30 UNITS: 100 INJECTION, SOLUTION SUBCUTANEOUS at 08:16

## 2018-07-18 RX ADMIN — VANCOMYCIN HYDROCHLORIDE 1250 MG: 5 INJECTION, POWDER, LYOPHILIZED, FOR SOLUTION INTRAVENOUS at 20:36

## 2018-07-18 RX ADMIN — GABAPENTIN 600 MG: 300 CAPSULE ORAL at 08:16

## 2018-07-18 RX ADMIN — NYSTATIN: 100000 POWDER TOPICAL at 20:41

## 2018-07-18 RX ADMIN — HEPARIN SODIUM 5000 UNITS: 5000 INJECTION, SOLUTION INTRAVENOUS; SUBCUTANEOUS at 08:16

## 2018-07-18 RX ADMIN — CLONAZEPAM 1 MG: 1 TABLET ORAL at 16:27

## 2018-07-18 RX ADMIN — GABAPENTIN 600 MG: 300 CAPSULE ORAL at 20:37

## 2018-07-18 RX ADMIN — SODIUM CHLORIDE 75 ML/HR: 0.9 INJECTION, SOLUTION INTRAVENOUS at 20:37

## 2018-07-18 RX ADMIN — HYDROMORPHONE HYDROCHLORIDE 1 MG: 1 INJECTION, SOLUTION INTRAMUSCULAR; INTRAVENOUS; SUBCUTANEOUS at 13:55

## 2018-07-18 RX ADMIN — NYSTATIN: 100000 POWDER TOPICAL at 08:07

## 2018-07-18 RX ADMIN — SERTRALINE HYDROCHLORIDE 50 MG: 50 TABLET ORAL at 21:33

## 2018-07-18 RX ADMIN — ROPINIROLE 0.5 MG: 1 TABLET, FILM COATED ORAL at 16:25

## 2018-07-18 RX ADMIN — METHADONE HYDROCHLORIDE 15 MG: 10 TABLET ORAL at 08:14

## 2018-07-18 RX ADMIN — GABAPENTIN 600 MG: 300 CAPSULE ORAL at 16:25

## 2018-07-18 RX ADMIN — SODIUM CHLORIDE 75 ML/HR: 0.9 INJECTION, SOLUTION INTRAVENOUS at 07:59

## 2018-07-18 RX ADMIN — ROPINIROLE 0.5 MG: 1 TABLET, FILM COATED ORAL at 08:14

## 2018-07-18 RX ADMIN — HYDROMORPHONE HYDROCHLORIDE 1 MG: 1 INJECTION, SOLUTION INTRAMUSCULAR; INTRAVENOUS; SUBCUTANEOUS at 21:33

## 2018-07-18 RX ADMIN — CLONAZEPAM 1 MG: 1 TABLET ORAL at 20:37

## 2018-07-18 RX ADMIN — HEPARIN SODIUM 5000 UNITS: 5000 INJECTION, SOLUTION INTRAVENOUS; SUBCUTANEOUS at 20:41

## 2018-07-18 RX ADMIN — NYSTATIN: 100000 POWDER TOPICAL at 16:27

## 2018-07-18 RX ADMIN — VANCOMYCIN HYDROCHLORIDE 1250 MG: 5 INJECTION, POWDER, LYOPHILIZED, FOR SOLUTION INTRAVENOUS at 07:59

## 2018-07-18 NOTE — PROGRESS NOTES
Progress Note - Infectious Disease   Leighton Cano 65 y o  female MRN: [id number]  Unit/Bed#: E5 -01 Encounter: [id number]      Impression/Recommendations:  1   Right forearm cellulitis and abscess   Patient is status post I&D   Operative culture grew MRSA   Patient is slowly improving  Continue IV vancomycin    Monitor temperature/WBC  Serial forearm exams      2   Right forearm muscle necrosis, noted at surgery   This is secondary to MRSA abscess above   Consider myositis   Will treat with somewhat prolonged IV antibiotic course  Darkened tendon at [facility] dressing change noted  Antibiotic plan as in above  Serial exams  Treat x 2 weeks total, another 3 days  Continue VAC per Orthopedics Service      3   DM, poorly controlled, with hyperglycemia on admission   Hemoglobin A1c is 13 4   I discussed with patient in great detail regarding this  Suezanne Finical control hyperglycemia clearly contributes to development of cellulitis and abscess above   Blood sugar is much better controlled now  Management per Medicine Service       4   Noncompliance        Discussed with patient and family in detail regarding the above plan  Discussed with Dr Keila Lund from [facility] service      Antibiotics:  Vancomycin # 15  POD # 11     Subjective:  Patient feels well  Right forearm pain controlled  She is moving her fingers better  Temperature stays down   No further chills  She is tolerating antibiotic well   No nausea, vomiting or diarrhea   No dizziness or hearing loss  Objective:  Vitals:  HR:  [62-97] 62  Resp:  [18-20] 18  BP: (108-179)/(55-79) 179/79  SpO2:  [94 %-99 %] 99 %  Temp (24hrs), Av 1 °F (36 7 °C), Min:97 2 °F (36 2 °C), Max:99 °F (37 2 °C)  Current: Temperature: 98 °F (36 7 °C)    Physical Exam:     General: Awake, alert, cooperative, no distress  Neck:  Supple  No mass  No lymphadenopathy  Lungs: Expansion symmetric, no rales, no wheezing, respirations unlabored     Heart:  Regular rate and rhythm, S1 and S2 normal, no murmur  Abdomen: Soft, nondistended, non-tender, bowel sounds active all four quadrants,        no masses, no organomegaly  Extremities: Right forearm wound with VAC in place  No purulence  Improve edema  Improved tenderness  The improving finger ROM  Skin:  No rash  Neuro: Moves all extremities  Invasive Devices     Peripherally Inserted Central Catheter Line            PICC Line 36/96/59 Left Basilic 8 days          Drain            Closed/Suction Drain Right Other (Comment) Accordion 10 Fr  17 days    Negative Pressure Wound Therapy (V A C ) Arm Right 11 days                Labs studies:   I have personally reviewed pertinent labs  Results from last 7 days  Lab Units 07/18/18  0544 07/17/18  0538 07/16/18  0451 07/13/18  0638   SODIUM mmol/L 140 139 136 141   POTASSIUM mmol/L 4 2 4 7 4 8 4 0   CHLORIDE mmol/L 106 106 102 104   CO2 mmol/L 28 24 28 30   ANION GAP mmol/L 6 9 6 7   BUN mg/dL 23 24 20 9   CREATININE mg/dL 0 71 0 68 0 70 0 63   EGFR ml/min/1 73sq m 92 95 94 97   GLUCOSE RANDOM mg/dL 110 118 115 116   CALCIUM mg/dL 9 7 9 2 10 1 9 1   AST U/L  --   --   --  19   ALT U/L  --   --   --  8*   ALK PHOS U/L  --   --   --  82   TOTAL PROTEIN g/dL  --   --   --  6 9   BILIRUBIN TOTAL mg/dL  --   --   --  0 41       Results from last 7 days  Lab Units 07/18/18  0544 07/17/18  0836 07/16/18  0451   WBC Thousand/uL 4 57 3 81* 6 27   HEMOGLOBIN g/dL 10 0* 11 9 10 9*   PLATELETS Thousands/uL 244 292 395*           Imaging Studies:   I have personally reviewed pertinent imaging study reports and images in PACS  EKG, Pathology, and Other Studies:   I have personally reviewed pertinent reports

## 2018-07-18 NOTE — WOUND OSTOMY CARE
Progress Note - Wound   Yoseph Marielena 65 y o  female MRN: [id number]  Unit/Bed#: E5 -01 Encounter: [id number]      Assessment:   Patient seen for wound assessment and VAC dressing change  Patient pre-medicated by primary RN  60 ml sterile normal saline were instilled into VAC sponge, allowed to soak for 25 minutes to aid in [facility] dressing removal       Right arm is mildly erythematous  Patient reports pain when hand is dependent  No drainage present in [facility]  Canister  Periwound skin is fragile, but intact  Patient has approximate incision with sutures intact proximal and distal to open wound over forearm  Open area measures 11 5 x 4 x 0 4 with tendon exposed  Darkened area of tendon proximally unchanged from prior assessment on 7/16/18  Wound base is otherwise 100% beefy red granulation tissue  Wound irrigated with normal saline solution  3m no sting skin barrier film placed to periwound  Thin layer of white foam (1 piece) placed over exposed tendon and covered with 1 piece black sponge  VAC drape and trac pad applied  Suction resumed at 125 mmHg  Patient tolerated procedure well  Daughter and granddaughter at bedside for emotional support during dressing change  Splint and ACE wrap applied per orthopedics--patient reports that the foam applied to distal end of splint is working well to cover the sharp edge  Plan:   1   VAC dressing change every Monday, Wednesday, and Friday  2   Plastic surgery consult for possible graft  Negative Pressure Wound Therapy (V A C ) Arm Right (Active)   Unit Type Ulta 7/18/2018  1:30 PM   Black foam- # applied 1 7/18/2018  1:30 PM   White foam- # applied 1 7/18/2018  1:30 PM   Nonadherent (Adaptic)- # applied 0 7/18/2018  1:30 PM   Other- # applied 0 7/18/2018  1:30 PM   Cycle Continuous; On 7/18/2018  1:30 PM   Target Pressure (mmHg) 125 7/18/2018  1:30 PM   Canister Changed No 7/18/2018  1:30 PM   Dressing Status Clean;Dry; Intact 7/18/2018  1:30 PM Black foam- # removed 1 7/18/2018  1:30 PM   White foam- # removed 1 7/18/2018  1:30 PM   Nonadherent (Adaptic)- # removed 0 7/18/2018  1:30 PM   Other- # removed 0 7/18/2018  1:30 PM   Output (mL) 0 mL 7/18/2018  1:30 PM       Incision 07/01/18 Arm Right (Active)   Incision Description Clean 7/18/2018  1:30 PM   Color Percentage 100% beefy red with exposed tendon  Proximal portion of tendon is dark (unchanged from 7/16) 7/18/2018  1:30 PM   Mari-wound Assessment Erythema;Fragile 7/18/2018  1:30 PM   Wound Length (cm) 11 5 cm 7/18/2018  1:30 PM   Wound Width (cm) 4 cm 7/18/2018  1:30 PM   Wound Depth (cm) 0 4 7/18/2018  1:30 PM   Calculated Wound Volume (cm^3) 18 4 cm^3 7/18/2018  1:30 PM   Change in Wound Size % 82 96 7/18/2018  1:30 PM   Closure Unapproximated 7/18/2018  1:30 PM   Drainage Amount None 7/18/2018  1:30 PM   Drainage Description Serosanguineous 7/16/2018  1:00 PM   Treatments Cleansed;Site care;Irrigation with NSS 7/18/2018  1:30 PM   Dressing Wound V A C  7/18/2018  1:30 PM   Dressing Changed Changed 7/18/2018  1:30 PM   Patient Tolerance Tolerated well 7/18/2018  1:30 PM   Dressing Status Clean;Dry; Intact 7/18/2018  1:30 PM     Elizabeth BENITEZN, RN, Valdo Garcia

## 2018-07-18 NOTE — NURSING NOTE
Last blood sugar taken was 61  Insulin with dinner was not given  Patient given orange juice and ordered dinner

## 2018-07-18 NOTE — PROGRESS NOTES
Progress Note - Orthopedics   Yoseph Peguero 65 y o  female MRN: [id number]  Unit/Bed#: E5 -01 Encounter: [id number]    Assessment:    64 yo female s/p I&D forearm abscess 7/1/18, s/p I&D right forearm with excisional debridement of nonviable tissue on 7/7/18, s/p I&D right forearm on 7/11/18    Plan:   Continue with wound vac changes Q Mon, Wed, Fri  Pain control prn  abx per ID  Med mgt per SLIM  Maintain splint for now  F/u with plastic surgery/hand surgery as outpatient as soon as possible    Subjective:  Patient seen examined  Admits pain in right forearm  Vitals: Blood pressure 135/69, pulse 85, temperature 98 9 °F (37 2 °C), resp  rate 18, height 5' 6" (1 676 m), weight 65 3 kg (144 lb), SpO2 95 %  ,Body mass index is 23 24 kg/m²  Intake/Output Summary (Last 24 hours) at 07/18/18 1826  Last data filed at 07/18/18 1330   Gross per 24 hour   Intake             1000 ml   Output                0 ml   Net             1000 ml       Invasive Devices     Peripherally Inserted Central Catheter Line            PICC Line 62/41/04 Left Basilic 8 days          Drain            Closed/Suction Drain Right Other (Comment) Accordion 10 Fr  17 days    Negative Pressure Wound Therapy (V A C ) Arm Right 11 days                Ortho Exam:   R forearm:  Splint/wound vac:   Intact  +F/E finger  Sensation grossly intact median/ulnar/radial nerves  Brisk capillary refill    Lab, Imaging and other studies:   CBC:   Lab Results   Component Value Date    WBC 4 57 07/18/2018    HGB 10 0 (L) 07/18/2018    HCT 32 0 (L) 07/18/2018    MCV 89 07/18/2018     07/18/2018    MCH 27 9 07/18/2018    MCHC 31 3 (L) 07/18/2018    RDW 16 0 (H) 07/18/2018    MPV 9 7 07/18/2018    NRBC 0 07/18/2018     CMP:   Lab Results   Component Value Date     07/18/2018     07/18/2018    CO2 28 07/18/2018    ANIONGAP 6 07/18/2018    BUN 23 07/18/2018    CREATININE 0 71 07/18/2018    GLUCOSE 110 07/18/2018    CALCIUM 9 7 07/18/2018 EGFR 92 07/18/2018

## 2018-07-18 NOTE — OCCUPATIONAL THERAPY NOTE
Occupational Therapy Treatment Note:         07/18/18 0848   Restrictions/Precautions   Weight Bearing Precautions Per Order No   Other Precautions Fall Risk;Pain;Cognitive   Pain Assessment   Pain Assessment 0-10   Pain Score 8   Pain Type Surgical pain   Pain Location Arm   Pain Orientation Right   ADL   Where Assessed Edge of bed   Grooming Assistance 5  Supervision/Setup   Grooming Deficit Setup   LB Bathing Assistance 5  Supervision/Setup   LB Bathing Deficit Setup   UB Dressing Assistance 5  Supervision/Setup   UB Dressing Deficit Setup   LB Dressing Assistance 5  Supervision/Setup   LB Dressing Deficit Setup   Toileting Assistance  5  Supervision/Setup   Toileting Deficit Setup   Functional Standing Tolerance   Time 6 mins   Activity dynamic stand balance activity  Comments cues for safety reqiured with activity  Bed Mobility   Supine to Sit 5  Supervision   Additional items Assist x 1   Sit to Supine 5  Supervision   Additional items Assist x 1   Transfers   Sit to Stand 5  Supervision   Additional items Assist x 1   Stand to Sit 5  Supervision   Additional items Assist x 1   Stand pivot 5  Supervision   Additional items Assist x 1   Toilet transfer 5  Supervision   Additional items Assist x 1   Functional Mobility   Functional Mobility 5  Supervision   Additional Comments x1   Additional items (cues for safety required  )   Cognition   Overall Cognitive Status Impaired   Arousal/Participation Alert; Cooperative;Responsive   Attention Attends with cues to redirect   Orientation Level Oriented X4   Memory Decreased short term memory;Decreased recall of precautions   Following Commands Follows multistep commands with increased time or repetition   Comments Pt with slow processing noted  Pt's family present during tx session  Activity Tolerance   Activity Tolerance Patient limited by fatigue;Patient limited by pain   Medical Staff Made Aware Reported all findings to nursing staff      Assessment Assessment Pt was seen for skilled OT with focus on completion of self care tasks, BUE ROM exercises, PROM exercises to RUE/digits and review of current plan of care  Pt's  and family present during tx session  Reviewed Pt need not only to elevate affected RUE but to engage AROM to shoulder/elbow and PROM to digits in RUE  Pt was educated on towel glide exercises to promote flexion/extension exercises in digits 1-5 on RUE  Pt with noted increase in pain levels with activity  Educated Pt on PROM for isolated digit flexion/extension exercises to inhibit noted edema  Pt able to return demonstration with F carry over noted  Pt was provided with both playing cards and stress ball to encourage usage with EOB positioning  Encouraged activity as tolerated BID at a minimum for 3 sets of 10 reps with towel glide and stress ball exercises  Encouraged functional use of RUE with playing cards using supination and pronation of affected RUE  See above levels of A required for all functional tasks  Pt was able to complete self toileting and LE dressing with SBA overall  No LOB noted with functional tasks  Pt stated, "I've been doing everything for myself"  Pt will benefit from outpatient services upon discharge as well as a home safety evaluation  Plan   Treatment Interventions ADL retraining;Functional transfer training;Cognitive reorientation; Endurance training   Goal Expiration Date 07/22/18   Treatment Day 3   OT Frequency 3-5x/wk   Recommendation   OT Discharge Recommendation Short Term Rehab   Barthel Index   Feeding 5   Bathing 0   Grooming Score 0   Dressing Score 5   Bladder Score 10   Bowels Score 10   Toilet Use Score 5   Transfers (Bed/Chair) Score 10   Mobility (Level Surface) Score 0   Stairs Score 0   Barthel Index Score 45   Modified [place] Scale   Modified [place] Scale 4   Taryn Henriquez, [address]

## 2018-07-18 NOTE — PLAN OF CARE
Problem: OCCUPATIONAL THERAPY ADULT  Goal: Performs self-care activities at highest level of function for planned discharge setting  See evaluation for individualized goals  Treatment Interventions: ADL retraining, Functional transfer training, UE strengthening/ROM, Endurance training, Equipment evaluation/education, Cognitive reorientation, Patient/family training, Compensatory technique education, Energy conservation, Activityengagement          See flowsheet documentation for full assessment, interventions and recommendations  Outcome: Progressing  Limitation: Decreased ADL status, Decreased UE ROM, Decreased UE strength, Decreased Safe judgement during ADL, Decreased cognition, Decreased endurance, Decreased self-care trans, Decreased high-level ADLs  Prognosis: Good  Assessment: Pt was seen for skilled OT with focus on completion of self care tasks, BUE ROM exercises, PROM exercises to RUE/digits and review of current plan of care  Pt's  and family present during tx session  Reviewed Pt need not only to elevate affected RUE but to engage AROM to shoulder/elbow and PROM to digits in RUE  Pt was educated on towel glide exercises to promote flexion/extension exercises in digits 1-5 on RUE  Pt with noted increase in pain levels with activity  Educated Pt on PROM for isolated digit flexion/extension exercises to inhibit noted edema  Pt able to return demonstration with F carry over noted  Pt was provided with both playing cards and stress ball to encourage usage with EOB positioning  Encouraged activity as tolerated BID at a minimum for 3 sets of 10 reps with towel glide and stress ball exercises  Encouraged functional use of RUE with playing cards using supination and pronation of affected RUE  See above levels of A required for all functional tasks  Pt was able to complete self toileting and LE dressing with SBA overall  No LOB noted with functional tasks   Pt stated, "I've been doing everything for myself"  Pt will benefit from outpatient services upon discharge as well as a home safety evaluation         OT Discharge Recommendation: Short Term Rehab         Comments: Ang Kumar, [address]

## 2018-07-18 NOTE — PROGRESS NOTES
[facility] Internal Medicine Progress Note  Patient:  Neyda Eaton 65 y o  female   MRN: [id number]  PCP: Chai Klein MD  Unit/Bed#: E5 -01 Encounter: [id number]  Date Of Visit: 07/18/18    Assessment:    Principal Problem:    Abscess of right forearm  Active Problems:    Hyponatremia    Right arm cellulitis    Benign essential hypertension    Type 2 diabetes mellitus, uncontrolled ([address] )    Abscess of tendon sheath of forearm, right    Anemia      Plan:    · Abscess of right forearm continues with wound VAC and dressing changed [id number] and do today by wound care every other day will continue treatment for MRSA status post I and D through July 20th has PICC line cannot be discharged home due to drug abuse history/she agrees to stay here till antibiotic course finished/again stressed to her the importance of follow-up and awaiting Plastic surgery input for outpatient follow-up care  · Diabetes mellitus type 2 poorly controlled due to noncompliance with glycohemoglobin as high as 13 4 adequate with coverage scale here and Lantus 30 units along with mealtime 8 units Humalog and significantly improved and I again a stress the importance of vigilant on her blood sugar management as essential for wound care  · Hypertension continue lisinopril 20 mg daily  · Hepatitis-C is been explained to her the risk and and need for screening for hepatic carcinoma and suggested get GI follow-up  · Bipolar disorder continue Lamictal sertraline  · Chronic pain on methadone maintenance here, Requip, gabapentin also clonazepam needs increased dosing of Dilaudid prior to wound care over right forearm but continues to be an issue in spite of this and yesterday stated to staff that she will bring her own pain medications into the hospital       VTE Pharmacologic Prophylaxis:   Pharmacologic: Heparin  Mechanical VTE Prophylaxis in Place: Yes    Discussions with Specialists or Other Care Team Provider:  No    Time Spent for Care: 30 minutes  More than 50% of total time spent on counseling and coordination of care as described above  Subjective:   Some discomfort refer to the right forearm but pain relief mostly in check with methadone left arm PICC line in place stable/I am not altogether sure that this patient will be compliant with follow-up plans including care of wound insulin maintenance and need for follow-up with plastics service she keeps dressing her inability to get transportation a tried to assure also that VNA will be set up for wound care as outpatient but will also need outpatient follow-up with Plastic surgery/Orthopedics    Objective:     Vitals:   Temp (24hrs), Av 1 °F (36 7 °C), Min:97 2 °F (36 2 °C), Max:99 °F (37 2 °C)    HR:  [62-97] 62  Resp:  [18-20] 18  BP: (108-179)/(55-79) 179/79  SpO2:  [94 %-99 %] 99 %  Body mass index is 23 24 kg/m²  Input and Output Summary (last 24 hours):        Intake/Output Summary (Last 24 hours) at 18 4422  Last data filed at 18 0756   Gross per 24 hour   Intake             1640 ml   Output                0 ml   Net             1640 ml       Physical Exam:     Physical Exam:   General appearance: alert, appears stated age and cooperative  Head: Normocephalic, without obvious abnormality, atraumatic  Lungs: clear to auscultation bilaterally  Heart: regular rate and rhythm  Abdomen: soft, non-tender; bowel sounds normal; no masses,  no organomegaly  Back: negative  Extremities: Right forearm with Ace wrap and wound VAC in place and extremities normal, atraumatic, no cyanosis or edema  Neurologic: Grossly normal      Additional Data:     Labs:      Results from last 7 days  Lab Units 18  0544   WBC Thousand/uL 4 57   HEMOGLOBIN g/dL 10 0*   HEMATOCRIT % 32 0*   PLATELETS Thousands/uL 244   NEUTROS PCT % 51   LYMPHS PCT % 40   MONOS PCT % 6   EOS PCT % 3       Results from last 7 days  Lab Units 18  0544  18  0638   SODIUM mmol/L 140  < > 141   POTASSIUM mmol/L 4 2  < > 4 0   CHLORIDE mmol/L 106  < > 104   CO2 mmol/L 28  < > 30   BUN mg/dL 23  < > 9   CREATININE mg/dL 0 71  < > 0 63   CALCIUM mg/dL 9 7  < > 9 1   TOTAL PROTEIN g/dL  --   --  6 9   BILIRUBIN TOTAL mg/dL  --   --  0 41   ALK PHOS U/L  --   --  82   ALT U/L  --   --  8*   AST U/L  --   --  19   GLUCOSE RANDOM mg/dL 110  < > 116   < > = values in this interval not displayed  * I Have Reviewed All Lab Data Listed Above  * Additional Pertinent Lab Tests Reviewed: All Labs For Current Hospital Admission Reviewed    Imaging:  Xr Chest 2 Views    Result Date: 6/28/2018  Narrative: CHEST INDICATION:   pre surgical  COMPARISON:  9/24/2014 EXAM PERFORMED/VIEWS:  XR CHEST PA & LATERAL FINDINGS: Cardiomediastinal silhouette appears unremarkable  The lungs are clear  No pneumothorax or pleural effusion  Osseous structures appear within normal limits for patient age  Impression: No acute cardiopulmonary disease  Workstation performed: [id number]     Xr Humerus Right    Result Date: 7/13/2018  Narrative: RIGHT HUMERUS INDICATION:   Status post fall with pain  COMPARISON:  None VIEWS:  XR HUMERUS RIGHT Images: 3 FINDINGS: There is no acute fracture or dislocation  No degenerative changes  No lytic or blastic lesions are seen  Soft tissues are unremarkable  Impression: No acute osseous abnormality  Workstation performed: [id number]     Xr Forearm 2 Vw Right    Result Date: 7/13/2018  Narrative: RIGHT FOREARM INDICATION:   Status post fall with new pain  COMPARISON:  None VIEWS:  XR FOREARM 2 VW RIGHT Images: 2 FINDINGS: There is no acute fracture or dislocation  No degenerative changes  No lytic or blastic lesions are seen  There is a suction drain projecting over the mid forearm  Previously seen soft tissue swelling no longer present  Impression: No acute osseous abnormality   Workstation performed: [id number]     Xr Forearm 2 Vw Right    Result Date: 6/29/2018  Narrative: RIGHT FOREARM INDICATION: right arm abscess  Cellulitis  COMPARISON:  None VIEWS:  XR FOREARM 2 VW RIGHT FINDINGS: There is no acute fracture or dislocation  No degenerative changes  No lytic or blastic lesions are seen  Cellulitis along the lateral and dorsal aspect of the forearm with a focal lump in the mid region  No gas  No osseous involvement  Impression: Cellulitis along the lateral and dorsal aspect of the forearm with a focal lump in the mid region  No gas  No osseous involvement  Abscess cannot be excluded on plain film and if there is continued clinical concern, consider ultrasound  Workstation performed: [id number]     Mri Forearm Right Wo Contrast    Result Date: 6/29/2018  Narrative: MRI RIGHT UPPER EXTREMITY WITHOUT CONTRAST - ( ) INDICATION:   abscess  COMPARISON:  None  TECHNIQUE:  MR examination of the right forearm was performed  Pulse sequences IV contrast was not given  Scan was performed on a 1 5 Selin Unit  FINDINGS: Unfortunately the patient could not tolerate the procedure secondary to discomfort and only localizer images and coronal T1 images were obtained prior to the termination of the exam  SUBCUTANEOUS TISSUES: There is subcutaneous edema of the soft tissues of the radial aspect of the right forearm  Questionable 1 9 x 1 3 cm collection along the radial aspect of the right forearm at the level of the distal diaphysis  BONES:  No fracture, dislocation or destructive osseous lesion on this limited evaluation  ARTICULAR SURFACES:  Unremarkable given exam limitations  VISUALIZED MUSCULATURE:  Poorly evaluated on single sequence  OTHER SOFT TISSUES:  Unremarkable  OTHER PERTINENT FINDINGS:  None  Impression: Nondiagnostic exam as only coronal T1 images were obtained before patient had to stop the exam secondary to discomfort  There is soft tissue swelling present along the radial aspect of the right forearm   Workstation performed: [id number]     Imaging Reports Reviewed Today Include:  Reviewed MRI of right upper extremity June 29th  Imaging Personally Reviewed by Myself Includes:    No results found  Recent Cultures (last 7 days):           Last 24 Hours Medication List:     Current Facility-Administered Medications:  acetaminophen 650 mg Oral Q6H PRN Saadia Cellar, PA-C    calcium carbonate 1,000 mg Oral Daily PRN Saadia Cellar, PA-C    clonazePAM 1 mg Oral TID Saadia Cellar, PA-C    gabapentin 600 mg Oral TID Cathi Hitchcock MD    heparin (porcine) 5,000 Units Subcutaneous Q12H [address] Sebastian Buenrostro MD    hydrALAZINE 10 mg Intravenous Q6H PRN Rafael Moore MD    HYDROmorphone 1 mg Intravenous Q6H PRN Sebastian Buenrostro MD    insulin glargine 30 Units Subcutaneous QAM Cathi Hitchcock MD    insulin lispro 1-5 Units Subcutaneous 4x Daily (AC & HS) Emilio Hauser MD    insulin lispro 8 Units Subcutaneous TID AC Cathi Hitchcock MD    lamoTRIgine 150 mg Oral Daily Saadia Cellar, PA-C    lisinopril 20 mg Oral Daily Cathi Hitchcock MD    methadone 15 mg Oral Daily Emilio Hauser MD    nystatin  Topical TID Delmy Font, CRNP    ondansetron 4 mg Intravenous Q6H PRN Saadia Cellar, PA-C    ondansetron 4 mg Oral Q6H PRN Pina Cornelius, CRNP    rOPINIRole 0 5 mg Oral TID Saadia Cellar, PA-C    sertraline 50 mg Oral HS Saadia Cellar, PA-C    sodium chloride 75 mL/hr Intravenous Continuous Sebastian Buenrostro MD Last Rate: 75 mL/hr (07/18/18 0759)   traMADol 50 mg Oral Q6H PRN Emilio Hauser MD    vancomycin 17 5 mg/kg Intravenous Q12H Kenya Kraus MD Last Rate: 1,250 mg (07/18/18 0759)        Today, Patient Was Seen By: Sebastian Buenrostro MD    ** Please Note: Dragon 360 Dictation voice to text software may have been used in the creation of this document   **

## 2018-07-18 NOTE — SOCIAL WORK
Spoke with SHI Al from orthopedics regarding [employer] wound vac order form as pt is to be discharging with wound vac when cleared for discharge  Faxed over the order form to her at [phone]  She will fax it back once completed  Pt is anticipated to complete her IV abx x3 more days as per last ID note  Will continue to follow

## 2018-07-19 ENCOUNTER — APPOINTMENT (INPATIENT)
Dept: CT IMAGING | Facility: HOSPITAL | Age: 63
DRG: 854 | End: 2018-07-19
Payer: COMMERCIAL

## 2018-07-19 DIAGNOSIS — L02.413 ABSCESS OF RIGHT FOREARM: Primary | ICD-10-CM

## 2018-07-19 LAB
GLUCOSE SERPL-MCNC: 105 MG/DL (ref 65–140)
GLUCOSE SERPL-MCNC: 141 MG/DL (ref 65–140)
GLUCOSE SERPL-MCNC: 158 MG/DL (ref 65–140)
GLUCOSE SERPL-MCNC: 184 MG/DL (ref 65–140)
GLUCOSE SERPL-MCNC: 47 MG/DL (ref 65–140)
GLUCOSE SERPL-MCNC: 55 MG/DL (ref 65–140)

## 2018-07-19 PROCEDURE — 99232 SBSQ HOSP IP/OBS MODERATE 35: CPT | Performed by: INTERNAL MEDICINE

## 2018-07-19 PROCEDURE — 82948 REAGENT STRIP/BLOOD GLUCOSE: CPT

## 2018-07-19 PROCEDURE — 70450 CT HEAD/BRAIN W/O DYE: CPT

## 2018-07-19 RX ORDER — NALOXONE HYDROCHLORIDE 0.4 MG/ML
0.1 INJECTION, SOLUTION INTRAMUSCULAR; INTRAVENOUS; SUBCUTANEOUS ONCE
Status: COMPLETED | OUTPATIENT
Start: 2018-07-19 | End: 2018-07-19

## 2018-07-19 RX ORDER — DEXTROSE MONOHYDRATE 25 G/50ML
INJECTION, SOLUTION INTRAVENOUS
Status: COMPLETED
Start: 2018-07-19 | End: 2018-07-19

## 2018-07-19 RX ORDER — INSULIN GLARGINE 100 [IU]/ML
25 INJECTION, SOLUTION SUBCUTANEOUS EVERY MORNING
Status: DISCONTINUED | OUTPATIENT
Start: 2018-07-20 | End: 2018-07-21 | Stop reason: HOSPADM

## 2018-07-19 RX ORDER — NALOXONE HYDROCHLORIDE 0.4 MG/ML
INJECTION, SOLUTION INTRAMUSCULAR; INTRAVENOUS; SUBCUTANEOUS
Status: COMPLETED
Start: 2018-07-19 | End: 2018-07-19

## 2018-07-19 RX ORDER — DEXTROSE MONOHYDRATE 25 G/50ML
25 INJECTION, SOLUTION INTRAVENOUS ONCE
Status: COMPLETED | OUTPATIENT
Start: 2018-07-19 | End: 2018-07-19

## 2018-07-19 RX ADMIN — NYSTATIN: 100000 POWDER TOPICAL at 08:27

## 2018-07-19 RX ADMIN — LAMOTRIGINE 150 MG: 100 TABLET ORAL at 08:19

## 2018-07-19 RX ADMIN — GABAPENTIN 600 MG: 300 CAPSULE ORAL at 21:04

## 2018-07-19 RX ADMIN — ROPINIROLE 0.5 MG: 1 TABLET, FILM COATED ORAL at 08:18

## 2018-07-19 RX ADMIN — NALOXONE HYDROCHLORIDE 0.1 MG: 0.4 INJECTION, SOLUTION INTRAMUSCULAR; INTRAVENOUS; SUBCUTANEOUS at 18:44

## 2018-07-19 RX ADMIN — LISINOPRIL 20 MG: 20 TABLET ORAL at 08:20

## 2018-07-19 RX ADMIN — HYDROMORPHONE HYDROCHLORIDE 1 MG: 1 INJECTION, SOLUTION INTRAMUSCULAR; INTRAVENOUS; SUBCUTANEOUS at 05:20

## 2018-07-19 RX ADMIN — NYSTATIN: 100000 POWDER TOPICAL at 16:55

## 2018-07-19 RX ADMIN — VANCOMYCIN HYDROCHLORIDE 1250 MG: 5 INJECTION, POWDER, LYOPHILIZED, FOR SOLUTION INTRAVENOUS at 08:18

## 2018-07-19 RX ADMIN — NYSTATIN: 100000 POWDER TOPICAL at 21:05

## 2018-07-19 RX ADMIN — GABAPENTIN 600 MG: 300 CAPSULE ORAL at 08:19

## 2018-07-19 RX ADMIN — HEPARIN SODIUM 5000 UNITS: 5000 INJECTION, SOLUTION INTRAVENOUS; SUBCUTANEOUS at 08:20

## 2018-07-19 RX ADMIN — METHADONE HYDROCHLORIDE 15 MG: 10 TABLET ORAL at 08:19

## 2018-07-19 RX ADMIN — SERTRALINE HYDROCHLORIDE 50 MG: 50 TABLET ORAL at 21:06

## 2018-07-19 RX ADMIN — DEXTROSE MONOHYDRATE 25 ML: 25 INJECTION, SOLUTION INTRAVENOUS at 18:44

## 2018-07-19 RX ADMIN — VANCOMYCIN HYDROCHLORIDE 1250 MG: 5 INJECTION, POWDER, LYOPHILIZED, FOR SOLUTION INTRAVENOUS at 21:07

## 2018-07-19 RX ADMIN — CLONAZEPAM 1 MG: 1 TABLET ORAL at 16:52

## 2018-07-19 RX ADMIN — GABAPENTIN 600 MG: 300 CAPSULE ORAL at 16:48

## 2018-07-19 RX ADMIN — ROPINIROLE 0.5 MG: 1 TABLET, FILM COATED ORAL at 16:48

## 2018-07-19 RX ADMIN — HYDROMORPHONE HYDROCHLORIDE 1 MG: 1 INJECTION, SOLUTION INTRAMUSCULAR; INTRAVENOUS; SUBCUTANEOUS at 23:38

## 2018-07-19 RX ADMIN — ROPINIROLE 0.5 MG: 1 TABLET, FILM COATED ORAL at 21:06

## 2018-07-19 RX ADMIN — CLONAZEPAM 1 MG: 1 TABLET ORAL at 21:03

## 2018-07-19 RX ADMIN — TRAMADOL HYDROCHLORIDE 50 MG: 50 TABLET, FILM COATED ORAL at 16:52

## 2018-07-19 RX ADMIN — INSULIN GLARGINE 30 UNITS: 100 INJECTION, SOLUTION SUBCUTANEOUS at 08:25

## 2018-07-19 RX ADMIN — CLONAZEPAM 1 MG: 1 TABLET ORAL at 08:25

## 2018-07-19 RX ADMIN — HEPARIN SODIUM 5000 UNITS: 5000 INJECTION, SOLUTION INTRAVENOUS; SUBCUTANEOUS at 21:04

## 2018-07-19 NOTE — PROGRESS NOTES
Progress Note - Infectious Disease   Dany Roots 65 y o  female MRN: [id number]  Unit/Bed#: E5 -01 Encounter: [id number]      Impression/Recommendations:  1   Right forearm cellulitis and abscess   Patient is status post I&D   Operative culture grew MRSA   Patient is slowly improving  Continue IV vancomycin    Monitor temperature/WBC  Serial forearm exams      2   Right forearm muscle necrosis, noted at surgery   This is secondary to MRSA abscess above   Consider myositis   Will treat with somewhat prolonged IV antibiotic course   Patient is clinically improved  Antibiotic plan as in above  Serial exams  Treat x 2 weeks total, another 2 days  Continue VAC per Orthopedics Service      3   DM, poorly controlled, with hyperglycemia on admission   Hemoglobin A1c is 13 4   I discussed with patient in great detail regarding this  Fayetta Poser control hyperglycemia clearly contributes to development of cellulitis and abscess above   Blood sugar is much better controlled now  Management per Medicine Service       4   Noncompliance        Discussed with patient and family in detail regarding the above plan      Antibiotics:  Vancomycin # 16  POD # 12     Subjective:  Patient feels well  Right forearm pain continued to improve, controlled  She is moving her fingers better  Temperature stays down   No further chills  She is tolerating antibiotic well   No nausea, vomiting or diarrhea   No dizziness or hearing loss  Objective:  Vitals:  HR:  [64] 64  Resp:  [18] 18  BP: (130-174)/(60-82) 174/82  SpO2:  [97 %] 97 %  Temp (24hrs), Av 9 °F (36 6 °C), Min:97 6 °F (36 4 °C), Max:98 1 °F (36 7 °C)  Current: Temperature: 98 1 °F (36 7 °C)    Physical Exam:     General: Awake, alert, cooperative, no distress  Neck:  Supple  No mass  No lymphadenopathy  Lungs: Expansion symmetric, no rales, no wheezing, respirations unlabored  Heart:  Regular rate and rhythm, S1 and S2 normal, no murmur     Abdomen: Soft, nondistended, non-tender, bowel sounds active all four quadrants,        no masses, no organomegaly  Extremities: Right forearm wound with VAC  No purulence  Improved tenderness  Improved mobility of fingers  Skin:  No rash  Neuro: Moves all extremities  Invasive Devices     Peripherally Inserted Central Catheter Line            PICC Line 08/34/08 Left Basilic 9 days          Drain            Closed/Suction Drain Right Other (Comment) Accordion 10 Fr  18 days    Negative Pressure Wound Therapy (V A C ) Arm Right 12 days                Labs studies:   I have personally reviewed pertinent labs  Results from last 7 days  Lab Units 07/18/18  0544 07/17/18  0538 07/16/18  0451 07/13/18  0638   SODIUM mmol/L 140 139 136 141   POTASSIUM mmol/L 4 2 4 7 4 8 4 0   CHLORIDE mmol/L 106 106 102 104   CO2 mmol/L 28 24 28 30   ANION GAP mmol/L 6 9 6 7   BUN mg/dL 23 24 20 9   CREATININE mg/dL 0 71 0 68 0 70 0 63   EGFR ml/min/1 73sq m 92 95 94 97   GLUCOSE RANDOM mg/dL 110 118 115 116   CALCIUM mg/dL 9 7 9 2 10 1 9 1   AST U/L  --   --   --  19   ALT U/L  --   --   --  8*   ALK PHOS U/L  --   --   --  82   TOTAL PROTEIN g/dL  --   --   --  6 9   BILIRUBIN TOTAL mg/dL  --   --   --  0 41       Results from last 7 days  Lab Units 07/18/18  0544 07/17/18  0836 07/16/18  0451   WBC Thousand/uL 4 57 3 81* 6 27   HEMOGLOBIN g/dL 10 0* 11 9 10 9*   PLATELETS Thousands/uL 244 292 395*           Imaging Studies:   I have personally reviewed pertinent imaging study reports and images in PACS  EKG, Pathology, and Other Studies:   I have personally reviewed pertinent reports

## 2018-07-19 NOTE — TREATMENT PLAN
I call Plastic surgery service  and Dr Bhavani Borges related to him need for follow-up of this patient at discharge he express his wishes for the patient to continue to be monitored by the wound clinic and once decision made for need for wound graft to be referred to his office as outpatient  I made him aware the patient's probable discharge date will be in the a m  after last wound VAC change and discontinuation of IV antibiotics    I will confer with Infectious Disease service as to outpatient course and follow

## 2018-07-19 NOTE — NURSING NOTE
Blood sugar was taken after breakfast was eaten and sugar was 55  Orange juice was given to increase glucose level  Will recheck sugar and order lunch

## 2018-07-19 NOTE — PROGRESS NOTES
Earlier in shift (approx 2000 ), this writer walked into patient room and noticed that patient had a prescription bottle on her table and was counting out pills (small, green, with a break/cut line and some numbers, not able to be seen)  Script bottle was labeled Carolann Niño (this writer was unable to read last name)  Patient had a visitor who when noticed that I saw bottle ran over to patient and grabbed bottle saying "That's her husbands pills" and proceeded to  pills and put back in bottle  The visitor put bottle into her handbag  After visitor left, this writer asked [facility] what were the pills for, and she said they were her visitor's 's pills  Since the stories didn't agree, this writer reminded patient that she should only be taking what was prescribed by her doctor here, and patient responded that's all she is taking  Hospital supervisor notified

## 2018-07-19 NOTE — PROGRESS NOTES
[facility] Internal Medicine Progress Note  Patient:  Alek Brock 65 y o  female   MRN: [id number]  PCP: Carmell Fleischer, MD  Unit/Bed#: E5 -01 Encounter: [id number]  Date Of Visit: 07/19/18    Assessment:    Principal Problem:    Abscess of right forearm  Active Problems:    Hyponatremia    Right arm cellulitis    Benign essential hypertension    Type 2 diabetes mellitus, uncontrolled ([address] )    Abscess of tendon sheath of forearm, right    Anemia      Plan:    · Abscess of right forearm continues with wound VAC and dressing changed [id number] and do today by wound care every other day will continue treatment for MRSA status post I and D through July 20th has PICC line cannot be discharged home due to drug abuse history/she agrees to stay here till antibiotic course finished/again stressed to her the importance of follow-up and awaiting Plastic surgery input for outpatient follow-up care  · Diabetes mellitus type 2 poorly controlled due to noncompliance with glycohemoglobin as high as 13 4 adequate with coverage scale here and Lantus 30 units along with mealtime 8 units Humalog and significantly improved and I again a stress the importance of vigilant on her blood sugar management as essential for wound care  · Hypertension continue lisinopril 20 mg daily  · Hepatitis-C is been explained to her the risk and and need for screening for hepatic carcinoma and suggested get GI follow-up  · Bipolar disorder continue Lamictal sertraline  · Chronic pain on methadone maintenance , Requip, gabapentin also clonazepam needs increased dosing of Dilaudid prior to wound care over right forearm but continues to be an issue in spite of this and 2 days ago stated to staff that she will bring her own pain medications into the hospital which now apparently was attempted last night will need to discontinue Dilaudid prior to discharge presently on tramadol p r n  but not being used I reinforced the need to transition to this and try and stop Dilaudid today once I informed her and her  of this they said they would get  involved  VTE Pharmacologic Prophylaxis:   Pharmacologic: Heparin  Mechanical VTE Prophylaxis in Place: Yes    Discussions with Specialists or Other Care Team Provider:  No    Time Spent for Care: 30 minutes  More than 50% of total time spent on counseling and coordination of care as described above  Subjective:   Some discomfort refer to the right forearm but pain relief mostly in check with methadone left arm PICC line in place stable/I am not altogether sure that this patient will be compliant with follow-up plans including care of wound insulin maintenance and need for follow-up with plastics service she keeps stressing her inability to get transportation a tried to assure also that [facility] will be set up for wound care as outpatient but will also need outpatient follow-up with Plastic surgery/Orthopedics  Patient's  also engaged me an extended conversation stating why she was being discharged while she still had active infection I tried to explain to him the fact that though we are to stopping IV antibiotics tomorrow it would be up to the Infectious Disease service to pursue further care as outpatient in regards to wound care and treatment of any infection or further antibiotic  Along with the need for follow-up  of the Plastic surgery and Orthopedic Services  Staffing continues to be concerned over family members in attendance at night and some inappropriate behavior in regards to witnessed by staff of family bringing outside medication management for her pain  Please refer to notes from nursing staff on 10/24p m  of July 18th and and 4:41 a m  of the 19th of July patient and family noncompliant to standards of care of the floor in regards to bring in an outside medications    They had requested transfer to another floor but see no criteria for this as other floor standard are same as this for and would just reinforce their behavior  Objective:     Vitals:   Temp (24hrs), Av 3 °F (36 8 °C), Min:97 6 °F (36 4 °C), Max:98 9 °F (37 2 °C)    HR:  [64-85] 64  Resp:  [18] 18  BP: (135-140)/(65-69) 140/65  SpO2:  [95 %-97 %] 97 %  Body mass index is 23 24 kg/m²  Input and Output Summary (last 24 hours): Intake/Output Summary (Last 24 hours) at 18 08  Last data filed at 18   Gross per 24 hour   Intake              945 ml   Output                0 ml   Net              945 ml       Physical Exam:     Physical Exam:   General appearance: alert, appears stated age and cooperative  Head: Normocephalic, without obvious abnormality, atraumatic  Lungs: clear to auscultation bilaterally  Heart: regular rate and rhythm  Abdomen: soft, non-tender; bowel sounds normal; no masses,  no organomegaly  Back: negative  Extremities: Right forearm with Ace wrap and wound VAC in place and extremities normal, atraumatic, no cyanosis or edema  Neurologic: Grossly normal      Additional Data:     Labs:      Results from last 7 days  Lab Units 18  0544   WBC Thousand/uL 4 57   HEMOGLOBIN g/dL 10 0*   HEMATOCRIT % 32 0*   PLATELETS Thousands/uL 244   NEUTROS PCT % 51   LYMPHS PCT % 40   MONOS PCT % 6   EOS PCT % 3       Results from last 7 days  Lab Units 18  0544  18  0638   SODIUM mmol/L 140  < > 141   POTASSIUM mmol/L 4 2  < > 4 0   CHLORIDE mmol/L 106  < > 104   CO2 mmol/L 28  < > 30   BUN mg/dL 23  < > 9   CREATININE mg/dL 0 71  < > 0 63   CALCIUM mg/dL 9 7  < > 9 1   TOTAL PROTEIN g/dL  --   --  6 9   BILIRUBIN TOTAL mg/dL  --   --  0 41   ALK PHOS U/L  --   --  82   ALT U/L  --   --  8*   AST U/L  --   --  19   GLUCOSE RANDOM mg/dL 110  < > 116   < > = values in this interval not displayed  * I Have Reviewed All Lab Data Listed Above  * Additional Pertinent Lab Tests Reviewed:  All Labs For Current Hospital Admission Reviewed    Imaging:  Xr Chest 2 Views    Result Date: 6/28/2018  Narrative: CHEST INDICATION:   pre surgical  COMPARISON:  9/24/2014 EXAM PERFORMED/VIEWS:  XR CHEST PA & LATERAL FINDINGS: Cardiomediastinal silhouette appears unremarkable  The lungs are clear  No pneumothorax or pleural effusion  Osseous structures appear within normal limits for patient age  Impression: No acute cardiopulmonary disease  Workstation performed: [id number]     Xr Humerus Right    Result Date: 7/13/2018  Narrative: RIGHT HUMERUS INDICATION:   Status post fall with pain  COMPARISON:  None VIEWS:  XR HUMERUS RIGHT Images: 3 FINDINGS: There is no acute fracture or dislocation  No degenerative changes  No lytic or blastic lesions are seen  Soft tissues are unremarkable  Impression: No acute osseous abnormality  Workstation performed: [id number]     Xr Forearm 2 Vw Right    Result Date: 7/13/2018  Narrative: RIGHT FOREARM INDICATION:   Status post fall with new pain  COMPARISON:  None VIEWS:  XR FOREARM 2 VW RIGHT Images: 2 FINDINGS: There is no acute fracture or dislocation  No degenerative changes  No lytic or blastic lesions are seen  There is a suction drain projecting over the mid forearm  Previously seen soft tissue swelling no longer present  Impression: No acute osseous abnormality  Workstation performed: [id number]     Xr Forearm 2 Vw Right    Result Date: 6/29/2018  Narrative: RIGHT FOREARM INDICATION:   right arm abscess  Cellulitis  COMPARISON:  None VIEWS:  XR FOREARM 2 VW RIGHT FINDINGS: There is no acute fracture or dislocation  No degenerative changes  No lytic or blastic lesions are seen  Cellulitis along the lateral and dorsal aspect of the forearm with a focal lump in the mid region  No gas  No osseous involvement  Impression: Cellulitis along the lateral and dorsal aspect of the forearm with a focal lump in the mid region  No gas  No osseous involvement    Abscess cannot be excluded on plain film and if there is continued clinical concern, consider ultrasound  Workstation performed: [id number]     Mri Forearm Right Wo Contrast    Result Date: 6/29/2018  Narrative: MRI RIGHT UPPER EXTREMITY WITHOUT CONTRAST - ( ) INDICATION:   abscess  COMPARISON:  None  TECHNIQUE:  MR examination of the right forearm was performed  Pulse sequences IV contrast was not given  Scan was performed on a 1 5 Selin Unit  FINDINGS: Unfortunately the patient could not tolerate the procedure secondary to discomfort and only localizer images and coronal T1 images were obtained prior to the termination of the exam  SUBCUTANEOUS TISSUES: There is subcutaneous edema of the soft tissues of the radial aspect of the right forearm  Questionable 1 9 x 1 3 cm collection along the radial aspect of the right forearm at the level of the distal diaphysis  BONES:  No fracture, dislocation or destructive osseous lesion on this limited evaluation  ARTICULAR SURFACES:  Unremarkable given exam limitations  VISUALIZED MUSCULATURE:  Poorly evaluated on single sequence  OTHER SOFT TISSUES:  Unremarkable  OTHER PERTINENT FINDINGS:  None  Impression: Nondiagnostic exam as only coronal T1 images were obtained before patient had to stop the exam secondary to discomfort  There is soft tissue swelling present along the radial aspect of the right forearm  Workstation performed: [id number]     Imaging Reports Reviewed Today Include:  Reviewed MRI of right upper extremity June 29th  Imaging Personally Reviewed by Myself Includes:    No results found       Recent Cultures (last 7 days):           Last 24 Hours Medication List:     Current Facility-Administered Medications:  acetaminophen 650 mg Oral Q6H PRN Marianela Bustos PA-C    calcium carbonate 1,000 mg Oral Daily PRN Marianela Bustos PA-C    clonazePAM 1 mg Oral TID Marianela Bustos PA-C    gabapentin 600 mg Oral TID Sanjeev Larson MD    heparin (porcine) 5,000 Units Subcutaneous Q12H [address] Mahesh Downing MD    hydrALAZINE 10 mg Intravenous Q6H PRN Marcy Rosa [place]    HYDROmorphone 1 mg Intravenous Q6H PRN Lita Miller MD    insulin glargine 30 Units Subcutaneous QAM Stoney Luna MD    insulin lispro 1-5 Units Subcutaneous 4x Daily (AC & HS) Aashish Salvador MD    insulin lispro 8 Units Subcutaneous TID AC Stoney Luna MD    lamoTRIgine 150 mg Oral Daily Ruth Ann Freshwater, PA-C    lisinopril 20 mg Oral Daily Stoney Luna MD    methadone 15 mg Oral Daily Aashish Salvador MD    nystatin  Topical TID SKY Hammer    ondansetron 4 mg Intravenous Q6H PRN Ruth Ann Freshwater, PA-C    ondansetron 4 mg Oral Q6H PRN SKY Dsouza    rOPINIRole 0 5 mg Oral TID [place] Freshwater, PA-C    sertraline 50 mg Oral HS Ruth Ann Freshwater, PA-C    sodium chloride 75 mL/hr Intravenous Continuous Lita Miller MD Last Rate: 75 mL/hr (07/18/18 2037)   traMADol 50 mg Oral Q6H PRN Aashish Salvador MD    vancomycin 17 5 mg/kg Intravenous Q12H Teressa Ferguson MD Last Rate: 1,250 mg (07/18/18 2036)        Today, Patient Was Seen By: Lita Miller MD    ** Please Note: Dragon 360 Dictation voice to text software may have been used in the creation of this document   **

## 2018-07-19 NOTE — SOCIAL WORK
Wound vac order form was obtained and faxed over to [facility] with requested clinical information  Spoke with [facility] from [facility] who confirmed order but just needed a correction of length of need on the order form to be down by physician  Message left for Dr Soumya Ramos  [facility] is following the pt for home care  They are aware of wound vac changes to be down MWF  Informed by CC that pt stated the she will not have a place to stay when discharged  Followed-up with pt with CM Myles Trevizo where pt stated the same  Asked pt why she did not state this previously and why on admission she stated that she lives with her daughter  Pt stated that she was only living with her daughter for "3 days" prior to being hospitalized and prior to that living with another family member  Pt stated she has a  who was previously here earlier and he is living with a friend  Pt stated that her children are working on a plan for her to live somewhere but is not "in liberty to say where " Informed pt that CM needs to know where she will be leaving so that [facility] can follow-up outpt for wound care/wound vac changes  Requested to speak with her children  She provided the number to her son Lucy Tai [phone]  Spoke to Lucy Tai who stated he is working on a place to live with his other siblings but they all have pets and was told by the pt that she is not allowed to stay where there are pets  CM will need to discuss this with the physician  Pt requested to contact her  Keisha Griffin [phone] as pt reported  would like to CM  Contact  but unable to reach  Message left

## 2018-07-19 NOTE — PROGRESS NOTES
Pt had a visitor that states she is her daughter-in-law(called "T"), who had been in earlier when pt was counting pills in her room  The [address] had asked us to put a sign on the door stating:  "All visitors must report to the nurses station prior to visiting  Thank you for your cooperation "  This visitor ignored sign on door and took it down without reporting to room  When the visitor went by nurses station, she was asked to leave her bags at the nurses station, which she did willingly  This writer asked "T" how old Chioma Khalil was, who was staying with the patient around the clock 24 hours for the past 21 days  She stated 19 or 20  When this writer asked Chioma Khalil how old she is, Chioma Khalil said she was only 16  Rules were explained to Chioma Khalil and LANDON, and both were in agreement with Chioma Khalil leaving tomorrow and not stay overnight anymore  This writer also reviewed that we could no longer continue to furnish all the visitors with juices, soda, crackers and meals  It was also explained that it was Curly Alejandrina as our patient that we were worried about, and we had to do what we could to keep her safe  Since this writer had observed the exchange of medication, with both sides giving different stories about whose it was and how it got here  They were agreeable  All this interaction was witnessed by our [facility]  It was a good conversation with no confrontation  Patient was asleep during all of this  Approx  10 minutes later, patient came out of room with IV pole screaming loudly and cursing  Patient was redirected into room where she continued to curse and yell about the new "rules"  Security was called and Jose A, hospital supervisor  Patient yelling while we waited for them that she was leaving tomorrow, AMA, and she hated this place and was going to [facility] because she did not have her operation on time when she wanted it  Patient is currently in room with hospital supervisor and the two visitors  Will continue to monitor

## 2018-07-19 NOTE — TREATMENT PLAN
SUBJECTIVE:   Contacted by RN regarding Called by nursing for change in mental status and unwitnessed fall at 1820  Chart reviewed time of arrival 1830  Pt found in bed in semirecumbency, lethargic, arousable to voice but lethargic w/minimal ability to follow command except for intermittently squeezing hands/opening eyes  Disoriented and unable to answer questions regarding location, and was somewhat agitated in between episodic somnolence  Pt's son found the pt on the floor w/surrounding stool in bathroom and altered and notified nursing staff  BS in 45s and pupils were pinpoint  There was concern that family members were bringing in patient's medications from home and pt's dilaudid was d/c'd this morning in favor of tramadol for anticipated d/c in am   Pt denied headache/neck pain/cp sob numbness/tingling  After 0 1mg narcan and 1/2 amp d50 pt able to complete neuro exam and affirmed ROS as above  A/P:   Lethargy   Likely multifactorial from hypoglycemia and suspected narcotic use given improvement w/dextrose and narcan    No focal neurodeficits although pt w/limited ability to complete strength testing in RUE due to abscess and b/l poor recruitment in LE   CT head w/o contrast, stat  Check uds  Reinforced w/son that no one is to give pt additional medications from outside who denies this  Continue to monitor blood glucose closely       OBJECTIVE:     Pt was personally seen and examined by myself  PE:    Constitutional: lethargic, thin arousable to voice and somewhat agitated   HEENT: pupils are pinpoint miotic w/minimal reaction to light and equal b/l after narcan they are improved from 2mm to 4mm, EOMI,    Lungs: CTA b/l, no  wheezing, no rhonchi, no rales   Heart: mildly tachycardiac, regular  rhythm, regular murmur    Abd: Soft, no  distention,, no TTP,    Neuro: CN II/III equal round reactive to light post narcan   Alert to self initially    Conversationally appropriate post narcan/dextrose  III/IV/VI EOMs intact b/l   V: crude sensation intact in V1/2 and masseter strength equal b/l in V3   VII: facial symmetry w/expression   VIII: grossly intact b/l   IX/X/XII: soft palate raise, no tongue deviation or uvula deviation w/wag  CN XI: SCMs intact, shoulder shrug equal b/l    Strength 4/5 in LUE unable to complete exam in RUE to wound and dressing  3/5 in LLE b/l w/hip flexion/extension  Crude sensation intact in UE/LE b/l  Speech was somewhat slurred but improved post dextrose/narcan  Skin dressing of RUE forearm intact w/ace wrap appears c/d/i    LABS:      Results from last 7 days  Lab Units 07/18/18  0544   WBC Thousand/uL 4 57   HEMOGLOBIN g/dL 10 0*   HEMATOCRIT % 32 0*   PLATELETS Thousands/uL 244       Results from last 7 days  Lab Units 07/18/18  0544  07/13/18  0638   SODIUM mmol/L 140  < > 141   POTASSIUM mmol/L 4 2  < > 4 0   CHLORIDE mmol/L 106  < > 104   CO2 mmol/L 28  < > 30   BUN mg/dL 23  < > 9   CREATININE mg/dL 0 71  < > 0 63   CALCIUM mg/dL 9 7  < > 9 1   TOTAL PROTEIN g/dL  --   --  6 9   BILIRUBIN TOTAL mg/dL  --   --  0 41   ALK PHOS U/L  --   --  82   ALT U/L  --   --  8*   AST U/L  --   --  19   GLUCOSE RANDOM mg/dL 110  < > 116   < > = values in this interval not displayed  Lab Results   Component Value Date    BLOODCX No Growth After 5 Days  06/28/2018    BLOODCX No Growth After 5 Days   06/28/2018

## 2018-07-20 PROBLEM — F11.90 METHADONE USE: Status: ACTIVE | Noted: 2018-07-20

## 2018-07-20 PROBLEM — K75.9 HEPATITIS: Status: ACTIVE | Noted: 2018-07-20

## 2018-07-20 LAB
AMPHETAMINES SERPL QL SCN: NEGATIVE
BARBITURATES UR QL: NEGATIVE
BENZODIAZ UR QL: NEGATIVE
COCAINE UR QL: NEGATIVE
GLUCOSE SERPL-MCNC: 113 MG/DL (ref 65–140)
GLUCOSE SERPL-MCNC: 128 MG/DL (ref 65–140)
GLUCOSE SERPL-MCNC: 133 MG/DL (ref 65–140)
GLUCOSE SERPL-MCNC: 61 MG/DL (ref 65–140)
GLUCOSE SERPL-MCNC: 99 MG/DL (ref 65–140)
METHADONE UR QL: POSITIVE
OPIATES UR QL SCN: POSITIVE
PCP UR QL: NEGATIVE
THC UR QL: NEGATIVE

## 2018-07-20 PROCEDURE — 80307 DRUG TEST PRSMV CHEM ANLYZR: CPT | Performed by: PHYSICIAN ASSISTANT

## 2018-07-20 PROCEDURE — 99232 SBSQ HOSP IP/OBS MODERATE 35: CPT | Performed by: PHYSICIAN ASSISTANT

## 2018-07-20 PROCEDURE — 99232 SBSQ HOSP IP/OBS MODERATE 35: CPT | Performed by: INTERNAL MEDICINE

## 2018-07-20 PROCEDURE — 82948 REAGENT STRIP/BLOOD GLUCOSE: CPT

## 2018-07-20 RX ADMIN — TRAMADOL HYDROCHLORIDE 50 MG: 50 TABLET, FILM COATED ORAL at 09:49

## 2018-07-20 RX ADMIN — GABAPENTIN 600 MG: 300 CAPSULE ORAL at 22:02

## 2018-07-20 RX ADMIN — CLONAZEPAM 1 MG: 1 TABLET ORAL at 09:48

## 2018-07-20 RX ADMIN — CLONAZEPAM 1 MG: 1 TABLET ORAL at 22:03

## 2018-07-20 RX ADMIN — NYSTATIN: 100000 POWDER TOPICAL at 22:03

## 2018-07-20 RX ADMIN — TRAMADOL HYDROCHLORIDE 50 MG: 50 TABLET, FILM COATED ORAL at 20:39

## 2018-07-20 RX ADMIN — SERTRALINE HYDROCHLORIDE 50 MG: 50 TABLET ORAL at 22:02

## 2018-07-20 RX ADMIN — LAMOTRIGINE 150 MG: 100 TABLET ORAL at 09:48

## 2018-07-20 RX ADMIN — GABAPENTIN 600 MG: 300 CAPSULE ORAL at 09:49

## 2018-07-20 RX ADMIN — HEPARIN SODIUM 5000 UNITS: 5000 INJECTION, SOLUTION INTRAVENOUS; SUBCUTANEOUS at 22:03

## 2018-07-20 RX ADMIN — VANCOMYCIN HYDROCHLORIDE 1250 MG: 5 INJECTION, POWDER, LYOPHILIZED, FOR SOLUTION INTRAVENOUS at 07:51

## 2018-07-20 RX ADMIN — HEPARIN SODIUM 5000 UNITS: 5000 INJECTION, SOLUTION INTRAVENOUS; SUBCUTANEOUS at 09:49

## 2018-07-20 RX ADMIN — NYSTATIN: 100000 POWDER TOPICAL at 09:51

## 2018-07-20 RX ADMIN — ROPINIROLE 0.5 MG: 1 TABLET, FILM COATED ORAL at 09:47

## 2018-07-20 RX ADMIN — VANCOMYCIN HYDROCHLORIDE 1250 MG: 5 INJECTION, POWDER, LYOPHILIZED, FOR SOLUTION INTRAVENOUS at 20:31

## 2018-07-20 RX ADMIN — INSULIN GLARGINE 25 UNITS: 100 INJECTION, SOLUTION SUBCUTANEOUS at 09:51

## 2018-07-20 RX ADMIN — METHADONE HYDROCHLORIDE 15 MG: 10 TABLET ORAL at 09:48

## 2018-07-20 RX ADMIN — NYSTATIN: 100000 POWDER TOPICAL at 16:27

## 2018-07-20 RX ADMIN — CLONAZEPAM 1 MG: 1 TABLET ORAL at 16:26

## 2018-07-20 RX ADMIN — ROPINIROLE 0.5 MG: 1 TABLET, FILM COATED ORAL at 22:02

## 2018-07-20 RX ADMIN — HYDROMORPHONE HYDROCHLORIDE 0.5 MG: 1 INJECTION, SOLUTION INTRAMUSCULAR; INTRAVENOUS; SUBCUTANEOUS at 14:13

## 2018-07-20 RX ADMIN — LISINOPRIL 20 MG: 20 TABLET ORAL at 09:49

## 2018-07-20 RX ADMIN — HYDRALAZINE HYDROCHLORIDE 10 MG: 20 INJECTION INTRAMUSCULAR; INTRAVENOUS at 09:50

## 2018-07-20 RX ADMIN — GABAPENTIN 600 MG: 300 CAPSULE ORAL at 16:26

## 2018-07-20 RX ADMIN — ROPINIROLE 0.5 MG: 1 TABLET, FILM COATED ORAL at 16:26

## 2018-07-20 NOTE — ASSESSMENT & PLAN NOTE
2* mrsa W/cellulitis and necrosis of underlying musculature noted in I&D and under surgical evaluation  Has been on Vanc w/picc line placed per ID for another 1 day of IV abx after today for total of 2 weeks total abx  Not a candidate for d/c home w/picc given hx of drug abuse  Will need op f/u with wound care clinic for wound vac maintenance which is m/w/f  After she has been followed w/ wound care providers and found to be a candidate for grafting will need op f/u with plastics

## 2018-07-20 NOTE — PROGRESS NOTES
Son to patient reported David Loud to be on the floor of the bathroom  RN went in room to assist and patient was found on the floor of the bathroom by the sink  Patient was lethargic and had stool smeared on the floor  Vitals were taken and attending physician was called  Vitals were stable except for oxygen levels  Oxygen saturations were high 80s  2L of oxygen by nasal canula applied and oxygen saturation went up to 94%  Kerri Espinal responded to the call  Blood sugar taken found the be 47  Suspicion of visitors providing patient undocumented drugs  Narcan and Dextrose was ordered and given  Rapid response was canceled  CT of the head was ordered  Blood sugar will be rechecked

## 2018-07-20 NOTE — PROGRESS NOTES
Progress Note - Infectious Disease   Rafal Mckenna 65 y o  female MRN: [id number]  Unit/Bed#: E5 -01 Encounter: [id number]      Impression/Recommendations:  1   Right forearm cellulitis and abscess   Patient is status post I&D   Operative culture grew MRSA   Patient is slowly improving  Continue IV vancomycin    Monitor temperature/WBC  Serial forearm exams      2   Right forearm muscle necrosis, noted at surgery   This is secondary to MRSA abscess above   Consider myositis   Will treat with somewhat prolonged IV antibiotic course   Patient is clinically improved  Antibiotic plan as in above  Serial exams  Treat x 2 weeks total, another 1 day, last dose tomorrow a m  Lynita Ped Continue VAC per Orthopedics Service      3   DM, poorly controlled, with hyperglycemia on admission   Hemoglobin A1c is 13 4   I discussed with patient in great detail regarding this  Oxana Deter control hyperglycemia clearly contributes to development of cellulitis and abscess above   Blood sugar is much better controlled now  Management per Medicine Service       4   Noncompliance        Discussed with patient in detail regarding the above plan      Antibiotics:  Vancomycin # 17  POD # 13     Subjective:  Patient feels well  Right forearm pain continued to improve, controlled  She is moving her fingers better  Temperature stays down   No further chills  She is tolerating antibiotic well   No nausea, vomiting or diarrhea   No dizziness or hearing loss  Objective:  Vitals:  HR:  [] 68  Resp:  [18] 18  BP: (117-177)/(62-86) 177/86  SpO2:  [89 %-97 %] 94 %  Temp (24hrs), Av 2 °F (36 8 °C), Min:97 9 °F (36 6 °C), Max:98 7 °F (37 1 °C)  Current: Temperature: 97 9 °F (36 6 °C)    Physical Exam:     General: Awake, alert, cooperative, no distress  Neck:  Supple  No mass  No lymphadenopathy  Lungs: Expansion symmetric, no rales, no wheezing, respirations unlabored     Heart:  Regular rate and rhythm, S1 and S2 normal, no murmur  Abdomen: Soft, nondistended, non-tender, bowel sounds active all four quadrants,        no masses, no organomegaly  Extremities: Right forearm with VAC in place  No purulence  Improve edema  Improved tenderness  Improved ROM of fingers  Skin:  No rash  Neuro: Moves all extremities  Invasive Devices     Peripherally Inserted Central Catheter Line            PICC Line 83/36/96 Left Basilic 10 days          Drain            Closed/Suction Drain Right Other (Comment) Accordion 10 Fr  19 days    Negative Pressure Wound Therapy (V A C ) Arm Right 13 days                Labs studies:   I have personally reviewed pertinent labs  Results from last 7 days  Lab Units 07/18/18  0544 07/17/18  0538 07/16/18  0451   SODIUM mmol/L 140 139 136   POTASSIUM mmol/L 4 2 4 7 4 8   CHLORIDE mmol/L 106 106 102   CO2 mmol/L 28 24 28   ANION GAP mmol/L 6 9 6   BUN mg/dL 23 24 20   CREATININE mg/dL 0 71 0 68 0 70   EGFR ml/min/1 73sq m 92 95 94   GLUCOSE RANDOM mg/dL 110 118 115   CALCIUM mg/dL 9 7 9 2 10 1       Results from last 7 days  Lab Units 07/18/18  0544 07/17/18  0836 07/16/18  0451   WBC Thousand/uL 4 57 3 81* 6 27   HEMOGLOBIN g/dL 10 0* 11 9 10 9*   PLATELETS Thousands/uL 244 292 395*           Imaging Studies:   I have personally reviewed pertinent imaging study reports and images in PACS  EKG, Pathology, and Other Studies:   I have personally reviewed pertinent reports

## 2018-07-20 NOTE — ASSESSMENT & PLAN NOTE
Hepatitis C  Pt need op f/u with gi for hcc screening and eventual treatment if consistently not using narcotics

## 2018-07-20 NOTE — PROGRESS NOTES
Progress Note - Leighton Cano 1955, 65 y o  female MRN: [id number]    Unit/Bed#: E5 -01 Encounter: [id number]    Primary Care Provider: Alcides Modi MD   Date and time admitted to hospital: 6/28/2018  7:15 PM        * Abscess of right forearm   Assessment & Plan    2* mrsa W/cellulitis and necrosis of underlying musculature noted in I&D and under surgical evaluation  Has been on Vanc w/picc line placed per ID for another 1 day of IV abx after today for total of 2 weeks total abx  Not a candidate for d/c home w/picc given hx of drug abuse  Will need op f/u with wound care clinic for wound vac maintenance which is m/w/f  After she has been followed w/ wound care providers and found to be a candidate for grafting will need op f/u with plastics        Hepatitis   Assessment & Plan    Hepatitis C  Pt need op f/u with gi for hcc screening and eventual treatment if consistently not using narcotics        Methadone use ([facility] [address] )   Assessment & Plan    W/concern for inappropriate opioid use from pt's family members bringing in medications from home  Pt was quite lethargic last night w/miotic pupils and bs of 47, uds positive for opioids/methadone  CT head negative, no focal neurodeficits, improved w/ 1/2 amp dextrose and 0 1mg of IV narcan  D/w nursing will need single day supply of methadone as pt's clinic will be closed on Sunday and she can f/u with methadone clinic            Type 2 diabetes mellitus, uncontrolled (HCC)   Assessment & Plan    Lab Results   Component Value Date    HGBA1C 13 4 (H) 06/29/2018       Recent Labs      07/19/18   1833  07/19/18   1910  07/19/18 2057  07/20/18   1132   POCGLU  47*  105  158*  113       Blood Sugar Average: Last 72 hrs:    Poorly controlled w/a1c 13%  Has had hypoglycemia the last several days, decreased lantus to 25 IU daily and humalog 8 IU TID AC, will need glucometer and supplies upon d/c  Continue SSI and POC BS        Benign essential hypertension Assessment & Plan    Likely accelerated 2* narcan administration yesterday  Pt asymptomatic  Continue lisinopril 20mg po daily, continue hydralazine, if persistent will add norvasc 5mg po daily          VTE Pharmacologic Prophylaxis:   Pharmacologic: Heparin  Mechanical VTE Prophylaxis in Place: No    Patient Centered Rounds: I have performed bedside rounds with nursing staff today  Discussions with Specialists or Other Care Team Provider: RAJIV salguero    Education and Discussions with Family / Patient: disposition, workup    Time Spent for Care: 20 minutes  More than 50% of total time spent on counseling and coordination of care as described above  Current Length of Stay: 22 day(s)    Current Patient Status: Inpatient   Certification Statement: The patient will continue to require additional inpatient hospital stay due to abcess of RUE requiring IV abx    Discharge Plan: likely d/c tomorrow    Code Status: Level 1 - Full Code      Subjective:   Patient seen and examined  No events overnight after episodic lethargy and confusion  Patient reports that she is feeling better today her nausea has improved  She has no diarrhea on antibiotics she has no chest pain shortness of breath or lightheadedness  There has been concern about whether or not the patient has a place to stay although this has not been brought up in the prior 21 days of the patient's hospitalization to case management  Objective:     Vitals:   Temp (24hrs), Av 2 °F (36 8 °C), Min:97 9 °F (36 6 °C), Max:98 7 °F (37 1 °C)    HR:  [] 68  Resp:  [18] 18  BP: (117-177)/(62-86) 177/86  SpO2:  [89 %-97 %] 94 %  Body mass index is 23 24 kg/m²  Input and Output Summary (last 24 hours):        Intake/Output Summary (Last 24 hours) at 18 1214  Last data filed at 18 0900   Gross per 24 hour   Intake              120 ml   Output              750 ml   Net             -630 ml       Physical Exam:     Physical Exam Constitutional: She appears well-developed  No distress  HENT:   Head: Normocephalic and atraumatic  Right Ear: External ear normal    Left Ear: External ear normal    Eyes: Conjunctivae are normal  Right eye exhibits no discharge  Left eye exhibits no discharge  No scleral icterus  Cardiovascular: Normal rate, regular rhythm and normal heart sounds  Exam reveals no gallop and no friction rub  No murmur heard  Pulmonary/Chest: Effort normal  No respiratory distress  She has no wheezes  She has no rales  She exhibits no tenderness  Abdominal: She exhibits no distension  There is no tenderness  There is no rebound and no guarding  Neurological: She is alert  Crude sensation intact in UE b/l   Skin: Skin is warm  She is not diaphoretic  Right upper extremity dressed and wrapped which is c/d/i   Vitals reviewed  (  Be Sure to Include Physical Exam: Delete this entire line when you have entered your exam)    Additional Data:     Labs:      Results from last 7 days  Lab Units 07/18/18  0544   WBC Thousand/uL 4 57   HEMOGLOBIN g/dL 10 0*   HEMATOCRIT % 32 0*   PLATELETS Thousands/uL 244   NEUTROS PCT % 51   LYMPHS PCT % 40   MONOS PCT % 6   EOS PCT % 3       Results from last 7 days  Lab Units 07/18/18  0544   SODIUM mmol/L 140   POTASSIUM mmol/L 4 2   CHLORIDE mmol/L 106   CO2 mmol/L 28   BUN mg/dL 23   CREATININE mg/dL 0 71   CALCIUM mg/dL 9 7   GLUCOSE RANDOM mg/dL 110           Results from last 7 days  Lab Units 07/20/18  1132 07/19/18  2057 07/19/18  1910 07/19/18  1833 07/19/18  1619 07/19/18  1305 07/19/18  1119 07/18/18  2051 07/18/18  1519 07/18/18  1108 07/18/18  0803 07/17/18  2046   POC GLUCOSE mg/dl 113 158* 105 47* 141* 184* 55* 106 61* 141* 115 320*             * I Have Reviewed All Lab Data Listed Above  * Additional Pertinent Lab Tests Reviewed:  All Labs Within Last 24 Hours Reviewed    Imaging:    Imaging Reports Reviewed Today Include: CT head  Imaging Personally Reviewed by Myself Includes:      Recent Cultures (last 7 days):           Last 24 Hours Medication List:     Current Facility-Administered Medications:  acetaminophen 650 mg Oral Q6H PRN Yani Elizabeth PA-C    calcium carbonate 1,000 mg Oral Daily PRN Yani Elizabeth PA-C    clonazePAM 1 mg Oral TID Yani Elizabeth PA-C    gabapentin 600 mg Oral TID Ryan Guevara MD    heparin (porcine) 5,000 Units Subcutaneous Q12H [facility] Quang Maynard MD    hydrALAZINE 10 mg Intravenous Q6H PRN Jordan Bailey    HYDROmorphone 0 2 mg Intravenous Q6H PRN Quang Maynard MD    insulin glargine 25 Units Subcutaneous QAM Parul Shah PA-C    insulin lispro 1-5 Units Subcutaneous 4x Daily (AC & HS) David Arteaga MD    insulin lispro 8 Units Subcutaneous TID AC Ryan Guevara MD    lamoTRIgine 150 mg Oral Daily Yani Elizabeth PA-C    lisinopril 20 mg Oral Daily Ryan Guevara MD    methadone 15 mg Oral Daily David Arteaga MD    nystatin  Topical TID SKY Lopez    ondansetron 4 mg Intravenous Q6H PRN Yani Elizabeth PA-C    ondansetron 4 mg Oral Q6H PRN SKY Olivares    rOPINIRole 0 5 mg Oral TID Yani Elizabeth PA-C    sertraline 50 mg Oral HS Yani Elizabeth PA-C    traMADol 50 mg Oral Q6H PRN David Arteaga MD    vancomycin 17 5 mg/kg Intravenous Q12H Katina Aponte MD Last Rate: 1,250 mg (07/20/18 0751)        Today, Patient Was Seen By: Eliane Andrade PA-C    ** Please Note: Dictation voice to text software may have been used in the creation of this document   **

## 2018-07-20 NOTE — SOCIAL WORK
Spoke with Dr Mamadou Langford earlier today regarding correction needed on wound vac order form for length of need  Since, it was SHI Cm who completed form she would be the one for correction and initial  Patito Cm will be here on Tuesday to correct form  Spoke with Vermillion from [address] [place] [address] of noted information  She stated that would be okay and wound vac is approved and can still be delivered today  Wound vac delivered and proof of delivery ticket signed by pt's daughter Chelsea Patino and signed copy faxed over to [facility]  Spoke with Katina from the [address] [place] [address] and an outpt wound care appointment has been made for Monday 7/23 at 1pm   Spoke with Bob Cowan from [facility] informing of wound care appointment Monday  VNA will be following the other days Wednesday and Friday for wound vac changes  Spoke with pt's son Jennifer Zamora and he has been updated on discharge plan (wound care appt, VNA)  He stated that pt will be staying with her daughter Dayna Hahn- ([address]) when pt discharges  This was also confirmed with Chelsea Montieltrue Sera also stated that he and his sister will work on transporting pt to her wound care appointment on Monday  He is agreeable to the wound care appointment and [facility] following pt  AVS updated

## 2018-07-20 NOTE — WOUND OSTOMY CARE
Progress Note - Wound   Mickiel Iron 65 y o  female MRN: [id number]  Unit/Bed#: E5 -01 Encounter: [id number]      Assessment:   Patient seen for wound assessment and VAC dressing change  Patient pre-medicated by primary RN  Approximately 60 ml sterile normal saline instilled into VAC dressing sponge and allowed to soak for 20 minutes prior to sponge removal       Right arm is mildly erythematous around proximal portion of wound--this is also the most part of the wound per patient  Scant amount of serosanguinous drainage is present in the old VAC canister  Periwound is fragile and intact  Patient has approximated incision with sutures intact proximal and distal to open wound over forearm  Open area measures 11 3 x 3 8 x 0 3 with tendon exposed  Darkened area of tendon proximally measuring 3 x 0 5 cm (unchanged)  There is a small light gray area measuring 0 6 x 0 3 on the mid lateral aspect of the tendon to monitor closely  Wound base is otherwise 100% beefy red granulation tissue  Wound irrigated with normal saline solution   3m no sting skin barrier film applied to periwound   Thin layer of white foam (1 piece) placed over exposed tendon and covered with 1 piece black sponge   VAC drape and trac pad applied   Suction resumed at 125 mmHg low continous   Patient tolerated procedure well  Daughter at bedside for emotional support during dressing change  Splint and ACE wrap applied per orthopedics--patient reports that the foam applied to distal end of splint continues to work well to cover the sharp edge  Plan:   Patient is to be discharged tentatively tomorrow--will go to [facility] center on Mondays and have [facility] for [facility] dressing changes Wednesday and Friday at home        Negative Pressure Wound Therapy (V A C ) Arm Right (Active)   Unit Type Ulta 7/20/2018  1:45 PM   Black foam- # applied 1 7/20/2018  1:45 PM   White foam- # applied 1 7/20/2018  1:45 PM   Nonadherent (Adaptic)- # applied 0 7/20/2018  1:45 PM   Other- # applied 0 7/20/2018  1:45 PM   Cycle Continuous; On 7/20/2018  1:45 PM   Target Pressure (mmHg) 125 7/20/2018  1:45 PM   Canister Changed Yes 7/20/2018  1:45 PM   Dressing Status Clean;Dry; Intact 7/20/2018  1:45 PM   Black foam- # removed 1 7/20/2018  1:45 PM   White foam- # removed 1 7/20/2018  1:45 PM   Nonadherent (Adaptic)- # removed 0 7/20/2018  1:45 PM   Other- # removed 0 7/20/2018  1:45 PM   Output (mL) 25 mL 7/20/2018  1:45 PM       Incision 07/01/18 Arm Right (Active)   Incision Description Clean 7/20/2018  1:45 PM   Color Percentage 100% beefy red with exposed tendon--proximal portion of tendon is darkened  7/20/2018  1:45 PM   Mari-wound Assessment Clean;Erythema;Fragile 7/20/2018  1:45 PM   Wound Length (cm) 11 3 cm 7/20/2018  1:45 PM   Wound Width (cm) 3 8 cm 7/20/2018  1:45 PM   Wound Depth (cm) 0 3 7/20/2018  1:45 PM   Calculated Wound Volume (cm^3) 12 88 cm^3 7/20/2018  1:45 PM   Change in Wound Size % 88 07 7/20/2018  1:45 PM   Closure Unapproximated 7/20/2018  1:45 PM   Drainage Amount Scant 7/20/2018  1:45 PM   Drainage Description Serosanguineous 7/20/2018  1:45 PM   Treatments Cleansed;Irrigation with NSS;Site care 7/20/2018  1:45 PM   Dressing Wound V A C  7/20/2018  1:45 PM   Dressing Changed Changed 7/20/2018  1:45 PM   Patient Tolerance Tolerated well 7/20/2018  1:45 PM   Dressing Status Clean;Dry; Intact 7/20/2018  1:45 PM     Sara Hill BSN, RN, Eden Leos

## 2018-07-20 NOTE — ASSESSMENT & PLAN NOTE
Likely accelerated 2* narcan administration yesterday  Pt asymptomatic  Continue lisinopril 20mg po daily, continue hydralazine, if persistent will add norvasc 5mg po daily

## 2018-07-20 NOTE — ASSESSMENT & PLAN NOTE
W/concern for inappropriate opioid use from pt's family members bringing in medications from home  Pt was quite lethargic last night w/miotic pupils and bs of 47, uds positive for opioids/methadone  CT head negative, no focal neurodeficits, improved w/ 1/2 amp dextrose and 0 1mg of IV narcan  D/w nursing will need single day supply of methadone as pt's clinic will be closed on Sunday and she can f/u with methadone clinic

## 2018-07-20 NOTE — ASSESSMENT & PLAN NOTE
Lab Results   Component Value Date    HGBA1C 13 4 (H) 06/29/2018       Recent Labs      07/19/18   1833  07/19/18   1910  07/19/18 2057 07/20/18   1132   POCGLU  47*  105  158*  113       Blood Sugar Average: Last 72 hrs:    Poorly controlled w/a1c 13%  Has had hypoglycemia the last several days, decreased lantus to 25 IU daily and humalog 8 IU TID AC, will need glucometer and supplies upon d/c  Continue SSI and POC BS

## 2018-07-21 VITALS
OXYGEN SATURATION: 96 % | HEIGHT: 66 IN | HEART RATE: 68 BPM | DIASTOLIC BLOOD PRESSURE: 70 MMHG | WEIGHT: 144 LBS | SYSTOLIC BLOOD PRESSURE: 158 MMHG | TEMPERATURE: 98.7 F | RESPIRATION RATE: 18 BRPM | BODY MASS INDEX: 23.14 KG/M2

## 2018-07-21 LAB
GLUCOSE SERPL-MCNC: 107 MG/DL (ref 65–140)
GLUCOSE SERPL-MCNC: 80 MG/DL (ref 65–140)

## 2018-07-21 PROCEDURE — 99239 HOSP IP/OBS DSCHRG MGMT >30: CPT | Performed by: PHYSICIAN ASSISTANT

## 2018-07-21 PROCEDURE — 82948 REAGENT STRIP/BLOOD GLUCOSE: CPT

## 2018-07-21 PROCEDURE — 99232 SBSQ HOSP IP/OBS MODERATE 35: CPT | Performed by: INTERNAL MEDICINE

## 2018-07-21 RX ORDER — TRAMADOL HYDROCHLORIDE 50 MG/1
50 TABLET ORAL EVERY 6 HOURS PRN
Qty: 20 TABLET | Refills: 0 | Status: SHIPPED | OUTPATIENT
Start: 2018-07-21 | End: 2018-07-26

## 2018-07-21 RX ORDER — LISINOPRIL 40 MG/1
40 TABLET ORAL DAILY
Qty: 30 TABLET | Refills: 0 | Status: SHIPPED | OUTPATIENT
Start: 2018-07-22 | End: 2018-08-29

## 2018-07-21 RX ORDER — INSULIN GLARGINE 100 [IU]/ML
25 INJECTION, SOLUTION SUBCUTANEOUS EVERY MORNING
Qty: 10 ML | Refills: 0 | Status: SHIPPED | OUTPATIENT
Start: 2018-07-22 | End: 2019-12-12 | Stop reason: HOSPADM

## 2018-07-21 RX ORDER — ONDANSETRON 4 MG/1
4 TABLET, ORALLY DISINTEGRATING ORAL EVERY 6 HOURS PRN
Qty: 20 TABLET | Refills: 0 | Status: SHIPPED | OUTPATIENT
Start: 2018-07-21 | End: 2018-10-10

## 2018-07-21 RX ORDER — SULFAMETHOXAZOLE AND TRIMETHOPRIM 800; 160 MG/1; MG/1
1 TABLET ORAL EVERY 12 HOURS SCHEDULED
Qty: 28 TABLET | Refills: 0 | Status: SHIPPED | OUTPATIENT
Start: 2018-07-21 | End: 2018-08-04

## 2018-07-21 RX ORDER — METHADONE HYDROCHLORIDE 5 MG/1
15 TABLET ORAL DAILY
Qty: 3 TABLET | Refills: 0 | Status: SHIPPED | OUTPATIENT
Start: 2018-07-22 | End: 2018-07-23

## 2018-07-21 RX ORDER — SULFAMETHOXAZOLE AND TRIMETHOPRIM 800; 160 MG/1; MG/1
1 TABLET ORAL EVERY 12 HOURS SCHEDULED
Status: DISCONTINUED | OUTPATIENT
Start: 2018-07-21 | End: 2018-07-21 | Stop reason: HOSPADM

## 2018-07-21 RX ORDER — LISINOPRIL 20 MG/1
40 TABLET ORAL DAILY
Status: DISCONTINUED | OUTPATIENT
Start: 2018-07-22 | End: 2018-07-21 | Stop reason: HOSPADM

## 2018-07-21 RX ADMIN — SULFAMETHOXAZOLE AND TRIMETHOPRIM 1 TABLET: 800; 160 TABLET ORAL at 11:35

## 2018-07-21 RX ADMIN — METHADONE HYDROCHLORIDE 15 MG: 10 TABLET ORAL at 09:00

## 2018-07-21 RX ADMIN — HEPARIN SODIUM 5000 UNITS: 5000 INJECTION, SOLUTION INTRAVENOUS; SUBCUTANEOUS at 08:59

## 2018-07-21 RX ADMIN — GABAPENTIN 600 MG: 300 CAPSULE ORAL at 09:00

## 2018-07-21 RX ADMIN — CLONAZEPAM 1 MG: 1 TABLET ORAL at 09:00

## 2018-07-21 RX ADMIN — VANCOMYCIN HYDROCHLORIDE 1250 MG: 5 INJECTION, POWDER, LYOPHILIZED, FOR SOLUTION INTRAVENOUS at 09:01

## 2018-07-21 RX ADMIN — TRAMADOL HYDROCHLORIDE 50 MG: 50 TABLET, FILM COATED ORAL at 09:02

## 2018-07-21 RX ADMIN — INSULIN GLARGINE 25 UNITS: 100 INJECTION, SOLUTION SUBCUTANEOUS at 09:02

## 2018-07-21 RX ADMIN — ROPINIROLE 0.5 MG: 1 TABLET, FILM COATED ORAL at 09:00

## 2018-07-21 RX ADMIN — NYSTATIN: 100000 POWDER TOPICAL at 09:11

## 2018-07-21 RX ADMIN — LAMOTRIGINE 150 MG: 100 TABLET ORAL at 09:00

## 2018-07-21 RX ADMIN — LISINOPRIL 20 MG: 20 TABLET ORAL at 09:00

## 2018-07-21 NOTE — PROGRESS NOTES
Progress Note - Infectious Disease   Ajay Whitman 65 y o  female MRN: [id number]  Unit/Bed#: E5 -01 Encounter: [id number]      Impression/Recommendations:  1   Right forearm cellulitis and abscess   Patient is status post I&D   Operative culture grew MRSA   Patient is slowly improving  Antibiotic plan as in below    Monitor temperature/WBC  Serial forearm exams      2   Right forearm muscle necrosis, noted at surgery   This is secondary to MRSA abscess above   Consider myositis   Will treat with somewhat prolonged IV antibiotic course   Patient is clinically improved  She completed 2 weeks IV antibiotic post I&D  Change antibiotic to p o  Bactrim  Serial exams  Treat x 2 another 2 weeks  Continue VAC per Orthopedics Service      3   DM, poorly controlled, with hyperglycemia on admission   Hemoglobin A1c is 13 4   I discussed with patient in great detail regarding this  Jen Roberts control hyperglycemia clearly contributes to development of cellulitis and abscess above   Blood sugar is much better controlled now  Management per Medicine Service       4   Noncompliance        Discussed with patient in detail regarding the above plan  Okay for discharge from ID viewpoint      Antibiotics:  Vancomycin # 18  POD # 14     Subjective:  Patient feels better every day  Right forearm pain continues to improve, controlled  She is moving her fingers better  Temperature stays down   No further chills  She is tolerating antibiotic well   No nausea, vomiting or diarrhea   No dizziness or hearing loss  Objective:  Vitals:  HR:  [67-78] 68  Resp:  [18] 18  BP: (138-198)/(70-88) 158/70  SpO2:  [96 %-98 %] 96 %  Temp (24hrs), Av 8 °F (37 1 °C), Min:98 7 °F (37 1 °C), Max:99 °F (37 2 °C)  Current: Temperature: 98 7 °F (37 1 °C)    Physical Exam:     General: Awake, alert, cooperative, no distress  Neck:  Supple  No mass  No lymphadenopathy     Lungs: Expansion symmetric, no rales, no wheezing, respirations unlabored  Heart:  Regular rate and rhythm, S1 and S2 normal, no murmur  Abdomen: Soft, nondistended, non-tender, bowel sounds active all four quadrants,        no masses, no organomegaly  Extremities: Right arm wound with VAC  No purulence  Improve edema  Improved tenderness  Improved ROM and fingers  Skin:  No rash  Neuro: Moves all extremities  Invasive Devices     Peripherally Inserted Central Catheter Line            PICC Line 74/46/33 Left Basilic 11 days          Drain            Closed/Suction Drain Right Other (Comment) Accordion 10 Fr  20 days    Negative Pressure Wound Therapy (V A C ) Arm Right 13 days                Labs studies:   I have personally reviewed pertinent labs  Results from last 7 days  Lab Units 07/18/18  0544 07/17/18  0538 07/16/18  0451   SODIUM mmol/L 140 139 136   POTASSIUM mmol/L 4 2 4 7 4 8   CHLORIDE mmol/L 106 106 102   CO2 mmol/L 28 24 28   ANION GAP mmol/L 6 9 6   BUN mg/dL 23 24 20   CREATININE mg/dL 0 71 0 68 0 70   EGFR ml/min/1 73sq m 92 95 94   GLUCOSE RANDOM mg/dL 110 118 115   CALCIUM mg/dL 9 7 9 2 10 1       Results from last 7 days  Lab Units 07/18/18  0544 07/17/18  0836 07/16/18  0451   WBC Thousand/uL 4 57 3 81* 6 27   HEMOGLOBIN g/dL 10 0* 11 9 10 9*   PLATELETS Thousands/uL 244 292 395*           Imaging Studies:   I have personally reviewed pertinent imaging study reports and images in PACS  EKG, Pathology, and Other Studies:   I have personally reviewed pertinent reports

## 2018-07-21 NOTE — ASSESSMENT & PLAN NOTE
2* mrsa W/cellulitis and necrosis of underlying musculature noted in I&D and under surgical evaluation  Has been on Vanc w/picc line placed per ID and was not a candidate for d/c home w/picc given hx of drug abuse    She is to be on bactrim for another 2 weeks per ID and has been cleared for d/c  Will need op f/u with wound care clinic for wound vac maintenance which is m and visiting nurses on w/f  After she has been followed w/ wound care providers and found to be a candidate for grafting will need op f/u with plastics w/fong  pdmp reviewed no red flags will provide tramadol 50mg q 6 h prn severe pain for 5 days and she is recommended to f/u with her pcp

## 2018-07-21 NOTE — DISCHARGE SUMMARY
Discharge- Alek Brock 1955, 65 y o  female MRN: [id number]    Unit/Bed#: E5 -01 Encounter: [id number]    Primary Care Provider: Carmell Fleischer, MD   Date and time admitted to hospital: 6/28/2018  7:15 PM  Discharge Summary - [facility]  Internal Medicine    Patient Information: Alek Brock 65 y o  female MRN: [id number]  Unit/Bed#: E5 -01 Encounter: [id number]    Discharging Physician / Practitioner: Verónica Schaeffer PA-C  PCP: Carmell Fleischer, MD  Admission Date: 6/28/2018  Discharge Date: 07/21/18    Disposition:     Home with VNA Services (Reminder: Complete face to face encounter)    Reason for Admission: cellulitis and abscess    Discharge Diagnoses:     Principal Problem:    Abscess of right forearm  Active Problems:    Hyponatremia    Right arm cellulitis    Bipolar affective disorder ([facility] [address] )    Benign essential hypertension    Type 2 diabetes mellitus, uncontrolled ([facility] [address] )    Abscess of tendon sheath of forearm, right    Anemia    Methadone use ([facility] [address] )    Hepatitis  Resolved Problems:    * No resolved hospital problems  *      Consultations During Hospital Stay:  · Infectious disease  · Orthopaedic  ·     Procedures Performed:     · Excisional debridement of skin and subcutaneous tissue, incision and drainage of right forearm w/deep abscess    Significant Findings / Test Results:     · CT head w/o contrast 7/19  INDICATION:   unwitnessed fall, change in mental status      COMPARISON:  CT head 12/2/2012      TECHNIQUE:  CT examination of the brain was performed  In addition to axial images, coronal 2D reformatted images were created and submitted for interpretation        Radiation dose length product (DLP) for this visit:  1010 mGy-cm     This examination, like all CT scans performed in the [facility], was performed utilizing techniques to minimize radiation dose exposure, including the use of iterative   reconstruction and automated exposure control        IMAGE QUALITY:  Diagnostic      FINDINGS:     PARENCHYMA:  No intracranial mass, mass effect or midline shift  No CT signs of acute infarction  No acute parenchymal hemorrhage      VENTRICLES AND EXTRA-AXIAL SPACES:  Normal for the patient's age      VISUALIZED ORBITS AND PARANASAL SINUSES:  Unremarkable      CALVARIUM AND EXTRACRANIAL SOFT TISSUES:  Normal      IMPRESSION:     No acute intracranial abnormality  Xray forearm 2 vw right 7/12/18  No acute fx/dislocation or degenerative changes, lytic/blastic lesions  Suction drain projecting over midforearm, previously seen soft tissue swelling no longer present    xr humerus right 7/12/18   No acute osseous abn    Mri forearm w/o contrast 6/29/18  INDICATION:   abscess      COMPARISON:  None      TECHNIQUE:  MR examination of the right forearm was performed  Pulse sequences IV contrast was not given  Scan was performed on a 1 5 [facility]      FINDINGS:     Unfortunately the patient could not tolerate the procedure secondary to discomfort and only localizer images and coronal T1 images were obtained prior to the termination of the exam      SUBCUTANEOUS TISSUES: There is subcutaneous edema of the soft tissues of the radial aspect of the right forearm  Questionable 1 9 x 1 3 cm collection along the radial aspect of the right forearm at the level of the distal diaphysis      BONES:  No fracture, dislocation or destructive osseous lesion on this limited evaluation      ARTICULAR SURFACES:  Unremarkable given exam limitations      VISUALIZED MUSCULATURE:  Poorly evaluated on single sequence      OTHER SOFT TISSUES:  Unremarkable      OTHER PERTINENT FINDINGS:  None      IMPRESSION:  Nondiagnostic exam as only coronal T1 images were obtained before patient had to stop the exam secondary to discomfort  There is soft tissue swelling present along the radial aspect of the right forearm      xr forearm 2 vw right 6/29/18     There is no acute fracture or dislocation      No degenerative changes      No lytic or blastic lesions are seen      Cellulitis along the lateral and dorsal aspect of the forearm with a focal lump in the mid region  No gas  No osseous involvement      IMPRESSION:     Cellulitis along the lateral and dorsal aspect of the forearm with a focal lump in the mid region  No gas  No osseous involvement  Abscess cannot be excluded on plain film and if there is continued clinical concern, consider ultrasound  xr chest pa lateral 6/28/18  Cardiomediastinal silhouette appears unremarkable      The lungs are clear  No pneumothorax or pleural effusion      Osseous structures appear within normal limits for patient age      IMPRESSION:     No acute cardiopulmonary disease  Wound cx-soft tissue      Tissue Culture 3+ Growth of Methicillin Resistant Staphylococcus aureus     Please note: This patient requires contact precautions           GRAM STAIN RESULT  2+ Polys      1+ Gram positive cocci in pairs            Susceptibility      Methicillin Resistant Staphylococcus aureus     SHANIKA     Ampicillin ($$) <=2 00 ug/ml"><=2 00 ug/ml Resistant     Cefazolin ($) <=4 00 ug/ml"><=4 00 ug/ml Resistant     Clindamycin ($) <=0 25 ug/ml"><=0 25 ug/ml Susceptible     Erythromycin ($$$$) <=0 25 ug/ml"><=0 25 ug/ml Susceptible     Gentamicin ($$) <=1 ug/ml"><=1 ug/ml Susceptible     Oxacillin >2 00 ug/ml Resistant     Tetracycline <=2 ug/ml"><=2 ug/ml Susceptible     Trimethoprim + Sulfamethoxazole ($$$) <=0 5/9 5 ug/ml"><=0 5/9 5 u  Susceptible     Vancomycin ($) 1 00 ug/ml Susceptible                   bcx no growth x 5 d  Incidental Findings:   ·      Test Results Pending at Discharge (will require follow up):   ·      Outpatient Tests Requested:  ·     Complications:  Confusion/lethargy/unwitnessed fall    Hospital Course: Isma Hernandez is a 65 y o  female patient who originally presented to the hospital on 6/28/2018 due to     right arm cellulitis   Patient reports she fell 10 days ago and had a bruise and possibly an abrasion  At some point she noticed redness in the area  She was given amoxicillin by her PCP which she completed  She recently noticed drainage  She denies fever  She has an old tattoo in the area  She admits to high blood sugars and being noncompliant with her insulin for the past month  Pt had a prolonged stay requiring surgical I&D, IV abx requiring picc for mrsa abscess and cellulitis w/involvement of some necrosis of underlying musculature w/wound vac placement  Not a candidate for d/c w/picc given concern for IVDA  Will be d/c'd after cleared by ID on bactrim w/wound care f/u in [facility] on Monday and VNA W/F  After she has demonstrated improvement and f/u with her wound care she is to be referred to [facility] for plastic surgery and eventual graft  Pt's stay was complicated by concern for her family members bringing in outside medications and an unwitnessed fall and change in mental status  CT head w/o contrast was negative and pt was found w/miotic pupils and hypoglycemia  She improved w/narcan and dextrose w/o any neurodeficits or any significant injury  * Abscess of right forearm   Assessment & Plan    2* mrsa W/cellulitis and necrosis of underlying musculature noted in I&D and under surgical evaluation  Has been on Vanc w/picc line placed per ID and was not a candidate for d/c home w/picc given hx of drug abuse    She is to be on bactrim for another 2 weeks per ID and has been cleared for d/c  Will need op f/u with wound care clinic for wound vac maintenance which is m and visiting nurses on w/f  After she has been followed w/ wound care providers and found to be a candidate for grafting will need op f/u with plastics w/fong  pdmp reviewed no red flags will provide tramadol 50mg q 6 h prn severe pain for 5 days and she is recommended to f/u with her pcp        Hepatitis   Assessment & Plan    Hepatitis C  Pt need op f/u with gi for hcc screening and eventual treatment if consistently not using narcotics        Methadone use [facility])   Assessment & Plan    W/concern for inappropriate opioid use from pt's family members bringing in medications from home  Pt was quite lethargic last night w/miotic pupils and bs of 47, uds positive for opioids/methadone  CT head negative, no focal neurodeficits, improved w/ 1/2 amp dextrose and 0 1mg of IV narcan  D/w nursing will need single day supply of methadone as pt's clinic will be closed on Sunday and she can f/u with methadone clinic  Will prescribe one day supply of methadone 5mg (3 tabs) for total of patient's confirmed 15mg dose        Type 2 diabetes mellitus, uncontrolled ([facility] [address] )   Assessment & Plan    Lab Results   Component Value Date    HGBA1C 13 4 (H) 06/29/2018       Recent Labs      07/20/18   1614  07/20/18   2102  07/21/18   0759  07/21/18   1112   POCGLU  99  128  107  80       Blood Sugar Average: Last 72 hrs:    Poorly controlled w/a1c 13%  Has hadintermittent hypoglycemia, continue lantus 25 IU daily and humalog has been decreased to 6 IU TID AC  Glucometer and test strips provided        Benign essential hypertension   Assessment & Plan    Asymptomatic but uncontrolled increase lisinopril to 40mg po daily   Okay for d/c        Bipolar affective disorder [facility])   Assessment & Plan    Continue lamictal/zoloct              Condition at Discharge: good     Discharge Day Visit / Exam:     Subjective:  Pt seen and examined  VS reviewed  She reports she feels somewhat nauseous today and reports she hasn't passed gas today  No events overnight per nursing  She expressed understanding for follow up needs  She was encouraged to take otc colace (docusate) if she is experiencing constipation    Vitals: Blood Pressure: 158/70 (07/21/18 0801)  Pulse: 68 (07/21/18 0801)  Temperature: 98 7 °F (37 1 °C) (07/21/18 0801)  Temp Source: Temporal (07/21/18 0801)  Respirations: 18 (07/21/18 0801)  Height: 5' 6" (167 6 cm) (07/12/18 0907)  Weight - Scale: 65 3 kg (144 lb) (06/28/18 1913)  SpO2: 96 % (07/21/18 0801)  Exam:   Physical Exam   Constitutional: She appears well-developed  No distress  Thin appears stated age, nad   HENT:   Head: Normocephalic and atraumatic  Right Ear: External ear normal    Left Ear: External ear normal    Mouth/Throat: No oropharyngeal exudate  Eyes: Conjunctivae are normal  Right eye exhibits no discharge  Left eye exhibits no discharge  No scleral icterus  Neck: Normal range of motion  Cardiovascular: Normal rate, regular rhythm and normal heart sounds  Exam reveals no gallop and no friction rub  No murmur heard  Cap refill <2 seconds in RUE   Pulmonary/Chest: Effort normal and breath sounds normal  She has no wheezes  She has no rales  She exhibits no tenderness  Abdominal: Soft  Bowel sounds are normal  She exhibits no distension  There is no tenderness  There is no rebound and no guarding  Musculoskeletal: She exhibits no edema  Neurological: She is alert  Crude sensation intact in UE b/l  Skin: Skin is warm and dry  She is not diaphoretic    riight arm dressing is intact and ace bandage present   Psychiatric: She has a normal mood and affect  Vitals reviewed  Discussion with Family: none    Discharge instructions/Information to patient and family:   See after visit summary for information provided to patient and family  Provisions for Follow-Up Care:  See after visit summary for information related to follow-up care and any pertinent home health orders  Planned Readmission: none     Discharge Statement:  I spent 50 minutes discharging the patient  This time was spent on the day of discharge  I had direct contact with the patient on the day of discharge   Greater than 50% of the total time was spent examining patient, answering all patient questions, arranging and discussing plan of care with patient as well as directly providing post-discharge instructions  Additional time then spent on discharge activities  Discharge Medications:  See after visit summary for reconciled discharge medications provided to patient and family        ** Please Note: This note has been constructed using a voice recognition system **

## 2018-07-21 NOTE — ASSESSMENT & PLAN NOTE
W/concern for inappropriate opioid use from pt's family members bringing in medications from home  Pt was quite lethargic last night w/miotic pupils and bs of 47, uds positive for opioids/methadone  CT head negative, no focal neurodeficits, improved w/ 1/2 amp dextrose and 0 1mg of IV narcan  D/w nursing will need single day supply of methadone as pt's clinic will be closed on Sunday and she can f/u with methadone clinic    Will prescribe one day supply of methadone 5mg (3 tabs) for total of patient's confirmed 15mg dose

## 2018-07-21 NOTE — ASSESSMENT & PLAN NOTE
Lab Results   Component Value Date    HGBA1C 13 4 (H) 06/29/2018       Recent Labs      07/20/18   1614  07/20/18   2102  07/21/18   0759  07/21/18   1112   POCGLU  99  128  107  80       Blood Sugar Average: Last 72 hrs:    Poorly controlled w/a1c 13%  Has hadintermittent hypoglycemia, continue lantus 25 IU daily and humalog has been decreased to 6 IU TID AC  Glucometer and test strips provided

## 2018-07-21 NOTE — PROGRESS NOTES
Pt called [facility] following her discharge  She was questioning her home prescription of Bactrim  She stated "This med does not work for me, I took this before I got to the hospital"  Our charge nurse Farrah Guthrie answered the call and explained to her that the chosen ABX are based upon C&S results and that they are fit for her  She then responded "I will not be taking these" and hung up  I paged SHI Soriano W Lázaro Ramos and passed along the nature and content of the phone call

## 2018-07-21 NOTE — DISCHARGE INSTR - AVS FIRST PAGE
Wound care instructions- irrigate normal saline solution   3m no sting skin barrier film applied to periwound   Thin layer of white foam (1 piece) placed over exposed tendon and covered with 1 piece black sponge   VAC drape and trac pad applied   Suction resumed at 125 mmHg low continous  Splint and ACE wrap applied per orthopedics--patient reports that the foam applied to distal end of splint continues to work well to cover the sharp edge   wound dressing changes are Monday Wednesday and Friday Monday at wound care center

## 2018-07-23 ENCOUNTER — APPOINTMENT (OUTPATIENT)
Dept: WOUND CARE | Facility: HOSPITAL | Age: 63
End: 2018-07-23
Payer: COMMERCIAL

## 2018-07-23 ENCOUNTER — PATIENT OUTREACH (OUTPATIENT)
Dept: OTHER | Facility: HOSPITAL | Age: 63
End: 2018-07-23

## 2018-07-23 LAB
GLUCOSE SERPL-MCNC: 86 MG/DL (ref 65–140)
GLUCOSE SERPL-MCNC: 86 MG/DL (ref 65–140)
GLUCOSE SERPL-MCNC: 88 MG/DL (ref 65–140)

## 2018-07-23 PROCEDURE — 99213 OFFICE O/P EST LOW 20 MIN: CPT | Performed by: SURGERY

## 2018-07-23 PROCEDURE — 82948 REAGENT STRIP/BLOOD GLUCOSE: CPT

## 2018-07-23 PROCEDURE — 97605 NEG PRS WND THER DME<=50SQCM: CPT | Performed by: SURGERY

## 2018-07-23 NOTE — PROGRESS NOTES
Outpatient Care Management Note:  Reached out to patient post discharge  Contact information given, patient placed it in her cell phone  She is taking her medications and has been checking her blood sugars as ordered  AM sugar this morning was 162 and she is concerned about that  Told her to monitor for trends, but that the infection in her arm could cause her blood sugars to be slightly higher than usual and to make sure she is continuing on the antibiotics as ordered  Aware of her wound care appointment today at 1  PCP appointment not made, she did not know that she should make one (not on the AVS)  She will call today to make that appointment as well as follow up appointment with orthopedics and plastic surgery  Encouraged to call with any questions or concerns  Amenable to continued follow up

## 2018-07-25 ENCOUNTER — TELEPHONE (OUTPATIENT)
Dept: OBGYN CLINIC | Facility: MEDICAL CENTER | Age: 63
End: 2018-07-25

## 2018-07-25 NOTE — TELEPHONE ENCOUNTER
I tried calling the  opertaor at [facility] and it did not go through  The  at [facility] said they are having difficulty with the phone lines at [place]  I was connected to case management and left a message for [facility] to call me back

## 2018-07-25 NOTE — TELEPHONE ENCOUNTER
Arti called requesting callback from [facility] regarding pt's wound vac-order form  She did fax me copy moments ago  Section 5C & length of need needs to be filled out       CB# Arti [phone]  FAX# [phone]

## 2018-07-25 NOTE — TELEPHONE ENCOUNTER
I tried calling [facility] back but the line was busy and I was unable to leave a message

## 2018-07-25 NOTE — TELEPHONE ENCOUNTER
I talked to Oscar  today  She states the patient needs more information on her wound VAC order form  This was fax to our office  She was unsure what to put for the date of the prescription  She gave me a contact information for the wound VAC facility to discuss further   So I called Virgen Girard  from the company the wound VAC  orders is through  She states that I can write the order for 2 months and that they continue wound VAC care for as long as needed until follow up with plastics  Patient currently does not have a follow-up plastics  plastics wants her to see wound care 1st   Patient has been seen by wound care this week  Patient was given information to follow up with plastics and make an appointment  They would like us to fax back the paperwork with appropriate information

## 2018-07-30 ENCOUNTER — APPOINTMENT (OUTPATIENT)
Dept: WOUND CARE | Facility: HOSPITAL | Age: 63
End: 2018-07-30
Payer: COMMERCIAL

## 2018-07-30 PROCEDURE — 11042 DBRDMT SUBQ TIS 1ST 20SQCM/<: CPT | Performed by: SURGERY

## 2018-07-30 PROCEDURE — 97605 NEG PRS WND THER DME<=50SQCM: CPT | Performed by: SURGERY

## 2018-08-06 ENCOUNTER — TELEPHONE (OUTPATIENT)
Dept: OBGYN CLINIC | Facility: MEDICAL CENTER | Age: 63
End: 2018-08-06

## 2018-08-07 ENCOUNTER — PATIENT OUTREACH (OUTPATIENT)
Dept: OTHER | Facility: HOSPITAL | Age: 63
End: 2018-08-07

## 2018-08-07 NOTE — PROGRESS NOTES
Outpatient Care Management Note:  Spoke with patient, very upset with VNA, "there is no communication, they missed the appointment yesterday "  They are scheduled to visit at 3:30 today but patient is concerned that her dressing has not been changed since Friday and the wound VAC is draining yellow fluid, patient does describe it as thin  She is also complaining of increased pain  She has not made her follow up appointments with Orthopedic or plastic surgery because she is "disgusted ' Explained that they would be the ones who would deal with her pain, splint and drainage issues  She  did agree to make an appointment if they called her  I spoke with both offices and explained the situation and they will call the patient to schedule  I also spoke with Ivy at [address] [place] [address] , she will call the nurse who is scheduled to see the patient to see if there is any way to move up the time  Patient has my contact information

## 2018-08-07 NOTE — TELEPHONE ENCOUNTER
I received a call from the [facility] department who spoke with the patient this morning and she is going to be scheduled with plastics  She I also willing to schedule an appointment with ortho as well  They are asking if the patient can be contacted and to discuss what needs to be done next

## 2018-08-08 NOTE — TELEPHONE ENCOUNTER
Pt's son ASHA Carmichael called back and spoke to him myself  I let him know to ensure that his mother will f/u with plastics  No f/u as of yet  She does not to see Ortho for f/u per Norman Kelly PA-C  I also let him know to verify if plastics wanted to see pt if her wound was better prior to being seen  I made him aware that her mother is seeing woundcare every Wednesday & Friday  He is going to call mother and seek out discharge instructions to ensure that his mother f/u with plastics  He will call back as an FYI once he has spoken to his mother or for further info       CB# [facility]: [phone]

## 2018-08-08 NOTE — TELEPHONE ENCOUNTER
Patient called back stating she rec'd a call from someone but have no idea as to who or why  Stated someone was supposed to call her to schedule an appt   C/b [phone]

## 2018-08-08 NOTE — TELEPHONE ENCOUNTER
I called and spoke to [facility]  from case management for info clarification  She tells me that she knows pt has been given all the info & instructions after discharge  She also confirms that pt is being seen by [address] [place] [address]  She also gave me # for her adult son, Umberto Atkins [phone]  I want to ensure there are no gaps in communication and that he also confirm that she will see plastics  [facility]  tells me she was told allegedly that plastics apparently did not want to see pt until her wound looked better hence the woundcare being done  I called and left son Umberto Atkins a v/m to call me back and clarify all info mentioned above   I also did attempt to call pt's contact # as well as her adult daughter's # without success; v/m too full to leave v/m

## 2018-08-11 NOTE — ED PROVIDER NOTES
History  Chief Complaint   Patient presents with    Cellulitis     Cellulitis to right forearm  Completed full course of Amoxicillin without relief  History provided by:  Patient   used: No    Abscess - Complicated   Location:  Shoulder/arm  Shoulder/arm abscess location:  R forearm  Size:  Please see picture  Abscess quality: draining, fluctuance, painful, redness and warmth    Progression:  Worsening  Pain details:     Quality:  Pressure, throbbing and tightness    Severity:  Moderate    Timing:  Constant    Progression:  Worsening  Chronicity:  New  Context: diabetes and skin injury    Context: not injected drug use    Relieved by:  Nothing  Worsened by:  Nothing  Ineffective treatments:  None tried  Associated symptoms: no anorexia, no fatigue, no fever and no headaches    Risk factors: no prior abscess        Prior to Admission Medications   Prescriptions Last Dose Informant Patient Reported? Taking? clonazePAM (KlonoPIN) 1 mg tablet   Yes Yes   Sig: Take 1 mg by mouth 3 (three) times a day   gabapentin (NEURONTIN) 300 mg capsule   Yes Yes   Sig: Take 300 mg by mouth 3 (three) times a day   insulin glargine (LANTUS) 100 units/mL subcutaneous injection   Yes Yes   Sig: Inject 50 Units under the skin   insulin lispro (HumaLOG) 100 units/mL injection   Yes Yes   Sig: Inject 11 Units under the skin   lamoTRIgine (LaMICtal) 150 MG tablet   Yes Yes   Sig: Take 150 mg by mouth daily   lisinopril (ZESTRIL) 10 mg tablet   Yes Yes   Sig: Take 10 mg by mouth   rOPINIRole (REQUIP) 0 5 mg tablet   Yes Yes   Sig: Take 10 mg by mouth 3 (three) times a day   sertraline (ZOLOFT) 50 mg tablet   Yes Yes   Sig: Take 50 mg by mouth daily      Facility-Administered Medications: None       History reviewed  No pertinent past medical history  History reviewed  No pertinent surgical history  History reviewed  No pertinent family history    I have reviewed and agree with the history as documented  Social History   Substance Use Topics    Smoking status: Never Smoker    Smokeless tobacco: Never Used    Alcohol use No        Review of Systems   Constitutional: Negative for chills, fatigue and fever  HENT: Negative for dental problem  Eyes: Negative for pain  Respiratory: Negative for chest tightness  Cardiovascular: Negative for leg swelling  Gastrointestinal: Negative for abdominal pain and anorexia  Endocrine: Negative for heat intolerance  Genitourinary: Negative for dysuria  Musculoskeletal: Negative for back pain  Skin: Negative for rash  Allergic/Immunologic: Negative for food allergies  Neurological: Negative for headaches  Hematological: Does not bruise/bleed easily  Psychiatric/Behavioral: Negative for confusion  Physical Exam  Physical Exam   Constitutional: She is oriented to person, place, and time  She appears well-developed and well-nourished  No distress  HENT:   Head: Normocephalic and atraumatic  Eyes: Conjunctivae are normal    Neck: Neck supple  No JVD present  Cardiovascular: Normal rate  Pulmonary/Chest: Effort normal    Abdominal: She exhibits no distension  No complaints  Genitourinary:   Genitourinary Comments: No complaints exam deferred  Musculoskeletal: She exhibits edema, tenderness and deformity  Arms:  Lymphadenopathy:     She has no cervical adenopathy  Neurological: She is alert and oriented to person, place, and time  Patient has intact sensation to the right hand all fingers in the radial ulnar are and median nerve distribution  Skin: Capillary refill takes less than 2 seconds  Capillary refill is intact to all fingers of the right hand less than 2 seconds at the nail beds  Bounding radial pulses appreciated  Tammy Peters Psychiatric: She has a normal mood and affect  Nursing note and vitals reviewed                Vital Signs  ED Triage Vitals [06/28/18 1913]   Temperature Pulse Respirations Blood Pressure SpO2   97 9 °F (36 6 °C) 101 16 (!) 208/82 98 %      Temp Source Heart Rate Source Patient Position - Orthostatic VS BP Location FiO2 (%)   Oral Monitor Sitting Left arm --      Pain Score       9           Vitals:    06/28/18 1913 06/28/18 2137 06/28/18 2230 06/29/18 0011   BP: (!) 208/82 148/69 155/67 142/65   Pulse: 101 79  80   Patient Position - Orthostatic VS: Sitting Lying Lying Lying       Visual Acuity      ED Medications  Medications   sodium chloride 0 9 % infusion (125 mL/hr Intravenous New Bag 6/28/18 2017)   clonazePAM (KlonoPIN) tablet 1 mg (1 mg Oral Given 6/28/18 2240)   gabapentin (NEURONTIN) capsule 300 mg (300 mg Oral Given 6/28/18 2241)   insulin glargine (LANTUS) subcutaneous injection 25 Units 0 25 mL (not administered)   lamoTRIgine (LaMICtal) tablet 150 mg (not administered)   lisinopril (ZESTRIL) tablet 10 mg (10 mg Oral Given 6/28/18 2245)   rOPINIRole (REQUIP) tablet 0 5 mg (0 5 mg Oral Given 6/28/18 2240)   sertraline (ZOLOFT) tablet 50 mg (50 mg Oral Given 6/28/18 2240)   vancomycin (VANCOCIN) IVPB (premix) 1,000 mg (not administered)   ondansetron (ZOFRAN) injection 4 mg (not administered)   calcium carbonate (TUMS) chewable tablet 1,000 mg (not administered)   insulin lispro (HumaLOG) 100 units/mL subcutaneous injection 1-5 Units (5 Units Subcutaneous Given 6/28/18 2349)   acetaminophen (TYLENOL) tablet 650 mg (not administered)   morphine (PF) 4 mg/mL injection 4 mg (not administered)   ibuprofen (MOTRIN) tablet 800 mg (800 mg Oral Given 6/28/18 2018)   acetaminophen (TYLENOL) tablet 975 mg (975 mg Oral Given 6/28/18 2018)   vancomycin (VANCOCIN) IVPB (premix) 1,000 mg (1,000 mg Intravenous New Bag 6/28/18 2026)   ondansetron (ZOFRAN) injection 4 mg (4 mg Intravenous Given 6/28/18 2019)   HYDROmorphone (DILAUDID) injection 0 5 mg (0 5 mg Intravenous Given 6/28/18 2023)   insulin lispro (HumaLOG) 100 units/mL subcutaneous injection 10 Units (10 Units Subcutaneous Given 6/28/18 2239) Diagnostic Studies  Results Reviewed     Procedure Component Value Units Date/Time    Comprehensive metabolic panel [[id number]]  (Abnormal) Collected:  06/28/18 1948    Lab Status:  Final result Specimen:  Blood from Arm, Left Updated:  06/28/18 2019     Sodium 128 (L) mmol/L      Potassium 4 2 mmol/L      Chloride 92 (L) mmol/L      CO2 26 mmol/L      Anion Gap 10 mmol/L      BUN 15 mg/dL      Creatinine 0 86 mg/dL      Glucose 466 (H) mg/dL      Calcium 10 0 mg/dL      AST 24 U/L      ALT 16 U/L      Alkaline Phosphatase 126 (H) U/L      Total Protein 7 9 g/dL      Albumin 2 6 (L) g/dL      Total Bilirubin 0 50 mg/dL      eGFR 73 ml/min/1 73sq m     Narrative:         National Kidney Disease Education Program recommendations are as follows:  GFR calculation is accurate only with a steady state creatinine  Chronic Kidney disease less than 60 ml/min/1 73 sq  meters  Kidney failure less than 15 ml/min/1 73 sq  meters  Blood culture #2 [[id number]] Collected:  06/28/18 2016    Lab Status: In process Specimen:  Blood from Arm, Left Updated:  06/28/18 2018    CBC and differential [[id number]]  (Abnormal) Collected:  06/28/18 1948    Lab Status:  Final result Specimen:  Blood from Arm, Left Updated:  06/28/18 1959     WBC 12 81 (H) Thousand/uL      RBC 3 94 Million/uL      Hemoglobin 10 8 (L) g/dL      Hematocrit 32 0 (L) %      MCV 81 (L) fL      MCH 27 4 pg      MCHC 33 8 g/dL      RDW 13 2 %      MPV 9 4 fL      Platelets 759 Thousands/uL      nRBC 0 /100 WBCs      Neutrophils Relative 84 (H) %      Lymphocytes Relative 11 (L) %      Monocytes Relative 4 %      Eosinophils Relative 0 %      Basophils Relative 0 %      Neutrophils Absolute 10 82 (H) Thousands/µL      Lymphocytes Absolute 1 42 Thousands/µL      Monocytes Absolute 0 52 Thousand/µL      Eosinophils Absolute 0 04 Thousand/µL      Basophils Absolute 0 01 Thousands/µL     Blood culture #1 [[id number]] Collected:  06/28/18 1949    Lab Status:   In process Specimen:  Blood from Arm, Left Updated:  06/1956                 XR chest 2 views   Final Result by Daniela Hammond MD (06/28 2050)      No acute cardiopulmonary disease  Workstation performed: [id number]                    Procedures  Procedures       Phone Contacts  ED Phone Contact    ED Course                               MDM  Number of Diagnoses or Management Options  Abscess of tendon sheath of forearm, right:   Cellulitis:   DM (diabetes mellitus) ([facility] ): Hyperglycemia:   Right arm cellulitis:   Diagnosis management comments: 80-year-old female presents emergency department for right arm abscess which is draining  Patient states she was treated with antibiotics but has not had any improvement but with worsening symptom  Patient admits to evolving lump on right arm which expressed pus over the last 24 hours  Patient with difficulty with extension especially to the small and ring finger right hand  At this time patient is felt need for inpatient management with antibiotics and evaluation by Orthopedics  I have personally tiger text Dr Maggie Bennett, her recommendations are in patient admission to initiating antibiotics keep patient NPO over midnight and plan for surgery next day  This was discussed with patient she is agreeable  This was discussed with General Medicine they are agreeable  Patient remained stable in the department and upon transfer to the standard nursing floor         Amount and/or Complexity of Data Reviewed  Clinical lab tests: ordered and reviewed  Tests in the radiology section of CPT®: ordered and reviewed      CritCare Time    Disposition  Final diagnoses:   Right arm cellulitis   Abscess of tendon sheath of forearm, right   DM (diabetes mellitus) ([facility] )   Cellulitis   Hyperglycemia     Time reflects when diagnosis was documented in both MDM as applicable and the Disposition within this note     Time User Action Codes Description Comment    6/28/2018  9:00 PM Reginald Fear Add [K22 716] Right arm cellulitis     6/28/2018  9:02 PM Gearoneydaan Fear Add [M65 031] Abscess of tendon sheath of forearm, right     6/29/2018 12:42 AM Reginald Fear Add [E11 9] DM (diabetes mellitus) ([facility] )     6/29/2018 12:43 AM Reginald Fear Add [L03 90] Cellulitis     6/29/2018 12:43 AM CourtneyAlejandro loza Add [R73 9] Hyperglycemia       ED Disposition     ED Disposition Condition Comment    Admit  Case was discussed with GREG and the patient's admission status was agreed to be Admission Status: observation status to the service of Dr Jr Lopez   Follow-up Information    None         Current Discharge Medication List      CONTINUE these medications which have NOT CHANGED    Details   clonazePAM (KlonoPIN) 1 mg tablet Take 1 mg by mouth 3 (three) times a day      gabapentin (NEURONTIN) 300 mg capsule Take 300 mg by mouth 3 (three) times a day      insulin glargine (LANTUS) 100 units/mL subcutaneous injection Inject 50 Units under the skin      insulin lispro (HumaLOG) 100 units/mL injection Inject 11 Units under the skin      lamoTRIgine (LaMICtal) 150 MG tablet Take 150 mg by mouth daily      lisinopril (ZESTRIL) 10 mg tablet Take 10 mg by mouth      rOPINIRole (REQUIP) 0 5 mg tablet Take 10 mg by mouth 3 (three) times a day      sertraline (ZOLOFT) 50 mg tablet Take 50 mg by mouth daily           No discharge procedures on file      ED Provider  Electronically Signed by           Hui Davenport PA-C  06/29/18 Phillip Johnson PA-C  06/29/18 0822 WDL

## 2018-08-14 ENCOUNTER — PATIENT OUTREACH (OUTPATIENT)
Dept: OTHER | Facility: HOSPITAL | Age: 63
End: 2018-08-14

## 2018-08-14 NOTE — PROGRESS NOTES
Outpatient Care Management Note:  Patient continues to be very angry and non compliant   "I did not miss my appointments with the wound center-I did not know I had them because I broke my phone "  "Why didn't they call one of my kids "  Explained to patient that since the wound center was able to leave a message, they had no way of knowing that her phone was broken  Patient continues to refuse to see plastic surgery for follow up appointment  Per patient, orthopedics does not need to see her  Patient verbalizing dissatisfaction with her  health insurance due to copays of her medications,  "that is why I don't take my medicine " Questioned patient about her medical assistance and she responded, "no one told me I had medical assistance, I just have an access card  Informed patient to present her access card along with her [facility] card when she gets her prescriptions filled  Reminded patient that she has an appointment with the wound care center on 8/15/18 at 2:45  Verbalized understanding of same

## 2018-08-15 ENCOUNTER — APPOINTMENT (OUTPATIENT)
Dept: WOUND CARE | Facility: HOSPITAL | Age: 63
End: 2018-08-15
Payer: COMMERCIAL

## 2018-08-15 PROCEDURE — 99213 OFFICE O/P EST LOW 20 MIN: CPT | Performed by: FAMILY MEDICINE

## 2018-08-24 ENCOUNTER — OFFICE VISIT (OUTPATIENT)
Dept: PLASTIC SURGERY | Facility: CLINIC | Age: 63
End: 2018-08-24
Payer: COMMERCIAL

## 2018-08-24 VITALS — HEIGHT: 66 IN | WEIGHT: 150 LBS | BODY MASS INDEX: 24.11 KG/M2

## 2018-08-24 DIAGNOSIS — L02.413 ABSCESS OF RIGHT FOREARM: ICD-10-CM

## 2018-08-24 DIAGNOSIS — M65.031: ICD-10-CM

## 2018-08-24 DIAGNOSIS — IMO0002 UNCONTROLLED TYPE 2 DIABETES MELLITUS WITH OTHER SKIN COMPLICATION, WITH LONG-TERM CURRENT USE OF INSULIN: Primary | ICD-10-CM

## 2018-08-24 PROCEDURE — 99203 OFFICE O/P NEW LOW 30 MIN: CPT | Performed by: PLASTIC SURGERY

## 2018-08-24 NOTE — PROGRESS NOTES
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, with long-term current use of insulin (HCC)    Abscess of tendon sheath of forearm, right    Abscess of right forearm      During this encounter I discussed with Margarito Littlejohn my findings  Patient had a subcutaneous abscess of the right forearm that required multiple debridements and a course of antibiotic  Patient has been followed at the wound center in [facility] out concerning measurements  I explained to the patient that she has exposed tendons with some devitalized tissue in the area and also has significant restriction of range of motion of the wrist to the distal forearm  This my medical recommendation that she undergo surgical debridement of the devitalized tissue in order to plan a definitive closure that can entail any of the plastic screw maneuvers such as skin grafting, skin substitute application and potential local or regional flap  However for the patient for occupational therapies to try to increase the range of motion  Patient has no fractures stenosis plan needed  Patient will continue local wound care  Patient has manifested reasonable understanding of the plan and she would like to proceed with surgery  Victor Hugo Anthony MD   [place] [id number]   Willem Santiago [facility], [address]    Office: [phone]           Subjective:      Patient ID: Lisette Cleveland is a 65 y o  female  Mrs Dakota Whitehead is a 65years old female with history of diabetes who presents for evaluation of the right forearm wound  As per the patient she sustained a fall and and hand arm edema and pain for about 10 days until he can progressively worse and she presented to the emergency room at [facility] found to have an abscess    She underwent multiple debridements on July 1st, 7th and 11th and has remaining wound that is being managed by local wound care by the wound center  Patient manifest she continues to do silver dressings were is a splint at all times  He has not undergone any sort of therapy at this point  She is right-handed  The following portions of the patient's history were reviewed and updated as appropriate: allergies, current medications, past family history, past medical history, past social history, past surgical history and problem list     Review of Systems   Constitutional: Negative  HENT: Negative  Respiratory: Negative  Cardiovascular: Negative  Gastrointestinal: Negative  Genitourinary: Negative  Musculoskeletal: Positive for joint swelling  Skin: Positive for wound  Hematological: Negative  Psychiatric/Behavioral: Negative  Objective:      Ht 5' 6" (1 676 m)   Wt 68 kg (150 lb)   BMI 24 21 kg/m²          Physical Exam   Constitutional: She appears well-developed and well-nourished  Eyes: Pupils are equal, round, and reactive to light  Neck: Normal range of motion  Pulmonary/Chest: Effort normal    Musculoskeletal:        Right wrist: She exhibits tenderness and swelling          Arms:  Skin:

## 2018-08-27 PROBLEM — L02.413 ABSCESS OF RIGHT ARM: Status: ACTIVE | Noted: 2018-08-27

## 2018-08-27 PROBLEM — M65.031: Status: ACTIVE | Noted: 2018-08-27

## 2018-08-28 ENCOUNTER — PATIENT OUTREACH (OUTPATIENT)
Dept: OTHER | Facility: HOSPITAL | Age: 63
End: 2018-08-28

## 2018-08-28 NOTE — PROGRESS NOTES
Outpatient Care Management Note:  Patient had follow up visit with plastic surgery on 8/24/18  She is scheduled for debridement of the right forearm on 9/12/18  She is still seeing the wound center weekly and [facility] is doing dressing changes  She has been exercising her fingers and wrist to try to facilitate improved ROM  Encouraged to not over due the exercises until she was cleared by plastic surgery, encouraged to stop the exercises if they caused pain  She is independent with her ADL's and has no additional needs at this time  Verified that she has my contact information

## 2018-08-29 ENCOUNTER — ANESTHESIA EVENT (OUTPATIENT)
Dept: PERIOP | Facility: HOSPITAL | Age: 63
End: 2018-08-29
Payer: COMMERCIAL

## 2018-08-29 RX ORDER — METHADONE HYDROCHLORIDE 5 MG/1
TABLET ORAL DAILY
COMMUNITY
End: 2021-04-17 | Stop reason: HOSPADM

## 2018-08-29 RX ORDER — ASPIRIN 81 MG/1
81 TABLET, CHEWABLE ORAL DAILY
COMMUNITY
End: 2018-10-07 | Stop reason: ALTCHOICE

## 2018-08-29 RX ORDER — METHADONE HYDROCHLORIDE 10 MG/1
TABLET ORAL EVERY 6 HOURS PRN
COMMUNITY
End: 2018-10-10

## 2018-08-29 NOTE — PRE-PROCEDURE INSTRUCTIONS
Pre-Surgery Instructions:   Medication Instructions    clonazePAM (KlonoPIN) 1 mg tablet epic    gabapentin (NEURONTIN) 300 mg capsule epic    insulin glargine (LANTUS) 100 units/mL subcutaneous injection epic    insulin lispro (HumaLOG) 100 units/mL injection epic    lamoTRIgine (LaMICtal) 150 MG tablet epic    methadone (DOLOPHINE) 10 mg tablet epic    methadone (DOLOPHINE) 5 mg tablet epic    ondansetron (ZOFRAN-ODT) 4 mg disintegrating tablet epic    rOPINIRole (REQUIP) 0 5 mg tablet epic    sertraline (ZOLOFT) 50 mg tablet epic   Education Index     Med Instructions Troubleshoot   5HT3 Antagonists Med Class     Continue to take this medication on your normal schedule  If this is an oral medication and you take it in the morning, then you may take this medicine with a sip of water  Antidepressant Med Class     Continue to take this medication on your normal schedule  If this is an oral medication and you take it in the morning, then you may take this medicine with a sip of water  Antiepileptic Med Class     Continue to take this medication on your normal schedule  If this is an oral medication and you take it in the morning, then you may take this medicine with a sip of water  Antiparkinsons Med Class     Continue to take this medication on your normal schedule  If this is an oral medication and you take it in the morning, then you may take this medicine with a sip of water  Benzodiazepine antagonist Med Class     If this medication is needed please continue to take on your normal schedule  If you take it in the morning, then you may take this medicine with a sip of water  ASA Med Class: Aspirin     Should be discontinued at least one week prior to planned operation, unless specifically stated otherwise by surgical service  Your Surgeon may have patient stop taking aspirin up to a week before surgery if having intracranial, middle ear, posterior eye, spine surgery or prostate surgery  [Patients taking aspirin for coronary stents should be reviewed by an anesthesiologist in the optimization clinic  Please do not discontinue aspirin in patients with coronary stents unless given specific permission to do so by the cardiologist who prescribed medication ]   If your surgeon approves please continue to take this medication on your normal schedule  You may take this medication on the morning of your surgery with a sip of water  Insulin Med Class     Pre-Surgery/Procedure Instructions for Adult Patients who Take Medicine for Diabetes or to Control their Blood Sugar     Day Before Surgery/Procedure  Use the directions based on the type of medicine you take for your diabetes  1  If you are having a procedure that does not require a bowel prep:  ? Pre-Mixed Insulin (Intermediate Acting: Humalog 75/25, Humulin 70/30  Novolog 70/30, Regular Insulin)  § Take ½ your regular dose the evening before your procedure  ? Rapid/Fast Acting Insulin/Long Acting Insulin (Humalog U200, NovoLog, Apidra, Lantus, Levemir, [place], [place])  § Take your FULL regular dose the day before procedure  ? Oral Diabetic Medicines including Glipizide/Glimepiride/Glucotrol (sulfonylurea)  § Take your regular dose with dinner the evening before your procedure  2  If you are having a procedure (e g  Colonoscopy) that requires a bowel prep and you are allowed to have at least a clear liquid diet:  ? Pre-Mixed Insulin (Intermediate Acting: Humalog 75/25, Humulin 70/30, Novolog 70/30, Regular Insulin)  § Take ½ your regular dose the evening before your procedure  ? Rapid/Fast Acting Insulin (Humalog U200, NovoLog, Apidra, Fi[place])  § Take ½ your regular dose the evening before your procedure  ? Long Acting Insulin (Lantus, Levemir, [place])  § Take your FULL regular dose the day before procedure    ? Oral Glipizide/Glimepiride/Glucotrol (sulfonylurea)  § Take ½ your regular dose the evening before your procedure  ? Oral Diabetic Medicines that are NOT Glipizide/Glimepiride/Glucotrol  § Take your regular dose with dinner in the evening before your procedure      Day of Surgery/Procedure  · Long Acting Insulin (Lantus, Levemir, [place])  ? If you usually take your Long-Acting Insulin in the morning, take the full dose as scheduled  · With the exception of the morning Long-Acting Insulin noted above, DO NOT take ANY diabetic medicine on the day of your procedure unless you were instructed by the doctor who manages your diabetic medicines  · Continue to check your blood sugars  · If you have an insulin pump then consult with your Endocrinologist for instructions  · If you cannot see your Endocrinologist, on the day of the procedure set your insulin pump to your basal rate only  Please bring your insulin pump supplies to the hospital      This Educational material has been approved by the Patient Education Advisory Committee  Date prepared: 1/17/2018          Expiration date: 1/17/2019        Approval Number:                     Opioid Med Class     Continue to take this medication on your normal schedule  If this is an oral medication and you take it in the morning, then you may take this medicine with a sip of water  Pre procedure instructions given,verbalizes understanding

## 2018-08-30 ENCOUNTER — PATIENT OUTREACH (OUTPATIENT)
Dept: OTHER | Facility: HOSPITAL | Age: 63
End: 2018-08-30

## 2018-08-30 NOTE — PROGRESS NOTES
Outpatient Care Management Note:  Received call from Dr Den Varghese  He is unable to sign off on SNP care plan because he has not seen the patient in 2 years  She has not returned multiple calls from the office encouraging her to schedule an appointment

## 2018-08-31 ENCOUNTER — APPOINTMENT (OUTPATIENT)
Dept: WOUND CARE | Facility: HOSPITAL | Age: 63
End: 2018-08-31
Payer: COMMERCIAL

## 2018-08-31 PROCEDURE — 11043 DBRDMT MUSC&/FSCA 1ST 20/<: CPT

## 2018-09-06 ENCOUNTER — PATIENT OUTREACH (OUTPATIENT)
Dept: OTHER | Facility: HOSPITAL | Age: 63
End: 2018-09-06

## 2018-09-12 ENCOUNTER — HOSPITAL ENCOUNTER (OUTPATIENT)
Facility: HOSPITAL | Age: 63
Setting detail: OUTPATIENT SURGERY
Discharge: HOME | End: 2018-09-12
Attending: PLASTIC SURGERY | Admitting: PLASTIC SURGERY
Payer: COMMERCIAL

## 2018-09-12 ENCOUNTER — ANESTHESIA (OUTPATIENT)
Dept: PERIOP | Facility: HOSPITAL | Age: 63
End: 2018-09-12
Payer: COMMERCIAL

## 2018-09-12 VITALS
RESPIRATION RATE: 16 BRPM | OXYGEN SATURATION: 98 % | BODY MASS INDEX: 23.63 KG/M2 | WEIGHT: 147 LBS | DIASTOLIC BLOOD PRESSURE: 67 MMHG | SYSTOLIC BLOOD PRESSURE: 140 MMHG | TEMPERATURE: 97.8 F | HEIGHT: 66 IN | HEART RATE: 71 BPM

## 2018-09-12 DIAGNOSIS — M65.031: Primary | ICD-10-CM

## 2018-09-12 LAB
GLUCOSE SERPL-MCNC: 131 MG/DL (ref 65–140)
GLUCOSE SERPL-MCNC: 190 MG/DL (ref 65–140)

## 2018-09-12 PROCEDURE — 15272 SKIN SUB GRAFT T/A/L ADD-ON: CPT | Performed by: PLASTIC SURGERY

## 2018-09-12 PROCEDURE — 15002 WOUND PREP TRK/ARM/LEG: CPT | Performed by: PLASTIC SURGERY

## 2018-09-12 PROCEDURE — 82948 REAGENT STRIP/BLOOD GLUCOSE: CPT

## 2018-09-12 PROCEDURE — 15271 SKIN SUB GRAFT TRNK/ARM/LEG: CPT | Performed by: PLASTIC SURGERY

## 2018-09-12 DEVICE — INTEGRA® MESHED BILAYER WOUND MATRIX 2 IN*2 IN (5 CM*5 CM)
Type: IMPLANTABLE DEVICE | Status: FUNCTIONAL
Brand: INTEGRA®

## 2018-09-12 RX ORDER — FENTANYL CITRATE 50 UG/ML
INJECTION, SOLUTION INTRAMUSCULAR; INTRAVENOUS AS NEEDED
Status: DISCONTINUED | OUTPATIENT
Start: 2018-09-12 | End: 2018-09-12 | Stop reason: SURG

## 2018-09-12 RX ORDER — MAGNESIUM HYDROXIDE 1200 MG/15ML
LIQUID ORAL AS NEEDED
Status: DISCONTINUED | OUTPATIENT
Start: 2018-09-12 | End: 2018-09-12 | Stop reason: HOSPADM

## 2018-09-12 RX ORDER — IBUPROFEN 800 MG/1
800 TABLET ORAL EVERY 8 HOURS SCHEDULED
Qty: 63 TABLET | Refills: 0 | Status: SHIPPED | OUTPATIENT
Start: 2018-09-12 | End: 2018-09-12

## 2018-09-12 RX ORDER — METOCLOPRAMIDE HYDROCHLORIDE 5 MG/ML
10 INJECTION INTRAMUSCULAR; INTRAVENOUS ONCE AS NEEDED
Status: DISCONTINUED | OUTPATIENT
Start: 2018-09-12 | End: 2018-09-12 | Stop reason: HOSPADM

## 2018-09-12 RX ORDER — GABAPENTIN 300 MG/1
300 CAPSULE ORAL 3 TIMES DAILY
Qty: 90 CAPSULE | Refills: 0 | Status: SHIPPED | OUTPATIENT
Start: 2018-09-12 | End: 2018-09-12

## 2018-09-12 RX ORDER — BUPIVACAINE HYDROCHLORIDE AND EPINEPHRINE 2.5; 5 MG/ML; UG/ML
INJECTION, SOLUTION INFILTRATION; PERINEURAL AS NEEDED
Status: DISCONTINUED | OUTPATIENT
Start: 2018-09-12 | End: 2018-09-12 | Stop reason: HOSPADM

## 2018-09-12 RX ORDER — EPHEDRINE SULFATE 50 MG/ML
INJECTION, SOLUTION INTRAVENOUS AS NEEDED
Status: DISCONTINUED | OUTPATIENT
Start: 2018-09-12 | End: 2018-09-12 | Stop reason: SURG

## 2018-09-12 RX ORDER — METHADONE HYDROCHLORIDE 5 MG/1
15 TABLET ORAL ONCE
Status: COMPLETED | OUTPATIENT
Start: 2018-09-12 | End: 2018-09-12

## 2018-09-12 RX ORDER — SODIUM CHLORIDE, SODIUM LACTATE, POTASSIUM CHLORIDE, CALCIUM CHLORIDE 600; 310; 30; 20 MG/100ML; MG/100ML; MG/100ML; MG/100ML
INJECTION, SOLUTION INTRAVENOUS CONTINUOUS PRN
Status: DISCONTINUED | OUTPATIENT
Start: 2018-09-12 | End: 2018-09-12 | Stop reason: SURG

## 2018-09-12 RX ORDER — PROPOFOL 10 MG/ML
INJECTION, EMULSION INTRAVENOUS AS NEEDED
Status: DISCONTINUED | OUTPATIENT
Start: 2018-09-12 | End: 2018-09-12 | Stop reason: SURG

## 2018-09-12 RX ORDER — GABAPENTIN 300 MG/1
300 CAPSULE ORAL 3 TIMES DAILY
Qty: 90 CAPSULE | Refills: 0 | Status: SHIPPED | OUTPATIENT
Start: 2018-09-12 | End: 2018-10-10

## 2018-09-12 RX ORDER — ONDANSETRON 2 MG/ML
INJECTION INTRAMUSCULAR; INTRAVENOUS AS NEEDED
Status: DISCONTINUED | OUTPATIENT
Start: 2018-09-12 | End: 2018-09-12 | Stop reason: SURG

## 2018-09-12 RX ORDER — IBUPROFEN 800 MG/1
800 TABLET ORAL EVERY 8 HOURS SCHEDULED
Qty: 63 TABLET | Refills: 0 | Status: SHIPPED | OUTPATIENT
Start: 2018-09-12 | End: 2018-10-07 | Stop reason: ALTCHOICE

## 2018-09-12 RX ORDER — LIDOCAINE HYDROCHLORIDE 10 MG/ML
INJECTION, SOLUTION INFILTRATION; PERINEURAL AS NEEDED
Status: DISCONTINUED | OUTPATIENT
Start: 2018-09-12 | End: 2018-09-12 | Stop reason: SURG

## 2018-09-12 RX ORDER — FENTANYL CITRATE/PF 50 MCG/ML
50 SYRINGE (ML) INJECTION
Status: DISCONTINUED | OUTPATIENT
Start: 2018-09-12 | End: 2018-09-12 | Stop reason: HOSPADM

## 2018-09-12 RX ORDER — ONDANSETRON 2 MG/ML
4 INJECTION INTRAMUSCULAR; INTRAVENOUS ONCE AS NEEDED
Status: DISCONTINUED | OUTPATIENT
Start: 2018-09-12 | End: 2018-09-12 | Stop reason: HOSPADM

## 2018-09-12 RX ORDER — OXYCODONE HYDROCHLORIDE 5 MG/1
5 TABLET ORAL EVERY 6 HOURS PRN
Qty: 20 TABLET | Refills: 0 | Status: SHIPPED | OUTPATIENT
Start: 2018-09-12 | End: 2018-10-07 | Stop reason: ALTCHOICE

## 2018-09-12 RX ADMIN — EPHEDRINE SULFATE 5 MG: 50 INJECTION, SOLUTION INTRAMUSCULAR; INTRAVENOUS; SUBCUTANEOUS at 09:49

## 2018-09-12 RX ADMIN — EPHEDRINE SULFATE 10 MG: 50 INJECTION, SOLUTION INTRAMUSCULAR; INTRAVENOUS; SUBCUTANEOUS at 09:20

## 2018-09-12 RX ADMIN — FENTANYL CITRATE 25 MCG: 50 INJECTION, SOLUTION INTRAMUSCULAR; INTRAVENOUS at 09:18

## 2018-09-12 RX ADMIN — EPHEDRINE SULFATE 10 MG: 50 INJECTION, SOLUTION INTRAMUSCULAR; INTRAVENOUS; SUBCUTANEOUS at 09:09

## 2018-09-12 RX ADMIN — METHADONE HYDROCHLORIDE 15 MG: 5 TABLET ORAL at 10:50

## 2018-09-12 RX ADMIN — LIDOCAINE HYDROCHLORIDE 50 MG: 10 INJECTION, SOLUTION INFILTRATION; PERINEURAL at 08:52

## 2018-09-12 RX ADMIN — ONDANSETRON 4 MG: 2 INJECTION INTRAMUSCULAR; INTRAVENOUS at 10:00

## 2018-09-12 RX ADMIN — PROPOFOL 200 MG: 10 INJECTION, EMULSION INTRAVENOUS at 08:52

## 2018-09-12 RX ADMIN — DEXAMETHASONE SODIUM PHOSPHATE 5 MG: 10 INJECTION INTRAMUSCULAR; INTRAVENOUS at 09:11

## 2018-09-12 RX ADMIN — EPHEDRINE SULFATE 5 MG: 50 INJECTION, SOLUTION INTRAMUSCULAR; INTRAVENOUS; SUBCUTANEOUS at 09:31

## 2018-09-12 RX ADMIN — SODIUM CHLORIDE, SODIUM LACTATE, POTASSIUM CHLORIDE, AND CALCIUM CHLORIDE: .6; .31; .03; .02 INJECTION, SOLUTION INTRAVENOUS at 08:51

## 2018-09-12 RX ADMIN — VANCOMYCIN HYDROCHLORIDE 1 G: 1 INJECTION, POWDER, LYOPHILIZED, FOR SOLUTION INTRAVENOUS at 09:01

## 2018-09-12 NOTE — PROGRESS NOTES
Assumed care of patient at 1217 hours  Report received from [facility] in [address]  Patient in supine position with HOB elevated to 60 degrees  In NAD  Noted tolerated light snack PO  Pt's son Giana Stout daina at bedside

## 2018-09-12 NOTE — OP NOTE
OPERATIVE REPORT  PATIENT NAME: Olivia Leyva    :  1955  MRN: [id number]  Pt Location: [facility] OR ROOM 06    SURGERY DATE: 2018    Surgeon(s) and Role:     * Viviane Hook MD - Primary    Preop Diagnosis:  Abscess of right arm [L02 413]  Abscess of tendon sheath of right forearm [M65 031]    Post-Op Diagnosis Codes:     * Abscess of right arm [L02 413]     * Abscess of tendon sheath of right forearm [M65 031]    Procedure(s) (LRB):  DEBRIDEMENT  ([address] OUT) FOREARM with Vac dressing change (Right)  INTEGRA PLACEMENT (Right)    Specimen(s):  * No specimens in log *    Estimated Blood Loss:   25 mL    Drains:  Closed/Suction Drain Right Other (Comment) Accordion 10 Fr  (Active)   Number of days: 73       Negative Pressure Wound Therapy (V A C ) Arm Right (Active)   Number of days: 67       Anesthesia Type:   General    Operative Indications:  Abscess of right arm [L02 413]  Abscess of tendon sheath of right forearm [M65 031]      Operative Findings:  - right forearm post debridement measurements 9 x 3 x 0 3 cm  - exposed fibrotic muscle bellies and tendons  Complications:   None    Procedure and Technique:  Patient was met in the preoperative holding area, site was marked and verified and any last minute questions were answered  She was then taken to the operative theater in supine position  All noninvasive hemodynamic lines were in place, peripheral IV access established and general anesthesia was induced  The right arm was then prepped and draped in usual sterile fashion  Preoperative antibiotics were given and a time-out was performed  Excisional surgical debridement was performed the right forearm using the scalpel with tangential excisions layer by layer down to healthy bleeding tissue  Post debridement measurements were 9 x 3 x 0 3 cm and there was significant fibrotic muscle bellies exposed proximally, with some remnant tendon fibers distally    Given the patient comorbidities and condition of her forearm, not ideal for local flaps, a decision was made to place dermal substitute (Integra) in order to cover the defect and improve wound bed condition prior to skin graft placement in near future  The wound was then irrigated 1 L normal saline, hemostasis obtained  The Integra was removed of the package and soaked in normal saline for 5 minutes  Then it was trimmed and tailored to the defect and secured with interrupted of 4-0 chromic throughout the whole circumference of the defect  Negative pressure dressing was then applied in order to use as a bolster over the graft with only 1 piece of black sponge and set to    Dry dressing and a volar splint was placed and avoid motion that was shear the graft       I was present for the entire procedure    Patient Disposition:  PACU , hemodynamically stable and patient will return to OR for planned surgery as a staged procedure    SIGNATURE: Jacinda Holman MD  DATE: September 12, 2018  TIME: 10:49 AM

## 2018-09-12 NOTE — PERIOPERATIVE NURSING NOTE
VSS, pt denies pain or nausea, assessment unchanged, report called, no questions, pt transferred to [facility]

## 2018-09-12 NOTE — ANESTHESIA POSTPROCEDURE EVALUATION
Post-Op Assessment Note      CV Status:  Stable    Mental Status:  Alert and awake    Hydration Status:  Euvolemic    PONV Controlled:  Controlled    Airway Patency:  Patent    Post Op Vitals Reviewed: Yes          Staff: CRNA       Comments: Ptresting without complaints          BP      Temp 98 7 °F (37 1 °C) (09/12/18 1034)    Pulse 81 (09/12/18 1034)   Resp 14 (09/12/18 1034)    SpO2 100 % (09/12/18 1034)

## 2018-09-12 NOTE — DISCHARGE INSTRUCTIONS
Plastic Surgery Discharge Instructions    -Call the office ([phone]) to schedule follow up appointment with Dr Elliot Schreiber at the beginning of next week  -Keep dressings clean dry and intact until follow up appointment  -Avoid strenuous activity with the right arm   -Call the office if wound VAC is not functioning properly  -Call the office for severe pain, swelling, numbness/tingling of the extremity  Negative Pressure Wound Therapy   WHAT YOU NEED TO KNOW:     NPWT uses a machine called a wound vac, wound vacuum, or pump to help with wound healing  Suction from the machine removes excess drainage from your wound and pulls wound edges closer together  NPWT promotes healthy tissue growth by increasing blood flow to your wound  NPWT also reduces bacteria that causes infections              DISCHARGE INSTRUCTIONS:   Seek care immediately if:   · You have a sudden increase or large amount of blood from your wound      · You have a fever of 102°F or more  Contact your healthcare provider if:   · You have a fever or nausea, or you vomit      · Your wound or skin around your wound is red, swollen, and feels warm      · You see pus coming from your wound or it smells bad      · You are dizzy or confused      · You have questions or concerns about the machine, your condition, or care  Follow up with your healthcare provider as directed: Your healthcare provider will need to monitor your wound  Write down your questions so you remember to ask them during your visits  How NPWT works: Moist foam packing or gauze is placed in your wound  Suction tubing may already be implanted within the foam  If not, tubing will be placed in the middle of the foam or gauze  Then your wound and part of the tubing will be covered completely by a clear dressing  The tubing is attached to a collection canister on the machine  Your healthcare provider may set the machine for continuous or periodic suction      Shower with the dressing in place: Do not remove your dressing unless directed  Ask your healthcare provider for more information  Do not take the machine into the bathtub or shower with you  NPWT is used 24 hours a day: If the NPWT machine is turned off for 2 hours or more, the dressing needs to be replaced  Ask your healthcare provider what type of dressing to use

## 2018-09-12 NOTE — ANESTHESIA PREPROCEDURE EVALUATION
Review of Systems/Medical History  Patient summary reviewed  Chart reviewed  No history of anesthetic complications     Cardiovascular  Hyperlipidemia, Hypertension poorly controlled,    Pulmonary  Negative pulmonary ROS        GI/Hepatic  Negative GI/hepatic ROS   Liver disease ,        Negative  ROS        Endo/Other  Diabetes poorly controlled type 1 Insulin, History of thyroid disease , hypothyroidism,   Comment: HgbA1C  13 4     GYN       Hematology  Negative hematology ROS Anemia ,     Musculoskeletal    Comment: Right arm cellulitis      Neurology      Comment: On methadone therapy  Psychology   Anxiety, Depression , bipolar disorder and being treated for depression,              Physical Exam    Airway    Mallampati score: II  TM Distance: >3 FB  Neck ROM: full     Dental       Cardiovascular      Pulmonary      Other Findings        Anesthesia Plan  ASA Score- 3     Anesthesia Type- general with ASA Monitors  Additional Monitors:   Airway Plan: LMA  Comment: LMA #3 previously  Plan Factors-    Induction- intravenous  Postoperative Plan- Plan for postoperative opioid use  Informed Consent- Anesthetic plan and risks discussed with patient  I personally reviewed this patient with the CRNA  Discussed and agreed on the Anesthesia Plan with the CRNA  Sherre Soulier

## 2018-09-13 ENCOUNTER — TELEPHONE (OUTPATIENT)
Dept: PLASTIC SURGERY | Facility: CLINIC | Age: 63
End: 2018-09-13

## 2018-09-13 NOTE — TELEPHONE ENCOUNTER
Patient states she is doing well post op with no concerns  States pain medication is keeping pain controlled and her VAC is working  Call transferred to  to schedule follow up with Dr Shanti Murphy  Encouraged to call sooner with any questions

## 2018-09-14 ENCOUNTER — TELEPHONE (OUTPATIENT)
Dept: PLASTIC SURGERY | Facility: CLINIC | Age: 63
End: 2018-09-14

## 2018-09-14 NOTE — TELEPHONE ENCOUNTER
Ivy with [facility] emailed requesting post op wound care orders for patient  Would like orders faxed to [phone]  Per Dr Ayala Friendly, patient will require twice weekly wound VAC dressing changes beginning after 9/19/18 post op appt  Wound VAC set to 125mmHG continuous low

## 2018-09-14 NOTE — LETTER
Patient: Holly Cornejo  : 1955    Per Dr Shanti Murphy:    Patient will require twice weekly wound VAC dressing changes starting after 18 office visit  Wound VAC set to 125 mmHG continuous low

## 2018-09-17 ENCOUNTER — PATIENT OUTREACH (OUTPATIENT)
Dept: OTHER | Facility: HOSPITAL | Age: 63
End: 2018-09-17

## 2018-09-17 NOTE — PROGRESS NOTES
Outpatient Care Managers Note:  Spoke with patient post (R) arm debridement on 9/12/18, wound is currently draining a significant amount of foul smelling drainage in spite of the wound vac  VNA is not scheduled to resume care until after 9/19/18 per Dr Megan Nathan orders  Encouraged to call office immediately and inform them of foul smelling drainage  Verbalized understanding of same  Confirmed that Dr Joe Delgado is her PCP but she has not seen him in 2 years  Stressed the importance of following with her PCP to monitor her diabetes and other medical conditions, especially with current wound issues  Verbalized understanding of same  Verified that she has my contact information  Encouraged to call with change in symptoms, questions or concerns

## 2018-09-19 ENCOUNTER — OFFICE VISIT (OUTPATIENT)
Dept: PLASTIC SURGERY | Facility: CLINIC | Age: 63
End: 2018-09-19

## 2018-09-19 VITALS — HEIGHT: 66 IN | WEIGHT: 147 LBS | BODY MASS INDEX: 23.63 KG/M2

## 2018-09-19 DIAGNOSIS — M65.031: Primary | ICD-10-CM

## 2018-09-19 PROCEDURE — 99024 POSTOP FOLLOW-UP VISIT: CPT | Performed by: PHYSICIAN ASSISTANT

## 2018-09-20 NOTE — PROGRESS NOTES
DEBRIDEMENT  (WASH OUT) FOREARM with Vac dressing change (Right Arm Lower)      INTEGRA PLACEMENT (Right Arm Lower)    Wound VAC was removed today  The VAC dressing was not changed since that was applied on 09/12/2018  The wound was irrigated  The Integra remain loosely held in place by the chromic sutures  A new VAC sponge was applied and good seal was achieved  She is to have this changed every 3 days  She is to see Dr Giana Siddiqi on Friday 09/21/2018 for evaluation, VAC change, planning for skin graft

## 2018-09-21 ENCOUNTER — OFFICE VISIT (OUTPATIENT)
Dept: PLASTIC SURGERY | Facility: CLINIC | Age: 63
End: 2018-09-21

## 2018-09-21 DIAGNOSIS — L02.413 ABSCESS OF RIGHT ARM: ICD-10-CM

## 2018-09-21 DIAGNOSIS — F11.90 METHADONE USE: ICD-10-CM

## 2018-09-21 DIAGNOSIS — M65.031: ICD-10-CM

## 2018-09-21 DIAGNOSIS — Z98.890 POST-OPERATIVE STATE: Primary | ICD-10-CM

## 2018-09-21 PROCEDURE — 99024 POSTOP FOLLOW-UP VISIT: CPT | Performed by: PLASTIC SURGERY

## 2018-09-21 NOTE — PROGRESS NOTES
Zaira Jaime is 9 days post-op:   Debridement of right forearm wound, placement of Integra and wound VAC dressing  Presenting for routine follow-up  S:  Doing well  O:  Right forearm wound 9 x 3 cm with Integra in place, loose silicone laterally  No erythema or evidence of infection  Drainage as expected  A:  I discussed with this patient the importance of her compliance with the wound care management  Initially no VAC changes were performed until 7 days after surgery  Prior to this encounter the patient self removed the wound VAC and left the wound open while ambulating home  Arranged VNA has not performed any VAC changes  Plan is to continue VAC changes until second stage of surgery 3-4 weeks after for skin graft closure of defect  Patient not candidate for local flap options given condition of arm and patient history  P: Patient did not bring VAC or supplies to office visit  Dressing was applied and asked to call V[facility] today for [facility] replacement     Patient to return in 1 week  Patient is to return as needed for redness, swelling, discomfort, or any concern about her surgery

## 2018-09-24 ENCOUNTER — PATIENT OUTREACH (OUTPATIENT)
Dept: OTHER | Facility: HOSPITAL | Age: 63
End: 2018-09-24

## 2018-09-24 NOTE — PROGRESS NOTES
Outpatient Care Management Note:  Patient is doing well  VNA is coming in for dressing changes and wound care, she is pleased that the same nurse has been coming in consistently  She is checking her blood sugars and taking her medications as ordered  She has scheduled a PCP appointment for 10/2/18, she asked that I verify the date and time which I did and confirmed it with her  Asked if I could get more information on her upcoming skin graft so we could discuss it prior to surgery, Called Dr Ashutosh Rivers office requesting information  She has no other needs at this time  Verified that she has my contact information  Encouraged to call with a change in symptoms, questions or concerns

## 2018-09-28 ENCOUNTER — OFFICE VISIT (OUTPATIENT)
Dept: PLASTIC SURGERY | Facility: CLINIC | Age: 63
End: 2018-09-28

## 2018-09-28 ENCOUNTER — PATIENT OUTREACH (OUTPATIENT)
Dept: OTHER | Facility: HOSPITAL | Age: 63
End: 2018-09-28

## 2018-09-28 VITALS — BODY MASS INDEX: 23.63 KG/M2 | WEIGHT: 147 LBS | HEIGHT: 66 IN

## 2018-09-28 DIAGNOSIS — L02.413 ABSCESS OF RIGHT ARM: Primary | ICD-10-CM

## 2018-09-28 PROCEDURE — 99024 POSTOP FOLLOW-UP VISIT: CPT | Performed by: PLASTIC SURGERY

## 2018-09-28 NOTE — PROGRESS NOTES
Andreea Angle is 2 weeks post-op: right forearm debridement and application of Integra dermal substitute  Presenting for routine follow-up  S:   Patient has noted mild excessive exudate  O:  Right forearm with very loose silicone sheet, there is over 60% beefy red granulation tissue, no evidence of infection  A:  Doing well, still not optimal condition for skin grafting  No evidence of infection     P: Patient VAC was replaced and was recommended to continue 2-3 times a week VAC changes by VNA   Patient to return in 2 weeks for preop evaluation prior to skin grafting  Patient is to return as needed for redness, swelling, discomfort, or any concern about her surgery

## 2018-09-28 NOTE — PROGRESS NOTES
Outpatient Care Patient Management Note: Received call from patient asking if I could confirm with [facility] that her wound supplies have been ordered  Message left on [employer]  Jaclyn aware of same

## 2018-10-01 ENCOUNTER — TELEPHONE (OUTPATIENT)
Dept: PLASTIC SURGERY | Facility: CLINIC | Age: 63
End: 2018-10-01

## 2018-10-01 NOTE — TELEPHONE ENCOUNTER
Juan Lema with [facility] calling to clarify frequency of wound VAC dressing changes  Patient states she was told dressing needed to be changed every other day and current wound care orders state Monday, Wednesday, Friday  Clarified that wound VAC dressing is to continue to be changed Monday, Wednesday, Friday  Juan Lema verbalized understanding

## 2018-10-07 ENCOUNTER — APPOINTMENT (EMERGENCY)
Dept: NON INVASIVE DIAGNOSTICS | Facility: HOSPITAL | Age: 63
End: 2018-10-07
Payer: COMMERCIAL

## 2018-10-07 ENCOUNTER — HOSPITAL ENCOUNTER (EMERGENCY)
Facility: HOSPITAL | Age: 63
Discharge: HOME | End: 2018-10-07
Attending: EMERGENCY MEDICINE | Admitting: EMERGENCY MEDICINE
Payer: COMMERCIAL

## 2018-10-07 VITALS
TEMPERATURE: 97.8 F | DIASTOLIC BLOOD PRESSURE: 70 MMHG | OXYGEN SATURATION: 97 % | SYSTOLIC BLOOD PRESSURE: 164 MMHG | WEIGHT: 147 LBS | RESPIRATION RATE: 17 BRPM | HEART RATE: 65 BPM | BODY MASS INDEX: 23.73 KG/M2

## 2018-10-07 DIAGNOSIS — L03.115 CELLULITIS OF RIGHT LOWER EXTREMITY: Primary | ICD-10-CM

## 2018-10-07 LAB
ALBUMIN SERPL BCP-MCNC: 3.4 G/DL (ref 3.5–5)
ALP SERPL-CCNC: 149 U/L (ref 46–116)
ALT SERPL W P-5'-P-CCNC: 19 U/L (ref 12–78)
ANION GAP SERPL CALCULATED.3IONS-SCNC: 7 MMOL/L (ref 4–13)
APTT PPP: 23 SECONDS (ref 24–36)
AST SERPL W P-5'-P-CCNC: 15 U/L (ref 5–45)
BASOPHILS # BLD AUTO: 0.04 THOUSANDS/ΜL (ref 0–0.1)
BASOPHILS NFR BLD AUTO: 1 % (ref 0–1)
BILIRUB SERPL-MCNC: 0.3 MG/DL (ref 0.2–1)
BUN SERPL-MCNC: 14 MG/DL (ref 5–25)
CALCIUM SERPL-MCNC: 10.3 MG/DL (ref 8.3–10.1)
CHLORIDE SERPL-SCNC: 99 MMOL/L (ref 100–108)
CO2 SERPL-SCNC: 32 MMOL/L (ref 21–32)
CREAT SERPL-MCNC: 0.79 MG/DL (ref 0.6–1.3)
EOSINOPHIL # BLD AUTO: 0.07 THOUSAND/ΜL (ref 0–0.61)
EOSINOPHIL NFR BLD AUTO: 1 % (ref 0–6)
ERYTHROCYTE [DISTWIDTH] IN BLOOD BY AUTOMATED COUNT: 13.2 % (ref 11.6–15.1)
GFR SERPL CREATININE-BSD FRML MDRD: 81 ML/MIN/1.73SQ M
GLUCOSE SERPL-MCNC: 207 MG/DL (ref 65–140)
HCT VFR BLD AUTO: 32.3 % (ref 34.8–46.1)
HGB BLD-MCNC: 10.7 G/DL (ref 11.5–15.4)
IMM GRANULOCYTES # BLD AUTO: 0.03 THOUSAND/UL (ref 0–0.2)
IMM GRANULOCYTES NFR BLD AUTO: 1 % (ref 0–2)
INR PPP: 0.98 (ref 0.86–1.17)
LYMPHOCYTES # BLD AUTO: 1.55 THOUSANDS/ΜL (ref 0.6–4.47)
LYMPHOCYTES NFR BLD AUTO: 29 % (ref 14–44)
MCH RBC QN AUTO: 27.6 PG (ref 26.8–34.3)
MCHC RBC AUTO-ENTMCNC: 33.1 G/DL (ref 31.4–37.4)
MCV RBC AUTO: 84 FL (ref 82–98)
MONOCYTES # BLD AUTO: 0.28 THOUSAND/ΜL (ref 0.17–1.22)
MONOCYTES NFR BLD AUTO: 5 % (ref 4–12)
NEUTROPHILS # BLD AUTO: 3.38 THOUSANDS/ΜL (ref 1.85–7.62)
NEUTS SEG NFR BLD AUTO: 63 % (ref 43–75)
NRBC BLD AUTO-RTO: 0 /100 WBCS
PLATELET # BLD AUTO: 341 THOUSANDS/UL (ref 149–390)
PMV BLD AUTO: 9 FL (ref 8.9–12.7)
POTASSIUM SERPL-SCNC: 4.6 MMOL/L (ref 3.5–5.3)
PROT SERPL-MCNC: 8.1 G/DL (ref 6.4–8.2)
PROTHROMBIN TIME: 13.1 SECONDS (ref 11.8–14.2)
RBC # BLD AUTO: 3.87 MILLION/UL (ref 3.81–5.12)
SODIUM SERPL-SCNC: 138 MMOL/L (ref 136–145)
WBC # BLD AUTO: 5.35 THOUSAND/UL (ref 4.31–10.16)

## 2018-10-07 PROCEDURE — 96372 THER/PROPH/DIAG INJ SC/IM: CPT

## 2018-10-07 PROCEDURE — 85025 COMPLETE CBC W/AUTO DIFF WBC: CPT | Performed by: EMERGENCY MEDICINE

## 2018-10-07 PROCEDURE — 99284 EMERGENCY DEPT VISIT MOD MDM: CPT

## 2018-10-07 PROCEDURE — 93971 EXTREMITY STUDY: CPT

## 2018-10-07 PROCEDURE — 85610 PROTHROMBIN TIME: CPT | Performed by: EMERGENCY MEDICINE

## 2018-10-07 PROCEDURE — 85730 THROMBOPLASTIN TIME PARTIAL: CPT | Performed by: EMERGENCY MEDICINE

## 2018-10-07 PROCEDURE — 80053 COMPREHEN METABOLIC PANEL: CPT | Performed by: EMERGENCY MEDICINE

## 2018-10-07 PROCEDURE — 93971 EXTREMITY STUDY: CPT | Performed by: SURGERY

## 2018-10-07 PROCEDURE — 36415 COLL VENOUS BLD VENIPUNCTURE: CPT | Performed by: EMERGENCY MEDICINE

## 2018-10-07 PROCEDURE — 96374 THER/PROPH/DIAG INJ IV PUSH: CPT

## 2018-10-07 RX ORDER — NAPROXEN 500 MG/1
500 TABLET ORAL 2 TIMES DAILY PRN
Qty: 14 TABLET | Refills: 0 | Status: SHIPPED | OUTPATIENT
Start: 2018-10-07 | End: 2018-11-08

## 2018-10-07 RX ORDER — HYDROMORPHONE HCL/PF 1 MG/ML
1 SYRINGE (ML) INJECTION ONCE
Status: COMPLETED | OUTPATIENT
Start: 2018-10-07 | End: 2018-10-07

## 2018-10-07 RX ORDER — SULFAMETHOXAZOLE AND TRIMETHOPRIM 800; 160 MG/1; MG/1
1 TABLET ORAL 2 TIMES DAILY
Qty: 20 TABLET | Refills: 0 | Status: SHIPPED | OUTPATIENT
Start: 2018-10-07 | End: 2018-10-10

## 2018-10-07 RX ORDER — FENTANYL CITRATE 50 UG/ML
50 INJECTION, SOLUTION INTRAMUSCULAR; INTRAVENOUS ONCE
Status: COMPLETED | OUTPATIENT
Start: 2018-10-07 | End: 2018-10-07

## 2018-10-07 RX ORDER — OXYCODONE HYDROCHLORIDE AND ACETAMINOPHEN 5; 325 MG/1; MG/1
1 TABLET ORAL EVERY 4 HOURS PRN
Qty: 10 TABLET | Refills: 0 | Status: SHIPPED | OUTPATIENT
Start: 2018-10-07 | End: 2018-10-10

## 2018-10-07 RX ADMIN — HYDROMORPHONE HYDROCHLORIDE 1 MG: 1 INJECTION, SOLUTION INTRAMUSCULAR; INTRAVENOUS; SUBCUTANEOUS at 10:16

## 2018-10-07 RX ADMIN — FENTANYL CITRATE 50 MCG: 50 INJECTION, SOLUTION INTRAMUSCULAR; INTRAVENOUS at 09:42

## 2018-10-07 NOTE — DISCHARGE INSTRUCTIONS
Cellulitis   WHAT YOU NEED TO KNOW:   Cellulitis is a skin infection caused by bacteria  Cellulitis may go away on its own or you may need treatment  Your healthcare provider may draw a [place] around the outside edges of your cellulitis  If your cellulitis spreads, your healthcare provider will see it outside of the [place]  DISCHARGE INSTRUCTIONS:   Call 911 if:   · You have sudden trouble breathing or chest pain  Return to the emergency department if:   · Your wound gets larger and more painful  · You feel a crackling under your skin when you touch it  · You have purple dots or bumps on your skin, or you see bleeding under your skin  · You have new swelling and pain in your legs  · The red, warm, swollen area gets larger  · You see red streaks coming from the infected area  Contact your healthcare provider if:   · You have a fever  · Your fever or pain does not go away or gets worse  · The area does not get smaller after 2 days of antibiotics  · Your skin is flaking or peeling off

## 2018-10-07 NOTE — ED NOTES
Two unsuccessful IV insertions by GENNA Ramos RN will attempt for IV placement        Sarah Herbert RN  10/07/18 2598

## 2018-10-07 NOTE — ED PROVIDER NOTES
History  Chief Complaint   Patient presents with    Leg Swelling     with right let pain and swelling started on Monday     64 yo female with IDDM, complicated by chronic neuropathy, currently being treated with a wound vac on the right forearm for wound healing of a deep space infection that has undergone multiple surgical debridements by plastic surgery, c/o onset of right leg pain and swelling of about 1 week duration at this point, exacerbated by weight bearing, increasing in intensity since last night  History provided by:  Patient      Prior to Admission Medications   Prescriptions Last Dose Informant Patient Reported? Taking?    clonazePAM (KlonoPIN) 1 mg tablet  Self Yes Yes   Sig: Take 1 mg by mouth 3 (three) times a day   gabapentin (NEURONTIN) 300 mg capsule  Self Yes Yes   Sig: Take 600 mg by mouth 3 (three) times a day     gabapentin (NEURONTIN) 300 mg capsule  Self No No   Sig: Take 1 capsule (300 mg total) by mouth 3 (three) times a day for 30 days Please take the night before surgery   insulin glargine (LANTUS) 100 units/mL subcutaneous injection  Self No Yes   Sig: Inject 25 Units under the skin every morning   insulin lispro (HumaLOG) 100 units/mL injection  Self No Yes   Sig: Inject 6 Units under the skin 3 (three) times a day before meals   lamoTRIgine (LaMICtal) 150 MG tablet  Self Yes Yes   Sig: Take 150 mg by mouth daily   methadone (DOLOPHINE) 10 mg tablet  Self Yes Yes   Sig: Take by mouth every 6 (six) hours as needed for moderate pain,   methadone (DOLOPHINE) 5 mg tablet  Self Yes Yes   Sig: Take by mouth every 6 (six) hours as needed for moderate pain   ondansetron (ZOFRAN-ODT) 4 mg disintegrating tablet  Self No Yes   Sig: Take 1 tablet (4 mg total) by mouth every 6 (six) hours as needed for nausea or vomiting   rOPINIRole (REQUIP) 0 5 mg tablet  Self Yes Yes   Sig: Take 10 mg by mouth 3 (three) times a day   sertraline (ZOLOFT) 50 mg tablet  Self Yes Yes   Sig: Take 50 mg by mouth daily      Facility-Administered Medications: None       Past Medical History:   Diagnosis Date    Diabetes mellitus ([address] )        Past Surgical History:   Procedure Laterality Date    APPENDECTOMY      INCISION AND DRAINAGE OF WOUND Right 7/1/2018    Procedure: INCISION AND DRAINAGE (I&D) RIGHT FOREARM;  Surgeon: Neida Michelle MD;  [facility];  Service: Orthopedics    IN SPLIT [address] <100 SQCM Right 9/12/2018    Procedure: Amira Sis;  Surgeon: Maurice Goldman MD;  [facility];  Service: Plastics    TONSILLECTOMY      WOUND DEBRIDEMENT Right 7/7/2018    Procedure: DEBRIDEMENT UPPER EXTREMITY ([address]) W/ APPLICATION OF WOUND VAC;  Surgeon: Kade Guzmán MD;  [facility];  Service: Orthopedics    WOUND DEBRIDEMENT Right 7/11/2018    Procedure: DEBRIDEMENT UPPER EXTREMITY WITH WOUND VAC EXCHANGE;  Surgeon: Oren Ridley DO;  [facility];  Service: Orthopedics    WOUND DEBRIDEMENT Right 9/12/2018    Procedure: DEBRIDEMENT  [facility] OUT) FOREARM with Vac dressing change;  Surgeon: Maurice Goldman MD;  [facility];  Service: Plastics       Family History   Problem Relation Age of Onset    Hypertension Mother         heart attack    Dementia Father         heart atttack/hypertention     I have reviewed and agree with the history as documented  Social History   Substance Use Topics    Smoking status: Never Smoker    Smokeless tobacco: Never Used    Alcohol use No        Review of Systems    Physical Exam  Physical Exam   Constitutional: She is oriented to person, place, and time  She appears well-developed and well-nourished  No distress  HENT:   Head: Normocephalic and atraumatic  Right Ear: External ear normal    Left Ear: External ear normal    Nose: Nose normal    Eyes: Pupils are equal, round, and reactive to light  Conjunctivae and EOM are normal    Neck: Normal range of motion  Neck supple  Cardiovascular: Normal rate and regular rhythm  Pulmonary/Chest: Effort normal    Abdominal: Soft  Musculoskeletal: Normal range of motion  Right lower leg: She exhibits tenderness (pain exquisite to only light touch), swelling and edema (becomes evident from about mid calf to foot)  Neurological: She is alert and oriented to person, place, and time  She has normal strength  Gait normal    Skin: Skin is warm and dry  Capillary refill takes less than 2 seconds  No rash noted  She is not diaphoretic  Psychiatric: She has a normal mood and affect  Her behavior is normal  Judgment and thought content normal    Nursing note and vitals reviewed        Vital Signs  ED Triage Vitals [10/07/18 0851]   Temperature Pulse Respirations Blood Pressure SpO2   97 8 °F (36 6 °C) 71 18 (!) 135/101 96 %      Temp Source Heart Rate Source Patient Position - Orthostatic VS BP Location FiO2 (%)   Oral Monitor Sitting Left arm --      Pain Score       Worst Possible Pain           Vitals:    10/07/18 0851 10/07/18 1108   BP: (!) 135/101 164/70   Pulse: 71 65   Patient Position - Orthostatic VS: Sitting Lying       Visual Acuity      ED Medications  Medications   fentanyl citrate (PF) 100 MCG/2ML 50 mcg (50 mcg Intravenous Given 10/7/18 0942)   HYDROmorphone (DILAUDID) injection 1 mg (1 mg Intramuscular Given 10/7/18 1016)       Diagnostic Studies  Results Reviewed     Procedure Component Value Units Date/Time    Comprehensive metabolic panel [[id number]]  (Abnormal) Collected:  10/07/18 0944    Lab Status:  Final result Specimen:  Blood from Arm, Left Updated:  10/07/18 1011     Sodium 138 mmol/L      Potassium 4 6 mmol/L      Chloride 99 (L) mmol/L      CO2 32 mmol/L      ANION GAP 7 mmol/L      BUN 14 mg/dL      Creatinine 0 79 mg/dL      Glucose 207 (H) mg/dL      Calcium 10 3 (H) mg/dL      AST 15 U/L      ALT 19 U/L      Alkaline Phosphatase 149 (H) U/L      Total Protein 8 1 g/dL      Albumin 3 4 (L) g/dL      Total Bilirubin 0 30 mg/dL      eGFR 81 ml/min/1 73sq m     Narrative:         National Kidney Disease Education Program recommendations are as follows:  GFR calculation is accurate only with a steady state creatinine  Chronic Kidney disease less than 60 ml/min/1 73 sq  meters  Kidney failure less than 15 ml/min/1 73 sq  meters  APTT [[id number]]  (Abnormal) Collected:  10/07/18 0944    Lab Status:  Final result Specimen:  Blood from Arm, Left Updated:  10/07/18 1009     PTT 23 (L) seconds     Protime-INR [[id number]]  (Normal) Collected:  10/07/18 0944    Lab Status:  Final result Specimen:  Blood from Arm, Left Updated:  10/07/18 1005     Protime 13 1 seconds      INR 0 98    CBC and differential [[id number]]  (Abnormal) Collected:  10/07/18 0944    Lab Status:  Final result Specimen:  Blood from Arm, Left Updated:  10/07/18 0951     WBC 5 35 Thousand/uL      RBC 3 87 Million/uL      Hemoglobin 10 7 (L) g/dL      Hematocrit 32 3 (L) %      MCV 84 fL      MCH 27 6 pg      MCHC 33 1 g/dL      RDW 13 2 %      MPV 9 0 fL      Platelets 098 Thousands/uL      nRBC 0 /100 WBCs      Neutrophils Relative 63 %      Immat GRANS % 1 %      Lymphocytes Relative 29 %      Monocytes Relative 5 %      Eosinophils Relative 1 %      Basophils Relative 1 %      Neutrophils Absolute 3 38 Thousands/µL      Immature Grans Absolute 0 03 Thousand/uL      Lymphocytes Absolute 1 55 Thousands/µL      Monocytes Absolute 0 28 Thousand/µL      Eosinophils Absolute 0 07 Thousand/µL      Basophils Absolute 0 04 Thousands/µL                  VAS lower limb venous duplex study, unilateral/limited    (Results Pending)              Procedures  Procedures       Phone Contacts  ED Phone Contact    ED Course  ED Course as of Oct 07 1530   Sun Oct 07, 2018   1052 Negative by report for acute DVT VAS lower limb venous duplex study, unilateral/limited   1140 Reviewed results with patient at bedside and updated on the plan    ED workup is reassuring, she is nontoxic  I am inclined to treat with abx  I For this painful condition,I am going to use NSAIDs, with a short course of percocet  She is actually tapering down the methadone by doctor order which she uses for chronic pain, so for this acute painful condition, I will supplement with short course of oxycodone  [facility]  CritCare Time    Disposition  Final diagnoses:   Cellulitis of right lower extremity     Time reflects when diagnosis was documented in both MDM as applicable and the Disposition within this note     Time User Action Codes Description Comment    10/7/2018 11:44 AM Alfredo Stewart Add [[id number]] Cellulitis of right lower extremity       ED Disposition     ED Disposition Condition Comment    Discharge  Kita Rg discharge to home/self care      Condition at discharge: Good        Follow-up Information     Follow up With Specialties Details Why Contact Info Additional Information    Jennyfer Huitron MD Family Medicine Go in 2 days For followup [facility] [address] [facility] Emergency Department Emergency Medicine  If symptoms worsen [address]ady[address] [place] [facility], [address], [place], [address]          Discharge Medication List as of 10/7/2018 11:45 AM      START taking these medications    Details   naproxen (NAPROSYN) 500 mg tablet Take 1 tablet (500 mg total) by mouth 2 (two) times a day as needed for mild pain or moderate pain for up to 14 doses, Starting Sun 10/7/2018, Print      oxyCODONE-acetaminophen (PERCOCET) 5-325 mg per tablet Take 1 tablet by mouth every 4 (four) hours as needed for severe pain for up to 10 days, Starting Sun 10/7/2018, Until Wed 10/17/2018, Print      sulfamethoxazole-trimethoprim (BACTRIM DS) 800-160 mg per tablet Take 1 tablet by mouth 2 (two) times a day for 10 days smx-tmp DS (BACTRIM) 800-160 mg tabs (1tab q12 D10), Starting Sun 10/7/2018, Until Wed 10/17/2018, Print         CONTINUE these medications which have NOT CHANGED    Details   clonazePAM (KlonoPIN) 1 mg tablet Take 1 mg by mouth 3 (three) times a day, Historical Med      !! gabapentin (NEURONTIN) 300 mg capsule Take 600 mg by mouth 3 (three) times a day  , Historical Med      insulin glargine (LANTUS) 100 units/mL subcutaneous injection Inject 25 Units under the skin every morning, Starting Sun 7/22/2018, Print      insulin lispro (HumaLOG) 100 units/mL injection Inject 6 Units under the skin 3 (three) times a day before meals, Starting Sat 7/21/2018, Print      lamoTRIgine (LaMICtal) 150 MG tablet Take 150 mg by mouth daily, Historical Med      !! methadone (DOLOPHINE) 10 mg tablet Take by mouth every 6 (six) hours as needed for moderate pain,, Historical Med      !! methadone (DOLOPHINE) 5 mg tablet Take by mouth every 6 (six) hours as needed for moderate pain, Historical Med      ondansetron (ZOFRAN-ODT) 4 mg disintegrating tablet Take 1 tablet (4 mg total) by mouth every 6 (six) hours as needed for nausea or vomiting, Starting Sat 7/21/2018, Print      rOPINIRole (REQUIP) 0 5 mg tablet Take 10 mg by mouth 3 (three) times a day, Historical Med      sertraline (ZOLOFT) 50 mg tablet Take 50 mg by mouth daily, Historical Med      !! gabapentin (NEURONTIN) 300 mg capsule Take 1 capsule (300 mg total) by mouth 3 (three) times a day for 30 days Please take the night before surgery, Starting Wed 9/12/2018, Until Fri 10/12/2018, Print       !! - Potential duplicate medications found  Please discuss with provider  No discharge procedures on file      ED Provider  Electronically Signed by           Sherri Zambrano MD  10/07/18 2954

## 2018-10-10 RX ORDER — HYDROXYZINE 50 MG/1
50 TABLET, FILM COATED ORAL
COMMUNITY

## 2018-10-10 NOTE — PRE-PROCEDURE INSTRUCTIONS
Pre-Surgery Instructions:   Medication Instructions    clonazePAM (KlonoPIN) 1 mg tablet Instructed patient per Anesthesia Guidelines   gabapentin (NEURONTIN) 300 mg capsule Instructed patient per Anesthesia Guidelines   hydrOXYzine HCL (ATARAX) 50 mg tablet Instructed patient per Anesthesia Guidelines   insulin glargine (LANTUS) 100 units/mL subcutaneous injection Patient was instructed by Physician and understands   insulin lispro (HumaLOG) 100 units/mL injection Patient was instructed by Physician and understands   lamoTRIgine (LaMICtal) 150 MG tablet Instructed patient per Anesthesia Guidelines   methadone (DOLOPHINE) 5 mg tablet Instructed patient per Anesthesia Guidelines   naproxen (NAPROSYN) 500 mg tablet Instructed patient per Anesthesia Guidelines   rOPINIRole (REQUIP) 0 5 mg tablet Instructed patient per Anesthesia Guidelines   sertraline (ZOLOFT) 50 mg tablet Instructed patient per Anesthesia Guidelines  Spoke to pt  Medication list reviewed & instructed  Had sx 9/2018  As of 10 10 18 instructed on tylenol OTC only  Am DOS pt to take klonopin, lamictal, gabapentin with 1-2 sips of water  Pt states she was given methadone post-op after 9/2018 sx  Pt reports she was instructed by MD vasquez insulin DOS  Pt aware of showering instructions, reviewed at time of call  All instructions verbally understood by patient  No further questions  Antidepressant Med Class     Continue to take this medication on your normal schedule  If this is an oral medication and you take it in the morning, then you may take this medicine with a sip of water  Antiepileptic Med Class     Continue to take this medication on your normal schedule  If this is an oral medication and you take it in the morning, then you may take this medicine with a sip of water  Antiparkinsons Med Class     Continue to take this medication on your normal schedule    If this is an oral medication and you take it in the morning, then you may take this medicine with a sip of water  Benzodiazepine antagonist Med Class     If this medication is needed please continue to take on your normal schedule  If you take it in the morning, then you may take this medicine with a sip of water  Insulin Med Class     Pre-Surgery/Procedure Instructions for Adult Patients who Take Medicine for Diabetes or to Control their Blood Sugar     Day Before Surgery/Procedure  Use the directions based on the type of medicine you take for your diabetes  1  If you are having a procedure that does not require a bowel prep:  ? Pre-Mixed Insulin (Intermediate Acting: Humalog 75/25, Humulin 70/30  Novolog 70/30, Regular Insulin)  § Take ½ your regular dose the evening before your procedure  ? Rapid/Fast Acting Insulin/Long Acting Insulin (Humalog U200, NovoLog, Apidra, Lantus, Levemir, Hali Mcintyre, Xi)  § Take your FULL regular dose the day before procedure  ? Oral Diabetic Medicines including Glipizide/Glimepiride/Glucotrol (sulfonylurea)  § Take your regular dose with dinner the evening before your procedure  2  If you are having a procedure (e g  Colonoscopy) that requires a bowel prep and you are allowed to have at least a clear liquid diet:  ? Pre-Mixed Insulin (Intermediate Acting: Humalog 75/25, Humulin 70/30, Novolog 70/30, Regular Insulin)  § Take ½ your regular dose the evening before your procedure  ? Rapid/Fast Acting Insulin (Humalog U200, NovoLog, Apidra, Fiasp)  § Take ½ your regular dose the evening before your procedure  ? Long Acting Insulin (Lantus, Levemir, Hali Mcintyre)  § Take your FULL regular dose the day before procedure  ? Oral Glipizide/Glimepiride/Glucotrol (sulfonylurea)  § Take ½ your regular dose the evening before your procedure    ? Oral Diabetic Medicines that are NOT Glipizide/Glimepiride/Glucotrol  § Take your regular dose with dinner in the evening before your procedure      Day of Surgery/Procedure  · Long Acting Insulin (Lantus, Levemir, Cookie Ni)  ? If you usually take your Long-Acting Insulin in the morning, take the full dose as scheduled  · With the exception of the morning Long-Acting Insulin noted above, DO NOT take ANY diabetic medicine on the day of your procedure unless you were instructed by the doctor who manages your diabetic medicines  · Continue to check your blood sugars  · If you have an insulin pump then consult with your Endocrinologist for instructions  · If you cannot see your Endocrinologist, on the day of the procedure set your insulin pump to your basal rate only  Please bring your insulin pump supplies to the hospital      This Educational material has been approved by the Patient Education Advisory Committee  Date prepared: 1/17/2018          Expiration date: 1/17/2019        Approval Number:                     NSAID Med Class     Stop taking this medication at least 3 days prior to surgery/procedure  Opioid Med Class     Continue to take this medication on your normal schedule  If this is an oral medication and you take it in the morning, then you may take this medicine with a sip of water

## 2018-10-15 ENCOUNTER — PATIENT OUTREACH (OUTPATIENT)
Dept: OTHER | Facility: HOSPITAL | Age: 63
End: 2018-10-15

## 2018-10-15 NOTE — PROGRESS NOTES
Outpatient Care Management Note: Patient is concerned that her leg isn't getting any better  Seen in the ED on 10/7/18 for right lower extremity cellulitis  Started on antibiotics at that time, saw her PCP on 10/10/18 and no changes were made  She feels there has been no improvement, encouraged to call her PCP and follow his instructions  Her Hgb A1C in the office was 7 1, this is down from 13 4  Encouragement given to patient to continue with the good work  She is scheduled for skin grafting on 10/17/18, has an appointment on 10/16/18 to review procedure, encouraged to make office aware of her right lower extremity cellulitis at that appointment  Verified that she has my contact information  Encouraged to call with a change in symptoms, questions or concerns

## 2018-10-16 ENCOUNTER — OFFICE VISIT (OUTPATIENT)
Dept: PLASTIC SURGERY | Facility: CLINIC | Age: 63
End: 2018-10-16

## 2018-10-16 VITALS — WEIGHT: 144 LBS | BODY MASS INDEX: 23.14 KG/M2 | HEIGHT: 66 IN

## 2018-10-16 DIAGNOSIS — E11.65 UNCONTROLLED TYPE 2 DIABETES MELLITUS WITH HYPERGLYCEMIA (HCC): Primary | ICD-10-CM

## 2018-10-16 DIAGNOSIS — M65.031: ICD-10-CM

## 2018-10-16 DIAGNOSIS — L02.413 ABSCESS OF RIGHT ARM: ICD-10-CM

## 2018-10-16 PROCEDURE — 99024 POSTOP FOLLOW-UP VISIT: CPT | Performed by: PLASTIC SURGERY

## 2018-10-16 NOTE — PROGRESS NOTES
Assessment/Plan:      Diagnoses and all orders for this visit:    Uncontrolled type 2 diabetes mellitus with hyperglycemia ([facility] [address] )    Abscess of tendon sheath of right forearm    Abscess of right arm        I discussed that I felt that the wound is ready for coverage with a skin graft  I discussed risks including but not limited to: bleeding, infection, hematoma, seroma, graft loss, wound breakdown, scar, need for further surgery, deformity, pain, numbness, weakness, asymmetry, injury to structures, and medical complications  The patient would like to proceed and will proceed with schedule surgery on 10/17/2018    A total of 20 minutes of face-to-face time was spent in this encounter, of which >50% was spent in counseling  Nissa King [address] [place] [address] 31, [address]   Office: [phone]              Subjective:     Patient ID: Martine Zayas is a 65 y o  female  Mrs Param Jade is a 65years old female with history of diabetes who presents for evaluation of the right forearm wound  As per the patient she sustained a fall and and hand arm edema and pain for about 10 days until he can progressively worse and she presented to the emergency room at [facility] found to have an abscess  She underwent multiple debridements on July 1st, 7th and 11th and has remaining wound that is being managed by local wound care by the wound center  Patient manifest she continues to do silver dressings were is a splint at all times  SHe has not undergone any sort of therapy at this point  She is right-handed    Patient underwent debridement, washout and placement of Integra on 9/4/2018 with VAC therapy for 3-4 weeks  Today, she presents for preoperative evaluation prior to second stage reconstruction  Review of Systems   Constitutional: Negative  HENT: Negative  Respiratory: Positive for cough      Endocrine: Negative  Skin: Positive for wound  Neurological: Negative  Psychiatric/Behavioral: Negative  Objective:     Physical Exam   Constitutional: She appears well-developed  Cardiovascular: Normal rate  Pulmonary/Chest: Effort normal    Abdominal: Soft  Skin:        Psychiatric: She has a normal mood and affect   Her behavior is normal

## 2018-10-17 ENCOUNTER — HOSPITAL ENCOUNTER (OUTPATIENT)
Facility: HOSPITAL | Age: 63
Setting detail: OUTPATIENT SURGERY
Discharge: HOME | End: 2018-10-17
Attending: PLASTIC SURGERY | Admitting: PLASTIC SURGERY
Payer: COMMERCIAL

## 2018-10-17 ENCOUNTER — ANESTHESIA EVENT (OUTPATIENT)
Dept: PERIOP | Facility: HOSPITAL | Age: 63
End: 2018-10-17
Payer: COMMERCIAL

## 2018-10-17 ENCOUNTER — ANESTHESIA (OUTPATIENT)
Dept: PERIOP | Facility: HOSPITAL | Age: 63
End: 2018-10-17
Payer: COMMERCIAL

## 2018-10-17 VITALS
BODY MASS INDEX: 23.14 KG/M2 | HEIGHT: 66 IN | OXYGEN SATURATION: 97 % | SYSTOLIC BLOOD PRESSURE: 157 MMHG | TEMPERATURE: 97.8 F | RESPIRATION RATE: 16 BRPM | DIASTOLIC BLOOD PRESSURE: 89 MMHG | HEART RATE: 86 BPM | WEIGHT: 144 LBS

## 2018-10-17 DIAGNOSIS — L03.113 RIGHT ARM CELLULITIS: ICD-10-CM

## 2018-10-17 DIAGNOSIS — L02.413 ABSCESS OF RIGHT FOREARM: Primary | ICD-10-CM

## 2018-10-17 LAB
GLUCOSE SERPL-MCNC: 155 MG/DL (ref 65–140)
GLUCOSE SERPL-MCNC: 202 MG/DL (ref 65–140)

## 2018-10-17 PROCEDURE — 15002 WOUND PREP TRK/ARM/LEG: CPT | Performed by: PLASTIC SURGERY

## 2018-10-17 PROCEDURE — 82948 REAGENT STRIP/BLOOD GLUCOSE: CPT

## 2018-10-17 PROCEDURE — 15221 FTH/GFT FR S/A/L EACH ADDL: CPT | Performed by: PLASTIC SURGERY

## 2018-10-17 PROCEDURE — 15220 FTH/GFT FR S/A/L 20 SQ CM/<: CPT | Performed by: PLASTIC SURGERY

## 2018-10-17 RX ORDER — PROPOFOL 10 MG/ML
INJECTION, EMULSION INTRAVENOUS AS NEEDED
Status: DISCONTINUED | OUTPATIENT
Start: 2018-10-17 | End: 2018-10-17 | Stop reason: SURG

## 2018-10-17 RX ORDER — OXYCODONE HYDROCHLORIDE 5 MG/1
5 TABLET ORAL EVERY 6 HOURS PRN
Qty: 15 TABLET | Refills: 0 | Status: SHIPPED | OUTPATIENT
Start: 2018-10-17 | End: 2018-11-08

## 2018-10-17 RX ORDER — LIDOCAINE HYDROCHLORIDE AND EPINEPHRINE 10; 10 MG/ML; UG/ML
INJECTION, SOLUTION INFILTRATION; PERINEURAL AS NEEDED
Status: DISCONTINUED | OUTPATIENT
Start: 2018-10-17 | End: 2018-10-17 | Stop reason: HOSPADM

## 2018-10-17 RX ORDER — METHADONE HYDROCHLORIDE 5 MG/1
15 TABLET ORAL ONCE
Status: COMPLETED | OUTPATIENT
Start: 2018-10-17 | End: 2018-10-17

## 2018-10-17 RX ORDER — FENTANYL CITRATE 50 UG/ML
INJECTION, SOLUTION INTRAMUSCULAR; INTRAVENOUS AS NEEDED
Status: DISCONTINUED | OUTPATIENT
Start: 2018-10-17 | End: 2018-10-17 | Stop reason: SURG

## 2018-10-17 RX ORDER — SODIUM CHLORIDE, SODIUM LACTATE, POTASSIUM CHLORIDE, CALCIUM CHLORIDE 600; 310; 30; 20 MG/100ML; MG/100ML; MG/100ML; MG/100ML
125 INJECTION, SOLUTION INTRAVENOUS CONTINUOUS
Status: DISCONTINUED | OUTPATIENT
Start: 2018-10-17 | End: 2018-10-17 | Stop reason: HOSPADM

## 2018-10-17 RX ORDER — KETAMINE HYDROCHLORIDE 50 MG/ML
INJECTION, SOLUTION, CONCENTRATE INTRAMUSCULAR; INTRAVENOUS AS NEEDED
Status: DISCONTINUED | OUTPATIENT
Start: 2018-10-17 | End: 2018-10-17 | Stop reason: SURG

## 2018-10-17 RX ORDER — CEFAZOLIN SODIUM 1 G/3ML
INJECTION, POWDER, FOR SOLUTION INTRAMUSCULAR; INTRAVENOUS AS NEEDED
Status: DISCONTINUED | OUTPATIENT
Start: 2018-10-17 | End: 2018-10-17 | Stop reason: SURG

## 2018-10-17 RX ORDER — LIDOCAINE HYDROCHLORIDE 10 MG/ML
INJECTION, SOLUTION INFILTRATION; PERINEURAL AS NEEDED
Status: DISCONTINUED | OUTPATIENT
Start: 2018-10-17 | End: 2018-10-17 | Stop reason: SURG

## 2018-10-17 RX ORDER — FENTANYL CITRATE/PF 50 MCG/ML
25 SYRINGE (ML) INJECTION
Status: DISCONTINUED | OUTPATIENT
Start: 2018-10-17 | End: 2018-10-17 | Stop reason: HOSPADM

## 2018-10-17 RX ORDER — ONDANSETRON 2 MG/ML
INJECTION INTRAMUSCULAR; INTRAVENOUS AS NEEDED
Status: DISCONTINUED | OUTPATIENT
Start: 2018-10-17 | End: 2018-10-17 | Stop reason: SURG

## 2018-10-17 RX ORDER — OXYCODONE HYDROCHLORIDE 5 MG/1
5 TABLET ORAL EVERY 6 HOURS PRN
Status: DISCONTINUED | OUTPATIENT
Start: 2018-10-17 | End: 2018-10-17 | Stop reason: HOSPADM

## 2018-10-17 RX ORDER — MIDAZOLAM HYDROCHLORIDE 1 MG/ML
INJECTION INTRAMUSCULAR; INTRAVENOUS AS NEEDED
Status: DISCONTINUED | OUTPATIENT
Start: 2018-10-17 | End: 2018-10-17 | Stop reason: SURG

## 2018-10-17 RX ADMIN — DEXAMETHASONE SODIUM PHOSPHATE 10 MG: 10 INJECTION INTRAMUSCULAR; INTRAVENOUS at 08:57

## 2018-10-17 RX ADMIN — FENTANYL CITRATE 25 MCG: 50 INJECTION, SOLUTION INTRAMUSCULAR; INTRAVENOUS at 08:43

## 2018-10-17 RX ADMIN — KETAMINE HYDROCHLORIDE 10 MG: 50 INJECTION, SOLUTION INTRAMUSCULAR; INTRAVENOUS at 09:35

## 2018-10-17 RX ADMIN — ONDANSETRON 4 MG: 2 INJECTION INTRAMUSCULAR; INTRAVENOUS at 08:57

## 2018-10-17 RX ADMIN — LIDOCAINE HYDROCHLORIDE 40 MG: 10 INJECTION, SOLUTION INFILTRATION; PERINEURAL at 08:37

## 2018-10-17 RX ADMIN — PROPOFOL 200 MG: 10 INJECTION, EMULSION INTRAVENOUS at 08:38

## 2018-10-17 RX ADMIN — KETAMINE HYDROCHLORIDE 10 MG: 50 INJECTION, SOLUTION INTRAMUSCULAR; INTRAVENOUS at 09:14

## 2018-10-17 RX ADMIN — FENTANYL CITRATE 25 MCG: 50 INJECTION, SOLUTION INTRAMUSCULAR; INTRAVENOUS at 08:37

## 2018-10-17 RX ADMIN — KETAMINE HYDROCHLORIDE 10 MG: 50 INJECTION, SOLUTION INTRAMUSCULAR; INTRAVENOUS at 08:41

## 2018-10-17 RX ADMIN — METHADONE HYDROCHLORIDE 15 MG: 5 TABLET ORAL at 14:10

## 2018-10-17 RX ADMIN — MIDAZOLAM 2 MG: 1 INJECTION INTRAMUSCULAR; INTRAVENOUS at 08:19

## 2018-10-17 RX ADMIN — CEFAZOLIN 1000 MG: 1 INJECTION, POWDER, FOR SOLUTION INTRAVENOUS at 08:46

## 2018-10-17 RX ADMIN — FENTANYL CITRATE 25 MCG: 50 INJECTION, SOLUTION INTRAMUSCULAR; INTRAVENOUS at 09:03

## 2018-10-17 RX ADMIN — SODIUM CHLORIDE, SODIUM LACTATE, POTASSIUM CHLORIDE, AND CALCIUM CHLORIDE: .6; .31; .03; .02 INJECTION, SOLUTION INTRAVENOUS at 08:28

## 2018-10-17 RX ADMIN — FENTANYL CITRATE 25 MCG: 50 INJECTION, SOLUTION INTRAMUSCULAR; INTRAVENOUS at 08:57

## 2018-10-17 RX ADMIN — KETAMINE HYDROCHLORIDE 10 MG: 50 INJECTION, SOLUTION INTRAMUSCULAR; INTRAVENOUS at 09:00

## 2018-10-17 RX ADMIN — OXYCODONE HYDROCHLORIDE 5 MG: 5 TABLET ORAL at 11:04

## 2018-10-17 RX ADMIN — KETAMINE HYDROCHLORIDE 10 MG: 50 INJECTION, SOLUTION INTRAMUSCULAR; INTRAVENOUS at 09:50

## 2018-10-17 NOTE — OP NOTE
OPERATIVE REPORT  PATIENT NAME: Stephania Matos    :  1955  MRN: [id number]  Pt Location: [facility] OR ROOM 06    SURGERY DATE: 10/17/2018    Surgeon(s) and Role:     * Keenan Duque MD - Primary    Preop Diagnosis:  Right arm cellulitis [M59 [id number]]    Post-Op Diagnosis Codes:     * Right arm cellulitis [G21 738]    Procedure:     1) Right arm nonexcisional debridement 8 5 x 3 cm   2) Right arm defect closure with full thickness skin graft 8 5 x 3 cm   3) Application of right upper extremity splint  Specimen(s):  * No specimens in log *    Estimated Blood Loss:   5 mL    Drains:  Closed/Suction Drain Right Other (Comment) Accordion 10 Fr  (Active)   Number of days: 108       Negative Pressure Wound Therapy (V A C ) Arm Distal (Active)   Unit Type vac 10/17/2018 10:39 AM   Black foam- # applied 1 2018 12:00 AM   Nonadherent (Adaptic)- # applied 1 2018 12:00 AM   Cycle Continuous 10/17/2018 11:39 AM   Target Pressure (mmHg) 125 10/17/2018 11:39 AM   Canister Changed Yes 2018 12:00 AM   Dressing Status Clean;Dry; Intact 2018 12:00 AM   Number of days: 0       Anesthesia Type:   General/LMA    Operative Indications:  Right arm cellulitis [V77 [id number]]      Operative Findings:  Right forearm defect with beefy red granulation tissue    Complications:   None    Procedure and Technique:  Patient was taken to the operative theater and placed in supine position  General anesthesia was induced and preoperative antibiotics were given  The right upper extremity and the abdomen was prepped and draped in usual sterile fashion  Time-out performed were confirmation of patient surgical site, and perioperative measurements were taken  This point wound over the dorsum aspect of the right form was encounter measuring the 10 by 3 cm with beefy red granulation tissue  None excisional debridement was performed by scraping the skin relation tissue with corrects    This wound was then irrigated 1 L normal saline  The need for a skin graft to allow closure of this defect was chosen  So attention was then turned to the abdomen where an elliptical skin excisional of the mid lower abdomen sol 10 x 3 cm and infiltrated with lidocaine with epinephrine 10 mL  Graft was harvested with a scalpel and dermis was removed on the back table  The graft was then secured to the form defect with chromic 4-0 sutures  Adaptic and a wound VAC was placed as a bolster  The donor site was closed by undermining the skin flap cephalad about 3 cm and closed in layers with interrupted of 3-0 Monocryl deep dermal sutures and skin with 4-0 running subcuticular suture  History grow was applied for dressing of the donor site  Volar splint was then placed to place the wrist in neutral positions         I was present for the entire procedure    Patient Disposition:  PACU  and hemodynamically stable    SIGNATURE: Arvin Murillo MD  DATE: October 17, 2018  TIME: 12:16 PM

## 2018-10-17 NOTE — PROGRESS NOTES
Right arm has ace dressing present  Fingers warm  Small amount of redness/abrasion above dressing site near elbow

## 2018-10-17 NOTE — PROGRESS NOTES
Right foot reddened, warm and swollen  Pt states MD said it was cellulitis and is ok to proceed with surgery  Afebrile   Scabbed wound 3cm noted on right heel

## 2018-10-17 NOTE — ANESTHESIA POSTPROCEDURE EVALUATION
Post-Op Assessment Note      CV Status:  Stable    Mental Status:  Alert and awake    Hydration Status:  Stable    PONV Controlled:  None    Airway Patency:  Patent  Airway: intubated    Post Op Vitals Reviewed: Yes          Staff: Anesthesiologist           BP      Temp      Pulse     Resp      SpO2

## 2018-10-17 NOTE — ANESTHESIA PREPROCEDURE EVALUATION
Review of Systems/Medical History  Patient summary reviewed  Chart reviewed      Cardiovascular  Hyperlipidemia, Hypertension controlled,    Pulmonary       GI/Hepatic    Liver disease , Hepatitis ,             Endo/Other  Diabetes well controlled type 1 Insulin, History of thyroid disease , hypothyroidism,      GYN       Hematology  Anemia ,     Musculoskeletal       Neurology   Psychology   Depression , being treated for depression,              Physical Exam    Airway    Mallampati score: II  TM Distance: >3 FB  Neck ROM: full     Dental   No notable dental hx     Cardiovascular  Rhythm: regular, Rate: normal, Cardiovascular exam normal    Pulmonary  Pulmonary exam normal     Other Findings        Anesthesia Plan  ASA Score- 3     Anesthesia Type- general with ASA Monitors  Additional Monitors:   Airway Plan: LMA  Plan Factors-    Induction- intravenous  Postoperative Plan- Plan for postoperative opioid use  Informed Consent- Anesthetic plan and risks discussed with patient  I personally reviewed this patient with the CRNA  Discussed and agreed on the Anesthesia Plan with the DEA Sanchez

## 2018-10-22 ENCOUNTER — PATIENT OUTREACH (OUTPATIENT)
Dept: OTHER | Facility: HOSPITAL | Age: 63
End: 2018-10-22

## 2018-10-24 ENCOUNTER — PATIENT OUTREACH (OUTPATIENT)
Dept: OTHER | Facility: HOSPITAL | Age: 63
End: 2018-10-24

## 2018-10-24 NOTE — PROGRESS NOTES
Outpatient Care Management Note:  Doing well  Had her wound closure with skin grafting done on 10/17/18  Both arm and abdominal dressings are dry and intact  Confirmed follow up appointment with plastic surgery on 10/26/18  Doing well with her medications and diet  Independent with her ADL's, no current needs  Verified that she has my contact information  Encouraged to call with a change I symptoms, questions or concerns

## 2018-10-26 ENCOUNTER — OFFICE VISIT (OUTPATIENT)
Dept: PLASTIC SURGERY | Facility: CLINIC | Age: 63
End: 2018-10-26

## 2018-10-26 ENCOUNTER — TELEPHONE (OUTPATIENT)
Dept: PLASTIC SURGERY | Facility: CLINIC | Age: 63
End: 2018-10-26

## 2018-10-26 VITALS — WEIGHT: 140 LBS | BODY MASS INDEX: 22.5 KG/M2 | HEIGHT: 66 IN

## 2018-10-26 DIAGNOSIS — S59.911D RIGHT FOREARM INJURY, SUBSEQUENT ENCOUNTER: Primary | ICD-10-CM

## 2018-10-26 DIAGNOSIS — M65.031: ICD-10-CM

## 2018-10-26 PROCEDURE — 99024 POSTOP FOLLOW-UP VISIT: CPT | Performed by: PLASTIC SURGERY

## 2018-10-26 NOTE — PROGRESS NOTES
Jose Powell is 9 days post-op: washout and full thickness skin graft to right forearm  Presenting for routine follow-up  S:  Doing well  Patient has noted no excessive pain and discharge  O:  Right forearm VAC removed, graft with 99% take, no erythema, no fluid collection  Wrist ROM flex/ext limited  A:  Satisfactory course  P: graft care instructions were provided with daily Aquaphor, adaptive and dry dressing  A referral was placed for occupational therapist to improve range of motion of wrist and fingers  Patient to return in 3 weeks  Patient is to return as needed for redness, swelling, discomfort, or any concern about her surgery

## 2018-10-26 NOTE — PATIENT INSTRUCTIONS
Wound Care Orders:    Donor Site: Apply Aquaphor daily   Skin Graft: Apply Aquaphor daily, apply adpatic, cover with ABD, and Ace wrap

## 2018-10-31 ENCOUNTER — TELEPHONE (OUTPATIENT)
Dept: PLASTIC SURGERY | Facility: CLINIC | Age: 63
End: 2018-10-31

## 2018-10-31 NOTE — TELEPHONE ENCOUNTER
Photo reviewed by Dr Jorge Alberto Laguerre, who feels that wound graft looks good and has some excess skin along edges which will be addressed at 11/14/18 appt  April He made aware

## 2018-10-31 NOTE — TELEPHONE ENCOUNTER
Ruby with [address] [facility] calling to clarify wound care orders and OT orders from 10/26/18 appt  Phone number provided to contact Dr Maureen Kayser MA at [facility] to discuss orders  Reports that patient also is complaining that proximal portion of wound looks different from last week  States it is pink with yellow slough around edges  No other symptoms  VSS  States will email photo of area of concern

## 2018-11-07 ENCOUNTER — PATIENT OUTREACH (OUTPATIENT)
Dept: OTHER | Facility: HOSPITAL | Age: 63
End: 2018-11-07

## 2018-11-08 ENCOUNTER — HOSPITAL ENCOUNTER (INPATIENT)
Facility: HOSPITAL | Age: 63
LOS: 1 days | Discharge: LEFT BEFORE DISCHARGE | DRG: 603 | End: 2018-11-08
Attending: EMERGENCY MEDICINE | Admitting: INTERNAL MEDICINE
Payer: COMMERCIAL

## 2018-11-08 ENCOUNTER — APPOINTMENT (EMERGENCY)
Dept: RADIOLOGY | Facility: HOSPITAL | Age: 63
DRG: 603 | End: 2018-11-08
Payer: COMMERCIAL

## 2018-11-08 VITALS
SYSTOLIC BLOOD PRESSURE: 190 MMHG | RESPIRATION RATE: 18 BRPM | TEMPERATURE: 97.4 F | WEIGHT: 148.13 LBS | BODY MASS INDEX: 23.91 KG/M2 | DIASTOLIC BLOOD PRESSURE: 83 MMHG | HEART RATE: 74 BPM | OXYGEN SATURATION: 100 %

## 2018-11-08 DIAGNOSIS — M86.171 ACUTE OSTEOMYELITIS OF RIGHT FOOT (HCC): Primary | ICD-10-CM

## 2018-11-08 DIAGNOSIS — L02.413 ABSCESS OF RIGHT FOREARM: ICD-10-CM

## 2018-11-08 DIAGNOSIS — L03.115 CELLULITIS OF RIGHT FOOT: ICD-10-CM

## 2018-11-08 LAB
ALBUMIN SERPL BCP-MCNC: 3.3 G/DL (ref 3.5–5)
ALP SERPL-CCNC: 182 U/L (ref 46–116)
ALT SERPL W P-5'-P-CCNC: 16 U/L (ref 12–78)
ANION GAP SERPL CALCULATED.3IONS-SCNC: 6 MMOL/L (ref 4–13)
AST SERPL W P-5'-P-CCNC: 31 U/L (ref 5–45)
BASOPHILS # BLD AUTO: 0.03 THOUSANDS/ΜL (ref 0–0.1)
BASOPHILS NFR BLD AUTO: 1 % (ref 0–1)
BILIRUB SERPL-MCNC: 0.59 MG/DL (ref 0.2–1)
BUN SERPL-MCNC: 17 MG/DL (ref 5–25)
CALCIUM SERPL-MCNC: 9.5 MG/DL (ref 8.3–10.1)
CHLORIDE SERPL-SCNC: 96 MMOL/L (ref 100–108)
CO2 SERPL-SCNC: 30 MMOL/L (ref 21–32)
CREAT SERPL-MCNC: 0.8 MG/DL (ref 0.6–1.3)
EOSINOPHIL # BLD AUTO: 0.11 THOUSAND/ΜL (ref 0–0.61)
EOSINOPHIL NFR BLD AUTO: 3 % (ref 0–6)
ERYTHROCYTE [DISTWIDTH] IN BLOOD BY AUTOMATED COUNT: 13.4 % (ref 11.6–15.1)
GFR SERPL CREATININE-BSD FRML MDRD: 79 ML/MIN/1.73SQ M
GLUCOSE SERPL-MCNC: 401 MG/DL (ref 65–140)
HCT VFR BLD AUTO: 32.8 % (ref 34.8–46.1)
HGB BLD-MCNC: 10.4 G/DL (ref 11.5–15.4)
IMM GRANULOCYTES # BLD AUTO: 0.01 THOUSAND/UL (ref 0–0.2)
IMM GRANULOCYTES NFR BLD AUTO: 0 % (ref 0–2)
LYMPHOCYTES # BLD AUTO: 1.44 THOUSANDS/ΜL (ref 0.6–4.47)
LYMPHOCYTES NFR BLD AUTO: 33 % (ref 14–44)
MCH RBC QN AUTO: 26.6 PG (ref 26.8–34.3)
MCHC RBC AUTO-ENTMCNC: 31.7 G/DL (ref 31.4–37.4)
MCV RBC AUTO: 84 FL (ref 82–98)
MONOCYTES # BLD AUTO: 0.5 THOUSAND/ΜL (ref 0.17–1.22)
MONOCYTES NFR BLD AUTO: 11 % (ref 4–12)
NEUTROPHILS # BLD AUTO: 2.32 THOUSANDS/ΜL (ref 1.85–7.62)
NEUTS SEG NFR BLD AUTO: 52 % (ref 43–75)
NRBC BLD AUTO-RTO: 0 /100 WBCS
PLATELET # BLD AUTO: 241 THOUSANDS/UL (ref 149–390)
PMV BLD AUTO: 9.7 FL (ref 8.9–12.7)
POTASSIUM SERPL-SCNC: 4.9 MMOL/L (ref 3.5–5.3)
PROT SERPL-MCNC: 8.1 G/DL (ref 6.4–8.2)
RBC # BLD AUTO: 3.91 MILLION/UL (ref 3.81–5.12)
SODIUM SERPL-SCNC: 132 MMOL/L (ref 136–145)
WBC # BLD AUTO: 4.41 THOUSAND/UL (ref 4.31–10.16)

## 2018-11-08 PROCEDURE — 99223 1ST HOSP IP/OBS HIGH 75: CPT | Performed by: PHYSICIAN ASSISTANT

## 2018-11-08 PROCEDURE — 96374 THER/PROPH/DIAG INJ IV PUSH: CPT

## 2018-11-08 PROCEDURE — 87040 BLOOD CULTURE FOR BACTERIA: CPT | Performed by: PHYSICIAN ASSISTANT

## 2018-11-08 PROCEDURE — 73630 X-RAY EXAM OF FOOT: CPT

## 2018-11-08 PROCEDURE — 99285 EMERGENCY DEPT VISIT HI MDM: CPT

## 2018-11-08 PROCEDURE — 96365 THER/PROPH/DIAG IV INF INIT: CPT

## 2018-11-08 PROCEDURE — 85025 COMPLETE CBC W/AUTO DIFF WBC: CPT | Performed by: PHYSICIAN ASSISTANT

## 2018-11-08 PROCEDURE — 80053 COMPREHEN METABOLIC PANEL: CPT | Performed by: PHYSICIAN ASSISTANT

## 2018-11-08 PROCEDURE — 36415 COLL VENOUS BLD VENIPUNCTURE: CPT | Performed by: PHYSICIAN ASSISTANT

## 2018-11-08 RX ORDER — GABAPENTIN 300 MG/1
600 CAPSULE ORAL 3 TIMES DAILY
Status: DISCONTINUED | OUTPATIENT
Start: 2018-11-08 | End: 2018-11-09 | Stop reason: HOSPADM

## 2018-11-08 RX ORDER — SODIUM CHLORIDE 9 MG/ML
50 INJECTION, SOLUTION INTRAVENOUS CONTINUOUS
Status: DISCONTINUED | OUTPATIENT
Start: 2018-11-08 | End: 2018-11-09 | Stop reason: HOSPADM

## 2018-11-08 RX ORDER — INSULIN GLARGINE 100 [IU]/ML
25 INJECTION, SOLUTION SUBCUTANEOUS EVERY MORNING
Status: DISCONTINUED | OUTPATIENT
Start: 2018-11-09 | End: 2018-11-09 | Stop reason: HOSPADM

## 2018-11-08 RX ORDER — VANCOMYCIN HYDROCHLORIDE 1 G/200ML
15 INJECTION, SOLUTION INTRAVENOUS EVERY 24 HOURS
Status: DISCONTINUED | OUTPATIENT
Start: 2018-11-09 | End: 2018-11-09 | Stop reason: HOSPADM

## 2018-11-08 RX ORDER — ROPINIROLE 1 MG/1
0.5 TABLET, FILM COATED ORAL 2 TIMES DAILY
Status: DISCONTINUED | OUTPATIENT
Start: 2018-11-08 | End: 2018-11-09 | Stop reason: HOSPADM

## 2018-11-08 RX ORDER — LISINOPRIL 5 MG/1
5 TABLET ORAL DAILY
Status: DISCONTINUED | OUTPATIENT
Start: 2018-11-08 | End: 2018-11-09 | Stop reason: HOSPADM

## 2018-11-08 RX ORDER — SERTRALINE HYDROCHLORIDE 100 MG/1
100 TABLET, FILM COATED ORAL DAILY
Status: DISCONTINUED | OUTPATIENT
Start: 2018-11-09 | End: 2018-11-09 | Stop reason: HOSPADM

## 2018-11-08 RX ORDER — ONDANSETRON 2 MG/ML
4 INJECTION INTRAMUSCULAR; INTRAVENOUS EVERY 6 HOURS PRN
Status: DISCONTINUED | OUTPATIENT
Start: 2018-11-08 | End: 2018-11-09 | Stop reason: HOSPADM

## 2018-11-08 RX ORDER — VANCOMYCIN HYDROCHLORIDE 1 G/200ML
15 INJECTION, SOLUTION INTRAVENOUS ONCE
Status: COMPLETED | OUTPATIENT
Start: 2018-11-08 | End: 2018-11-08

## 2018-11-08 RX ORDER — CLONAZEPAM 1 MG/1
1 TABLET ORAL 3 TIMES DAILY
Status: DISCONTINUED | OUTPATIENT
Start: 2018-11-08 | End: 2018-11-09 | Stop reason: HOSPADM

## 2018-11-08 RX ORDER — LAMOTRIGINE 100 MG/1
150 TABLET ORAL DAILY
Status: DISCONTINUED | OUTPATIENT
Start: 2018-11-09 | End: 2018-11-09 | Stop reason: HOSPADM

## 2018-11-08 RX ORDER — KETOROLAC TROMETHAMINE 30 MG/ML
15 INJECTION, SOLUTION INTRAMUSCULAR; INTRAVENOUS ONCE
Status: DISCONTINUED | OUTPATIENT
Start: 2018-11-08 | End: 2018-11-09 | Stop reason: HOSPADM

## 2018-11-08 RX ORDER — HYDROXYZINE HYDROCHLORIDE 25 MG/1
50 TABLET, FILM COATED ORAL
Status: DISCONTINUED | OUTPATIENT
Start: 2018-11-08 | End: 2018-11-09 | Stop reason: HOSPADM

## 2018-11-08 RX ORDER — HEPARIN SODIUM 5000 [USP'U]/ML
5000 INJECTION, SOLUTION INTRAVENOUS; SUBCUTANEOUS EVERY 8 HOURS SCHEDULED
Status: DISCONTINUED | OUTPATIENT
Start: 2018-11-08 | End: 2018-11-09 | Stop reason: HOSPADM

## 2018-11-08 RX ORDER — KETOROLAC TROMETHAMINE 30 MG/ML
15 INJECTION, SOLUTION INTRAMUSCULAR; INTRAVENOUS ONCE
Status: COMPLETED | OUTPATIENT
Start: 2018-11-08 | End: 2018-11-08

## 2018-11-08 RX ORDER — TRAMADOL HYDROCHLORIDE 50 MG/1
50 TABLET ORAL ONCE
Status: DISCONTINUED | OUTPATIENT
Start: 2018-11-08 | End: 2018-11-09 | Stop reason: HOSPADM

## 2018-11-08 RX ORDER — ACETAMINOPHEN 325 MG/1
650 TABLET ORAL EVERY 6 HOURS PRN
Status: DISCONTINUED | OUTPATIENT
Start: 2018-11-08 | End: 2018-11-09 | Stop reason: HOSPADM

## 2018-11-08 RX ORDER — ACETAMINOPHEN 325 MG/1
975 TABLET ORAL ONCE
Status: DISCONTINUED | OUTPATIENT
Start: 2018-11-08 | End: 2018-11-09 | Stop reason: HOSPADM

## 2018-11-08 RX ORDER — HYDRALAZINE HYDROCHLORIDE 20 MG/ML
10 INJECTION INTRAMUSCULAR; INTRAVENOUS EVERY 4 HOURS PRN
Status: DISCONTINUED | OUTPATIENT
Start: 2018-11-08 | End: 2018-11-09 | Stop reason: HOSPADM

## 2018-11-08 RX ADMIN — PIPERACILLIN SODIUM AND TAZOBACTAM SODIUM 3.38 G: 36; 4.5 INJECTION, POWDER, FOR SOLUTION INTRAVENOUS at 18:29

## 2018-11-08 RX ADMIN — VANCOMYCIN HYDROCHLORIDE 1000 MG: 1 INJECTION, SOLUTION INTRAVENOUS at 17:01

## 2018-11-08 RX ADMIN — SODIUM CHLORIDE 50 ML/HR: 0.9 INJECTION, SOLUTION INTRAVENOUS at 21:38

## 2018-11-08 RX ADMIN — KETOROLAC TROMETHAMINE 15 MG: 30 INJECTION, SOLUTION INTRAMUSCULAR at 16:05

## 2018-11-08 NOTE — ED ATTENDING ATTESTATION
Cate Rajput DO, saw and evaluated the patient  I have discussed the patient with the resident/non-physician practitioner and agree with the resident's/non-physician practitioner's findings, Plan of Care, and MDM as documented in the resident's/non-physician practitioner's note, except where noted  All available labs and Radiology studies were reviewed  At this point I agree with the current assessment done in the Emergency Department  I have conducted an independent evaluation of this patient a history and physical is as follows:    Patient was assessed/managed with PA    60 yo F diabetic with 1 month of L foot swelling  No specific trauma but does have wound to L heel  Pt has been treated with 2 courses of abx, Bactrim then Keflex  Pt comes to ED today because swelling and pain have gotten progressively worse  No systemic sx  Prior history of drug abuse  History of skin infections/wounds  Patient denies any fevers, chills, HA, CP, SOB, cough, abdominal pain, N/V/D/C, dysuria, numbness, weakness  GEN: Vitals stable, no acute distress, appears comfortable  HEENT: Head is normocephalic/atraumatic, EOMI, PERRL  CV: heart regular rate and rhythm, no murmurs/rubs/gallops, no chest wall ttp  PULM: Lungs CTA b/l, no wheezes/rales/rhonchi, no cough present  ABD: soft, nontender, nondistended  NEURO: GCS 15, no focal neuro deficits  EXT: R foot: heel with scab, no purulent drainage noted  R foot with diffuse edema and ttp  Mild erythema, no erythema up shin/leg  PSYCH: normal affect/personal interaction    Will get labwork to assess for leukocytosis/renal function/lyes, blood cx, xray of foot to assess for osteo   Admit for IV abx given failure of outpt management    Critical Care Time  CritCare Time    Procedures

## 2018-11-08 NOTE — ED PROVIDER NOTES
History  Chief Complaint   Patient presents with    Foot Swelling     Has swelling of the right foot, started over a month ago  Been on antibiotics, no improvement  27-year-old insulin-dependent diabetic female presents the ER with right foot and ankle swelling that started 1 month ago  Patient states that she was seen in the ER on October 7 and placed on Bactrim at that time  Patient finished prescription for Bactrim and followed up with her family doctor because she did not have improvement in symptoms  Patient was then started on a course of Keflex which she also finished without any improvement in symptoms  Patient states that she has 10/10 stabbing pain to right lower extremity and swelling that covers her whole foot as well as her ankle  Patient states that she does not have improvement in her swelling after resting and elevating the extremity  Patient recently had a skin graft placed on her right forearm and states she has had approximately 5 surgeries and also had a wound VAC placed in the past on her forearm due to an infection  Patient denies fevers, chills, nausea, vomiting, dizziness, lightheadedness, chest pain, chest tightness, shortness of breath  Patient states that she has been taking ibuprofen and Tylenol without improvement in pain  Patient denies any streaking redness going up her leg,             Prior to Admission Medications   Prescriptions Last Dose Informant Patient Reported? Taking?    clonazePAM (KlonoPIN) 1 mg tablet  Self Yes Yes   Sig: Take 1 mg by mouth 3 (three) times a day   gabapentin (NEURONTIN) 300 mg capsule  Self Yes Yes   Sig: Take 600 mg by mouth 3 (three) times a day     hydrOXYzine HCL (ATARAX) 50 mg tablet  Self Yes Yes   Sig: Take 50 mg by mouth daily at bedtime   insulin glargine (LANTUS) 100 units/mL subcutaneous injection  Self No Yes   Sig: Inject 25 Units under the skin every morning   insulin lispro (HumaLOG) 100 units/mL injection  Self No Yes   Sig: Inject 6 Units under the skin 3 (three) times a day before meals   Patient taking differently: Inject 15 Units under the skin 3 (three) times a day before meals     lamoTRIgine (LaMICtal) 150 MG tablet  Self Yes Yes   Sig: Take 150 mg by mouth daily   methadone (DOLOPHINE) 5 mg tablet  Self Yes Yes   Sig: Take by mouth daily 15 mg daily    rOPINIRole (REQUIP) 0 5 mg tablet  Self Yes Yes   Sig: Take 0 5 mg by mouth 2 (two) times a day     sertraline (ZOLOFT) 50 mg tablet  Self Yes Yes   Sig: Take 100 mg by mouth daily        Facility-Administered Medications: None       Past Medical History:   Diagnosis Date    Bipolar disorder ([address] )     Depression     Diabetes mellitus ([address] )     History of MRSA infection        Past Surgical History:   Procedure Laterality Date    APPENDECTOMY      INCISION AND DRAINAGE OF WOUND Right 7/1/2018    Procedure: INCISION AND DRAINAGE (I&D) RIGHT FOREARM;  Surgeon: Alli Sandy MD;  [facility];  Service: Orthopedics    NC SPLIT [address] <100 SQCM Right 10/17/2018    Procedure: RIGHT ARM SKIN GRAFT SPLIT THICKNESS (STSG);   Surgeon: Placido Allison MD;  [facility];  Service: Plastics    NC SPLIT [address] <100 SQCM Right 9/12/2018    Procedure: [place];  Surgeon: Placido Allison MD;  [facility];  Service: Plastics    TONSILLECTOMY      VAC DRESSING APPLICATION Right 09/14/3175    Procedure: Ishaan Bangura;  Surgeon: Placido Allison MD;  [facility];  Service: Plastics    WOUND DEBRIDEMENT Right 7/7/2018    Procedure: DEBRIDEMENT UPPER EXTREMITY ([address]) W/ APPLICATION OF WOUND VAC;  Surgeon: Arnie Arroyo MD;  [facility];  Service: Orthopedics    WOUND DEBRIDEMENT Right 7/11/2018    Procedure: DEBRIDEMENT UPPER EXTREMITY WITH WOUND VAC EXCHANGE;  Surgeon: Rigoberto Guevara DO;  [facility];  Service: Orthopedics    WOUND DEBRIDEMENT Right 9/12/2018 Procedure: DEBRIDEMENT  [facility] OUT) FOREARM with Vac dressing change;  Surgeon: Sukhwinder Vegas MD;  [facility];  Service: Plastics       Family History   Problem Relation Age of Onset    Hypertension Mother         heart attack    Dementia Father         heart atttack/hypertention     I have reviewed and agree with the history as documented  Social History   Substance Use Topics    Smoking status: Never Smoker    Smokeless tobacco: Never Used    Alcohol use No        Review of Systems   Constitutional: Negative for chills and fever  Respiratory: Negative for cough, chest tightness, shortness of breath and wheezing  Cardiovascular: Negative for chest pain and palpitations  Gastrointestinal: Negative for nausea and vomiting  Musculoskeletal: Positive for gait problem (pain with walking ) and myalgias (R foot pain and swelling up to lower leg)  Physical Exam  Physical Exam   Constitutional: She is oriented to person, place, and time  Vital signs are normal  She appears well-developed and well-nourished  HENT:   Head: Normocephalic and atraumatic  Eyes: Pupils are equal, round, and reactive to light  Neck: Normal range of motion  Cardiovascular: Normal rate, regular rhythm, normal heart sounds and intact distal pulses  Pulmonary/Chest: Effort normal and breath sounds normal    Musculoskeletal: Normal range of motion  Arms:       Right foot: There is tenderness (to light palpation of R foot) and swelling (to the dorsal aspect of the foot)  There is normal capillary refill and no deformity  Feet:    Scab noted to lateral aspect of R foot near the heel  Neurological: She is alert and oriented to person, place, and time  Skin: Skin is warm and dry  Capillary refill takes less than 2 seconds  She is not diaphoretic  There is erythema (mild erythema noted to the R foot)  Nursing note and vitals reviewed                Vital Signs  ED Triage Vitals Temperature Pulse Respirations Blood Pressure SpO2   11/08/18 1458 11/08/18 1458 11/08/18 1458 11/08/18 1458 11/08/18 1458   98 °F (36 7 °C) 80 16 (!) 172/72 99 %      Temp Source Heart Rate Source Patient Position - Orthostatic VS BP Location FiO2 (%)   11/08/18 1458 11/08/18 1700 11/08/18 1458 11/08/18 1458 --   Oral Monitor Sitting Left arm       Pain Score       11/08/18 1458       9           Vitals:    11/08/18 1458 11/08/18 1700 11/08/18 1906   BP: (!) 172/72 (!) 183/79 (!) 190/83   Pulse: 80 74 74   Patient Position - Orthostatic VS: Sitting  Sitting       Visual Acuity      ED Medications  Medications   ketorolac (TORADOL) injection 15 mg (15 mg Intravenous Given 11/8/18 1605)   vancomycin (VANCOCIN) IVPB (premix) 1,000 mg (0 mg/kg × 67 2 kg Intravenous Stopped 11/8/18 1810)       Diagnostic Studies  Results Reviewed     Procedure Component Value Units Date/Time    Blood culture #1 [[id number]] Collected:  11/08/18 1604    Lab Status:  Final result Specimen:  Blood from Arm, Left Updated:  11/13/18 2201     Blood Culture No Growth After 5 Days  Blood culture #2 [[id number]] Collected:  11/08/18 1636    Lab Status:  Final result Specimen:  Blood from Arm, Left Updated:  11/13/18 2201     Blood Culture No Growth After 5 Days      Comprehensive metabolic panel [[id number]]  (Abnormal) Collected:  11/08/18 1636    Lab Status:  Final result Specimen:  Blood from Arm, Left Updated:  11/08/18 1709     Sodium 132 (L) mmol/L      Potassium 4 9 mmol/L      Chloride 96 (L) mmol/L      CO2 30 mmol/L      ANION GAP 6 mmol/L      BUN 17 mg/dL      Creatinine 0 80 mg/dL      Glucose 401 (H) mg/dL      Calcium 9 5 mg/dL      AST 31 U/L      ALT 16 U/L      Alkaline Phosphatase 182 (H) U/L      Total Protein 8 1 g/dL      Albumin 3 3 (L) g/dL      Total Bilirubin 0 59 mg/dL      eGFR 79 ml/min/1 73sq m     Narrative:         National Kidney Disease Education Program recommendations are as follows:  GFR calculation is accurate only with a steady state creatinine  Chronic Kidney disease less than 60 ml/min/1 73 sq  meters  Kidney failure less than 15 ml/min/1 73 sq  meters  CBC and differential [[id number]]  (Abnormal) Collected:  11/08/18 1636    Lab Status:  Final result Specimen:  Blood from Arm, Left Updated:  11/08/18 1649     WBC 4 41 Thousand/uL      RBC 3 91 Million/uL      Hemoglobin 10 4 (L) g/dL      Hematocrit 32 8 (L) %      MCV 84 fL      MCH 26 6 (L) pg      MCHC 31 7 g/dL      RDW 13 4 %      MPV 9 7 fL      Platelets 231 Thousands/uL      nRBC 0 /100 WBCs      Neutrophils Relative 52 %      Immat GRANS % 0 %      Lymphocytes Relative 33 %      Monocytes Relative 11 %      Eosinophils Relative 3 %      Basophils Relative 1 %      Neutrophils Absolute 2 32 Thousands/µL      Immature Grans Absolute 0 01 Thousand/uL      Lymphocytes Absolute 1 44 Thousands/µL      Monocytes Absolute 0 50 Thousand/µL      Eosinophils Absolute 0 11 Thousand/µL      Basophils Absolute 0 03 Thousands/µL                  XR foot 3+ views RIGHT   Final Result by Leighann Liang MD (11/08 1603)      There is suggestion of some slight irregularity of the 4th and 5th tarsometatarsal joints and some associated swelling and also slight sclerosis of the cuboid  Suggest further workup with MRI  Correlate for any signs or symptoms of infection    If so,    the MRI might require contrast       Workstation performed: [id number]                    Procedures  Procedures       Phone Contacts  ED Phone Contact    ED Course  ED Course as of Nov 18 0544   Thu Nov 08, 2018   1700 Pt notes improvement in pain after toradol                                MDM  Number of Diagnoses or Management Options  Acute osteomyelitis of right foot ([address] ): established and worsening     Amount and/or Complexity of Data Reviewed  Clinical lab tests: ordered and reviewed  Tests in the radiology section of CPT®: ordered and reviewed    Patient Progress  Patient progress: stable    CritCare Time    Disposition  Final diagnoses:   Acute osteomyelitis of right foot ([address] )     Time reflects when diagnosis was documented in both MDM as applicable and the Disposition within this note     Time User Action Codes Description Comment    11/8/2018  6:02 PM Henry Montiel Add [[id number]] Acute osteomyelitis of right foot ([address] )     11/8/2018  6:10 PM Marty Jose Add [[id number]] Cellulitis of right foot     11/8/2018  8:35 PM Nirmal Lopez Add [[id number]] Abscess of right forearm       ED Disposition     ED Disposition Condition Comment    Admit  Case was discussed with Dr Kathia Magana and the patient's admission status was agreed to be Admission Status: inpatient status to the service of Dr Kathia Magana   Follow-up Information    None         Discharge Medication List as of 11/8/2018 11:13 PM      CONTINUE these medications which have NOT CHANGED    Details   clonazePAM (KlonoPIN) 1 mg tablet Take 1 mg by mouth 3 (three) times a day, Historical Med      gabapentin (NEURONTIN) 300 mg capsule Take 600 mg by mouth 3 (three) times a day  , Historical Med      hydrOXYzine HCL (ATARAX) 50 mg tablet Take 50 mg by mouth daily at bedtime, Historical Med      insulin glargine (LANTUS) 100 units/mL subcutaneous injection Inject 25 Units under the skin every morning, Starting Sun 7/22/2018, Print      insulin lispro (HumaLOG) 100 units/mL injection Inject 6 Units under the skin 3 (three) times a day before meals, Starting Sat 7/21/2018, Print      lamoTRIgine (LaMICtal) 150 MG tablet Take 150 mg by mouth daily, Historical Med      methadone (DOLOPHINE) 5 mg tablet Take by mouth daily 15 mg daily , Historical Med      rOPINIRole (REQUIP) 0 5 mg tablet Take 0 5 mg by mouth 2 (two) times a day  , Historical Med      sertraline (ZOLOFT) 50 mg tablet Take 100 mg by mouth daily  , Historical Med           No discharge procedures on file      ED Provider  Electronically Signed by           Debbie Puga, TABITHA  11/18/18 0545

## 2018-11-08 NOTE — ED NOTES
IV flushes with blood return, no signs of infiltration   Pt denies pain during flushing of IV     Luisito Hillman RN  11/08/18 3811

## 2018-11-08 NOTE — H&P
History and Physical - [facility] Internal Medicine    Patient Information: José Miguel Jones 59 y o  female MRN: [id number]  Unit/Bed#: E5 -01 Encounter: [id number]  Admitting Physician: Sheldon Granda PA-C  PCP: Antwan Walsh MD  Date of Admission:  11/08/18    Assessment/Plan:    Hospital Problem List:     Principal Problem:    Cellulitis of right foot  Active Problems:    Bipolar affective disorder ([facility] )    Hyperlipidemia    Abscess of tendon sheath of forearm, right    Methadone use ([place] [address] )    History of MRSA infection    Diabetes mellitus ([place] [address] )    Depression      Primary Problem(s):  · Cellulitis of right foot / right foot swelling  · With concern for possible osteomyelitis  And history of MRSA  · X-ray of right foot reveals irregularity of 4th and 5th tarsometatarsal joints with associated ankle swelling and slight sclerosis of the cuboid  · Continue IV antibiotics vancomycin and add IV Zosyn  · Fortunately no signs or symptoms of SIRS or sepsis on admission  · Blood cultures pending  · Consult Podiatry for further imaging with MRI  Additional Problems:   · History of right arm cellulitis/abscess abscess: With prolonged hospitalization stay in june/2018 requiring IV antibiotics and a PICC line  Was unable to undergo DC with PICC given concern for IV drug abuse  Recently underwent skin grafting on 10/17/18  Patient reports cleaning this daily- patient has been using Adaptic, Aquaphor, pads, and gauze wrap to care for wound  Will consult wound Care for further evaluation  · Hypertensive urgency:  Presenting with elevated blood pressure is 183/79, 172/72  Patient denies history of essential hypertension or being on medication for this  Will provide with 10 mg of IV hydralazine for SBP greater than 160  Will start patient on lisinopril 5 mg daily in the setting of diabetes  · Hyponatremia: Na 132  Continue to monitor in the setting of gentle IV fluids  · Type 2 diabetes:  With peripheral neuropathy on gabapentin 600 mg 3 times daily  Presenting with hyperglycemia with glucose of 401  Home regimen of glargine 45 units before bed and Humalog 6 units before meals  Upon review of care everywhere last A1c was 10/10/18 and was 7 1 (was much more elevated in the past 13 4)    · Psych:  Bipolar disorder/schizoaffective disorder/depression: Follow with psych as an outpt-Dr Laura Hood  Continue home Lamictal 150 mg daily, sertraline- this was recently increased from 50 mg to 100 mg daily  · Restless leg syndrome:  Continue Requip    · History of IV drug abuse: Reports being clean for the past year  Currently being tapered off of methadone  Currently on 50 mg daily  This will need to be verified with the methadone clinic [facility]) tomorrow as it is closed at the time of admission  [phone]  VTE Prophylaxis: Heparin  / sequential compression device   Code Status: full code  Anticipated Length of Stay:  Patient will be admitted on an Inpatient basis with an anticipated length of stay of  Greater than 2 midnights  Justification for Hospital Stay: right foot swelling/cellulitis with concern for possible osteomyelitis    Chief Complaint:   Right foot pain x1 month    History of Present Illness: Gildardo Couch is a 59 y o  female with a PMH of type 2 diabetes, bipolar disorder, schizoaffective disorder, RSL, hx of IV drug abuse but reports being clean for 1 year  Pt has hx of a right arm abscess and underwent prolonged hospitalization from 6/28/18 to 7/21/18 to receive IV antibiotics  Underwent recent skin grafting on 10/17/18  History of MRSA  Patient has been dealing with right ankle swelling for the past month  She recently saw her PCP who placed her on a course of Bactrim on 10/7/18  She completed the full course with no improvement  She was placed on additional course of antibiotics with oral Keflex times 10 days with no improvement    Patient presents today after being seen by PCP who told patient to further come to the ER  Patient's right ankle is swollen, and warm  No open cuts, scrapes, drainage present  Patient denies twisting her ankle or any traumatic event  Patient denies missing antibiotic doses and reports taking the full courses  + hx of MRSA  Further denies any chest pain, chest pressure, palpitations, nausea, vomiting, diarrhea, constipation  No fevers or chills  Denies smoking, alcohol consumption or currently using drugs  Lives at home with   Patient receives methadone from [facility]   Her current dosages 15 mg daily  She has been attempting to be weaned off of this  Max dosage was 80 mg  Methadone clinic number is [phone]  Review of Systems:    General:   No Fever or chills;    EENT:   No ear pain, facial swelling;   Skin:   No rashes, color changes  Respiratory:     No shortness of breath, cough,   Cardiovascular:     No chest pain, palpitations  Gastrointestinal:    No nausea, vomiting, diarrhea; No abdominal pain  Musculoskeletal:     No arthralgias, myalgias, + Right foot swelling  Neurologic:   No dizziness, numbness, weakness  No speech difficulties     Psych:   No agitation,    Otherwise, All other twelve-point review of systems normal      Past Medical and Surgical History:     Past Medical History:   Diagnosis Date    [facility] disorder [facility] )     Depression     Diabetes mellitus ([facility] )     History of MRSA infection        Past Surgical History:   Procedure Laterality Date    APPENDECTOMY      INCISION AND DRAINAGE OF WOUND Right 7/1/2018    Procedure: INCISION AND DRAINAGE (I&D) RIGHT FOREARM;  Surgeon: Liberty Rubi MD;  [facility];  Service: Orthopedics    KS SPLIT [address] <100 SQCM Right 9/12/2018    Procedure: Maria Del Carmen Aponte;  Surgeon: Aramis Mcgowan MD;  Location:  MAIN OR;  Service: Plastics    KS SPLIT [address] <100 SQCM Right 10/17/2018    Procedure: RIGHT ARM SKIN GRAFT SPLIT THICKNESS (STSG);   Surgeon: Keshia Stone MD;  [facility];  Service: Plastics    TONSILLECTOMY      VAC DRESSING APPLICATION Right 50/84/3323    Procedure: Frank Malloy;  Surgeon: Keshia Stone MD;  [facility];  Service: Plastics    WOUND DEBRIDEMENT Right 7/7/2018    Procedure: DEBRIDEMENT UPPER EXTREMITY ([address]) W/ APPLICATION OF WOUND VAC;  Surgeon: Asiya Duvall MD;  [facility];  Service: Orthopedics    WOUND DEBRIDEMENT Right 7/11/2018    Procedure: DEBRIDEMENT UPPER EXTREMITY WITH WOUND VAC EXCHANGE;  Surgeon: Abbe Martinez DO;  [facility];  Service: Orthopedics    WOUND DEBRIDEMENT Right 9/12/2018    Procedure: Angela Rivas  [facility] OUT) FOREARM with Vac dressing change;  Surgeon: Keshia Stone MD;  [facility]: [facility];  Service: Plastics       Meds/Allergies:    Current Facility-Administered Medications   Medication Dose Route Frequency Provider Last Rate Last Dose    piperacillin-tazobactam (ZOSYN) 3 375 g in sodium chloride 0 9 % 50 mL IVPB  3 375 g Intravenous Q6H Haritha Lopez PA-C 100 mL/hr at 11/08/18 1829 3 375 g at 11/08/18 1829    [START ON 11/9/2018] vancomycin (VANCOCIN) IVPB (premix) 1,000 mg  15 mg/kg Intravenous Q24H Haritha Lopez PA-C           No Known Allergies    Allergies: No Known Allergies    Social History:     Marital Status: Single     Substance Use History:   History   Alcohol Use No     History   Smoking Status    Never Smoker   Smokeless Tobacco    Never Used     History   Drug Use No     Comment: on methadone, Hx herion addiction       Family History:    non-contributory    Physical Exam:     Vitals:   Blood Pressure: (!) 183/79 (11/08/18 1700)  Pulse: 74 (11/08/18 1700)  Temperature: 98 °F (36 7 °C) (11/08/18 1458)  Temp Source: Oral (11/08/18 1458)  Respirations: 16 (11/08/18 1700)  Weight - Scale: 67 2 kg (148 lb 2 oz) (11/08/18 1458)  SpO2: 99 % (11/08/18 1700)    Physical Exam   Constitutional: She is oriented to person, place, and time  She appears well-developed and well-nourished  No distress  HENT:   Head: Normocephalic and atraumatic  Eyes: Conjunctivae are normal    Cardiovascular: Normal rate, regular rhythm and normal heart sounds  Pulmonary/Chest: Effort normal and breath sounds normal  No respiratory distress  She has no wheezes  She has no rales  She exhibits no tenderness  Abdominal: Soft  Bowel sounds are normal  She exhibits no distension and no mass  There is no tenderness  There is no rebound and no guarding  Musculoskeletal: She exhibits edema  Neurological: She is alert and oriented to person, place, and time  Skin: Skin is warm and dry  She is not diaphoretic  Right ankle edema, mild erythema, and ulcer to right lateral heel   Psychiatric: She has a normal mood and affect  Her behavior is normal    Nursing note and vitals reviewed  Additional Data:     Lab Results: I have personally reviewed pertinent reports  Results from last 7 days  Lab Units 11/08/18  1636   WBC Thousand/uL 4 41   HEMOGLOBIN g/dL 10 4*   HEMATOCRIT % 32 8*   PLATELETS Thousands/uL 241   NEUTROS PCT % 52   LYMPHS PCT % 33   MONOS PCT % 11   EOS PCT % 3       Results from last 7 days  Lab Units 11/08/18  1636   POTASSIUM mmol/L 4 9   CHLORIDE mmol/L 96*   CO2 mmol/L 30   BUN mg/dL 17   CREATININE mg/dL 0 80   CALCIUM mg/dL 9 5   ALK PHOS U/L 182*   ALT U/L 16   AST U/L 31           Imaging: I have personally reviewed pertinent reports  Xr Foot 3+ Views Right    Result Date: 11/8/2018  Narrative: RIGHT FOOT INDICATION:   foot swelling greater than 1 month  COMPARISON:  9/22/2015 VIEWS:  XR FOOT 3+ VW RIGHT FINDINGS: There is no acute fracture or dislocation  There is suggestion of some mild joint irregularity at the 4th and 5th tarsometatarsal joints as well as possible bone spur formation at the lateral margin of the cuboid    There may be some slight sclerosis of the cuboid as well  These findings seem new  since the previous exam although some of the slight apparent differences might be due to differences in projection and field-of-view  Of note, there is swelling of the foot in this region  Patient may require further workup with MRI of the foot  The current findings are nonspecific and could be due to some degenerative changes, inflammatory arthritis, or even infection  No lytic or blastic lesions seen  No soft tissue gas or foreign body identified  Impression: There is suggestion of some slight irregularity of the 4th and 5th tarsometatarsal joints and some associated swelling and also slight sclerosis of the cuboid  Suggest further workup with MRI  Correlate for any signs or symptoms of infection  If so, the MRI might require contrast  Workstation performed: [id number]       [employer] Records Reviewed: Yes     Total Time for Visit, including Counseling / Coordination of Care: 45 minutes  Greater than 50% of this total time spent on direct patient counseling and coordination of care  ** Please Note: This note has been constructed using a voice recognition system   **

## 2018-11-09 NOTE — PROGRESS NOTES
Called by nurse to see patient who wants to sign out AMA  Patient insisting on narcotic pain medication  Spoke with admitting provider who gave her toradol and offered tramadol  Patient refusing both those options stating she wants something stronger  Patient informed by myself and admitting provider that narcotics would not be dispensed due to hx IV drug use and current methadone use  Patient cursing and stating she will sign out AMA  After signing [place] paperwork patient threw pen  Informed nurse

## 2018-11-09 NOTE — UTILIZATION REVIEW
[address]  Utilization Review Department  Phone: [phone]; Fax [phone]  [email or web address]  ATTENTION: Please call with any questions or concerns to [phone]  and carefully listen to the prompts so that you are directed to the right person  Send all requests for admission clinical reviews, approved or denied determinations and any other requests to fax [phone]  All voicemails are confidential       Initial Clinical Review    Admission: Date/Time/Statement: 11/8/18 @ 1803     Orders Placed This Encounter   Procedures    Inpatient Admission (expected length of stay for this patient is greater than two midnights)     Standing Status:   Standing     Number of Occurrences:   1     Order Specific Question:   Admitting Physician     Answer:   Clearence Dose     Order Specific Question:   Level of Care     Answer:   Med Surg [16]     Order Specific Question:   Estimated length of stay     Answer:   More than 2 Midnights     Order Specific Question:   Certification     Answer:   I certify that inpatient services are medically necessary for this patient for a duration of greater than two midnights  See H&P and MD Progress Notes for additional information about the patient's course of treatment  ED: Date/Time/Mode of Arrival:   ED Arrival Information     Expected Arrival Acuity Means of Arrival Escorted By Service Admission Type    - 11/8/2018 14:52 Urgent Walk-In Family Member General Medicine Urgent    Arrival Complaint    Cellulitis R foot          Chief Complaint:   Chief Complaint   Patient presents with    Foot Swelling     Has swelling of the right foot, started over a month ago  Been on antibiotics, no improvement  History of Illness: 59 y o  female with a PMH of type 2 diabetes, bipolar disorder, schizoaffective disorder, RSL, hx of IV drug abuse but reports being clean for 1 year   Pt has hx of a right arm abscess and underwent prolonged hospitalization from 6/28/18 to 7/21/18 to receive IV antibiotics  Underwent recent skin grafting on 10/17/18  History of MRSA      Patient has been dealing with right ankle swelling for the past month  She recently saw her PCP who placed her on a course of Bactrim on 10/7/18  She completed the full course with no improvement  She was placed on additional course of antibiotics with oral Keflex times 10 days with no improvement  Patient presents today after being seen by PCP who told patient to further come to the ER  Patient's right ankle is swollen, and warm  ED Vital Signs:   ED Triage Vitals   Temperature Pulse Respirations Blood Pressure SpO2   11/08/18 1458 11/08/18 1458 11/08/18 1458 11/08/18 1458 11/08/18 1458   98 °F (36 7 °C) 80 16 (!) 172/72 99 %      Temp Source Heart Rate Source Patient Position - Orthostatic VS BP Location FiO2 (%)   11/08/18 1458 11/08/18 1700 11/08/18 1458 11/08/18 1458 --   Oral Monitor Sitting Left arm       Pain Score       11/08/18 1458       9        Wt Readings from Last 1 Encounters:   11/08/18 67 2 kg (148 lb 2 oz)       Vital Signs (abnormal): T 97 4, /72--190/83    Abnormal Labs/Diagnostic Test Results: Hgb 10 4, Hct 32 8, Na 132, CL 96, Glucose 401, Alk phos 182, Albumin 3 3    XR R foot 11/8 --  There is suggestion of some slight irregularity of the 4th and 5th tarsometatarsal joints and some associated swelling and also slight sclerosis of the cuboid  Suggest further workup with MRI  Correlate for any signs or symptoms of infection    If so, the MRI might require contrast     ED Treatment:   Medication Administration from 11/08/2018 1452 to 11/08/2018 1841       Date/Time Order Dose Route Action     11/08/2018 1605 ketorolac (TORADOL) injection 15 mg 15 mg Intravenous Given     11/08/2018 1701 vancomycin (VANCOCIN) IVPB (premix) 1,000 mg 1,000 mg Intravenous New Bag     11/08/2018 1836 piperacillin-tazobactam (ZOSYN) 3 375 g in sodium chloride 0 9 % 50 mL IVPB 3 375 g Intravenous Continue to Inpatient Floor     11/08/2018 1829 piperacillin-tazobactam (ZOSYN) 3 375 g in sodium chloride 0 9 % 50 mL IVPB 3 375 g Intravenous New Bag       Past Medical/Surgical History: Active Ambulatory Problems     Diagnosis Date Noted    Hyponatremia 06/28/2018    Right arm cellulitis 06/28/2018    Abscess of right forearm 06/28/2018    Bipolar affective disorder ([facility] ) 02/08/2013    Corn or callus 11/07/2014    Benign essential hypertension 02/08/2013    Hyperlipidemia 11/07/2014    Hypothyroidism 08/02/2007    Persistent insomnia 12/23/2013    Type 2 diabetes mellitus, uncontrolled ([facility] ) 11/07/2014    Visual loss 11/07/2014    Abscess of tendon sheath of forearm, right 06/28/2018    Anemia 07/12/2018    Methadone use ([facility] ) 07/20/2018    Hepatitis 07/20/2018    Abscess of right arm 08/27/2018    Abscess of tendon sheath of right forearm 08/27/2018    Bipolar disorder ([place] [address] )      Past Medical History:   Diagnosis Date    Bipolar disorder ([place] [address] )     Depression     Diabetes mellitus ([facility] )     History of MRSA infection        Admitting Diagnosis: Foot pain [M79 673]  Acute osteomyelitis of right foot ([facility] ) [M86 171]  Cellulitis of right foot [L03 115]    Age/Sex: 59 y o  female    Assessment/Plan:  · Cellulitis of right foot / right foot swelling  ? With concern for possible osteomyelitis  And history of MRSA  ? X-ray of right foot reveals irregularity of 4th and 5th tarsometatarsal joints with associated ankle swelling and slight sclerosis of the cuboid  ? Continue IV antibiotics vancomycin and add IV Zosyn  ? Fortunately no signs or symptoms of SIRS or sepsis on admission  ? Blood cultures pending  ? Consult Podiatry for further imaging with MRI           Admission Orders:  M/S unit  Consult wound care, podiatry  Reg diet  accuchecks qid w/ ssi  Up as vicente  SCD's  PTOT evals  Zosyn IV q6h  Vancomycin 1G IV x1  Hep sq q8  NSS @ 50 ml/hr      11/8 ~1030 PM --  Pt signed out of hospital [address]

## 2018-11-13 LAB
BACTERIA BLD CULT: NORMAL
BACTERIA BLD CULT: NORMAL

## 2018-11-28 ENCOUNTER — TELEPHONE (OUTPATIENT)
Dept: PLASTIC SURGERY | Facility: CLINIC | Age: 63
End: 2018-11-28

## 2018-11-28 NOTE — TELEPHONE ENCOUNTER
Sapna Bosch RN with [facility] reports that patient was recently discharged from [facility] due to a fall so they are resuming her care  States patient right forearm wound is healed but still red around the edges  Questions if a dressing is required  Advised to apply dry gauze or ABD as needed for comfort  Also states patient is requesting an order for OT  Advised that patient schedule a follow up visit with Dr Stephanie Ervin to discuss further and have arm evaluated as she was to follow up in 3 weeks after last OV 10/26/18  Verbalized understanding of all

## 2018-11-29 ENCOUNTER — PATIENT OUTREACH (OUTPATIENT)
Dept: OTHER | Facility: HOSPITAL | Age: 63
End: 2018-11-29

## 2018-12-03 ENCOUNTER — PATIENT OUTREACH (OUTPATIENT)
Dept: OTHER | Facility: HOSPITAL | Age: 63
End: 2018-12-03

## 2018-12-07 ENCOUNTER — PATIENT OUTREACH (OUTPATIENT)
Dept: OTHER | Facility: HOSPITAL | Age: 63
End: 2018-12-07

## 2019-03-07 ENCOUNTER — PATIENT OUTREACH (OUTPATIENT)
Dept: CASE MANAGEMENT | Facility: OTHER | Age: 64
End: 2019-03-07

## 2019-03-07 RX ORDER — ATORVASTATIN CALCIUM 40 MG/1
40 TABLET, FILM COATED ORAL DAILY
COMMUNITY
Start: 2018-10-30 | End: 2021-12-14

## 2019-03-07 RX ORDER — MELOXICAM 15 MG/1
15 TABLET ORAL
COMMUNITY
Start: 2018-12-26 | End: 2019-05-15

## 2019-03-07 RX ORDER — LISINOPRIL 20 MG/1
20 TABLET ORAL
COMMUNITY
Start: 2018-11-21 | End: 2019-12-07

## 2019-03-07 RX ORDER — DULOXETIN HYDROCHLORIDE 60 MG/1
60 CAPSULE, DELAYED RELEASE ORAL
COMMUNITY
Start: 2018-12-26 | End: 2021-12-14

## 2019-03-07 NOTE — PROGRESS NOTES
Outpatient Care Management Note:  Spoke with [facility] regarding her multiple medical problems  Her right arm would has healed but per the patient "it looks like Frankenstein "  Explained that skin grafts often look like that and to discuss with her PCP if there was anything  He recommended to make the scars less apparent  She does not have full range of motion of her hand, advised to discuss OT with her PCP  She fell and fractured her foot in October and has not had an orthopedic surgery follow up  She is still wearing a boot and the foot is still swollen, advised to call for a follow up appointment today to make sure that the bones are healing, verbalized understanding of same  Her blood sugars are elevated again, her Lantus insulin dose is up to 50 units and her Humalog is up to 15 units  She has not seen her PCP since October, advised to schedule an appointment to discuss her multiple issues, verbalized understanding and agreed to do same  She is active with her outpatient mental health provider  Continues on methadone, denies any drug use  She is independent with her ADL's and denies any addditional needs  Verified that she has my contact information  Encouraged to call with questions or concerns

## 2019-03-25 ENCOUNTER — PATIENT OUTREACH (OUTPATIENT)
Dept: CASE MANAGEMENT | Facility: OTHER | Age: 64
End: 2019-03-25

## 2019-03-25 NOTE — PROGRESS NOTES
Outpatient Care Management Note:  Don Moya is doing well  She has not made an appointment with her PCP or orthopedics yet but states she will call them today  Her blood sugars the last two days are improved, she is doing better with her diet and has lost approximately 15 pounds  She is off the methadone and denies any other drug use  Her foot continues to be swollen, reinforced the importance of following up with orthopedics, verbalized understanding of same  She is currently staying with her daughter and feels things are going well  She would like to get her GED but is concerned about needing to do a lot of writing because of the limited use she has of her right hand  Encouraged to look into it as many of the exams are computer based  She was out of her medications for a while but has them all now, reinforced the importance of taking all of her medications as ordered and seeing her PCP, verbalized understanding of same  She is independent with her ADL's and denies any needs at this time  Encouraged to call with questions or concerns

## 2019-04-04 ENCOUNTER — PATIENT OUTREACH (OUTPATIENT)
Dept: CASE MANAGEMENT | Facility: OTHER | Age: 64
End: 2019-04-04

## 2019-05-15 ENCOUNTER — HOSPITAL ENCOUNTER (EMERGENCY)
Facility: HOSPITAL | Age: 64
Discharge: HOME | End: 2019-05-15
Attending: EMERGENCY MEDICINE | Admitting: EMERGENCY MEDICINE
Payer: COMMERCIAL

## 2019-05-15 ENCOUNTER — APPOINTMENT (EMERGENCY)
Dept: RADIOLOGY | Facility: HOSPITAL | Age: 64
End: 2019-05-15
Payer: COMMERCIAL

## 2019-05-15 VITALS
TEMPERATURE: 97.6 F | OXYGEN SATURATION: 100 % | RESPIRATION RATE: 16 BRPM | HEART RATE: 74 BPM | SYSTOLIC BLOOD PRESSURE: 192 MMHG | BODY MASS INDEX: 24.77 KG/M2 | WEIGHT: 153.44 LBS | DIASTOLIC BLOOD PRESSURE: 80 MMHG

## 2019-05-15 DIAGNOSIS — S81.801A OPEN WOUND OF RIGHT LOWER LEG, INITIAL ENCOUNTER: ICD-10-CM

## 2019-05-15 DIAGNOSIS — L03.115 CELLULITIS OF RIGHT FOOT: Primary | ICD-10-CM

## 2019-05-15 PROCEDURE — 73630 X-RAY EXAM OF FOOT: CPT

## 2019-05-15 PROCEDURE — 99283 EMERGENCY DEPT VISIT LOW MDM: CPT | Performed by: EMERGENCY MEDICINE

## 2019-05-15 PROCEDURE — 90471 IMMUNIZATION ADMIN: CPT

## 2019-05-15 PROCEDURE — 90715 TDAP VACCINE 7 YRS/> IM: CPT | Performed by: EMERGENCY MEDICINE

## 2019-05-15 PROCEDURE — 99283 EMERGENCY DEPT VISIT LOW MDM: CPT

## 2019-05-15 RX ORDER — CEPHALEXIN 250 MG/1
500 CAPSULE ORAL EVERY 6 HOURS SCHEDULED
Qty: 56 CAPSULE | Refills: 0 | Status: SHIPPED | OUTPATIENT
Start: 2019-05-15 | End: 2019-05-22

## 2019-05-15 RX ORDER — ACETAMINOPHEN 325 MG/1
650 TABLET ORAL ONCE
Status: DISCONTINUED | OUTPATIENT
Start: 2019-05-15 | End: 2019-05-15 | Stop reason: HOSPADM

## 2019-05-15 RX ORDER — NAPROXEN 500 MG/1
500 TABLET ORAL EVERY 12 HOURS PRN
Qty: 15 TABLET | Refills: 0 | Status: SHIPPED | OUTPATIENT
Start: 2019-05-15 | End: 2021-04-12

## 2019-05-15 RX ADMIN — TETANUS TOXOID, REDUCED DIPHTHERIA TOXOID AND ACELLULAR PERTUSSIS VACCINE, ADSORBED 0.5 ML: 5; 2.5; 8; 8; 2.5 SUSPENSION INTRAMUSCULAR at 16:59

## 2019-05-22 ENCOUNTER — HOSPITAL ENCOUNTER (OUTPATIENT)
Dept: RADIOLOGY | Facility: HOSPITAL | Age: 64
Discharge: HOME | End: 2019-05-22
Payer: COMMERCIAL

## 2019-05-22 ENCOUNTER — TRANSCRIBE ORDERS (OUTPATIENT)
Dept: ADMINISTRATIVE | Facility: HOSPITAL | Age: 64
End: 2019-05-22

## 2019-05-22 DIAGNOSIS — L03.90 CELLULITIS OF SKIN WITH LYMPHANGITIS: ICD-10-CM

## 2019-05-22 DIAGNOSIS — L97.529 ULCER OF LEFT FOOT, UNSPECIFIED ULCER STAGE (HCC): ICD-10-CM

## 2019-05-22 DIAGNOSIS — Z79.4 ENCOUNTER FOR LONG-TERM (CURRENT) USE OF INSULIN (HCC): ICD-10-CM

## 2019-05-22 DIAGNOSIS — M86.9 DIABETIC FOOT ULCER WITH OSTEOMYELITIS (HCC): ICD-10-CM

## 2019-05-22 DIAGNOSIS — M86.9 DIABETIC FOOT ULCER WITH OSTEOMYELITIS (HCC): Primary | ICD-10-CM

## 2019-05-22 DIAGNOSIS — E11.621 DIABETIC FOOT ULCER WITH OSTEOMYELITIS (HCC): ICD-10-CM

## 2019-05-22 DIAGNOSIS — L97.509 DIABETIC FOOT ULCER WITH OSTEOMYELITIS (HCC): Primary | ICD-10-CM

## 2019-05-22 DIAGNOSIS — E11.69 DIABETIC FOOT ULCER WITH OSTEOMYELITIS (HCC): Primary | ICD-10-CM

## 2019-05-22 DIAGNOSIS — E11.9 DIABETES MELLITUS WITHOUT COMPLICATION (HCC): ICD-10-CM

## 2019-05-22 DIAGNOSIS — E11.69 DIABETIC FOOT ULCER WITH OSTEOMYELITIS (HCC): ICD-10-CM

## 2019-05-22 DIAGNOSIS — E11.621 DIABETIC FOOT ULCER WITH OSTEOMYELITIS (HCC): Primary | ICD-10-CM

## 2019-05-22 DIAGNOSIS — L97.509 DIABETIC FOOT ULCER WITH OSTEOMYELITIS (HCC): ICD-10-CM

## 2019-05-22 PROCEDURE — 73630 X-RAY EXAM OF FOOT: CPT

## 2019-12-07 ENCOUNTER — HOSPITAL ENCOUNTER (INPATIENT)
Facility: HOSPITAL | Age: 64
LOS: 5 days | Discharge: HOME | DRG: 617 | End: 2019-12-12
Attending: EMERGENCY MEDICINE | Admitting: PODIATRIST
Payer: COMMERCIAL

## 2019-12-07 ENCOUNTER — APPOINTMENT (EMERGENCY)
Dept: RADIOLOGY | Facility: HOSPITAL | Age: 64
DRG: 617 | End: 2019-12-07
Payer: COMMERCIAL

## 2019-12-07 DIAGNOSIS — L08.9 WOUND INFECTION: ICD-10-CM

## 2019-12-07 DIAGNOSIS — S90.111A CONTUSION OF GREAT TOE OF RIGHT FOOT: ICD-10-CM

## 2019-12-07 DIAGNOSIS — T14.8XXA WOUND INFECTION: ICD-10-CM

## 2019-12-07 DIAGNOSIS — L03.031 CELLULITIS OF GREAT TOE OF RIGHT FOOT: Primary | ICD-10-CM

## 2019-12-07 DIAGNOSIS — I10 BENIGN ESSENTIAL HYPERTENSION: ICD-10-CM

## 2019-12-07 DIAGNOSIS — I10 HTN (HYPERTENSION): ICD-10-CM

## 2019-12-07 DIAGNOSIS — L03.115 CELLULITIS OF RIGHT FOOT: ICD-10-CM

## 2019-12-07 DIAGNOSIS — F31.9 BIPOLAR AFFECTIVE DISORDER, REMISSION STATUS UNSPECIFIED (HCC): ICD-10-CM

## 2019-12-07 DIAGNOSIS — M86.9 OSTEOMYELITIS OF RIGHT FOOT, UNSPECIFIED TYPE (HCC): ICD-10-CM

## 2019-12-07 DIAGNOSIS — E11.65 UNCONTROLLED TYPE 2 DIABETES MELLITUS WITH HYPERGLYCEMIA (HCC): ICD-10-CM

## 2019-12-07 DIAGNOSIS — IMO0002 TYPE 2 DIABETES MELLITUS, UNCONTROLLED: ICD-10-CM

## 2019-12-07 PROBLEM — B18.2 CHRONIC HEPATITIS C WITHOUT HEPATIC COMA (HCC): Status: ACTIVE | Noted: 2018-07-20

## 2019-12-07 LAB
ALBUMIN SERPL BCP-MCNC: 3.1 G/DL (ref 3.5–5)
ALP SERPL-CCNC: 167 U/L (ref 46–116)
ALT SERPL W P-5'-P-CCNC: 20 U/L (ref 12–78)
ANION GAP SERPL CALCULATED.3IONS-SCNC: 11 MMOL/L (ref 4–13)
AST SERPL W P-5'-P-CCNC: 16 U/L (ref 5–45)
BASE EX.OXY STD BLDV CALC-SCNC: 89.7 % (ref 60–80)
BASE EXCESS BLDV CALC-SCNC: -4.3 MMOL/L
BASOPHILS # BLD AUTO: 0.04 THOUSANDS/ΜL (ref 0–0.1)
BASOPHILS NFR BLD AUTO: 1 % (ref 0–1)
BETA-HYDROXYBUTYRATE: 0 MMOL/L
BILIRUB SERPL-MCNC: 0.27 MG/DL (ref 0.2–1)
BUN SERPL-MCNC: 25 MG/DL (ref 5–25)
CALCIUM SERPL-MCNC: 10 MG/DL (ref 8.3–10.1)
CHLORIDE SERPL-SCNC: 92 MMOL/L (ref 100–108)
CO2 SERPL-SCNC: 23 MMOL/L (ref 21–32)
CREAT SERPL-MCNC: 1.24 MG/DL (ref 0.6–1.3)
CRP SERPL QL: >90 MG/L
EOSINOPHIL # BLD AUTO: 0.15 THOUSAND/ΜL (ref 0–0.61)
EOSINOPHIL NFR BLD AUTO: 2 % (ref 0–6)
ERYTHROCYTE [DISTWIDTH] IN BLOOD BY AUTOMATED COUNT: 13.6 % (ref 11.6–15.1)
ERYTHROCYTE [SEDIMENTATION RATE] IN BLOOD: 88 MM/HOUR (ref 0–20)
GFR SERPL CREATININE-BSD FRML MDRD: 46 ML/MIN/1.73SQ M
GLUCOSE SERPL-MCNC: 102 MG/DL (ref 65–140)
GLUCOSE SERPL-MCNC: 104 MG/DL (ref 65–140)
GLUCOSE SERPL-MCNC: 138 MG/DL (ref 65–140)
GLUCOSE SERPL-MCNC: 455 MG/DL (ref 65–140)
GLUCOSE SERPL-MCNC: 691 MG/DL (ref 65–140)
HCO3 BLDV-SCNC: 19.8 MMOL/L (ref 24–30)
HCT VFR BLD AUTO: 34.2 % (ref 34.8–46.1)
HGB BLD-MCNC: 10.6 G/DL (ref 11.5–15.4)
IMM GRANULOCYTES # BLD AUTO: 0.04 THOUSAND/UL (ref 0–0.2)
IMM GRANULOCYTES NFR BLD AUTO: 1 % (ref 0–2)
LYMPHOCYTES # BLD AUTO: 1.42 THOUSANDS/ΜL (ref 0.6–4.47)
LYMPHOCYTES NFR BLD AUTO: 18 % (ref 14–44)
MCH RBC QN AUTO: 28.5 PG (ref 26.8–34.3)
MCHC RBC AUTO-ENTMCNC: 31 G/DL (ref 31.4–37.4)
MCV RBC AUTO: 92 FL (ref 82–98)
MONOCYTES # BLD AUTO: 0.48 THOUSAND/ΜL (ref 0.17–1.22)
MONOCYTES NFR BLD AUTO: 6 % (ref 4–12)
NEUTROPHILS # BLD AUTO: 5.83 THOUSANDS/ΜL (ref 1.85–7.62)
NEUTS SEG NFR BLD AUTO: 72 % (ref 43–75)
NRBC BLD AUTO-RTO: 0 /100 WBCS
O2 CT BLDV-SCNC: 13.2 ML/DL
PCO2 BLDV: 32.4 MM HG (ref 42–50)
PH BLDV: 7.4 [PH] (ref 7.3–7.4)
PLATELET # BLD AUTO: 306 THOUSANDS/UL (ref 149–390)
PMV BLD AUTO: 9.5 FL (ref 8.9–12.7)
PO2 BLDV: 61.4 MM HG (ref 35–45)
POTASSIUM SERPL-SCNC: 5 MMOL/L (ref 3.5–5.3)
PROT SERPL-MCNC: 8.8 G/DL (ref 6.4–8.2)
RBC # BLD AUTO: 3.72 MILLION/UL (ref 3.81–5.12)
SODIUM SERPL-SCNC: 126 MMOL/L (ref 136–145)
TSH SERPL DL<=0.05 MIU/L-ACNC: 8.18 UIU/ML (ref 0.36–3.74)
WBC # BLD AUTO: 7.96 THOUSAND/UL (ref 4.31–10.16)

## 2019-12-07 PROCEDURE — 96374 THER/PROPH/DIAG INJ IV PUSH: CPT

## 2019-12-07 PROCEDURE — 85652 RBC SED RATE AUTOMATED: CPT | Performed by: STUDENT IN AN ORGANIZED HEALTH CARE EDUCATION/TRAINING PROGRAM

## 2019-12-07 PROCEDURE — 87040 BLOOD CULTURE FOR BACTERIA: CPT | Performed by: EMERGENCY MEDICINE

## 2019-12-07 PROCEDURE — 80053 COMPREHEN METABOLIC PANEL: CPT | Performed by: EMERGENCY MEDICINE

## 2019-12-07 PROCEDURE — NC001 PR NO CHARGE: Performed by: PODIATRIST

## 2019-12-07 PROCEDURE — 84443 ASSAY THYROID STIM HORMONE: CPT | Performed by: STUDENT IN AN ORGANIZED HEALTH CARE EDUCATION/TRAINING PROGRAM

## 2019-12-07 PROCEDURE — 82805 BLOOD GASES W/O2 SATURATION: CPT | Performed by: EMERGENCY MEDICINE

## 2019-12-07 PROCEDURE — 82010 KETONE BODYS QUAN: CPT | Performed by: EMERGENCY MEDICINE

## 2019-12-07 PROCEDURE — 90682 RIV4 VACC RECOMBINANT DNA IM: CPT | Performed by: STUDENT IN AN ORGANIZED HEALTH CARE EDUCATION/TRAINING PROGRAM

## 2019-12-07 PROCEDURE — 99284 EMERGENCY DEPT VISIT MOD MDM: CPT

## 2019-12-07 PROCEDURE — 36415 COLL VENOUS BLD VENIPUNCTURE: CPT | Performed by: EMERGENCY MEDICINE

## 2019-12-07 PROCEDURE — 99285 EMERGENCY DEPT VISIT HI MDM: CPT | Performed by: EMERGENCY MEDICINE

## 2019-12-07 PROCEDURE — 86140 C-REACTIVE PROTEIN: CPT | Performed by: STUDENT IN AN ORGANIZED HEALTH CARE EDUCATION/TRAINING PROGRAM

## 2019-12-07 PROCEDURE — 99254 IP/OBS CNSLTJ NEW/EST MOD 60: CPT | Performed by: STUDENT IN AN ORGANIZED HEALTH CARE EDUCATION/TRAINING PROGRAM

## 2019-12-07 PROCEDURE — 82948 REAGENT STRIP/BLOOD GLUCOSE: CPT

## 2019-12-07 PROCEDURE — G0008 ADMIN INFLUENZA VIRUS VAC: HCPCS | Performed by: STUDENT IN AN ORGANIZED HEALTH CARE EDUCATION/TRAINING PROGRAM

## 2019-12-07 PROCEDURE — 73660 X-RAY EXAM OF TOE(S): CPT

## 2019-12-07 PROCEDURE — 85025 COMPLETE CBC W/AUTO DIFF WBC: CPT | Performed by: EMERGENCY MEDICINE

## 2019-12-07 RX ORDER — INSULIN GLARGINE 100 [IU]/ML
30 INJECTION, SOLUTION SUBCUTANEOUS EVERY MORNING
Status: DISCONTINUED | OUTPATIENT
Start: 2019-12-08 | End: 2019-12-11

## 2019-12-07 RX ORDER — LAMOTRIGINE 100 MG/1
150 TABLET ORAL DAILY
Status: DISCONTINUED | OUTPATIENT
Start: 2019-12-07 | End: 2019-12-12 | Stop reason: HOSPADM

## 2019-12-07 RX ORDER — ROPINIROLE 1 MG/1
0.5 TABLET, FILM COATED ORAL 2 TIMES DAILY
Status: DISCONTINUED | OUTPATIENT
Start: 2019-12-07 | End: 2019-12-12 | Stop reason: HOSPADM

## 2019-12-07 RX ORDER — SERTRALINE HYDROCHLORIDE 100 MG/1
100 TABLET, FILM COATED ORAL DAILY
Status: DISCONTINUED | OUTPATIENT
Start: 2019-12-07 | End: 2019-12-12 | Stop reason: HOSPADM

## 2019-12-07 RX ORDER — OXYCODONE HYDROCHLORIDE 5 MG/1
5 TABLET ORAL EVERY 6 HOURS PRN
Status: DISCONTINUED | OUTPATIENT
Start: 2019-12-07 | End: 2019-12-12 | Stop reason: HOSPADM

## 2019-12-07 RX ORDER — CLINDAMYCIN PHOSPHATE 600 MG/50ML
600 INJECTION INTRAVENOUS ONCE
Status: COMPLETED | OUTPATIENT
Start: 2019-12-07 | End: 2019-12-07

## 2019-12-07 RX ORDER — ACETAMINOPHEN 325 MG/1
650 TABLET ORAL EVERY 6 HOURS PRN
Status: DISCONTINUED | OUTPATIENT
Start: 2019-12-07 | End: 2019-12-12 | Stop reason: HOSPADM

## 2019-12-07 RX ORDER — MORPHINE SULFATE 4 MG/ML
4 INJECTION, SOLUTION INTRAMUSCULAR; INTRAVENOUS ONCE
Status: COMPLETED | OUTPATIENT
Start: 2019-12-07 | End: 2019-12-07

## 2019-12-07 RX ORDER — CLONAZEPAM 1 MG/1
1 TABLET ORAL 3 TIMES DAILY
Status: DISCONTINUED | OUTPATIENT
Start: 2019-12-07 | End: 2019-12-12 | Stop reason: HOSPADM

## 2019-12-07 RX ORDER — HYDROXYZINE HYDROCHLORIDE 25 MG/1
50 TABLET, FILM COATED ORAL
Status: DISCONTINUED | OUTPATIENT
Start: 2019-12-07 | End: 2019-12-12 | Stop reason: HOSPADM

## 2019-12-07 RX ORDER — DULOXETIN HYDROCHLORIDE 60 MG/1
60 CAPSULE, DELAYED RELEASE ORAL DAILY
Status: DISCONTINUED | OUTPATIENT
Start: 2019-12-07 | End: 2019-12-12 | Stop reason: HOSPADM

## 2019-12-07 RX ORDER — INSULIN GLARGINE 100 [IU]/ML
25 INJECTION, SOLUTION SUBCUTANEOUS EVERY MORNING
Status: DISCONTINUED | OUTPATIENT
Start: 2019-12-08 | End: 2019-12-07

## 2019-12-07 RX ORDER — GABAPENTIN 300 MG/1
300 CAPSULE ORAL 3 TIMES DAILY
Status: DISCONTINUED | OUTPATIENT
Start: 2019-12-07 | End: 2019-12-12 | Stop reason: HOSPADM

## 2019-12-07 RX ORDER — ATORVASTATIN CALCIUM 40 MG/1
40 TABLET, FILM COATED ORAL
Status: DISCONTINUED | OUTPATIENT
Start: 2019-12-07 | End: 2019-12-12 | Stop reason: HOSPADM

## 2019-12-07 RX ADMIN — INFLUENZA A VIRUS A/BRISBANE/02/2018 (H1N1) RECOMBINANT HEMAGGLUTININ ANTIGEN, INFLUENZA A VIRUS A/KANSAS/14/2017 (H3N2) RECOMBINANT HEMAGGLUTININ ANTIGEN, INFLUENZA B VIRUS B/PHUKET/3073/2013 RECOMBINANT HEMAGGLUTININ ANTIGEN, AND INFLUENZA B VIRUS B/MARYLAND/15/2016 RECOMBINANT HEMAGGLUTININ ANTIGEN 0.5 ML: 45; 45; 45; 45 INJECTION INTRAMUSCULAR at 16:53

## 2019-12-07 RX ADMIN — GABAPENTIN 300 MG: 300 CAPSULE ORAL at 21:07

## 2019-12-07 RX ADMIN — INSULIN HUMAN 15 UNITS: 100 INJECTION, SOLUTION PARENTERAL at 14:04

## 2019-12-07 RX ADMIN — ENOXAPARIN SODIUM 40 MG: 40 INJECTION SUBCUTANEOUS at 16:56

## 2019-12-07 RX ADMIN — GABAPENTIN 300 MG: 300 CAPSULE ORAL at 16:59

## 2019-12-07 RX ADMIN — INSULIN HUMAN 15 UNITS: 100 INJECTION, SOLUTION PARENTERAL at 12:36

## 2019-12-07 RX ADMIN — DULOXETINE HYDROCHLORIDE 60 MG: 60 CAPSULE, DELAYED RELEASE ORAL at 17:05

## 2019-12-07 RX ADMIN — SERTRALINE HYDROCHLORIDE 100 MG: 100 TABLET ORAL at 17:00

## 2019-12-07 RX ADMIN — CLONAZEPAM 1 MG: 1 TABLET ORAL at 21:07

## 2019-12-07 RX ADMIN — VANCOMYCIN HYDROCHLORIDE 1500 MG: 1 INJECTION, POWDER, LYOPHILIZED, FOR SOLUTION INTRAVENOUS at 18:02

## 2019-12-07 RX ADMIN — MORPHINE SULFATE 4 MG: 4 INJECTION INTRAVENOUS at 12:03

## 2019-12-07 RX ADMIN — SODIUM CHLORIDE 1000 ML: 0.9 INJECTION, SOLUTION INTRAVENOUS at 13:02

## 2019-12-07 RX ADMIN — HYDROXYZINE HYDROCHLORIDE 50 MG: 25 TABLET ORAL at 22:04

## 2019-12-07 RX ADMIN — CLONAZEPAM 1 MG: 1 TABLET ORAL at 17:00

## 2019-12-07 RX ADMIN — ATORVASTATIN CALCIUM 40 MG: 40 TABLET, FILM COATED ORAL at 17:00

## 2019-12-07 RX ADMIN — CLINDAMYCIN PHOSPHATE 600 MG: 600 INJECTION, SOLUTION INTRAVENOUS at 11:55

## 2019-12-07 RX ADMIN — LAMOTRIGINE 150 MG: 100 TABLET ORAL at 17:00

## 2019-12-07 RX ADMIN — SODIUM CHLORIDE 500 ML: 0.9 INJECTION, SOLUTION INTRAVENOUS at 11:50

## 2019-12-07 RX ADMIN — ROPINIROLE 0.5 MG: 1 TABLET, FILM COATED ORAL at 17:06

## 2019-12-07 RX ADMIN — SODIUM CHLORIDE 500 ML: 0.9 INJECTION, SOLUTION INTRAVENOUS at 12:34

## 2019-12-07 NOTE — ASSESSMENT & PLAN NOTE
60-year-old female with hypertension, hyperlipidemia, hypothyroidism, diabetes type 2 admitted due to cellulitis of her great toe of her right foot  - antibiotics care of primary team  - operative management care of primary team    PREOPERATIVE RISK STRATIFICATION  60-year-old female with hypertension, hyperlipidemia, diabetes type 2 on insulin admitted due to cellulitis of her great toe of her right foot  She has a Revised Cardiac Risk Index score of 2 (vascular surgery and insulin use with diabetes) with mets score of >4 and will be going for an elevated risk procedure  The risks of delaying surgery to perform additional tests far outweigh the minimal benefits these tests will provide  Based on her risk stratification, NO FURTHER WORKUP NEEDED and MAY PROCEED WITH SURGERY

## 2019-12-07 NOTE — ED PROVIDER NOTES
History  Chief Complaint   Patient presents with    Toe Injury     Pt reports that she had R foot steepped on last month  Reports that she has had swelling and redness a few days ago  Denies fevers  Hx DM  Reports taking "left over Amoxicillin"     C/o R great toe pain since her daughter stomped on her R foot 2 months ago (pt  Just had a sock on at the time)  2 weeks ago the R great toe nail fell off and then over the past week, it became red,swollen has some drainage and a blackened area to the top of the toe  No fevers, no n/v           Prior to Admission Medications   Prescriptions Last Dose Informant Patient Reported? Taking?    DULoxetine (CYMBALTA) 60 mg delayed release capsule   Yes Yes   Sig: Take 60 mg by mouth   atorvastatin (LIPITOR) 40 mg tablet   Yes Yes   Sig: Take 40 mg by mouth   clonazePAM (KlonoPIN) 1 mg tablet  Self Yes Yes   Sig: Take 1 mg by mouth 3 (three) times a day   gabapentin (NEURONTIN) 300 mg capsule  Self Yes Yes   Sig: Take 600 mg by mouth 3 (three) times a day     hydrOXYzine HCL (ATARAX) 50 mg tablet  Self Yes Yes   Sig: Take 50 mg by mouth daily at bedtime   insulin glargine (LANTUS) 100 units/mL subcutaneous injection  Self No No   Sig: Inject 25 Units under the skin every morning   Patient taking differently: Inject 45 Units under the skin every morning    insulin lispro (HumaLOG) 100 units/mL injection  Self No Yes   Sig: Inject 6 Units under the skin 3 (three) times a day before meals   Patient taking differently: Inject 15 Units under the skin 3 (three) times a day before meals     lamoTRIgine (LaMICtal) 150 MG tablet  Self Yes Yes   Sig: Take 150 mg by mouth daily   methadone (DOLOPHINE) 5 mg tablet  Self Yes Yes   Sig: Take by mouth daily 15 mg daily    naproxen (NAPROSYN) 500 mg tablet   No No   Sig: Take 1 tablet (500 mg total) by mouth every 12 (twelve) hours as needed for mild pain or moderate pain   rOPINIRole (REQUIP) 0 5 mg tablet  Self Yes Yes   Sig: Take 0 5 mg by mouth 2 (two) times a day     sertraline (ZOLOFT) 50 mg tablet  Self Yes Yes   Sig: Take 100 mg by mouth daily        Facility-Administered Medications: None       Past Medical History:   Diagnosis Date    [facility]     Depression     [facility]     History of MRSA infection        Past Surgical History:   Procedure Laterality Date    APPENDECTOMY      INCISION AND DRAINAGE OF WOUND Right 7/1/2018    Procedure: INCISION AND DRAINAGE (I&D) RIGHT FOREARM;  Surgeon: Padmini Jacobs MD;  [facility];  Service: Orthopedics    [place] SPLIT [address] <100 SQCM Right 10/17/2018    Procedure: RIGHT ARM SKIN GRAFT SPLIT THICKNESS (STSG); Surgeon: Angus Beal MD;  [facility];  Service: Plastics    [place] SPLIT [address] <100 SQCM Right 9/12/2018    Procedure: Wilber Hair;  Surgeon: Angus Beal MD;  [facility];  Service: Plastics    TONSILLECTOMY      VAC DRESSING APPLICATION Right 17/91/2124    Procedure: Lina Calvert;  Surgeon: Angus Beal MD;  [facility];  Service: Plastics    WOUND DEBRIDEMENT Right 7/7/2018    Procedure: DEBRIDEMENT UPPER EXTREMITY ([address]) W/ APPLICATION OF WOUND VAC;  Surgeon: Samanta Young MD;  [facility];  Service: Orthopedics    WOUND DEBRIDEMENT Right 7/11/2018    Procedure: DEBRIDEMENT UPPER EXTREMITY WITH WOUND VAC EXCHANGE;  Surgeon: Padilla Stewart DO;  [facility];  Service: Orthopedics    WOUND DEBRIDEMENT Right 9/12/2018    Procedure: DEBRIDEMENT  [facility] OUT) FOREARM with Vac dressing change;  Surgeon: Angus Beal MD;  [facility];  Service: Plastics       Family History   Problem Relation Age of Onset    Hypertension Mother         heart attack    Dementia Father         heart atttack/hypertention     I have reviewed and agree with the history as documented      Social History     Tobacco Use    Smoking status: Never Smoker    Smokeless tobacco: Never Used   Substance Use Topics    Alcohol use: No     Alcohol/week: 0 0 standard drinks     Frequency: Never     Binge frequency: Never    Drug use: No     Comment: on methadone, Hx herion addiction        Review of Systems   Constitutional: Negative for appetite change, fatigue and fever  HENT: Negative for rhinorrhea and sore throat  Eyes: Negative for pain  Respiratory: Negative for cough, shortness of breath and wheezing  Cardiovascular: Negative for chest pain and leg swelling  Gastrointestinal: Negative for abdominal pain, diarrhea and vomiting  Genitourinary: Negative for dysuria and flank pain  Musculoskeletal: Positive for joint swelling  Negative for back pain and neck pain  Skin: Positive for wound  Neurological: Negative for syncope and headaches  Psychiatric/Behavioral:        Mood normal       Physical Exam  Physical Exam   Constitutional: She is oriented to person, place, and time  She appears well-developed and well-nourished  HENT:   Head: Normocephalic and atraumatic  Neck: Normal range of motion  Neck supple  Cardiovascular: Normal rate and regular rhythm  Pulmonary/Chest: Effort normal and breath sounds normal    Abdominal: Soft  There is no tenderness  Musculoskeletal: Normal range of motion  Neurological: She is alert and oriented to person, place, and time  Skin:   R great toe , nail is removed, diffuse swelling and redness to entire toe, necrotic area to top of toe about 2x2cm area, +some sensation intact, some yellowish drainage present, pulse 2/4   Nursing note and vitals reviewed        Vital Signs  ED Triage Vitals   Temperature Pulse Respirations Blood Pressure SpO2   12/07/19 1035 12/07/19 1035 12/07/19 1035 12/07/19 1037 12/07/19 1035   97 6 °F (36 4 °C) (!) 106 18 (!) 198/86 100 %      Temp Source Heart Rate Source Patient Position - Orthostatic VS BP Location FiO2 (%)   12/07/19 1035 12/07/19 1035 12/07/19 1035 12/07/19 1035 --   Oral Monitor Sitting Right arm       Pain Score       12/07/19 1035       6           Vitals:    12/07/19 1234 12/07/19 1440 12/07/19 2239 12/08/19 0730   BP: (!) 173/74 165/72 148/65 125/60   Pulse: 82 82 84 76   Patient Position - Orthostatic VS: Lying Lying Lying Lying         Visual Acuity      ED Medications  Medications   clonazePAM (KlonoPIN) tablet 1 mg (1 mg Oral Given 12/8/19 0856)   lamoTRIgine (LaMICtal) tablet 150 mg (150 mg Oral Given 12/8/19 0855)   sertraline (ZOLOFT) tablet 100 mg (100 mg Oral Given 12/8/19 0856)   rOPINIRole (REQUIP) tablet 0 5 mg (0 5 mg Oral Given 12/8/19 0856)   hydrOXYzine HCL (ATARAX) tablet 50 mg (50 mg Oral Given 12/7/19 2204)   gabapentin (NEURONTIN) capsule 300 mg (300 mg Oral Given 12/8/19 0856)   insulin lispro (HumaLOG) 100 units/mL subcutaneous injection 6 Units (6 Units Subcutaneous Not Given 12/8/19 0750)   atorvastatin (LIPITOR) tablet 40 mg (40 mg Oral Given 12/7/19 1700)   DULoxetine (CYMBALTA) delayed release capsule 60 mg (60 mg Oral Given 12/8/19 0856)   enoxaparin (LOVENOX) subcutaneous injection 40 mg (40 mg Subcutaneous Given 12/8/19 0856)   insulin lispro (HumaLOG) 100 units/mL subcutaneous injection 1-5 Units (1 Units Subcutaneous Not Given 12/8/19 0750)   insulin lispro (HumaLOG) 100 units/mL subcutaneous injection 1-5 Units (0 Units Subcutaneous Not Given 12/7/19 2106)   acetaminophen (TYLENOL) tablet 650 mg (has no administration in time range)   oxyCODONE (ROXICODONE) IR tablet 5 mg (has no administration in time range)   insulin glargine (LANTUS) subcutaneous injection 30 Units 0 3 mL (30 Units Subcutaneous Given 12/8/19 0857)   vancomycin (VANCOCIN) IVPB (premix) 1,000 mg (has no administration in time range)   sodium chloride 0 9 % bolus 500 mL (0 mL Intravenous Stopped 12/7/19 1256)   clindamycin (CLEOCIN) IVPB (premix) 600 mg (0 mg Intravenous Stopped 12/7/19 1233)   morphine (PF) 4 mg/mL injection 4 mg (4 mg Intravenous Given 12/7/19 1203)   insulin regular (HumuLIN R,NovoLIN R) injection 15 Units (15 Units Subcutaneous Given 12/7/19 1236)   sodium chloride 0 9 % bolus 1,000 mL (1,000 mL Intravenous New Bag 12/7/19 1302)   sodium chloride 0 9 % bolus 500 mL (500 mL Intravenous New Bag 12/7/19 1234)   insulin regular (HumuLIN R,NovoLIN R) injection 15 Units (15 Units Subcutaneous Given 12/7/19 1404)   influenza vaccine, recombinant, quadrivalent (FLUBLOK) IM injection 0 5 mL (0 5 mL Intramuscular Given 12/7/19 1653)       Diagnostic Studies  Results Reviewed     Procedure Component Value Units Date/Time    C-reactive protein [[id number]]  (Abnormal) Collected:  12/07/19 1149    Lab Status:  Final result Specimen:  Blood from Hand, Right Updated:  12/07/19 2042     CRP >90 0 mg/L     Blood culture #1 [[id number]] Collected:  12/07/19 1131    Lab Status:  Preliminary result Specimen:  Blood from Arm, Right Updated:  12/07/19 1901     Blood Culture Received in Microbiology Lab  Culture in Progress  Blood culture #2 [[id number]] Collected:  12/07/19 1149    Lab Status:  Preliminary result Specimen:  Blood from Hand, Right Updated:  12/07/19 1901     Blood Culture Received in Microbiology Lab  Culture in Progress  TSH, 3rd generation [[id number]]  (Abnormal) Collected:  12/07/19 1149    Lab Status:  Final result Specimen:  Blood from Hand, Right Updated:  12/07/19 1858     TSH 3RD GENERATON 8 183 uIU/mL     Narrative:       Patients undergoing fluorescein dye angiography may retain small amounts of fluorescein in the body for 48-72 hours post procedure  Samples containing fluorescein can produce falsely depressed TSH values  If the patient had this procedure,a specimen should be resubmitted post fluorescein clearance        Fingerstick Glucose (POCT) [[id number]]  (Abnormal) Collected:  12/07/19 1346    Lab Status:  Final result Updated:  12/07/19 1348     POC Glucose 455 mg/dl     Beta Hydroxybutyrate [[id number]]  (Normal) Collected: 12/07/19 1233    Lab Status:  Final result Specimen:  Blood from Hand, Right Updated:  12/07/19 1249     BETA-HYDROXYBUTYRATE 0 0 mmol/L     Blood gas, venous [[id number]]  (Abnormal) Collected:  12/07/19 1233    Lab Status:  Final result Specimen:  Blood from Hand, Right Updated:  12/07/19 1242     pH, Mookie 7 403     pCO2, Mookie 32 4 mm Hg      pO2, Mookie 61 4 mm Hg      HCO3, Mookie 19 8 mmol/L      Base Excess, Mookie -4 3 mmol/L      O2 Content, Mookie 13 2 ml/dL      O2 HGB, VENOUS 89 7 %     Comprehensive metabolic panel [[id number]]  (Abnormal) Collected:  12/07/19 1149    Lab Status:  Final result Specimen:  Blood from Hand, Right Updated:  12/07/19 1217     Sodium 126 mmol/L      Potassium 5 0 mmol/L      Chloride 92 mmol/L      CO2 23 mmol/L      ANION GAP 11 mmol/L      BUN 25 mg/dL      Creatinine 1 24 mg/dL      Glucose 691 mg/dL      Calcium 10 0 mg/dL      AST 16 U/L      ALT 20 U/L      Alkaline Phosphatase 167 U/L      Total Protein 8 8 g/dL      Albumin 3 1 g/dL      Total Bilirubin 0 27 mg/dL      eGFR 46 ml/min/1 73sq m     Narrative:       [place] guidelines for Chronic Kidney Disease (CKD):     Stage 1 with normal or high GFR (GFR > 90 mL/min/1 73 square meters)    Stage 2 Mild CKD (GFR = 60-89 mL/min/1 73 square meters)    Stage 3A Moderate CKD (GFR = 45-59 mL/min/1 73 square meters)    Stage 3B Moderate CKD (GFR = 30-44 mL/min/1 73 square meters)    Stage 4 Severe CKD (GFR = 15-29 mL/min/1 73 square meters)    Stage 5 End Stage CKD (GFR <15 mL/min/1 73 square meters)  Note: GFR calculation is accurate only with a steady state creatinine    CBC and differential [[id number]]  (Abnormal) Collected:  12/07/19 1149    Lab Status:  Final result Specimen:  Blood from Hand, Right Updated:  12/07/19 1158     WBC 7 96 Thousand/uL      RBC 3 72 Million/uL      Hemoglobin 10 6 g/dL      Hematocrit 34 2 %      MCV 92 fL      MCH 28 5 pg      MCHC 31 0 g/dL      RDW 13 6 %      MPV 9 5 fL Platelets 319 Thousands/uL      nRBC 0 /100 WBCs      Neutrophils Relative 72 %      Immat GRANS % 1 %      Lymphocytes Relative 18 %      Monocytes Relative 6 %      Eosinophils Relative 2 %      Basophils Relative 1 %      Neutrophils Absolute 5 83 Thousands/µL      Immature Grans Absolute 0 04 Thousand/uL      Lymphocytes Absolute 1 42 Thousands/µL      Monocytes Absolute 0 48 Thousand/µL      Eosinophils Absolute 0 15 Thousand/µL      Basophils Absolute 0 04 Thousands/µL                  XR toe great min 2 view RIGHT   Final Result by Poncho Santoro MD (12/08 9966)      Findings suspicious for osteomyelitis involving the 1st distal phalanx  The study was marked in EPIC for significant notification  Workstation performed: [id number]                    Procedures  Procedures         ED Course                               MDM  Number of Diagnoses or Management Options  Cellulitis of great toe of right foot:   Contusion of great toe of right foot:      Amount and/or Complexity of Data Reviewed  Clinical lab tests: ordered and reviewed  Tests in the radiology section of CPT®: ordered and reviewed    Risk of Complications, Morbidity, and/or Mortality  Presenting problems: moderate  General comments: Xray shows ? osteomyelitis    Given iv antibiotics and Podiatry admitted patient          Disposition  Final diagnoses:   Cellulitis of great toe of right foot   Contusion of great toe of right foot     Time reflects when diagnosis was documented in both MDM as applicable and the Disposition within this note     Time User Action Codes Description Comment    12/7/2019 12:01 PM Lisset Parsons Add [L03 031] Cellulitis of great toe of right foot     12/7/2019 12:01 PM Tiffany Parsons Add [S90 111A] Contusion of great toe of right foot     12/7/2019  3:44 PM Yonatan Tovar Add [E11 65] Uncontrolled type 2 diabetes mellitus with hyperglycemia ([facility] )     12/7/2019  3:45 PM Yonatan Tovar Add [F31 9] Bipolar affective disorder, remission status unspecified ([facility] )     12/7/2019  3:45 PM Curry Niranjan Modify [F31 9] Bipolar affective disorder, remission status unspecified ([facility] )     12/7/2019  3:45 PM Tim Ureña Add [I10] Benign essential hypertension       ED Disposition     ED Disposition Condition Date/Time Comment    Admit Stable Sat Dec 7, 2019 12:00 PM Case was discussed with podiatry and the patient's admission status was agreed to be inpt /med surg          Follow-up Information    None         Current Discharge Medication List      CONTINUE these medications which have NOT CHANGED    Details   atorvastatin (LIPITOR) 40 mg tablet Take 40 mg by mouth      clonazePAM (KlonoPIN) 1 mg tablet Take 1 mg by mouth 3 (three) times a day      DULoxetine (CYMBALTA) 60 mg delayed release capsule Take 60 mg by mouth      gabapentin (NEURONTIN) 300 mg capsule Take 600 mg by mouth 3 (three) times a day        hydrOXYzine HCL (ATARAX) 50 mg tablet Take 50 mg by mouth daily at bedtime      insulin lispro (HumaLOG) 100 units/mL injection Inject 6 Units under the skin 3 (three) times a day before meals  Qty: 6 mL, Refills: 0    Associated Diagnoses: Type 2 diabetes mellitus, uncontrolled (HCC)      lamoTRIgine (LaMICtal) 150 MG tablet Take 150 mg by mouth daily      methadone (DOLOPHINE) 5 mg tablet Take by mouth daily 15 mg daily       rOPINIRole (REQUIP) 0 5 mg tablet Take 0 5 mg by mouth 2 (two) times a day        sertraline (ZOLOFT) 50 mg tablet Take 100 mg by mouth daily        insulin glargine (LANTUS) 100 units/mL subcutaneous injection Inject 25 Units under the skin every morning  Qty: 10 mL, Refills: 0    Associated Diagnoses: Type 2 diabetes mellitus, uncontrolled (HCC)      naproxen (NAPROSYN) 500 mg tablet Take 1 tablet (500 mg total) by mouth every 12 (twelve) hours as needed for mild pain or moderate pain  Qty: 15 tablet, Refills: 0    Associated Diagnoses: Open wound of right lower leg, initial encounter No discharge procedures on file      ED Provider  Electronically Signed by           Bay Carlos MD  12/08/19 Michael Olivas MD  12/08/19 5549

## 2019-12-07 NOTE — ED NOTES
Per Dr Chapis Reyez, patient ok to be sent to floor  Repeat blood sugar in one hour after insulin administration   BLG @ [id number] [facility] Smita Garcia RN  12/07/19 1012

## 2019-12-07 NOTE — H&P
H&P Exam - Genaro Villegas 62 y o  female MRN: [id number]    Unit/Bed#: E2 -01 Encounter: [id number]    Assessment:    61 y/o female presents with right hallux wound probable to bone and cellulitis     1  Uncontrolled DM type 2   2  Essential hypertension  3  Hyperlipidemia  4  Hypothyroidism  5  History of MRSA     Plan:  - final read of right foot x-ray pending, however as per my interpretation there is erosive changes and lucency noted at the distal phalanx suggestive of osteomyelitis, will wait for final read, pt at high risk of toe amputation and is aware   - Blood cultures pending   - ESR and CRP pending   - patient started on vancomycin with pharmacy consult  - appreciate SLIM for medical management and preoperative clearance  - will continue with all home pharmacological agents, placed on sliding scale     Dispo: Patient will require hospital stay more than 2 midnights for IV antibiotics and possible podiatric surgical intervention    History of Present Illness   61 y/o female presents with right diabetic foot ulcer probable to bone at cellulitis  Patient states she initially suffered right hallux injury approximately couple months ago when her daughter stumped on her toe  Since the injury patient has been treating her wound at home by herself, she has not seen a doctor for it  Patient states she started noticing redness and swelling and discoloration starting past Wednesday or Thursday  Patient also presented with close to 600mg/dl of blood sugar  Patient states she has not been taking many of her medication  Patient has history of non-compliance  She denied fever nausea vomiting chills or shortness of breath       Review of Systems    Historical Information   Past Medical History:   Diagnosis Date    [facility] )     Depression     Diabetes mellitus ([facility] )     History of MRSA infection      Past Surgical History:   Procedure Laterality Date    APPENDECTOMY      INCISION AND DRAINAGE OF WOUND Right 7/1/2018    Procedure: INCISION AND DRAINAGE (I&D) RIGHT FOREARM;  Surgeon: Evonne Brennan MD;  [facility];  Service: Orthopedics    [place] SPLIT [address] <100 SQCM Right 10/17/2018    Procedure: RIGHT ARM SKIN GRAFT SPLIT THICKNESS (STSG);   Surgeon: Nikhil Lloyd MD;  [facility];  Service: Plastics    [place] SPLIT [address] <100 SQCM Right 9/12/2018    Procedure: Cheryl Aguilar;  Surgeon: Nikhil Lloyd MD;  [facility];  Service: Plastics    TONSILLECTOMY      VAC DRESSING APPLICATION Right 67/34/1935    Procedure: Joanne Kaitlyn;  Surgeon: Nikhil Lloyd MD;  [facility];  Service: Plastics    WOUND DEBRIDEMENT Right 7/7/2018    Procedure: DEBRIDEMENT UPPER EXTREMITY ([address]) W/ APPLICATION OF WOUND [address];  Surgeon: Marci Melchor MD;  [facility];  Service: Orthopedics    WOUND DEBRIDEMENT Right 7/11/2018    Procedure: DEBRIDEMENT UPPER EXTREMITY WITH WOUND VAC EXCHANGE;  Surgeon: Niecy Cook DO;  [facility];  Service: Orthopedics    WOUND DEBRIDEMENT Right 9/12/2018    Procedure: DEBRIDEMENT  ([address]) FOREARM with Vac dressing change;  Surgeon: Nikhil Lloyd MD;  [facility];  Service: Plastics     Social History   Social History     Substance and Sexual Activity   Alcohol Use No    Alcohol/week: 0 0 standard drinks    Frequency: Never    Binge frequency: Never     Social History     Substance and Sexual Activity   Drug Use No    Comment: on methadone, Hx herion addiction     Social History     Tobacco Use   Smoking Status Never Smoker   Smokeless Tobacco Never Used     Family History: non-contributory    Meds/Allergies   all medications and allergies reviewed  No Known Allergies    Objective   First Vitals:   Blood Pressure: (!) 198/86 (12/07/19 1037)  Pulse: (!) 106 (12/07/19 1035)  Temperature: 97 6 °F (36 4 °C) (12/07/19 1035)  Temp Source: Oral (12/07/19 1035)  Respirations: 18 (12/07/19 1035)  Weight - Scale: 76 kg (167 lb 8 8 oz) (12/07/19 1035)  SpO2: 100 % (12/07/19 1035)    Current Vitals:   Blood Pressure: 165/72 (12/07/19 1440)  Pulse: 82 (12/07/19 1440)  Temperature: 100 2 °F (37 9 °C) (12/07/19 1440)  Temp Source: Tympanic (12/07/19 1440)  Respirations: 18 (12/07/19 1440)  Weight - Scale: 76 kg (167 lb 8 8 oz) (12/07/19 1035)  SpO2: 98 % (12/07/19 1440)      Intake/Output Summary (Last 24 hours) at 12/7/2019 1602  Last data filed at 12/7/2019 1256  Gross per 24 hour   Intake 550 ml   Output    Net 550 ml       Invasive Devices     Peripheral Intravenous Line            Peripheral IV 12/07/19 Right Hand less than 1 day          Drain            Negative Pressure Wound Therapy (V A C ) Arm Distal 416 days                Physical Exam   Constitutional: She is oriented to person, place, and time  She appears well-developed and well-nourished  HENT:   Head: Normocephalic  Eyes: Pupils are equal, round, and reactive to light  EOM are normal  Right eye exhibits no discharge  Left eye exhibits no discharge  Neck: Normal range of motion  Neck supple  No JVD present  Cardiovascular: Normal rate and regular rhythm  Pulses:       Dorsalis pedis pulses are 1+ on the right side, and 1+ on the left side  Posterior tibial pulses are 2+ on the right side, and 2+ on the left side  Pulmonary/Chest: Effort normal    Abdominal: Soft  Bowel sounds are normal    Musculoskeletal: Normal range of motion  She exhibits edema and tenderness  Right foot: There is normal range of motion and no deformity  Left foot: There is tenderness and swelling  There is normal range of motion and no deformity  Feet:   Right Foot:   Protective Sensation: 10 sites tested  2 sites sensed  Skin Integrity: Positive for ulcer, skin breakdown and erythema  Left Foot:   Protective Sensation: 10 sites tested  3 sites sensed  Skin Integrity: Positive for dry skin  Negative for ulcer or erythema  Neurological: She is alert and oriented to person, place, and time  Skin: Skin is warm and dry  Capillary refill takes less than 2 seconds  There is erythema  Right hallux with distal tip wound probable to bone  Wound appears to be necrotic and fibrotic  No active purulent drainage noted  No malodor noted  Erythema extending from hallux to midfoot      Psychiatric: Her behavior is normal      12/7      Lab Results:   Imaging:   EKG, Pathology, and Other Studies:     Code Status: Level 1 - Full Code  Advance Directive and Living Will:      Power of :    POLST:

## 2019-12-07 NOTE — PROGRESS NOTES
Vancomycin Assessment    Jay Iglesias is a 62 y o  female who is currently receiving vancomycin for bone infection and cellulitis  Relevant clinical data and objective history reviewed:  Creatinine   Date Value Ref Range Status   12/07/2019 1 24 0 60 - 1 30 mg/dL Final     Comment:     Standardized to IDMS reference method   11/08/2018 0 80 0 60 - 1 30 mg/dL Final     Comment:     Standardized to IDMS reference method   10/07/2018 0 79 0 60 - 1 30 mg/dL Final     Comment:     Standardized to IDMS reference method     /72 (BP Location: Left arm)   Pulse 82   Temp 100 2 °F (37 9 °C) (Tympanic)   Resp 18   Wt 76 kg (167 lb 8 8 oz)   SpO2 98%   BMI 27 04 kg/m²   No intake/output data recorded  Lab Results   Component Value Date/Time    BUN 25 12/07/2019 11:49 AM    BUN 13 10/04/2018 08:15 AM    WBC 7 96 12/07/2019 11:49 AM    HGB 10 6 (L) 12/07/2019 11:49 AM    HCT 34 2 (L) 12/07/2019 11:49 AM    MCV 92 12/07/2019 11:49 AM     12/07/2019 11:49 AM     Temp Readings from Last 3 Encounters:   12/07/19 100 2 °F (37 9 °C) (Tympanic)   05/15/19 97 6 °F (36 4 °C) (Temporal)   11/08/18 (!) 97 4 °F (36 3 °C) (Temporal)     Vancomycin Days of Therapy: 1    Assessment/Plan  The patient is currently on vancomycin utilizing scheduled dosing based on actual body weight  The patient is currently receiving vancomycin 1500 mg IV Q24H and is clinically appropriate and dose will be continued  Pharmacy will also follow closely for s/sx of nephrotoxicity, infusion reactions and appropriateness of therapy  BMP and CBC will be ordered per protocol  Plan for trough as patient approaches steady state, prior to the 4th  dose at approximately 02 73 91 27 04 on 12/10  Due to infection severity, will target a trough of 15-20  Pharmacy will continue to follow the patients culture results and clinical progress daily      Paulino Camp, Pharmacist

## 2019-12-07 NOTE — ED NOTES
Per Dr Terry Oscar, repeat BLG 1 hour after insulin administration   [address] [place] [address] Mic Perez RN  12/07/19 3243

## 2019-12-07 NOTE — PLAN OF CARE
Problem: Potential for Falls  Goal: Patient will remain free of falls  Description  INTERVENTIONS:  - Assess patient frequently for physical needs  -  Identify cognitive and physical deficits and behaviors that affect risk of falls    -  [place] fall precautions as indicated by assessment   - Educate patient/family on patient safety including physical limitations  - Instruct patient to call for assistance with activity based on assessment  - Modify environment to reduce risk of injury  - Consider OT/PT consult to assist with strengthening/mobility  Outcome: Progressing

## 2019-12-07 NOTE — CONSULTS
Consult- [facility] 1955, 62 y o  female MRN: [id number]    Unit/Bed#: E5 -01 Encounter: [id number]    Primary Care Provider: Monica Man MD   Date and time admitted to hospital: 12/7/2019 10:38 AM      Inpatient consult to Internal Medicine  Consult performed by: Zo Cabrera MD  Consult ordered by: Eder Moore DPM          * Cellulitis of great toe of right foot  Assessment & Plan  28-year-old female with hypertension, hyperlipidemia, hypothyroidism, diabetes type 2 admitted due to cellulitis of her great toe of her right foot  - antibiotics care of primary team  - operative management care of primary team    PREOPERATIVE RISK STRATIFICATION  28-year-old female with hypertension, hyperlipidemia, diabetes type 2 on insulin admitted due to cellulitis of her great toe of her right foot  She has a Revised Cardiac Risk Index score of 2 (vascular surgery and insulin use with diabetes) with mets score of >4 and will be going for an elevated risk procedure  The risks of delaying surgery to perform additional tests far outweigh the minimal benefits these tests will provide  Based on her risk stratification, NO FURTHER WORKUP NEEDED and MAY PROCEED WITH SURGERY  Hypothyroidism  Assessment & Plan  Follow-up TSH levels  Patient currently not on any medications    No results for input(s): [id number] in the last 72 hours        Hyperlipidemia  Assessment & Plan  Continue statin    Type 2 diabetes mellitus, uncontrolled (HCC)  Assessment & Plan  Lab Results   Component Value Date    HGBA1C 13 4 (H) 06/29/2018       Recent Labs     12/07/19  1346 12/07/19  1715   POCGLU 455* 102       Blood Sugar Average: Last 72 hrs:  (P) 278 5     Follow-up A1C  Her home regimen:  Insulin Glargine 30-45U depending on sliding scale   Insulin Aspart U-100 15U TID    Inpatient Regimen:  Insulin Glargine 30U  Insulin Aspart 8U TID  IASS  Will adjust as needed daily    Benign essential hypertension  Assessment & Plan  Continue current medications    Chronic hepatitis C without hepatic coma (HCC)  Assessment & Plan  Follow-up outpatient    Recent Labs     12/07/19  1149   AST 16   ALT 20   TBILI 0 27         Bipolar affective disorder ([facility] [address] )  Assessment & Plan  Continue medications      VTE Prophylaxis: Enoxaparin (Lovenox)  / sequential compression device     Recommendations for Discharge:  · Continue Diabetes Home regimen  · Insulin Glargine 30-45U depending on her sliding scale at home  · Insulin Aspart U-100 15U TID with meals  · Continue Hypertensive Home regimen  · Lisinopril 20mg daily  · Continue Psych regimen  · Outpatient follow-up with PCP in one week  · NO CHANGES DONE    Counseling / Coordination of Care Time: 1 hour  Greater than 50% of total time spent on patient counseling and coordination of care  Collaboration of Care: Were Recommendations Directly Discussed with Primary Treatment Team? - No     History of Present Illness: Rui Gu is a 62 y o  female who is originally admitted to the podiatry service due to cellulitis of right foot  We are consulted for diabetes and hypertensive management  62-year-old female admitted due to right diabetic foot ulcer concerning for cellulitis and possible osteomyelitis  This might have been precipitated due to her impaired healing ability as a result of her uncontrolled diabetes in the setting of trauma 2 months ago  Review of Systems:    Review of Systems   Constitutional: Negative for activity change, appetite change, chills, diaphoresis, fatigue and fever  HENT: Negative for ear pain  Eyes: Negative for pain  Respiratory: Negative for apnea, cough, chest tightness, shortness of breath and wheezing  Cardiovascular: Negative for chest pain, palpitations and leg swelling  Gastrointestinal: Negative for abdominal distention, abdominal pain, diarrhea, nausea and vomiting  Genitourinary: Negative for dysuria     Neurological: Negative for dizziness, syncope, speech difficulty and headaches  Past Medical and Surgical History:     Past Medical History:   Diagnosis Date    Bipolar disorder ([facility] [address] )     Depression     Diabetes mellitus ([facility] [address] [facility] )     History of MRSA infection        Past Surgical History:   Procedure Laterality Date    APPENDECTOMY      INCISION AND DRAINAGE OF WOUND Right 7/1/2018    Procedure: INCISION AND DRAINAGE (I&D) RIGHT FOREARM;  Surgeon: Daniel Akhtar MD;  [facility];  Service: Orthopedics    IA SPLIT [address] <100 SQCM Right 10/17/2018    Procedure: RIGHT ARM SKIN GRAFT SPLIT THICKNESS (STSG);   Surgeon: Raj Ambrose MD;  [facility];  Service: Plastics    IA S[address] <100 SQCM Right 9/12/2018    Procedure: Lakia Douglass;  Surgeon: Raj Ambrose MD;  [facility];  Service: Plastics    TONSILLECTOMY      VAC DRESSING APPLICATION Right 38/53/5800    Procedure: Catarino Faster;  Surgeon: Raj Ambrose MD;  [facility];  Service: Plastics    WOUND DEBRIDEMENT Right 7/7/2018    Procedure: DEBRIDEMENT UPPER EXTREMITY ([address]) W/ APPLICATION OF WOUND VAC;  Surgeon: Demian Maharaj MD;  [facility];  Service: Orthopedics    WOUND DEBRIDEMENT Right 7/11/2018    Procedure: DEBRIDEMENT UPPER EXTREMITY WITH WOUND VAC EXCHANGE;  Surgeon: Raji Abernathy DO;  [facility];  Service: Orthopedics    WOUND DEBRIDEMENT Right 9/12/2018    Procedure: DEBRIDEMENT  [facility] OUT) FOREARM with Vac dressing change;  Surgeon: Raj Ambrose MD;  [facility];  Service: Plastics       Meds/Allergies:    all medications and allergies reviewed    Allergies: No Known Allergies    Social History:     Marital Status: Single    Substance Use History:   Social History     Substance and Sexual Activity   Alcohol Use No    Alcohol/week: 0 0 standard drinks    Frequency: Never    Binge frequency: Never     Social History     Tobacco Use   Smoking Status Never Smoker   Smokeless Tobacco Never Used     Social History     Substance and Sexual Activity   Drug Use No    Comment: on methadone, Hx herion addiction       Family History:    non-contributory    Physical Exam:     Vitals:   Blood Pressure: 165/72 (12/07/19 1440)  Pulse: 82 (12/07/19 1440)  Temperature: 100 2 °F (37 9 °C) (12/07/19 1440)  Temp Source: Tympanic (12/07/19 1440)  Respirations: 18 (12/07/19 1440)  Weight - Scale: 76 kg (167 lb 8 8 oz) (12/07/19 1035)  SpO2: 98 % (12/07/19 1440)    Physical Exam   Constitutional: She appears well-nourished  No distress  HENT:   Head: Normocephalic and atraumatic  Eyes: Pupils are equal, round, and reactive to light  EOM are normal    Neck: Neck supple  Cardiovascular: Normal rate, regular rhythm, normal heart sounds and intact distal pulses  Exam reveals no gallop  No murmur heard  Pulmonary/Chest: Effort normal and breath sounds normal  No stridor  No respiratory distress  She has no wheezes  Abdominal: Soft  Bowel sounds are normal  She exhibits no distension  There is no tenderness  There is no guarding  Musculoskeletal: Normal range of motion  Neurological: She is alert  Skin: Skin is warm and dry  Psychiatric: She has a normal mood and affect  Additional Data:     Lab Results: I have personally reviewed pertinent reports        Results from last 7 days   Lab Units 12/07/19  1149   WBC Thousand/uL 7 96   HEMOGLOBIN g/dL 10 6*   HEMATOCRIT % 34 2*   PLATELETS Thousands/uL 306   NEUTROS PCT % 72   LYMPHS PCT % 18   MONOS PCT % 6   EOS PCT % 2     Results from last 7 days   Lab Units 12/07/19  1149   SODIUM mmol/L 126*   POTASSIUM mmol/L 5 0   CHLORIDE mmol/L 92*   CO2 mmol/L 23   BUN mg/dL 25   CREATININE mg/dL 1 24   ANION GAP mmol/L 11   CALCIUM mg/dL 10 0   ALBUMIN g/dL 3 1*   TOTAL BILIRUBIN mg/dL 0 27   ALK PHOS U/L 167*   ALT U/L 20   AST U/L 16   GLUCOSE RANDOM mg/dL 691*             Lab Results   Component Value Date/Time    HGBA1C 13 4 (H) 06/29/2018 09:58 AM     Results from last 7 days   Lab Units 12/07/19  1715 12/07/19  1346   POC GLUCOSE mg/dl 102 455*           Imaging: I have personally reviewed pertinent reports  XR toe great min 2 view RIGHT    (Results Pending)       EKG, Pathology, and Other Studies Reviewed on Admission:   · EKG: None    ** Please Note: This note has been constructed using a voice recognition system   **

## 2019-12-07 NOTE — ASSESSMENT & PLAN NOTE
Follow-up TSH levels  Patient currently not on any medications    No results for input(s): [id number] in the last 72 hours

## 2019-12-07 NOTE — ASSESSMENT & PLAN NOTE
Lab Results   Component Value Date    HGBA1C 13 4 (H) 06/29/2018       Recent Labs     12/07/19  1346 12/07/19  1715   POCGLU 455* 102       Blood Sugar Average: Last 72 hrs:  (P) 278 5     Follow-up A1C  Her home regimen:  Insulin Glargine 30-45U depending on sliding scale   Insulin Aspart U-100 15U TID    Inpatient Regimen:  Insulin Glargine 30U  Insulin Aspart 8U TID  IASS  Will adjust as needed daily

## 2019-12-07 NOTE — ED NOTES
Patient transported to [address] [place] [address], [address] [facility]  12/07/19 1418

## 2019-12-08 LAB
ANION GAP SERPL CALCULATED.3IONS-SCNC: 10 MMOL/L (ref 4–13)
BASOPHILS # BLD AUTO: 0.04 THOUSANDS/ΜL (ref 0–0.1)
BASOPHILS NFR BLD AUTO: 0 % (ref 0–1)
BUN SERPL-MCNC: 18 MG/DL (ref 5–25)
CALCIUM SERPL-MCNC: 9.6 MG/DL (ref 8.3–10.1)
CHLORIDE SERPL-SCNC: 102 MMOL/L (ref 100–108)
CO2 SERPL-SCNC: 25 MMOL/L (ref 21–32)
CREAT SERPL-MCNC: 0.82 MG/DL (ref 0.6–1.3)
EOSINOPHIL # BLD AUTO: 0.32 THOUSAND/ΜL (ref 0–0.61)
EOSINOPHIL NFR BLD AUTO: 3 % (ref 0–6)
ERYTHROCYTE [DISTWIDTH] IN BLOOD BY AUTOMATED COUNT: 13.6 % (ref 11.6–15.1)
GFR SERPL CREATININE-BSD FRML MDRD: 76 ML/MIN/1.73SQ M
GLUCOSE SERPL-MCNC: 105 MG/DL (ref 65–140)
GLUCOSE SERPL-MCNC: 108 MG/DL (ref 65–140)
GLUCOSE SERPL-MCNC: 211 MG/DL (ref 65–140)
GLUCOSE SERPL-MCNC: 235 MG/DL (ref 65–140)
GLUCOSE SERPL-MCNC: 237 MG/DL (ref 65–140)
HCT VFR BLD AUTO: 31.9 % (ref 34.8–46.1)
HGB BLD-MCNC: 10.1 G/DL (ref 11.5–15.4)
IMM GRANULOCYTES # BLD AUTO: 0.08 THOUSAND/UL (ref 0–0.2)
IMM GRANULOCYTES NFR BLD AUTO: 1 % (ref 0–2)
LYMPHOCYTES # BLD AUTO: 2.49 THOUSANDS/ΜL (ref 0.6–4.47)
LYMPHOCYTES NFR BLD AUTO: 25 % (ref 14–44)
MCH RBC QN AUTO: 28.7 PG (ref 26.8–34.3)
MCHC RBC AUTO-ENTMCNC: 31.7 G/DL (ref 31.4–37.4)
MCV RBC AUTO: 91 FL (ref 82–98)
MONOCYTES # BLD AUTO: 0.71 THOUSAND/ΜL (ref 0.17–1.22)
MONOCYTES NFR BLD AUTO: 7 % (ref 4–12)
NEUTROPHILS # BLD AUTO: 6.31 THOUSANDS/ΜL (ref 1.85–7.62)
NEUTS SEG NFR BLD AUTO: 64 % (ref 43–75)
NRBC BLD AUTO-RTO: 0 /100 WBCS
PLATELET # BLD AUTO: 313 THOUSANDS/UL (ref 149–390)
PMV BLD AUTO: 9.4 FL (ref 8.9–12.7)
POTASSIUM SERPL-SCNC: 4.6 MMOL/L (ref 3.5–5.3)
RBC # BLD AUTO: 3.52 MILLION/UL (ref 3.81–5.12)
SODIUM SERPL-SCNC: 137 MMOL/L (ref 136–145)
WBC # BLD AUTO: 9.95 THOUSAND/UL (ref 4.31–10.16)

## 2019-12-08 PROCEDURE — 99233 SBSQ HOSP IP/OBS HIGH 50: CPT | Performed by: PODIATRIST

## 2019-12-08 PROCEDURE — 87075 CULTR BACTERIA EXCEPT BLOOD: CPT | Performed by: STUDENT IN AN ORGANIZED HEALTH CARE EDUCATION/TRAINING PROGRAM

## 2019-12-08 PROCEDURE — 85025 COMPLETE CBC W/AUTO DIFF WBC: CPT | Performed by: STUDENT IN AN ORGANIZED HEALTH CARE EDUCATION/TRAINING PROGRAM

## 2019-12-08 PROCEDURE — 99223 1ST HOSP IP/OBS HIGH 75: CPT | Performed by: INTERNAL MEDICINE

## 2019-12-08 PROCEDURE — 80048 BASIC METABOLIC PNL TOTAL CA: CPT | Performed by: STUDENT IN AN ORGANIZED HEALTH CARE EDUCATION/TRAINING PROGRAM

## 2019-12-08 PROCEDURE — 87205 SMEAR GRAM STAIN: CPT | Performed by: STUDENT IN AN ORGANIZED HEALTH CARE EDUCATION/TRAINING PROGRAM

## 2019-12-08 PROCEDURE — 87070 CULTURE OTHR SPECIMN AEROBIC: CPT | Performed by: STUDENT IN AN ORGANIZED HEALTH CARE EDUCATION/TRAINING PROGRAM

## 2019-12-08 PROCEDURE — 99232 SBSQ HOSP IP/OBS MODERATE 35: CPT | Performed by: STUDENT IN AN ORGANIZED HEALTH CARE EDUCATION/TRAINING PROGRAM

## 2019-12-08 PROCEDURE — 82948 REAGENT STRIP/BLOOD GLUCOSE: CPT

## 2019-12-08 RX ORDER — VANCOMYCIN HYDROCHLORIDE 1 G/200ML
1000 INJECTION, SOLUTION INTRAVENOUS EVERY 12 HOURS
Status: DISCONTINUED | OUTPATIENT
Start: 2019-12-08 | End: 2019-12-11

## 2019-12-08 RX ORDER — DOCUSATE SODIUM 100 MG/1
100 CAPSULE, LIQUID FILLED ORAL 2 TIMES DAILY
Status: DISCONTINUED | OUTPATIENT
Start: 2019-12-08 | End: 2019-12-12 | Stop reason: HOSPADM

## 2019-12-08 RX ORDER — METRONIDAZOLE 500 MG/1
500 TABLET ORAL EVERY 8 HOURS SCHEDULED
Status: DISCONTINUED | OUTPATIENT
Start: 2019-12-08 | End: 2019-12-11

## 2019-12-08 RX ADMIN — CLONAZEPAM 1 MG: 1 TABLET ORAL at 21:07

## 2019-12-08 RX ADMIN — INSULIN LISPRO 2 UNITS: 100 INJECTION, SOLUTION INTRAVENOUS; SUBCUTANEOUS at 21:07

## 2019-12-08 RX ADMIN — LAMOTRIGINE 150 MG: 100 TABLET ORAL at 08:55

## 2019-12-08 RX ADMIN — INSULIN LISPRO 6 UNITS: 100 INJECTION, SOLUTION INTRAVENOUS; SUBCUTANEOUS at 16:33

## 2019-12-08 RX ADMIN — GABAPENTIN 300 MG: 300 CAPSULE ORAL at 21:07

## 2019-12-08 RX ADMIN — DOCUSATE SODIUM 100 MG: 100 CAPSULE, LIQUID FILLED ORAL at 19:46

## 2019-12-08 RX ADMIN — ROPINIROLE 0.5 MG: 1 TABLET, FILM COATED ORAL at 17:18

## 2019-12-08 RX ADMIN — VANCOMYCIN HYDROCHLORIDE 1000 MG: 1 INJECTION, SOLUTION INTRAVENOUS at 22:04

## 2019-12-08 RX ADMIN — GABAPENTIN 300 MG: 300 CAPSULE ORAL at 16:30

## 2019-12-08 RX ADMIN — CLONAZEPAM 1 MG: 1 TABLET ORAL at 16:30

## 2019-12-08 RX ADMIN — ATORVASTATIN CALCIUM 40 MG: 40 TABLET, FILM COATED ORAL at 16:30

## 2019-12-08 RX ADMIN — GABAPENTIN 300 MG: 300 CAPSULE ORAL at 08:56

## 2019-12-08 RX ADMIN — CLONAZEPAM 1 MG: 1 TABLET ORAL at 08:56

## 2019-12-08 RX ADMIN — ROPINIROLE 0.5 MG: 1 TABLET, FILM COATED ORAL at 08:56

## 2019-12-08 RX ADMIN — DULOXETINE HYDROCHLORIDE 60 MG: 60 CAPSULE, DELAYED RELEASE ORAL at 08:56

## 2019-12-08 RX ADMIN — VANCOMYCIN HYDROCHLORIDE 1000 MG: 1 INJECTION, SOLUTION INTRAVENOUS at 11:41

## 2019-12-08 RX ADMIN — METRONIDAZOLE 500 MG: 500 TABLET ORAL at 17:18

## 2019-12-08 RX ADMIN — INSULIN LISPRO 6 UNITS: 100 INJECTION, SOLUTION INTRAVENOUS; SUBCUTANEOUS at 12:31

## 2019-12-08 RX ADMIN — HYDROXYZINE HYDROCHLORIDE 50 MG: 25 TABLET ORAL at 22:04

## 2019-12-08 RX ADMIN — INSULIN LISPRO 2 UNITS: 100 INJECTION, SOLUTION INTRAVENOUS; SUBCUTANEOUS at 16:33

## 2019-12-08 RX ADMIN — METRONIDAZOLE 500 MG: 500 TABLET ORAL at 22:04

## 2019-12-08 RX ADMIN — ENOXAPARIN SODIUM 40 MG: 40 INJECTION SUBCUTANEOUS at 08:56

## 2019-12-08 RX ADMIN — INSULIN LISPRO 2 UNITS: 100 INJECTION, SOLUTION INTRAVENOUS; SUBCUTANEOUS at 12:31

## 2019-12-08 RX ADMIN — INSULIN GLARGINE 30 UNITS: 100 INJECTION, SOLUTION SUBCUTANEOUS at 08:57

## 2019-12-08 RX ADMIN — SERTRALINE HYDROCHLORIDE 100 MG: 100 TABLET ORAL at 08:56

## 2019-12-08 NOTE — ASSESSMENT & PLAN NOTE
Follow-up Free T4 levels  Patient currently not on any medications   Will consider starting her depending on Free T4 levels    Recent Labs     12/07/19  1149   [id number] 8 183*

## 2019-12-08 NOTE — ASSESSMENT & PLAN NOTE
Lab Results   Component Value Date    HGBA1C 13 4 (H) 06/29/2018       Recent Labs     12/07/19  1925 12/07/19  2104 12/08/19  0732 12/08/19  1108   POCGLU 138 104 105 235*       Blood Sugar Average: Last 72 hrs:  (P) 204 [id number]     Follow-up A1C  Her home regimen:  Insulin Glargine 30-45U depending on sliding scale   Insulin Aspart U-100 15U TID    Inpatient Regimen:  Insulin Glargine 30U  Insulin Aspart 8U TID  IASS  Will adjust as needed daily

## 2019-12-08 NOTE — ASSESSMENT & PLAN NOTE
States she takes Methadone 90mg daily  Read documentation from May 2019 by her PCP who stated that she was on remission  However, patient states she went back to Methadone from then on due to concerns for relapse  I do not have direct evidence of this dosage  Attempt to reach out to [facility] [phone] and left a voicemail twice today  Still awaiting call back

## 2019-12-08 NOTE — ASSESSMENT & PLAN NOTE
60-year-old female with hypertension, hyperlipidemia, hypothyroidism, diabetes type 2 admitted due to cellulitis of her great toe of her right foot  - Antibiotics care of primary team  Currently on IV Vancomycin  Awaiting ID recommendations  - Operative management care of primary team  - Preoperative risk stratification done  See note on 12/7/2019

## 2019-12-08 NOTE — UTILIZATION REVIEW
Initial Clinical Review    Admission: Date/Time/Statement: Inpatient Admission Orders (From admission, onward)     Ordered        12/07/19 1158  Inpatient Admission (expected length of stay for this patient Order details is greater than two midnights)  Once                   Orders Placed This Encounter   Procedures    Inpatient Admission (expected length of stay for this patient Order details is greater than two midnights)     Standing Status:   Standing     Number of Occurrences:   1     Order Specific Question:   Admitting Physician     Answer:   Deepika Mars     Order Specific Question:   Level of Care     Answer:   Med Surg [16]     Order Specific Question:   Estimated length of stay     Answer:   More than 2 Midnights     Order Specific Question:   Certification     Answer:   I certify that inpatient services are medically necessary for this patient for a duration of greater than two midnights  See H&P and MD Progress Notes for additional information about the patient's course of treatment  ED Arrival Information     Expected Arrival Acuity Means of Arrival Escorted By Service Admission Type    - 12/7/2019 10:25 Urgent Walk-In Self General Medicine Urgent    Arrival Complaint    Wound infection        Chief Complaint   Patient presents with    Toe Injury     Pt reports that she had R foot steepped on last month  Reports that she has had swelling and redness a few days ago  Denies fevers  Hx DM  Reports taking "left over Amoxicillin"     Assessment/Plan: C/o R great toe pain since her daughter stomped on her R foot 2 months ago  2 weeks ago the R great toe nail fell off and then over the past week, it became red,swollen, has some drainage and a blackened area to the top of the toe  On exam: R great toe , nail is removed, diffuse swelling and redness to entire toe, necrotic area to top of toe about 2x2cm area, +some sensation intact, some yellowish drainage present, pulse 2/4  Glu > 600 on admission  Pt admits to medication non compliance  Admitted to IP with  Right Hallux Wound - probes to bone with cellulitis  Right great toe xray suspicious for Osteomyelitis  Amarilys Domingueznatali Uncontrolled DM  Plan IV Abx's, BC's pending, consult Medicine  High risk of toe amputation  12/8: + Pain R great toe  Continues with Abxs   SSI coverage     ED Triage Vitals   Temperature Pulse Respirations Blood Pressure SpO2   12/07/19 1035 12/07/19 1035 12/07/19 1035 12/07/19 1037 12/07/19 1035   97 6 °F (36 4 °C) (!) 106 18 (!) 198/86 100 %      Temp Source Heart Rate Source Patient Position - Orthostatic VS BP Location FiO2 (%)   12/07/19 1035 12/07/19 1035 12/07/19 1035 12/07/19 1035 --   Oral Monitor Sitting Right arm       Pain Score       12/07/19 1035       6        Wt Readings from Last 1 Encounters:   12/07/19 76 kg (167 lb 8 8 oz)     Additional Vital Signs:   12/08/19 0730  98 °F (36 7 °C) 76 18 125/60 100 %  Lying   12/07/19 2239  97 2 °F (36 2 °C)  84 18 148/65 96 % None (Room air) Lying   12/07/19 1440  100 2 °F (37 9 °C) 82 18 165/72 98 % None (Room air) Lying   12/07/19 1234   82 18 173/74 100 % None (Room air) Lying       Pertinent Labs/Diagnostic Test Results:   Results from last 7 days   Lab Units 12/08/19  0538 12/07/19  1149   WBC Thousand/uL 9 95 7 96   HEMOGLOBIN g/dL 10 1* 10 6*   HEMATOCRIT % 31 9* 34 2*   PLATELETS Thousands/uL 313 306   NEUTROS ABS Thousands/µL 6 31 5 83     Results from last 7 days   Lab Units 12/08/19  0538 12/07/19  1149   SODIUM mmol/L 137 126*   POTASSIUM mmol/L 4 6 5 0   CHLORIDE mmol/L 102 92*   CO2 mmol/L 25 23   ANION GAP mmol/L 10 11   BUN mg/dL 18 25   CREATININE mg/dL 0 82 1 24   EGFR ml/min/1 73sq m 76 46   CALCIUM mg/dL 9 6 10 0     Results from last 7 days   Lab Units 12/07/19  1149   AST U/L 16   ALT U/L 20   ALK PHOS U/L 167*   TOTAL PROTEIN g/dL 8 8*   ALBUMIN g/dL 3 1*   TOTAL BILIRUBIN mg/dL 0 27     Results from last 7 days   Lab Units 12/08/19  1108 12/08/19  0732 12/07/19  9219 12/07/19  1925 12/07/19  1715 12/07/19  1346   POC GLUCOSE mg/dl 235* 105 104 138 102 455*     Results from last 7 days   Lab Units 12/08/19  0538 12/07/19  1149   GLUCOSE RANDOM mg/dL 108 691*     BETA-HYDROXYBUTYRATE   Date Value Ref Range Status   12/07/2019 0 0 <0 6 mmol/L Final      Results from last 7 days   Lab Units 12/07/19  1233   PH MARK  7 403*   PCO2 MARK mm Hg 32 4*   PO2 MARK mm Hg 61 4*   HCO3 MARK mmol/L 19 8*   BASE EXC MARK mmol/L -4 3   O2 CONTENT MARK ml/dL 13 2   O2 HGB, VENOUS % 89 7*     Results from last 7 days   Lab Units 12/07/19  1149   TSH 3RD GENERATON uIU/mL 8 183*     Results from last 7 days   Lab Units 12/07/19  1830 12/07/19  1149   CRP mg/L  --  >90 0*   SED RATE mm/hour 88*  --      Results from last 7 days   Lab Units 12/07/19  1149 12/07/19  1131   BLOOD CULTURE  Received in Microbiology Lab  Culture in Progress  Received in Microbiology Lab  Culture in Progress  12/7 Xray Right Great Toe: Findings suspicious for osteomyelitis involving the 1st distal phalanx        ED Treatment:   Medication Administration from 12/07/2019 1025 to 12/07/2019 1436       Date/Time Order Dose Route Action     12/07/2019 1150 sodium chloride 0 9 % bolus 500 mL 500 mL Intravenous New Bag     12/07/2019 1155 clindamycin (CLEOCIN) IVPB (premix) 600 mg 600 mg Intravenous New Bag     12/07/2019 1203 morphine (PF) 4 mg/mL injection 4 mg 4 mg Intravenous Given     12/07/2019 1236 insulin regular (HumuLIN R,NovoLIN R) injection 15 Units 15 Units Subcutaneous Given     12/07/2019 1302 sodium chloride 0 9 % bolus 1,000 mL 1,000 mL Intravenous New Bag     12/07/2019 1234 sodium chloride 0 9 % bolus 500 mL 500 mL Intravenous New Bag     12/07/2019 1404 insulin regular (HumuLIN R,NovoLIN R) injection 15 Units 15 Units Subcutaneous Given        Past Medical History:   Diagnosis Date    [facility] ([facility] [address] )     Depression     [facility]     History of MRSA infection      Present on Admission:   Bipolar affective disorder ([facility] [address] )   Benign essential hypertension   Hyperlipidemia   Hypothyroidism   Type 2 diabetes mellitus, uncontrolled (HCC)   Chronic hepatitis C without hepatic coma (HCC)      Admitting Diagnosis: Wound infection [T14  8XXA, L08 9]  Contusion of great toe of right foot [S90 111A]  Cellulitis of great toe of right foot [L03 031]  Age/Sex: 62 y o  female     Admission Orders:  Scheduled Medications:  Medications:  atorvastatin 40 mg Oral Daily With Dinner   clonazePAM 1 mg Oral TID   DULoxetine 60 mg Oral Daily   enoxaparin 40 mg Subcutaneous Daily   gabapentin 300 mg Oral TID   hydrOXYzine HCL 50 mg Oral HS   insulin glargine 30 Units Subcutaneous QAM   insulin lispro 1-5 Units Subcutaneous TID AC   insulin lispro 1-5 Units Subcutaneous HS   insulin lispro 6 Units Subcutaneous TID AC   lamoTRIgine 150 mg Oral Daily   rOPINIRole 0 5 mg Oral BID   sertraline 100 mg Oral Daily   vancomycin 1,000 mg Intravenous Q12H     Continuous IV Infusions:  PRN Meds:  acetaminophen 650 mg Oral Q6H PRN   oxyCODONE 5 mg Oral Q6H PRN       IP CONSULT TO INTERNAL MEDICINE  IP CONSULT TO PHARMACY  SCD's    Network Utilization Review Department  [email or web address]  ATTENTION: Please call with any questions or concerns to [phone] and carefully listen to the prompts so that you are directed to the right person  All voicemails are confidential   Wade Lazar all requests for admission clinical reviews, approved or denied determinations and any other requests to dedicated fax number below belonging to the campus where the patient is receiving treatment   List of dedicated fax numbers for the Facilities:  [address] DENIALS (Administrative/Medical Necessity) [phone]   [address] [place] [address] (Maternity/NICU/Pediatrics) [phone]   [facility] [address] [phone]   [facility] [phone]     [address] [phone]   [facility] [phone]   [place] [address] [phone]   [address] [place] [address] [phone]

## 2019-12-08 NOTE — PROGRESS NOTES
Progress Note - Nury Lim 1955, 62 y o  female MRN: [id number]    Unit/Bed#: E5 -01 Encounter: [id number]    Primary Care Provider: Rian Patterson MD   Date and time admitted to hospital: 12/7/2019 10:38 AM        * Cellulitis of great toe of right foot  Assessment & Plan   81-year-old female with hypertension, hyperlipidemia, hypothyroidism, diabetes type 2 admitted due to cellulitis of her great toe of her right foot  - Antibiotics care of primary team  Currently on IV Vancomycin  Awaiting ID recommendations  - Operative management care of primary team  - Preoperative risk stratification done  See note on 12/7/2019  [facility] use ([facility] )  Assessment & Plan  States she takes Methadone 90mg daily  Read documentation from May 2019 by her PCP who stated that she was on remission  However, patient states she went back to Methadone from then on due to concerns for relapse  I do not have direct evidence of this dosage  Attempt to reach out to [facility] [phone] and left a voicemail twice today  Still awaiting call back  Hypothyroidism  Assessment & Plan  Follow-up Free T4 levels  Patient currently not on any medications   Will consider starting her depending on Free T4 levels    Recent Labs     12/07/19  1149   [id number] 8 183*         Hyperlipidemia  Assessment & Plan  Continue statin    Type 2 diabetes mellitus, uncontrolled ([facility] )  Assessment & Plan  Lab Results   Component Value Date    HGBA1C 13 4 (H) 06/29/2018       Recent Labs     12/07/19  1925 12/07/19  2104 12/08/19  0732 12/08/19  1108   POCGLU 138 104 105 235*       Blood Sugar Average: Last 72 hrs:  (P) 024 [id number]     Follow-up A1C  Her home regimen:  Insulin Glargine 30-45U depending on sliding scale   Insulin Aspart U-100 15U TID    Inpatient Regimen:  Insulin Glargine 30U  Insulin Aspart 8U TID  IASS  Will adjust as needed daily    Benign essential hypertension  Assessment & Plan  Continue current medications    VTE Pharmacologic Prophylaxis:   Pharmacologic: Enoxaparin (Lovenox)  Mechanical VTE Prophylaxis in Place: Yes    Patient Centered Rounds: I have performed bedside rounds with nursing staff today  Discussions with Specialists or Other Care Team Provider: Nursing    Education and Discussions with Family / Patient: Patient    Time Spent for Care: 30 minutes  More than 50% of total time spent on counseling and coordination of care as described above  Current Length of Stay: 1 day(s)    Current Patient Status: Inpatient   Certification Statement: The patient will continue to require additional inpatient hospital stay due to IV antibiotics    Discharge Plan: C/o primary team    Code Status: Level 1 - Full Code    Subjective:   Patient seen and examined at bedside  No acute events or complaints overnight  Objective:     Vitals:   Temp (24hrs), Av 5 °F (36 9 °C), Min:97 2 °F (36 2 °C), Max:100 2 °F (37 9 °C)    Temp:  [97 2 °F (36 2 °C)-100 2 °F (37 9 °C)] 98 °F (36 7 °C)  HR:  [76-84] 76  Resp:  [18] 18  BP: (125-165)/(60-72) 125/60  SpO2:  [96 %-100 %] 100 %  Body mass index is 27 04 kg/m²  Input and Output Summary (last 24 hours):     No intake or output data in the 24 hours ending 19 1418    Physical Exam:     Physical Exam   Constitutional: No distress  HENT:   Head: Normocephalic and atraumatic  Eyes: Pupils are equal, round, and reactive to light  Conjunctivae and EOM are normal  No scleral icterus  Neck: Normal range of motion  No JVD present  Cardiovascular: Normal rate, regular rhythm, normal heart sounds and intact distal pulses  Exam reveals no gallop and no friction rub  No murmur heard  Pulmonary/Chest: Effort normal and breath sounds normal  No stridor  No respiratory distress  She has no wheezes  She has no rales  Abdominal: Soft  Bowel sounds are normal  She exhibits no distension  There is no tenderness  There is no rebound and no guarding  Musculoskeletal: Normal range of motion  She exhibits no edema or tenderness  Right hallux wound   Neurological: She is alert  Skin: Skin is warm and dry  Psychiatric: She has a normal mood and affect  Vitals reviewed  Additional Data:     Labs:    Results from last 7 days   Lab Units 12/08/19  0538   WBC Thousand/uL 9 95   HEMOGLOBIN g/dL 10 1*   HEMATOCRIT % 31 9*   PLATELETS Thousands/uL 313   NEUTROS PCT % 64   LYMPHS PCT % 25   MONOS PCT % 7   EOS PCT % 3     Results from last 7 days   Lab Units 12/08/19  0538 12/07/19  1149   SODIUM mmol/L 137 126*   POTASSIUM mmol/L 4 6 5 0   CHLORIDE mmol/L 102 92*   CO2 mmol/L 25 23   BUN mg/dL 18 25   CREATININE mg/dL 0 82 1 24   ANION GAP mmol/L 10 11   CALCIUM mg/dL 9 6 10 0   ALBUMIN g/dL  --  3 1*   TOTAL BILIRUBIN mg/dL  --  0 27   ALK PHOS U/L  --  167*   ALT U/L  --  20   AST U/L  --  16   GLUCOSE RANDOM mg/dL 108 691*         Results from last 7 days   Lab Units 12/08/19  1108 12/08/19  0732 12/07/19  2104 12/07/19  1925 12/07/19  1715 12/07/19  1346   POC GLUCOSE mg/dl 235* 105 104 138 102 455*                   * I Have Reviewed All Lab Data Listed Above  * Additional Pertinent Lab Tests Reviewed: [place] [facility] Admission Reviewed    Imaging:    Imaging Reports Reviewed Today Include: No imaging    Recent Cultures (last 7 days):     Results from last 7 days   Lab Units 12/07/19  1149 12/07/19  1131   BLOOD CULTURE  Received in Microbiology Lab  Culture in Progress  Received in Microbiology Lab  Culture in Progress         Last 24 Hours Medication List:     Current Facility-Administered Medications:  acetaminophen 650 mg Oral Q6H PRN Mindy Balling, DPM    atorvastatin 40 mg Oral Daily With [employer], DPM    clonazePAM 1 mg Oral TID Mindy Balling, DPM    DULoxetine 60 mg Oral Daily Adelaidadianelys Kaminski, DPM    enoxaparin 40 mg Subcutaneous Daily Mindy Balling, DPM    gabapentin 300 mg Oral TID Mindy Balling, DPM    hydrOXYzine HCL 50 mg Oral HS Michelle Pacheco DPM    insulin glargine 30 Units Subcutaneous QAM Sabi Irby MD    insulin lispro 1-5 Units Subcutaneous TID AC Adelaida Kaminski DPM    insulin lispro 1-5 Units Subcutaneous HS Adelaida Kaminski DPM    insulin lispro 6 Units Subcutaneous TID AC Michelle Pacheco DPM    lamoTRIgine 150 mg Oral Daily Michelle Pacheco DPM    oxyCODONE 5 mg Oral Q6H PRN Michelle Pacheco DPM    rOPINIRole 0 5 mg Oral BID Michelle Pacheco DPM    sertraline 100 mg Oral Daily Michelle Pacheco DPM    vancomycin 1,000 mg Intravenous Q12H Saen Alicea DPM Last Rate: 1,000 mg (12/08/19 1141)        Today, Patient Was Seen By: Jeronimo Coffey MD    ** Please Note: Dictation voice to text software may have been used in the creation of this document   **

## 2019-12-08 NOTE — PLAN OF CARE
Problem: Potential for Falls  Goal: Patient will remain free of falls  Description  INTERVENTIONS:  - Assess patient frequently for physical needs  -  Identify cognitive and physical deficits and behaviors that affect risk of falls    -  [place] fall precautions as indicated by assessment   - Educate patient/family on patient safety including physical limitations  - Instruct patient to call for assistance with activity based on assessment  - Modify environment to reduce risk of injury  - Consider OT/PT consult to assist with strengthening/mobility  Outcome: Progressing

## 2019-12-08 NOTE — CONSULTS
Consultation - Infectious Disease   Sheryle Dan 62 y o  female MRN: [id number]  Unit/Bed#: E5 -01 Encounter: [id number]      IMPRESSION & RECOMMENDATIONS:   Impression/Recommendations: This is a 62 y o  female, with poorly controlled and poorly compliant DM, has a right great toe ulcer that has been neglected for the last 2 months, now admitted with right great toe cellulitis and osteomyelitis  1  Right great toe cellulitis  Source is most likely the chronic right toe ulcer  Patient has no fever or leukocytosis but has definite clinical cellulitis  With prior history of MRSA skin and soft tissue infection, this is the likely pathogen again  Vancomycin is appropriate  Given necrotic ulcer, will add anaerobic coverage  Fortunately, patient is systemically well, without evidence of toxicity or hemodynamic instability  Admission blood cultures are negative so far  Continue IV vancomycin  Add Flagyl for anaerobic coverage  Monitor temperature/WBC  Serial toe exams  Follow-up on pending blood cultures  2  Osteomyelitis of distal phalanx of right great toe, evidence of x-ray  Clinically, probability of osteomyelitis is also high, given chronicity of ulcer and neglect  MRI is pending  Patient will need at least amputation of distal phalanx  Given very poor quality of soft tissue of the whole toe, she may need amputation of the whole toe  I would defer the decision to Podiatry  Antibiotic plan as in above  Follow-up on MRI  Recommend amputation of toe, at least distal phalanx  3  Poorly controlled and poorly compliant DM, with patient not taking any medicine for her DM for quite a while  She was hyperglycemic on admission with blood sugar of > 600  This clearly contributes to poor wound healing and infection above  Patient counseled regarding the need to better control her blood sugar  Management per Medicine service      4  Peripheral neuropathy, most likely secondary to poorly controlled DM  This contributes to development of ulcer above  Patient counseled regarding the need to seek podiatry care as soon as foot/toe ulcer is discovered  Prior hospitalization records reviewed in detail  Discussed with patient in detail regarding the above plan  Thank you for this consultation  We will follow along with you  HISTORY OF PRESENT ILLNESS:  Reason for Consult:  Right great toe cellulitis and osteomyelitis  HPI: Joann Baxter is a 62 y o  female, with history of poorly controlled and poorly compliant DM, developed an ulcer of her right great toe approximately 2 months ago when her daughter accidentally stepped on her toe  Patient did not see care  She started noticing swelling, redness and pain of the toe a few days ago, prompting her to go to the ER yesterday  On presentation, patient did not have fever or leukocytosis  However, she had a deep distal right great toe ulcer with obvious cellulitis  In addition, x-ray show osteomyelitis of distal phalanx  Patient was admitted  IV vancomycin was started  We are asked to evaluate the patient  Of note, on admission, patient's blood sugar was 600  At present, patient is comfortable at rest   She has no pain in the toe at rest   However, she has pain when the toe is palpated  No fever or chills at home  Although patient has known DM, she has not been taking her medications at home and has not been checking her blood sugars  Of note, patient has had multiple admissions for extremity infections  During admission in July of last year for right arm abscess, patient had MRSA  REVIEW OF SYSTEMS:  A complete 12 point system-based review was done  Except for what is noted in HPI above, ROS of systems is otherwise negative      PAST MEDICAL HISTORY:  Past Medical History:   Diagnosis Date    Bipolar disorder ([facility] )     Depression     Diabetes mellitus ([facility] )     History of MRSA infection      Past Surgical History: Procedure Laterality Date    APPENDECTOMY      INCISION AND DRAINAGE OF WOUND Right 7/1/2018    Procedure: INCISION AND DRAINAGE (I&D) RIGHT FOREARM;  Surgeon: Flora Kilgore MD;  [facility];  Service: Orthopedics    UT SPLIT [address] <100 SQCM Right 10/17/2018    Procedure: RIGHT ARM SKIN GRAFT SPLIT THICKNESS (STSG); Surgeon: Santi Waite MD;  [facility];  Service: Plastics    [address] <100 SQCM Right 9/12/2018    Procedure: Jasmine Hunter;  Surgeon: Santi Waite MD;  [facility];  Service: Plastics    TONSILLECTOMY      VAC DRESSING APPLICATION Right 45/58/6771    Procedure: Abel Dejesuse;  Surgeon: Santi Waite MD;  [facility];  Service: Plastics    WOUND DEBRIDEMENT Right 7/7/2018    Procedure: DEBRIDEMENT UPPER EXTREMITY ([address]) W/ APPLICATION OF WOUND VAC;  Surgeon: Nacho Mcnally MD;  [facility];  Service: Orthopedics    WOUND DEBRIDEMENT Right 7/11/2018    Procedure: DEBRIDEMENT UPPER EXTREMITY WITH WOUND VAC EXCHANGE;  Surgeon: Alex Moody DO;  [facility];  Service: Orthopedics    WOUND DEBRIDEMENT Right 9/12/2018    Procedure: DEBRIDEMENT  [facility] OUT) FOREARM with Vac dressing change;  Surgeon: Santi Waite MD;  [facility];  Service: Plastics     Problem list reviewed  FAMILY HISTORY:  Non-contributory    SOCIAL HISTORY:  Social History     Substance and Sexual Activity   Alcohol Use No    Alcohol/week: 0 0 standard drinks    Frequency: Never    Binge frequency: Never     Social History     Substance and Sexual Activity   Drug Use No    Comment: on methadone, Hx herion addiction     Social History     Tobacco Use   Smoking Status Never Smoker   Smokeless Tobacco Never Used       ALLERGIES:  No Known Allergies    MEDICATIONS:  All current active medications have been reviewed  Patient is currently on IV vancomycin      PHYSICAL EXAM:  Vitals:  Temp:  [97 2 °F (36 2 °C)-98 °F (36 7 °C)] 97 5 °F (36 4 °C)  HR:  [74-84] 74  Resp:  [18] 18  BP: (119-148)/(60-78) 119/78  SpO2:  [96 %-100 %] 100 %  Temp (24hrs), Av 6 °F (36 4 °C), Min:97 2 °F (36 2 °C), Max:98 °F (36 7 °C)  Current: Temperature: 97 5 °F (36 4 °C)     Physical Exam:  General:  Well-nourished, well-developed, in no acute distress  Awake, alert and oriented x 3  Eyes:  Conjunctive clear with no hemorrhages or effusions  Oropharynx:  No ulcers, no lesions, pharynx benign, no tonsillitis  Neck:  Supple, no lymphadenopathy, no mass, nontender  Lungs:  Expansion symmetric, no rales, no wheezing, no accessory muscle use  Cardiac:  Regular rate and rhythm, normal S1, normal S2, no murmurs  Abdomen:  Soft, nondistended, non-tender, no HSM  Extremities:  1+ leg edema  Right great toe with necrotic ulcer distally  No purulence  There is surrounding erythema/warmth involving the whole toe with extension to distal foot  Mild tenderness in toe  No fluctuance  Skin:  No rashes, no ulcers  Neurological:  Moves all four extremities spontaneously, decreased sensation    LABS, IMAGING, & OTHER STUDIES:  Lab Results:  I have personally reviewed pertinent labs  Results from last 7 days   Lab Units 19  0538 19  1149   POTASSIUM mmol/L 4 6 5 0   CHLORIDE mmol/L 102 92*   CO2 mmol/L 25 23   BUN mg/dL 18 25   CREATININE mg/dL 0 82 1 24   EGFR ml/min/1 73sq m 76 46   CALCIUM mg/dL 9 6 10 0   AST U/L  --  16   ALT U/L  --  20   ALK PHOS U/L  --  167*     Results from last 7 days   Lab Units 19  0538 19  1149   WBC Thousand/uL 9 95 7 96   HEMOGLOBIN g/dL 10 1* 10 6*   PLATELETS Thousands/uL 313 306     Results from last 7 days   Lab Units 19  1149 19  1131   BLOOD CULTURE  Received in Microbiology Lab  Culture in Progress  Received in Microbiology Lab  Culture in Progress         Imaging Studies:   I have personally reviewed pertinent imaging study reports and images in PACS  Right foot x-ray reviewed personally  Osteomyelitis of 1st distal phalanx  EKG, Pathology, and Other Studies:   I have personally reviewed pertinent reports

## 2019-12-08 NOTE — PROGRESS NOTES
Vancomycin Assessment    Rui Gu is a 62 y o  female who is currently receiving vancomycin 1500mg q24h for osteomyelitis     Relevant clinical data and objective history reviewed:  Creatinine   Date Value Ref Range Status   12/08/2019 0 82 0 60 - 1 30 mg/dL Final     Comment:     Standardized to IDMS reference method   12/07/2019 1 24 0 60 - 1 30 mg/dL Final     Comment:     Standardized to IDMS reference method   11/08/2018 0 80 0 60 - 1 30 mg/dL Final     Comment:     Standardized to IDMS reference method     /60 (BP Location: Left arm)   Pulse 76   Temp 98 °F (36 7 °C) (Temporal)   Resp 18   Wt 76 kg (167 lb 8 8 oz)   SpO2 100%   BMI 27 04 kg/m²   I/O last 3 completed shifts: In: 550 [IV Piggyback:550]  Out: -   Lab Results   Component Value Date/Time    BUN 18 12/08/2019 05:38 AM    BUN 13 10/04/2018 08:15 AM    WBC 9 95 12/08/2019 05:38 AM    HGB 10 1 (L) 12/08/2019 05:38 AM    HCT 31 9 (L) 12/08/2019 05:38 AM    MCV 91 12/08/2019 05:38 AM     12/08/2019 05:38 AM     Temp Readings from Last 3 Encounters:   12/08/19 98 °F (36 7 °C) (Temporal)   05/15/19 97 6 °F (36 4 °C) (Temporal)   11/08/18 (!) 97 4 °F (36 3 °C) (Temporal)     Vancomycin Days of Therapy:2    Assessment/Plan  The patient is currently on vancomycin utilizing scheduled dosing  Baseline risks associated with therapy include: advanced age  The patient is receiving 1500mg q24h and  since the CrCl has increased to 57  therefore, after clinical evaluation will be changed to 1000mg q12h   Pharmacy will continue to follow closely for s/sx of nephrotoxicity, infusion reactions and appropriateness of therapy  BMP and CBC will be ordered per protocol  Plan for trough as patient approaches steady state, prior to the 4th  dose at approximately 12/9/19 at [address] will continue to follow the patients culture results and clinical progress daily      Wai Rodarte, Pharmacist

## 2019-12-08 NOTE — PROGRESS NOTES
Progress Note - Podiatry  Fabiana Saldaña 62 y o  female MRN: [id number]  Unit/Bed#: E5 -01 Encounter: [id number]    Assessment:    63 y/o female presents with right hallux wound/OM of distal phalanx and cellulitis      1  Uncontrolled DM type 2   2  Essential hypertension  3  Hyperlipidemia  4  Hypothyroidism  5  History of MRSA      Plan:  - Xrays reviewed: Findings suspicious for osteomyelitis involving the 1st distal phalanx  - Will plan for OR for partial vs  Total right hallux amputation, Pending MRI    - continue IV abx Vancomycin with pharmacy consult   - wound cultures pending   - ID consult pending   - appreciate SLIM for medical management   - will update attending      Subjective/Objective   Chief Complaint:   Chief Complaint   Patient presents with    Toe Injury     Pt reports that she had R foot steepped on last month  Reports that she has had swelling and redness a few days ago  Denies fevers  Hx DM  Reports taking "left over Amoxicillin"       Subjective: 62 y o  y/o female was seen and evaluated at bedside  Patient denies any acute events overnight  Blood pressure 125/60, pulse 76, temperature 98 °F (36 7 °C), temperature source Temporal, resp  rate 18, weight 76 kg (167 lb 8 8 oz), SpO2 100 %  ,Body mass index is 27 04 kg/m²  Invasive Devices     Peripheral Intravenous Line            Peripheral IV 12/07/19 Right Hand less than 1 day          Drain            Negative Pressure Wound Therapy (V A C ) Arm Distal 417 days                Physical Exam:   General: Alert, cooperative and no distress  Lungs: Non labored breathing  Heart: Positive S1, S2  Abdomen: Soft, non-tender  Extremity: right hallux wound with purulent drainage and mild malodor noted today  Probes to bone  Wound base mostly fibrotic  Hyperkeratotitc periwound  Cellulitis appears to be regressing             Lab, Imaging and other studies:   CBC:   Lab Results   Component Value Date    WBC 9 95 12/08/2019    HGB 10 1 (L) 12/08/2019    HCT 31 9 (L) 12/08/2019    MCV 91 12/08/2019     12/08/2019    MCH 28 7 12/08/2019    MCHC 31 7 12/08/2019    RDW 13 6 12/08/2019    MPV 9 4 12/08/2019    NRBC 0 12/08/2019   , CMP:   Lab Results   Component Value Date    SODIUM 137 12/08/2019    K 4 6 12/08/2019     12/08/2019    CO2 25 12/08/2019    BUN 18 12/08/2019    CREATININE 0 82 12/08/2019    CALCIUM 9 6 12/08/2019    AST 16 12/07/2019    ALT 20 12/07/2019    ALKPHOS 167 (H) 12/07/2019    EGFR 76 12/08/2019       Imaging: I have personally reviewed pertinent films in PACS  EKG, Pathology, and Other Studies: I have personally reviewed pertinent reports    VTE Pharmacologic Prophylaxis: Sequential compression device (Venodyne)

## 2019-12-09 ENCOUNTER — APPOINTMENT (INPATIENT)
Dept: MRI IMAGING | Facility: HOSPITAL | Age: 64
DRG: 617 | End: 2019-12-09
Payer: COMMERCIAL

## 2019-12-09 ENCOUNTER — ANESTHESIA EVENT (INPATIENT)
Dept: PERIOP | Facility: HOSPITAL | Age: 64
DRG: 617 | End: 2019-12-09
Payer: COMMERCIAL

## 2019-12-09 PROBLEM — M86.9 OSTEOMYELITIS OF RIGHT FOOT (HCC): Status: ACTIVE | Noted: 2019-12-07

## 2019-12-09 LAB
ANION GAP SERPL CALCULATED.3IONS-SCNC: 7 MMOL/L (ref 4–13)
BASOPHILS # BLD AUTO: 0.04 THOUSANDS/ΜL (ref 0–0.1)
BASOPHILS NFR BLD AUTO: 1 % (ref 0–1)
BUN SERPL-MCNC: 20 MG/DL (ref 5–25)
CALCIUM SERPL-MCNC: 9.7 MG/DL (ref 8.3–10.1)
CHLORIDE SERPL-SCNC: 101 MMOL/L (ref 100–108)
CO2 SERPL-SCNC: 28 MMOL/L (ref 21–32)
CREAT SERPL-MCNC: 0.83 MG/DL (ref 0.6–1.3)
EOSINOPHIL # BLD AUTO: 0.3 THOUSAND/ΜL (ref 0–0.61)
EOSINOPHIL NFR BLD AUTO: 4 % (ref 0–6)
ERYTHROCYTE [DISTWIDTH] IN BLOOD BY AUTOMATED COUNT: 13.7 % (ref 11.6–15.1)
GFR SERPL CREATININE-BSD FRML MDRD: 75 ML/MIN/1.73SQ M
GLUCOSE SERPL-MCNC: 126 MG/DL (ref 65–140)
GLUCOSE SERPL-MCNC: 145 MG/DL (ref 65–140)
GLUCOSE SERPL-MCNC: 170 MG/DL (ref 65–140)
GLUCOSE SERPL-MCNC: 170 MG/DL (ref 65–140)
GLUCOSE SERPL-MCNC: 173 MG/DL (ref 65–140)
HCT VFR BLD AUTO: 30.2 % (ref 34.8–46.1)
HGB BLD-MCNC: 9.4 G/DL (ref 11.5–15.4)
IMM GRANULOCYTES # BLD AUTO: 0.08 THOUSAND/UL (ref 0–0.2)
IMM GRANULOCYTES NFR BLD AUTO: 1 % (ref 0–2)
LYMPHOCYTES # BLD AUTO: 1.58 THOUSANDS/ΜL (ref 0.6–4.47)
LYMPHOCYTES NFR BLD AUTO: 22 % (ref 14–44)
MCH RBC QN AUTO: 28.2 PG (ref 26.8–34.3)
MCHC RBC AUTO-ENTMCNC: 31.1 G/DL (ref 31.4–37.4)
MCV RBC AUTO: 91 FL (ref 82–98)
MONOCYTES # BLD AUTO: 0.57 THOUSAND/ΜL (ref 0.17–1.22)
MONOCYTES NFR BLD AUTO: 8 % (ref 4–12)
NEUTROPHILS # BLD AUTO: 4.73 THOUSANDS/ΜL (ref 1.85–7.62)
NEUTS SEG NFR BLD AUTO: 64 % (ref 43–75)
NRBC BLD AUTO-RTO: 0 /100 WBCS
PLATELET # BLD AUTO: 319 THOUSANDS/UL (ref 149–390)
PMV BLD AUTO: 9.2 FL (ref 8.9–12.7)
POTASSIUM SERPL-SCNC: 4.3 MMOL/L (ref 3.5–5.3)
RBC # BLD AUTO: 3.33 MILLION/UL (ref 3.81–5.12)
SODIUM SERPL-SCNC: 136 MMOL/L (ref 136–145)
T4 FREE SERPL-MCNC: 0.99 NG/DL (ref 0.76–1.46)
VANCOMYCIN TROUGH SERPL-MCNC: 18.1 UG/ML (ref 10–20)
WBC # BLD AUTO: 7.3 THOUSAND/UL (ref 4.31–10.16)

## 2019-12-09 PROCEDURE — 99232 SBSQ HOSP IP/OBS MODERATE 35: CPT | Performed by: INTERNAL MEDICINE

## 2019-12-09 PROCEDURE — 84439 ASSAY OF FREE THYROXINE: CPT | Performed by: STUDENT IN AN ORGANIZED HEALTH CARE EDUCATION/TRAINING PROGRAM

## 2019-12-09 PROCEDURE — 99232 SBSQ HOSP IP/OBS MODERATE 35: CPT | Performed by: PODIATRIST

## 2019-12-09 PROCEDURE — 80048 BASIC METABOLIC PNL TOTAL CA: CPT | Performed by: STUDENT IN AN ORGANIZED HEALTH CARE EDUCATION/TRAINING PROGRAM

## 2019-12-09 PROCEDURE — 85025 COMPLETE CBC W/AUTO DIFF WBC: CPT | Performed by: STUDENT IN AN ORGANIZED HEALTH CARE EDUCATION/TRAINING PROGRAM

## 2019-12-09 PROCEDURE — 82948 REAGENT STRIP/BLOOD GLUCOSE: CPT

## 2019-12-09 PROCEDURE — A9585 GADOBUTROL INJECTION: HCPCS | Performed by: STUDENT IN AN ORGANIZED HEALTH CARE EDUCATION/TRAINING PROGRAM

## 2019-12-09 PROCEDURE — 80202 ASSAY OF VANCOMYCIN: CPT | Performed by: PODIATRIST

## 2019-12-09 PROCEDURE — 99233 SBSQ HOSP IP/OBS HIGH 50: CPT | Performed by: INTERNAL MEDICINE

## 2019-12-09 PROCEDURE — 87081 CULTURE SCREEN ONLY: CPT | Performed by: INTERNAL MEDICINE

## 2019-12-09 PROCEDURE — 73720 MRI LWR EXTREMITY W/O&W/DYE: CPT

## 2019-12-09 PROCEDURE — NC001 PR NO CHARGE: Performed by: PHYSICIAN ASSISTANT

## 2019-12-09 RX ORDER — SODIUM CHLORIDE 9 MG/ML
75 INJECTION, SOLUTION INTRAVENOUS CONTINUOUS
Status: DISCONTINUED | OUTPATIENT
Start: 2019-12-10 | End: 2019-12-10

## 2019-12-09 RX ORDER — METHADONE HYDROCHLORIDE 10 MG/1
90 TABLET ORAL DAILY
Status: DISCONTINUED | OUTPATIENT
Start: 2019-12-09 | End: 2019-12-12 | Stop reason: HOSPADM

## 2019-12-09 RX ADMIN — GABAPENTIN 300 MG: 300 CAPSULE ORAL at 22:36

## 2019-12-09 RX ADMIN — GABAPENTIN 300 MG: 300 CAPSULE ORAL at 17:59

## 2019-12-09 RX ADMIN — METRONIDAZOLE 500 MG: 500 TABLET ORAL at 13:45

## 2019-12-09 RX ADMIN — INSULIN LISPRO 6 UNITS: 100 INJECTION, SOLUTION INTRAVENOUS; SUBCUTANEOUS at 18:00

## 2019-12-09 RX ADMIN — GABAPENTIN 300 MG: 300 CAPSULE ORAL at 08:39

## 2019-12-09 RX ADMIN — METRONIDAZOLE 500 MG: 500 TABLET ORAL at 21:15

## 2019-12-09 RX ADMIN — VANCOMYCIN HYDROCHLORIDE 1000 MG: 1 INJECTION, SOLUTION INTRAVENOUS at 10:35

## 2019-12-09 RX ADMIN — ROPINIROLE 0.5 MG: 1 TABLET, FILM COATED ORAL at 17:59

## 2019-12-09 RX ADMIN — CLONAZEPAM 1 MG: 1 TABLET ORAL at 08:38

## 2019-12-09 RX ADMIN — ROPINIROLE 0.5 MG: 1 TABLET, FILM COATED ORAL at 08:38

## 2019-12-09 RX ADMIN — INSULIN LISPRO 1 UNITS: 100 INJECTION, SOLUTION INTRAVENOUS; SUBCUTANEOUS at 11:49

## 2019-12-09 RX ADMIN — GADOBUTROL 7 ML: 604.72 INJECTION INTRAVENOUS at 15:33

## 2019-12-09 RX ADMIN — INSULIN LISPRO 6 UNITS: 100 INJECTION, SOLUTION INTRAVENOUS; SUBCUTANEOUS at 08:39

## 2019-12-09 RX ADMIN — CLONAZEPAM 1 MG: 1 TABLET ORAL at 22:36

## 2019-12-09 RX ADMIN — DULOXETINE HYDROCHLORIDE 60 MG: 60 CAPSULE, DELAYED RELEASE ORAL at 08:38

## 2019-12-09 RX ADMIN — SODIUM CHLORIDE 75 ML/HR: 0.9 INJECTION, SOLUTION INTRAVENOUS at 23:42

## 2019-12-09 RX ADMIN — LAMOTRIGINE 150 MG: 100 TABLET ORAL at 08:38

## 2019-12-09 RX ADMIN — HYDROXYZINE HYDROCHLORIDE 50 MG: 25 TABLET ORAL at 23:42

## 2019-12-09 RX ADMIN — SERTRALINE HYDROCHLORIDE 100 MG: 100 TABLET ORAL at 08:38

## 2019-12-09 RX ADMIN — DOCUSATE SODIUM 100 MG: 100 CAPSULE, LIQUID FILLED ORAL at 08:38

## 2019-12-09 RX ADMIN — CLONAZEPAM 1 MG: 1 TABLET ORAL at 17:59

## 2019-12-09 RX ADMIN — METRONIDAZOLE 500 MG: 500 TABLET ORAL at 05:26

## 2019-12-09 RX ADMIN — INSULIN GLARGINE 30 UNITS: 100 INJECTION, SOLUTION SUBCUTANEOUS at 08:43

## 2019-12-09 RX ADMIN — INSULIN LISPRO 1 UNITS: 100 INJECTION, SOLUTION INTRAVENOUS; SUBCUTANEOUS at 21:16

## 2019-12-09 RX ADMIN — DOCUSATE SODIUM 100 MG: 100 CAPSULE, LIQUID FILLED ORAL at 17:59

## 2019-12-09 RX ADMIN — ATORVASTATIN CALCIUM 40 MG: 40 TABLET, FILM COATED ORAL at 17:59

## 2019-12-09 RX ADMIN — ENOXAPARIN SODIUM 40 MG: 40 INJECTION SUBCUTANEOUS at 08:39

## 2019-12-09 RX ADMIN — VANCOMYCIN HYDROCHLORIDE 1000 MG: 1 INJECTION, SOLUTION INTRAVENOUS at 23:42

## 2019-12-09 RX ADMIN — METHADONE HYDROCHLORIDE 90 MG: 10 TABLET ORAL at 12:22

## 2019-12-09 RX ADMIN — INSULIN LISPRO 6 UNITS: 100 INJECTION, SOLUTION INTRAVENOUS; SUBCUTANEOUS at 11:49

## 2019-12-09 NOTE — PROGRESS NOTES
Progress Note - Matt Bledsoe 1955, 62 y o  female MRN: [id number]    Unit/Bed#: E5 -01 Encounter: [id number]    Primary Care Provider: Mairno Norris MD   Date and time admitted to hospital: 12/7/2019 10:38 AM      * Cellulitis of great toe of right foot  Assessment & Plan  50-year-old female with hypertension, hyperlipidemia, hypothyroidism, diabetes type 2, admitted due to cellulitis of her great toe of her right foot  - Antibiotics- Currently on IV Vancomycin- ID following  - Podiatry primary- plans for MRI of right foot   - Preoperative risk stratification done  See note on 12/7/2019   - Plan for OR/amputation once MRI has been obtained    Methadone use [facility])  Assessment & Plan  States she takes Methadone 90mg daily  Read documentation from May 2019 by her PCP who stated that she was on remission  However, patient states she went back to Methadone from then on due to concerns for relapse  Called [facility] [phone] who confirm patient is taking 90 mg of methadone daily  Her last dose was on 12/7/19  Will resume here  Chronic hepatitis C without hepatic coma [facility])  Assessment & Plan  Follow-up outpatient    Type 2 diabetes mellitus, uncontrolled ([facility] [address] )  Assessment & Plan  Lab Results   Component Value Date    HGBA1C 13 4 (H) 06/29/2018     Recent Labs     12/08/19  1544 12/08/19  2037 12/09/19  0746 12/09/19  1119   POCGLU 237* 211* 145* 170*     Follow-up A1C  Her home regimen:  Insulin Glargine 30-45U depending on sliding scale   Insulin Aspart U-100 15U TID    Inpatient Regimen:  Insulin Glargine 30U  Insulin Aspart 8U TID  Will adjust daily    Hypothyroidism  Assessment & Plan  TSH elevated at 8 183  Follow-up Free T4 levels  Patient currently not on any medications     Will consider starting her depending on Free T4 levels    Hyperlipidemia  Assessment & Plan  Continue statin    Bipolar affective disorder ([facility] [address] )  Assessment & Plan  Continue Hydroxyzine, Cymbalta, Zoloft        VTE Pharmacologic Prophylaxis:    Pharmacologic: Enoxaparin (Lovenox)  Mechanical VTE Prophylaxis in Place: Yes    Discharge Plan: With need for continued inpatient management for right great foot wound with need for MRI / IV antibiotics    Discussions with Specialists or Other Care Team Provider: Dr Karen Ferguson, nursing    Education and Discussions with Family / Patient: patient    Time Spent for Care: 15 minutes  More than 50% of total time spent on counseling and coordination of care as described above  Current Length of Stay: 2 day(s)  Current Patient Status: Inpatient   Code Status: Level 1 - Full Code    Subjective:   Patient resting comfortably in bed  She and bandaged her wound  She is awaiting an MRI  Will eventually need OR for right great toe  Continue IV antibiotics in the meantime  Spoke about methadone dose and called clinic to confirm doses she is taking 90 mg daily  Also was previously on levothyroxine in the past   Await T4 level  Objective:     Vitals:   Temp (24hrs), Av 5 °F (36 4 °C), Min:97 4 °F (36 3 °C), Max:97 6 °F (36 4 °C)    Temp:  [97 4 °F (36 3 °C)-97 6 °F (36 4 °C)] 97 6 °F (36 4 °C)  HR:  [68-74] 69  Resp:  [18-20] 20  BP: (119-133)/(62-78) 133/62  SpO2:  [98 %-100 %] 100 %  Body mass index is 27 04 kg/m²  Input and Output Summary (last 24 hours): Intake/Output Summary (Last 24 hours) at 2019 1213  Last data filed at 2019 0857  Gross per 24 hour   Intake 272 ml   Output    Net 272 ml       Physical Exam:     Physical Exam   Constitutional: She is oriented to person, place, and time  She appears well-developed and well-nourished  HENT:   Head: Normocephalic and atraumatic  Cardiovascular: Regular rhythm and normal heart sounds  Pulmonary/Chest: Effort normal and breath sounds normal  No stridor  No respiratory distress  She has no wheezes  She has no rales  She exhibits no tenderness  Abdominal: Soft   Bowel sounds are normal  She exhibits no distension and no mass  There is no tenderness  There is no rebound and no guarding  No hernia  Musculoskeletal:   Right great toe with drainage and necrotic tissue located on the tip of the digit   Neurological: She is alert and oriented to person, place, and time  Psychiatric: She has a normal mood and affect  Her behavior is normal    Nursing note and vitals reviewed  Additional Data:     Labs:    Results from last 7 days   Lab Units 12/09/19  0610   WBC Thousand/uL 7 30   HEMOGLOBIN g/dL 9 4*   HEMATOCRIT % 30 2*   PLATELETS Thousands/uL 319   NEUTROS PCT % 64   LYMPHS PCT % 22   MONOS PCT % 8   EOS PCT % 4     Results from last 7 days   Lab Units 12/09/19  0610  12/07/19  1149   POTASSIUM mmol/L 4 3   < > 5 0   CHLORIDE mmol/L 101   < > 92*   CO2 mmol/L 28   < > 23   BUN mg/dL 20   < > 25   CREATININE mg/dL 0 83   < > 1 24   CALCIUM mg/dL 9 7   < > 10 0   ALK PHOS U/L  --   --  167*   ALT U/L  --   --  20   AST U/L  --   --  16    < > = values in this interval not displayed  * I Have Reviewed All Lab Data Listed Above  * Additional Pertinent Lab Tests Reviewed: [facility] 67 Admission Reviewed    Imaging:    Imaging Reports Reviewed Today Include:   Imaging Personally Reviewed by Myself Includes:      Recent Cultures (last 7 days):     Results from last 7 days   Lab Units 12/08/19  1341 12/07/19  1149 12/07/19  1131   BLOOD CULTURE   --  No Growth at 24 hrs  No Growth at 24 hrs  GRAM STAIN RESULT  1+ Polys*  2+ Gram negative rods*  --   --    WOUND CULTURE  Culture results to follow    --   --        Last 24 Hours Medication List:     Current Facility-Administered Medications:  acetaminophen 650 mg Oral Q6H PRN Yossita Osvernon, DPM    atorvastatin 40 mg Oral Daily With Mario DPM    clonazePAM 1 mg Oral TID Annie Harkins DPM    docusate sodium 100 mg Oral BID Madison Ortega MD    DULoxetine 60 mg Oral Daily Laqueta Osvernon DPM    enoxaparin 40 mg Subcutaneous Daily Elian Mckeon, DPM    gabapentin 300 mg Oral TID Elian Mckeon, DPM    hydrOXYzine HCL 50 mg Oral HS Adelaida Kaminski, DPM    insulin glargine 30 Units Subcutaneous QAM Jose Ramon Cornejo MD    insulin lispro 1-5 Units Subcutaneous TID AC Adelaida Kaminski, DPM    insulin lispro 1-5 Units Subcutaneous HS Adelaida Kaminski, DPM    insulin lispro 6 Units Subcutaneous TID AC Elian Mckeon, DPM    lamoTRIgine 150 mg Oral Daily Elian Mckeon, DPM    methadone 90 mg Oral Daily Haritha Lopez PA-C    metroNIDAZOLE 500 mg Oral [facility] Evie Garrison MD    oxyCODONE 5 mg Oral Q6H PRN SINTIA HubbardM    rOPINIRole 0 5 mg Oral BID Elian Mckeon DPM    sertraline 100 mg Oral Daily Elian Mckeon, DPM    vancomycin 1,000 mg Intravenous Q12H Deloris Hodgkins, DPM Last Rate: 1,000 mg (12/09/19 1035)        Today, Patient Was Seen By: Zuleyma Boudreaux PA-C    ** Please Note: This note has been constructed using a voice recognition system   **

## 2019-12-09 NOTE — ASSESSMENT & PLAN NOTE
TSH elevated at 8 183  Follow-up Free T4 levels  Patient currently not on any medications     Will consider starting her depending on Free T4 levels

## 2019-12-09 NOTE — PROGRESS NOTES
Progress Note - Podiatry  [place] 62 y o  female MRN: [id number]  Unit/Bed#: E5 -01 Encounter: [id number]    Assessment:  1  right hallux wound/OM of distal phalanx and cellulitis - POA  2  Methadone use  3  Hyperlipidemia  4  Hypothyroidism  5  History of MRSA   6  DM type 2  7  HTN     Plan:  - await MRI and plan for R total vs partial hallux amputation  Patient is cleared for OR per SLIM  - c/w vancomycin and flagyl per ID recommendations, appreciate input  - wcx growing GNR  - med mgmt per SLIM, BG currently controlled, A1c pending      Subjective/Objective   Chief Complaint:   Chief Complaint   Patient presents with    Toe Injury     Pt reports that she had R foot steepped on last month  Reports that she has had swelling and redness a few days ago  Denies fevers  Hx DM  Reports taking "left over Amoxicillin"       Subjective: 62 y o   female was seen and evaluated at bedside  No foot pain  Understanding to sx plan pending MRI results  No further questions/concerns  No n/v/f/c/sob    Blood pressure 133/62, pulse 69, temperature 97 6 °F (36 4 °C), temperature source Temporal, resp  rate 20, weight 76 kg (167 lb 8 8 oz), SpO2 100 %  ,Body mass index is 27 04 kg/m²  Invasive Devices     Peripheral Intravenous Line            Peripheral IV 12/07/19 Right Hand 1 day          Drain            Negative Pressure Wound Therapy (V A C ) Arm Distal 418 days                Physical Exam:   General: Alert, cooperative and no distress    Lower Extremity Exam:     NVS at baseline B/l  MSK function at baseline B/l  No calf tenderness noted B/l   R distal hallux necrotic wound that probes to bone, with continuous purulent drainage  Improved cellulitis  No malodor noted       Lab, Imaging and other studies:   CBC:   Lab Results   Component Value Date    WBC 7 30 12/09/2019    HGB 9 4 (L) 12/09/2019    HCT 30 2 (L) 12/09/2019    MCV 91 12/09/2019     12/09/2019    MCH 28 2 12/09/2019    MCHC 31 1 (L) 12/09/2019    RDW 13 7 12/09/2019    MPV 9 2 12/09/2019    NRBC 0 12/09/2019   , CMP:   Lab Results   Component Value Date    SODIUM 136 12/09/2019    K 4 3 12/09/2019     12/09/2019    CO2 28 12/09/2019    BUN 20 12/09/2019    CREATININE 0 83 12/09/2019    CALCIUM 9 7 12/09/2019    EGFR 75 12/09/2019       Imaging: I have personally reviewed pertinent films in PACS  EKG, Pathology, and Other Studies: I have personally reviewed pertinent reports  Portions of the record may have been created with voice recognition software  Occasional wrong word or "sound a like" substitutions may have occurred due to the inherent limitations of voice recognition software  Read the chart carefully and recognize, using context, where substitutions have occurred

## 2019-12-09 NOTE — UTILIZATION REVIEW
Notification of Inpatient Admission/Inpatient Authorization Request   This is a Notification of Inpatient Admission for Daryn 48  Be advised that this patient was admitted to our facility under Inpatient Status  Contact Dorita Suri at [phone] for additional admission information  [facility] DEPT  DEDICATED [phone]  Patient Name:   Gerson Rausch   YOB: 1955       [address]   [address]:   [address] [facility]  Tax ID: [id number]  NPI: [id number] Attending Provider/NPI: Vi White Md [[id number]]   Place of Service Code: 24     Place of Service Name:  [address] [place] [address]   Start Date: 12/7/19 1158     Discharge Date & Time: No discharge date for patient encounter  Type of Admission: Inpatient Status Discharge Disposition   (if discharged): Home/Self Care   Patient Diagnoses: Wound infection [T14  8XXA, L08 9]  Contusion of great toe of right foot [S90 111A]  Cellulitis of great toe of right foot [L03 031]     Orders: Admission Orders (From admission, onward)     Ordered        12/07/19 1158  Inpatient Admission (expected length of stay for this patient Order details is greater than two midnights)  Once                    Assigned Utilization Review Contact: [address] [place] [address] Utilization Review Department  Phone: [phone]; Fax [phone]  Email: [email or web address]   ATTENTION PAYERS: Please call the assigned Utilization  directly with any questions or concerns ALL voicemails in the department are confidential  Send all requests for admission clinical reviews, approved or denied determinations and any other requests to dedicated fax number belonging to the campus where the patient is receiving treatment
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
## 2019-12-09 NOTE — ASSESSMENT & PLAN NOTE
60-year-old female with hypertension, hyperlipidemia, hypothyroidism, diabetes type 2, admitted due to cellulitis of her great toe of her right foot  - Antibiotics- Currently on IV Vancomycin- ID following  - Podiatry primary- plans for MRI of right foot   - Preoperative risk stratification done   See note on 12/7/2019   - Plan for OR/amputation once MRI has been obtained

## 2019-12-09 NOTE — ASSESSMENT & PLAN NOTE
States she takes Methadone 90mg daily  Read documentation from May 2019 by her PCP who stated that she was on remission  However, patient states she went back to Methadone from then on due to concerns for relapse  Called [facility] [phone] who confirm patient is taking 90 mg of methadone daily  Her last dose was on 12/7/19  Will resume here

## 2019-12-09 NOTE — PLAN OF CARE
Problem: Potential for Falls  Goal: Patient will remain free of falls  Description  INTERVENTIONS:  - Assess patient frequently for physical needs  -  Identify cognitive and physical deficits and behaviors that affect risk of falls    -  [place] fall precautions as indicated by assessment   - Educate patient/family on patient safety including physical limitations  - Instruct patient to call for assistance with activity based on assessment  - Modify environment to reduce risk of injury  - Consider OT/PT consult to assist with strengthening/mobility  Outcome: Progressing     Problem: PAIN - ADULT  Goal: Verbalizes/displays adequate comfort level or baseline comfort level  Description  Interventions:  - Encourage patient to monitor pain and request assistance  - Assess pain using appropriate pain scale  - Administer analgesics based on type and severity of pain and evaluate response  - Implement non-pharmacological measures as appropriate and evaluate response  - Consider cultural and social influences on pain and pain management  - Notify physician/advanced practitioner if interventions unsuccessful or patient reports new pain  Outcome: Progressing     Problem: INFECTION - ADULT  Goal: Absence or prevention of progression during hospitalization  Description  INTERVENTIONS:  - Assess and monitor for signs and symptoms of infection  - Monitor lab/diagnostic results  - Monitor all insertion sites, i e  indwelling lines, tubes, and drains  - Monitor endotracheal if appropriate and nasal secretions for changes in amount and color  - [place] appropriate cooling/warming therapies per order  - Administer medications as ordered  - Instruct and encourage patient and family to use good hand hygiene technique  - Identify and instruct in appropriate isolation precautions for identified infection/condition  Outcome: Progressing     Problem: DISCHARGE PLANNING  Goal: Discharge to home or other facility with appropriate resources  Description  INTERVENTIONS:  - Identify barriers to discharge w/patient and caregiver  - Arrange for needed discharge resources and transportation as appropriate  - Identify discharge learning needs (meds, wound care, etc )  - Arrange for interpretive services to assist at discharge as needed  - Refer to Case Management Department for coordinating discharge planning if the patient needs post-hospital services based on physician/advanced practitioner order or complex needs related to functional status, cognitive ability, or social support system  Outcome: Progressing     Problem: SKIN/TISSUE INTEGRITY - ADULT  Goal: Skin integrity remains intact  Description  INTERVENTIONS  - Identify patients at risk for skin breakdown  - Assess and monitor skin integrity  - Assess and monitor nutrition and hydration status  - Monitor labs (i e  albumin)  - Assess for incontinence   - Turn and reposition patient  - Assist with mobility/ambulation  - Relieve pressure over bony prominences  - Avoid friction and shearing  - Provide appropriate hygiene as needed including keeping skin clean and dry  - Evaluate need for skin moisturizer/barrier cream  - Collaborate with interdisciplinary team (i e  Nutrition, Rehabilitation, etc )   - Patient/family teaching  Outcome: Progressing  Goal: Incision(s), wounds(s) or drain site(s) healing without S/S of infection  Description  INTERVENTIONS  - Assess and document risk factors for skin impairment   - Assess and document dressing, incision, wound bed, drain sites and surrounding tissue  - Consider nutrition services referral as needed  - Oral mucous membranes remain intact  - Provide patient/ family education  Outcome: Progressing     Problem: MUSCULOSKELETAL - ADULT  Goal: Maintain or return mobility to safest level of function  Description  INTERVENTIONS:  - Assess patient's ability to carry out ADLs; assess patient's baseline for ADL function and identify physical deficits which impact ability to perform ADLs (bathing, care of mouth/teeth, toileting, grooming, dressing, etc )  - Assess/evaluate cause of self-care deficits   - Assess range of motion  - Assess patient's mobility  - Assess patient's need for assistive devices and provide as appropriate  - Encourage maximum independence but intervene and supervise when necessary  - Involve family in performance of ADLs  - Assess for home care needs following discharge   - Consider OT consult to assist with ADL evaluation and planning for discharge  - Provide patient education as appropriate  Outcome: Progressing

## 2019-12-09 NOTE — PROGRESS NOTES
Vancomycin IV Pharmacy-to-Dose Consultation    Kendrick Alston is a 62 y o  female who is currently receiving Vancomycin IV with management by the Pharmacy Consult service  Assessment/Plan:  The patient was reviewed  Renal function is stable and no signs or symptoms of nephrotoxicity and/or infusion reactions were documented in the chart  Based on todays assessment, continue current vancomycin (day # 3) dosing of 1000mg IV every 12 hours, with a plan for trough to be drawn at 2230 on 12/9/19  We will continue to follow the patients culture results and clinical progress daily      Isidra Villeda, Pharmacist

## 2019-12-09 NOTE — ASSESSMENT & PLAN NOTE
Lab Results   Component Value Date    HGBA1C 13 4 (H) 06/29/2018     Recent Labs     12/08/19  1544 12/08/19  2037 12/09/19  0746 12/09/19  1119   POCGLU 237* 211* 145* 170*     Follow-up A1C  Her home regimen:  Insulin Glargine 30-45U depending on sliding scale   Insulin Aspart U-100 15U TID    Inpatient Regimen:  Insulin Glargine 30U  Insulin Aspart 8U TID  Will adjust daily

## 2019-12-10 ENCOUNTER — APPOINTMENT (INPATIENT)
Dept: RADIOLOGY | Facility: HOSPITAL | Age: 64
DRG: 617 | End: 2019-12-10
Payer: COMMERCIAL

## 2019-12-10 ENCOUNTER — ANESTHESIA (INPATIENT)
Dept: PERIOP | Facility: HOSPITAL | Age: 64
DRG: 617 | End: 2019-12-10
Payer: COMMERCIAL

## 2019-12-10 LAB
ANION GAP SERPL CALCULATED.3IONS-SCNC: 7 MMOL/L (ref 4–13)
BACTERIA WND AEROBE CULT: ABNORMAL
BASOPHILS # BLD AUTO: 0.04 THOUSANDS/ΜL (ref 0–0.1)
BASOPHILS NFR BLD AUTO: 1 % (ref 0–1)
BUN SERPL-MCNC: 18 MG/DL (ref 5–25)
CALCIUM SERPL-MCNC: 9.1 MG/DL (ref 8.3–10.1)
CHLORIDE SERPL-SCNC: 101 MMOL/L (ref 100–108)
CO2 SERPL-SCNC: 28 MMOL/L (ref 21–32)
CREAT SERPL-MCNC: 0.91 MG/DL (ref 0.6–1.3)
EOSINOPHIL # BLD AUTO: 0.32 THOUSAND/ΜL (ref 0–0.61)
EOSINOPHIL NFR BLD AUTO: 5 % (ref 0–6)
ERYTHROCYTE [DISTWIDTH] IN BLOOD BY AUTOMATED COUNT: 13.6 % (ref 11.6–15.1)
EST. AVERAGE GLUCOSE BLD GHB EST-MCNC: 249 MG/DL
GFR SERPL CREATININE-BSD FRML MDRD: 67 ML/MIN/1.73SQ M
GLUCOSE SERPL-MCNC: 118 MG/DL (ref 65–140)
GLUCOSE SERPL-MCNC: 136 MG/DL (ref 65–140)
GLUCOSE SERPL-MCNC: 208 MG/DL (ref 65–140)
GLUCOSE SERPL-MCNC: 213 MG/DL (ref 65–140)
GLUCOSE SERPL-MCNC: 215 MG/DL (ref 65–140)
GLUCOSE SERPL-MCNC: 263 MG/DL (ref 65–140)
GRAM STN SPEC: ABNORMAL
GRAM STN SPEC: ABNORMAL
HBA1C MFR BLD: 10.3 % (ref 4.2–6.3)
HCT VFR BLD AUTO: 29.5 % (ref 34.8–46.1)
HGB BLD-MCNC: 9.2 G/DL (ref 11.5–15.4)
IMM GRANULOCYTES # BLD AUTO: 0.09 THOUSAND/UL (ref 0–0.2)
IMM GRANULOCYTES NFR BLD AUTO: 1 % (ref 0–2)
LYMPHOCYTES # BLD AUTO: 1.76 THOUSANDS/ΜL (ref 0.6–4.47)
LYMPHOCYTES NFR BLD AUTO: 25 % (ref 14–44)
MCH RBC QN AUTO: 28.2 PG (ref 26.8–34.3)
MCHC RBC AUTO-ENTMCNC: 31.2 G/DL (ref 31.4–37.4)
MCV RBC AUTO: 91 FL (ref 82–98)
MONOCYTES # BLD AUTO: 0.52 THOUSAND/ΜL (ref 0.17–1.22)
MONOCYTES NFR BLD AUTO: 7 % (ref 4–12)
MRSA NOSE QL CULT: NORMAL
NEUTROPHILS # BLD AUTO: 4.29 THOUSANDS/ΜL (ref 1.85–7.62)
NEUTS SEG NFR BLD AUTO: 61 % (ref 43–75)
NRBC BLD AUTO-RTO: 0 /100 WBCS
PLATELET # BLD AUTO: 328 THOUSANDS/UL (ref 149–390)
PMV BLD AUTO: 9.2 FL (ref 8.9–12.7)
POTASSIUM SERPL-SCNC: 4.1 MMOL/L (ref 3.5–5.3)
RBC # BLD AUTO: 3.26 MILLION/UL (ref 3.81–5.12)
SODIUM SERPL-SCNC: 136 MMOL/L (ref 136–145)
WBC # BLD AUTO: 7.02 THOUSAND/UL (ref 4.31–10.16)

## 2019-12-10 PROCEDURE — 82948 REAGENT STRIP/BLOOD GLUCOSE: CPT

## 2019-12-10 PROCEDURE — 80048 BASIC METABOLIC PNL TOTAL CA: CPT | Performed by: STUDENT IN AN ORGANIZED HEALTH CARE EDUCATION/TRAINING PROGRAM

## 2019-12-10 PROCEDURE — 73630 X-RAY EXAM OF FOOT: CPT

## 2019-12-10 PROCEDURE — 88311 DECALCIFY TISSUE: CPT | Performed by: PATHOLOGY

## 2019-12-10 PROCEDURE — 0Y6Q0Z0 DETACHMENT AT LEFT 1ST TOE, COMPLETE, OPEN APPROACH: ICD-10-PCS | Performed by: PODIATRIST

## 2019-12-10 PROCEDURE — 99024 POSTOP FOLLOW-UP VISIT: CPT | Performed by: PODIATRIST

## 2019-12-10 PROCEDURE — 28820 AMPUTATION OF TOE: CPT | Performed by: PODIATRIST

## 2019-12-10 PROCEDURE — 88305 TISSUE EXAM BY PATHOLOGIST: CPT | Performed by: PATHOLOGY

## 2019-12-10 PROCEDURE — 85025 COMPLETE CBC W/AUTO DIFF WBC: CPT | Performed by: STUDENT IN AN ORGANIZED HEALTH CARE EDUCATION/TRAINING PROGRAM

## 2019-12-10 PROCEDURE — 83036 HEMOGLOBIN GLYCOSYLATED A1C: CPT | Performed by: PHYSICIAN ASSISTANT

## 2019-12-10 PROCEDURE — 99232 SBSQ HOSP IP/OBS MODERATE 35: CPT | Performed by: INTERNAL MEDICINE

## 2019-12-10 PROCEDURE — 99233 SBSQ HOSP IP/OBS HIGH 50: CPT | Performed by: INTERNAL MEDICINE

## 2019-12-10 RX ORDER — PROPOFOL 10 MG/ML
INJECTION, EMULSION INTRAVENOUS AS NEEDED
Status: DISCONTINUED | OUTPATIENT
Start: 2019-12-10 | End: 2019-12-10 | Stop reason: SURG

## 2019-12-10 RX ADMIN — DULOXETINE HYDROCHLORIDE 60 MG: 60 CAPSULE, DELAYED RELEASE ORAL at 08:21

## 2019-12-10 RX ADMIN — INSULIN HUMAN 3 UNITS: 100 INJECTION, SOLUTION PARENTERAL at 12:03

## 2019-12-10 RX ADMIN — GABAPENTIN 300 MG: 300 CAPSULE ORAL at 16:00

## 2019-12-10 RX ADMIN — GABAPENTIN 300 MG: 300 CAPSULE ORAL at 21:16

## 2019-12-10 RX ADMIN — VANCOMYCIN HYDROCHLORIDE 1000 MG: 1 INJECTION, SOLUTION INTRAVENOUS at 23:09

## 2019-12-10 RX ADMIN — CLONAZEPAM 1 MG: 1 TABLET ORAL at 16:00

## 2019-12-10 RX ADMIN — INSULIN LISPRO 2 UNITS: 100 INJECTION, SOLUTION INTRAVENOUS; SUBCUTANEOUS at 21:16

## 2019-12-10 RX ADMIN — ROPINIROLE 0.5 MG: 1 TABLET, FILM COATED ORAL at 08:20

## 2019-12-10 RX ADMIN — METRONIDAZOLE 500 MG: 500 TABLET ORAL at 14:42

## 2019-12-10 RX ADMIN — PROPOFOL 50 MG: 10 INJECTION, EMULSION INTRAVENOUS at 12:17

## 2019-12-10 RX ADMIN — DOCUSATE SODIUM 100 MG: 100 CAPSULE, LIQUID FILLED ORAL at 17:09

## 2019-12-10 RX ADMIN — PROPOFOL 50 MG: 10 INJECTION, EMULSION INTRAVENOUS at 12:31

## 2019-12-10 RX ADMIN — METRONIDAZOLE 500 MG: 500 TABLET ORAL at 21:16

## 2019-12-10 RX ADMIN — VANCOMYCIN HYDROCHLORIDE 1000 MG: 1 INJECTION, SOLUTION INTRAVENOUS at 11:57

## 2019-12-10 RX ADMIN — VANCOMYCIN HYDROCHLORIDE 1000 MG: 1 INJECTION, SOLUTION INTRAVENOUS at 11:05

## 2019-12-10 RX ADMIN — INSULIN LISPRO 6 UNITS: 100 INJECTION, SOLUTION INTRAVENOUS; SUBCUTANEOUS at 10:43

## 2019-12-10 RX ADMIN — SODIUM CHLORIDE 75 ML/HR: 0.9 INJECTION, SOLUTION INTRAVENOUS at 12:05

## 2019-12-10 RX ADMIN — INSULIN LISPRO 2 UNITS: 100 INJECTION, SOLUTION INTRAVENOUS; SUBCUTANEOUS at 10:43

## 2019-12-10 RX ADMIN — GABAPENTIN 300 MG: 300 CAPSULE ORAL at 08:21

## 2019-12-10 RX ADMIN — HYDROXYZINE HYDROCHLORIDE 50 MG: 25 TABLET ORAL at 23:09

## 2019-12-10 RX ADMIN — METRONIDAZOLE 500 MG: 500 TABLET ORAL at 05:15

## 2019-12-10 RX ADMIN — CLONAZEPAM 1 MG: 1 TABLET ORAL at 08:21

## 2019-12-10 RX ADMIN — LAMOTRIGINE 150 MG: 100 TABLET ORAL at 08:21

## 2019-12-10 RX ADMIN — PROPOFOL 50 MG: 10 INJECTION, EMULSION INTRAVENOUS at 12:24

## 2019-12-10 RX ADMIN — ATORVASTATIN CALCIUM 40 MG: 40 TABLET, FILM COATED ORAL at 16:00

## 2019-12-10 RX ADMIN — OXYCODONE HYDROCHLORIDE 5 MG: 5 TABLET ORAL at 20:06

## 2019-12-10 RX ADMIN — ROPINIROLE 0.5 MG: 1 TABLET, FILM COATED ORAL at 17:09

## 2019-12-10 RX ADMIN — METHADONE HYDROCHLORIDE 90 MG: 10 TABLET ORAL at 08:21

## 2019-12-10 RX ADMIN — CLONAZEPAM 1 MG: 1 TABLET ORAL at 21:16

## 2019-12-10 RX ADMIN — INSULIN GLARGINE 30 UNITS: 100 INJECTION, SOLUTION SUBCUTANEOUS at 08:21

## 2019-12-10 RX ADMIN — DOCUSATE SODIUM 100 MG: 100 CAPSULE, LIQUID FILLED ORAL at 08:21

## 2019-12-10 RX ADMIN — SERTRALINE HYDROCHLORIDE 100 MG: 100 TABLET ORAL at 08:21

## 2019-12-10 NOTE — ASSESSMENT & PLAN NOTE
States she takes Methadone 90mg daily  Read documentation from May 2019 by her PCP who stated that she was on remission  However, patient states she went back to Methadone from then on due to concerns for relapse  Called [facility] [phone] who confirmed patient is currently under their care for long-term methadone use  She is currently taking 90 mg of methadone daily  Her last dose was on 12/7/19  Will resume methadone here

## 2019-12-10 NOTE — PROGRESS NOTES
Progress Note - Martine Sutton 1955, 62 y o  female MRN: [id number]    Unit/Bed#: E5 -01 Encounter: [id number]    Primary Care Provider: Willie Colunga MD   Date and time admitted to hospital: 12/7/2019 10:38 AM        * Cellulitis of great toe of right foot  Assessment & Plan  72-year-old female with hypertension, hyperlipidemia, hypothyroidism, diabetes type 2, admitted due to cellulitis of her great toe of her right foot  - Antibiotics- Currently on IV Vancomycin- ID following  - Podiatry is primary  - MRI right foot-osteomyelitis of great toe phalanges  Hypoperfusion of distal phalanx which may represent gangrene, adventitial bursitis adjacent to sesamoids  - Preoperative risk stratification done  See note on 12/7/2019   - Plan for OR/amputation today at noon    Methadone use [facility])  Assessment & Plan  States she takes Methadone 90mg daily  Read documentation from May 2019 by her PCP who stated that she was on remission  However, patient states she went back to Methadone from then on due to concerns for relapse  Called [facility] [phone] who confirmed patient is currently under their care for long-term methadone use  She is currently taking 90 mg of methadone daily  Her last dose was on 12/7/19  Will resume methadone here      Osteomyelitis of right foot ([facility] [address] )  Assessment & Plan  With plans for OR by Podiatry today    Chronic hepatitis C without hepatic coma [facility])  Assessment & Plan  Follow-up outpatient    Type 2 diabetes mellitus, uncontrolled ([facility] [address] )  Assessment & Plan  Lab Results   Component Value Date    HGBA1C 10 3 (H) 12/10/2019     Recent Labs     12/09/19  1552 12/09/19  2115 12/10/19  0748 12/10/19  1059   POCGLU 126 173* 213* 208*     Significantly elevated A1c  Her home regimen:  Insulin Glargine 30-45U depending on sliding scale   Insulin Aspart U-100 15U TID    Inpatient Regimen:  Insulin Glargine 30U  Insulin Aspart 8U TID  Will adjust daily-patient may need increase in the setting of stress/trauma with OR today    Hypothyroidism  Assessment & Plan  TSH elevated at 8 183  Follow-up Free T4 levels -0 99  Patient currently not on any medications  Will defer to PCP in regards to resuming levothyroxine    Hyperlipidemia  Assessment & Plan  Continue statin    Bipolar affective disorder ([facility] [address] )  Assessment & Plan  Continue Hydroxyzine, Cymbalta, Zoloft        VTE Pharmacologic Prophylaxis:   Pharmacologic: Enoxaparin (Lovenox)  Mechanical VTE Prophylaxis in Place: Yes    Discharge Plan: Per primary team, Podiatry    Discussions with Specialists or Other Care Team Provider:  Dr Loreta Li at bedside, nursing    Education and Discussions with Family / Patient:  Patient    Time Spent for Care: 15 minutes  More than 50% of total time spent on counseling and coordination of care as described above  Current Length of Stay: 3 day(s)  Current Patient Status: Inpatient   Code Status: Level 1 - Full Code    Subjective:   Getting ready to go to surgery  She complains of pain in her right foot  She has been NPO after midnight  Discussed hemoglobin A1c being significantly elevated at 10 3  Goal to obtain better glycemic control in the setting of OR today  Objective:     Vitals:   Temp (24hrs), Av 9 °F (36 6 °C), Min:97 8 °F (36 6 °C), Max:97 9 °F (36 6 °C)    Temp:  [97 8 °F (36 6 °C)-97 9 °F (36 6 °C)] 97 9 °F (36 6 °C)  HR:  [75-91] 91  Resp:  [18] 18  BP: (107-150)/(56-67) 107/56  SpO2:  [94 %-99 %] 99 %  Body mass index is 27 04 kg/m²  Input and Output Summary (last 24 hours):     No intake or output data in the 24 hours ending 12/10/19 1133    Physical Exam:     Physical Exam   Constitutional: She is oriented to person, place, and time  She appears well-developed and well-nourished  No distress  HENT:   Head: Normocephalic and atraumatic  Cardiovascular: Normal rate, regular rhythm and normal heart sounds     Pulmonary/Chest: Effort normal and breath sounds normal  No stridor  No respiratory distress  She has no wheezes  She has no rales  She exhibits no tenderness  Abdominal: Soft  Bowel sounds are normal  She exhibits no distension and no mass  There is no tenderness  There is no rebound and no guarding  No hernia  Musculoskeletal:   Right foot gauze wrapped   Neurological: She is alert and oriented to person, place, and time  Skin: Skin is warm and dry  She is not diaphoretic  Psychiatric: She has a normal mood and affect  Her behavior is normal    Nursing note and vitals reviewed  Additional Data:     Labs:    Results from last 7 days   Lab Units 12/10/19  0503   WBC Thousand/uL 7 02   HEMOGLOBIN g/dL 9 2*   HEMATOCRIT % 29 5*   PLATELETS Thousands/uL 328   NEUTROS PCT % 61   LYMPHS PCT % 25   MONOS PCT % 7   EOS PCT % 5     Results from last 7 days   Lab Units 12/10/19  0503  12/07/19  1149   POTASSIUM mmol/L 4 1   < > 5 0   CHLORIDE mmol/L 101   < > 92*   CO2 mmol/L 28   < > 23   BUN mg/dL 18   < > 25   CREATININE mg/dL 0 91   < > 1 24   CALCIUM mg/dL 9 1   < > 10 0   ALK PHOS U/L  --   --  167*   ALT U/L  --   --  20   AST U/L  --   --  16    < > = values in this interval not displayed  * I Have Reviewed All Lab Data Listed Above  * Additional Pertinent Lab Tests Reviewed: [address] Admission Reviewed    Imaging:    Imaging Reports Reviewed Today Include:   Imaging Personally Reviewed by Myself Includes:      Recent Cultures (last 7 days):     Results from last 7 days   Lab Units 12/08/19  1341 12/07/19  1149 12/07/19  1131   BLOOD CULTURE   --  No Growth at 48 hrs  No Growth at 48 hrs     GRAM STAIN RESULT  1+ Polys*  2+ Gram negative rods*  --   --    WOUND CULTURE  3+ Growth of   --   --        Last 24 Hours Medication List:     Current Facility-Administered Medications:  acetaminophen 650 mg Oral Q6H PRN Eder Moore, DPM    atorvastatin 40 mg Oral Daily With [employer], DPM    clonazePAM 1 mg Oral TID Eder Moore, DPM    docusate sodium 100 mg Oral BID Alecia Gustafson MD    DULoxetine 60 mg Oral Daily Mindy Balling, DPM    enoxaparin 40 mg Subcutaneous Daily Mindy Balling, DPM    gabapentin 300 mg Oral TID Mindy Balling, DPM    hydrOXYzine HCL 50 mg Oral HS Adelaida Kaminski, DPM    insulin glargine 30 Units Subcutaneous QAM Alecia Gustafson MD    insulin lispro 1-5 Units Subcutaneous TID AC Adelaida Kaminski, DPM    insulin lispro 1-5 Units Subcutaneous HS Adelaida Kaminski, DPM    insulin lispro 6 Units Subcutaneous TID AC Mindy Balling, DPM    lamoTRIgine 150 mg Oral Daily Mindy Balling, DPM    methadone 90 mg Oral Daily Haritha Lopez PA-C    metroNIDAZOLE 500 mg Oral [facility] Merna Mcleod MD    oxyCODONE 5 mg Oral Q6H PRN Mindy Balling, DPM    rOPINIRole 0 5 mg Oral BID Mindy Balling, DPM    sertraline 100 mg Oral Daily Mindy Balling, DPM    sodium chloride 75 mL/hr Intravenous Continuous Clista Bologna, DPM Last Rate: 75 mL/hr (12/09/19 2342)   vancomycin 1,000 mg Intravenous Q12H Ashley Caldwell, DPM Last Rate: 1,000 mg (12/10/19 1105)        Today, Patient Was Seen By: Carlee Siemens, PA-C    ** Please Note: This note has been constructed using a voice recognition system   **

## 2019-12-10 NOTE — PLAN OF CARE
Problem: Potential for Falls  Goal: Patient will remain free of falls  Description  INTERVENTIONS:  - Assess patient frequently for physical needs  -  Identify cognitive and physical deficits and behaviors that affect risk of falls    -  [place] fall precautions as indicated by assessment   - Educate patient/family on patient safety including physical limitations  - Instruct patient to call for assistance with activity based on assessment  - Modify environment to reduce risk of injury  - Consider OT/PT consult to assist with strengthening/mobility  Outcome: Progressing     Problem: PAIN - ADULT  Goal: Verbalizes/displays adequate comfort level or baseline comfort level  Description  Interventions:  - Encourage patient to monitor pain and request assistance  - Assess pain using appropriate pain scale  - Administer analgesics based on type and severity of pain and evaluate response  - Implement non-pharmacological measures as appropriate and evaluate response  - Consider cultural and social influences on pain and pain management  - Notify physician/advanced practitioner if interventions unsuccessful or patient reports new pain  Outcome: Progressing     Problem: INFECTION - ADULT  Goal: Absence or prevention of progression during hospitalization  Description  INTERVENTIONS:  - Assess and monitor for signs and symptoms of infection  - Monitor lab/diagnostic results  - Monitor all insertion sites, i e  indwelling lines, tubes, and drains  - Monitor endotracheal if appropriate and nasal secretions for changes in amount and color  - [place] appropriate cooling/warming therapies per order  - Administer medications as ordered  - Instruct and encourage patient and family to use good hand hygiene technique  - Identify and instruct in appropriate isolation precautions for identified infection/condition  Outcome: Progressing     Problem: DISCHARGE PLANNING  Goal: Discharge to home or other facility with appropriate resources  Description  INTERVENTIONS:  - Identify barriers to discharge w/patient and caregiver  - Arrange for needed discharge resources and transportation as appropriate  - Identify discharge learning needs (meds, wound care, etc )  - Arrange for interpretive services to assist at discharge as needed  - Refer to Case Management Department for coordinating discharge planning if the patient needs post-hospital services based on physician/advanced practitioner order or complex needs related to functional status, cognitive ability, or social support system  Outcome: Progressing     Problem: SKIN/TISSUE INTEGRITY - ADULT  Goal: Skin integrity remains intact  Description  INTERVENTIONS  - Identify patients at risk for skin breakdown  - Assess and monitor skin integrity  - Assess and monitor nutrition and hydration status  - Monitor labs (i e  albumin)  - Assess for incontinence   - Turn and reposition patient  - Assist with mobility/ambulation  - Relieve pressure over bony prominences  - Avoid friction and shearing  - Provide appropriate hygiene as needed including keeping skin clean and dry  - Evaluate need for skin moisturizer/barrier cream  - Collaborate with interdisciplinary team (i e  Nutrition, Rehabilitation, etc )   - Patient/family teaching  Outcome: Progressing  Goal: Incision(s), wounds(s) or drain site(s) healing without S/S of infection  Description  INTERVENTIONS  - Assess and document risk factors for skin impairment   - Assess and document dressing, incision, wound bed, drain sites and surrounding tissue  - Consider nutrition services referral as needed  - Oral mucous membranes remain intact  - Provide patient/ family education  Outcome: Progressing     Problem: MUSCULOSKELETAL - ADULT  Goal: Maintain or return mobility to safest level of function  Description  INTERVENTIONS:  - Assess patient's ability to carry out ADLs; assess patient's baseline for ADL function and identify physical deficits which impact ability to perform ADLs (bathing, care of mouth/teeth, toileting, grooming, dressing, etc )  - Assess/evaluate cause of self-care deficits   - Assess range of motion  - Assess patient's mobility  - Assess patient's need for assistive devices and provide as appropriate  - Encourage maximum independence but intervene and supervise when necessary  - Involve family in performance of ADLs  - Assess for home care needs following discharge   - Consider OT consult to assist with ADL evaluation and planning for discharge  - Provide patient education as appropriate  Outcome: Progressing

## 2019-12-10 NOTE — PROGRESS NOTES
Progress Note - Podiatry  Juncos Applicasa 62 y o  female MRN: [id number]  Unit/Bed#: E5 -01 Encounter: [id number]    Assessment:  1  Right hallux wound with OM   2  Methadone use  3  Hyperlipidemia  4  Hypothyroidism  5  History of MRSA   6  DM type 2  7  HTN    Plan:  - OR today for right hallux amputation with Dr Gabriel Blackwell  - confirmed NPO status   - continue iv abx as per ID recs  - appreciate SLIM for medical management        Subjective/Objective   Chief Complaint:   Chief Complaint   Patient presents with    Toe Injury     Pt reports that she had R foot steepped on last month  Reports that she has had swelling and redness a few days ago  Denies fevers  Hx DM  Reports taking "left over Amoxicillin"       Subjective: 62 y o  y/o female was seen and evaluated at bedside  Patinet resting in bed comfortably  Blood pressure 107/56, pulse 91, temperature 97 9 °F (36 6 °C), temperature source Temporal, resp  rate 18, weight 76 kg (167 lb 8 8 oz), SpO2 99 %  ,Body mass index is 27 04 kg/m²  Invasive Devices     Peripheral Intravenous Line            Peripheral IV 12/07/19 Right Hand 2 days          Drain            Negative Pressure Wound Therapy (V A C ) Arm Distal 419 days                Physical Exam:   General: Alert, cooperative and no distress  Lungs: Non labored breathing  Heart: Positive S1, S2  Abdomen: Soft, non-tender  Extremity: NVS and MSK at baseline  Dressing left clean dry intact             Lab, Imaging and other studies:   CBC:   Lab Results   Component Value Date    WBC 7 02 12/10/2019    HGB 9 2 (L) 12/10/2019    HCT 29 5 (L) 12/10/2019    MCV 91 12/10/2019     12/10/2019    MCH 28 2 12/10/2019    MCHC 31 2 (L) 12/10/2019    RDW 13 6 12/10/2019    MPV 9 2 12/10/2019    NRBC 0 12/10/2019   , CMP:   Lab Results   Component Value Date    SODIUM 136 12/10/2019    K 4 1 12/10/2019     12/10/2019    CO2 28 12/10/2019    BUN 18 12/10/2019    CREATININE 0 91 12/10/2019 CALCIUM 9 1 12/10/2019    EGFR 67 12/10/2019       Imaging: I have personally reviewed pertinent films in PACS  EKG, Pathology, and Other Studies: I have personally reviewed pertinent reports

## 2019-12-10 NOTE — ANESTHESIA PREPROCEDURE EVALUATION
Review of Systems/Medical History  Patient summary reviewed  Chart reviewed  No history of anesthetic complications     Cardiovascular  Hyperlipidemia, Hypertension poorly controlled,    Pulmonary  Negative pulmonary ROS        GI/Hepatic  Negative GI/hepatic ROS   Liver disease , Hepatitis ,        Negative  ROS        Endo/Other  Diabetes poorly controlled type 1 Insulin, History of thyroid disease , hypothyroidism,   Comment: HgbA1C  >9    GYN       Hematology  Negative hematology ROS Anemia ,     Musculoskeletal    Comment: Right arm cellulitis      Neurology      Comment: On methadone therapy  Psychology   Anxiety, Depression , bipolar disorder and being treated for depression,              Physical Exam    Airway    Mallampati score: I  TM Distance: <3 FB  Neck ROM: full     Dental   upper dentures,     Cardiovascular  Rhythm: regular, Rate: normal, Cardiovascular exam normal    Pulmonary  Pulmonary exam normal Breath sounds clear to auscultation,     Other Findings        Anesthesia Plan  ASA Score- 3     Anesthesia Type- general with ASA Monitors  Additional Monitors:   Airway Plan:         Plan Factors- Patient instructed to abstain from smoking on day of procedure  Patient did not smoke on day of surgery  Induction- intravenous  Postoperative Plan- Plan for postoperative opioid use  Informed Consent- Anesthetic plan and risks discussed with patient

## 2019-12-10 NOTE — ASSESSMENT & PLAN NOTE
46-year-old female with hypertension, hyperlipidemia, hypothyroidism, diabetes type 2, admitted due to cellulitis of her great toe of her right foot  - Antibiotics- Currently on IV Vancomycin- ID following  - Podiatry is primary  - MRI right foot-osteomyelitis of great toe phalanges  Hypoperfusion of distal phalanx which may represent gangrene, adventitial bursitis adjacent to sesamoids  - Preoperative risk stratification done   See note on 12/7/2019   - Plan for OR/amputation today at noon

## 2019-12-10 NOTE — OP NOTE
OPERATIVE REPORT  PATIENT NAME: Fabiana Saldaña    :  1955  MRN: [id number]  Pt Location: [facility] OR ROOM 01    SURGERY DATE: 12/10/2019    Surgeon(s) and Role:     * Kamilla Rosales DPM - Primary     * Johann Alicia DPM - Assisting    Preop Diagnosis:  Osteomyelitis of right foot, unspecified type ([address] ) [M86 9]    Post-Op Diagnosis Codes:     * Osteomyelitis of right foot, unspecified type ([address] ) [M86 9]    Procedure(s) (LRB):  AMPUTATION TOE (Right)    Specimen(s):  ID Type Source Tests Collected by Time Destination   1 : right great toe Tissue Toe, Right TISSUE EXAM Kamilla Rosales DPM 12/10/2019 1232      Hemostasis:  Anatomic dissection    Materials:  3 0 Vicryl  3 0 nylon    Estimated Blood Loss:   Minimal    Drains:  Negative Pressure Wound Therapy (V A C ) Arm Distal (Active)   Number of days: 419       Anesthesia Type:   Choice    Operative Indications:  Osteomyelitis of right foot, unspecified type ([address] ) [M86 9]      Operative Findings:  C/w diagnosis  Surgical cure presumed  Proximal bone was hard and viable  Adequate bleeding noted at wound margins  Complications:   None    Procedure and Technique:  Under mild sedation, the patient was brought into the operating room and placed on the operating room table in the supine position  A time out was performed to confirm the correct patient, procedure and site with all parties in agreement  Following IV sedation, local anesthetic was obtained about the patient's foot was performed consisting of 20 ml of 1% Lidocaine and 0 5% Bupivacaine in a 1:1 mixture  The foot was then scrubbed, prepped and draped in the usual aseptic manner  An esmarch bandage was utilized to exsangunate the patients foot and the pneumatic ankle tourniquet was then inflated  The esmarch bandage was removed and the foot was placed on the operating room table  Attention was directed to the 1 digit where a fishmouth type of incision was made   Utilizing a sterile 15 blade, this incision was carried deep straight to bone  Soft tissue structures were then reflected off the proximal phalanx, and the toe was disarticulated at the 1st MPJ  The severed digit was then passed off to the back table  It was noted that all tissue margins were bleeding and viable  No deep sinus tracts or areas of purulence were visualized  The metatarsal bone was noted to be of hard and viable quality  Any remaining tendinous structures were identified and severed proximally to remove any nidus of infection  The wound was then cleansed with copious amount of sterile saline  Deep closure obtained with 3-0 vicryl  Skin closure was then obtained with 3-0 Nylon placed in a horizontal mattress type of fashion  Incision site was dressed with Adaptic, 4 x 4 gauze, Kerlix and Ace bandage  Patient tolerated procedure and anesthesia well and was transported to PACU with all vital signs stable and neurovascular status at preop baseline  As with many limb salvage procedures, we contemplate the possibility of performing further stages to this procedure  Procedures may include debridements, delayed closure, plastic surgery techniques, or more proximal amputations  This procedure may be considered part of a multi-staged limb salvage treatment plan       Dr Gabriel Blackwell was present for entire procedure and participated in all key aspects      Patient Disposition:  PACU     SIGNATURE: Alejandra Bowman DPM  DATE: December 10, 2019  TIME: 12:44 PM

## 2019-12-10 NOTE — ASSESSMENT & PLAN NOTE
Lab Results   Component Value Date    HGBA1C 10 3 (H) 12/10/2019     Recent Labs     12/09/19  1552 12/09/19  2115 12/10/19  0748 12/10/19  1059   POCGLU 126 173* 213* 208*     Significantly elevated A1c  Her home regimen:  Insulin Glargine 30-45U depending on sliding scale   Insulin Aspart U-100 15U TID    Inpatient Regimen:  Insulin Glargine 30U  Insulin Aspart 8U TID  Will adjust daily-patient may need increase in the setting of stress/trauma with OR today

## 2019-12-10 NOTE — PROGRESS NOTES
Progress Note - Infectious Disease   Michael Townsend 62 y o  female MRN: [id number]  Unit/Bed#: E5 -01 Encounter: [id number]      Impression/Plan:  1  Right great toe cellulitis  Secondary to chronic right toe wound with underlying osteomyelitis  Fortunately the patient has no fever or leukocytosis  Her blood cultures are negative after 48 hours  Wound culture grew mixed skin cas  Anaerobic culture is still pending  She does have a prior history of MRSA skin infections  She is currently receiving IV vancomycin and PO Flagyl and is tolerating without difficulty  The patient is following closely with Podiatry   -continue IV vancomycin  -continue PO Flagyl  -will reassess need for ongoing antibiotic treatment in the post op setting  -monitor CBC and BMP  -monitor vitals  -serial right foot exams  -continue follow-up with Podiatry     2  Chronic right great toe wound with osteomyelitis  As evidenced on patient's x-ray  I personally reviewed patient's MRI which confirmed osteomyelitis in both the distal and proximal phalanx and showed fluid surrounding the adjacent sesamoids  Suspect patient will need a right hallux amputation   She continues to follow closely with Podiatry   -antibiotics as above  -monitor CBC and BMP  -monitor vitals  -serial right foot exams  -local wound care per Podiatry  -continue close follow-up with Podiatry     3  Uncontrolled type 2 diabetes mellitus with hyperglycemia  Per patient she has not been taking any medication for diabetes for quite a while  Her last hemoglobin A1c was 9% on 11/12/2018  Her blood glucose was greater than 600 upon admission  This is risk factor for wound and infection above  Recommend patient committed tighter glycemic control for overall health, especially in the setting of wound infection   -blood glucose management per primary service     4  Peripheral neuropathy    Secondary to uncontrolled type 2 diabetes mellitus    This is risk factor for wound above      Above plan was discussed in detail with patient at the bedside  Antibiotics:  Vancomycin 4  Flagyl 3  Antibiotics 4    Subjective:  Patient reports she is still having pain in the R foot today  Is hoping that she can proceed with surgery because she just wants to have the toe taken off, does not feel that it is worth trying to save it  She denies fever, chills, sweats, shakes; no nausea, vomiting, abdominal pain, diarrhea, or dysuria; no cough, shortness of breath, or chest pain  No new symptoms  Objective:  Vitals:  Temp:  [97 8 °F (36 6 °C)-97 9 °F (36 6 °C)] 97 9 °F (36 6 °C)  HR:  [75-91] 91  Resp:  [18] 18  BP: (107-150)/(56-67) 107/56  SpO2:  [94 %-99 %] 99 %  Temp (24hrs), Av 9 °F (36 6 °C), Min:97 8 °F (36 6 °C), Max:97 9 °F (36 6 °C)  Current: Temperature: 97 9 °F (36 6 °C)    Physical Exam:   General Appearance:  Alert, interactive, nontoxic, no acute distress  Throat: Oropharynx moist without lesions  Lungs:   Clear to auscultation bilaterally; no wheezes, rhonchi or rales; respirations unlabored   Heart:  Tachycardic; no murmur, rub or gallop   Abdomen:   Soft, non-tender, non-distended, positive bowel sounds  Extremities: No clubbing or cyanosis, no edema   Skin: No new rashes, lesions, or draining wounds noted on exposed skin   Bulky gauze dressing in place over R distal foot, some betadine staining but no wound discharge noted on dressing     Labs, Imaging, & Other studies:   All pertinent labs and imaging studies were personally reviewed  Results from last 7 days   Lab Units 12/10/19  0503 19  0610 19  0538   WBC Thousand/uL 7 02 7 30 9 95   HEMOGLOBIN g/dL 9 2* 9 4* 10 1*   PLATELETS Thousands/uL 328 319 313     Results from last 7 days   Lab Units 12/10/19  0503  19  1149   POTASSIUM mmol/L 4 1   < > 5 0   CHLORIDE mmol/L 101   < > 92*   CO2 mmol/L 28   < > 23   BUN mg/dL 18   < > 25   CREATININE mg/dL 0 91   < > 1 24   EGFR ml/min/1 73sq m 67   < > 46 CALCIUM mg/dL 9 1   < > 10 0   AST U/L  --   --  16   ALT U/L  --   --  20   ALK PHOS U/L  --   --  167*    < > = values in this interval not displayed  Results from last 7 days   Lab Units 12/08/19  1341 12/07/19  1149 12/07/19  1131   BLOOD CULTURE   --  No Growth at 48 hrs  No Growth at 48 hrs     GRAM STAIN RESULT  1+ Polys*  2+ Gram negative rods*  --   --    WOUND CULTURE  3+ Growth of   --   --

## 2019-12-10 NOTE — PROGRESS NOTES
Vancomycin Assessment    Khanh Hargrove is a 62 y o  female who is currently receiving vancomycin 1000 mg IV q12h for osteomyelitis and cellulitis  Relevant clinical data and objective history reviewed:  Creatinine   Date Value Ref Range Status   12/10/2019 0 91 0 60 - 1 30 mg/dL Final     Comment:     Standardized to IDMS reference method   12/09/2019 0 83 0 60 - 1 30 mg/dL Final     Comment:     Standardized to IDMS reference method   12/08/2019 0 82 0 60 - 1 30 mg/dL Final     Comment:     Standardized to IDMS reference method     /56 (BP Location: Left arm)   Pulse 91   Temp 97 9 °F (36 6 °C) (Temporal)   Resp 18   Wt 76 kg (167 lb 8 8 oz)   SpO2 99%   BMI 27 04 kg/m²   I/O last 3 completed shifts: In: 272 [P O :272]  Out: -   Lab Results   Component Value Date/Time    BUN 18 12/10/2019 05:03 AM    BUN 13 10/04/2018 08:15 AM    WBC 7 02 12/10/2019 05:03 AM    HGB 9 2 (L) 12/10/2019 05:03 AM    HCT 29 5 (L) 12/10/2019 05:03 AM    MCV 91 12/10/2019 05:03 AM     12/10/2019 05:03 AM     Temp Readings from Last 3 Encounters:   12/10/19 97 9 °F (36 6 °C) (Temporal)   05/15/19 97 6 °F (36 4 °C) (Temporal)   11/08/18 (!) 97 4 °F (36 3 °C) (Temporal)     Vancomycin Days of Therapy: 4    Assessment/Plan  The patient is currently on vancomycin utilizing scheduled dosing  The patient is receiving 1000 mg IV q12h with the most recent vancomycin level being 18 1 at steady-state and therapeutic based on a goal of 15-20 (appropriate for most indications) ; therefore, the current dose is clinically appropriate and dose will be continued   Pharmacy will continue to follow closely for s/sx of nephrotoxicity, infusion reactions and appropriateness of therapy  BMP and CBC will be ordered per protocol  Plan for another trough 12/16/19 at approximately 1030  Pharmacy will continue to follow the patients culture results and clinical progress daily      Sammi Rizzo, Pharmacist

## 2019-12-10 NOTE — ASSESSMENT & PLAN NOTE
TSH elevated at 8 183  Follow-up Free T4 levels -0 99  Patient currently not on any medications     Will defer to PCP in regards to resuming levothyroxine

## 2019-12-11 PROBLEM — K59.00 CONSTIPATION: Status: ACTIVE | Noted: 2019-12-11

## 2019-12-11 LAB
BACTERIA SPEC ANAEROBE CULT: NORMAL
ERYTHROCYTE [DISTWIDTH] IN BLOOD BY AUTOMATED COUNT: 13.8 % (ref 11.6–15.1)
GLUCOSE SERPL-MCNC: 123 MG/DL (ref 65–140)
GLUCOSE SERPL-MCNC: 208 MG/DL (ref 65–140)
GLUCOSE SERPL-MCNC: 237 MG/DL (ref 65–140)
GLUCOSE SERPL-MCNC: 311 MG/DL (ref 65–140)
HCT VFR BLD AUTO: 28.9 % (ref 34.8–46.1)
HGB BLD-MCNC: 8.9 G/DL (ref 11.5–15.4)
MCH RBC QN AUTO: 28.3 PG (ref 26.8–34.3)
MCHC RBC AUTO-ENTMCNC: 30.8 G/DL (ref 31.4–37.4)
MCV RBC AUTO: 92 FL (ref 82–98)
PLATELET # BLD AUTO: 327 THOUSANDS/UL (ref 149–390)
PMV BLD AUTO: 9.1 FL (ref 8.9–12.7)
RBC # BLD AUTO: 3.15 MILLION/UL (ref 3.81–5.12)
WBC # BLD AUTO: 5.68 THOUSAND/UL (ref 4.31–10.16)

## 2019-12-11 PROCEDURE — 99232 SBSQ HOSP IP/OBS MODERATE 35: CPT | Performed by: INTERNAL MEDICINE

## 2019-12-11 PROCEDURE — G8987 SELF CARE CURRENT STATUS: HCPCS

## 2019-12-11 PROCEDURE — 82948 REAGENT STRIP/BLOOD GLUCOSE: CPT

## 2019-12-11 PROCEDURE — 85027 COMPLETE CBC AUTOMATED: CPT | Performed by: STUDENT IN AN ORGANIZED HEALTH CARE EDUCATION/TRAINING PROGRAM

## 2019-12-11 PROCEDURE — 99232 SBSQ HOSP IP/OBS MODERATE 35: CPT | Performed by: PHYSICIAN ASSISTANT

## 2019-12-11 PROCEDURE — G8979 MOBILITY GOAL STATUS: HCPCS

## 2019-12-11 PROCEDURE — 97166 OT EVAL MOD COMPLEX 45 MIN: CPT

## 2019-12-11 PROCEDURE — 97163 PT EVAL HIGH COMPLEX 45 MIN: CPT

## 2019-12-11 PROCEDURE — 99024 POSTOP FOLLOW-UP VISIT: CPT | Performed by: PODIATRIST

## 2019-12-11 PROCEDURE — G8988 SELF CARE GOAL STATUS: HCPCS

## 2019-12-11 PROCEDURE — G8978 MOBILITY CURRENT STATUS: HCPCS

## 2019-12-11 RX ORDER — INSULIN GLARGINE 100 [IU]/ML
32 INJECTION, SOLUTION SUBCUTANEOUS EVERY MORNING
Status: DISCONTINUED | OUTPATIENT
Start: 2019-12-12 | End: 2019-12-12

## 2019-12-11 RX ORDER — POLYETHYLENE GLYCOL 3350 17 G/17G
17 POWDER, FOR SOLUTION ORAL DAILY PRN
Status: DISCONTINUED | OUTPATIENT
Start: 2019-12-11 | End: 2019-12-12 | Stop reason: HOSPADM

## 2019-12-11 RX ORDER — HYDRALAZINE HYDROCHLORIDE 20 MG/ML
5 INJECTION INTRAMUSCULAR; INTRAVENOUS EVERY 6 HOURS PRN
Status: DISCONTINUED | OUTPATIENT
Start: 2019-12-11 | End: 2019-12-12 | Stop reason: HOSPADM

## 2019-12-11 RX ORDER — SENNOSIDES 8.6 MG
1 TABLET ORAL
Status: DISCONTINUED | OUTPATIENT
Start: 2019-12-11 | End: 2019-12-12 | Stop reason: HOSPADM

## 2019-12-11 RX ORDER — SULFAMETHOXAZOLE AND TRIMETHOPRIM 800; 160 MG/1; MG/1
1 TABLET ORAL EVERY 12 HOURS SCHEDULED
Status: DISCONTINUED | OUTPATIENT
Start: 2019-12-11 | End: 2019-12-12 | Stop reason: HOSPADM

## 2019-12-11 RX ADMIN — OXYCODONE HYDROCHLORIDE 5 MG: 5 TABLET ORAL at 17:42

## 2019-12-11 RX ADMIN — GABAPENTIN 300 MG: 300 CAPSULE ORAL at 16:37

## 2019-12-11 RX ADMIN — HYDROXYZINE HYDROCHLORIDE 50 MG: 25 TABLET ORAL at 22:01

## 2019-12-11 RX ADMIN — SULFAMETHOXAZOLE AND TRIMETHOPRIM 1 TABLET: 800; 160 TABLET ORAL at 15:16

## 2019-12-11 RX ADMIN — CLONAZEPAM 1 MG: 1 TABLET ORAL at 08:49

## 2019-12-11 RX ADMIN — CLONAZEPAM 1 MG: 1 TABLET ORAL at 16:37

## 2019-12-11 RX ADMIN — METRONIDAZOLE 500 MG: 500 TABLET ORAL at 05:38

## 2019-12-11 RX ADMIN — OXYCODONE HYDROCHLORIDE 5 MG: 5 TABLET ORAL at 05:38

## 2019-12-11 RX ADMIN — ENOXAPARIN SODIUM 40 MG: 40 INJECTION SUBCUTANEOUS at 08:50

## 2019-12-11 RX ADMIN — INSULIN LISPRO 3 UNITS: 100 INJECTION, SOLUTION INTRAVENOUS; SUBCUTANEOUS at 11:55

## 2019-12-11 RX ADMIN — ACETAMINOPHEN 650 MG: 325 TABLET ORAL at 22:01

## 2019-12-11 RX ADMIN — INSULIN LISPRO 2 UNITS: 100 INJECTION, SOLUTION INTRAVENOUS; SUBCUTANEOUS at 08:51

## 2019-12-11 RX ADMIN — GABAPENTIN 300 MG: 300 CAPSULE ORAL at 08:50

## 2019-12-11 RX ADMIN — SULFAMETHOXAZOLE AND TRIMETHOPRIM 1 TABLET: 800; 160 TABLET ORAL at 22:01

## 2019-12-11 RX ADMIN — INSULIN LISPRO 6 UNITS: 100 INJECTION, SOLUTION INTRAVENOUS; SUBCUTANEOUS at 08:51

## 2019-12-11 RX ADMIN — ROPINIROLE 0.5 MG: 1 TABLET, FILM COATED ORAL at 17:37

## 2019-12-11 RX ADMIN — SERTRALINE HYDROCHLORIDE 100 MG: 100 TABLET ORAL at 08:50

## 2019-12-11 RX ADMIN — CLONAZEPAM 1 MG: 1 TABLET ORAL at 22:01

## 2019-12-11 RX ADMIN — INSULIN LISPRO 1 UNITS: 100 INJECTION, SOLUTION INTRAVENOUS; SUBCUTANEOUS at 16:38

## 2019-12-11 RX ADMIN — DOCUSATE SODIUM 100 MG: 100 CAPSULE, LIQUID FILLED ORAL at 17:37

## 2019-12-11 RX ADMIN — INSULIN GLARGINE 30 UNITS: 100 INJECTION, SOLUTION SUBCUTANEOUS at 08:51

## 2019-12-11 RX ADMIN — DOCUSATE SODIUM 100 MG: 100 CAPSULE, LIQUID FILLED ORAL at 08:49

## 2019-12-11 RX ADMIN — ATORVASTATIN CALCIUM 40 MG: 40 TABLET, FILM COATED ORAL at 16:37

## 2019-12-11 RX ADMIN — LAMOTRIGINE 150 MG: 100 TABLET ORAL at 08:49

## 2019-12-11 RX ADMIN — METHADONE HYDROCHLORIDE 90 MG: 10 TABLET ORAL at 08:50

## 2019-12-11 RX ADMIN — DULOXETINE HYDROCHLORIDE 60 MG: 60 CAPSULE, DELAYED RELEASE ORAL at 08:49

## 2019-12-11 RX ADMIN — GABAPENTIN 300 MG: 300 CAPSULE ORAL at 22:01

## 2019-12-11 RX ADMIN — ROPINIROLE 0.5 MG: 1 TABLET, FILM COATED ORAL at 08:50

## 2019-12-11 RX ADMIN — SENNOSIDES 8.6 MG: 8.6 TABLET, FILM COATED ORAL at 22:01

## 2019-12-11 NOTE — ASSESSMENT & PLAN NOTE
Confirmed pt is again on methadone 90mg daily by provider this admission from new direction -[id number]  Methadone resumed and will continue

## 2019-12-11 NOTE — OCCUPATIONAL THERAPY NOTE
OccupationalTherapy Evaluation     Patient Name: Martine Sutton  HZDUR'A Date: 12/11/2019  Problem List  Principal Problem:    Cellulitis of great toe of right foot  Active Problems:    Bipolar affective disorder ([place] [address] )    Hyperlipidemia    Hypothyroidism    Type 2 diabetes mellitus, uncontrolled ([place] [address] )    Methadone use ([place] [address] )    Chronic hepatitis C without hepatic coma (HCC)    Wound infection    Osteomyelitis of right foot [facility])    Past Medical History  Past Medical History:   Diagnosis Date    Bipolar disorder ([place] [address] )     Depression     Diabetes mellitus ([place] [address] )     History of MRSA infection      Past Surgical History  Past Surgical History:   Procedure Laterality Date    APPENDECTOMY      INCISION AND DRAINAGE OF WOUND Right 7/1/2018    Procedure: INCISION AND DRAINAGE (I&D) RIGHT FOREARM;  Surgeon: Maliha Hermosillo MD;  [facility];  Service: Orthopedics    MN SPLIT [address] <100 SQCM Right 10/17/2018    Procedure: RIGHT ARM SKIN GRAFT SPLIT THICKNESS (STSG);   Surgeon: Tim Aguillon MD;  [facility];  Service: Plastics    MN SPLIT [address] <100 SQCM Right 9/12/2018    Procedure: Alma Delia Boys;  Surgeon: Tim Aguillon MD;  [facility];  Service: Plastics    TOE AMPUTATION Right 12/10/2019    Procedure: AMPUTATION TOE;  Surgeon: Rika Ascencio DPM;  [facility];  Service: Podiatry    TONSILLECTOMY      VAC DRESSING APPLICATION Right 69/32/1584    Procedure: Errol Garcia;  Surgeon: Tim Aguillon MD;  [facility];  Service: Plastics    WOUND DEBRIDEMENT Right 7/7/2018    Procedure: DEBRIDEMENT UPPER EXTREMITY ([address]) W/ APPLICATION OF WOUND VAC;  Surgeon: Carlton Bella MD;  [facility];  Service: Orthopedics    WOUND DEBRIDEMENT Right 7/11/2018    Procedure: DEBRIDEMENT UPPER EXTREMITY WITH WOUND VAC EXCHANGE;  Surgeon: Jarrett Fisher DO;  [facility];  Service: Orthopedics    WOUND DEBRIDEMENT Right 9/12/2018    Procedure: DEBRIDEMENT  [facility] OUT) FOREARM with Vac dressing change;  Surgeon: Beatriz Sanchez MD;  [facility];  Service: Plastics           12/11/19 1396   Note Type   Note type Eval only   Restrictions/Precautions   Weight Bearing Precautions Per Order Yes   RLE Weight Bearing Per Order WBAT  (with Darco Wedge)   Braces or Orthoses Other (Comment)  (Darco Wedge Toe unloading)   Other Precautions Fall Risk;Pain;WBS   Pain Assessment   Pain Assessment 0-10   Pain Score 9   Pain Type Surgical pain   Pain Location Foot; Toe (Comment which one)   Pain Orientation Right   Hospital Pain Intervention(s) Repositioned; Ambulation/increased activity; Emotional support; Rest   Response to Interventions Tolerated OT   Multiple Pain Sites No   Home Living   Type of [address] One level;Stairs to enter with rails  (14 TRUPTI)   Bathroom Shower/Tub Tub/shower unit   Bathroom Toilet Standard   Bathroom Equipment Other (Comment)  (denies DME)   [address] Other (Comment)  (denies DME)   Additional Comments Pt lives with spouse in a one level apt with 14 TRUPTI  Prior Function   Level of [place] Independent with ADLs and functional mobility   Lives With Spouse   Receives Help From Family   ADL Assistance Independent   IADLs Independent   Falls in the last 6 months 5 to 10   Vocational On disability   Comments At baseline, pt was I w/ ADLs, IADLs, and functional mobility/transfers w/o use of AD, (+) , and (+) falls PTA  Lifestyle   Autonomy At baseline, pt was I w/ ADLs, IADLs, and functional mobility/transfers w/o use of AD, (+) , and (+) falls PTA     Reciprocal Relationships Spouse   Service to Others On disability   Intrinsic Gratification Watching TV   Psychosocial   Psychosocial (WDL) WDL   ADL   Where Assessed Edge of bed   Eating Assistance 5  Supervision/Setup   Grooming Assistance 5 Supervision/Setup   UB Bathing Assistance 5  Supervision/Setup   LB Bathing Assistance 4  Minimal Assistance   UB Dressing Assistance 5  Supervision/Setup   LB Dressing Assistance 4  [address]  5  Supervision/Setup   Functional Assistance 4  Minimal Assistance   Bed Mobility   Supine to Sit Unable to assess   Sit to Supine Unable to assess   Additional Comments N/A  Pt seated EOB at start/end of session with call bell and phone within reach  All needs met and pt reports no further questions for OT at this time   Transfers   Sit to Stand 5  Supervision   Additional items Increased time required;Verbal cues   Stand to Sit 5  Supervision   Additional items Increased time required;Verbal cues   Additional Comments Cues for safe technique and hand placement   Functional Mobility   Functional Mobility 4  Minimal assistance  (CGA-Close Supervision )   Additional Comments Close Supervision x1, however required CGA at times 2* increased dizziness reported by pt    Additional items Rolling walker   Balance   Static Sitting Good   Dynamic Sitting Fair +   Static Standing Fair   Dynamic Standing Fair -   Ambulatory Poor +   Activity Tolerance   Activity Tolerance Patient limited by pain; Patient tolerated treatment well   Medical Staff [address], PT   Nurse Made Aware yes;  Shadi Keith RN   RUE Assessment   RUE Assessment WFL   RUE Strength   RUE Overall Strength Within Functional Limits - able to perform ADL tasks with strength  (4/5 throughout)   LUE Assessment   LUE Assessment WFL   LUE Strength   LUE Overall Strength Within Functional Limits - able to perform ADL tasks with strength  (4/5 throughout)   Hand Function   Gross Motor Coordination Functional   Fine Motor Coordination   (R=impaired, L=functiona)   Sensation   Light Touch Partial deficits in the RUE;Partial deficits in the RLE;Partial deficits in the LLE   Proprioception   Proprioception No apparent deficits   Vision-Basic Assessment   Current Vision Wears glasses only for reading   Vision - Complex Assessment   Ocular Range of Motion [facility]   Acuity Able to read clock/calendar on wall without difficulty   Perception   Inattention/Neglect Appears intact   Cognition   Overall Cognitive Status [facility]   Arousal/Participation Alert; Cooperative   Attention Attends with cues to redirect   Orientation Level Oriented X4   Memory Within functional limits   Following Commands Follows one step commands inconsistently   Assessment   Limitation Decreased ADL status; Decreased UE strength;Decreased Safe judgement during ADL;Decreased endurance;Decreased self-care trans;Decreased high-level ADLs   Prognosis Good   Assessment Pt is a 62 y o  female seen for OT evaluation s/p adm to [facility] on 12/7/2019 w/ Cellulitis of great toe of right foot  Pt now s/p R hallux amputation on 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/19  Pt w/ orders for WBAT R LE in Darco Wedge Toe Offloading  Comorbidities affecting pts functional performance include a significant PMH of Bipolar disorder, Depression, DM, and MRSA infection  Pt with active OT orders and activity orders for Activity as tolerated  Pt lives with spouse in a one level apt with 14 TRUPTI  At baseline, pt was I w/ ADLs, IADLs, and functional mobility/transfers w/o use of AD, (+) , and (+) falls PTA  Upon evaluation, pt currently requires Supervision for UB ADLs, Min A for LB ADLs, Supervision for toileting, Supervision for transfers, and CGA-Close Supervision for functional mobility/transfers 2* the following deficits impacting occupational performance: weakness, decreased strength, decreased balance, decreased tolerance, decreased safety awareness and increased pain  These impairments, as well at pts steps to enter environment, limited home support, difficulty performing ADLS and difficulty performing IADLS  limit pts ability to safely engage in all baseline areas of occupation  Pt scored overall 75/100 on the Barthel Index  Based on the aforementioned OT evaluation, functional performance deficits, and assessments, pt has been identified as a [place]reate complexity evaluation  Pt to continue to benefit from continued acute OT services during hospital stay to address defined deficits and to maximize level of functional independence in the following Occupational Performance areas: grooming, bathing/shower, toilet hygiene, dressing, medication management, functional mobility, community mobility, clothing management, cleaning, meal prep and household maintenance  From OT standpoint, recommend Home with family support and continued PT upon D/C  OT will continue to follow pt 3-5x/wk to address the following goals to  w/in 10-14 days:   Goals   Patient Goals To go home   LTG Time Frame 10-   Long Term Goal Please refer to LTGs listed below   Plan   Treatment Interventions ADL retraining;Functional transfer training;UE strengthening/ROM; Endurance training;Patient/family training;Equipment evaluation/education; Compensatory technique education; Energy conservation; Activityengagement   Goal Expiration Date 19   OT Treatment Day 0   OT Frequency 3-5x/wk   Recommendation   OT Discharge Recommendation Home with family support   OT - OK to Discharge Yes  (when medically cleared)   Barthel Index   Feeding 10   Bathing 0   Grooming Score 5   Dressing Score 5   Bladder Score 10   Bowels Score 10   Toilet Use Score 10   Transfers (Bed/Chair) Score 10   Mobility (Level Surface) Score 10   Stairs Score 5   Barthel Index Score 75   Modified [place] Scale   Modified [place] Scale 4          GOALS    1) Pt will improve activity tolerance to G for min 30 min txment sessions for increase engagement in functional tasks    2) Pt will complete bed mobility at a Mod I level w/ G balance/safety demonstrated to decrease caregiver assistance required     3) Pt will complete UB/LB dressing/self care w/ mod I using adaptive device and DME as needed    4) Pt will complete bathing w/ Mod I w/ use of AE and DME as needed    5) Pt will complete toileting w/ mod I w/ G hygiene/thoroughness using DME as needed    6) Pt will improve functional transfers to Mod I on/off all surfaces using DME as needed w/ G balance/safety     7) Pt will improve functional mobility during ADL/IADL/leisure tasks to Mod I using DME as needed w/ G balance/safety     8) Pt will be attentive 100% of the time during ongoing cognitive assessment w/ G participation to assist w/ safe d/c planning/recommendations    9) Pt will demonstrate G carryover of pt/caregiver education and training as appropriate w/o cues w/ good tolerance to increase safety during functional tasks    10) Pt will demonstrate 100% carryover of energy conservation techniques t/o functional I/ADL/leisure tasks w/o cues s/p skilled education to increase endurance during functional tasks    11) Pt will participate in simulated IADL management task to increase independence to Mod I w/ G safety and endurance    12) Pt will verbalize 3 potential fall hazards and identify appropriate compensatory techniques to decrease fall risk in home environment         Tab Anglin, OTR/L

## 2019-12-11 NOTE — PROGRESS NOTES
Progress Note - Renetta Betlran 1955, 62 y o  female MRN: [id number]    Unit/Bed#: E5 -01 Encounter: [id number]    Primary Care Provider: Medardo Enriquez MD   Date and time admitted to hospital: 12/7/2019 10:38 AM        Constipation  Assessment & Plan  Likely 2* narcotics  Positive flatus and abdomen soft w/normoactive bs  Continue colace add senna scheduled and miralax prn    Chronic hepatitis C without hepatic coma ([facility] )  Assessment & Plan  Follow-up outpatient    Methadone use [facility])  Assessment & Plan  Confirmed pt is again on methadone 90mg daily by provider this admission from [phone]  Methadone resumed and will continue    Type 2 diabetes mellitus, uncontrolled ([facility] )  Assessment & Plan  Lab Results   Component Value Date    HGBA1C 10 3 (H) 12/10/2019     Recent Labs     12/10/19  1515 12/10/19  2100 12/11/19  0813 12/11/19  1048   POCGLU 118 215* 237* 311*     Poor control  Pt reports she needs refills on all of her meds  Doubt compliance w/op regimen  Her home regimen:  Insulin Glargine 30-45U depending on sliding scale   Insulin aspart 15U TID    Inpatient Regimen:  Increase lantus to 32 iu qhs  Insulin lispro was 6 iu tid ac will incease to 10 iu tid ac  Will adjust daily-patient may need increase in the setting of stress/trauma with OR today    Hypothyroidism  Assessment & Plan  TSH elevated at 8 183 in setting of acute infection  Follow-up Free T4 levels -0 99  Patient currently not on any medications  Will defer to PCP in regards to resuming levothyroxine    Hyperlipidemia  Assessment & Plan  Continue statin    Bipolar affective disorder ([facility] )  Assessment & Plan  Continue Hydroxyzine, Cymbalta, Zoloft    * Cellulitis of great toe of right foot  Assessment & Plan  69-year-old female with hypertension, hyperlipidemia, hypothyroidism, diabetes type 2, admitted due to cellulitis of her great toe of her right foot w/underlying ostemyelitis    She is s/p right hallux amputation POD #1  - Antibiotics- Currently on IV Vancomycin/PO flagyl- ID following  - Podiatry is primary  - MRI right foot-osteomyelitis of great toe phalanges  Hypoperfusion of distal phalanx which may represent gangrene, adventitial bursitis adjacent to sesamoids  -f/u pathology continue on vancomycin/flagyl perID      VTE Pharmacologic Prophylaxis:   Pharmacologic: Enoxaparin (Lovenox)  Mechanical VTE Prophylaxis in Place: Yes    Patient Centered Rounds: I have performed bedside rounds with nursing staff today  Discussions with Specialists or Other Care Team Provider:     Education and Discussions with Family / Patient: dispo workup    Time Spent for Care: 20 minutes  More than 50% of total time spent on counseling and coordination of care as described above  Current Length of Stay: 4 day(s)    Current Patient Status: Inpatient   Certification Statement: The patient will continue to require additional inpatient hospital stay due to osteomyelitis/cellulitis    Discharge Plan: per 1* team   Once off IV abx no barriers for d/c from [facility]    Code Status: Level 1 - Full Code      Subjective:   Pt seen/examined  No events overnight  Having some right foot pain  No cp/sob/abd pain n/v/f/c  No bm since admission but + flatus  Normally constipated at baseline per pt  Objective:     Vitals:   Temp (24hrs), Av 3 °F (36 3 °C), Min:96 9 °F (36 1 °C), Max:97 8 °F (36 6 °C)    Temp:  [96 9 °F (36 1 °C)-97 8 °F (36 6 °C)] 97 2 °F (36 2 °C)  HR:  [65-84] 66  Resp:  [15-16] 16  BP: (127-152)/(61-79) 152/79  SpO2:  [95 %-98 %] 95 %  Body mass index is 27 04 kg/m²  Input and Output Summary (last 24 hours): Intake/Output Summary (Last 24 hours) at 2019 1307  Last data filed at 2019 0830  Gross per 24 hour   Intake 820 ml   Output    Net 820 ml       Physical Exam:     Physical Exam   Constitutional: She appears well-developed and well-nourished  No distress     HENT:   Head: Normocephalic and atraumatic  Right Ear: External ear normal    Left Ear: External ear normal    Eyes: Conjunctivae are normal    Neck: Normal range of motion  Cardiovascular: Normal rate, regular rhythm and normal heart sounds  Exam reveals no gallop and no friction rub  No murmur heard  Pulmonary/Chest: Effort normal  No respiratory distress  She has no wheezes  She has no rales  Abdominal: Soft  Bowel sounds are normal  She exhibits no distension and no mass  There is no tenderness  There is no rebound and no guarding  Musculoskeletal: She exhibits no edema  Neurological: She is alert  Skin: Skin is warm and dry  She is not diaphoretic  Right foot dressed and dressing is c/d/i   Psychiatric: She has a normal mood and affect  Vitals reviewed  (  Be Sure to Include Physical Exam: Delete this entire line when you have entered your exam)    Additional Data:     Labs:    Results from last 7 days   Lab Units 12/11/19  0448 12/10/19  0503   WBC Thousand/uL 5 68 7 02   HEMOGLOBIN g/dL 8 9* 9 2*   HEMATOCRIT % 28 9* 29 5*   PLATELETS Thousands/uL 327 328   NEUTROS PCT %  --  61   LYMPHS PCT %  --  25   MONOS PCT %  --  7   EOS PCT %  --  5     Results from last 7 days   Lab Units 12/10/19  0503  12/07/19  1149   SODIUM mmol/L 136   < > 126*   POTASSIUM mmol/L 4 1   < > 5 0   CHLORIDE mmol/L 101   < > 92*   CO2 mmol/L 28   < > 23   BUN mg/dL 18   < > 25   CREATININE mg/dL 0 91   < > 1 24   ANION GAP mmol/L 7   < > 11   CALCIUM mg/dL 9 1   < > 10 0   ALBUMIN g/dL  --   --  3 1*   TOTAL BILIRUBIN mg/dL  --   --  0 27   ALK PHOS U/L  --   --  167*   ALT U/L  --   --  20   AST U/L  --   --  16   GLUCOSE RANDOM mg/dL 263*   < > 691*    < > = values in this interval not displayed           Results from last 7 days   Lab Units 12/11/19  1048 12/11/19  0813 12/10/19  2100 12/10/19  1515 12/10/19  1302 12/10/19  1059 12/10/19  0748 12/09/19  2115 12/09/19  1552 12/09/19  1119 12/09/19  0746 12/08/19  2037   POC GLUCOSE mg/dl 311* 237* 215* 118 136 208* 213* 173* 126 170* 145* 211*     Results from last 7 days   Lab Units 12/10/19  0503   HEMOGLOBIN A1C % 10 3*               * I Have Reviewed All Lab Data Listed Above  * Additional Pertinent Lab Tests Reviewed: All Labs Within Last 24 Hours Reviewed    Imaging:    Imaging Reports Reviewed Today Include:   Imaging Personally Reviewed by Myself Includes:      Recent Cultures (last 7 days):     Results from last 7 days   Lab Units 12/08/19  1341 12/07/19  1149 12/07/19  1131   BLOOD CULTURE   --  No Growth at 72 hrs  No Growth at 72 hrs     GRAM STAIN RESULT  1+ Polys*  2+ Gram negative rods*  --   --    WOUND CULTURE  3+ Growth of   --   --        Last 24 Hours Medication List:     Current Facility-Administered Medications:  acetaminophen 650 mg Oral Q6H PRN Hero Coke, DPM    atorvastatin 40 mg Oral Daily With [place]worth  Alisson, DPM    clonazePAM 1 mg Oral TID Hero Coke, DPM    docusate sodium 100 mg Oral BID Hero Coke, DPM    DULoxetine 60 mg Oral Daily Hero Coke, DPM    enoxaparin 40 mg Subcutaneous Daily Hero Coke, DPM    gabapentin 300 mg Oral TID Hero Coke, DPM    hydrALAZINE 5 mg Intravenous Q6H PRN Parul Shah PA-C    hydrOXYzine HCL 50 mg Oral HS Hero Coke, DPM    [START ON 12/12/2019] insulin glargine 32 Units Subcutaneous QAM Parul Shah PA-C    insulin lispro 1-5 Units Subcutaneous TID AC Hero Coke, DPM    insulin lispro 1-5 Units Subcutaneous HS Hero Coke, DPM    insulin lispro 10 Units Subcutaneous TID AC Parul Shah PA-C    lamoTRIgine 150 mg Oral Daily Hero Coke, DPM    methadone 90 mg Oral Daily Hero Coke, DPM    metroNIDAZOLE 500 mg Oral [facility] Hero Coke, DPM    oxyCODONE 5 mg Oral Q6H PRN Hero Coke, DPM    polyethylene glycol 17 g Oral Daily PRN Parul Shah PA-C    rOPINIRole 0 5 mg Oral BID Hero Coke, DPM    senna 1 tablet Oral HS Parul Shah PA-C    sertraline 100 mg Oral Daily Hero Rae, CHRIS vancomycin 1,000 mg Intravenous Q12H Alexandre Pastrana DPM Last Rate: Stopped (12/11/19 1146)        Today, Patient Was Seen By: Ishan Ruiz PA-C    ** Please Note: Dictation voice to text software may have been used in the creation of this document   **

## 2019-12-11 NOTE — ASSESSMENT & PLAN NOTE
Lab Results   Component Value Date    HGBA1C 10 3 (H) 12/10/2019     Recent Labs     12/10/19  1515 12/10/19  2100 12/11/19  0813 12/11/19  1048   POCGLU 118 215* 237* 311*     Poor control  Pt reports she needs refills on all of her meds    Doubt compliance w/op regimen  Her home regimen:  Insulin Glargine 30-45U depending on sliding scale   Insulin aspart 15U TID    Inpatient Regimen:  Increase lantus to 32 iu qhs  Insulin lispro was 6 iu tid ac will incease to 10 iu tid ac  Will adjust daily-patient may need increase in the setting of stress/trauma with OR today

## 2019-12-11 NOTE — PLAN OF CARE
Problem: OCCUPATIONAL THERAPY ADULT  Goal: Performs self-care activities at highest level of function for planned discharge setting  See evaluation for individualized goals  Description  Treatment Interventions: ADL retraining, Functional transfer training, UE strengthening/ROM, Endurance training, Patient/family training, Equipment evaluation/education, Compensatory technique education, Energy conservation, Activityengagement          See flowsheet documentation for full assessment, interventions and recommendations  Note:   Limitation: Decreased ADL status, Decreased UE strength, Decreased Safe judgement during ADL, Decreased endurance, Decreased self-care trans, Decreased high-level ADLs  Prognosis: Good  Assessment: Pt is a 62 y o  female seen for OT evaluation s/p adm [facility] 12/7/[facility] w/ Cellulitis of great toe of right foot  Pt now s/p R hallux amputation on 12/10/19  Pt w/ orders for WBAT R LE in Darco Wedge Toe Offloading  Comorbidities affecting pts functional performance include a significant PMH of Bipolar disorder, Depression, DM, and MRSA infection  Pt with active OT orders and activity orders for Activity as tolerated  Pt lives with spouse in a one level apt with 14 TRUPTI  At baseline, pt was I w/ ADLs, IADLs, and functional mobility/transfers w/o use of AD, (+) , and (+) falls PTA  Upon evaluation, pt currently requires Supervision for UB ADLs, Min A for LB ADLs, Supervision for toileting, Supervision for transfers, and CGA-Close Supervision for functional mobility/transfers 2* the following deficits impacting occupational performance: weakness, decreased strength, decreased balance, decreased tolerance, decreased safety awareness and increased pain  These impairments, as well at pts steps to enter environment, limited home support, difficulty performing ADLS and difficulty performing IADLS  limit pts ability to safely engage in all baseline areas of occupation   Pt scored overall 75/100 on the Barthel Index  Based on the aforementioned OT evaluation, functional performance deficits, and assessments, pt has been identified as a Modreate complexity evaluation  Pt to continue to benefit from continued acute OT services during hospital stay to address defined deficits and to maximize level of functional independence in the following Occupational Performance areas: grooming, bathing/shower, toilet hygiene, dressing, medication management, functional mobility, community mobility, clothing management, cleaning, meal prep and household maintenance  From OT standpoint, recommend Home with family support and continued PT upon D/C   OT will continue to follow pt 3-5x/wk to address the following goals to  w/in 10-14 days:     OT Discharge Recommendation: Home with family support  OT - OK to Discharge: Yes(when medically cleared)

## 2019-12-11 NOTE — PHYSICAL THERAPY NOTE
PHYSICAL THERAPY  EVALUATION      Patient Name: Bela Verduzco  DWUCB'Y Date: 12/11/2019     62 y o   [id number]    Wound infection [T14  8XXA, L08 9]  Contusion of great toe of right foot [S90 111A]  Cellulitis of great toe of right foot [L03 031]    Past Medical History:   Diagnosis Date    Bipolar disorder ([facility] )     Depression     Diabetes mellitus ([facility] )     History of MRSA infection      Past Surgical History:   Procedure Laterality Date    APPENDECTOMY      INCISION AND DRAINAGE OF WOUND Right 7/1/2018    Procedure: INCISION AND DRAINAGE (I&D) RIGHT FOREARM;  Surgeon: Laura Noyola MD;  [facility];  Service: Orthopedics    [place] SPLIT [address] <100 SQCM Right 10/17/2018    Procedure: RIGHT ARM SKIN GRAFT SPLIT THICKNESS (STSG);   Surgeon: Gladys Cowan MD;  [facility];  Service: Plastics    [place] [address] <100 SQCM Right 9/12/2018    Procedure: Ina Stewart;  Surgeon: Gladys Cowan MD;  [facility];  Service: Plastics    TOE AMPUTATION Right 12/10/2019    Procedure: AMPUTATION TOE;  Surgeon: Shantel Luong DPM;  [facility];  Service: Podiatry    TONSILLECTOMY      VAC DRESSING APPLICATION Right 20/58/3794    Procedure: Ashley Mcgregor;  Surgeon: Gladys Cowan MD;  [facility];  Service: Plastics    WOUND DEBRIDEMENT Right 7/7/2018    Procedure: DEBRIDEMENT UPPER EXTREMITY ([address]) W/ APPLICATION OF WOUND VAC;  Surgeon: Mir Oliva MD;  [facility];  Service: Orthopedics    WOUND DEBRIDEMENT Right 7/11/2018    Procedure: DEBRIDEMENT UPPER EXTREMITY WITH WOUND VAC EXCHANGE;  Surgeon: Marty Brooks DO;  [facility];  Service: Orthopedics    WOUND DEBRIDEMENT Right 9/12/2018    Procedure: DEBRIDEMENT  [facility] OUT) FOREARM with Vac dressing change;  Surgeon: Gladys Cowan MD; [facility];  Service: Plastics        12/11/19 0921   Note Type   Note type Eval only   Pain Assessment   Pain Assessment 0-10   Pain Score 9   Pain Type Surgical pain   Pain Location Foot; Toe (Comment which one)   Pain Orientation Right   Hospital Pain Intervention(s) Repositioned; Ambulation/increased activity   Response to Interventions tolerated PT   Home Living   Type of [address] One level;Stairs to enter with rails  (FOS to enter w R rail ascending)   Bathroom Shower/Tub Tub/shower unit   Bathroom Toilet Standard   Bathroom Accessibility Accessible   Prior Function   Level of [place] Independent with ADLs and functional mobility   Lives With Spouse   ADL Assistance Independent   IADLs Independent   Falls in the last 6 months 5 to 10   Vocational On disability   Comments pt reports being indep pta, amb w/o an AD  lives w  who works during the days   Restrictions/Precautions   [place] Bearing Precautions Per Order Yes   RLE [place] Bearing Per Order WBAT  (w Darco wedge)   Braces or Orthoses Other (Comment)  (Darco wedge/toe unloading)   Other Precautions WBS; Fall Risk;Pain   General   Additional Pertinent History pt admitted 12/7/19 w cellulitis of R great toe s/p toe amputation 12/10/19   hx of MRSA   Family/Caregiver Present No   Cognition   Overall Cognitive Status WFL   Arousal/Participation Alert   Orientation Level Oriented X4   Memory Within functional limits   Following Commands Follows one step commands with increased time or repetition   Comments cues to redirect attention to task   RUE Assessment   RUE Assessment   (refer to OT)   LUE Assessment   LUE Assessment   (refer to OT)   RLE Assessment   RLE Assessment X  (5/5 hip and knee)   Strength RLE   R Ankle Dorsiflexion 3/5  (limited due to pain)   R Ankle Plantar Flexion 3/5  (limited due to pain)   LLE Assessment   LLE Assessment WFL  (grossly 5/5)   Coordination   Movements are Fluid and Coordinated 0 Coordination and Movement Description decreased coordination of gait w toe unloading shoe   Sensation X  (pt reports bilateral feet are constantly "numb")   Light Touch   RLE Light Touch Impaired   RLE Light Touch Comments absent L4-5   LLE Light Touch Impaired   LLE Light Touch Comments absent L4-5   Bed Mobility   Additional Comments unable to assess; pt seated EOB pre and post session   Transfers   Sit to Stand 5  Supervision   Additional items Bedrails; Increased time required;Verbal cues   Stand to Sit 5  Supervision   Additional items Increased time required;Verbal cues   Stand pivot 5  Supervision  (CGA)   Additional items Assist x 1; Increased time required;Verbal cues  (RW; wide turns)   Additional Comments vc for safe hand placement and proper use of RW during transf   Ambulation/Elevation   Gait pattern Improper Weight shift; Antalgic;Decreased foot clearance; Excessively slow   Gait Assistance 5  Supervision  (CGA)   Additional items Assist x 1;Verbal cues   Assistive Device Rolling walker   Distance >300'   Stair Management Assistance 5  Supervision  (CGA)   Additional items Assist x 1;Verbal cues; Increased time required   Stair Management Technique Two rails; Step to pattern; Foreward; One rail R;Sideways   Number of Stairs 5  (x2)   Balance   Dynamic Sitting Fair +   Static Standing Fair   Dynamic Standing Fair -  (w RW or HHA)   Ambulatory Poor +  (w RW)   Endurance Deficit   Endurance Deficit No   Activity Tolerance   Activity Tolerance Patient tolerated treatment well   Medical Staff Anamika Nuñez   Nurse Made Isaias Ledezma RN; cleared for therapy   Assessment   Prognosis Good   Problem List Decreased strength;Decreased range of motion; Impaired balance;Decreased mobility;Pain;Orthopedic restrictions;Decreased skin integrity; Impaired sensation;Decreased coordination   Assessment Grant Blunt is a 62 y o  female admitted to [facility] on 12/7/2019 for Cellulitis of great toe of right foot s/p toe amputation 12/10/19  Pt  has a past medical history of Bipolar disorder ([facility] ), Depression, Diabetes mellitus ([facility] ), and History of MRSA infection  PT was consulted and pt was seen on 12/11/2019 for mobility assessment and d/c planning  Pt presents WBAT RLE w darco wedge/toe unloading shoe, high fall risk precautions, incision of R great toe, PIV, monitoring of abn labs and vitals  Pt lives with her  in a second story apartment with FOS (R rail) to enter and one level bed and bath  Prior to admission pt was indep, amb w/o an AD and on disability  Pt instructed on WB status and use of toe unloading shoe for OOB; verbalize understanding  Pt able to indep don darco wedge EOB w supervision and verbal cues for proper fit  Pt is currently functioning at a supervision assistance x1 level for transfers, supervision/CGA assistance level for ambulation with Rolling Walker, and supervision/CGA for elevations  Upon amb pt reported feeling lightheaded requiring CGA  Pt also reports numerous falls in past 6 months due to being unstable on her feet; pt with one episode LOB when descending stairs w min A to recover  Initially pt require two handrails to ascend steps w step to pattern forward; educated pt on technique using one rail to simulate home environment  Pt demonstrated understanding and was able to repeat five steps w bilateral hands on one rail, ascending/descending sideways  Pt will benefit from continued skilled IP PT to address the above mentioned impairments  in order to maximize recovery and increase functional independence when completing mobility and ADLs  Currently PT recommendations for DME include RW  At this time PT recommendations for d/c are home w family support and OP PT to address deficits contributing to falls, balance issues     Barriers to Discharge Decreased caregiver support   Barriers to Discharge Comments alone during most of the day   Goals   Patient Goals go home   STG Expiration Date 12/21/19   Short Term Goal #1 To be completed in 10 days: 1)  Pt will perform bed mobility with indep demonstrating appropriate technique 100% of the time in order to improve function  2)  Perform all transfers with [place] demonstrating safe and appropriate technique 100% of the time in order to improve ability to negotiate safely in home environment  3) Amb with least restrictive AD > 300'x1 with mod I in order to demonstrate ability to negotiate community distances  4)  Improve overall strength and balance 1/2 grade in order to optimize ability to perform functional tasks and reduce fall risk  5) Increase activity tolerance to 45 minutes in order to improve endurance to functional tasks  6)  Negotiate stairs using most appropriate technique and mod I in order to be able to negotiate safely in home environment  7) PT for ongoing patient and family/caregiver education, DME needs and d/c planning in order to promote highest level of function in least restrictive environment  8) Pt will demonstrate understanding of WBAT RLE w toe unloading shoe during functional tasks 3/3 times  PT Treatment Day 0   Plan   Treatment/Interventions ADL retraining;Functional transfer training;LE strengthening/ROM; Elevations; Therapeutic exercise;Patient/family training;Equipment eval/education; Bed mobility;Gait training;Spoke to nursing;OT   PT Frequency 2-3x/wk   Recommendation   Recommendation Home with family support; Outpatient PT   Equipment Recommended Walker   PT - OK to Discharge Yes   Additional Comments when medically cleared   Modified [place]dell Scale   Modified [place] Scale 4   Barthel Index   Feeding 10   Bathing 0   Grooming Score 5   Dressing Score 10   Bladder Score 10   Bowels Score 10   Toilet Use Score 10   Transfers (Bed/Chair) Score 10   Mobility (Level Surface) Score 10   Stairs Score 5   Barthel Index Score 80   History: co - morbidities, coping styles (hx depression), social background (alone during days, TRUPTI), fall risk, fall history, use of assistive device, cognition, WBAT RLE w toe unloading shoe, barthel index 80  Exam: impairments in systems including musculoskeletal (ROM, strength, posture), neuromuscular (balance,locomotion, gait, transfers, motor function and learning), joint integrity, integumentary (skin integrity, presence of scars or wounds), cognition  Clinical: unstable/unpredictable  Complexity:high      Dez Solis, PT

## 2019-12-11 NOTE — ASSESSMENT & PLAN NOTE
Likely 2* narcotics    Positive flatus and abdomen soft w/normoactive bs  Continue colace add senna scheduled and miralax prn

## 2019-12-11 NOTE — PLAN OF CARE
Problem: PHYSICAL THERAPY ADULT  Goal: Performs mobility at highest level of function for planned discharge setting  See evaluation for individualized goals  Description  Treatment/Interventions: ADL retraining, Functional transfer training, LE strengthening/ROM, Elevations, Therapeutic exercise, Patient/family training, Equipment eval/education, Bed mobility, Gait training, Spoke to nursing, OT  Equipment Recommended: Ember Miller       See flowsheet documentation for full assessment, interventions and recommendations  Note:   Prognosis: Good  Problem List: Decreased strength, Decreased range of motion, Impaired balance, Decreased mobility, Pain, Orthopedic restrictions, Decreased skin integrity, Impaired sensation, Decreased coordination  Assessment: Nyasia Telles is a 62 y o  female admitted to [facility] on 12/7/2019 for Cellulitis of great toe of right foot s/p toe amputation 12/10/19  Pt  has a past medical history of Bipolar disorder ([facility] [address] ), Depression, Diabetes mellitus ([facility] ), and History of MRSA infection  PT was consulted and pt was seen on 12/11/2019 for mobility assessment and d/c planning  Pt presents WBAT RLE w darco wedge/toe unloading shoe, high fall risk precautions, incision of R great toe, PIV, monitoring of abn labs and vitals  Pt lives with her  in a second story apartment with FOS (R rail) to enter and one level bed and bath  Prior to admission pt was indep, amb w/o an AD and on disability  Pt is currently functioning at a supervision assistance x1 level for transfers, supervision/CGA assistance level for ambulation with Rolling Walker, and supervision/CGA for elevations  Upon amb pt reported feeling lightheaded requiring CGA  Pt also reports numerous falls in past 6 months due to being unstable on her feet; pt with one episode LOB when descending stairs w min A to recover   Initially pt require two handrails to ascend steps w step to pattern forward; educated pt on technique using one rail to simulate home environment  Pt demonstrated understanding and was able to repeat five steps w bilateral hands on one rail, ascending/descending sideways  Pt will benefit from continued skilled IP PT to address the above mentioned impairments  in order to maximize recovery and increase functional independence when completing mobility and ADLs  Currently PT recommendations for DME include RW  At this time PT recommendations for d/c are home w family support and OP PT to address deficits contributing to falls, balance issues  Barriers to Discharge: Decreased caregiver support  Barriers to Discharge Comments: alone during most of the day  Recommendation: Home with family support, Outpatient PT     PT - OK to Discharge: Yes    See flowsheet documentation for full assessment

## 2019-12-11 NOTE — PROGRESS NOTES
Progress Note - Podiatry  Remicalm Plant 62 y o  female MRN: [id number]  Unit/Bed#: E5 -01 Encounter: [id number]    Assessment:  1  Right hallux wound with OM  - S/p Right hallux amputation (DOS: 12/10/19)  2  Methadone use  3  Hyperlipidemia  4  Hypothyroidism  5  History of MRSA   6  DM type 2  7  HTN    Plan:  · Postop dressing change today  Incision is well coapted with sutures intact  Anticipate discharge tomorrow 12/11/2019  · Weight-bearing as tolerated RLE in a Darco shoe  · C/w Bactrim DS Q 12 through 12/17/2019 per ID recommendations  · Patient will follow up with Dr Mo Alvarado as an outpatient once discharged  Subjective/Objective   Chief Complaint:   Chief Complaint   Patient presents with    Toe Injury     Pt reports that she had R foot steepped on last month  Reports that she has had swelling and redness a few days ago  Denies fevers  Hx DM  Reports taking "left over Amoxicillin"       Subjective: 62 y o  y/o female was seen and evaluated at bedside in no acute distress, nontoxic in appearance  Patient denies any recent nausea, vomiting, fever, chills, shortness of breath, chest pains  Blood pressure 152/79, pulse 66, temperature (!) 97 2 °F (36 2 °C), temperature source Temporal, resp  rate 16, weight 76 kg (167 lb 8 8 oz), SpO2 95 %  ,Body mass index is 27 04 kg/m²  Invasive Devices     Drain            Negative Pressure Wound Therapy (V A C ) Arm Distal 420 days                Physical Exam:   General: Alert, cooperative and no distress    Lower Extremity Exam:     NVS at baseline B/l  MSK function at baseline B/l  No calf tenderness noted B/l     RLE:  Dressing was c/d/i with no strike through to the outer dressing noted  Hallux amputation site is well coapted with sutures intact  No acute signs of infection  Periwound erythema noted  No active drainage  Right foot      Lab, Imaging and other studies:   I have personally reviewed pertinent lab results    , CBC: Lab Results   Component Value Date    WBC 5 68 12/11/2019    HGB 8 9 (L) 12/11/2019    HCT 28 9 (L) 12/11/2019    MCV 92 12/11/2019     12/11/2019    MCH 28 3 12/11/2019    MCHC 30 8 (L) 12/11/2019    RDW 13 8 12/11/2019    MPV 9 1 12/11/2019   , CMP: No results found for: SODIUM, K, CL, CO2, ANIONGAP, BUN, CREATININE, GLUCOSE, CALCIUM, AST, ALT, ALKPHOS, PROT, BILITOT, EGFR    Imaging: I have personally reviewed pertinent films in PACS  EKG, Pathology, and Other Studies: I have personally reviewed pertinent reports  Portions of the record may have been created with voice recognition software  Occasional wrong word or "sound a like" substitutions may have occurred due to the inherent limitations of voice recognition software  Read the chart carefully and recognize, using context, where substitutions have occurred

## 2019-12-11 NOTE — ASSESSMENT & PLAN NOTE
TSH elevated at 8 183 in setting of acute infection  Follow-up Free T4 levels -0 99  Patient currently not on any medications     Will defer to PCP in regards to resuming levothyroxine

## 2019-12-11 NOTE — PROGRESS NOTES
Vancomycin IV Pharmacy-to-Dose Consultation    Carlos Renae is a 62 y o  female who is currently receiving Vancomycin IV with management by the Pharmacy Consult service  Assessment/Plan:  The patient was reviewed  Renal function is stable and no signs or symptoms of nephrotoxicity and/or infusion reactions were documented in the chart  Based on todays assessment, continue current vancomycin (day # 5) dosing of 1000 mg IV q12h, with a plan for trough to be drawn at 1030 on 12/16/19  We will continue to follow the patients culture results and clinical progress daily      Ana Vieira, Pharmacist

## 2019-12-11 NOTE — ASSESSMENT & PLAN NOTE
58-year-old female with hypertension, hyperlipidemia, hypothyroidism, diabetes type 2, admitted due to cellulitis of her great toe of her right foot w/underlying ostemyelitis  She is s/p right hallux amputation POD #1  - Antibiotics- Currently on IV Vancomycin/PO flagyl- ID following  - Podiatry is primary  - MRI right foot-osteomyelitis of great toe phalanges        Hypoperfusion of distal phalanx which may represent gangrene, adventitial bursitis adjacent to sesamoids  -f/u pathology continue on vancomycin/flagyl perID

## 2019-12-11 NOTE — PROGRESS NOTES
Progress Note - Infectious Disease   Musa Rowe 62 y o  female MRN: [id number]  Unit/Bed#: E5 -01 Encounter: [id number]    Impression/Plan:  1  Right great toe cellulitis   Secondary to chronic right toe wound with underlying osteomyelitis  Fortunately the patient has no fever or leukocytosis   Her blood cultures are negative after 72 hours  Wound culture grew mixed skin cas  Anaerobic culture is still pending  She does have a prior history of MRSA skin infections  She is now s/p amputation of the R hallux, no intraop cultures were obtained but the bone pathology is pending  She is currently receiving IV vancomycin and PO Flagyl and is tolerating without difficulty  Will continue for now while we wait for bone results   -continue IV vancomycin  -continue PO Flagyl  -anticipate transition to oral regimen once bone results are reviewed  -monitor CBC and BMP  -monitor vitals  -serial right foot exams  -continue follow-up with Podiatry     2  Chronic right great toe wound with osteomyelitis  As evidenced on patient's x-ray  MRI confirmed osteomyelitis in both the distal and proximal phalanx and showed fluid surrounding the adjacent sesamoids   She is now s/p right hallux amputation   She continues to follow closely with Podiatry   -antibiotics as above  -monitor CBC and BMP  -monitor vitals  -serial right foot exams  -local wound care per Podiatry  -continue close follow-up with Podiatry     3  Uncontrolled type 2 diabetes mellitus with hyperglycemia   Per patient she has not been taking any medication for diabetes for quite a while  Parul Wilson last hemoglobin A1c was 9% on 11/12/2018  Parul Wilson blood glucose was greater than 600 upon admission   This is risk factor for wound and infection above   Recommend patient committed tighter glycemic control for overall health, especially in the setting of wound infection   -blood glucose management per primary service     4  Peripheral neuropathy   Secondary to uncontrolled type 2 diabetes mellitus    This is risk factor for wound above  Above plan was discussed in detail with patient at the bedside  Antibiotics:  Vancomycin 5  Flagyl 4  Antibiotics 5    Subjective:  Patient reports she's feeling well today  Denies active pain in her R foot  Reports her surgical dressing has been intact since she got back to her room yesterday, reports the podiatry team has not been in yet today to look at it  She is relieved that her surgery was successful at removing the infected bone  She has no acute complaints  She denies fevers, chills, sweats, shakes; no nausea, vomiting, abdominal pain, diarrhea, or dysuria; no cough, shortness of breath, or chest pain  No new symptoms  Objective:  Vitals:  Temp:  [96 5 °F (35 8 °C)-97 8 °F (36 6 °C)] 97 2 °F (36 2 °C)  HR:  [64-84] 66  Resp:  [14-16] 16  BP: (127-175)/(61-79) 152/79  SpO2:  [93 %-98 %] 95 %  Temp (24hrs), Av 1 °F (36 2 °C), Min:96 5 °F (35 8 °C), Max:97 8 °F (36 6 °C)  Current: Temperature: (!) 97 2 °F (36 2 °C)    Physical Exam:   General Appearance:  Alert, interactive, nontoxic, no acute distress  Throat: Oropharynx moist without lesions  Lungs:   Clear to auscultation bilaterally; no wheezes, rhonchi or rales; respirations unlabored   Heart:  RRR; no murmur, rub or gallop   Abdomen:   Soft, non-tender, non-distended, positive bowel sounds  Extremities: No clubbing or cyanosis, no edema   Skin: No new rashes, lesions, or draining wounds noted on exposed skin   Surgical dressing in place on R foot, no breakthrough drainage     Labs, Imaging, & Other studies:   All pertinent labs and imaging studies were personally reviewed  Results from last 7 days   Lab Units 19  0448 12/10/19  0503 19  0610   WBC Thousand/uL 5 68 7 02 7 30   HEMOGLOBIN g/dL 8 9* 9 2* 9 4*   PLATELETS Thousands/uL 327 328 319     Results from last 7 days   Lab Units 12/10/19  0503  19  1149   POTASSIUM mmol/L 4 1   < > 5 0   CHLORIDE mmol/L 101   < > 92*   CO2 mmol/L 28   < > 23   BUN mg/dL 18   < > 25   CREATININE mg/dL 0 91   < > 1 24   EGFR ml/min/1 73sq m 67   < > 46   CALCIUM mg/dL 9 1   < > 10 0   AST U/L  --   --  16   ALT U/L  --   --  20   ALK PHOS U/L  --   --  167*    < > = values in this interval not displayed  Results from last 7 days   Lab Units 12/09/19  0803 12/08/19  1341 12/07/19  1149 12/07/19  1131   BLOOD CULTURE   --   --  No Growth at 72 hrs  No Growth at 72 hrs     GRAM STAIN RESULT   --  1+ Polys*  2+ Gram negative rods*  --   --    WOUND CULTURE   --  3+ Growth of   --   --    MRSA CULTURE ONLY  No Methicillin Resistant Staphlyococcus aureus (MRSA) isolated  --   --   --

## 2019-12-12 VITALS
DIASTOLIC BLOOD PRESSURE: 63 MMHG | HEART RATE: 74 BPM | TEMPERATURE: 97.8 F | BODY MASS INDEX: 27.04 KG/M2 | RESPIRATION RATE: 18 BRPM | SYSTOLIC BLOOD PRESSURE: 133 MMHG | OXYGEN SATURATION: 97 % | WEIGHT: 167.55 LBS

## 2019-12-12 LAB
ANION GAP SERPL CALCULATED.3IONS-SCNC: 10 MMOL/L (ref 4–13)
BACTERIA BLD CULT: NORMAL
BACTERIA BLD CULT: NORMAL
BUN SERPL-MCNC: 21 MG/DL (ref 5–25)
CALCIUM SERPL-MCNC: 9.7 MG/DL (ref 8.3–10.1)
CHLORIDE SERPL-SCNC: 104 MMOL/L (ref 100–108)
CO2 SERPL-SCNC: 26 MMOL/L (ref 21–32)
CREAT SERPL-MCNC: 0.96 MG/DL (ref 0.6–1.3)
GFR SERPL CREATININE-BSD FRML MDRD: 63 ML/MIN/1.73SQ M
GLUCOSE SERPL-MCNC: 152 MG/DL (ref 65–140)
GLUCOSE SERPL-MCNC: 201 MG/DL (ref 65–140)
POTASSIUM SERPL-SCNC: 4.8 MMOL/L (ref 3.5–5.3)
SODIUM SERPL-SCNC: 140 MMOL/L (ref 136–145)

## 2019-12-12 PROCEDURE — 82948 REAGENT STRIP/BLOOD GLUCOSE: CPT

## 2019-12-12 PROCEDURE — NC001 PR NO CHARGE: Performed by: PODIATRIST

## 2019-12-12 PROCEDURE — 99024 POSTOP FOLLOW-UP VISIT: CPT | Performed by: PODIATRIST

## 2019-12-12 PROCEDURE — 99232 SBSQ HOSP IP/OBS MODERATE 35: CPT | Performed by: PHYSICIAN ASSISTANT

## 2019-12-12 PROCEDURE — 97535 SELF CARE MNGMENT TRAINING: CPT

## 2019-12-12 PROCEDURE — 99232 SBSQ HOSP IP/OBS MODERATE 35: CPT | Performed by: NURSE PRACTITIONER

## 2019-12-12 PROCEDURE — 80048 BASIC METABOLIC PNL TOTAL CA: CPT | Performed by: INTERNAL MEDICINE

## 2019-12-12 RX ORDER — LISINOPRIL 5 MG/1
5 TABLET ORAL DAILY
Status: DISCONTINUED | OUTPATIENT
Start: 2019-12-12 | End: 2019-12-12 | Stop reason: HOSPADM

## 2019-12-12 RX ORDER — INSULIN GLARGINE 100 [IU]/ML
36 INJECTION, SOLUTION SUBCUTANEOUS EVERY MORNING
Status: DISCONTINUED | OUTPATIENT
Start: 2019-12-13 | End: 2019-12-12 | Stop reason: HOSPADM

## 2019-12-12 RX ORDER — SULFAMETHOXAZOLE AND TRIMETHOPRIM 800; 160 MG/1; MG/1
1 TABLET ORAL EVERY 12 HOURS SCHEDULED
Qty: 10 TABLET | Refills: 0 | Status: SHIPPED | OUTPATIENT
Start: 2019-12-12 | End: 2019-12-12 | Stop reason: HOSPADM

## 2019-12-12 RX ORDER — SULFAMETHOXAZOLE AND TRIMETHOPRIM 800; 160 MG/1; MG/1
1 TABLET ORAL EVERY 12 HOURS SCHEDULED
Qty: 10 TABLET | Refills: 0 | Status: SHIPPED | OUTPATIENT
Start: 2019-12-12 | End: 2019-12-17

## 2019-12-12 RX ORDER — LISINOPRIL 5 MG/1
5 TABLET ORAL DAILY
Qty: 30 TABLET | Refills: 0 | Status: SHIPPED | OUTPATIENT
Start: 2019-12-12

## 2019-12-12 RX ADMIN — LAMOTRIGINE 150 MG: 100 TABLET ORAL at 08:22

## 2019-12-12 RX ADMIN — INSULIN GLARGINE 32 UNITS: 100 INJECTION, SOLUTION SUBCUTANEOUS at 08:20

## 2019-12-12 RX ADMIN — INSULIN LISPRO 1 UNITS: 100 INJECTION, SOLUTION INTRAVENOUS; SUBCUTANEOUS at 08:24

## 2019-12-12 RX ADMIN — DULOXETINE HYDROCHLORIDE 60 MG: 60 CAPSULE, DELAYED RELEASE ORAL at 08:22

## 2019-12-12 RX ADMIN — GABAPENTIN 300 MG: 300 CAPSULE ORAL at 08:23

## 2019-12-12 RX ADMIN — ENOXAPARIN SODIUM 40 MG: 40 INJECTION SUBCUTANEOUS at 08:20

## 2019-12-12 RX ADMIN — SERTRALINE HYDROCHLORIDE 100 MG: 100 TABLET ORAL at 08:22

## 2019-12-12 RX ADMIN — METHADONE HYDROCHLORIDE 90 MG: 10 TABLET ORAL at 08:23

## 2019-12-12 RX ADMIN — SULFAMETHOXAZOLE AND TRIMETHOPRIM 1 TABLET: 800; 160 TABLET ORAL at 08:22

## 2019-12-12 RX ADMIN — DOCUSATE SODIUM 100 MG: 100 CAPSULE, LIQUID FILLED ORAL at 08:28

## 2019-12-12 RX ADMIN — CLONAZEPAM 1 MG: 1 TABLET ORAL at 08:20

## 2019-12-12 RX ADMIN — ROPINIROLE 0.5 MG: 1 TABLET, FILM COATED ORAL at 08:21

## 2019-12-12 NOTE — DISCHARGE INSTRUCTIONS
Discharge Instructions - Podiatry    Weight Bearing Status:  Weightbear as tolerated right lower extremity with Darco wedge shoe                       Medications: Take antibiotics as prescribed    Follow-up appointment instructions: Please make an appointment within one week of discharge with Dr Ching Baker  Contact sooner if any increase in pain, or signs of infection occur   - Recommended patient to follow up with PCP soon for diabetes management for proper glucose control  Wound Care: Leave dressings clean, dry, and intact until 1st outpatient office visit  Do not get dressings wet

## 2019-12-12 NOTE — ASSESSMENT & PLAN NOTE
Likely 2* narcotics  Positive flatus and abdomen soft w/normoactive bs  Improved w/colace/senna    Recommend continue OTCs prn in op setting given methadone use

## 2019-12-12 NOTE — ASSESSMENT & PLAN NOTE
Lab Results   Component Value Date    HGBA1C 10 3 (H) 12/10/2019     Recent Labs     12/11/19  1048 12/11/19  1619 12/11/19  2144 12/12/19  0759   POCGLU 311* 208* 123 201*     Poor control  Pt reports she needs refills on all of her meds, glucometer, test strips, lancets which have been provided for her    Her home regimen:  Insulin Glargine 30-45U depending on sliding scale   Insulin aspart 15U TID    Inpatient Regimen:  Increase lantus to 36 iu qhs  Insulin lispro was increased to 12  iu tid ac   Recommend close f/u with pcp in one week for further titration, expressed need for compliance w/diabetic testing and monitoring w/pt

## 2019-12-12 NOTE — PHYSICAL THERAPY NOTE
PHYSICAL THERAPY NOTE          Patient Name: Charbel Ferguson  CERKX'F Date: 12/12/2019   Time In: 1140 Time out: 1150       12/12/19 1150   Pain Assessment   Pain Assessment No/denies pain   Pain Score No Pain   Restrictions/Precautions   RLE Weight Bearing Per Order WBAT   Braces or Orthoses Other (Comment)  (darco wedge)   Other Precautions Fall Risk   General   Chart Reviewed Yes   Additional Pertinent History pt admitted 12/7/19 w cellulitis of R great toe s/p toe amputation 12/10/19  hx of MRSA   Subjective   Subjective "I almost lost my balance and my friend had to catch me"   Transfers   Sit to Stand 6  Modified independent   Additional items Bedrails   Stand to Sit 6  Modified independent   Additional items Bedrails  (poor control descent)   Stand pivot 5  Supervision   Additional items Increased time required   Ambulation/Elevation   Gait pattern Improper Weight shift  (instability due to LLD; darco wedge on R)   Gait Assistance 7  Independent   Assistive Device None   Distance 10'x4, short distances in room   Activity Tolerance   Medical Staff Made Aware RAJIV Reddy   Nurse Made Aware Yue VEGA   Assessment   Problem List Decreased strength;Decreased range of motion; Impaired balance;Decreased mobility;Pain;Orthopedic restrictions;Decreased skin integrity; Impaired sensation;Decreased coordination   Assessment PT orders received; chart reviewed  Spoke to nsg who stated pt is expected to d/c today however would like to talk to PT about obtaining cane for amb outside of hospital  Got in touch w CM to check on progress of obtaining RW for pt for amb per PT IE recommendation; CM stated per podiatry RW is not necessary as pt is WBAT w darco wedge  Update pt and encouraged her to see if friends/family have extra RW she can use as well as option of buying cane from local pharmacy   Pt verbalize understanding however states concerns stating "I am a fall risk"  End of session pt in room amb indep w nsg present  Goals   Patient Goals get a cane   STG Expiration Date 12/21/19   PT Treatment Day 1   Plan   Treatment/Interventions ADL retraining;Functional transfer training;LE strengthening/ROM; Elevations; Therapeutic exercise;Patient/family training;Equipment eval/education; Bed mobility;Gait training;Spoke to nursing;OT   PT Frequency 2-3x/wk   Recommendation   Recommendation Home with family support   Equipment Recommended Walker   PT - OK to Discharge Yes   Additional Comments when medically cleared     Little Tucker, PT

## 2019-12-12 NOTE — PROGRESS NOTES
Progress Note - [facility] 1955, 62 y o  female MRN: [id number]    Unit/Bed#: E5 -01 Encounter: [id number]    Primary Care Provider: Daniel Singh MD   Date and time admitted to hospital: 12/7/2019 10:38 AM        Constipation  Assessment & Plan  Likely 2* narcotics  Positive flatus and abdomen soft w/normoactive bs  Improved w/colace/senna  Recommend continue OTCs prn in op setting given methadone use    Chronic hepatitis C without hepatic coma [facility])  Assessment & Plan  Follow-up outpatient    Methadone use [facility])  Assessment & Plan  Pt w/chronic opioid use  confirmed pt is again on methadone 90mg daily by provider this admission from [facility] [place] -[phone]  Methadone resumed and will continue    Type 2 diabetes mellitus, uncontrolled ([facility] [address] )  Assessment & Plan  Lab Results   Component Value Date    HGBA1C 10 3 (H) 12/10/2019     Recent Labs     12/11/19  1048 12/11/19  1619 12/11/19  2144 12/12/19  0759   POCGLU 311* 208* 123 201*     Poor control  Pt reports she needs refills on all of her meds, glucometer, test strips, lancets which have been provided for her  Her home regimen:  Insulin Glargine 30-45U depending on sliding scale   Insulin aspart 15U TID    Inpatient Regimen:  Increase lantus to 36 iu qhs  Insulin lispro was increased to 12  iu tid ac   Recommend close f/u with pcp in one week for further titration, expressed need for compliance w/diabetic testing and monitoring w/pt    Hypothyroidism  Assessment & Plan  TSH elevated at 8 183 in setting of acute infection  Follow-up Free T4 levels -0 99  Patient currently not on any medications     Will defer to PCP in regards to resuming levothyroxine    Hyperlipidemia  Assessment & Plan  Continue statin    Bipolar affective disorder ([facility] [address] )  Assessment & Plan  Continue Hydroxyzine, Cymbalta, Zoloft    * Cellulitis of great toe of right foot  Assessment & Plan  78-year-old female with hypertension, hyperlipidemia, hypothyroidism, diabetes type 2, admitted due to cellulitis of her great toe of her right foot w/underlying ostemyelitis  She is s/p right hallux amputation POD #2  - Antibiotics- Currently on IV Vancomycin/PO flagyl- ID following w/anticipation to transition to oral abx from 1* team  - Podiatry is primary  - MRI right foot-osteomyelitis of great toe phalanges  Hypoperfusion of distal phalanx which may represent gangrene, adventitial bursitis adjacent to sesamoids  -f/u pathology  Mgmt per 1* team      Essential HTN   Previously on lisinopril  bp has been 120s-150s  Given DM status will restart on lisinopril 5mg po daily  Rx in chart  VTE Pharmacologic Prophylaxis:   Pharmacologic: Enoxaparin (Lovenox)  Mechanical VTE Prophylaxis in Place: Yes    Patient Centered Rounds: I have performed bedside rounds with nursing staff today  Discussions with Specialists or Other Care Team Provider:     Education and Discussions with Family / Patient: dispo workup w/pt    Time Spent for Care: 20 minutes  More than 50% of total time spent on counseling and coordination of care as described above  Current Length of Stay: 5 day(s)    Current Patient Status: Inpatient   Certification Statement: The patient will continue to require additional inpatient hospital stay due to cellulitis of foot    Discharge Plan: discharge planning per 1* team   No barriers for D/C from SLIM perspective once cleared by ID    Code Status: Level 1 - Full Code      Subjective:   Pt seen/examined  No events overnight  Pt reports she had a BM today  No cp/sob/abd pain, n/v/f/c  Discussed downloading yon vs a small log book for monitoring BS for close titration in next week given surgical stressors/infection      Objective:     Vitals:   Temp (24hrs), Av 9 °F (36 6 °C), Min:97 8 °F (36 6 °C), Max:97 9 °F (36 6 °C)    Temp:  [97 8 °F (36 6 °C)-97 9 °F (36 6 °C)] 97 8 °F (36 6 °C)  HR:  [61-74] 74  Resp:  [18] 18  BP: (133-140)/(63-65) 133/63  SpO2:  [96 %-98 %] 97 %  Body mass index is 27 04 kg/m²  Input and Output Summary (last 24 hours): Intake/Output Summary (Last 24 hours) at 12/12/2019 1207  Last data filed at 12/12/2019 1466  Gross per 24 hour   Intake 720 ml   Output    Net 720 ml       Physical Exam:     Physical Exam   Constitutional: She appears well-developed and well-nourished  No distress  Appears older than stated age  nad   HENT:   Head: Normocephalic and atraumatic  Right Ear: External ear normal    Left Ear: External ear normal    Eyes: Conjunctivae are normal    Neck: Normal range of motion  Cardiovascular: Normal rate, regular rhythm and normal heart sounds  Exam reveals no gallop and no friction rub  No murmur heard  Pulmonary/Chest: Effort normal  No respiratory distress  She has no wheezes  She has no rales  Abdominal: Soft  She exhibits no distension and no mass  There is no tenderness  There is no guarding  Musculoskeletal: She exhibits no edema  Neurological: She is alert  Skin: Skin is warm and dry  She is not diaphoretic  Right foot dressed and c/d/i   Psychiatric: She has a normal mood and affect  Vitals reviewed      (  Be Sure to Include Physical Exam: Delete this entire line when you have entered your exam)    Additional Data:     Labs:reviewed from last 24h    Results from last 7 days   Lab Units 12/11/19  0448 12/10/19  0503   WBC Thousand/uL 5 68 7 02   HEMOGLOBIN g/dL 8 9* 9 2*   HEMATOCRIT % 28 9* 29 5*   PLATELETS Thousands/uL 327 328   NEUTROS PCT %  --  61   LYMPHS PCT %  --  25   MONOS PCT %  --  7   EOS PCT %  --  5     Results from last 7 days   Lab Units 12/12/19  0532  12/07/19  1149   SODIUM mmol/L 140   < > 126*   POTASSIUM mmol/L 4 8   < > 5 0   CHLORIDE mmol/L 104   < > 92*   CO2 mmol/L 26   < > 23   BUN mg/dL 21   < > 25   CREATININE mg/dL 0 96   < > 1 24   ANION GAP mmol/L 10   < > 11   CALCIUM mg/dL 9 7   < > 10 0   ALBUMIN g/dL  --   --  3 1*   TOTAL BILIRUBIN mg/dL  --   --  0 27   ALK PHOS U/L  --   --  167*   ALT U/L  --   --  20   AST U/L  --   --  16   GLUCOSE RANDOM mg/dL 152*   < > 691*    < > = values in this interval not displayed  Results from last 7 days   Lab Units 12/12/19  0759 12/11/19  2144 12/11/19  1619 12/11/19  1048 12/11/19  0813 12/10/19  2100 12/10/19  1515 12/10/19  1302 12/10/19  1059 12/10/19  0748 12/09/19  2115 12/09/19  1552   POC GLUCOSE mg/dl 201* 123 208* 311* 237* 215* 118 136 208* 213* 173* 126     Results from last 7 days   Lab Units 12/10/19  0503   HEMOGLOBIN A1C % 10 3*               * I Have Reviewed All Lab Data Listed Above  * Additional Pertinent Lab Tests Reviewed: All Labs Within Last 24 Hours Reviewed    Imaging:    Imaging Reports Reviewed Today Include:   Imaging Personally Reviewed by Myself Includes:      Recent Cultures (last 7 days):     Results from last 7 days   Lab Units 12/08/19  1341 12/07/19  1149 12/07/19  1131   BLOOD CULTURE   --  No Growth After 4 Days  No Growth After 4 Days     GRAM STAIN RESULT  1+ Polys*  2+ Gram negative rods*  --   --    WOUND CULTURE  3+ Growth of   --   --        Last 24 Hours Medication List:     Current Facility-Administered Medications:  acetaminophen 650 mg Oral Q6H PRN Rosan Clause, DPM   atorvastatin 40 mg Oral Daily [facility], DPM   clonazePAM 1 mg Oral TID Rosan Clause, DPM   docusate sodium 100 mg Oral BID Rosan Clause, DPM   DULoxetine 60 mg Oral Daily Rosan Clause, DPM   enoxaparin 40 mg Subcutaneous Daily Rosan Clause, DPM   gabapentin 300 mg Oral TID Rosan Clause, DPM   hydrALAZINE 5 mg Intravenous Q6H PRN Parul Shah PA-C   hydrOXYzine HCL 50 mg Oral HS Rosan Clause, DPM   [START ON 12/13/2019] insulin glargine 36 Units Subcutaneous QAM Parul Shah PA-C   insulin lispro 1-5 Units Subcutaneous TID AC Rosan Clause, DPM   insulin lispro 1-5 Units Subcutaneous HS Rosan Clause, DPM   insulin lispro 12 Units Subcutaneous TID AC Parul Shah PA-C   lamoTRIgine 150 mg Oral Daily Emeterio Bustos, DPM   lisinopril 5 mg Oral Daily Parul Merritt PA-C   methadone 90 mg Oral Daily Katairam Bustos, DPM   oxyCODONE 5 mg Oral Q6H PRN Emeterio Bustos, DPM   polyethylene glycol 17 g Oral Daily PRN Parul Shah PA-C   rOPINIRole 0 5 mg Oral BID Emeterio Bustos, DPM   senna 1 tablet Oral HS Parul Shah PA-C   sertraline 100 mg Oral Daily Emeterio Bustos, DPM   sulfamethoxazole-trimethoprim 1 tablet Oral Q12H Marissa Bey MD        Today, Patient Was Seen By: Marcelo Medellin PA-C    ** Please Note: Dictation voice to text software may have been used in the creation of this document   **
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
## 2019-12-12 NOTE — PROGRESS NOTES
Progress Note - Infectious Disease   Joann Baxter 62 y o  female MRN: [id number]  Unit/Bed#: E5 -01 Encounter: [id number]      Impression/Plan:  1  Right great toe cellulitis   Secondary to chronic right toe wound with underlying osteomyelitis  Fortunately the patient has no fever or leukocytosis   Her blood cultures are negative after four days   Wound culture grew mixed skin cas  Anaerobic culture was negative   She does have a prior history of MRSA skin infections  She is now s/p amputation of the R hallux, no intraop cultures were obtained but the bone pathology is pending  She is currently receiving PO Bactrim and is tolerating without difficulty  She should complete a seven day postop antibiotic treatment course   -continue PO Bactrim through 12/17/2019 for total of seven days of postop antibiotics  -monitor CBC and BMP  -monitor vitals  -serial right foot exams  -continue follow-up with Podiatry     2  Chronic right great toe wound with osteomyelitis  As evidenced on patient's x-ray  MRI confirmed osteomyelitis in both the distal and proximal phalanx and showed fluid surrounding the adjacent sesamoids   She is now s/p right hallux amputation   She continues to follow closely with Podiatry   -antibiotics as above  -monitor CBC and BMP  -monitor vitals  -serial right foot exams  -local wound care per Podiatry  -continue close follow-up with Podiatry     3  Uncontrolled type 2 diabetes mellitus with hyperglycemia   Per patient she has not been taking any medication for diabetes for quite a while  Stefania Barrow last hemoglobin A1c was 9% on 11/12/2018  Stefania Barrow blood glucose was greater than 600 upon admission   This is risk factor for wound and infection above   Recommend patient committed tighter glycemic control for overall health, especially in the setting of wound infection   -blood glucose management per primary service     4  Peripheral neuropathy   Secondary to uncontrolled type 2 diabetes mellitus    This is risk factor for wound above  Patient is stable for discharge from ID standpoint  Above plan was discussed in detail with patient and her friend, Dessie Lefort, at the bedside  Antibiotics:  Bactrim 2  Antibiotics 6  Postop 2    Subjective:  Patient is dressed and ready for discharge  She tells me she is feeling well today  Is not having any pain in her right foot  Has no concerns with her current dressing  She understands that she will need to remain on antibiotics once she goes home and will be following up in her outpatient podiatry office for further care  She has no acute complaints  Denies fever, chills, sweats, shakes; no nausea, vomiting, abdominal pain, diarrhea, or dysuria; no cough, shortness of breath, or chest pain  No new symptoms  Objective:  Vitals:  Temp:  [97 8 °F (36 6 °C)-97 9 °F (36 6 °C)] 97 8 °F (36 6 °C)  HR:  [61-74] 74  Resp:  [18] 18  BP: (133-140)/(63-65) 133/63  SpO2:  [96 %-98 %] 97 %  Temp (24hrs), Av 9 °F (36 6 °C), Min:97 8 °F (36 6 °C), Max:97 9 °F (36 6 °C)  Current: Temperature: 97 8 °F (36 6 °C)    Physical Exam:   General Appearance:  Alert, interactive, nontoxic, no acute distress  Patient is dressed and waiting for her discharge papers  Throat: Oropharynx moist without lesions  Lungs:   Clear to auscultation bilaterally; no wheezes, rhonchi or rales; respirations unlabored   Heart:  RRR; no murmur, rub or gallop   Abdomen:   Soft, non-tender, non-distended, positive bowel sounds  Extremities: No clubbing or cyanosis, no edema   Skin: No new rashes, lesions, or draining wounds noted on exposed skin  Right foot surgical dressing is dry and intact       Labs, Imaging, & Other studies:   All pertinent labs and imaging studies were personally reviewed  Results from last 7 days   Lab Units 19  0448 12/10/19  0503 19  0610   WBC Thousand/uL 5 68 7 02 7 30   HEMOGLOBIN g/dL 8 9* 9 2* 9 4*   PLATELETS Thousands/uL 327 328 319     Results from last 7 days   Lab Units 12/12/19  0532  12/07/19  1149   POTASSIUM mmol/L 4 8   < > 5 0   CHLORIDE mmol/L 104   < > 92*   CO2 mmol/L 26   < > 23   BUN mg/dL 21   < > 25   CREATININE mg/dL 0 96   < > 1 24   EGFR ml/min/1 73sq m 63   < > 46   CALCIUM mg/dL 9 7   < > 10 0   AST U/L  --   --  16   ALT U/L  --   --  20   ALK PHOS U/L  --   --  167*    < > = values in this interval not displayed  Results from last 7 days   Lab Units 12/09/19  0803 12/08/19  1341 12/07/19  1149 12/07/19  1131   BLOOD CULTURE   --   --  No Growth After 4 Days  No Growth After 4 Days     GRAM STAIN RESULT   --  1+ Polys*  2+ Gram negative rods*  --   --    WOUND CULTURE   --  3+ Growth of   --   --    MRSA CULTURE ONLY  No Methicillin Resistant Staphlyococcus aureus (MRSA) isolated  --   --   --

## 2019-12-12 NOTE — DISCHARGE SUMMARY
Discharge Summary -   Renetta Beltran 62 y o  female MRN: [id number]  Unit/Bed#: E5 -01 Encounter: [id number]    Admission Date: 12/7/2019     Admitting Diagnosis: Wound infection [T14  8XXA, L08 9]  Contusion of great toe of right foot [S90 111A]  Cellulitis of great toe of right foot [L03 031]    HPI: 63 y/o female presented with right diabetic foot ulcer probable to bone at cellulitis  Patient states she initially suffered right hallux injury approximately couple months ago when her daughter stumped on her toe  Since the injury patient has been treating her wound at home by herself, she has not seen a doctor for it  Patient states she started noticing redness and swelling and discoloration starting past Wednesday or Thursday  Patient also presented with close to 600mg/dl of blood sugar  Patient states she has not been taking many of her medication  Patient has history of non-compliance  She denied fever nausea vomiting chills or shortness of breath  Procedures Performed: AMPUTATION TOE:     Hospital Course: Patient was admitted under Dr Karlos Rojo service for evaluation right foot ulcer that was probable to bone and cellulitis  Admission patient was started on IV antibiotics vancomycin given MRSA history and ordered x-ray of the foot  Internal Medicine was consulted for medical management particularly management of increased blood sugar  Infectious Disease were consulted for IV antibiotics recommendation  Wound cultures taken on the day of admission grew 2+ gram negative rods  MRI was ordered finding were consistent with osteomyelitis of great toe phalanges  Patient was scheduled for OR for right great toe amputation  Patient underwent right 3rd toe amputation on 12/10/19 with Dr Carmela Mathews  ID recommended discontinued vancomycin and Flagyl after surgery and recommended Bactrim p o  Q 12 hours through 12/17/19 to complete 7 days total treatment postoperatively    Incision site was evaluated next a which appeared stable with intact sutures  Patient is stable from all teams for discharge on 12/12/19  Patient is to follow up with Dr Juan R Smith outpatient within 1 week of her discharge, she is to leave right foot dressings clean dry and intact  Weightbear as tolerated to the right lower extremity in Darco Wedge shoe  Patient denied nausea vomiting fever chills shortness of breath upon discharge  Significant Findings, Care, Treatment and Services Provided:  Please see hospital course    Complications:  None    Discharge Diagnosis:  Status post right hallux amputation    Condition at Discharge: stable     Discharge instructions/Information to patient and family:   See after visit summary for information provided to patient and family  Provisions for Follow-Up Care/Important appointments:    See after visit summary for information related to follow-up care and any pertinent home health orders  Disposition: Home    Planned Readmission: No    Discharge Statement   I spent 30 minutes discharging the patient  This time was spent on the day of discharge  I had direct contact with the patient on the day of discharge  The details of this patient's discharge     Discharge Medications:  See after visit summary for reconciled discharge medications provided to patient and family

## 2019-12-12 NOTE — PROGRESS NOTES
Progress Note - Podiatry  Sarkis Heard 62 y o  female MRN: [id number]  Unit/Bed#: E5 -01 Encounter: [id number]    Assessment:  1  Right hallux wound with OM  - S/p Right hallux amputation (DOS: 12/10/19)  2  Methadone use  3  Hyperlipidemia  4  Hypothyroidism  5  History of MRSA   6  DM type 2  7  HTN    Plan:  - patient is stable for discharge today 12/12  - right foot dressings left clean dry and intact   - WBAT to RLE in Darco Wedge shoe   - continue with Bactrim DS 12 thorough 12/17/19 per ID recommendation   - pt will f/u outpt with Dr Mallory San    - appreciate [facility] for medical management      Subjective/Objective   Chief Complaint:   Chief Complaint   Patient presents with    Toe Injury     Pt reports that she had R foot steepped on last month  Reports that she has had swelling and redness a few days ago  Denies fevers  Hx DM  Reports taking "left over Amoxicillin"       Subjective: 62 y o  y/o female was seen and evaluated at bedside  Patient denies any acute events overnight  Blood pressure 133/63, pulse 74, temperature 97 8 °F (36 6 °C), temperature source Tympanic, resp  rate 18, weight 76 kg (167 lb 8 8 oz), SpO2 97 %  ,Body mass index is 27 04 kg/m²  Invasive Devices     Drain            Negative Pressure Wound Therapy (V A C ) Arm Distal 421 days                Physical Exam:   General: Alert, cooperative and no distress  Lungs: Non labored breathing  Heart: Positive S1, S2  Abdomen: Soft, non-tender  Extremity: NVS and MSK at baseline  Right lower extremity dressings left clean dry intact             Lab, Imaging and other studies:   CBC: No results found for: WBC, HGB, HCT, MCV, PLT, ADJUSTEDWBC, MCH, MCHC, RDW, MPV, NRBC, CMP:   Lab Results   Component Value Date    SODIUM 140 12/12/2019    K 4 8 12/12/2019     12/12/2019    CO2 26 12/12/2019    BUN 21 12/12/2019    CREATININE 0 96 12/12/2019    CALCIUM 9 7 12/12/2019    EGFR 63 12/12/2019       Imaging: I have personally reviewed pertinent films in PACS  EKG, Pathology, and Other Studies: I have personally reviewed pertinent reports    VTE Pharmacologic Prophylaxis: Sequential compression device (Venodyne)

## 2019-12-12 NOTE — ASSESSMENT & PLAN NOTE
79-year-old female with hypertension, hyperlipidemia, hypothyroidism, diabetes type 2, admitted due to cellulitis of her great toe of her right foot w/underlying ostemyelitis  She is s/p right hallux amputation POD #2  - Antibiotics- Currently on IV Vancomycin/PO flagyl- ID following w/anticipation to transition to oral abx from 1* team  - Podiatry is primary  - MRI right foot-osteomyelitis of great toe phalanges  Hypoperfusion of distal phalanx which may represent gangrene, adventitial bursitis adjacent to sesamoids  -f/u pathology   Mgmt per 1* team

## 2019-12-12 NOTE — CONSULTS
Vancomycin Monitoring    Demographics    [facility]   Assessment    Vancomycin d/c'ed by Timoteo   Plan    Consult closed  thanks!      Leesa Rice, PharmD

## 2019-12-12 NOTE — ASSESSMENT & PLAN NOTE
Pt w/chronic opioid use    confirmed pt is again on methadone 90mg daily by provider this admission from [facility] direction [phone]  Methadone resumed and will continue

## 2020-01-13 PROBLEM — L03.031 CELLULITIS OF GREAT TOE OF RIGHT FOOT: Status: RESOLVED | Noted: 2019-12-07 | Resolved: 2020-01-13

## 2020-05-28 ENCOUNTER — APPOINTMENT (INPATIENT)
Dept: MRI IMAGING | Facility: HOSPITAL | Age: 65
DRG: 617 | End: 2020-05-28
Payer: COMMERCIAL

## 2020-05-28 ENCOUNTER — HOSPITAL ENCOUNTER (INPATIENT)
Facility: HOSPITAL | Age: 65
LOS: 7 days | DRG: 617 | End: 2020-06-04
Attending: EMERGENCY MEDICINE | Admitting: INTERNAL MEDICINE
Payer: COMMERCIAL

## 2020-05-28 ENCOUNTER — APPOINTMENT (EMERGENCY)
Dept: RADIOLOGY | Facility: HOSPITAL | Age: 65
DRG: 617 | End: 2020-05-28
Payer: COMMERCIAL

## 2020-05-28 DIAGNOSIS — L08.9 WOUND INFECTION: ICD-10-CM

## 2020-05-28 DIAGNOSIS — S92.901A CLOSED FRACTURE OF RIGHT FOOT, INITIAL ENCOUNTER: ICD-10-CM

## 2020-05-28 DIAGNOSIS — S91.101A OPEN WOUND OF RIGHT GREAT TOE, INITIAL ENCOUNTER: ICD-10-CM

## 2020-05-28 DIAGNOSIS — IMO0002 TYPE 2 DIABETES MELLITUS, UNCONTROLLED: ICD-10-CM

## 2020-05-28 DIAGNOSIS — T14.8XXA WOUND INFECTION: ICD-10-CM

## 2020-05-28 DIAGNOSIS — E11.65 HYPERGLYCEMIA DUE TO DIABETES MELLITUS (HCC): ICD-10-CM

## 2020-05-28 DIAGNOSIS — E87.1 HYPONATREMIA: ICD-10-CM

## 2020-05-28 DIAGNOSIS — S91.001A ANKLE WOUND, RIGHT, INITIAL ENCOUNTER: Primary | ICD-10-CM

## 2020-05-28 DIAGNOSIS — M86.9 OSTEOMYELITIS OF RIGHT FOOT, UNSPECIFIED TYPE (HCC): ICD-10-CM

## 2020-05-28 LAB
ALBUMIN SERPL BCP-MCNC: 3.2 G/DL (ref 3.5–5)
ALP SERPL-CCNC: 156 U/L (ref 46–116)
ALT SERPL W P-5'-P-CCNC: 18 U/L (ref 12–78)
ANION GAP SERPL CALCULATED.3IONS-SCNC: 7 MMOL/L (ref 4–13)
APTT PPP: 26 SECONDS (ref 23–37)
AST SERPL W P-5'-P-CCNC: 17 U/L (ref 5–45)
BASOPHILS # BLD AUTO: 0.02 THOUSANDS/ΜL (ref 0–0.1)
BASOPHILS NFR BLD AUTO: 0 % (ref 0–1)
BILIRUB SERPL-MCNC: 0.7 MG/DL (ref 0.2–1)
BUN SERPL-MCNC: 25 MG/DL (ref 5–25)
CALCIUM SERPL-MCNC: 9.5 MG/DL (ref 8.3–10.1)
CHLORIDE SERPL-SCNC: 90 MMOL/L (ref 100–108)
CO2 SERPL-SCNC: 28 MMOL/L (ref 21–32)
CREAT SERPL-MCNC: 1.13 MG/DL (ref 0.6–1.3)
CRP SERPL QL: 220.4 MG/L
EOSINOPHIL # BLD AUTO: 0.02 THOUSAND/ΜL (ref 0–0.61)
EOSINOPHIL NFR BLD AUTO: 0 % (ref 0–6)
ERYTHROCYTE [DISTWIDTH] IN BLOOD BY AUTOMATED COUNT: 14.2 % (ref 11.6–15.1)
ERYTHROCYTE [SEDIMENTATION RATE] IN BLOOD: 76 MM/HOUR (ref 1–20)
GFR SERPL CREATININE-BSD FRML MDRD: 52 ML/MIN/1.73SQ M
GLUCOSE SERPL-MCNC: 171 MG/DL (ref 65–140)
GLUCOSE SERPL-MCNC: 240 MG/DL (ref 65–140)
GLUCOSE SERPL-MCNC: 418 MG/DL (ref 65–140)
HCT VFR BLD AUTO: 33.1 % (ref 34.8–46.1)
HGB BLD-MCNC: 10.8 G/DL (ref 11.5–15.4)
IMM GRANULOCYTES # BLD AUTO: 0.03 THOUSAND/UL (ref 0–0.2)
IMM GRANULOCYTES NFR BLD AUTO: 0 % (ref 0–2)
INR PPP: 1.01 (ref 0.84–1.19)
LACTATE SERPL-SCNC: 0.9 MMOL/L (ref 0.5–2)
LYMPHOCYTES # BLD AUTO: 1.24 THOUSANDS/ΜL (ref 0.6–4.47)
LYMPHOCYTES NFR BLD AUTO: 15 % (ref 14–44)
MCH RBC QN AUTO: 28.1 PG (ref 26.8–34.3)
MCHC RBC AUTO-ENTMCNC: 32.6 G/DL (ref 31.4–37.4)
MCV RBC AUTO: 86 FL (ref 82–98)
MONOCYTES # BLD AUTO: 0.56 THOUSAND/ΜL (ref 0.17–1.22)
MONOCYTES NFR BLD AUTO: 7 % (ref 4–12)
NEUTROPHILS # BLD AUTO: 6.71 THOUSANDS/ΜL (ref 1.85–7.62)
NEUTS SEG NFR BLD AUTO: 78 % (ref 43–75)
NRBC BLD AUTO-RTO: 0 /100 WBCS
PLATELET # BLD AUTO: 236 THOUSANDS/UL (ref 149–390)
PMV BLD AUTO: 9.9 FL (ref 8.9–12.7)
POTASSIUM SERPL-SCNC: 4.2 MMOL/L (ref 3.5–5.3)
PROCALCITONIN SERPL-MCNC: 0.25 NG/ML
PROT SERPL-MCNC: 8.2 G/DL (ref 6.4–8.2)
PROTHROMBIN TIME: 13.4 SECONDS (ref 11.6–14.5)
RBC # BLD AUTO: 3.84 MILLION/UL (ref 3.81–5.12)
SODIUM SERPL-SCNC: 125 MMOL/L (ref 136–145)
WBC # BLD AUTO: 8.58 THOUSAND/UL (ref 4.31–10.16)

## 2020-05-28 PROCEDURE — 96372 THER/PROPH/DIAG INJ SC/IM: CPT

## 2020-05-28 PROCEDURE — 87186 SC STD MICRODIL/AGAR DIL: CPT | Performed by: STUDENT IN AN ORGANIZED HEALTH CARE EDUCATION/TRAINING PROGRAM

## 2020-05-28 PROCEDURE — 87205 SMEAR GRAM STAIN: CPT | Performed by: STUDENT IN AN ORGANIZED HEALTH CARE EDUCATION/TRAINING PROGRAM

## 2020-05-28 PROCEDURE — 83605 ASSAY OF LACTIC ACID: CPT | Performed by: PHYSICIAN ASSISTANT

## 2020-05-28 PROCEDURE — A9585 GADOBUTROL INJECTION: HCPCS | Performed by: STUDENT IN AN ORGANIZED HEALTH CARE EDUCATION/TRAINING PROGRAM

## 2020-05-28 PROCEDURE — 85025 COMPLETE CBC W/AUTO DIFF WBC: CPT | Performed by: PHYSICIAN ASSISTANT

## 2020-05-28 PROCEDURE — 87147 CULTURE TYPE IMMUNOLOGIC: CPT | Performed by: STUDENT IN AN ORGANIZED HEALTH CARE EDUCATION/TRAINING PROGRAM

## 2020-05-28 PROCEDURE — 73630 X-RAY EXAM OF FOOT: CPT

## 2020-05-28 PROCEDURE — 86140 C-REACTIVE PROTEIN: CPT | Performed by: PHYSICIAN ASSISTANT

## 2020-05-28 PROCEDURE — 87070 CULTURE OTHR SPECIMN AEROBIC: CPT | Performed by: STUDENT IN AN ORGANIZED HEALTH CARE EDUCATION/TRAINING PROGRAM

## 2020-05-28 PROCEDURE — 73723 MRI JOINT LWR EXTR W/O&W/DYE: CPT

## 2020-05-28 PROCEDURE — 99285 EMERGENCY DEPT VISIT HI MDM: CPT

## 2020-05-28 PROCEDURE — 96360 HYDRATION IV INFUSION INIT: CPT

## 2020-05-28 PROCEDURE — 82948 REAGENT STRIP/BLOOD GLUCOSE: CPT

## 2020-05-28 PROCEDURE — 87081 CULTURE SCREEN ONLY: CPT | Performed by: INTERNAL MEDICINE

## 2020-05-28 PROCEDURE — 85610 PROTHROMBIN TIME: CPT | Performed by: PHYSICIAN ASSISTANT

## 2020-05-28 PROCEDURE — 99223 1ST HOSP IP/OBS HIGH 75: CPT | Performed by: INTERNAL MEDICINE

## 2020-05-28 PROCEDURE — 73718 MRI LOWER EXTREMITY W/O DYE: CPT

## 2020-05-28 PROCEDURE — 84145 PROCALCITONIN (PCT): CPT | Performed by: PHYSICIAN ASSISTANT

## 2020-05-28 PROCEDURE — 99285 EMERGENCY DEPT VISIT HI MDM: CPT | Performed by: PHYSICIAN ASSISTANT

## 2020-05-28 PROCEDURE — 85730 THROMBOPLASTIN TIME PARTIAL: CPT | Performed by: PHYSICIAN ASSISTANT

## 2020-05-28 PROCEDURE — 36415 COLL VENOUS BLD VENIPUNCTURE: CPT | Performed by: PHYSICIAN ASSISTANT

## 2020-05-28 PROCEDURE — 73610 X-RAY EXAM OF ANKLE: CPT

## 2020-05-28 PROCEDURE — 85652 RBC SED RATE AUTOMATED: CPT | Performed by: PHYSICIAN ASSISTANT

## 2020-05-28 PROCEDURE — 80053 COMPREHEN METABOLIC PANEL: CPT | Performed by: PHYSICIAN ASSISTANT

## 2020-05-28 PROCEDURE — 87040 BLOOD CULTURE FOR BACTERIA: CPT | Performed by: PHYSICIAN ASSISTANT

## 2020-05-28 PROCEDURE — 10060 I&D ABSCESS SIMPLE/SINGLE: CPT | Performed by: PODIATRIST

## 2020-05-28 PROCEDURE — 99223 1ST HOSP IP/OBS HIGH 75: CPT | Performed by: PODIATRIST

## 2020-05-28 PROCEDURE — 11042 DBRDMT SUBQ TIS 1ST 20SQCM/<: CPT | Performed by: PODIATRIST

## 2020-05-28 RX ORDER — ROPINIROLE 1 MG/1
0.5 TABLET, FILM COATED ORAL 2 TIMES DAILY
Status: DISCONTINUED | OUTPATIENT
Start: 2020-05-28 | End: 2020-06-04 | Stop reason: HOSPADM

## 2020-05-28 RX ORDER — ONDANSETRON 2 MG/ML
4 INJECTION INTRAMUSCULAR; INTRAVENOUS EVERY 4 HOURS PRN
Status: DISCONTINUED | OUTPATIENT
Start: 2020-05-28 | End: 2020-06-04 | Stop reason: HOSPADM

## 2020-05-28 RX ORDER — METRONIDAZOLE 500 MG/1
500 TABLET ORAL EVERY 8 HOURS SCHEDULED
Status: DISCONTINUED | OUTPATIENT
Start: 2020-05-28 | End: 2020-06-01

## 2020-05-28 RX ORDER — LISINOPRIL 5 MG/1
5 TABLET ORAL DAILY
Status: DISCONTINUED | OUTPATIENT
Start: 2020-05-29 | End: 2020-06-04 | Stop reason: HOSPADM

## 2020-05-28 RX ORDER — LAMOTRIGINE 100 MG/1
150 TABLET ORAL DAILY
Status: DISCONTINUED | OUTPATIENT
Start: 2020-05-29 | End: 2020-06-04 | Stop reason: HOSPADM

## 2020-05-28 RX ORDER — OXYCODONE HYDROCHLORIDE AND ACETAMINOPHEN 5; 325 MG/1; MG/1
1 TABLET ORAL ONCE
Status: COMPLETED | OUTPATIENT
Start: 2020-05-28 | End: 2020-05-28

## 2020-05-28 RX ORDER — METHADONE HYDROCHLORIDE 10 MG/1
90 TABLET ORAL DAILY
Status: DISCONTINUED | OUTPATIENT
Start: 2020-05-29 | End: 2020-06-04 | Stop reason: HOSPADM

## 2020-05-28 RX ORDER — ACETAMINOPHEN 325 MG/1
650 TABLET ORAL EVERY 6 HOURS PRN
Status: DISCONTINUED | OUTPATIENT
Start: 2020-05-28 | End: 2020-06-04 | Stop reason: HOSPADM

## 2020-05-28 RX ORDER — HYDROXYZINE HYDROCHLORIDE 25 MG/1
50 TABLET, FILM COATED ORAL
Status: DISCONTINUED | OUTPATIENT
Start: 2020-05-28 | End: 2020-06-04 | Stop reason: HOSPADM

## 2020-05-28 RX ORDER — HEPARIN SODIUM 5000 [USP'U]/ML
5000 INJECTION, SOLUTION INTRAVENOUS; SUBCUTANEOUS EVERY 8 HOURS SCHEDULED
Status: DISCONTINUED | OUTPATIENT
Start: 2020-05-28 | End: 2020-06-04 | Stop reason: HOSPADM

## 2020-05-28 RX ORDER — INSULIN GLARGINE 100 [IU]/ML
20 INJECTION, SOLUTION SUBCUTANEOUS EVERY MORNING
Status: DISCONTINUED | OUTPATIENT
Start: 2020-05-29 | End: 2020-06-01

## 2020-05-28 RX ORDER — GABAPENTIN 300 MG/1
600 CAPSULE ORAL 3 TIMES DAILY
Status: DISCONTINUED | OUTPATIENT
Start: 2020-05-28 | End: 2020-06-04 | Stop reason: HOSPADM

## 2020-05-28 RX ORDER — CLONAZEPAM 1 MG/1
1 TABLET ORAL 3 TIMES DAILY
Status: DISCONTINUED | OUTPATIENT
Start: 2020-05-28 | End: 2020-05-31

## 2020-05-28 RX ORDER — ONDANSETRON 2 MG/ML
4 INJECTION INTRAMUSCULAR; INTRAVENOUS ONCE
Status: COMPLETED | OUTPATIENT
Start: 2020-05-28 | End: 2020-05-28

## 2020-05-28 RX ORDER — SERTRALINE HYDROCHLORIDE 100 MG/1
100 TABLET, FILM COATED ORAL DAILY
Status: DISCONTINUED | OUTPATIENT
Start: 2020-05-29 | End: 2020-06-04 | Stop reason: HOSPADM

## 2020-05-28 RX ORDER — ATORVASTATIN CALCIUM 40 MG/1
40 TABLET, FILM COATED ORAL
Status: DISCONTINUED | OUTPATIENT
Start: 2020-05-28 | End: 2020-06-04 | Stop reason: HOSPADM

## 2020-05-28 RX ORDER — DULOXETIN HYDROCHLORIDE 60 MG/1
60 CAPSULE, DELAYED RELEASE ORAL DAILY
Status: DISCONTINUED | OUTPATIENT
Start: 2020-05-29 | End: 2020-06-04 | Stop reason: HOSPADM

## 2020-05-28 RX ADMIN — INSULIN HUMAN 10 UNITS: 100 INJECTION, SOLUTION PARENTERAL at 12:50

## 2020-05-28 RX ADMIN — HEPARIN SODIUM 5000 UNITS: 5000 INJECTION INTRAVENOUS; SUBCUTANEOUS at 18:52

## 2020-05-28 RX ADMIN — CLONAZEPAM 1 MG: 1 TABLET ORAL at 22:15

## 2020-05-28 RX ADMIN — HYDROXYZINE HYDROCHLORIDE 50 MG: 25 TABLET, FILM COATED ORAL at 22:15

## 2020-05-28 RX ADMIN — GABAPENTIN 600 MG: 300 CAPSULE ORAL at 22:15

## 2020-05-28 RX ADMIN — METRONIDAZOLE 500 MG: 500 TABLET ORAL at 13:23

## 2020-05-28 RX ADMIN — METRONIDAZOLE 500 MG: 500 TABLET ORAL at 22:15

## 2020-05-28 RX ADMIN — INSULIN LISPRO 3 UNITS: 100 INJECTION, SOLUTION INTRAVENOUS; SUBCUTANEOUS at 22:14

## 2020-05-28 RX ADMIN — VANCOMYCIN HYDROCHLORIDE 1250 MG: 1 INJECTION, POWDER, LYOPHILIZED, FOR SOLUTION INTRAVENOUS at 13:56

## 2020-05-28 RX ADMIN — ONDANSETRON 4 MG: 2 INJECTION INTRAMUSCULAR; INTRAVENOUS at 13:36

## 2020-05-28 RX ADMIN — GABAPENTIN 600 MG: 300 CAPSULE ORAL at 18:52

## 2020-05-28 RX ADMIN — ROPINIROLE 0.5 MG: 1 TABLET, FILM COATED ORAL at 18:52

## 2020-05-28 RX ADMIN — INSULIN LISPRO 1 UNITS: 100 INJECTION, SOLUTION INTRAVENOUS; SUBCUTANEOUS at 18:52

## 2020-05-28 RX ADMIN — GADOBUTROL 7 ML: 604.72 INJECTION INTRAVENOUS at 18:39

## 2020-05-28 RX ADMIN — OXYCODONE HYDROCHLORIDE AND ACETAMINOPHEN 1 TABLET: 5; 325 TABLET ORAL at 12:24

## 2020-05-28 RX ADMIN — CLONAZEPAM 1 MG: 1 TABLET ORAL at 18:52

## 2020-05-28 RX ADMIN — ATORVASTATIN CALCIUM 40 MG: 40 TABLET, FILM COATED ORAL at 18:52

## 2020-05-29 ENCOUNTER — ANESTHESIA (INPATIENT)
Dept: PERIOP | Facility: HOSPITAL | Age: 65
DRG: 617 | End: 2020-05-29
Payer: COMMERCIAL

## 2020-05-29 ENCOUNTER — APPOINTMENT (INPATIENT)
Dept: RADIOLOGY | Facility: HOSPITAL | Age: 65
DRG: 617 | End: 2020-05-29
Payer: COMMERCIAL

## 2020-05-29 ENCOUNTER — ANESTHESIA EVENT (INPATIENT)
Dept: PERIOP | Facility: HOSPITAL | Age: 65
DRG: 617 | End: 2020-05-29
Payer: COMMERCIAL

## 2020-05-29 LAB
ANION GAP SERPL CALCULATED.3IONS-SCNC: 7 MMOL/L (ref 4–13)
ATRIAL RATE: 57 BPM
BUN SERPL-MCNC: 21 MG/DL (ref 5–25)
CALCIUM SERPL-MCNC: 9.2 MG/DL (ref 8.3–10.1)
CHLORIDE SERPL-SCNC: 102 MMOL/L (ref 100–108)
CO2 SERPL-SCNC: 27 MMOL/L (ref 21–32)
CREAT SERPL-MCNC: 0.93 MG/DL (ref 0.6–1.3)
ERYTHROCYTE [DISTWIDTH] IN BLOOD BY AUTOMATED COUNT: 14.6 % (ref 11.6–15.1)
GFR SERPL CREATININE-BSD FRML MDRD: 66 ML/MIN/1.73SQ M
GLUCOSE SERPL-MCNC: 201 MG/DL (ref 65–140)
GLUCOSE SERPL-MCNC: 212 MG/DL (ref 65–140)
GLUCOSE SERPL-MCNC: 238 MG/DL (ref 65–140)
GLUCOSE SERPL-MCNC: 301 MG/DL (ref 65–140)
GLUCOSE SERPL-MCNC: 347 MG/DL (ref 65–140)
GLUCOSE SERPL-MCNC: 360 MG/DL (ref 65–140)
HCT VFR BLD AUTO: 33.5 % (ref 34.8–46.1)
HGB BLD-MCNC: 10.3 G/DL (ref 11.5–15.4)
MCH RBC QN AUTO: 27.7 PG (ref 26.8–34.3)
MCHC RBC AUTO-ENTMCNC: 30.7 G/DL (ref 31.4–37.4)
MCV RBC AUTO: 90 FL (ref 82–98)
P AXIS: 17 DEGREES
PLATELET # BLD AUTO: 205 THOUSANDS/UL (ref 149–390)
PMV BLD AUTO: 9.8 FL (ref 8.9–12.7)
POTASSIUM SERPL-SCNC: 4 MMOL/L (ref 3.5–5.3)
PR INTERVAL: 182 MS
PROCALCITONIN SERPL-MCNC: 0.16 NG/ML
QRS AXIS: 32 DEGREES
QRSD INTERVAL: 90 MS
QT INTERVAL: 446 MS
QTC INTERVAL: 434 MS
RBC # BLD AUTO: 3.72 MILLION/UL (ref 3.81–5.12)
SARS-COV-2 RNA RESP QL NAA+PROBE: NEGATIVE
SODIUM SERPL-SCNC: 136 MMOL/L (ref 136–145)
T WAVE AXIS: 33 DEGREES
VENTRICULAR RATE: 57 BPM
WBC # BLD AUTO: 4.44 THOUSAND/UL (ref 4.31–10.16)

## 2020-05-29 PROCEDURE — 87635 SARS-COV-2 COVID-19 AMP PRB: CPT | Performed by: PODIATRIST

## 2020-05-29 PROCEDURE — 87075 CULTR BACTERIA EXCEPT BLOOD: CPT | Performed by: PODIATRIST

## 2020-05-29 PROCEDURE — 71045 X-RAY EXAM CHEST 1 VIEW: CPT

## 2020-05-29 PROCEDURE — 11042 DBRDMT SUBQ TIS 1ST 20SQCM/<: CPT | Performed by: PODIATRIST

## 2020-05-29 PROCEDURE — 99232 SBSQ HOSP IP/OBS MODERATE 35: CPT | Performed by: PODIATRIST

## 2020-05-29 PROCEDURE — 73630 X-RAY EXAM OF FOOT: CPT

## 2020-05-29 PROCEDURE — 82948 REAGENT STRIP/BLOOD GLUCOSE: CPT

## 2020-05-29 PROCEDURE — 0JBQ0ZZ EXCISION OF RIGHT FOOT SUBCUTANEOUS TISSUE AND FASCIA, OPEN APPROACH: ICD-10-PCS | Performed by: STUDENT IN AN ORGANIZED HEALTH CARE EDUCATION/TRAINING PROGRAM

## 2020-05-29 PROCEDURE — 28820 AMPUTATION OF TOE: CPT | Performed by: PODIATRIST

## 2020-05-29 PROCEDURE — 85027 COMPLETE CBC AUTOMATED: CPT | Performed by: PHYSICIAN ASSISTANT

## 2020-05-29 PROCEDURE — 84145 PROCALCITONIN (PCT): CPT | Performed by: PHYSICIAN ASSISTANT

## 2020-05-29 PROCEDURE — 99232 SBSQ HOSP IP/OBS MODERATE 35: CPT | Performed by: INTERNAL MEDICINE

## 2020-05-29 PROCEDURE — 88305 TISSUE EXAM BY PATHOLOGIST: CPT | Performed by: PATHOLOGY

## 2020-05-29 PROCEDURE — 0Y6X0Z0 DETACHMENT AT RIGHT 5TH TOE, COMPLETE, OPEN APPROACH: ICD-10-PCS | Performed by: STUDENT IN AN ORGANIZED HEALTH CARE EDUCATION/TRAINING PROGRAM

## 2020-05-29 PROCEDURE — 97167 OT EVAL HIGH COMPLEX 60 MIN: CPT

## 2020-05-29 PROCEDURE — 93010 ELECTROCARDIOGRAM REPORT: CPT | Performed by: INTERNAL MEDICINE

## 2020-05-29 PROCEDURE — 87070 CULTURE OTHR SPECIMN AEROBIC: CPT | Performed by: PODIATRIST

## 2020-05-29 PROCEDURE — 80048 BASIC METABOLIC PNL TOTAL CA: CPT | Performed by: PHYSICIAN ASSISTANT

## 2020-05-29 PROCEDURE — 87186 SC STD MICRODIL/AGAR DIL: CPT | Performed by: PODIATRIST

## 2020-05-29 PROCEDURE — 88311 DECALCIFY TISSUE: CPT | Performed by: PATHOLOGY

## 2020-05-29 PROCEDURE — 93005 ELECTROCARDIOGRAM TRACING: CPT

## 2020-05-29 PROCEDURE — 97163 PT EVAL HIGH COMPLEX 45 MIN: CPT

## 2020-05-29 PROCEDURE — 87205 SMEAR GRAM STAIN: CPT | Performed by: PODIATRIST

## 2020-05-29 PROCEDURE — 87147 CULTURE TYPE IMMUNOLOGIC: CPT | Performed by: PODIATRIST

## 2020-05-29 RX ORDER — SODIUM CHLORIDE 9 MG/ML
125 INJECTION, SOLUTION INTRAVENOUS CONTINUOUS
Status: CANCELLED | OUTPATIENT
Start: 2020-05-29

## 2020-05-29 RX ORDER — KETAMINE HYDROCHLORIDE 50 MG/ML
INJECTION, SOLUTION, CONCENTRATE INTRAMUSCULAR; INTRAVENOUS AS NEEDED
Status: DISCONTINUED | OUTPATIENT
Start: 2020-05-29 | End: 2020-05-29 | Stop reason: SURG

## 2020-05-29 RX ORDER — HYDROMORPHONE HCL/PF 1 MG/ML
0.5 SYRINGE (ML) INJECTION
Status: DISCONTINUED | OUTPATIENT
Start: 2020-05-29 | End: 2020-05-29 | Stop reason: HOSPADM

## 2020-05-29 RX ORDER — MAGNESIUM HYDROXIDE 1200 MG/15ML
LIQUID ORAL AS NEEDED
Status: DISCONTINUED | OUTPATIENT
Start: 2020-05-29 | End: 2020-05-29 | Stop reason: HOSPADM

## 2020-05-29 RX ORDER — ONDANSETRON 2 MG/ML
4 INJECTION INTRAMUSCULAR; INTRAVENOUS ONCE AS NEEDED
Status: DISCONTINUED | OUTPATIENT
Start: 2020-05-29 | End: 2020-05-29 | Stop reason: HOSPADM

## 2020-05-29 RX ORDER — PROPOFOL 10 MG/ML
INJECTION, EMULSION INTRAVENOUS AS NEEDED
Status: DISCONTINUED | OUTPATIENT
Start: 2020-05-29 | End: 2020-05-29 | Stop reason: SURG

## 2020-05-29 RX ORDER — KETOROLAC TROMETHAMINE 30 MG/ML
15 INJECTION, SOLUTION INTRAMUSCULAR; INTRAVENOUS EVERY 6 HOURS PRN
Status: ACTIVE | OUTPATIENT
Start: 2020-05-29 | End: 2020-05-30

## 2020-05-29 RX ORDER — CEFAZOLIN SODIUM 1 G/3ML
INJECTION, POWDER, FOR SOLUTION INTRAMUSCULAR; INTRAVENOUS AS NEEDED
Status: DISCONTINUED | OUTPATIENT
Start: 2020-05-29 | End: 2020-05-29 | Stop reason: SURG

## 2020-05-29 RX ORDER — VANCOMYCIN HYDROCHLORIDE 1 G/200ML
12.5 INJECTION, SOLUTION INTRAVENOUS EVERY 12 HOURS
Status: DISCONTINUED | OUTPATIENT
Start: 2020-05-29 | End: 2020-06-01

## 2020-05-29 RX ORDER — SODIUM CHLORIDE 9 MG/ML
INJECTION, SOLUTION INTRAVENOUS CONTINUOUS PRN
Status: DISCONTINUED | OUTPATIENT
Start: 2020-05-29 | End: 2020-05-29 | Stop reason: SURG

## 2020-05-29 RX ORDER — PROPOFOL 10 MG/ML
INJECTION, EMULSION INTRAVENOUS CONTINUOUS PRN
Status: DISCONTINUED | OUTPATIENT
Start: 2020-05-29 | End: 2020-05-29 | Stop reason: SURG

## 2020-05-29 RX ADMIN — DULOXETINE HYDROCHLORIDE 60 MG: 60 CAPSULE, DELAYED RELEASE ORAL at 09:16

## 2020-05-29 RX ADMIN — ROPINIROLE 0.5 MG: 1 TABLET, FILM COATED ORAL at 09:16

## 2020-05-29 RX ADMIN — GABAPENTIN 600 MG: 300 CAPSULE ORAL at 09:15

## 2020-05-29 RX ADMIN — GABAPENTIN 300 MG: 300 CAPSULE ORAL at 22:22

## 2020-05-29 RX ADMIN — KETAMINE HYDROCHLORIDE 5 MG: 50 INJECTION INTRAMUSCULAR; INTRAVENOUS at 19:54

## 2020-05-29 RX ADMIN — METRONIDAZOLE 500 MG: 500 TABLET ORAL at 22:23

## 2020-05-29 RX ADMIN — METHADONE HYDROCHLORIDE 90 MG: 10 TABLET ORAL at 09:16

## 2020-05-29 RX ADMIN — SERTRALINE HYDROCHLORIDE 100 MG: 100 TABLET ORAL at 09:16

## 2020-05-29 RX ADMIN — VANCOMYCIN HYDROCHLORIDE 1250 MG: 1 INJECTION, POWDER, LYOPHILIZED, FOR SOLUTION INTRAVENOUS at 02:07

## 2020-05-29 RX ADMIN — METRONIDAZOLE 500 MG: 500 TABLET ORAL at 05:27

## 2020-05-29 RX ADMIN — PROPOFOL 30 MG: 10 INJECTION, EMULSION INTRAVENOUS at 19:27

## 2020-05-29 RX ADMIN — CLONAZEPAM 1 MG: 1 TABLET ORAL at 09:15

## 2020-05-29 RX ADMIN — LAMOTRIGINE 150 MG: 100 TABLET ORAL at 09:15

## 2020-05-29 RX ADMIN — KETAMINE HYDROCHLORIDE 5 MG: 50 INJECTION INTRAMUSCULAR; INTRAVENOUS at 19:33

## 2020-05-29 RX ADMIN — HYDROXYZINE HYDROCHLORIDE 50 MG: 25 TABLET, FILM COATED ORAL at 22:23

## 2020-05-29 RX ADMIN — HEPARIN SODIUM 5000 UNITS: 5000 INJECTION INTRAVENOUS; SUBCUTANEOUS at 22:22

## 2020-05-29 RX ADMIN — LISINOPRIL 5 MG: 5 TABLET ORAL at 09:16

## 2020-05-29 RX ADMIN — PROPOFOL 40 MCG/KG/MIN: 10 INJECTION, EMULSION INTRAVENOUS at 19:27

## 2020-05-29 RX ADMIN — CEFAZOLIN SODIUM 1000 MG: 1 INJECTION, POWDER, FOR SOLUTION INTRAMUSCULAR; INTRAVENOUS at 19:28

## 2020-05-29 RX ADMIN — KETAMINE HYDROCHLORIDE 5 MG: 50 INJECTION INTRAMUSCULAR; INTRAVENOUS at 19:49

## 2020-05-29 RX ADMIN — KETAMINE HYDROCHLORIDE 5 MG: 50 INJECTION INTRAMUSCULAR; INTRAVENOUS at 19:44

## 2020-05-29 RX ADMIN — VANCOMYCIN HYDROCHLORIDE 1000 MG: 1 INJECTION, SOLUTION INTRAVENOUS at 13:54

## 2020-05-29 RX ADMIN — KETAMINE HYDROCHLORIDE 5 MG: 50 INJECTION INTRAMUSCULAR; INTRAVENOUS at 19:39

## 2020-05-29 RX ADMIN — KETAMINE HYDROCHLORIDE 25 MG: 50 INJECTION INTRAMUSCULAR; INTRAVENOUS at 19:28

## 2020-05-29 RX ADMIN — INSULIN LISPRO 4 UNITS: 100 INJECTION, SOLUTION INTRAVENOUS; SUBCUTANEOUS at 12:17

## 2020-05-29 RX ADMIN — METRONIDAZOLE 500 MG: 500 TABLET ORAL at 13:54

## 2020-05-29 RX ADMIN — HEPARIN SODIUM 5000 UNITS: 5000 INJECTION INTRAVENOUS; SUBCUTANEOUS at 13:54

## 2020-05-29 RX ADMIN — CLONAZEPAM 1 MG: 1 TABLET ORAL at 22:23

## 2020-05-29 RX ADMIN — INSULIN LISPRO 2 UNITS: 100 INJECTION, SOLUTION INTRAVENOUS; SUBCUTANEOUS at 22:22

## 2020-05-29 RX ADMIN — HEPARIN SODIUM 5000 UNITS: 5000 INJECTION INTRAVENOUS; SUBCUTANEOUS at 05:27

## 2020-05-29 RX ADMIN — SODIUM CHLORIDE: 0.9 INJECTION, SOLUTION INTRAVENOUS at 19:39

## 2020-05-30 LAB
ANION GAP SERPL CALCULATED.3IONS-SCNC: 7 MMOL/L (ref 4–13)
BACTERIA WND AEROBE CULT: ABNORMAL
BASOPHILS # BLD AUTO: 0.02 THOUSANDS/ΜL (ref 0–0.1)
BASOPHILS NFR BLD AUTO: 0 % (ref 0–1)
BUN SERPL-MCNC: 22 MG/DL (ref 5–25)
CALCIUM SERPL-MCNC: 9.3 MG/DL (ref 8.3–10.1)
CHLORIDE SERPL-SCNC: 103 MMOL/L (ref 100–108)
CO2 SERPL-SCNC: 27 MMOL/L (ref 21–32)
CREAT SERPL-MCNC: 0.84 MG/DL (ref 0.6–1.3)
EOSINOPHIL # BLD AUTO: 0.12 THOUSAND/ΜL (ref 0–0.61)
EOSINOPHIL NFR BLD AUTO: 2 % (ref 0–6)
ERYTHROCYTE [DISTWIDTH] IN BLOOD BY AUTOMATED COUNT: 14.6 % (ref 11.6–15.1)
EST. AVERAGE GLUCOSE BLD GHB EST-MCNC: 352 MG/DL
GFR SERPL CREATININE-BSD FRML MDRD: 74 ML/MIN/1.73SQ M
GLUCOSE SERPL-MCNC: 111 MG/DL (ref 65–140)
GLUCOSE SERPL-MCNC: 116 MG/DL (ref 65–140)
GLUCOSE SERPL-MCNC: 162 MG/DL (ref 65–140)
GLUCOSE SERPL-MCNC: 177 MG/DL (ref 65–140)
GLUCOSE SERPL-MCNC: 250 MG/DL (ref 65–140)
GLUCOSE SERPL-MCNC: 333 MG/DL (ref 65–140)
GRAM STN SPEC: ABNORMAL
GRAM STN SPEC: ABNORMAL
HBA1C MFR BLD: 13.9 %
HCT VFR BLD AUTO: 34.3 % (ref 34.8–46.1)
HGB BLD-MCNC: 10.6 G/DL (ref 11.5–15.4)
IMM GRANULOCYTES # BLD AUTO: 0.01 THOUSAND/UL (ref 0–0.2)
IMM GRANULOCYTES NFR BLD AUTO: 0 % (ref 0–2)
LYMPHOCYTES # BLD AUTO: 1.99 THOUSANDS/ΜL (ref 0.6–4.47)
LYMPHOCYTES NFR BLD AUTO: 39 % (ref 14–44)
MCH RBC QN AUTO: 28 PG (ref 26.8–34.3)
MCHC RBC AUTO-ENTMCNC: 30.9 G/DL (ref 31.4–37.4)
MCV RBC AUTO: 91 FL (ref 82–98)
MONOCYTES # BLD AUTO: 0.4 THOUSAND/ΜL (ref 0.17–1.22)
MONOCYTES NFR BLD AUTO: 8 % (ref 4–12)
MRSA NOSE QL CULT: NORMAL
NEUTROPHILS # BLD AUTO: 2.57 THOUSANDS/ΜL (ref 1.85–7.62)
NEUTS SEG NFR BLD AUTO: 51 % (ref 43–75)
NRBC BLD AUTO-RTO: 0 /100 WBCS
PLATELET # BLD AUTO: 233 THOUSANDS/UL (ref 149–390)
PMV BLD AUTO: 10.6 FL (ref 8.9–12.7)
POTASSIUM SERPL-SCNC: 4.5 MMOL/L (ref 3.5–5.3)
RBC # BLD AUTO: 3.78 MILLION/UL (ref 3.81–5.12)
SODIUM SERPL-SCNC: 137 MMOL/L (ref 136–145)
WBC # BLD AUTO: 5.11 THOUSAND/UL (ref 4.31–10.16)

## 2020-05-30 PROCEDURE — 99024 POSTOP FOLLOW-UP VISIT: CPT | Performed by: PODIATRIST

## 2020-05-30 PROCEDURE — 85025 COMPLETE CBC W/AUTO DIFF WBC: CPT | Performed by: STUDENT IN AN ORGANIZED HEALTH CARE EDUCATION/TRAINING PROGRAM

## 2020-05-30 PROCEDURE — 83036 HEMOGLOBIN GLYCOSYLATED A1C: CPT | Performed by: STUDENT IN AN ORGANIZED HEALTH CARE EDUCATION/TRAINING PROGRAM

## 2020-05-30 PROCEDURE — 80048 BASIC METABOLIC PNL TOTAL CA: CPT | Performed by: STUDENT IN AN ORGANIZED HEALTH CARE EDUCATION/TRAINING PROGRAM

## 2020-05-30 PROCEDURE — 99232 SBSQ HOSP IP/OBS MODERATE 35: CPT | Performed by: INTERNAL MEDICINE

## 2020-05-30 PROCEDURE — 82948 REAGENT STRIP/BLOOD GLUCOSE: CPT

## 2020-05-30 RX ADMIN — ATORVASTATIN CALCIUM 40 MG: 40 TABLET, FILM COATED ORAL at 17:14

## 2020-05-30 RX ADMIN — ROPINIROLE 0.5 MG: 1 TABLET, FILM COATED ORAL at 08:31

## 2020-05-30 RX ADMIN — CLONAZEPAM 1 MG: 1 TABLET ORAL at 21:38

## 2020-05-30 RX ADMIN — LAMOTRIGINE 150 MG: 100 TABLET ORAL at 08:31

## 2020-05-30 RX ADMIN — METRONIDAZOLE 500 MG: 500 TABLET ORAL at 14:28

## 2020-05-30 RX ADMIN — GABAPENTIN 600 MG: 300 CAPSULE ORAL at 15:54

## 2020-05-30 RX ADMIN — LISINOPRIL 5 MG: 5 TABLET ORAL at 08:31

## 2020-05-30 RX ADMIN — INSULIN LISPRO 1 UNITS: 100 INJECTION, SOLUTION INTRAVENOUS; SUBCUTANEOUS at 21:40

## 2020-05-30 RX ADMIN — HYDROXYZINE HYDROCHLORIDE 50 MG: 25 TABLET, FILM COATED ORAL at 21:38

## 2020-05-30 RX ADMIN — GABAPENTIN 600 MG: 300 CAPSULE ORAL at 21:38

## 2020-05-30 RX ADMIN — CLONAZEPAM 1 MG: 1 TABLET ORAL at 15:54

## 2020-05-30 RX ADMIN — DULOXETINE HYDROCHLORIDE 60 MG: 60 CAPSULE, DELAYED RELEASE ORAL at 08:32

## 2020-05-30 RX ADMIN — INSULIN LISPRO 3 UNITS: 100 INJECTION, SOLUTION INTRAVENOUS; SUBCUTANEOUS at 08:32

## 2020-05-30 RX ADMIN — SERTRALINE HYDROCHLORIDE 100 MG: 100 TABLET ORAL at 08:32

## 2020-05-30 RX ADMIN — CLONAZEPAM 1 MG: 1 TABLET ORAL at 08:31

## 2020-05-30 RX ADMIN — METRONIDAZOLE 500 MG: 500 TABLET ORAL at 21:38

## 2020-05-30 RX ADMIN — GABAPENTIN 600 MG: 300 CAPSULE ORAL at 08:31

## 2020-05-30 RX ADMIN — ROPINIROLE 0.5 MG: 1 TABLET, FILM COATED ORAL at 17:14

## 2020-05-30 RX ADMIN — HEPARIN SODIUM 5000 UNITS: 5000 INJECTION INTRAVENOUS; SUBCUTANEOUS at 21:38

## 2020-05-30 RX ADMIN — VANCOMYCIN HYDROCHLORIDE 1000 MG: 1 INJECTION, SOLUTION INTRAVENOUS at 14:32

## 2020-05-30 RX ADMIN — METHADONE HYDROCHLORIDE 90 MG: 10 TABLET ORAL at 08:32

## 2020-05-30 RX ADMIN — METRONIDAZOLE 500 MG: 500 TABLET ORAL at 06:26

## 2020-05-30 RX ADMIN — HEPARIN SODIUM 5000 UNITS: 5000 INJECTION INTRAVENOUS; SUBCUTANEOUS at 14:28

## 2020-05-30 RX ADMIN — HEPARIN SODIUM 5000 UNITS: 5000 INJECTION INTRAVENOUS; SUBCUTANEOUS at 06:25

## 2020-05-30 RX ADMIN — VANCOMYCIN HYDROCHLORIDE 1000 MG: 1 INJECTION, SOLUTION INTRAVENOUS at 02:27

## 2020-05-30 RX ADMIN — INSULIN GLARGINE 20 UNITS: 100 INJECTION, SOLUTION SUBCUTANEOUS at 08:40

## 2020-05-31 PROBLEM — F19.10 IV DRUG ABUSE (HCC): Status: ACTIVE | Noted: 2020-05-31

## 2020-05-31 PROBLEM — F11.20 UNCOMPLICATED OPIOID DEPENDENCE (HCC): Status: ACTIVE | Noted: 2020-05-31

## 2020-05-31 LAB
AMPHETAMINES SERPL QL SCN: NEGATIVE
ANION GAP SERPL CALCULATED.3IONS-SCNC: 7 MMOL/L (ref 4–13)
BACTERIA SPEC ANAEROBE CULT: NORMAL
BARBITURATES UR QL: NEGATIVE
BASOPHILS # BLD AUTO: 0.03 THOUSANDS/ΜL (ref 0–0.1)
BASOPHILS NFR BLD AUTO: 1 % (ref 0–1)
BENZODIAZ UR QL: NEGATIVE
BUN SERPL-MCNC: 16 MG/DL (ref 5–25)
CALCIUM SERPL-MCNC: 9 MG/DL (ref 8.3–10.1)
CHLORIDE SERPL-SCNC: 103 MMOL/L (ref 100–108)
CO2 SERPL-SCNC: 26 MMOL/L (ref 21–32)
COCAINE UR QL: NEGATIVE
CREAT SERPL-MCNC: 0.78 MG/DL (ref 0.6–1.3)
EOSINOPHIL # BLD AUTO: 0.11 THOUSAND/ΜL (ref 0–0.61)
EOSINOPHIL NFR BLD AUTO: 3 % (ref 0–6)
ERYTHROCYTE [DISTWIDTH] IN BLOOD BY AUTOMATED COUNT: 14.7 % (ref 11.6–15.1)
GFR SERPL CREATININE-BSD FRML MDRD: 81 ML/MIN/1.73SQ M
GLUCOSE SERPL-MCNC: 172 MG/DL (ref 65–140)
GLUCOSE SERPL-MCNC: 179 MG/DL (ref 65–140)
GLUCOSE SERPL-MCNC: 183 MG/DL (ref 65–140)
GLUCOSE SERPL-MCNC: 249 MG/DL (ref 65–140)
GLUCOSE SERPL-MCNC: 314 MG/DL (ref 65–140)
GLUCOSE SERPL-MCNC: 330 MG/DL (ref 65–140)
HCT VFR BLD AUTO: 28.8 % (ref 34.8–46.1)
HGB BLD-MCNC: 9 G/DL (ref 11.5–15.4)
IMM GRANULOCYTES # BLD AUTO: 0.01 THOUSAND/UL (ref 0–0.2)
IMM GRANULOCYTES NFR BLD AUTO: 0 % (ref 0–2)
LYMPHOCYTES # BLD AUTO: 1.42 THOUSANDS/ΜL (ref 0.6–4.47)
LYMPHOCYTES NFR BLD AUTO: 34 % (ref 14–44)
MCH RBC QN AUTO: 28 PG (ref 26.8–34.3)
MCHC RBC AUTO-ENTMCNC: 31.3 G/DL (ref 31.4–37.4)
MCV RBC AUTO: 90 FL (ref 82–98)
METHADONE UR QL: POSITIVE
MONOCYTES # BLD AUTO: 0.35 THOUSAND/ΜL (ref 0.17–1.22)
MONOCYTES NFR BLD AUTO: 9 % (ref 4–12)
NEUTROPHILS # BLD AUTO: 2.22 THOUSANDS/ΜL (ref 1.85–7.62)
NEUTS SEG NFR BLD AUTO: 53 % (ref 43–75)
NRBC BLD AUTO-RTO: 0 /100 WBCS
OPIATES UR QL SCN: NEGATIVE
PCP UR QL: NEGATIVE
PLATELET # BLD AUTO: 242 THOUSANDS/UL (ref 149–390)
PMV BLD AUTO: 9.5 FL (ref 8.9–12.7)
POTASSIUM SERPL-SCNC: 4.1 MMOL/L (ref 3.5–5.3)
RBC # BLD AUTO: 3.21 MILLION/UL (ref 3.81–5.12)
SODIUM SERPL-SCNC: 136 MMOL/L (ref 136–145)
THC UR QL: NEGATIVE
WBC # BLD AUTO: 4.14 THOUSAND/UL (ref 4.31–10.16)

## 2020-05-31 PROCEDURE — 82948 REAGENT STRIP/BLOOD GLUCOSE: CPT

## 2020-05-31 PROCEDURE — 80307 DRUG TEST PRSMV CHEM ANLYZR: CPT | Performed by: NURSE PRACTITIONER

## 2020-05-31 PROCEDURE — 85025 COMPLETE CBC W/AUTO DIFF WBC: CPT | Performed by: INTERNAL MEDICINE

## 2020-05-31 PROCEDURE — 99232 SBSQ HOSP IP/OBS MODERATE 35: CPT | Performed by: INTERNAL MEDICINE

## 2020-05-31 PROCEDURE — 80048 BASIC METABOLIC PNL TOTAL CA: CPT | Performed by: INTERNAL MEDICINE

## 2020-05-31 RX ORDER — CLONAZEPAM 1 MG/1
1 TABLET ORAL 2 TIMES DAILY
Status: DISCONTINUED | OUTPATIENT
Start: 2020-06-01 | End: 2020-06-04 | Stop reason: HOSPADM

## 2020-05-31 RX ADMIN — METRONIDAZOLE 500 MG: 500 TABLET ORAL at 05:59

## 2020-05-31 RX ADMIN — HEPARIN SODIUM 5000 UNITS: 5000 INJECTION INTRAVENOUS; SUBCUTANEOUS at 05:59

## 2020-05-31 RX ADMIN — GABAPENTIN 600 MG: 300 CAPSULE ORAL at 15:37

## 2020-05-31 RX ADMIN — ROPINIROLE 0.5 MG: 1 TABLET, FILM COATED ORAL at 08:37

## 2020-05-31 RX ADMIN — HEPARIN SODIUM 5000 UNITS: 5000 INJECTION INTRAVENOUS; SUBCUTANEOUS at 21:04

## 2020-05-31 RX ADMIN — SERTRALINE HYDROCHLORIDE 100 MG: 100 TABLET ORAL at 08:38

## 2020-05-31 RX ADMIN — METHADONE HYDROCHLORIDE 90 MG: 10 TABLET ORAL at 08:37

## 2020-05-31 RX ADMIN — ATORVASTATIN CALCIUM 40 MG: 40 TABLET, FILM COATED ORAL at 17:08

## 2020-05-31 RX ADMIN — ROPINIROLE 0.5 MG: 1 TABLET, FILM COATED ORAL at 17:08

## 2020-05-31 RX ADMIN — DULOXETINE HYDROCHLORIDE 60 MG: 60 CAPSULE, DELAYED RELEASE ORAL at 08:38

## 2020-05-31 RX ADMIN — LAMOTRIGINE 150 MG: 100 TABLET ORAL at 08:38

## 2020-05-31 RX ADMIN — INSULIN GLARGINE 20 UNITS: 100 INJECTION, SOLUTION SUBCUTANEOUS at 08:38

## 2020-05-31 RX ADMIN — HYDROXYZINE HYDROCHLORIDE 50 MG: 25 TABLET, FILM COATED ORAL at 21:04

## 2020-05-31 RX ADMIN — INSULIN LISPRO 3 UNITS: 100 INJECTION, SOLUTION INTRAVENOUS; SUBCUTANEOUS at 08:38

## 2020-05-31 RX ADMIN — VANCOMYCIN HYDROCHLORIDE 1000 MG: 1 INJECTION, SOLUTION INTRAVENOUS at 02:00

## 2020-05-31 RX ADMIN — CLONAZEPAM 1 MG: 1 TABLET ORAL at 15:37

## 2020-05-31 RX ADMIN — CLONAZEPAM 1 MG: 1 TABLET ORAL at 08:38

## 2020-05-31 RX ADMIN — LISINOPRIL 5 MG: 5 TABLET ORAL at 08:38

## 2020-05-31 RX ADMIN — INSULIN LISPRO 5 UNITS: 100 INJECTION, SOLUTION INTRAVENOUS; SUBCUTANEOUS at 21:05

## 2020-05-31 RX ADMIN — METRONIDAZOLE 500 MG: 500 TABLET ORAL at 15:37

## 2020-05-31 RX ADMIN — GABAPENTIN 600 MG: 300 CAPSULE ORAL at 21:05

## 2020-05-31 RX ADMIN — GABAPENTIN 600 MG: 300 CAPSULE ORAL at 08:37

## 2020-05-31 RX ADMIN — METRONIDAZOLE 500 MG: 500 TABLET ORAL at 21:05

## 2020-05-31 RX ADMIN — HEPARIN SODIUM 5000 UNITS: 5000 INJECTION INTRAVENOUS; SUBCUTANEOUS at 15:37

## 2020-05-31 RX ADMIN — VANCOMYCIN HYDROCHLORIDE 1000 MG: 1 INJECTION, SOLUTION INTRAVENOUS at 15:37

## 2020-06-01 LAB
ANION GAP SERPL CALCULATED.3IONS-SCNC: 5 MMOL/L (ref 4–13)
BACTERIA WND AEROBE CULT: ABNORMAL
BASOPHILS # BLD AUTO: 0.03 THOUSANDS/ΜL (ref 0–0.1)
BASOPHILS NFR BLD AUTO: 1 % (ref 0–1)
BUN SERPL-MCNC: 14 MG/DL (ref 5–25)
CALCIUM SERPL-MCNC: 9.2 MG/DL (ref 8.3–10.1)
CHLORIDE SERPL-SCNC: 102 MMOL/L (ref 100–108)
CO2 SERPL-SCNC: 30 MMOL/L (ref 21–32)
CREAT SERPL-MCNC: 0.95 MG/DL (ref 0.6–1.3)
EOSINOPHIL # BLD AUTO: 0.12 THOUSAND/ΜL (ref 0–0.61)
EOSINOPHIL NFR BLD AUTO: 3 % (ref 0–6)
ERYTHROCYTE [DISTWIDTH] IN BLOOD BY AUTOMATED COUNT: 14.6 % (ref 11.6–15.1)
GFR SERPL CREATININE-BSD FRML MDRD: 64 ML/MIN/1.73SQ M
GLUCOSE SERPL-MCNC: 146 MG/DL (ref 65–140)
GLUCOSE SERPL-MCNC: 174 MG/DL (ref 65–140)
GLUCOSE SERPL-MCNC: 212 MG/DL (ref 65–140)
GLUCOSE SERPL-MCNC: 215 MG/DL (ref 65–140)
GLUCOSE SERPL-MCNC: 221 MG/DL (ref 65–140)
GLUCOSE SERPL-MCNC: 228 MG/DL (ref 65–140)
GLUCOSE SERPL-MCNC: 249 MG/DL (ref 65–140)
GRAM STN SPEC: ABNORMAL
GRAM STN SPEC: ABNORMAL
HCT VFR BLD AUTO: 29.5 % (ref 34.8–46.1)
HGB BLD-MCNC: 9.4 G/DL (ref 11.5–15.4)
IMM GRANULOCYTES # BLD AUTO: 0.02 THOUSAND/UL (ref 0–0.2)
IMM GRANULOCYTES NFR BLD AUTO: 1 % (ref 0–2)
LYMPHOCYTES # BLD AUTO: 1.54 THOUSANDS/ΜL (ref 0.6–4.47)
LYMPHOCYTES NFR BLD AUTO: 37 % (ref 14–44)
MCH RBC QN AUTO: 28.6 PG (ref 26.8–34.3)
MCHC RBC AUTO-ENTMCNC: 31.9 G/DL (ref 31.4–37.4)
MCV RBC AUTO: 90 FL (ref 82–98)
MONOCYTES # BLD AUTO: 0.37 THOUSAND/ΜL (ref 0.17–1.22)
MONOCYTES NFR BLD AUTO: 9 % (ref 4–12)
NEUTROPHILS # BLD AUTO: 2.05 THOUSANDS/ΜL (ref 1.85–7.62)
NEUTS SEG NFR BLD AUTO: 49 % (ref 43–75)
NRBC BLD AUTO-RTO: 0 /100 WBCS
PLATELET # BLD AUTO: 244 THOUSANDS/UL (ref 149–390)
PMV BLD AUTO: 9.4 FL (ref 8.9–12.7)
POTASSIUM SERPL-SCNC: 4 MMOL/L (ref 3.5–5.3)
RBC # BLD AUTO: 3.29 MILLION/UL (ref 3.81–5.12)
SODIUM SERPL-SCNC: 137 MMOL/L (ref 136–145)
VANCOMYCIN TROUGH SERPL-MCNC: 18.3 UG/ML (ref 10–20)
WBC # BLD AUTO: 4.13 THOUSAND/UL (ref 4.31–10.16)

## 2020-06-01 PROCEDURE — 99232 SBSQ HOSP IP/OBS MODERATE 35: CPT | Performed by: INTERNAL MEDICINE

## 2020-06-01 PROCEDURE — 82948 REAGENT STRIP/BLOOD GLUCOSE: CPT

## 2020-06-01 PROCEDURE — 80048 BASIC METABOLIC PNL TOTAL CA: CPT | Performed by: INTERNAL MEDICINE

## 2020-06-01 PROCEDURE — 97530 THERAPEUTIC ACTIVITIES: CPT

## 2020-06-01 PROCEDURE — 80202 ASSAY OF VANCOMYCIN: CPT | Performed by: INTERNAL MEDICINE

## 2020-06-01 PROCEDURE — 97110 THERAPEUTIC EXERCISES: CPT

## 2020-06-01 PROCEDURE — 99232 SBSQ HOSP IP/OBS MODERATE 35: CPT | Performed by: PODIATRIST

## 2020-06-01 PROCEDURE — 97116 GAIT TRAINING THERAPY: CPT

## 2020-06-01 PROCEDURE — 85025 COMPLETE CBC W/AUTO DIFF WBC: CPT | Performed by: INTERNAL MEDICINE

## 2020-06-01 RX ORDER — INSULIN GLARGINE 100 [IU]/ML
15 INJECTION, SOLUTION SUBCUTANEOUS EVERY 12 HOURS SCHEDULED
Status: DISCONTINUED | OUTPATIENT
Start: 2020-06-01 | End: 2020-06-02

## 2020-06-01 RX ORDER — CEPHALEXIN 500 MG/1
500 CAPSULE ORAL EVERY 6 HOURS SCHEDULED
Status: DISCONTINUED | OUTPATIENT
Start: 2020-06-01 | End: 2020-06-04 | Stop reason: HOSPADM

## 2020-06-01 RX ADMIN — INSULIN GLARGINE 15 UNITS: 100 INJECTION, SOLUTION SUBCUTANEOUS at 22:25

## 2020-06-01 RX ADMIN — GABAPENTIN 600 MG: 300 CAPSULE ORAL at 22:26

## 2020-06-01 RX ADMIN — VANCOMYCIN HYDROCHLORIDE 1000 MG: 1 INJECTION, SOLUTION INTRAVENOUS at 02:06

## 2020-06-01 RX ADMIN — DULOXETINE HYDROCHLORIDE 60 MG: 60 CAPSULE, DELAYED RELEASE ORAL at 08:11

## 2020-06-01 RX ADMIN — INSULIN LISPRO 3 UNITS: 100 INJECTION, SOLUTION INTRAVENOUS; SUBCUTANEOUS at 22:25

## 2020-06-01 RX ADMIN — CLONAZEPAM 1 MG: 1 TABLET ORAL at 17:56

## 2020-06-01 RX ADMIN — CLONAZEPAM 1 MG: 1 TABLET ORAL at 08:11

## 2020-06-01 RX ADMIN — HEPARIN SODIUM 5000 UNITS: 5000 INJECTION INTRAVENOUS; SUBCUTANEOUS at 06:31

## 2020-06-01 RX ADMIN — METRONIDAZOLE 500 MG: 500 TABLET ORAL at 13:03

## 2020-06-01 RX ADMIN — CEPHALEXIN 500 MG: 500 CAPSULE ORAL at 23:44

## 2020-06-01 RX ADMIN — GABAPENTIN 600 MG: 300 CAPSULE ORAL at 08:10

## 2020-06-01 RX ADMIN — LISINOPRIL 5 MG: 5 TABLET ORAL at 08:11

## 2020-06-01 RX ADMIN — INSULIN GLARGINE 20 UNITS: 100 INJECTION, SOLUTION SUBCUTANEOUS at 08:10

## 2020-06-01 RX ADMIN — SERTRALINE HYDROCHLORIDE 100 MG: 100 TABLET ORAL at 08:11

## 2020-06-01 RX ADMIN — ROPINIROLE 0.5 MG: 1 TABLET, FILM COATED ORAL at 08:11

## 2020-06-01 RX ADMIN — HYDROXYZINE HYDROCHLORIDE 50 MG: 25 TABLET, FILM COATED ORAL at 22:26

## 2020-06-01 RX ADMIN — LAMOTRIGINE 150 MG: 100 TABLET ORAL at 08:10

## 2020-06-01 RX ADMIN — METHADONE HYDROCHLORIDE 90 MG: 10 TABLET ORAL at 08:11

## 2020-06-01 RX ADMIN — CEPHALEXIN 500 MG: 500 CAPSULE ORAL at 17:56

## 2020-06-01 RX ADMIN — VANCOMYCIN HYDROCHLORIDE 1000 MG: 1 INJECTION, SOLUTION INTRAVENOUS at 13:03

## 2020-06-01 RX ADMIN — ATORVASTATIN CALCIUM 40 MG: 40 TABLET, FILM COATED ORAL at 17:56

## 2020-06-01 RX ADMIN — ROPINIROLE 0.5 MG: 1 TABLET, FILM COATED ORAL at 17:56

## 2020-06-01 RX ADMIN — HEPARIN SODIUM 5000 UNITS: 5000 INJECTION INTRAVENOUS; SUBCUTANEOUS at 13:03

## 2020-06-01 RX ADMIN — METRONIDAZOLE 500 MG: 500 TABLET ORAL at 06:31

## 2020-06-01 RX ADMIN — HEPARIN SODIUM 5000 UNITS: 5000 INJECTION INTRAVENOUS; SUBCUTANEOUS at 22:26

## 2020-06-01 RX ADMIN — GABAPENTIN 600 MG: 300 CAPSULE ORAL at 17:56

## 2020-06-02 LAB
ANION GAP SERPL CALCULATED.3IONS-SCNC: 5 MMOL/L (ref 4–13)
BACTERIA BLD CULT: NORMAL
BACTERIA BLD CULT: NORMAL
BASOPHILS # BLD AUTO: 0.03 THOUSANDS/ΜL (ref 0–0.1)
BASOPHILS NFR BLD AUTO: 1 % (ref 0–1)
BUN SERPL-MCNC: 10 MG/DL (ref 5–25)
CALCIUM SERPL-MCNC: 9.3 MG/DL (ref 8.3–10.1)
CHLORIDE SERPL-SCNC: 105 MMOL/L (ref 100–108)
CO2 SERPL-SCNC: 31 MMOL/L (ref 21–32)
CREAT SERPL-MCNC: 0.93 MG/DL (ref 0.6–1.3)
EOSINOPHIL # BLD AUTO: 0.15 THOUSAND/ΜL (ref 0–0.61)
EOSINOPHIL NFR BLD AUTO: 3 % (ref 0–6)
ERYTHROCYTE [DISTWIDTH] IN BLOOD BY AUTOMATED COUNT: 15 % (ref 11.6–15.1)
GFR SERPL CREATININE-BSD FRML MDRD: 66 ML/MIN/1.73SQ M
GLUCOSE SERPL-MCNC: 112 MG/DL (ref 65–140)
GLUCOSE SERPL-MCNC: 222 MG/DL (ref 65–140)
GLUCOSE SERPL-MCNC: 235 MG/DL (ref 65–140)
GLUCOSE SERPL-MCNC: 264 MG/DL (ref 65–140)
GLUCOSE SERPL-MCNC: 87 MG/DL (ref 65–140)
HCT VFR BLD AUTO: 31.7 % (ref 34.8–46.1)
HGB BLD-MCNC: 9.8 G/DL (ref 11.5–15.4)
IMM GRANULOCYTES # BLD AUTO: 0.05 THOUSAND/UL (ref 0–0.2)
IMM GRANULOCYTES NFR BLD AUTO: 1 % (ref 0–2)
LYMPHOCYTES # BLD AUTO: 1.81 THOUSANDS/ΜL (ref 0.6–4.47)
LYMPHOCYTES NFR BLD AUTO: 34 % (ref 14–44)
MCH RBC QN AUTO: 28.2 PG (ref 26.8–34.3)
MCHC RBC AUTO-ENTMCNC: 30.9 G/DL (ref 31.4–37.4)
MCV RBC AUTO: 91 FL (ref 82–98)
MONOCYTES # BLD AUTO: 0.43 THOUSAND/ΜL (ref 0.17–1.22)
MONOCYTES NFR BLD AUTO: 8 % (ref 4–12)
NEUTROPHILS # BLD AUTO: 2.85 THOUSANDS/ΜL (ref 1.85–7.62)
NEUTS SEG NFR BLD AUTO: 53 % (ref 43–75)
NRBC BLD AUTO-RTO: 0 /100 WBCS
PLATELET # BLD AUTO: 255 THOUSANDS/UL (ref 149–390)
PMV BLD AUTO: 9 FL (ref 8.9–12.7)
POTASSIUM SERPL-SCNC: 4.4 MMOL/L (ref 3.5–5.3)
RBC # BLD AUTO: 3.48 MILLION/UL (ref 3.81–5.12)
SODIUM SERPL-SCNC: 141 MMOL/L (ref 136–145)
WBC # BLD AUTO: 5.32 THOUSAND/UL (ref 4.31–10.16)

## 2020-06-02 PROCEDURE — 85025 COMPLETE CBC W/AUTO DIFF WBC: CPT | Performed by: INTERNAL MEDICINE

## 2020-06-02 PROCEDURE — 97530 THERAPEUTIC ACTIVITIES: CPT

## 2020-06-02 PROCEDURE — 99232 SBSQ HOSP IP/OBS MODERATE 35: CPT | Performed by: INTERNAL MEDICINE

## 2020-06-02 PROCEDURE — 80048 BASIC METABOLIC PNL TOTAL CA: CPT | Performed by: INTERNAL MEDICINE

## 2020-06-02 PROCEDURE — 97116 GAIT TRAINING THERAPY: CPT

## 2020-06-02 PROCEDURE — 82948 REAGENT STRIP/BLOOD GLUCOSE: CPT

## 2020-06-02 RX ORDER — INSULIN GLARGINE 100 [IU]/ML
20 INJECTION, SOLUTION SUBCUTANEOUS EVERY 12 HOURS SCHEDULED
Status: DISCONTINUED | OUTPATIENT
Start: 2020-06-02 | End: 2020-06-04 | Stop reason: HOSPADM

## 2020-06-02 RX ADMIN — INSULIN GLARGINE 20 UNITS: 100 INJECTION, SOLUTION SUBCUTANEOUS at 22:23

## 2020-06-02 RX ADMIN — ROPINIROLE 0.5 MG: 1 TABLET, FILM COATED ORAL at 09:00

## 2020-06-02 RX ADMIN — CEPHALEXIN 500 MG: 500 CAPSULE ORAL at 05:36

## 2020-06-02 RX ADMIN — LISINOPRIL 5 MG: 5 TABLET ORAL at 09:00

## 2020-06-02 RX ADMIN — HEPARIN SODIUM 5000 UNITS: 5000 INJECTION INTRAVENOUS; SUBCUTANEOUS at 22:23

## 2020-06-02 RX ADMIN — ATORVASTATIN CALCIUM 40 MG: 40 TABLET, FILM COATED ORAL at 17:13

## 2020-06-02 RX ADMIN — CLONAZEPAM 1 MG: 1 TABLET ORAL at 17:13

## 2020-06-02 RX ADMIN — ROPINIROLE 0.5 MG: 1 TABLET, FILM COATED ORAL at 17:13

## 2020-06-02 RX ADMIN — GABAPENTIN 600 MG: 300 CAPSULE ORAL at 17:13

## 2020-06-02 RX ADMIN — GABAPENTIN 600 MG: 300 CAPSULE ORAL at 09:00

## 2020-06-02 RX ADMIN — INSULIN LISPRO 2 UNITS: 100 INJECTION, SOLUTION INTRAVENOUS; SUBCUTANEOUS at 22:23

## 2020-06-02 RX ADMIN — LAMOTRIGINE 150 MG: 100 TABLET ORAL at 09:00

## 2020-06-02 RX ADMIN — GABAPENTIN 600 MG: 300 CAPSULE ORAL at 22:23

## 2020-06-02 RX ADMIN — METHADONE HYDROCHLORIDE 90 MG: 10 TABLET ORAL at 08:59

## 2020-06-02 RX ADMIN — HEPARIN SODIUM 5000 UNITS: 5000 INJECTION INTRAVENOUS; SUBCUTANEOUS at 05:36

## 2020-06-02 RX ADMIN — HYDROXYZINE HYDROCHLORIDE 50 MG: 25 TABLET, FILM COATED ORAL at 22:23

## 2020-06-02 RX ADMIN — CEPHALEXIN 500 MG: 500 CAPSULE ORAL at 11:15

## 2020-06-02 RX ADMIN — CLONAZEPAM 1 MG: 1 TABLET ORAL at 09:00

## 2020-06-02 RX ADMIN — SERTRALINE HYDROCHLORIDE 100 MG: 100 TABLET ORAL at 09:00

## 2020-06-02 RX ADMIN — INSULIN GLARGINE 15 UNITS: 100 INJECTION, SOLUTION SUBCUTANEOUS at 09:00

## 2020-06-02 RX ADMIN — HEPARIN SODIUM 5000 UNITS: 5000 INJECTION INTRAVENOUS; SUBCUTANEOUS at 13:45

## 2020-06-02 RX ADMIN — DULOXETINE HYDROCHLORIDE 60 MG: 60 CAPSULE, DELAYED RELEASE ORAL at 09:00

## 2020-06-02 RX ADMIN — CEPHALEXIN 500 MG: 500 CAPSULE ORAL at 17:13

## 2020-06-03 LAB
GLUCOSE SERPL-MCNC: 156 MG/DL (ref 65–140)
GLUCOSE SERPL-MCNC: 265 MG/DL (ref 65–140)
GLUCOSE SERPL-MCNC: 332 MG/DL (ref 65–140)
GLUCOSE SERPL-MCNC: 372 MG/DL (ref 65–140)

## 2020-06-03 PROCEDURE — 99232 SBSQ HOSP IP/OBS MODERATE 35: CPT | Performed by: INTERNAL MEDICINE

## 2020-06-03 PROCEDURE — 82948 REAGENT STRIP/BLOOD GLUCOSE: CPT

## 2020-06-03 PROCEDURE — 97535 SELF CARE MNGMENT TRAINING: CPT

## 2020-06-03 PROCEDURE — 99024 POSTOP FOLLOW-UP VISIT: CPT | Performed by: PODIATRIST

## 2020-06-03 PROCEDURE — 97530 THERAPEUTIC ACTIVITIES: CPT

## 2020-06-03 PROCEDURE — 97116 GAIT TRAINING THERAPY: CPT

## 2020-06-03 PROCEDURE — 97110 THERAPEUTIC EXERCISES: CPT

## 2020-06-03 RX ORDER — CEPHALEXIN 500 MG/1
500 CAPSULE ORAL EVERY 6 HOURS SCHEDULED
Qty: 28 CAPSULE | Refills: 0 | Status: SHIPPED | OUTPATIENT
Start: 2020-06-03 | End: 2020-06-10

## 2020-06-03 RX ADMIN — ROPINIROLE 0.5 MG: 1 TABLET, FILM COATED ORAL at 17:02

## 2020-06-03 RX ADMIN — SERTRALINE HYDROCHLORIDE 100 MG: 100 TABLET ORAL at 08:16

## 2020-06-03 RX ADMIN — HYDROXYZINE HYDROCHLORIDE 50 MG: 25 TABLET, FILM COATED ORAL at 21:44

## 2020-06-03 RX ADMIN — GABAPENTIN 600 MG: 300 CAPSULE ORAL at 08:16

## 2020-06-03 RX ADMIN — INSULIN GLARGINE 20 UNITS: 100 INJECTION, SOLUTION SUBCUTANEOUS at 21:44

## 2020-06-03 RX ADMIN — CEPHALEXIN 500 MG: 500 CAPSULE ORAL at 00:34

## 2020-06-03 RX ADMIN — CEPHALEXIN 500 MG: 500 CAPSULE ORAL at 23:50

## 2020-06-03 RX ADMIN — ATORVASTATIN CALCIUM 40 MG: 40 TABLET, FILM COATED ORAL at 17:00

## 2020-06-03 RX ADMIN — DULOXETINE HYDROCHLORIDE 60 MG: 60 CAPSULE, DELAYED RELEASE ORAL at 08:16

## 2020-06-03 RX ADMIN — LAMOTRIGINE 150 MG: 100 TABLET ORAL at 08:16

## 2020-06-03 RX ADMIN — HEPARIN SODIUM 5000 UNITS: 5000 INJECTION INTRAVENOUS; SUBCUTANEOUS at 05:50

## 2020-06-03 RX ADMIN — INSULIN GLARGINE 20 UNITS: 100 INJECTION, SOLUTION SUBCUTANEOUS at 08:58

## 2020-06-03 RX ADMIN — METHADONE HYDROCHLORIDE 90 MG: 10 TABLET ORAL at 08:16

## 2020-06-03 RX ADMIN — INSULIN LISPRO 5 UNITS: 100 INJECTION, SOLUTION INTRAVENOUS; SUBCUTANEOUS at 21:44

## 2020-06-03 RX ADMIN — CLONAZEPAM 1 MG: 1 TABLET ORAL at 17:00

## 2020-06-03 RX ADMIN — CEPHALEXIN 500 MG: 500 CAPSULE ORAL at 17:00

## 2020-06-03 RX ADMIN — GABAPENTIN 600 MG: 300 CAPSULE ORAL at 21:43

## 2020-06-03 RX ADMIN — HEPARIN SODIUM 5000 UNITS: 5000 INJECTION INTRAVENOUS; SUBCUTANEOUS at 21:43

## 2020-06-03 RX ADMIN — CLONAZEPAM 1 MG: 1 TABLET ORAL at 08:16

## 2020-06-03 RX ADMIN — ROPINIROLE 0.5 MG: 1 TABLET, FILM COATED ORAL at 08:16

## 2020-06-03 RX ADMIN — GABAPENTIN 600 MG: 300 CAPSULE ORAL at 17:00

## 2020-06-03 RX ADMIN — LISINOPRIL 5 MG: 5 TABLET ORAL at 08:16

## 2020-06-03 RX ADMIN — CEPHALEXIN 500 MG: 500 CAPSULE ORAL at 13:38

## 2020-06-03 RX ADMIN — CEPHALEXIN 500 MG: 500 CAPSULE ORAL at 05:51

## 2020-06-04 VITALS
OXYGEN SATURATION: 96 % | HEART RATE: 68 BPM | RESPIRATION RATE: 18 BRPM | DIASTOLIC BLOOD PRESSURE: 59 MMHG | TEMPERATURE: 97.9 F | SYSTOLIC BLOOD PRESSURE: 131 MMHG

## 2020-06-04 LAB
GLUCOSE SERPL-MCNC: 211 MG/DL (ref 65–140)
GLUCOSE SERPL-MCNC: 302 MG/DL (ref 65–140)

## 2020-06-04 PROCEDURE — 99239 HOSP IP/OBS DSCHRG MGMT >30: CPT | Performed by: INTERNAL MEDICINE

## 2020-06-04 PROCEDURE — 82948 REAGENT STRIP/BLOOD GLUCOSE: CPT

## 2020-06-04 RX ADMIN — METHADONE HYDROCHLORIDE 90 MG: 10 TABLET ORAL at 08:51

## 2020-06-04 RX ADMIN — INSULIN GLARGINE 20 UNITS: 100 INJECTION, SOLUTION SUBCUTANEOUS at 08:11

## 2020-06-04 RX ADMIN — SERTRALINE HYDROCHLORIDE 100 MG: 100 TABLET ORAL at 08:51

## 2020-06-04 RX ADMIN — GABAPENTIN 600 MG: 300 CAPSULE ORAL at 08:51

## 2020-06-04 RX ADMIN — LISINOPRIL 5 MG: 5 TABLET ORAL at 08:51

## 2020-06-04 RX ADMIN — CEPHALEXIN 500 MG: 500 CAPSULE ORAL at 05:08

## 2020-06-04 RX ADMIN — CEPHALEXIN 500 MG: 500 CAPSULE ORAL at 11:09

## 2020-06-04 RX ADMIN — DULOXETINE HYDROCHLORIDE 60 MG: 60 CAPSULE, DELAYED RELEASE ORAL at 08:52

## 2020-06-04 RX ADMIN — HEPARIN SODIUM 5000 UNITS: 5000 INJECTION INTRAVENOUS; SUBCUTANEOUS at 05:08

## 2020-06-04 RX ADMIN — LAMOTRIGINE 150 MG: 100 TABLET ORAL at 08:51

## 2020-06-04 RX ADMIN — ROPINIROLE 0.5 MG: 1 TABLET, FILM COATED ORAL at 08:52

## 2020-06-04 RX ADMIN — CLONAZEPAM 1 MG: 1 TABLET ORAL at 08:52

## 2020-10-06 ENCOUNTER — HOSPITAL ENCOUNTER (EMERGENCY)
Facility: HOSPITAL | Age: 65
Discharge: LEFT BEFORE DISCHARGE | End: 2020-10-06
Attending: EMERGENCY MEDICINE | Admitting: EMERGENCY MEDICINE
Payer: COMMERCIAL

## 2020-10-06 ENCOUNTER — APPOINTMENT (EMERGENCY)
Dept: RADIOLOGY | Facility: HOSPITAL | Age: 65
End: 2020-10-06
Payer: COMMERCIAL

## 2020-10-06 VITALS
HEART RATE: 62 BPM | OXYGEN SATURATION: 100 % | DIASTOLIC BLOOD PRESSURE: 81 MMHG | SYSTOLIC BLOOD PRESSURE: 197 MMHG | TEMPERATURE: 97.6 F | RESPIRATION RATE: 18 BRPM

## 2020-10-06 DIAGNOSIS — L03.115 CELLULITIS OF RIGHT FOOT: ICD-10-CM

## 2020-10-06 DIAGNOSIS — L03.031 CELLULITIS OF RIGHT TOE: ICD-10-CM

## 2020-10-06 DIAGNOSIS — R73.9 HYPERGLYCEMIA: ICD-10-CM

## 2020-10-06 DIAGNOSIS — E87.1 HYPONATREMIA: ICD-10-CM

## 2020-10-06 DIAGNOSIS — I10 HIGH BLOOD PRESSURE: ICD-10-CM

## 2020-10-06 DIAGNOSIS — S91.104A OPEN WOUND OF FOURTH TOE OF RIGHT FOOT, INITIAL ENCOUNTER: Primary | ICD-10-CM

## 2020-10-06 LAB
ALBUMIN SERPL BCP-MCNC: 3.5 G/DL (ref 3.5–5)
ALP SERPL-CCNC: 179 U/L (ref 46–116)
ALT SERPL W P-5'-P-CCNC: 18 U/L (ref 12–78)
ANION GAP SERPL CALCULATED.3IONS-SCNC: 1 MMOL/L (ref 4–13)
ANION GAP SERPL CALCULATED.3IONS-SCNC: 12 MMOL/L (ref 4–13)
APTT PPP: 24 SECONDS (ref 23–37)
AST SERPL W P-5'-P-CCNC: 16 U/L (ref 5–45)
BASOPHILS # BLD AUTO: 0.03 THOUSANDS/ΜL (ref 0–0.1)
BASOPHILS NFR BLD AUTO: 1 % (ref 0–1)
BILIRUB SERPL-MCNC: 0.51 MG/DL (ref 0.2–1)
BUN SERPL-MCNC: 19 MG/DL (ref 5–25)
BUN SERPL-MCNC: 20 MG/DL (ref 5–25)
CALCIUM SERPL-MCNC: 8.6 MG/DL (ref 8.3–10.1)
CALCIUM SERPL-MCNC: 9.3 MG/DL (ref 8.3–10.1)
CHLORIDE SERPL-SCNC: 100 MMOL/L (ref 100–108)
CHLORIDE SERPL-SCNC: 94 MMOL/L (ref 100–108)
CO2 SERPL-SCNC: 22 MMOL/L (ref 21–32)
CO2 SERPL-SCNC: 31 MMOL/L (ref 21–32)
CREAT SERPL-MCNC: 0.89 MG/DL (ref 0.6–1.3)
CREAT SERPL-MCNC: 1.06 MG/DL (ref 0.6–1.3)
CRP SERPL QL: 13.5 MG/L
EOSINOPHIL # BLD AUTO: 0.09 THOUSAND/ΜL (ref 0–0.61)
EOSINOPHIL NFR BLD AUTO: 2 % (ref 0–6)
ERYTHROCYTE [DISTWIDTH] IN BLOOD BY AUTOMATED COUNT: 14.7 % (ref 11.6–15.1)
ERYTHROCYTE [SEDIMENTATION RATE] IN BLOOD: 35 MM/HOUR (ref 0–29)
GFR SERPL CREATININE-BSD FRML MDRD: 56 ML/MIN/1.73SQ M
GFR SERPL CREATININE-BSD FRML MDRD: 69 ML/MIN/1.73SQ M
GLUCOSE SERPL-MCNC: 463 MG/DL (ref 65–140)
GLUCOSE SERPL-MCNC: 468 MG/DL (ref 65–140)
GLUCOSE SERPL-MCNC: 593 MG/DL (ref 65–140)
HCT VFR BLD AUTO: 35.9 % (ref 34.8–46.1)
HGB BLD-MCNC: 11.6 G/DL (ref 11.5–15.4)
IMM GRANULOCYTES # BLD AUTO: 0.02 THOUSAND/UL (ref 0–0.2)
IMM GRANULOCYTES NFR BLD AUTO: 0 % (ref 0–2)
INR PPP: 1.04 (ref 0.84–1.19)
LYMPHOCYTES # BLD AUTO: 1.87 THOUSANDS/ΜL (ref 0.6–4.47)
LYMPHOCYTES NFR BLD AUTO: 30 % (ref 14–44)
MCH RBC QN AUTO: 30.3 PG (ref 26.8–34.3)
MCHC RBC AUTO-ENTMCNC: 32.3 G/DL (ref 31.4–37.4)
MCV RBC AUTO: 94 FL (ref 82–98)
MONOCYTES # BLD AUTO: 0.29 THOUSAND/ΜL (ref 0.17–1.22)
MONOCYTES NFR BLD AUTO: 5 % (ref 4–12)
NEUTROPHILS # BLD AUTO: 3.86 THOUSANDS/ΜL (ref 1.85–7.62)
NEUTS SEG NFR BLD AUTO: 62 % (ref 43–75)
NRBC BLD AUTO-RTO: 0 /100 WBCS
PLATELET # BLD AUTO: 256 THOUSANDS/UL (ref 149–390)
PMV BLD AUTO: 9.7 FL (ref 8.9–12.7)
POTASSIUM SERPL-SCNC: 3.6 MMOL/L (ref 3.5–5.3)
POTASSIUM SERPL-SCNC: 3.7 MMOL/L (ref 3.5–5.3)
PROT SERPL-MCNC: 8.1 G/DL (ref 6.4–8.2)
PROTHROMBIN TIME: 13.4 SECONDS (ref 11.6–14.5)
RBC # BLD AUTO: 3.83 MILLION/UL (ref 3.81–5.12)
SODIUM SERPL-SCNC: 128 MMOL/L (ref 136–145)
SODIUM SERPL-SCNC: 132 MMOL/L (ref 136–145)
WBC # BLD AUTO: 6.16 THOUSAND/UL (ref 4.31–10.16)

## 2020-10-06 PROCEDURE — 99284 EMERGENCY DEPT VISIT MOD MDM: CPT | Performed by: PHYSICIAN ASSISTANT

## 2020-10-06 PROCEDURE — 99214 OFFICE O/P EST MOD 30 MIN: CPT | Performed by: PODIATRIST

## 2020-10-06 PROCEDURE — 96372 THER/PROPH/DIAG INJ SC/IM: CPT

## 2020-10-06 PROCEDURE — 36415 COLL VENOUS BLD VENIPUNCTURE: CPT | Performed by: PHYSICIAN ASSISTANT

## 2020-10-06 PROCEDURE — 85610 PROTHROMBIN TIME: CPT | Performed by: PHYSICIAN ASSISTANT

## 2020-10-06 PROCEDURE — 85652 RBC SED RATE AUTOMATED: CPT | Performed by: PHYSICIAN ASSISTANT

## 2020-10-06 PROCEDURE — 96360 HYDRATION IV INFUSION INIT: CPT

## 2020-10-06 PROCEDURE — 73630 X-RAY EXAM OF FOOT: CPT

## 2020-10-06 PROCEDURE — 73610 X-RAY EXAM OF ANKLE: CPT

## 2020-10-06 PROCEDURE — 85730 THROMBOPLASTIN TIME PARTIAL: CPT | Performed by: PHYSICIAN ASSISTANT

## 2020-10-06 PROCEDURE — 87040 BLOOD CULTURE FOR BACTERIA: CPT | Performed by: PHYSICIAN ASSISTANT

## 2020-10-06 PROCEDURE — 96361 HYDRATE IV INFUSION ADD-ON: CPT

## 2020-10-06 PROCEDURE — 86140 C-REACTIVE PROTEIN: CPT | Performed by: PHYSICIAN ASSISTANT

## 2020-10-06 PROCEDURE — 99284 EMERGENCY DEPT VISIT MOD MDM: CPT

## 2020-10-06 PROCEDURE — 85025 COMPLETE CBC W/AUTO DIFF WBC: CPT | Performed by: PHYSICIAN ASSISTANT

## 2020-10-06 PROCEDURE — 80048 BASIC METABOLIC PNL TOTAL CA: CPT | Performed by: PHYSICIAN ASSISTANT

## 2020-10-06 PROCEDURE — 80053 COMPREHEN METABOLIC PANEL: CPT | Performed by: PHYSICIAN ASSISTANT

## 2020-10-06 PROCEDURE — 82948 REAGENT STRIP/BLOOD GLUCOSE: CPT

## 2020-10-06 RX ORDER — LISINOPRIL 5 MG/1
10 TABLET ORAL ONCE
Status: COMPLETED | OUTPATIENT
Start: 2020-10-06 | End: 2020-10-06

## 2020-10-06 RX ORDER — OXYCODONE HYDROCHLORIDE AND ACETAMINOPHEN 5; 325 MG/1; MG/1
1 TABLET ORAL ONCE
Status: COMPLETED | OUTPATIENT
Start: 2020-10-06 | End: 2020-10-06

## 2020-10-06 RX ORDER — DOXYCYCLINE 100 MG/1
100 TABLET ORAL 2 TIMES DAILY
Qty: 14 TABLET | Refills: 0 | Status: SHIPPED | OUTPATIENT
Start: 2020-10-06 | End: 2020-10-13

## 2020-10-06 RX ADMIN — SODIUM CHLORIDE 1000 ML: 0.9 INJECTION, SOLUTION INTRAVENOUS at 09:45

## 2020-10-06 RX ADMIN — INSULIN HUMAN 10 UNITS: 100 INJECTION, SOLUTION PARENTERAL at 10:29

## 2020-10-06 RX ADMIN — LISINOPRIL 10 MG: 5 TABLET ORAL at 12:24

## 2020-10-06 RX ADMIN — OXYCODONE HYDROCHLORIDE AND ACETAMINOPHEN 1 TABLET: 5; 325 TABLET ORAL at 10:25

## 2020-10-11 LAB
BACTERIA BLD CULT: NORMAL
BACTERIA BLD CULT: NORMAL

## 2021-04-12 ENCOUNTER — APPOINTMENT (EMERGENCY)
Dept: RADIOLOGY | Facility: HOSPITAL | Age: 66
DRG: 240 | End: 2021-04-12
Payer: COMMERCIAL

## 2021-04-12 ENCOUNTER — HOSPITAL ENCOUNTER (INPATIENT)
Facility: HOSPITAL | Age: 66
LOS: 4 days | DRG: 240 | End: 2021-04-17
Attending: EMERGENCY MEDICINE | Admitting: FAMILY MEDICINE
Payer: COMMERCIAL

## 2021-04-12 DIAGNOSIS — G89.29 CHRONIC PAIN: ICD-10-CM

## 2021-04-12 DIAGNOSIS — F11.20 UNCOMPLICATED OPIOID DEPENDENCE (HCC): ICD-10-CM

## 2021-04-12 DIAGNOSIS — F41.9 ANXIETY: ICD-10-CM

## 2021-04-12 DIAGNOSIS — H02.402 PTOSIS OF LEFT EYELID: ICD-10-CM

## 2021-04-12 DIAGNOSIS — M86.9 OSTEOMYELITIS OF RIGHT FOOT (HCC): ICD-10-CM

## 2021-04-12 DIAGNOSIS — N17.9 AKI (ACUTE KIDNEY INJURY) (HCC): ICD-10-CM

## 2021-04-12 DIAGNOSIS — Z98.890 POST-OPERATIVE STATE: ICD-10-CM

## 2021-04-12 DIAGNOSIS — M86.9 OSTEOMYELITIS OF TOE (HCC): Primary | ICD-10-CM

## 2021-04-12 LAB
BASOPHILS # BLD AUTO: 0.06 THOUSANDS/ΜL (ref 0–0.1)
BASOPHILS NFR BLD AUTO: 1 % (ref 0–1)
EOSINOPHIL # BLD AUTO: 0.11 THOUSAND/ΜL (ref 0–0.61)
EOSINOPHIL NFR BLD AUTO: 1 % (ref 0–6)
ERYTHROCYTE [DISTWIDTH] IN BLOOD BY AUTOMATED COUNT: 12.8 % (ref 11.6–15.1)
ERYTHROCYTE [SEDIMENTATION RATE] IN BLOOD: 68 MM/HOUR (ref 0–29)
HCT VFR BLD AUTO: 41.3 % (ref 34.8–46.1)
HGB BLD-MCNC: 13.9 G/DL (ref 11.5–15.4)
IMM GRANULOCYTES # BLD AUTO: 0.06 THOUSAND/UL (ref 0–0.2)
IMM GRANULOCYTES NFR BLD AUTO: 1 % (ref 0–2)
LYMPHOCYTES # BLD AUTO: 2.94 THOUSANDS/ΜL (ref 0.6–4.47)
LYMPHOCYTES NFR BLD AUTO: 26 % (ref 14–44)
MCH RBC QN AUTO: 29 PG (ref 26.8–34.3)
MCHC RBC AUTO-ENTMCNC: 33.7 G/DL (ref 31.4–37.4)
MCV RBC AUTO: 86 FL (ref 82–98)
MONOCYTES # BLD AUTO: 0.58 THOUSAND/ΜL (ref 0.17–1.22)
MONOCYTES NFR BLD AUTO: 5 % (ref 4–12)
NEUTROPHILS # BLD AUTO: 7.54 THOUSANDS/ΜL (ref 1.85–7.62)
NEUTS SEG NFR BLD AUTO: 66 % (ref 43–75)
NRBC BLD AUTO-RTO: 0 /100 WBCS
PLATELET # BLD AUTO: 297 THOUSANDS/UL (ref 149–390)
PMV BLD AUTO: 10.2 FL (ref 8.9–12.7)
RBC # BLD AUTO: 4.79 MILLION/UL (ref 3.81–5.12)
WBC # BLD AUTO: 11.29 THOUSAND/UL (ref 4.31–10.16)

## 2021-04-12 PROCEDURE — 87040 BLOOD CULTURE FOR BACTERIA: CPT | Performed by: EMERGENCY MEDICINE

## 2021-04-12 PROCEDURE — 99285 EMERGENCY DEPT VISIT HI MDM: CPT | Performed by: EMERGENCY MEDICINE

## 2021-04-12 PROCEDURE — 99285 EMERGENCY DEPT VISIT HI MDM: CPT

## 2021-04-12 PROCEDURE — 73630 X-RAY EXAM OF FOOT: CPT

## 2021-04-12 PROCEDURE — 86141 C-REACTIVE PROTEIN HS: CPT | Performed by: EMERGENCY MEDICINE

## 2021-04-12 PROCEDURE — 85652 RBC SED RATE AUTOMATED: CPT | Performed by: EMERGENCY MEDICINE

## 2021-04-12 PROCEDURE — 36415 COLL VENOUS BLD VENIPUNCTURE: CPT | Performed by: EMERGENCY MEDICINE

## 2021-04-12 PROCEDURE — 85025 COMPLETE CBC W/AUTO DIFF WBC: CPT | Performed by: EMERGENCY MEDICINE

## 2021-04-12 PROCEDURE — 80048 BASIC METABOLIC PNL TOTAL CA: CPT | Performed by: EMERGENCY MEDICINE

## 2021-04-12 RX ORDER — CEFAZOLIN SODIUM 2 G/50ML
2000 SOLUTION INTRAVENOUS ONCE
Status: COMPLETED | OUTPATIENT
Start: 2021-04-12 | End: 2021-04-13

## 2021-04-12 RX ORDER — MELOXICAM 15 MG/1
15 TABLET ORAL DAILY
COMMUNITY
Start: 2021-03-10 | End: 2021-04-17 | Stop reason: HOSPADM

## 2021-04-13 ENCOUNTER — APPOINTMENT (INPATIENT)
Dept: RADIOLOGY | Facility: HOSPITAL | Age: 66
DRG: 240 | End: 2021-04-13
Payer: COMMERCIAL

## 2021-04-13 ENCOUNTER — APPOINTMENT (INPATIENT)
Dept: CT IMAGING | Facility: HOSPITAL | Age: 66
DRG: 240 | End: 2021-04-13
Payer: COMMERCIAL

## 2021-04-13 ENCOUNTER — ANESTHESIA EVENT (INPATIENT)
Dept: PERIOP | Facility: HOSPITAL | Age: 66
DRG: 240 | End: 2021-04-13
Payer: COMMERCIAL

## 2021-04-13 ENCOUNTER — ANESTHESIA (INPATIENT)
Dept: PERIOP | Facility: HOSPITAL | Age: 66
DRG: 240 | End: 2021-04-13
Payer: COMMERCIAL

## 2021-04-13 PROBLEM — H49.02 3RD CRANIAL NERVE PALSY, LEFT: Status: ACTIVE | Noted: 2021-04-13

## 2021-04-13 PROBLEM — H02.402 PTOSIS OF LEFT EYELID: Status: ACTIVE | Noted: 2021-04-13

## 2021-04-13 PROBLEM — N17.9 AKI (ACUTE KIDNEY INJURY) (HCC): Status: ACTIVE | Noted: 2021-04-13

## 2021-04-13 LAB
ANION GAP SERPL CALCULATED.3IONS-SCNC: 12 MMOL/L (ref 4–13)
ANION GAP SERPL CALCULATED.3IONS-SCNC: 7 MMOL/L (ref 4–13)
ATRIAL RATE: 156 BPM
ATRIAL RATE: 178 BPM
BASOPHILS # BLD AUTO: 0.04 THOUSANDS/ΜL (ref 0–0.1)
BASOPHILS NFR BLD AUTO: 1 % (ref 0–1)
BUN SERPL-MCNC: 23 MG/DL (ref 5–25)
BUN SERPL-MCNC: 26 MG/DL (ref 5–25)
CALCIUM SERPL-MCNC: 11 MG/DL (ref 8.3–10.1)
CALCIUM SERPL-MCNC: 9.2 MG/DL (ref 8.3–10.1)
CHLORIDE SERPL-SCNC: 100 MMOL/L (ref 100–108)
CHLORIDE SERPL-SCNC: 93 MMOL/L (ref 100–108)
CO2 SERPL-SCNC: 31 MMOL/L (ref 21–32)
CO2 SERPL-SCNC: 33 MMOL/L (ref 21–32)
CREAT SERPL-MCNC: 1.17 MG/DL (ref 0.6–1.3)
CREAT SERPL-MCNC: 1.4 MG/DL (ref 0.6–1.3)
CRP SERPL HS-MCNC: 39.18 MG/L
EOSINOPHIL # BLD AUTO: 0.12 THOUSAND/ΜL (ref 0–0.61)
EOSINOPHIL NFR BLD AUTO: 1 % (ref 0–6)
ERYTHROCYTE [DISTWIDTH] IN BLOOD BY AUTOMATED COUNT: 12.8 % (ref 11.6–15.1)
EST. AVERAGE GLUCOSE BLD GHB EST-MCNC: 289 MG/DL
GFR SERPL CREATININE-BSD FRML MDRD: 40 ML/MIN/1.73SQ M
GFR SERPL CREATININE-BSD FRML MDRD: 49 ML/MIN/1.73SQ M
GLUCOSE SERPL-MCNC: 128 MG/DL (ref 65–140)
GLUCOSE SERPL-MCNC: 129 MG/DL (ref 65–140)
GLUCOSE SERPL-MCNC: 133 MG/DL (ref 65–140)
GLUCOSE SERPL-MCNC: 134 MG/DL (ref 65–140)
GLUCOSE SERPL-MCNC: 186 MG/DL (ref 65–140)
GLUCOSE SERPL-MCNC: 199 MG/DL (ref 65–140)
GLUCOSE SERPL-MCNC: 93 MG/DL (ref 65–140)
HBA1C MFR BLD: 11.7 %
HCT VFR BLD AUTO: 32.5 % (ref 34.8–46.1)
HGB BLD-MCNC: 11 G/DL (ref 11.5–15.4)
IMM GRANULOCYTES # BLD AUTO: 0.03 THOUSAND/UL (ref 0–0.2)
IMM GRANULOCYTES NFR BLD AUTO: 0 % (ref 0–2)
LACTATE SERPL-SCNC: 1.5 MMOL/L (ref 0.5–2)
LYMPHOCYTES # BLD AUTO: 2.15 THOUSANDS/ΜL (ref 0.6–4.47)
LYMPHOCYTES NFR BLD AUTO: 26 % (ref 14–44)
MCH RBC QN AUTO: 29.6 PG (ref 26.8–34.3)
MCHC RBC AUTO-ENTMCNC: 33.8 G/DL (ref 31.4–37.4)
MCV RBC AUTO: 87 FL (ref 82–98)
MONOCYTES # BLD AUTO: 0.56 THOUSAND/ΜL (ref 0.17–1.22)
MONOCYTES NFR BLD AUTO: 7 % (ref 4–12)
NEUTROPHILS # BLD AUTO: 5.38 THOUSANDS/ΜL (ref 1.85–7.62)
NEUTS SEG NFR BLD AUTO: 65 % (ref 43–75)
NRBC BLD AUTO-RTO: 0 /100 WBCS
P AXIS: 20 DEGREES
P AXIS: 93 DEGREES
PLATELET # BLD AUTO: 300 THOUSANDS/UL (ref 149–390)
PMV BLD AUTO: 9.6 FL (ref 8.9–12.7)
POTASSIUM SERPL-SCNC: 4.1 MMOL/L (ref 3.5–5.3)
POTASSIUM SERPL-SCNC: 4.2 MMOL/L (ref 3.5–5.3)
QRS AXIS: 31 DEGREES
QRS AXIS: 34 DEGREES
QRSD INTERVAL: 88 MS
QRSD INTERVAL: 90 MS
QT INTERVAL: 392 MS
QT INTERVAL: 416 MS
QTC INTERVAL: 410 MS
QTC INTERVAL: 432 MS
RBC # BLD AUTO: 3.72 MILLION/UL (ref 3.81–5.12)
SODIUM SERPL-SCNC: 136 MMOL/L (ref 136–145)
SODIUM SERPL-SCNC: 140 MMOL/L (ref 136–145)
T WAVE AXIS: -16 DEGREES
T WAVE AXIS: 7 DEGREES
VENTRICULAR RATE: 65 BPM
VENTRICULAR RATE: 66 BPM
WBC # BLD AUTO: 8.28 THOUSAND/UL (ref 4.31–10.16)

## 2021-04-13 PROCEDURE — 93010 ELECTROCARDIOGRAM REPORT: CPT | Performed by: INTERNAL MEDICINE

## 2021-04-13 PROCEDURE — 83605 ASSAY OF LACTIC ACID: CPT | Performed by: PHYSICIAN ASSISTANT

## 2021-04-13 PROCEDURE — 93005 ELECTROCARDIOGRAM TRACING: CPT

## 2021-04-13 PROCEDURE — 87205 SMEAR GRAM STAIN: CPT | Performed by: STUDENT IN AN ORGANIZED HEALTH CARE EDUCATION/TRAINING PROGRAM

## 2021-04-13 PROCEDURE — 0Y6M0ZB DETACHMENT AT RIGHT FOOT, PARTIAL 2ND RAY, OPEN APPROACH: ICD-10-PCS | Performed by: PODIATRIST

## 2021-04-13 PROCEDURE — 83036 HEMOGLOBIN GLYCOSYLATED A1C: CPT | Performed by: PHYSICIAN ASSISTANT

## 2021-04-13 PROCEDURE — 88311 DECALCIFY TISSUE: CPT | Performed by: PATHOLOGY

## 2021-04-13 PROCEDURE — 0Y6M0ZC DETACHMENT AT RIGHT FOOT, PARTIAL 3RD RAY, OPEN APPROACH: ICD-10-PCS | Performed by: PODIATRIST

## 2021-04-13 PROCEDURE — 73630 X-RAY EXAM OF FOOT: CPT

## 2021-04-13 PROCEDURE — 36415 COLL VENOUS BLD VENIPUNCTURE: CPT | Performed by: EMERGENCY MEDICINE

## 2021-04-13 PROCEDURE — 0Y6M0ZF DETACHMENT AT RIGHT FOOT, PARTIAL 5TH RAY, OPEN APPROACH: ICD-10-PCS | Performed by: PODIATRIST

## 2021-04-13 PROCEDURE — 87040 BLOOD CULTURE FOR BACTERIA: CPT | Performed by: EMERGENCY MEDICINE

## 2021-04-13 PROCEDURE — 99223 1ST HOSP IP/OBS HIGH 75: CPT | Performed by: PODIATRIST

## 2021-04-13 PROCEDURE — 96374 THER/PROPH/DIAG INJ IV PUSH: CPT

## 2021-04-13 PROCEDURE — 82948 REAGENT STRIP/BLOOD GLUCOSE: CPT

## 2021-04-13 PROCEDURE — 87186 SC STD MICRODIL/AGAR DIL: CPT | Performed by: STUDENT IN AN ORGANIZED HEALTH CARE EDUCATION/TRAINING PROGRAM

## 2021-04-13 PROCEDURE — 80048 BASIC METABOLIC PNL TOTAL CA: CPT | Performed by: PHYSICIAN ASSISTANT

## 2021-04-13 PROCEDURE — 87147 CULTURE TYPE IMMUNOLOGIC: CPT | Performed by: STUDENT IN AN ORGANIZED HEALTH CARE EDUCATION/TRAINING PROGRAM

## 2021-04-13 PROCEDURE — 70496 CT ANGIOGRAPHY HEAD: CPT

## 2021-04-13 PROCEDURE — 99223 1ST HOSP IP/OBS HIGH 75: CPT | Performed by: INTERNAL MEDICINE

## 2021-04-13 PROCEDURE — 70498 CT ANGIOGRAPHY NECK: CPT

## 2021-04-13 PROCEDURE — G1004 CDSM NDSC: HCPCS

## 2021-04-13 PROCEDURE — 99223 1ST HOSP IP/OBS HIGH 75: CPT | Performed by: PSYCHIATRY & NEUROLOGY

## 2021-04-13 PROCEDURE — 87070 CULTURE OTHR SPECIMN AEROBIC: CPT | Performed by: STUDENT IN AN ORGANIZED HEALTH CARE EDUCATION/TRAINING PROGRAM

## 2021-04-13 PROCEDURE — 88305 TISSUE EXAM BY PATHOLOGIST: CPT | Performed by: PATHOLOGY

## 2021-04-13 PROCEDURE — 85025 COMPLETE CBC W/AUTO DIFF WBC: CPT | Performed by: PHYSICIAN ASSISTANT

## 2021-04-13 PROCEDURE — 0Y6M0ZD DETACHMENT AT RIGHT FOOT, PARTIAL 4TH RAY, OPEN APPROACH: ICD-10-PCS | Performed by: PODIATRIST

## 2021-04-13 PROCEDURE — 0Y6M0Z9 DETACHMENT AT RIGHT FOOT, PARTIAL 1ST RAY, OPEN APPROACH: ICD-10-PCS | Performed by: PODIATRIST

## 2021-04-13 PROCEDURE — 28805 AMPUTATION THRU METATARSAL: CPT | Performed by: PODIATRIST

## 2021-04-13 RX ORDER — PROPOFOL 10 MG/ML
INJECTION, EMULSION INTRAVENOUS CONTINUOUS PRN
Status: DISCONTINUED | OUTPATIENT
Start: 2021-04-13 | End: 2021-04-13

## 2021-04-13 RX ORDER — SODIUM CHLORIDE, SODIUM LACTATE, POTASSIUM CHLORIDE, CALCIUM CHLORIDE 600; 310; 30; 20 MG/100ML; MG/100ML; MG/100ML; MG/100ML
100 INJECTION, SOLUTION INTRAVENOUS CONTINUOUS
Status: DISPENSED | OUTPATIENT
Start: 2021-04-13 | End: 2021-04-13

## 2021-04-13 RX ORDER — LISINOPRIL 20 MG/1
20 TABLET ORAL DAILY
Status: DISCONTINUED | OUTPATIENT
Start: 2021-04-13 | End: 2021-04-13

## 2021-04-13 RX ORDER — SERTRALINE HYDROCHLORIDE 100 MG/1
100 TABLET, FILM COATED ORAL DAILY
Status: DISCONTINUED | OUTPATIENT
Start: 2021-04-13 | End: 2021-04-17 | Stop reason: HOSPADM

## 2021-04-13 RX ORDER — MIDAZOLAM HYDROCHLORIDE 2 MG/2ML
INJECTION, SOLUTION INTRAMUSCULAR; INTRAVENOUS AS NEEDED
Status: DISCONTINUED | OUTPATIENT
Start: 2021-04-13 | End: 2021-04-13

## 2021-04-13 RX ORDER — CALCIUM CARBONATE 200(500)MG
1000 TABLET,CHEWABLE ORAL DAILY PRN
Status: DISCONTINUED | OUTPATIENT
Start: 2021-04-13 | End: 2021-04-17 | Stop reason: HOSPADM

## 2021-04-13 RX ORDER — MAGNESIUM HYDROXIDE 1200 MG/15ML
LIQUID ORAL AS NEEDED
Status: DISCONTINUED | OUTPATIENT
Start: 2021-04-13 | End: 2021-04-13 | Stop reason: HOSPADM

## 2021-04-13 RX ORDER — GABAPENTIN 300 MG/1
600 CAPSULE ORAL 3 TIMES DAILY
Status: DISCONTINUED | OUTPATIENT
Start: 2021-04-13 | End: 2021-04-17 | Stop reason: HOSPADM

## 2021-04-13 RX ORDER — CEFAZOLIN SODIUM 2 G/50ML
2000 SOLUTION INTRAVENOUS EVERY 8 HOURS
Status: DISCONTINUED | OUTPATIENT
Start: 2021-04-13 | End: 2021-04-17 | Stop reason: HOSPADM

## 2021-04-13 RX ORDER — HYDROCHLOROTHIAZIDE 12.5 MG/1
25 TABLET ORAL DAILY
Status: DISCONTINUED | OUTPATIENT
Start: 2021-04-13 | End: 2021-04-13

## 2021-04-13 RX ORDER — ONDANSETRON 2 MG/ML
4 INJECTION INTRAMUSCULAR; INTRAVENOUS EVERY 6 HOURS PRN
Status: DISCONTINUED | OUTPATIENT
Start: 2021-04-13 | End: 2021-04-17 | Stop reason: HOSPADM

## 2021-04-13 RX ORDER — INSULIN GLARGINE 100 [IU]/ML
22 INJECTION, SOLUTION SUBCUTANEOUS
Status: DISCONTINUED | OUTPATIENT
Start: 2021-04-13 | End: 2021-04-17 | Stop reason: HOSPADM

## 2021-04-13 RX ORDER — FENTANYL CITRATE 50 UG/ML
INJECTION, SOLUTION INTRAMUSCULAR; INTRAVENOUS AS NEEDED
Status: DISCONTINUED | OUTPATIENT
Start: 2021-04-13 | End: 2021-04-13

## 2021-04-13 RX ORDER — DULOXETIN HYDROCHLORIDE 30 MG/1
60 CAPSULE, DELAYED RELEASE ORAL DAILY
Status: DISCONTINUED | OUTPATIENT
Start: 2021-04-13 | End: 2021-04-17 | Stop reason: HOSPADM

## 2021-04-13 RX ORDER — CLONAZEPAM 1 MG/1
1 TABLET ORAL 2 TIMES DAILY
Status: DISCONTINUED | OUTPATIENT
Start: 2021-04-14 | End: 2021-04-17 | Stop reason: HOSPADM

## 2021-04-13 RX ORDER — ONDANSETRON 2 MG/ML
INJECTION INTRAMUSCULAR; INTRAVENOUS AS NEEDED
Status: DISCONTINUED | OUTPATIENT
Start: 2021-04-13 | End: 2021-04-13

## 2021-04-13 RX ORDER — HYDRALAZINE HYDROCHLORIDE 20 MG/ML
10 INJECTION INTRAMUSCULAR; INTRAVENOUS EVERY 6 HOURS PRN
Status: DISCONTINUED | OUTPATIENT
Start: 2021-04-13 | End: 2021-04-17 | Stop reason: HOSPADM

## 2021-04-13 RX ORDER — ATORVASTATIN CALCIUM 40 MG/1
40 TABLET, FILM COATED ORAL DAILY
Status: DISCONTINUED | OUTPATIENT
Start: 2021-04-13 | End: 2021-04-17 | Stop reason: HOSPADM

## 2021-04-13 RX ORDER — ACETAMINOPHEN 325 MG/1
650 TABLET ORAL EVERY 6 HOURS PRN
Status: DISCONTINUED | OUTPATIENT
Start: 2021-04-13 | End: 2021-04-17 | Stop reason: HOSPADM

## 2021-04-13 RX ORDER — CEFAZOLIN SODIUM 2 G/50ML
2000 SOLUTION INTRAVENOUS EVERY 8 HOURS
Status: DISCONTINUED | OUTPATIENT
Start: 2021-04-13 | End: 2021-04-13

## 2021-04-13 RX ORDER — HYDRALAZINE HYDROCHLORIDE 20 MG/ML
5 INJECTION INTRAMUSCULAR; INTRAVENOUS EVERY 6 HOURS PRN
Status: DISCONTINUED | OUTPATIENT
Start: 2021-04-13 | End: 2021-04-17 | Stop reason: HOSPADM

## 2021-04-13 RX ORDER — HEPARIN SODIUM 5000 [USP'U]/ML
5000 INJECTION, SOLUTION INTRAVENOUS; SUBCUTANEOUS EVERY 8 HOURS SCHEDULED
Status: DISCONTINUED | OUTPATIENT
Start: 2021-04-13 | End: 2021-04-17 | Stop reason: HOSPADM

## 2021-04-13 RX ORDER — METHADONE HYDROCHLORIDE 10 MG/1
90 TABLET ORAL DAILY
Status: DISCONTINUED | OUTPATIENT
Start: 2021-04-13 | End: 2021-04-17 | Stop reason: HOSPADM

## 2021-04-13 RX ORDER — LISINOPRIL 5 MG/1
5 TABLET ORAL DAILY
Status: DISCONTINUED | OUTPATIENT
Start: 2021-04-13 | End: 2021-04-13

## 2021-04-13 RX ORDER — HYDROXYZINE HYDROCHLORIDE 25 MG/1
50 TABLET, FILM COATED ORAL
Status: DISCONTINUED | OUTPATIENT
Start: 2021-04-13 | End: 2021-04-17 | Stop reason: HOSPADM

## 2021-04-13 RX ORDER — KETOROLAC TROMETHAMINE 30 MG/ML
15 INJECTION, SOLUTION INTRAMUSCULAR; INTRAVENOUS ONCE
Status: COMPLETED | OUTPATIENT
Start: 2021-04-13 | End: 2021-04-13

## 2021-04-13 RX ORDER — LAMOTRIGINE 100 MG/1
150 TABLET ORAL DAILY
Status: DISCONTINUED | OUTPATIENT
Start: 2021-04-13 | End: 2021-04-17 | Stop reason: HOSPADM

## 2021-04-13 RX ORDER — CLONAZEPAM 1 MG/1
1 TABLET ORAL 3 TIMES DAILY
Status: DISCONTINUED | OUTPATIENT
Start: 2021-04-13 | End: 2021-04-13

## 2021-04-13 RX ADMIN — METRONIDAZOLE 500 MG: 500 INJECTION, SOLUTION INTRAVENOUS at 23:28

## 2021-04-13 RX ADMIN — HEPARIN SODIUM 5000 UNITS: 5000 INJECTION INTRAVENOUS; SUBCUTANEOUS at 06:58

## 2021-04-13 RX ADMIN — HEPARIN SODIUM 5000 UNITS: 5000 INJECTION INTRAVENOUS; SUBCUTANEOUS at 21:55

## 2021-04-13 RX ADMIN — INSULIN LISPRO 1 UNITS: 100 INJECTION, SOLUTION INTRAVENOUS; SUBCUTANEOUS at 23:36

## 2021-04-13 RX ADMIN — HYDROXYZINE HYDROCHLORIDE 50 MG: 25 TABLET ORAL at 21:54

## 2021-04-13 RX ADMIN — CLONAZEPAM 1 MG: 1 TABLET ORAL at 08:54

## 2021-04-13 RX ADMIN — GABAPENTIN 600 MG: 300 CAPSULE ORAL at 08:53

## 2021-04-13 RX ADMIN — ACETAMINOPHEN 650 MG: 325 TABLET, FILM COATED ORAL at 06:58

## 2021-04-13 RX ADMIN — HYDRALAZINE HYDROCHLORIDE 10 MG: 20 INJECTION, SOLUTION INTRAMUSCULAR; INTRAVENOUS at 17:08

## 2021-04-13 RX ADMIN — CEFAZOLIN SODIUM 2000 MG: 2 SOLUTION INTRAVENOUS at 17:04

## 2021-04-13 RX ADMIN — KETOROLAC TROMETHAMINE 15 MG: 30 INJECTION, SOLUTION INTRAMUSCULAR at 07:08

## 2021-04-13 RX ADMIN — SERTRALINE HYDROCHLORIDE 100 MG: 100 TABLET ORAL at 08:54

## 2021-04-13 RX ADMIN — SODIUM CHLORIDE, SODIUM LACTATE, POTASSIUM CHLORIDE, AND CALCIUM CHLORIDE 100 ML/HR: .6; .31; .03; .02 INJECTION, SOLUTION INTRAVENOUS at 02:15

## 2021-04-13 RX ADMIN — HYDROXYZINE HYDROCHLORIDE 50 MG: 25 TABLET ORAL at 02:16

## 2021-04-13 RX ADMIN — PROPOFOL 100 MCG/KG/MIN: 10 INJECTION, EMULSION INTRAVENOUS at 11:22

## 2021-04-13 RX ADMIN — INSULIN GLARGINE 22 UNITS: 100 INJECTION, SOLUTION SUBCUTANEOUS at 21:54

## 2021-04-13 RX ADMIN — CEFAZOLIN SODIUM 2000 MG: 2 SOLUTION INTRAVENOUS at 10:01

## 2021-04-13 RX ADMIN — ONDANSETRON 4 MG: 2 INJECTION INTRAMUSCULAR; INTRAVENOUS at 09:00

## 2021-04-13 RX ADMIN — METHADONE HYDROCHLORIDE 90 MG: 10 TABLET ORAL at 08:51

## 2021-04-13 RX ADMIN — METRONIDAZOLE 500 MG: 500 INJECTION, SOLUTION INTRAVENOUS at 00:40

## 2021-04-13 RX ADMIN — GABAPENTIN 600 MG: 300 CAPSULE ORAL at 21:55

## 2021-04-13 RX ADMIN — GABAPENTIN 600 MG: 300 CAPSULE ORAL at 16:49

## 2021-04-13 RX ADMIN — FENTANYL CITRATE 25 MCG: 50 INJECTION INTRAMUSCULAR; INTRAVENOUS at 11:37

## 2021-04-13 RX ADMIN — SODIUM CHLORIDE 1000 ML: 0.9 INJECTION, SOLUTION INTRAVENOUS at 00:39

## 2021-04-13 RX ADMIN — SODIUM CHLORIDE, SODIUM LACTATE, POTASSIUM CHLORIDE, AND CALCIUM CHLORIDE 100 ML/HR: .6; .31; .03; .02 INJECTION, SOLUTION INTRAVENOUS at 12:56

## 2021-04-13 RX ADMIN — DULOXETINE HYDROCHLORIDE 60 MG: 60 CAPSULE, DELAYED RELEASE ORAL at 08:54

## 2021-04-13 RX ADMIN — CLONAZEPAM 1 MG: 1 TABLET ORAL at 16:49

## 2021-04-13 RX ADMIN — METRONIDAZOLE 500 MG: 500 INJECTION, SOLUTION INTRAVENOUS at 08:17

## 2021-04-13 RX ADMIN — MIDAZOLAM 2 MG: 1 INJECTION INTRAMUSCULAR; INTRAVENOUS at 11:20

## 2021-04-13 RX ADMIN — ONDANSETRON 4 MG: 2 INJECTION INTRAMUSCULAR; INTRAVENOUS at 11:32

## 2021-04-13 RX ADMIN — CEFAZOLIN SODIUM 2000 MG: 2 SOLUTION INTRAVENOUS at 02:19

## 2021-04-13 RX ADMIN — IOHEXOL 85 ML: 350 INJECTION, SOLUTION INTRAVENOUS at 02:25

## 2021-04-13 NOTE — ASSESSMENT & PLAN NOTE
Patient has a history of opioid dependence  Continue home regimen with methadone 90mg daily:   Will confirm with patient's methadone clinic

## 2021-04-13 NOTE — ASSESSMENT & PLAN NOTE
Lab Results   Component Value Date    HGBA1C 13 9 (H) 05/30/2020       No results for input(s): POCGLU in the last 72 hours  Blood Sugar Average: Last 72 hrs:  Uncontrolled diabetes  Last A1c 13 9  Home regimen: Trulicity weekly (started 1 month ago, receives inj on sundays), Lantus 45 units qHS, Aspart 10 units with meals, metformin 850 mg BID  Will hold oral antihyperglycemics  Pt states she only takes 20 of lantus a day since starting on Trulicity, and 40 units/day will cause hypoglycemia  Will start patient on 22 units Lantus nightly  Accuchecks q6h and SSI for coverage while patient is NPO    Will order new A1c   NPO  Will place endocrinology consult as this is the patient's 3rd toe amputations over the last 2 years, and she remains uncontrolled

## 2021-04-13 NOTE — ANESTHESIA PREPROCEDURE EVALUATION
Review of Systems/Medical History  Patient summary reviewed  Chart reviewed  No history of anesthetic complications     Cardiovascular  Hyperlipidemia, Hypertension poorly controlled,    Pulmonary  Negative pulmonary ROS        GI/Hepatic  Negative GI/hepatic ROS   Liver disease , Hepatitis ,        Negative  ROS        Endo/Other  Diabetes poorly controlled type 1 Insulin, History of thyroid disease , hypothyroidism,   Comment: HgbA1C  >9    GYN       Hematology  Negative hematology ROS Anemia ,     Musculoskeletal    Comment: Right arm cellulitis      Neurology      Comment: On methadone therapy  Psychology   Anxiety, Depression , bipolar disorder and being treated for depression,     Comment: Narcotic abuse on methadone         Physical Exam    Airway    Mallampati score: I  TM Distance: <3 FB  Neck ROM: full     Dental   upper dentures,     Cardiovascular  Rhythm: regular, Rate: normal, Cardiovascular exam normal    Pulmonary  Pulmonary exam normal Breath sounds clear to auscultation,     Other Findings  Left upper eyelid palsy      Anesthesia Plan  ASA Score- 3     Anesthesia Type- general with ASA Monitors  Additional Monitors:   Airway Plan: LMA  Comment: Pt ok with narcotics with narcotics if needed  She is aware that narcotics may not work well due to methadone          Plan Factors-    Chart reviewed  Patient summary reviewed  Patient is not a current smoker  Patient instructed to abstain from smoking on day of procedure  Patient did not smoke on day of surgery  Induction- intravenous  Postoperative Plan- Plan for postoperative opioid use  Informed Consent- Anesthetic plan and risks discussed with patient

## 2021-04-13 NOTE — CONSULTS
Consultation - Neurology   Karl Friday 60 y o  female MRN: [id number]  Unit/Bed#: OR [place] Encounter: [id number]      Assessment/Plan     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, left  Assessment & Plan  60year old female with uncontrolled diabetes mellitus type 2, HTN, and HLD presents with left eyelid ptosis, inability to adduct the left eye, and difficulty with upward/downward gaze x 1 week  She also reports diplopia, obliquely stacked  Findings are consistent with a cranial nerve III palsy  CTH did not identify any acute pathology and neurologic exam is nonfocal with exception of ocular findings  Prior hemoglobin A1c was 13 9 in 5/2020 and repeat lab is pending  Suspect etiology of cranial nerve palsy to be secondary to uncontrolled diabetes  Plan:   - Will defer MRI brain  - Hemoglobin A1c pending   - Encouraged patient to use eye patch as ptosis starts to heal due to diplopia   - Medical management and supportive care per primary team  Correction of any metabolic or infectious disturbances  Type 2 diabetes mellitus, uncontrolled ([facility] [address] )  Assessment & Plan  Lab Results   Component Value Date    HGBA1C 13 9 (H) 05/30/2020       Recent Labs     04/13/21  0227 04/13/21  0647   POCGLU 133 128       - Hemoglobin A1c pending     Benign essential hypertension  Assessment & Plan  - Goal normotension  - Medical management per primary team       Karl Friday will not need outpatient follow up with Neurology  She will not require outpatient neurological testing  History of Present Illness     Reason for Consult / Principal Problem: Cranial nerve palsy     HPI: Anurag Friday is a 60 y o  female with hypertension, hyperlipidemia, uncontrolled diabetes mellitus, opioid dependence, and bipolar disorder, presented to the ED on 04/12 with a right toe infection  She first noted a blister one week ago  Patient was supposed to be a direct admission to [facility] but declined admission after hearing she will need to share a room   Her symptoms were getting worse so she presented to the ED at [facility]  Neurology was consulted due to left eyelid ptosis  Patient reports she noted this one week ago as well  She reports blurred vision and headaches x 3 weeks  She was seen by her PCP recently and was noted to have significant hypertension, with systolic blood pressures in the 180s-200s  Per admission note, patient demonstrated left eyelid ptosis and inability to move eye medially and upwards  She denies any pain in her eyes  She does not follow with an ophthalmologist  She underwent CTA head and neck which was unremarkable  There was right greater than left atherosclerosis noted at the cervical ICA, without significant stenosis  Inpatient consult to Neurology  Consult performed by: Leonardo Mccall PA-C  Consult ordered by: Meseret Corona PA-C          Review of Systems   Eyes: Positive for visual disturbance  Ptosis  Double vision, obliquely stacked   Musculoskeletal:        Pain in feet  Toe infection/necrosis   Neurological: Positive for headaches  All other systems reviewed and are negative  Historical Information   Past Medical History:   Diagnosis Date    Bipolar disorder ([facility] [address] [facility]     Depression     [facility]     History of MRSA infection     Hypertension      Past Surgical History:   Procedure Laterality Date    APPENDECTOMY      INCISION AND DRAINAGE OF WOUND Right 7/1/2018    Procedure: INCISION AND DRAINAGE (I&D) RIGHT FOREARM;  Surgeon: Rosalina Baker MD;  [facility];  Service: Orthopedics    IA SPLIT [address] <100 SQCM Right 10/17/2018    Procedure: RIGHT ARM SKIN GRAFT SPLIT THICKNESS (STSG);   Surgeon: Naty Cooper MD;  [facility];  Service: Plastics    IA S[address]IT [address] <100 SQCM Right 9/12/2018    Procedure: Lesa Peterson;  Surgeon: Naty Cooper MD;  [facility];  Service: Plastics    TOE AMPUTATION Right 12/10/2019    Procedure: AMPUTATION TOE;  Surgeon: Verl Soulier, DPM;  [facility];  Service: Podiatry    TOE AMPUTATION Right 5/29/2020    Procedure: AMPUTATION TOE, 5TH TOE;  Surgeon: Mali Lamar DPM;  [facility];  Service: Podiatry    TONSILLECTOMY      VAC DRESSING APPLICATION Right 17/33/8401    Procedure: [place];  Surgeon: Leidy Morse MD;  [facility];  Service: Plastics    WOUND DEBRIDEMENT Right 7/7/2018    Procedure: DEBRIDEMENT UPPER EXTREMITY ([address]) W/ APPLICATION OF WOUND VAC;  Surgeon: Ulices Rodney MD;  [facility];  Service: Orthopedics    WOUND DEBRIDEMENT Right 7/11/2018    Procedure: DEBRIDEMENT UPPER EXTREMITY WITH WOUND VAC EXCHANGE;  Surgeon: Jayda Lao DO;  [facility];  Service: Orthopedics    WOUND DEBRIDEMENT Right 9/12/2018    Procedure: DEBRIDEMENT  [facility] OUT) FOREARM with Vac dressing change;  Surgeon: Leidy Morse MD;  [facility]: [facility];  Service: Plastics    WOUND DEBRIDEMENT Right 5/29/2020    Procedure: Complex Irrigation and DEBRIDEMENT WOUND ([facility] OUT), ANKLE;  Surgeon: Mali Lamar DPM;  [facility];  Service: Podiatry     Social History   Social History     Substance and Sexual Activity   Alcohol Use No    Alcohol/week: 0 0 standard drinks    Frequency: Never    Binge frequency: Never     Social History     Substance and Sexual Activity   Drug Use No    Comment: on methadone, Hx herion addiction     E-Cigarette/Vaping     E-Cigarette/Vaping Substances     Social History     Tobacco Use   Smoking Status Never Smoker   Smokeless Tobacco Never Used     Family History:   Family History   Problem Relation Age of Onset    Hypertension Mother         heart attack    Dementia Father         heart atttack/hypertention       Review of previous medical records was completed       Meds/Allergies   all current active meds have been reviewed, current meds: Current Facility-Administered Medications   Medication Dose Route Frequency    [MAR Hold] acetaminophen (TYLENOL) tablet 650 mg  650 mg Oral Q6H PRN    atorvastatin (LIPITOR) tablet 40 mg  40 mg Oral Daily    [MAR Hold] calcium carbonate (TUMS) chewable tablet 1,000 mg  1,000 mg Oral Daily PRN    [MAR Hold] ceFAZolin (ANCEF) IVPB (premix in dextrose) 2,000 mg 50 mL  2,000 mg Intravenous Q8H    clonazePAM (KlonoPIN) tablet 1 mg  1 mg Oral TID    DULoxetine (CYMBALTA) delayed release capsule 60 mg  60 mg Oral Daily    gabapentin (NEURONTIN) capsule 600 mg  600 mg Oral TID    [MAR Hold] heparin (porcine) subcutaneous injection 5,000 Units  5,000 Units Subcutaneous Q8H [facility]    [MAR Hold] hydrALAZINE (APRESOLINE) injection 5 mg  5 mg Intravenous Q6H PRN    Or    [MAR Hold] hydrALAZINE (APRESOLINE) injection 10 mg  10 mg Intravenous Q6H PRN    hydrOXYzine HCL (ATARAX) tablet 50 mg  50 mg Oral HS    [MAR Hold] insulin glargine (LANTUS) subcutaneous injection 22 Units 0 22 mL  22 Units Subcutaneous HS    [MAR Hold] insulin lispro (HumaLOG) 100 units/mL subcutaneous injection 1-5 Units  1-5 Units Subcutaneous Q6H [id number]    lactated ringers infusion  100 mL/hr Intravenous Continuous    [MAR Hold] lamoTRIgine (LaMICtal) tablet 150 mg  150 mg Oral Daily    methadone (DOLOPHINE) tablet 90 mg  90 mg Oral Daily    [MAR Hold] metroNIDAZOLE (FLAGYL) IVPB (premix) 500 mg 100 mL  500 mg Intravenous Q8H    [MAR Hold] ondansetron (ZOFRAN) injection 4 mg  4 mg Intravenous Q6H PRN    sertraline (ZOLOFT) tablet 100 mg  100 mg Oral Daily    and PTA meds:   Prior to Admission Medications   Prescriptions Last Dose Informant Patient Reported? Taking?    DULoxetine (CYMBALTA) 60 mg delayed release capsule   Yes Yes   Sig: Take 60 mg by mouth   Insulin Pen Needle 31G X 4 MM MISC   No No   Sig: by Does not apply route see administration instructions Please use with novolog and lantus to administer insulin three times daily before meals and daily at bedtime  atorvastatin (LIPITOR) 40 mg tablet   Yes Yes   Sig: Take 40 mg by mouth daily    clonazePAM (KlonoPIN) 1 mg tablet  Self Yes Yes   Sig: Take 1 mg by mouth 3 (three) times a day   gabapentin (NEURONTIN) 300 mg capsule  Self Yes Yes   Sig: Take 600 mg by mouth 3 (three) times a day     hydrOXYzine HCL (ATARAX) 50 mg tablet  Self Yes Yes   Sig: Take 50 mg by mouth daily at bedtime   insulin aspart (NOVOLOG FLEXPEN) 100 Units/mL injection pen   No Yes   Sig: Inject 12 Units under the skin 3 (three) times a day with meals   insulin glargine (Lantus SoloStar) 100 units/mL injection pen   No Yes   Sig: Inject 22 Units under the skin every 12 (twelve) hours   lamoTRIgine (LaMICtal) 150 MG tablet  Self Yes Yes   Sig: Take 150 mg by mouth daily   lisinopril (ZESTRIL) 5 mg tablet   No Yes   Sig: Take 1 tablet (5 mg total) by mouth daily   meloxicam (MOBIC) 15 mg tablet   Yes Yes   Sig: Take 15 mg by mouth daily   metFORMIN (GLUCOPHAGE) 850 mg tablet   Yes Yes   Sig: Take 850 mg by mouth 2 (two) times a day   methadone (DOLOPHINE) 5 mg tablet  Self Yes Yes   Sig: Take by mouth daily 15 mg daily    rOPINIRole (REQUIP) 0 5 mg tablet  Self Yes Yes   Sig: Take 0 5 mg by mouth 2 (two) times a day     sertraline (ZOLOFT) 50 mg tablet  Self Yes Yes   Sig: Take 100 mg by mouth daily        Facility-Administered Medications: None       No Known Allergies    Objective   Vitals:Blood pressure 159/69, pulse 75, temperature 98 7 °F (37 1 °C), temperature source Oral, resp  rate 16, height 5' 6" (1 676 m), weight 66 4 kg (146 lb 6 2 oz), SpO2 97 %  ,Body mass index is 23 63 kg/m²  Intake/Output Summary (Last 24 hours) at 4/13/2021 1128  Last data filed at 4/13/2021 1116  Gross per 24 hour   Intake 1900 ml   Output --   Net 1900 ml       Invasive Devices:    Invasive Devices     Peripheral Intravenous Line            Peripheral IV 04/13/21 Left Antecubital less than 1 day                Physical Exam  Vitals signs and nursing note reviewed  Constitutional:       General: She is not in acute distress  Appearance: She is normal weight  She is not ill-appearing or diaphoretic  HENT:      Head: Normocephalic and atraumatic  Right Ear: External ear normal       Left Ear: External ear normal       Nose: Nose normal       Mouth/Throat:      Mouth: Mucous membranes are moist       Pharynx: Oropharynx is clear  Comments: Poor dentition   Eyes:      General: No scleral icterus  Right eye: No discharge  Left eye: No discharge  Pupils: Pupils are equal, round, and reactive to light  Comments: EOMs abnormal  See below  Neck:      Musculoskeletal: Normal range of motion and neck supple  No neck rigidity or muscular tenderness  Cardiovascular:      Rate and Rhythm: Normal rate  Pulmonary:      Effort: Pulmonary effort is normal  No respiratory distress  Musculoskeletal:         General: Deformity present  No signs of injury  Right lower leg: No edema  Left lower leg: No edema  Comments: Toe necrosis  Prior amputations    Skin:     General: Skin is warm and dry  Coloration: Skin is not pale  Findings: No rash  Neurological:      Mental Status: She is alert and oriented to person, place, and time  Cranial Nerves: Cranial nerve deficit present  Sensory: No sensory deficit  Motor: No weakness  Coordination: Coordination normal  Finger-Nose-Finger Test normal       Deep Tendon Reflexes: Strength normal       Reflex Scores:       Bicep reflexes are 1+ on the right side and 1+ on the left side  Brachioradialis reflexes are 1+ on the right side and 1+ on the left side  Patellar reflexes are 1+ on the right side and 1+ on the left side  Achilles reflexes are 0 on the right side and 0 on the left side  Psychiatric:         Mood and Affect: Mood normal          Behavior: Behavior normal          Thought Content:  Thought content normal          Judgment: Judgment normal        Neurologic Exam     Mental Status   Oriented to person, place, and time  Level of consciousness: alert  Follows all commands without difficulty  No dysarthria or aphasia  Cranial Nerves     CN II   Visual acuity: (With R eye, visual acuity intact  Double vision with L eye or when both eyes are opened)    CN III, IV, VI   Pupils are equal, round, and reactive to light  Right pupil: Shape: regular  Reactivity: brisk  Left pupil: Shape: regular  Reactivity: brisk  CN III: left CN III palsy  Nystagmus: none   Diplopia: left (Oblique)  Upgaze: abnormal  Downgaze: abnormal  Conjugate gaze: present    CN V   Facial sensation intact  CN VII   Right facial weakness: none  Left facial weakness: Left eyelid ptosis  Able to raise eyebrows symmetrically  No facial droop  CN VIII   Hearing: intact (Grossly intact to voice)    CN XI   Right sternocleidomastoid strength: normal  Left sternocleidomastoid strength: normal  Right trapezius strength: normal  Left trapezius strength: normal    CN XII   Tongue: not atrophic  Fasciculations: absent  Tongue deviation: none    Motor Exam   Muscle bulk: normal    Strength   Strength 5/5 throughout  Sensory Exam   Light touch normal    Sensation to temperature intact  Vibration intact at mid shins B/L and fingertips  Gait, Coordination, and Reflexes     Gait  Gait: (Deferred)    Coordination   Finger to nose coordination: normal    Tremor   Resting tremor: absent  Intention tremor: absent    Reflexes   Right brachioradialis: 1+  Left brachioradialis: 1+  Right biceps: 1+  Left biceps: 1+  Right patellar: 1+  Left patellar: 1+  Right achilles: 0  Left achilles: 0  Right ankle clonus: absent  Left ankle clonus: absent      Lab Results: I have personally reviewed pertinent reports  Imaging Studies: I have personally reviewed pertinent reports     and I have personally reviewed pertinent films in PACS (CTA)  EKG, Pathology, and Other Studies: I have personally reviewed pertinent reports      VTE Prophylaxis: Heparin    Code Status: Level 1 - Full Code

## 2021-04-13 NOTE — OP NOTE
OPERATIVE REPORT - Podiatry  PATIENT NAME: Valrie Nageotte    :  1955  MRN: [id number]  Pt Location: [facility] OR ROOM 01    SURGERY DATE: 2021    Surgeon(s) and Role:     * Luisa Kapadia DPM - Primary     * Maira Schaffer DPM - Assisting    Pre-op Diagnosis:  Osteomyelitis of toe ([facility] [address] ) [M86 9]    Post-Op Diagnosis Codes:     * Osteomyelitis of toe ([facility] [address] ) [M86 9]    Procedure(s) (LRB):  AMPUTATION TRANSMETATARSAL (TMA) (Right)    Specimen(s):  ID Type Source Tests Collected by Time Destination   1 : right forefoot Tissue Soft Tissue, Other TISSUE EXAM Luisa Kapadia DPM 2021 1141        Estimated Blood Loss:   Minimal    Drains:  * No LDAs found *    Anesthesia Type:   Choice with 20 ml of 1% Lidocaine and 0 5% Bupivacaine in a 1:1 mixture    Hemostasis:  Pneumatic ankle tourniquet 250 mmHg for 7 minutes  Electrocautery  Hand tied with Vicryl    Materials:  * No implants in log *    Operative Findings:  Consistent with diagnosis - necrotic, malodorous, grossly infected right 4th toe    Complications:   None    Procedure and Technique:     Under mild sedation, the patient was brought into the operating room and remained on the stretcher in the supine position  IV sedation was achieved by anesthesia team and a universal timeout was performed where all parties are in agreement of correct patient, correct procedure and correct site  A pneumatic tourniquet was then placed over the patient's right lower extremity with ample padding  A ankle block was performed consisting of 20 ml of 1% Lidocaine and 0 5% Bupivacaine in a 1:1 mixture  The foot was then prepped and draped in the usual aseptic manner  An esmarch bandage was used to exsangunate the foot and the pneumatic tourniquet was then inflated to 250 mmHg  A fishmouth type incision was drawn out over the forefoot  Utilizing a #15 blade a full thickness incision was made down to bone   The forefoot was then sharply dissected out to isolate the metatarsal shafts  Utilizing a key elevator soft tissues were freed from each of the metatarsals  A sagittal saw was then used to make the transmetatarsal amputation osteotomies  Each metatarsal was cut in a dorsal distal to plantar proximal fashion with care taken to maintain the metatarsal parabola  The entire forefoot was then removed from the table and passed off - the 4th toe was examined on the back table and noted to appear grossly infected with exposed bone, purulence, tissue necrosis and malodor  The remaining wound bed was then inspected  No purulent sinus tracts or tissue necrosis was visualized  Any residual exposed tendons were excised proximally to prevent any infection from tracking proximally  The wound was then flushed with copious amounts of normal sterile saline  The tourniquet was deflated at approximately 7 min and normal hyperemic response was noted to all digits  Bleeders were tied off with vicryl and ligated as necessary  The wound was flushed with nss  Deep closure obtained with vicryl, skin reapproximated with nylon and skin staples  The skin was cleansed and dried  Xeroform, dry dressing applied  The patient tolerated the procedure and anesthesia well without immediate complications and transferred to PACU with vital signs stable  As with many limb salvage procedures, we contemplate the possibility of performing further stages to this procedure  Procedures may include debridements, delayed closure, plastic surgery techniques, or more proximal amputations  This procedure may be considered part of a multi-staged limb salvage treatment plan  Dr Vanesa Saha was present during the entire procedure and participated in all key aspects  Sang Amin DPM  DATE: April 13, 2021  TIME: 12:04 PM      Portions of the record may have been created with voice recognition software   Occasional wrong word or "sound a like" substitutions may have occurred due to the inherent limitations of voice recognition software  Read the chart carefully and recognize, using context, where substitutions have occurred

## 2021-04-13 NOTE — ASSESSMENT & PLAN NOTE
Baseline creat between 0 8-1 1  Currently 1 40  Likely prerenal in the setting of acute illness and HCTZ   Received 1L NS in ED  Will give an additional 1 5L at 100cc/hr    Hold Lisinopril and HCTZ  Follow BMP in am    Lab Results   Component Value Date    CREATININE 1 40 (H) 04/12/2021    CREATININE 0 89 10/06/2020    CREATININE 1 06 10/06/2020

## 2021-04-13 NOTE — ASSESSMENT & PLAN NOTE
Patient has history of bipolar disorder  Continue home medications with Lamictal 150 mg daily, cymbalta 60 mg daily, and Zoloft 100 mg daily  Patient was initially placed on clonazepam 1 mg p o  T i d  Scheduled: The [facility] website was queried and patient is prescribed 60 tablets of clonazepam per month  Will change her dosing to reflect b i d   Administration

## 2021-04-13 NOTE — ASSESSMENT & PLAN NOTE
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

## 2021-04-13 NOTE — ASSESSMENT & PLAN NOTE
BP reviewed, intially elevated at 170/90, possibly pain contributing  Currently, /70  On Lisnopril-HCTZ at home  Will hold in the setting of JOYCE    IV Hydral PRN for SBP <160

## 2021-04-13 NOTE — ASSESSMENT & PLAN NOTE
Lab Results   Component Value Date    HGBA1C 13 9 (H) 05/30/2020       Recent Labs     04/13/21  0227 04/13/21  0647   POCGLU 133 128       - Hemoglobin A1c pending

## 2021-04-13 NOTE — ED NOTES
Pt still nauseous, will hold remaining morning medications  Discussed with Podiatry        Shanna Kessler RN  04/13/21 8298

## 2021-04-13 NOTE — ASSESSMENT & PLAN NOTE
States she has noticed a blister on her 4th digit of the R foot x 1 week ago  States her shoe feels tight and is applying pressure to her toes, she has tried soft cloth shoes as well as wearing Wide fit shoes and going up a size, still reporting that pressure against her toes  Pressure caused blister to rupture, which then started becoming necrotic x 4 days ago, worsening with each day  Review of patients chart reveals patient was evaluated by her PCP on 4/09/21  Note states patient has had infection for about a month, it was necrotic at that time and pt was made aware she likely has osteomyelitis and will likely need an amputation  Note also states patient has been taking cipro/keflex x 1 week prior to PCP visit  WBC 11  Lactic pending  L Foot xray - Bony erosion of proximal phalynx of the 4th metatarsal, concerning for osteomyelitis  Received Ancef/Flagyl in ED    Plan:  -Continue Ancef/Flagyl  -F/u on lactic  -Podiatry consult  Appreciate recs    - NPO

## 2021-04-13 NOTE — CONSULTS
Consult - Podiatry   Martine Zayas 60 y o  female MRN: [id number]  Unit/Bed#: ED 17 Encounter: [id number]    Assessment/Plan     Assessment:  59yo F w/ pmh significant for uncontrolled DM 2 w/ peripheral neuropathy presenting with right 4th toe gangrenous ulceration, exposed bone and cellulitis    Plan:  - Patient with require right transmetatarsal amputation (given previous 1st & 5th toe amputations, infeciton control and functionality of foot) - plan for OR around 11:30AM w/ Dr Alfie Rankin - case request placed  She has been NPO since midnight    - 2018 hx of MRSA but w/ MSSA on wound cultures since then - will f/u on most recent wound culture taken at bedside    - Right foot XR reviewed: noted with osseous erosion of 4th toe phalanges  - Labs reviewed: leukocytosis at admission, resolved today  LA wnl  ESR 68  CRP 39  Bcx pending  - Rest of care per primary team      History of Present Illness     HPI:  Martine Zayas is a 60 y o  female w/ pmh significant for uncontrolled DM, HLD, HTN, bipolar disorder, opioid dependence on methadone, hx MRSA infection who pis admitted for worsening right foot blister/wound/redness - podiatry was consulted for the same  Patient states that about one week ago she noticed blistering to her right 4th toe which progressed with pain to an ulceration about 3 days ago  Per chart review and SLIM note review, patient was seen by PCP 4/9/21 who notes that wound had been present for about one month (per patient), the toe appeared necrotic at that time (consulted patient she may need amputation) and prescribed 1wk cipro/keflex  Patient reported to the ED late last evening  She was last seen by podiatry in the ED 10/6/20 with noted right 4th toe ulceration and mild cellulitis - she was discharged with po abx  It was recommended that she f/u at outpt [facility] but she has not followed up in this setting  Patient does not wear diabetic shoes or follow up for podiatric care        Inpatient consult to Podiatry  Consult performed by: Jillian Contreras DPM  Consult ordered by: Parul Cook PA-C        Review of Systems   Constitutional: Negative  HENT: Negative  Eyes:Left eyelid droop    Respiratory: Negative  Cardiovascular: Negative  Gastrointestinal: Negative  Musculoskeletal: R foot pain and swelling   Skin: Right 4th toe wound and redness  Neurological: peripheral neuropathy  Psych: negative  Historical Information   Past Medical History:   Diagnosis Date    Bipolar disorder ([facility] )     Depression     Diabetes mellitus ([facility] )     History of MRSA infection     Hypertension      Past Surgical History:   Procedure Laterality Date    APPENDECTOMY      INCISION AND DRAINAGE OF WOUND Right 7/1/2018    Procedure: INCISION AND DRAINAGE (I&D) RIGHT FOREARM;  Surgeon: Dianelys Sanchez MD;  [facility];  Service: Orthopedics    WA SPLIT [address] <100 SQCM Right 10/17/2018    Procedure: RIGHT ARM SKIN GRAFT SPLIT THICKNESS (STSG);   Surgeon: Navi Rodriguez MD;  [facility];  Service: Plastics    WA SPLIT [address] <100 SQCM Right 9/12/2018    Procedure: Clarita Cecelia;  Surgeon: Navi Rodriguez MD;  [facility];  Service: Plastics    TOE AMPUTATION Right 12/10/2019    Procedure: AMPUTATION TOE;  Surgeon: Matt Miles DPM;  [facility];  Service: Podiatry    TOE AMPUTATION Right 5/29/2020    Procedure: AMPUTATION TOE, 5TH TOE;  Surgeon: Maricruz Patterson DPM;  [facility];  Service: Podiatry    TONSILLECTOMY      VAC DRESSING APPLICATION Right 05/59/2657    Procedure: Ramon Baptiste;  Surgeon: Navi Rodriguez MD;  [facility];  Service: Plastics    WOUND DEBRIDEMENT Right 7/7/2018    Procedure: DEBRIDEMENT UPPER EXTREMITY ([address]) W/ APPLICATION OF WOUND VAC;  Surgeon: Amber Okeefe MD;  [facility];  Service: Orthopedics    WOUND DEBRIDEMENT Right 7/11/2018    Procedure: DEBRIDEMENT UPPER EXTREMITY WITH WOUND VAC EXCHANGE;  Surgeon: Rochelle Meier DO;  [facility];  Service: Orthopedics    WOUND DEBRIDEMENT Right 9/12/2018    Procedure: DEBRIDEMENT  [facility] OUT) FOREARM with Vac dressing change;  Surgeon: Xiang Kaur MD;  [facility];  Service: Plastics    WOUND DEBRIDEMENT Right 5/29/2020    Procedure: Complex Irrigation and DEBRIDEMENT WOUND ([address] OUT), ANKLE;  Surgeon: Elizabet Almaguer DPM;  [facility];  Service: Podiatry     Social History   Social History     Substance and Sexual Activity   Alcohol Use No    Alcohol/week: 0 0 standard drinks    Frequency: Never    Binge frequency: Never     Social History     Substance and Sexual Activity   Drug Use No    Comment: on methadone, Hx herion addiction     Social History     Tobacco Use   Smoking Status Never Smoker   Smokeless Tobacco Never Used     Family History:   Family History   Problem Relation Age of Onset    Hypertension Mother         heart attack    Dementia Father         heart atttack/hypertention       Meds/Allergies   (Not in a hospital admission)    No Known Allergies    Objective   First Vitals:   Blood Pressure: 168/80 (04/12/21 2048)  Pulse: 100 (04/12/21 2048)  Temperature: 98 5 °F (36 9 °C) (04/12/21 2048)  Temp Source: Oral (04/12/21 2048)  Respirations: 16 (04/12/21 2048)  Height: 5' 6" (167 6 cm) (04/13/21 0041)  Weight - Scale: 66 4 kg (146 lb 6 2 oz) (04/12/21 2044)  SpO2: 95 % (04/12/21 2048)    Current Vitals:   Blood Pressure: 159/69 (04/13/21 0706)  Pulse: 75 (04/13/21 0706)  Temperature: 98 7 °F (37 1 °C) (04/13/21 0706)  Temp Source: Oral (04/13/21 0706)  Respirations: 16 (04/13/21 0706)  Height: 5' 6" (167 6 cm) (04/13/21 0041)  Weight - Scale: 66 4 kg (146 lb 6 2 oz) (04/13/21 0041)  SpO2: 97 % (04/13/21 0706)        /69 (BP Location: Right arm)   Pulse 75   Temp 98 7 °F (37 1 °C) (Oral)   Resp 16   Ht 5' 6" (1 676 m) Wt 66 4 kg (146 lb 6 2 oz)   SpO2 97%   BMI 23 63 kg/m²      General Appearance:    Alert, cooperative, no distress   Head:    Normocephalic, without obvious abnormality   Eyes:    PERRL     Neck:   Supple, symmetrical   Back:     Symmetric   Lungs:     Respirations unlabored   Heart:    Regular rate and rhythm, No chest pain   Abdomen:     Soft, non-tender    B/L Lower extremity exam   Extremities:   Gross ROM ankles intact  R foot pain noted  Right foot edema noted  S/p right 1st and 5th toe amputations - sites healed  Right toe 2 & 3 contractures  Pulses:   DP & PT pulses easily palpable  Warm to touch  Pedal hair absent  Skin:   Left hallux with distal tip DTI  Right 4th toe with distal necrosis and open ulceration and infection- bone is grossly exposed (pahlanges), gross purulence, cellulitic erythema, malodor noted  Forefoot erythema and edema noted to right foot  Neurologic:   Gross sensation is intact  Protective sensation is absent to foot  Patient reports numbness and tingling to B/L feet     right 4th toe  R 4th toe    right 4th toe    left hallux            Lab Results:   Admission on 04/12/2021   Component Date Value    WBC 04/12/2021 11 29*    RBC 04/12/2021 4 79     Hemoglobin 04/12/2021 13 9     Hematocrit 04/12/2021 41 3     MCV 04/12/2021 86     MCH 04/12/2021 29 0     MCHC 04/12/2021 33 7     RDW 04/12/2021 12 8     MPV 04/12/2021 10 2     Platelets 42/13/0305 297     nRBC 04/12/2021 0     Neutrophils Relative 04/12/2021 66     Immat GRANS % 04/12/2021 1     Lymphocytes Relative 04/12/2021 26     Monocytes Relative 04/12/2021 5     Eosinophils Relative 04/12/2021 1     Basophils Relative 04/12/2021 1     Neutrophils Absolute 04/12/2021 7 54     Immature Grans Absolute 04/12/2021 0 06     Lymphocytes Absolute 04/12/2021 2 94     Monocytes Absolute 04/12/2021 0 58     Eosinophils Absolute 04/12/2021 0 11     Basophils Absolute 04/12/2021 0 06     Sodium 04/12/2021 136     Potassium 04/12/2021 4 1     Chloride 04/12/2021 93*    CO2 04/12/2021 31     ANION GAP 04/12/2021 12     BUN 04/12/2021 26*    Creatinine 04/12/2021 1 40*    Glucose 04/12/2021 186*    Calcium 04/12/2021 11 0*    eGFR 04/12/2021 40     Blood Culture 04/13/2021 Received in Microbiology Lab  Culture in Progress   Blood Culture 04/12/2021 Received in Microbiology Lab  Culture in Progress   CRP, High Sensitivity 04/12/2021 39 18     Sed Rate 04/12/2021 68*    LACTIC ACID 04/13/2021 1 5     POC Glucose 04/13/2021 133     Sodium 04/13/2021 140     Potassium 04/13/2021 4 2     Chloride 04/13/2021 100     CO2 04/13/2021 33*    ANION GAP 04/13/2021 7     BUN 04/13/2021 23     Creatinine 04/13/2021 1 17     Glucose 04/13/2021 134     Calcium 04/13/2021 9 2     eGFR 04/13/2021 49     WBC 04/13/2021 8 28     RBC 04/13/2021 3 72*    Hemoglobin 04/13/2021 11 0*    Hematocrit 04/13/2021 32 5*    MCV 04/13/2021 87     MCH 04/13/2021 29 6     MCHC 04/13/2021 33 8     RDW 04/13/2021 12 8     MPV 04/13/2021 9 6     Platelets 29/88/4910 300     nRBC 04/13/2021 0     Neutrophils Relative 04/13/2021 65     Immat GRANS % 04/13/2021 0     Lymphocytes Relative 04/13/2021 26     Monocytes Relative 04/13/2021 7     Eosinophils Relative 04/13/2021 1     Basophils Relative 04/13/2021 1     Neutrophils Absolute 04/13/2021 5 38     Immature Grans Absolute 04/13/2021 0 03     Lymphocytes Absolute 04/13/2021 2 15     Monocytes Absolute 04/13/2021 0 56     Eosinophils Absolute 04/13/2021 0 12     Basophils Absolute 04/13/2021 0 04     POC Glucose 04/13/2021 128              Results from last 7 days   Lab Units 04/13/21  0027 04/12/21  2338   BLOOD CULTURE  Received in Microbiology Lab  Culture in Progress  Received in Microbiology Lab  Culture in Progress         Invalid input(s): LABAEARO            Imaging: I have personally reviewed pertinent films in PACS  EKG, Pathology, and Other Studies: I have personally reviewed pertinent reports        Code Status: Level 1 - Full Code  Advance Directive and Living Will:      Power of :    POLST:

## 2021-04-13 NOTE — PROGRESS NOTES
[address] [facility]  Progress Note - Marilou Martin 1955, 60 y o  female MRN: [id number]  Unit/Bed#: E2 -01 Encounter: [id number]  Primary Care Provider: Chris Reagan MD   Date and time admitted to hospital: 4/12/2021 11:00 PM    * Osteomyelitis of right foot [facility])  Assessment & Plan  -patient presented with right 4th toe gangrene  -she was evaluated by the podiatry team who noted right 4th toe gangrenous ulceration, exposed bone,  concerning for osteomyelitis, and surrounding cellulitis  -they recommended transmetatarsal amputation, as patient had prior 1st and 5th toe amputation  -continue antibiotic with Ancef/Flagyl  -wound cultures pending  -blood cultures pending    JOYCE (acute kidney injury) ([facility] )  Assessment & Plan  Baseline creat between 0 8-1 1  Patient presented with acute kidney injury with a creatinine of 1 4  She was given IV fluids, treated for sepsis with antibiotics, and her lisinopril/HCTZ was held  Creatinine improved to 1 17  Patient received IV contrast for her CTA of the head in the ER:  Continue to monitor creatinine closely      Lab Results   Component Value Date    CREATININE 1 17 04/13/2021    CREATININE 1 40 (H) 04/12/2021    CREATININE 0 89 10/06/2020       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, left  Assessment & Plan  Patient presented with left eye ptosis  CTA of the head and neck without any significant abnormality, acute infarct, or significant stenosis  Patient is evaluated by the neurology team and diagnosed with a left 3rd nerve palsy, 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  Continue supportive measures  Offered eye patch, patient declined  MRI not indicated    Uncomplicated opioid dependence [facility])  Assessment & Plan  Patient has a history of opioid dependence  Continue home regimen with methadone 90mg daily:   Will confirm with patient's methadone clinic    Bipolar disorder [facility])  Assessment & Plan  Patient has history of bipolar disorder  Continue home medications with Lamictal 150 mg daily, cymbalta 60 mg daily, and Zoloft 100 mg daily  Patient was initially placed on clonazepam 1 mg p o  T i d  Scheduled: The PA- website was queried and patient is prescribed 60 tablets of clonazepam per month  Will change her dosing to reflect b i d  Administration    Type 2 diabetes mellitus with diabetic neuropathy, with long-term current use of insulin [facility])  Assessment & Plan  Lab Results   Component Value Date    HGBA1C 11 7 (H) 04/13/2021       Recent Labs     04/13/21  0227 04/13/21  0647 04/13/21  1211   POCGLU 133 128 129       Blood Sugar Average: Last 72 hrs:  (P) 130     Patient has a history of diabetes type 2, with long-term use of insulin, with associated diabetic neuropathy  Uncontrolled diabetes  Last A1c 13  9 -improved to 11  Home regimen: Trulicity weekly (started 1 month ago, receives inj on sundays), Lantus 45 units qHS, Aspart 10 units with meals, metformin 850 mg BID  Will hold oral antihyperglycemics, especially metformin is patient received IV dye  Pt states she only takes 20 of lantus a day since starting on Trulicity, and 40 units/day will cause hypoglycemia  Will start patient on 22 units Lantus nightly  Continue Accuchecks and SSI for coverage   Continue monitor glycemic control  Recommend outpatient Endocrinology follow-up    Hyperlipidemia  Assessment & Plan  Continue atorvastatin    Benign essential hypertension  Assessment & Plan  Patient has a history of essential hypertension  Was noted to be accelerated as an outpatient, with associated headache and blurred vision  Patient takes hydrochlorothiazide and lisinopril 5 mg as an outpatient which were held on admission due to acute kidney injury  Blood pressures been variable since presenting in the ER, most recently systolic in the 740-800'C  Will initiate Norvasc 5 mg daily  Continue hydralazine p r n    Continue to titrate and monitor      Pt states she follows with Jason Arora for her methadone maintenance  [phone]  Called clinic to confirm patient's dose  Left a message for a call back  Patient states she already took her dose of 90 mg for this morning    Family:  Called son Sharifa Jalloh and gave update    dw pts nurse at bedside      VTE Pharmacologic Prophylaxis: Heparin  VTE Mechanical Prophylaxis: sequential compression device        Certification Statement: The patient will continue to require additional inpatient hospital stay due to need for further acute intervention for osteo    Status: inpatient     ===================================================================    Subjective:  Patient is examined postoperatively  She denies any current pain anywhere  She notes her pain is controlled  She denies any foot pain specifically  She also denies any headache  She denies any chest pain  She denies any abdominal pain or back pain  Patient denies any shortness of breath or cough  She denies any nausea, vomiting, diarrhea  She notes she is hungry  Patient notes when she opens her left eye, with the use of her fingers, she has blurred vision  She declines an eye patch      Physical Exam:   Temp:  [97 6 °F (36 4 °C)-98 7 °F (37 1 °C)] 97 9 °F (36 6 °C)  HR:  [] 62  Resp:  [8-18] 16  BP: (113-194)/(57-87) 187/87    Gen:  Pleasant, non-tachypnic, non-dyspnic  Conversant  Heent: L eye ptosis  Unable to adduct left eye past midline  Heart: regular rate and rhythm, S1S2 present, no murmur, rub or gallop  Lungs: clear to ausculatation bilaterally  No wheezing, crackles, or rhonchi  No accessory muscle use or respiratory distress  Abd: soft, non-tender, non-distended  NABS, no guarding, rebound or peritoneal signs  Extremities:  Right lower extremity dressing and Ace wrap in place  Neuro: awake, alert  Fluent and goal directed speech  Moving all 4 extremities        LABS:   Results from last 7 days   Lab Units 04/13/21  0517 04/12/21  2338   WBC Thousand/uL 8 28 11 29*   HEMOGLOBIN g/dL 11 0* 13 9   HEMATOCRIT % 32 5* 41 3   PLATELETS Thousands/uL 300 297     Results from last 7 days   Lab Units 04/13/21  0517 04/12/21  2338   POTASSIUM mmol/L 4 2 4 1   CHLORIDE mmol/L 100 93*   CO2 mmol/L 33* 31   BUN mg/dL 23 26*   CREATININE mg/dL 1 17 1 40*   CALCIUM mg/dL 9 2 11 [address] [place] [address] Data:    4/13 CTA head/neck  1  No acute intracranial CT abnormality  2   Patent major vasculature of [place] of [place] without high-grade stenosis  No aneurysm  Mild atherosclerotic disease of intracranial internal carotid arteries  3   Right greater than left atherosclerotic disease of cervical ICA without hemodynamically significant stenosis  4/12 x-ray right foot  Right 4th toe soft tissue atrophy  Rarefaction of bone mineral density of the 4th proximal phalanx  This may be indicative of a subacute nondisplaced fracture, or osteomyelitis    Microbiology  4/13 wound culture:  Pending  4/13 blood culture:  Pending x2          ---------------------------------------------------------------------------------------------------------------  This note has been constructed using a voice recognition system

## 2021-04-13 NOTE — ASSESSMENT & PLAN NOTE
Patient has a history of essential hypertension  Was noted to be accelerated as an outpatient, with associated headache and blurred vision  Patient takes hydrochlorothiazide and lisinopril 5 mg as an outpatient which were held on admission due to acute kidney injury  Blood pressures been variable since presenting in the ER, most recently systolic in the 562-096'Q  Will initiate Norvasc 5 mg daily  Continue hydralazine p r n    Continue to titrate and monitor

## 2021-04-13 NOTE — ASSESSMENT & PLAN NOTE
POA   States that ptosis had been present for 1 week  She has had headaches and blurry vision starting 3 weeks ago, was evaluated by PCP at that time, thought to be from uncontrolled hypertension as patient had SBP readings in the 180s to 200s  Is also evaluated at [facility] on 3/24/21, although ptosis was not noted at the time  Pt unable to move left eye medially, and difficulty with upward gaze  Able to move eye laterally and down  Sx consistent with a possible 3rd CN palsy  Plan:  - CTA head and neck ordered to r/o ischemia and/or compression of the nerve  - Neurology consult  Appreciate input   - Maintain BP <140  Currently 140/70

## 2021-04-13 NOTE — DISCHARGE INSTRUCTIONS
Discharge Instructions - Podiatry    Weight Bearing Status: Non weight bearing to the right foot (operative foot)    Follow-up appointment instructions: Please report to already scheduled appointment at [facility] wound care center with Dr Maria R Rich 4/20/21 at 10:15AM  Contact sooner if any increase in pain, or signs of infection occur  Please call [place] [facility] [phone] option 1 for questions about scheduling  Wound Care for Visiting Nurses: Please change dressing every MWF with xeroform and dry dressing (4x4, ABD, kerlex, ACE)  Monitor for signs of infection

## 2021-04-13 NOTE — OCCUPATIONAL THERAPY NOTE
Occupational Therapy Cancellation Note        Patient Name: Anurag Friday  HIDVR'O Date: 4/13/2021 04/13/21 1049   Note Type   Note type Evaluation   Cancel Reasons Patient to operating room  (OR for TMA)       Received orders + reviewed chart  Patient with plan to go to OR for TMA  OT will continue to follow and complete initial eval as appropriate      Pradeep Kelley MS, OTR/L

## 2021-04-13 NOTE — PLAN OF CARE
Problem: Potential for Falls  Goal: Patient will remain free of falls  Description: INTERVENTIONS:  - Assess patient frequently for physical needs  -  Identify cognitive and physical deficits and behaviors that affect risk of falls    -  [place] fall precautions as indicated by assessment   - Educate patient/family on patient safety including physical limitations  - Instruct patient to call for assistance with activity based on assessment  - Modify environment to reduce risk of injury  - Consider OT/PT consult to assist with strengthening/mobility  Outcome: Progressing

## 2021-04-13 NOTE — ASSESSMENT & PLAN NOTE
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

## 2021-04-13 NOTE — ED PROVIDER NOTES
History  Chief Complaint   Patient presents with    Toe Injury     reports infection to right 4th toe seen by PCP and told needs amputation hx DM  also reports blurry vision since last week seen at [facility] for this  This is a 71-year-old female with history of diabetes, hypertension who presents with a right toe infection  Patient states that 4 days ago, she noticed a black an area of her right 4th toe  She was seen by her family doctor who stated that she would need surgery  Patient was a direct admission order at [facility]   However, the hospital was full and she would receive a call when a bed was available  However, patient received a call and she was notified that she would have to share room  Patient did not want to share room  Her toe has continued to get worse so she came to the emergency department for evaluation  Denies fever/chills, nausea/vomiting, lightheadedness/dizziness, numbness/weakness, headache, change in vision, URI symptoms, neck pain, chest pain, palpitations, shortness of breath, cough, back pain, flank pain, abdominal pain, diarrhea, hematochezia, melena, dysuria, hematuria, abnormal vaginal discharge/bleeding  Prior to Admission Medications   Prescriptions Last Dose Informant Patient Reported? Taking? DULoxetine (CYMBALTA) 60 mg delayed release capsule   Yes Yes   Sig: Take 60 mg by mouth   Insulin Pen Needle 31G X 4 MM MISC   No No   Sig: by Does not apply route see administration instructions Please use with novolog and lantus to administer insulin three times daily before meals and daily at bedtime     atorvastatin (LIPITOR) 40 mg tablet   Yes Yes   Sig: Take 40 mg by mouth daily    clonazePAM (KlonoPIN) 1 mg tablet  Self Yes Yes   Sig: Take 1 mg by mouth 3 (three) times a day   gabapentin (NEURONTIN) 300 mg capsule  Self Yes Yes   Sig: Take 600 mg by mouth 3 (three) times a day     hydrOXYzine HCL (ATARAX) 50 mg tablet  Self Yes Yes   Sig: Take 50 mg by mouth daily at bedtime   insulin aspart (NOVOLOG FLEXPEN) 100 Units/mL injection pen   No Yes   Sig: Inject 12 Units under the skin 3 (three) times a day with meals   insulin glargine (Lantus SoloStar) 100 units/mL injection pen   No Yes   Sig: Inject 22 Units under the skin every 12 (twelve) hours   lamoTRIgine (LaMICtal) 150 MG tablet  Self Yes Yes   Sig: Take 150 mg by mouth daily   lisinopril (ZESTRIL) 5 mg tablet   No Yes   Sig: Take 1 tablet (5 mg total) by mouth daily   meloxicam (MOBIC) 15 mg tablet   Yes Yes   Sig: Take 15 mg by mouth daily   metFORMIN (GLUCOPHAGE) 850 mg tablet   Yes Yes   Sig: Take 850 mg by mouth 2 (two) times a day   methadone (DOLOPHINE) 5 mg tablet  Self Yes Yes   Sig: Take by mouth daily 15 mg daily    rOPINIRole (REQUIP) 0 5 mg tablet  Self Yes Yes   Sig: Take 0 5 mg by mouth 2 (two) times a day     sertraline (ZOLOFT) 50 mg tablet  Self Yes Yes   Sig: Take 100 mg by mouth daily        Facility-Administered Medications: None       Past Medical History:   Diagnosis Date    B[facility] disorder [facility] [facility] )     Depression     Diabetes mellitus ([facility] )     History of MRSA infection     Hypertension        Past Surgical History:   Procedure Laterality Date    APPENDECTOMY      INCISION AND DRAINAGE OF WOUND Right 7/1/2018    Procedure: INCISION AND DRAINAGE (I&D) RIGHT FOREARM;  Surgeon: Alline Prader, MD;  [facility];  Service: Orthopedics    OK SPLIT [address] <100 SQCM Right 10/17/2018    Procedure: RIGHT ARM SKIN GRAFT SPLIT THICKNESS (STSG);   Surgeon: Shady Bauer MD;  [facility];  Service: Plastics    OK SPL[address] <100 SQCM Right 9/12/2018    Procedure: [place];  Surgeon: Shady Bauer MD;  [facility];  Service: Plastics    TOE AMPUTATION Right 12/10/2019    Procedure: AMPUTATION TOE;  Surgeon: Reginald Jacob DPM;  [facility];  Service: Podiatry    TOE AMPUTATION Right 5/29/2020 Procedure: AMPUTATION TOE, 5TH TOE;  Surgeon: Sammi Cooper DPM;  [facility];  Service: Podiatry    TONSILLECTOMY      VAC DRESSING APPLICATION Right 27/20/5366    Procedure: Jamilah Reyaert;  Surgeon: Carl Romero MD;  [facility];  Service: Plastics    WOUND DEBRIDEMENT Right 7/7/2018    Procedure: DEBRIDEMENT UPPER EXTREMITY ([address]) W/ APPLICATION OF WOUND VAC;  Surgeon: Gurvinder Hunter MD;  [facility];  Service: Orthopedics    WOUND DEBRIDEMENT Right 7/11/2018    Procedure: DEBRIDEMENT UPPER EXTREMITY WITH WOUND VAC EXCHANGE;  Surgeon: Anitra Chang DO;  [facility];  Service: Orthopedics    WOUND DEBRIDEMENT Right 9/12/2018    Procedure: DEBRIDEMENT  [facility] OUT) FOREARM with Vac dressing change;  Surgeon: Carl Romero MD;  [facility];  Service: Plastics    WOUND DEBRIDEMENT Right 5/29/2020    Procedure: Complex Irrigation and DEBRIDEMENT WOUND ([address] OUT), ANKLE;  Surgeon: Sammi Cooper DPM;  [facility];  Service: Podiatry       Family History   Problem Relation Age of Onset    Hypertension Mother         heart attack    Dementia Father         heart atttack/hypertention     I have reviewed and agree with the history as documented  E-Cigarette/Vaping     E-Cigarette/Vaping Substances     Social History     Tobacco Use    Smoking status: Never Smoker    Smokeless tobacco: Never Used   Substance Use Topics    Alcohol use: No     Alcohol/week: 0 0 standard drinks     Frequency: Never     Binge frequency: Never    Drug use: No     Comment: on methadone, Hx herion addiction       Review of Systems   Constitutional: Negative for chills and fever  HENT: Negative for rhinorrhea, sore throat and trouble swallowing  Eyes: Negative for photophobia and visual disturbance  Respiratory: Negative for cough, chest tightness and shortness of breath      Cardiovascular: Negative for chest pain, palpitations and leg swelling  Gastrointestinal: Negative for abdominal pain, blood in stool, diarrhea, nausea and vomiting  Endocrine: Negative for polyuria  Genitourinary: Negative for dysuria, flank pain, hematuria, vaginal bleeding and vaginal discharge  Musculoskeletal: Negative for back pain and neck pain  Skin: Positive for wound  Negative for color change and rash  Allergic/Immunologic: Negative for immunocompromised state  Neurological: Negative for dizziness, weakness, light-headedness, numbness and headaches  All other systems reviewed and are negative  Physical Exam  Physical Exam  Constitutional:       General: She is not in acute distress  Appearance: Normal appearance  She is well-developed  HENT:      Mouth/Throat:      Pharynx: Uvula midline  Neck:      Thyroid: No thyroid mass or thyromegaly  Trachea: Trachea normal    Cardiovascular:      Rate and Rhythm: Normal rate and regular rhythm  Pulses: Normal pulses  Dorsalis pedis pulses are 2+ on the right side  Posterior tibial pulses are 2+ on the right side  Heart sounds: Normal heart sounds  No murmur  Pulmonary:      Effort: Pulmonary effort is normal       Breath sounds: Normal breath sounds  Abdominal:      General: Bowel sounds are normal       Palpations: Abdomen is soft  Tenderness: There is no abdominal tenderness  There is no guarding or rebound  Musculoskeletal:      Comments: Necrotic right 4th toe with erythema into the foot  Erythema of the right 3rd toe as well  Amputated big toe and 5th toe  Skin:     General: Skin is warm and dry  Neurological:      Mental Status: She is alert  Psychiatric:         Speech: Speech normal          Behavior: Behavior normal  Behavior is cooperative  Thought Content:  Thought content normal          Vital Signs  ED Triage Vitals   Temperature Pulse Respirations Blood Pressure SpO2   04/12/21 2048 04/12/21 2048 04/12/21 2048 04/12/21 2048 04/12/21 2048   98 5 °F (36 9 °C) 100 16 168/80 95 %      Temp Source Heart Rate Source Patient Position - Orthostatic VS BP Location FiO2 (%)   04/12/21 2048 04/12/21 2305 04/12/21 2048 04/12/21 2048 --   Oral Monitor Sitting Right arm       Pain Score       04/12/21 2048       Worst Possible Pain           Vitals:    04/12/21 2048 04/12/21 2305 04/13/21 0041   BP: 168/80 (!) 194/86 146/70   Pulse: 100 91 85   Patient Position - Orthostatic VS: Sitting Lying Lying         Visual Acuity      ED Medications  Medications   sodium chloride 0 9 % bolus 1,000 mL (1,000 mL Intravenous New Bag 4/13/21 0039)   ceFAZolin (ANCEF) IVPB (premix in dextrose) 2,000 mg 50 mL (has no administration in time range)   metroNIDAZOLE (FLAGYL) IVPB (premix) 500 mg 100 mL (500 mg Intravenous New Bag 4/13/21 0040)       Diagnostic Studies  Results Reviewed     Procedure Component Value Units Date/Time    Basic metabolic panel [[id number]]  (Abnormal) Collected: 04/12/21 2338    Lab Status: Final result Specimen: Blood from Arm, Right Updated: 04/13/21 0037     Sodium 136 mmol/L      Potassium 4 1 mmol/L      Chloride 93 mmol/L      CO2 31 mmol/L      ANION GAP 12 mmol/L      BUN 26 mg/dL      Creatinine 1 40 mg/dL      Glucose 186 mg/dL      Calcium 11 0 mg/dL      eGFR 40 ml/min/1 73sq m     Narrative:      Rogerio guidelines for Chronic Kidney Disease (CKD):     Stage 1 with normal or high GFR (GFR > 90 mL/min/1 73 square meters)    Stage 2 Mild CKD (GFR = 60-89 mL/min/1 73 square meters)    Stage 3A Moderate CKD (GFR = 45-59 mL/min/1 73 square meters)    Stage 3B Moderate CKD (GFR = 30-44 mL/min/1 73 square meters)    Stage 4 Severe CKD (GFR = 15-29 mL/min/1 73 square meters)    Stage 5 End Stage CKD (GFR <15 mL/min/1 73 square meters)  Note: GFR calculation is accurate only with a steady state creatinine    High sensitivity CRP [[id number]] Collected: 04/12/21 2338    Lab Status: Final result Specimen: Blood from Arm, Right Updated: 04/13/21 0037     CRP, High Sensitivity 39 18 mg/L     Narrative:            HsCRP Level       Relative Risk           <1 0 mg/L          Low           1 0 to 3 0 mg/L    Average           >3 0 mg/L          High        Blood culture #1 [[id number]] Collected: 04/13/21 0027    Lab Status: In process Specimen: Blood from Arm, Left Updated: 04/13/21 0031    Sedimentation rate, automated [[id number]]  (Abnormal) Collected: 04/12/21 2338    Lab Status: Final result Specimen: Blood from Arm, Right Updated: 04/12/21 2354     Sed Rate 68 mm/hour     CBC and differential [[id number]]  (Abnormal) Collected: 04/12/21 2338    Lab Status: Final result Specimen: Blood from Arm, Right Updated: 04/12/21 2352     WBC 11 29 Thousand/uL      RBC 4 79 Million/uL      Hemoglobin 13 9 g/dL      Hematocrit 41 3 %      MCV 86 fL      MCH 29 0 pg      MCHC 33 7 g/dL      RDW 12 8 %      MPV 10 2 fL      Platelets 425 Thousands/uL      nRBC 0 /100 WBCs      Neutrophils Relative 66 %      Immat GRANS % 1 %      Lymphocytes Relative 26 %      Monocytes Relative 5 %      Eosinophils Relative 1 %      Basophils Relative 1 %      Neutrophils Absolute 7 54 Thousands/µL      Immature Grans Absolute 0 06 Thousand/uL      Lymphocytes Absolute 2 94 Thousands/µL      Monocytes Absolute 0 58 Thousand/µL      Eosinophils Absolute 0 11 Thousand/µL      Basophils Absolute 0 06 Thousands/µL     Blood culture #2 [[id number]] Collected: 04/12/21 2338    Lab Status: In process Specimen: Blood from Arm, Right Updated: 04/12/21 2350                 XR foot 3+ views RIGHT   ED Interpretation by Ban Meléndez MD (04/12 2354)   Bony erosion of the proximal phalanx of the 4th metatarsal concerning for osteomyelitis as interpreted by myself  Procedures  Procedures         ED Course  ED Course as of Apr 13 0122   Mon Apr 12, 2021   2668 TT sent to Podiatry  [address] [place] [address] with Podiatry resident    NPO for possible OR tomorrow  He agrees with ancef/flagyl  SLIM admit  Tue Apr 13, 2021   0038 Erickson, receiving fluids   Creatinine(!): 1 40                                           MDM  Number of Diagnoses or Management Options  Diagnosis management comments: Will check labs, blood cultures  X-rays  Antibiotics  Consult Podiatry  Disposition  Final diagnoses:   Osteomyelitis of toe ([facility] [address] )   ERICKSON (acute kidney injury) ([facility] )     Time reflects when diagnosis was documented in both MDM as applicable and the Disposition within this note     Time User Action Codes Description Comment    4/12/2021 11:58 PM Gabrielle Short P Add [M86 9] Osteomyelitis of toe ([facility] [address] )     4/13/2021 12:47 AM Gabrielle Short P Add [N17 9] ERICKSON (acute kidney injury) [facility])       ED Disposition     ED Disposition Condition Date/Time Comment    Admit Stable Mon Apr 12, 2021 11:58 PM           Follow-up Information    None         Patient's Medications   Discharge Prescriptions    No medications on file     No discharge procedures on file      PDMP Review       Value Time User    PDMP Reviewed  Yes 5/31/2020 12:58 PM Leeanna Olmos          ED Provider  Electronically Signed by           Taras Tucker MD  04/13/21 [id number]

## 2021-04-13 NOTE — ASSESSMENT & PLAN NOTE
-patient presented with right 4th toe gangrene  -she was evaluated by the podiatry team who noted right 4th toe gangrenous ulceration, exposed bone,  concerning for osteomyelitis, and surrounding cellulitis  -they recommended transmetatarsal amputation, as patient had prior 1st and 5th toe amputation  -continue antibiotic with Ancef/Flagyl  -wound cultures pending  -blood cultures pending

## 2021-04-13 NOTE — H&P
[address] 1955, 60 y o  female MRN: [id number]  Unit/Bed#: ED 17 Encounter: [id number]  Primary Care Provider: Myrtle Ferrari MD   Date and time admitted to hospital: 4/12/2021 11:00 PM    * Osteomyelitis of right foot [facility])  Assessment & Plan  States she has noticed a blister on her 4th digit of the R foot x 1 week ago  States her shoe feels tight and is applying pressure to her toes, she has tried soft cloth shoes as well as wearing Wide fit shoes and going up a size, still reporting that pressure against her toes  Pressure caused blister to rupture, which then started becoming necrotic x 4 days ago, worsening with each day  Review of patients chart reveals patient was evaluated by her PCP on 4/09/21  Note states patient has had infection for about a month, it was necrotic at that time and pt was made aware she likely has osteomyelitis and will likely need an amputation  Note also states patient has been taking cipro/keflex x 1 week prior to PCP visit  WBC 11  Lactic pending  L Foot xray - pending read  Received Ancef/Flagyl in ED    Plan:  -Continue Ancef/Flagyl  -F/u on lactic  -Podiatry consult  Appreciate recs  - NPO                Ptosis of left eyelid  Assessment & Plan  POA  States that ptosis had been present for 1 week  She has had headaches and blurry vision starting 3 weeks ago, was evaluated by PCP at that time, thought to be from uncontrolled hypertension as patient had SBP readings in the 180s to 200s  Is also evaluated at [facility] on 3/24/21, although ptosis was not noted at the time  Pt unable to move left eye medially, and difficulty with upward gaze  Able to move eye laterally and down  Sx consistent with a possible 3rd CN palsy  Plan:  - CTA head and neck ordered to r/o ischemia and/or compression of the nerve  - Neurology consult  Appreciate input   - Maintain BP <140  Currently 140/70        JOYCE (acute kidney injury) [facility])  Assessment & Plan  Baseline creat between 0 8-1 1  Currently 1 40  Likely prerenal in the setting of acute illness and HCTZ   Received 1L NS in ED  Will give an additional 1 5L at 100cc/hr  Hold Lisinopril and HCTZ  Follow BMP in am    Lab Results   Component Value Date    CREATININE 1 40 (H) 04/12/2021    CREATININE 0 89 10/06/2020    CREATININE 1 06 10/06/2020       Benign essential hypertension  Assessment & Plan  BP reviewed, intially elevated at 170/90, possibly pain contributing  Currently, /70  On Lisnopril-HCTZ at home  Will hold in the setting of JOYCE  IV Hydral PRN for SBP <160    Type 2 diabetes mellitus, uncontrolled (HCC)  Assessment & Plan  Lab Results   Component Value Date    HGBA1C 13 9 (H) 05/30/2020       No results for input(s): POCGLU in the last 72 hours  Blood Sugar Average: Last 72 hrs:  Uncontrolled diabetes  Last A1c 13 9  Home regimen: Trulicity weekly (started 1 month ago, receives inj on sundays), Lantus 45 units qHS, Aspart 10 units with meals, metformin 850 mg BID  Will hold oral antihyperglycemics  Pt states she only takes 20 of lantus a day since starting on Trulicity, and 40 units/day will cause hypoglycemia  Will start patient on 22 units Lantus nightly  Accuchecks q6h and SSI for coverage while patient is NPO    Will order new A1c   NPO  Will place endocrinology consult as this is the patient's 3rd toe amputations over the last 2 years, and she remains uncontrolled    Uncomplicated opioid dependence ([facility] )  Assessment & Plan  On methadone 90mg daily    Bipolar disorder ([facility] )  Assessment & Plan  Continue Lamictal, cymbalta, and Zoloft    Hyperlipidemia  Assessment & Plan  Continue atorvastatin    VTE Prophylaxis: Heparin  / sequential compression device   Code Status:  Level 1 full code  POLST: There is no POLST form on file for this patient (pre-hospital)  Discussion with family:  Patient    Anticipated Length of Stay:  Patient will be admitted on an Inpatient basis with an anticipated length of stay of  greater than 2 midnights  Justification for Hospital Stay:  Osteomyelitis requiring IV abx and likely surgical intervention    Total Time for Visit, including Counseling / Coordination of Care: 30 minutes  Greater than 50% of this total time spent on direct patient counseling and coordination of care  Chief Complaint:   Toe pain    History of Present Illness: Talib Lopez is a 60 y o  female with PMH uncontrolled diabetes, HLD, HTN, bipolar disorder, opioid dependence on methadone, history of MRSA infection who presents with left 4th digit toe pain x1 week  She states that she initially noticed a blister on her foot about 1 week ago, the blister than bursted open and started becoming necrotic x3 days ago  Associated severe pain  She states that her shoe feels tight as though it is applying pressure to each digit of her left foot  She has tried wearing different types of shoes, include like clots shoes, wide fit shoes, and size up without relief  Of note, she was evaluated by her PCP on 04/09/2021 who noted necrosis and recommended patient received MRI for osteomyelitis and possible surgical intervention  PCP note also mentions that patient has been taken Cipro/Keflex x1 week prior to PCP visit  In the ED, vital signs are stable and labs are unremarkable except for elevated creatinine to 1 4  Left foot x-ray demonstrates bony erosion, concerning for osteomyelitis  She denies fevers, chills, chest pain, shortness of breath, nausea, vomiting, lightheadedness, abdominal pain, changes in bowel or urinary habits  Additionally, patient has ptosis of the left eye present on arrival   She states this has been present for 1 week  Knows that she has been having blurry vision and headaches for the last 3 weeks for which she was seen by her PCP and noted to have very elevated blood pressures, with systolics in the 386H to 568S    This was evaluated by PCP on 03/24/2021 and thought to be the reason for her headaches and blurry vision  She was subsequently evaluated at [facility], who did not know any ptosis at that time  On exam, patient has marked ptosis with inability to move left eye medially and difficulty with upwards gaze, concerning for possible cranial nerve 3 palsy  She has not seen an ophthalmologist in quite some time  Review of Systems:    Review of Systems   Constitutional: Negative for appetite change, chills, fatigue and fever  HENT: Negative for ear pain, sore throat and trouble swallowing  Eyes: Negative for visual disturbance (blurry vision)  Respiratory: Negative for cough, chest tightness, shortness of breath and wheezing  Cardiovascular: Negative for chest pain, palpitations and leg swelling  Gastrointestinal: Negative for abdominal distention, abdominal pain, diarrhea, nausea and vomiting  Endocrine: Negative  Genitourinary: Negative for dysuria  Musculoskeletal: Positive for gait problem (due to 4th digit toe pain)  Negative for myalgias  Skin: Positive for wound (necrosis of 4th digit on L foot)  Negative for pallor  Allergic/Immunologic: Negative for immunocompromised state  Neurological: Negative for dizziness, syncope, light-headedness, numbness and headaches  Ptosis of L eye       Past Medical and Surgical History:     Past Medical History:   Diagnosis Date    [facility]     Depression     Diabetes mellitus [facility]     History of MRSA infection     Hypertension        Past Surgical History:   Procedure Laterality Date    APPENDECTOMY      INCISION AND DRAINAGE OF WOUND Right 7/1/2018    Procedure: INCISION AND DRAINAGE (I&D) RIGHT FOREARM;  Surgeon: Orin Huffman MD;  [facility];  Service: Orthopedics    [address] SPLIT [address] <100 SQCM Right 10/17/2018    Procedure: RIGHT ARM SKIN GRAFT SPLIT THICKNESS (STSG);   Surgeon: Maira Rodriguez MD;  Location:  MAIN OR;  Service: Plastics    SC SPLIT GRFT TRUNK,ARM,LEG <100 SQCM Right 9/12/2018    Procedure: Deborah Dubs;  Surgeon: Neisha Fernandes MD;  [facility]: [facility];  Service: Plastics    TOE AMPUTATION Right 12/10/2019    Procedure: AMPUTATION TOE;  Surgeon: Alyse Ring DPM;  [facility];  Service: Podiatry    TOE AMPUTATION Right 5/29/2020    Procedure: AMPUTATION TOE, 5TH TOE;  Surgeon: Ramya Vargas DPM;  [facility];  Service: Podiatry    TONSILLECTOMY      VAC DRESSING APPLICATION Right 23/13/4832    Procedure: [place];  Surgeon: Neisha Fernandes MD;  [facility];  Service: Plastics    WOUND DEBRIDEMENT Right 7/7/2018    Procedure: DEBRIDEMENT UPPER EXTREMITY ([address]) W/ APPLICATION OF WOUND VAC;  Surgeon: Houston Duron MD;  [facility];  Service: Orthopedics    WOUND DEBRIDEMENT Right 7/11/2018    Procedure: DEBRIDEMENT UPPER EXTREMITY WITH WOUND VAC EXCHANGE;  Surgeon: Malu Rivero DO;  [facility];  Service: Orthopedics    WOUND DEBRIDEMENT Right 9/12/2018    Procedure: DEBRIDEMENT  [facility] OUT) FOREARM with Vac dressing change;  Surgeon: Neisha Fernandes MD;  [facility];  Service: Plastics    WOUND DEBRIDEMENT Right 5/29/2020    Procedure: Complex Irrigation and DEBRIDEMENT WOUND ([address] OUT), ANKLE;  Surgeon: Ramya Vargas DPM;  [facility];  Service: Podiatry       Meds/Allergies:    Prior to Admission medications    Medication Sig Start Date End Date Taking?  Authorizing Provider   atorvastatin (LIPITOR) 40 mg tablet Take 40 mg by mouth daily  10/30/18 4/12/21 Yes Historical Provider, MD   clonazePAM (KlonoPIN) 1 mg tablet Take 1 mg by mouth 3 (three) times a day   Yes Historical Provider, MD   DULoxetine (CYMBALTA) 60 mg delayed release capsule Take 60 mg by mouth 12/26/18 4/12/21 Yes Historical Provider, MD   gabapentin (NEURONTIN) 300 mg capsule Take 600 mg by mouth 3 (three) times a day Yes Historical Provider, MD   hydrOXYzine HCL (ATARAX) 50 mg tablet Take 50 mg by mouth daily at bedtime   Yes Historical Provider, MD   insulin aspart (NOVOLOG FLEXPEN) 100 Units/mL injection pen Inject 12 Units under the skin 3 (three) times a day with meals 12/12/19 4/12/21 Yes Kenya Marroquin PA-C   insulin glargine (Lantus SoloStar) 100 units/mL injection pen Inject 22 Units under the skin every 12 (twelve) hours 6/3/20 4/12/21 Yes Bailee Dunn MD   lamoTRIgine (LaMICtal) 150 MG tablet Take 150 mg by mouth daily   Yes Historical Provider, MD   lisinopril (ZESTRIL) 5 mg tablet Take 1 tablet (5 mg total) by mouth daily 12/12/19  Yes Lynn Shah PA-C   meloxicam (MOBIC) 15 mg tablet Take 15 mg by mouth daily 3/10/21 3/10/22 Yes Historical Provider, MD   metFORMIN (GLUCOPHAGE) 850 mg tablet Take 850 mg by mouth 2 (two) times a day 3/10/21  Yes Historical Provider, MD   methadone (DOLOPHINE) 5 mg tablet Take by mouth daily 15 mg daily    Yes Historical Provider, MD   rOPINIRole (REQUIP) 0 5 mg tablet Take 0 5 mg by mouth 2 (two) times a day     Yes Historical Provider, MD   sertraline (ZOLOFT) 50 mg tablet Take 100 mg by mouth daily     Yes Historical Provider, MD   Insulin Pen Needle 31G X 4 MM MISC by Does not apply route see administration instructions Please use with novolog and lantus to administer insulin three times daily before meals and daily at bedtime  12/12/19   Kenya Marroquin PA-C     I have reviewed home medications with patient personally      Allergies: No Known Allergies    Social History:     Marital Status: Single   Occupation:  Noncontributory  Patient Pre-hospital Living Situation:  Home  Patient Pre-hospital Level of Mobility:  Independent  Patient Pre-hospital Diet Restrictions:  Diabetic  Substance Use History:   Social History     Substance and Sexual Activity   Alcohol Use No    Alcohol/week: 0 0 standard drinks    Frequency: Never    Binge frequency: Never     Social History     Tobacco Use   Smoking Status Never Smoker   Smokeless Tobacco Never Used     Social History     Substance and Sexual Activity   Drug Use No    Comment: on methadone, Hx herion addiction       Family History:    non-contributory    Physical Exam:     Vitals:   Blood Pressure: 146/70 (04/13/21 0041)  Pulse: 85 (04/13/21 0041)  Temperature: 98 5 °F (36 9 °C) (04/13/21 0041)  Temp Source: Oral (04/12/21 2048)  Respirations: 18 (04/13/21 0041)  Height: 5' 6" (167 6 cm) (04/13/21 0041)  Weight - Scale: 66 4 kg (146 lb 6 2 oz) (04/13/21 0041)  SpO2: 97 % (04/13/21 0041)    Physical Exam  Vitals signs and nursing note reviewed  Constitutional:       Appearance: Normal appearance  HENT:      Head: Normocephalic and atraumatic  Mouth/Throat:      Mouth: Mucous membranes are moist       Pharynx: Oropharynx is clear  No oropharyngeal exudate  Eyes:      Comments: L eye unable to medial and upward gaze, ptosis present   Cardiovascular:      Rate and Rhythm: Normal rate and regular rhythm  Pulses: Normal pulses  Heart sounds: Normal heart sounds  No murmur  No friction rub  No gallop  Pulmonary:      Effort: Pulmonary effort is normal  No respiratory distress  Breath sounds: Normal breath sounds  No stridor  No wheezing or rales  Abdominal:      General: Abdomen is flat  Bowel sounds are normal  There is no distension  Palpations: Abdomen is soft  Tenderness: There is no abdominal tenderness  Musculoskeletal:         General: Tenderness and signs of injury (Necrosis of 4th digit of L foot) present  Right lower leg: No edema  Left lower leg: No edema  Skin:     General: Skin is warm and dry  Neurological:      Mental Status: She is alert and oriented to person, place, and time  Cranial Nerves: Cranial nerve deficit (CN 3 ) present  Sensory: No sensory deficit  Motor: No weakness        Coordination: Coordination normal        Additional Data: Lab Results: I have personally reviewed pertinent reports  Results from last 7 days   Lab Units 04/12/21  2338   WBC Thousand/uL 11 29*   HEMOGLOBIN g/dL 13 9   HEMATOCRIT % 41 3   PLATELETS Thousands/uL 297   NEUTROS PCT % 66   LYMPHS PCT % 26   MONOS PCT % 5   EOS PCT % 1     Results from last 7 days   Lab Units 04/12/21  2338   SODIUM mmol/L 136   POTASSIUM mmol/L 4 1   CHLORIDE mmol/L 93*   CO2 mmol/L 31   BUN mg/dL 26*   CREATININE mg/dL 1 40*   ANION GAP mmol/L 12   CALCIUM mg/dL 11 0*   GLUCOSE RANDOM mg/dL 186*         Results from last 7 days   Lab Units 04/13/21  0227   POC GLUCOSE mg/dl 133               Imaging: I have personally reviewed pertinent reports  XR foot 3+ views RIGHT   ED Interpretation by Lorne Aldridge MD (04/12 3264)   Bony erosion of the proximal phalanx of the 4th metatarsal concerning for osteomyelitis as interpreted by myself  CTA head and neck w wo contrast    (Results Pending)       EKG, Pathology, and Other Studies Reviewed on Admission:   · EKG  · Left foot x-ray    Allscripts / Epic Records Reviewed: Yes     ** Please Note: This note has been constructed using a voice recognition system   **

## 2021-04-13 NOTE — ASSESSMENT & PLAN NOTE
Lab Results   Component Value Date    HGBA1C 11 7 (H) 04/13/2021       Recent Labs     04/13/21  0227 04/13/21  0647 04/13/21  1211   POCGLU 133 128 129       Blood Sugar Average: Last 72 hrs:  (P) 130     Patient has a history of diabetes type 2, with long-term use of insulin, with associated diabetic neuropathy  Uncontrolled diabetes  Last A1c 13  9 -improved to 11  Home regimen: Trulicity weekly (started 1 month ago, receives inj on sundays), Lantus 45 units qHS, Aspart 10 units with meals, metformin 850 mg BID  Will hold oral antihyperglycemics, especially metformin is patient received IV dye  Pt states she only takes 20 of lantus a day since starting on Trulicity, and 40 units/day will cause hypoglycemia  Will start patient on 22 units Lantus nightly  Continue Accuchecks and SSI for coverage     Continue monitor glycemic control  Recommend outpatient Endocrinology follow-up

## 2021-04-13 NOTE — ASSESSMENT & PLAN NOTE
Baseline creat between 0 8-1 1    Patient presented with acute kidney injury with a creatinine of 1 4  She was given IV fluids, treated for sepsis with antibiotics, and her lisinopril/HCTZ was held  Creatinine improved to 1 17  Patient received IV contrast for her CTA of the head in the ER:  Continue to monitor creatinine closely      Lab Results   Component Value Date    CREATININE 1 17 04/13/2021    CREATININE 1 40 (H) 04/12/2021    CREATININE 0 89 10/06/2020

## 2021-04-14 LAB
ANION GAP SERPL CALCULATED.3IONS-SCNC: 7 MMOL/L (ref 4–13)
BASOPHILS # BLD AUTO: 0.04 THOUSANDS/ΜL (ref 0–0.1)
BASOPHILS NFR BLD AUTO: 1 % (ref 0–1)
BUN SERPL-MCNC: 16 MG/DL (ref 5–25)
CALCIUM SERPL-MCNC: 9.5 MG/DL (ref 8.3–10.1)
CHLORIDE SERPL-SCNC: 102 MMOL/L (ref 100–108)
CO2 SERPL-SCNC: 33 MMOL/L (ref 21–32)
CREAT SERPL-MCNC: 1.13 MG/DL (ref 0.6–1.3)
EOSINOPHIL # BLD AUTO: 0.09 THOUSAND/ΜL (ref 0–0.61)
EOSINOPHIL NFR BLD AUTO: 1 % (ref 0–6)
ERYTHROCYTE [DISTWIDTH] IN BLOOD BY AUTOMATED COUNT: 12.9 % (ref 11.6–15.1)
GFR SERPL CREATININE-BSD FRML MDRD: 51 ML/MIN/1.73SQ M
GLUCOSE SERPL-MCNC: 193 MG/DL (ref 65–140)
GLUCOSE SERPL-MCNC: 208 MG/DL (ref 65–140)
GLUCOSE SERPL-MCNC: 306 MG/DL (ref 65–140)
GLUCOSE SERPL-MCNC: 75 MG/DL (ref 65–140)
GLUCOSE SERPL-MCNC: 97 MG/DL (ref 65–140)
HCT VFR BLD AUTO: 33.2 % (ref 34.8–46.1)
HGB BLD-MCNC: 10.9 G/DL (ref 11.5–15.4)
IMM GRANULOCYTES # BLD AUTO: 0.02 THOUSAND/UL (ref 0–0.2)
IMM GRANULOCYTES NFR BLD AUTO: 0 % (ref 0–2)
LYMPHOCYTES # BLD AUTO: 1.79 THOUSANDS/ΜL (ref 0.6–4.47)
LYMPHOCYTES NFR BLD AUTO: 24 % (ref 14–44)
MCH RBC QN AUTO: 29.3 PG (ref 26.8–34.3)
MCHC RBC AUTO-ENTMCNC: 32.8 G/DL (ref 31.4–37.4)
MCV RBC AUTO: 89 FL (ref 82–98)
MONOCYTES # BLD AUTO: 0.54 THOUSAND/ΜL (ref 0.17–1.22)
MONOCYTES NFR BLD AUTO: 7 % (ref 4–12)
NEUTROPHILS # BLD AUTO: 4.99 THOUSANDS/ΜL (ref 1.85–7.62)
NEUTS SEG NFR BLD AUTO: 67 % (ref 43–75)
NRBC BLD AUTO-RTO: 0 /100 WBCS
PLATELET # BLD AUTO: 289 THOUSANDS/UL (ref 149–390)
PMV BLD AUTO: 9.6 FL (ref 8.9–12.7)
POTASSIUM SERPL-SCNC: 3.9 MMOL/L (ref 3.5–5.3)
RBC # BLD AUTO: 3.72 MILLION/UL (ref 3.81–5.12)
SODIUM SERPL-SCNC: 142 MMOL/L (ref 136–145)
WBC # BLD AUTO: 7.47 THOUSAND/UL (ref 4.31–10.16)

## 2021-04-14 PROCEDURE — 97167 OT EVAL HIGH COMPLEX 60 MIN: CPT

## 2021-04-14 PROCEDURE — 82948 REAGENT STRIP/BLOOD GLUCOSE: CPT

## 2021-04-14 PROCEDURE — 99024 POSTOP FOLLOW-UP VISIT: CPT | Performed by: PODIATRIST

## 2021-04-14 PROCEDURE — 80048 BASIC METABOLIC PNL TOTAL CA: CPT | Performed by: INTERNAL MEDICINE

## 2021-04-14 PROCEDURE — 97163 PT EVAL HIGH COMPLEX 45 MIN: CPT

## 2021-04-14 PROCEDURE — 85025 COMPLETE CBC W/AUTO DIFF WBC: CPT | Performed by: INTERNAL MEDICINE

## 2021-04-14 PROCEDURE — 99232 SBSQ HOSP IP/OBS MODERATE 35: CPT | Performed by: PHYSICIAN ASSISTANT

## 2021-04-14 PROCEDURE — 99223 1ST HOSP IP/OBS HIGH 75: CPT

## 2021-04-14 RX ORDER — OXYCODONE HYDROCHLORIDE 10 MG/1
10 TABLET ORAL EVERY 4 HOURS PRN
Status: DISCONTINUED | OUTPATIENT
Start: 2021-04-14 | End: 2021-04-17 | Stop reason: HOSPADM

## 2021-04-14 RX ORDER — METRONIDAZOLE 500 MG/1
500 TABLET ORAL EVERY 8 HOURS SCHEDULED
Status: DISCONTINUED | OUTPATIENT
Start: 2021-04-14 | End: 2021-04-17 | Stop reason: HOSPADM

## 2021-04-14 RX ORDER — OXYCODONE HYDROCHLORIDE 5 MG/1
5 TABLET ORAL EVERY 4 HOURS PRN
Status: DISCONTINUED | OUTPATIENT
Start: 2021-04-14 | End: 2021-04-17 | Stop reason: HOSPADM

## 2021-04-14 RX ADMIN — METRONIDAZOLE 500 MG: 500 INJECTION, SOLUTION INTRAVENOUS at 09:40

## 2021-04-14 RX ADMIN — LAMOTRIGINE 150 MG: 25 TABLET ORAL at 09:36

## 2021-04-14 RX ADMIN — SERTRALINE HYDROCHLORIDE 100 MG: 100 TABLET ORAL at 09:37

## 2021-04-14 RX ADMIN — GABAPENTIN 600 MG: 300 CAPSULE ORAL at 09:36

## 2021-04-14 RX ADMIN — HEPARIN SODIUM 5000 UNITS: 5000 INJECTION INTRAVENOUS; SUBCUTANEOUS at 13:23

## 2021-04-14 RX ADMIN — HEPARIN SODIUM 5000 UNITS: 5000 INJECTION INTRAVENOUS; SUBCUTANEOUS at 22:01

## 2021-04-14 RX ADMIN — METHADONE HYDROCHLORIDE 90 MG: 10 TABLET ORAL at 09:36

## 2021-04-14 RX ADMIN — INSULIN GLARGINE 22 UNITS: 100 INJECTION, SOLUTION SUBCUTANEOUS at 22:02

## 2021-04-14 RX ADMIN — HEPARIN SODIUM 5000 UNITS: 5000 INJECTION INTRAVENOUS; SUBCUTANEOUS at 05:40

## 2021-04-14 RX ADMIN — INSULIN LISPRO 1 UNITS: 100 INJECTION, SOLUTION INTRAVENOUS; SUBCUTANEOUS at 12:18

## 2021-04-14 RX ADMIN — CLONAZEPAM 1 MG: 1 TABLET ORAL at 09:36

## 2021-04-14 RX ADMIN — CEFAZOLIN SODIUM 2000 MG: 2 SOLUTION INTRAVENOUS at 18:09

## 2021-04-14 RX ADMIN — DULOXETINE HYDROCHLORIDE 60 MG: 60 CAPSULE, DELAYED RELEASE ORAL at 09:35

## 2021-04-14 RX ADMIN — CEFAZOLIN SODIUM 2000 MG: 2 SOLUTION INTRAVENOUS at 01:39

## 2021-04-14 RX ADMIN — METRONIDAZOLE 500 MG: 500 INJECTION, SOLUTION INTRAVENOUS at 16:15

## 2021-04-14 RX ADMIN — ATORVASTATIN CALCIUM 40 MG: 40 TABLET, FILM COATED ORAL at 09:36

## 2021-04-14 RX ADMIN — OXYCODONE HYDROCHLORIDE 10 MG: 10 TABLET ORAL at 22:07

## 2021-04-14 RX ADMIN — GABAPENTIN 600 MG: 300 CAPSULE ORAL at 18:10

## 2021-04-14 RX ADMIN — CLONAZEPAM 1 MG: 1 TABLET ORAL at 18:09

## 2021-04-14 RX ADMIN — CEFAZOLIN SODIUM 2000 MG: 2 SOLUTION INTRAVENOUS at 10:30

## 2021-04-14 RX ADMIN — INSULIN LISPRO 1 UNITS: 100 INJECTION, SOLUTION INTRAVENOUS; SUBCUTANEOUS at 18:11

## 2021-04-14 RX ADMIN — INSULIN LISPRO 3 UNITS: 100 INJECTION, SOLUTION INTRAVENOUS; SUBCUTANEOUS at 22:01

## 2021-04-14 RX ADMIN — METRONIDAZOLE 500 MG: 500 TABLET ORAL at 22:00

## 2021-04-14 RX ADMIN — HYDROXYZINE HYDROCHLORIDE 50 MG: 25 TABLET ORAL at 21:59

## 2021-04-14 NOTE — PROGRESS NOTES
[address] [place] [address]  Progress Note - Grace Nair 1955, 60 y o  female MRN: [id number]  Unit/Bed#: E2 -01 Encounter: [id number]  Primary Care Provider: Shima Davila MD   Date and time admitted to hospital: 4/12/2021 11:00 PM    * Osteomyelitis of right foot [facility])  Assessment & Plan  -patient presented with right 4th toe gangrene  -she was evaluated by the podiatry team who noted right 4th toe gangrenous ulceration, exposed bone,  concerning for osteomyelitis, and surrounding cellulitis  -s/p right transmetatarsal amputation on 4/13/2021  -continue antibiotic with Ancef/Flagyl  -wound cultures pending  -blood cultures pending    JOYCE (acute kidney injury) ([facility] [address] )  Assessment & Plan  Baseline creat between 0 8-1 1  Patient presented with acute kidney injury with a creatinine of 1 4  She was given IV fluids, treated for sepsis with antibiotics, and her lisinopril/HCTZ was held  Creatinine improved to 1 13  Patient received IV contrast for her CTA of the head in the ER:  Continue to monitor creatinine closely      Lab Results   Component Value Date    CREATININE 1 13 04/14/2021    CREATININE 1 17 04/13/2021    CREATININE 1 40 (H) 04/12/2021       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, left  Assessment & Plan  Patient presented with left eye ptosis  CTA of the head and neck without any significant abnormality, acute infarct, or significant stenosis  Patient is evaluated by the neurology team and diagnosed with a left 3rd nerve palsy, 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  Continue supportive measures  Offered eye patch, patient declined  MRI not indicated    Uncomplicated opioid dependence [facility])  Assessment & Plan  Patient has a history of opioid dependence  Continue home regimen with methadone 90mg daily:   Will confirm with patient's methadone clinic    Bipolar disorder [facility])  Assessment & Plan  Patient has history of bipolar disorder  Continue home medications with Lamictal 150 mg daily, cymbalta 60 mg daily, and Zoloft 100 mg daily  Patient was initially placed on clonazepam 1 mg p o  T i d  Scheduled: The [facility] website was queried and patient is prescribed 60 tablets of clonazepam per month  Dosing was changed to reflect b i d  Administration    Type 2 diabetes mellitus with diabetic neuropathy, with long-term current use of insulin [facility])  Assessment & Plan  Lab Results   Component Value Date    HGBA1C 11 7 (H) 04/13/2021       Recent Labs     04/13/21  1706 04/13/21  2335 04/14/21  0627 04/14/21  1123   POCGLU 93 199* 75 208*       Blood Sugar Average: Last 72 hrs:  (P) 137 [id number]     Patient has a history of diabetes type 2, with long-term use of insulin, with associated diabetic neuropathy  Uncontrolled diabetes  Last A1c 13  9 -improved to 11  Home regimen: Trulicity weekly (started 1 month ago, receives inj on sundays), Lantus 45 units qHS, Aspart 10 units with meals, metformin 850 mg BID  Will hold oral antihyperglycemics, especially metformin is patient received IV dye  Pt states she only takes 20 of lantus a day since starting on Trulicity, and 40 units/day will cause hypoglycemia  Patient started on 22 units Lantus nightly  Continue Accuchecks and SSI for coverage   Continue monitor glycemic control  Recommend outpatient Endocrinology follow-up    Hyperlipidemia  Assessment & Plan  Continue atorvastatin    Benign essential hypertension  Assessment & Plan  Patient has a history of essential hypertension  Was noted to be accelerated as an outpatient, with associated headache and blurred vision  Patient takes hydrochlorothiazide and lisinopril 5 mg as an outpatient which were held on admission due to acute kidney injury  Blood pressures been variable since presenting in the ER  Blood pressure currently elevated 143/64  Initiated on Norvasc 5 mg daily  Continue hydralazine p r n    Continue to titrate and monitor      VTE Pharmacologic Prophylaxis:   Pharmacologic: Heparin  Mechanical VTE Prophylaxis in Place: Yes    Patient Centered Rounds: I have performed bedside rounds with nursing staff today  Discussions with Specialists or Other Care Team Provider: nursing, CM    Education and Discussions with Family / Patient: patient    Time Spent for Care: 20 minutes  More than 50% of total time spent on counseling and coordination of care as described above  Current Length of Stay: 1 day(s)    Current Patient Status: Inpatient   Certification Statement: The patient will continue to require additional inpatient hospital stay due to continued need for IV antibiotics and awaiting culture results  Discharge Plan: TBD    Code Status: Level 1 - Full Code      Subjective: The patient was seen and examined  The patient admits to mild foot pain otherwise is feeling well  Objective:     Vitals:   Temp (24hrs), Av 6 °F (36 4 °C), Min:97 2 °F (36 2 °C), Max:97 9 °F (36 6 °C)    Temp:  [97 2 °F (36 2 °C)-97 9 °F (36 6 °C)] 97 8 °F (36 6 °C)  HR:  [60-93] 93  Resp:  [8-18] 18  BP: (113-187)/(57-87) 143/64  SpO2:  [94 %-100 %] 100 %  Body mass index is 23 63 kg/m²  Input and Output Summary (last 24 hours): Intake/Output Summary (Last 24 hours) at 2021 1151  Last data filed at 2021 0500  Gross per 24 hour   Intake 300 ml   Output 1100 ml   Net -800 ml       Physical Exam:     Physical Exam  Vitals signs and nursing note reviewed  Constitutional:       General: She is awake  Appearance: Normal appearance  Cardiovascular:      Rate and Rhythm: Normal rate and regular rhythm  Pulmonary:      Effort: Pulmonary effort is normal       Breath sounds: Normal breath sounds  No wheezing, rhonchi or rales  Abdominal:      General: Bowel sounds are normal       Palpations: Abdomen is soft  Tenderness: There is no abdominal tenderness  Feet:      Comments: Left foot with surgical dressing in place  Skin:     General: Skin is warm and dry     Neurological:      General: No focal deficit present  Mental Status: She is alert and oriented to person, place, and time  Psychiatric:         Attention and Perception: Attention normal          Mood and Affect: Mood normal          Speech: Speech normal          Behavior: Behavior is cooperative  Additional Data:     Labs:    Results from last 7 days   Lab Units 04/14/21  0653   WBC Thousand/uL 7 47   HEMOGLOBIN g/dL 10 9*   HEMATOCRIT % 33 2*   PLATELETS Thousands/uL 289   NEUTROS PCT % 67   LYMPHS PCT % 24   MONOS PCT % 7   EOS PCT % 1     Results from last 7 days   Lab Units 04/14/21  0653   SODIUM mmol/L 142   POTASSIUM mmol/L 3 9   CHLORIDE mmol/L 102   CO2 mmol/L 33*   BUN mg/dL 16   CREATININE mg/dL 1 13   ANION GAP mmol/L 7   CALCIUM mg/dL 9 5   GLUCOSE RANDOM mg/dL 97         Results from last 7 days   Lab Units 04/14/21  1123 04/14/21  0627 04/13/21  2335 04/13/21  1706 04/13/21  1211 04/13/21  0647 04/13/21  0227   POC GLUCOSE mg/dl 208* 75 199* 93 129 128 133     Results from last 7 days   Lab Units 04/13/21  0251   HEMOGLOBIN A1C % 11 7*     Results from last 7 days   Lab Units 04/13/21  0251   LACTIC ACID mmol/L 1 5           * I Have Reviewed All Lab Data Listed Above  * Additional Pertinent Lab Tests Reviewed: All Labs Within Last 24 Hours Reviewed    Imaging:    Imaging Reports Reviewed Today Include: none  Imaging Personally Reviewed by Myself Includes:  none    Recent Cultures (last 7 days):     Results from last 7 days   Lab Units 04/13/21  0946 04/13/21  0027 04/12/21  2338   BLOOD CULTURE   --  No Growth at 24 hrs  No Growth at 24 hrs     GRAM STAIN RESULT  1+ Polys*  4+ Gram negative rods*  1+ Gram positive cocci in pairs*  --   --        Last 24 Hours Medication List:   Current Facility-Administered Medications   Medication Dose Route Frequency Provider Last Rate    acetaminophen  650 mg Oral Q6H PRN Hassan Karlos, DPM      atorvastatin  40 mg Oral Daily Hassan Karlos, DPM      calcium carbonate 1,000 mg Oral Daily PRN Anna Duron, DPM      cefazolin  2,000 mg Intravenous Q8H Anna Duron, DPM 2,000 mg (04/14/21 1030)    clonazePAM  1 mg Oral BID Fran Rose MD      DULoxetine  60 mg Oral Daily Anna Duron, DPM      gabapentin  600 mg Oral TID Anna Duron, DPM      heparin (porcine)  5,000 Units Subcutaneous [facility] Anna Duron, DPM      hydrALAZINE  5 mg Intravenous Q6H PRN Anna Duron, DPM      Or    hydrALAZINE  10 mg Intravenous Q6H PRN Anna Duron, DPM      hydrOXYzine HCL  50 mg Oral HS Anna Duron, DPM      insulin glargine  22 Units Subcutaneous HS Anna Duron, DPM      insulin lispro  1-5 Units Subcutaneous Q6H [address] Anna Duron, DPM      lamoTRIgine  150 mg Oral Daily Anna Duron, DPM      methadone  90 mg Oral Daily Anna Duron, DPM      metroNIDAZOLE  500 mg Intravenous Q8H Amna Dominique,  mg (04/14/21 0940)    ondansetron  4 mg Intravenous Q6H PRN Anna Duron, DPM      oxyCODONE  5 mg Oral Q4H PRN Xenia Aguilera PA-C      Or    oxyCODONE  10 mg Oral Q4H PRN Xenia Aguilera PA-C      sertraline  100 mg Oral Daily Annacj Duron, DPM          Today, Patient Was Seen By: Xenia Aguilera PA-C    ** Please Note: Dictation voice to text software may have been used in the creation of this document   **

## 2021-04-14 NOTE — ASSESSMENT & PLAN NOTE
-patient presented with right 4th toe gangrene  -she was evaluated by the podiatry team who noted right 4th toe gangrenous ulceration, exposed bone,  concerning for osteomyelitis, and surrounding cellulitis  -s/p right transmetatarsal amputation on 4/13/2021  -continue antibiotic with Ancef/Flagyl  -wound cultures pending  -blood cultures pending

## 2021-04-14 NOTE — PLAN OF CARE
Problem: OCCUPATIONAL THERAPY ADULT  Goal: Performs self-care activities at highest level of function for planned discharge setting  See evaluation for individualized goals  Description: Treatment Interventions: ADL retraining, Visual perceptual retraining, Functional transfer training, UE strengthening/ROM, Endurance training, Patient/family training, Equipment evaluation/education, Compensatory technique education, Energy conservation, Activityengagement          See flowsheet documentation for full assessment, interventions and recommendations  Note: Limitation: Decreased ADL status, Decreased Safe judgement during ADL, Decreased endurance, Visual deficit, Decreased self-care trans  Prognosis: Fair  Assessment: Pt is a 60 y o  female admitted to [place] [address] on 4/12/21 with c/o R 4th digit toe/foot pain  Pt noted with osteomyelitis of R foot, 3rd L cranial nerve palsy, JOYCE, and ptosis of L eyelid  Pt is s/p TMA of 4th toe (4/13)  Pt is currently NWB RLE  Pt PMH includes uncontrolled DM, HLD, HTN, bipolar disorder, opioid dependence on methadone, hx of MRSA, and R toe amputations  At baseline PTA, pt states independence with all aspects of her ADLs, transfers, ambulation with no AD; neg falls, neg , + home alone  During evaluation of functional mobility, pt demonstrated min A c sit<>stand transfers and ambulation using RW with cueing throughout for safety and positioning  For the initial eval, pt demonstrated deficits c activity tolerance (currently fair, 10-15 min), functional mobility, functional balance, vision, ADL status, and transfer safety  Pt would benefit from continued OT tx to improve their level of independence and address the above deficits, 3-5xwk/1-2 wks  Currently recommend D/C to STR when medically appropriate  OT will continue to follow pt on OT caseload with the following goals       OT Discharge Recommendation: Post acute rehabilitation services  OT - OK to Discharge: Yes(Recommend d/c to STR when medically appropriate)

## 2021-04-14 NOTE — PLAN OF CARE
Problem: PHYSICAL THERAPY ADULT  Goal: Performs mobility at highest level of function for planned discharge setting  See evaluation for individualized goals  Description: Treatment/Interventions: Functional transfer training, LE strengthening/ROM, Elevations, Therapeutic exercise, Patient/family training, Equipment eval/education, Bed mobility, Gait training, Compensatory technique education, Continued evaluation, Spoke to nursing, OT( mobility)          See flowsheet documentation for full assessment, interventions and recommendations  Note: Prognosis: Good  Problem List: Impaired balance, Decreased mobility, Decreased safety awareness, Impaired sensation, Decreased skin integrity, Orthopedic restrictions, Pain  Assessment: Beatriz Hanson is a 60 y o  female admitted to [facility] on 4/12/2021 for Osteomyelitis of right foot ([address] )  POD#1 R TMA  PT was consulted and pt was seen on 4/14/2021 for mobility assessment and d/c planning  Pt presents multiple lines, NWB RLE, pain, fall risk  Pt is currently functioning at a supervision assistance x1 level for bed mobility, minimum assistance x1 level for transfers, minimum assistance x1 level for ambulation with Rolling Walker  Pt demonstrated good understanding of NWB RLE  Overall demonstrates good compliance w WBS however did note brief foot placement on ground for balance during dynamic standing tasks (LBD) where pt was only using one arm for support in standing  Able to readjust w cues for safety/ reinforcement of WBS  Did require min A for transf and ambulation for safety given fall risk and multiple lines  Pt will benefit from continued skilled IP PT to address the above mentioned impairments  in order to maximize recovery and increase functional independence when completing mobility and ADLs    At this time PT recommendations for d/c are STR given environmental barriers and available support at home, risk for falls and need for further mobility training given NWB status  End of session pt seated OOB, ble elevated, all needs in reach  Pt verbalize understanding to fall risk precautions  RN present  Barriers to Discharge: Inaccessible home environment, Decreased caregiver support  Barriers to Discharge Comments: 14 [address], home alone        PT - OK to Discharge: Yes    See flowsheet documentation for full assessment

## 2021-04-14 NOTE — CONSULTS
PHYSICAL MEDICINE AND REHABILITATION CONSULT NOTE  Beatriz Hanson 60 y o  female MRN: [id number]  Unit/Bed#: E2 -81 Encounter: [id number]    Requested by:   Guadalupe Hall DPM  Reason for Consultation:  Rehabilitation medicine evaluation and assistance with appropriate disposition  Primary Rehabilitation Diagnosis:   Amputation:  05 9  Other Amputation s/p L TMA    Etiologic Diagnosis:  Osteomyelitis s/p L TMA      Assessment and Recommendations: Beatriz Hanson is a 60 y o  female with with PMH of uncontrolled DM2, HTN, HL, bipolar disorder, chronic pain, chronic diabetic peripheral neuropathy, opiate dependence on chronic methadone, hx or MRSA, who presented with increased L 4th digit toe pain and necrosis  She also p/w JOYCE and L CN3 palsy and diplopia  Patient diagnosed with OM of toe and foot and underwent R TMA amputation on 4/13/21 by Dr Diane Post  She is to remain NWB on RLE  He has been recommended additional days on Abx for residual cellulits  WC growing 4+ Group B hemolytic strep  BC NGTD  She was evaluated by neuro for CN3 palsy with CT Head and CTA H&N did not show significant acute findings but showed stable 0 6 x0 8 cm extra axial lesion from 2018 most likely meningioma, and showed mild atherosclerotic disease of intracranial internal carotid arteries and right greater than left atherosclerotic disease of cervical ICA without hemodynamically significant stenosis  JOYCE has been improving  She has required multimodal pain regimen with gabapentin, opiates, cymbalta, and methadone  She remains on cymbalta, zoloft, lamictal and clonazepam for chronic mood d/o    Prior Level of Function and Social history:    Independent with ADLs, ambulation, and cognition  Lives with  in 1 level apt with 13 TRUPTI and RHR  She was doing some furniture walking recently        Current Level of Function:    Transfers and ambulation 10 ft min assist; stairs not tested   LB dressing, LB bathing mod assist; toileting min assist     Decline in ADLs and mobility:  Multifactorial:      Acute: S/P L TMA, diplopia from 3rd nerve palsy deconditioning, cellulitis    Acute on chronic: Pain, anxiety  Chronic:  Peripheral neuropathy   - Optimal management of each as listed    - Recommend close rehab physician oversight to optimally monitor and manage rehabilitation of patient and provide primary treatment of L TMA, diplopia, cellulitis, pain, anxiety/mood disorder on multiple sedating agents, peripheral neuropathy while in acute rehab setting      - Continue PT, OT while in acute setting to improve functional mobility, transfers, upper and lower body strengthening, conditioning, balance, and gait training with appropriate assistive device  Disposition recommendation:     [facility] - patient is good candidate for transfer to [facility]F pending insurance authorization     RATIONALE:   I have reviewed this patient's medical and functional history, hospital course, skilled therapy notes, and have evaluated the patient in person  In my professional judgment and rehabilitation experience, this patient MEETS the medical necessity criteria for an inpatient ARU stay:   A  Condition requires supervision by a rehabilitation physician, defined as a licensed physician with specialized training and experience in inpatient rehabilitation  The requirement for medical supervision means that the rehabilitation physician must conduct face-to-face visits with the patient at least 3 days per week throughout the patient's stay in the IRF to assess the patient both medically and functionally, as well as to modify the course of treatment as needed to maximize the patient's capacity to benefit from the rehabilitation process  B  Patient requires an intensive and coordinated interdisciplinary team approach of providing rehabilitation    Under current industry standards, this intensive rehabilitation therapy program generally consists of at least 3 hours of therapy per day at least 5 days per week  In certain well-documented cases, this intensive rehabilitation therapy program might instead consist of at least 15 hours of intensive rehabilitation therapy within a 7 consecutive day period, beginning with the date of admission to the IRF  C  Patient is reasonably expected to adequately participate, and benefit significantly from, the intensive rehabilitation therapy program at time of admission to the IRF  The patient can only be expected to benefit significantly from the intensive rehabilitation therapy program if the patient's condition and functional status are such that the patient can reasonably be expected to make measurable improvement (that will be of practical value to improve the patient's functional capacity or adaptation to impairments) as a result of the rehabilitation treatment, and if such improvement can be expected to be made within a prescribed period of time  The patient need not be expected to achieve complete independence in the domain of self-care nor be expected to return to his or her prior level of functioning in order to meet this standard  D  The patient must require the active and ongoing therapeutic intervention of multiple therapy disciplines (PT, OT, SLP, or prosthetics/orthotics), one of which must be PT or OT  Overall Medical Status:  - Vitals: stable, patient saturating well on room air  - Recent labs: stable - kidney function improved; Hb stablizing   - Additional Work-up/management prior to potential transfer to rehab: - - None  Continue to monitor vitals, labs, exam closely     Osteomyelitis of toe/foot on L s/p TMA with residual cellulitis-  - NWB LLE   - Close monitoring of wound/skin   - Continue Abx with flagul/Ancef   - WC growing 4+ Group B hemolytic strep    BC NGTD - follow-up cultures  - Skilled therapies as outlined     Pain/hx of opiate dependence - acute on chronic with chronic hx of methadone use now acutely back on opiates with increased risk of mis-use; hx of neuropathic pain   - Methadone 90 mg qday chronic  - Cautiously continue Oxy 5-10mg Q4H PRN > wean as possible   - Gabapentin 600mg TID  - Cymbalta     Neuro  - Compensatory strategies for acute diplopia/CN3 palsy  - Skilled PT  OT    - Increased fall precautions  - Optimal mgmt of DM2  - Monitor exam  - Neuro follow-up after d/c    Mood/sleep - hx of bipolar d/o, anxiety/depression with risk of flare  - Supportive counseling   - Cymbalta, lamictal, hydroxyzine  - Clonazepam - care with concurrent opiates/methadone  - Monitor exam and efficacy of opiates closely   - Avoid sedating meds when possible   - Optimize sleep-wake  - Supportive counseling  - Fall precautions    Hydration - JOYCE > improving monitor closely  - ensure adequate hydration but avoid fluid overload     Nutrition -   - ensure optimal intake     Bladder function - intact   - monitor for retention, incontinence, signs/symptoms of UTI  - recommend toileting (bedpan only if unable to use commode or toilet, but monitor skin closely) program Q2-4 H during day and Q4-6H overnight      Bowel function - No BM yet  - Low threshold to schedule bowel meds   - PRN bowel regimen     Encounter for skin care -   - Frequent turning, Q2H, EHOB cushion when OOB in chair  - Consider hydragard BID for buttocks, sacrum, and heels  - Monitor closely       VTE prophylaxis   - As outlined by primary team - heparin      # Other medical issues:     Per primary service - uncontrolled DM2 - pt education; lantus, lispro currently per IM        ==================================================================    Chief Complaint:  R foot tingling/pan     History of Present Illness: Anurag Friday is a 60 y o  female with with PMH of uncontrolled DM2, HTN, HL, bipolar disorder, opiate dependence on chronic methadone, hx or MRSA, who presented with increased L 4th digit toe pain and necrosis    She also p/w JOYCE and L CN3 palsy  Patient diagnosed with OM of toe and foot and underwent R TMA amputation on 4/13/21 by Dr Marifer Hillman  He has been recommended additional days on Abx for residual cellulits  WC growing 4+ Group B hemolytic strep  BC NGTD  She was evaluated by neuro for CN3 palsy with CT Head and CTA H&N did not show significant acute findings but showed stable 0 6 x0 8 cm extra axial lesion from 2018 most likely meningioma, and showed mild atherosclerotic disease of intracranial internal carotid arteries and right greater than left atherosclerotic disease of cervical ICA without hemodynamically significant stenosis  JOYCE has been improving  She has required multimodal pain regimen with gabapentin, opiates, cymbalta, and methadone  She remains on cymbalta, zoloft, lamictal and clonazepam for chronic mood d/o     On eval, patient reports residual foot tingling and pain worse randomly and with movements; helped fairly well with current pain regimen  She is trying to avoid opiates  Patient notes ongoing double vision and L lid lag; she denies fever, chills, sweats, calf pain, SOB, nausea; but has some chronic stuffy nose  Review of Systems:     Complete review of systems obtained  Please see HPI for details with other significant symptoms or history listed here: Otherwise, 14 point review of systems completed and was otherwise unremarkable      No Known Allergies    Current Facility-Administered Medications:     acetaminophen (TYLENOL) tablet 650 mg, 650 mg, Oral, Q6H PRN, Kimberly France, DPM, 650 mg at 04/13/21 0658    atorvastatin (LIPITOR) tablet 40 mg, 40 mg, Oral, Daily, Kimberly France DPM, 40 mg at 04/14/21 0936    calcium carbonate (TUMS) chewable tablet 1,000 mg, 1,000 mg, Oral, Daily PRN, Kimberly France DPM    ceFAZolin (ANCEF) IVPB (premix in dextrose) 2,000 mg 50 mL, 2,000 mg, Intravenous, Q8H, Amna Dominique DPM, Last Rate: 100 mL/hr at 04/14/21 1030, 2,000 mg at 04/14/21 1030    clonazePAM (KlonoPIN) tablet 1 mg, 1 mg, Oral, BID, Jessica Barry MD, 1 mg at 04/14/21 0936    DULoxetine (CYMBALTA) delayed release capsule 60 mg, 60 mg, Oral, Daily, Ab Hayley, DPM, 60 mg at 04/14/21 0935    gabapentin (NEURONTIN) capsule 600 mg, 600 mg, Oral, TID, Ab Hayley, DPM, 600 mg at 04/14/21 0936    heparin (porcine) subcutaneous injection 5,000 Units, 5,000 Units, Subcutaneous, Q8H [address], Ab Hayley, DPM, 5,000 Units at 04/14/21 1323    hydrALAZINE (APRESOLINE) injection 5 mg, 5 mg, Intravenous, Q6H PRN **OR** hydrALAZINE (APRESOLINE) injection 10 mg, 10 mg, Intravenous, Q6H PRN, Ab Hayley, DPM, 10 mg at 04/13/21 1708    hydrOXYzine HCL (ATARAX) tablet 50 mg, 50 mg, Oral, HS, Ab Hayley, DPM, 50 mg at 04/13/21 2154    insulin glargine (LANTUS) subcutaneous injection 22 Units 0 22 mL, 22 Units, Subcutaneous, HS, Ab Hayley, DPM, 22 Units at 04/13/21 2154    insulin lispro (HumaLOG) 100 units/mL subcutaneous injection 1-5 Units, 1-5 Units, Subcutaneous, Q6H [address], 1 Units at 04/14/21 1218 **AND** Fingerstick Glucose (POCT), , , Q6H, Ab Hayley, DPM    lamoTRIgine (LaMICtal) tablet 150 mg, 150 mg, Oral, Daily, Ab Hayley, DPM, 150 mg at 04/14/21 0936    methadone (DOLOPHINE) tablet 90 mg, 90 mg, Oral, Daily, Ab Hayley, DPM, 90 mg at 04/14/21 0936    metroNIDAZOLE (FLAGYL) IVPB (premix) 500 mg 100 mL, 500 mg, Intravenous, Q8H, Amna Dominique DPM, Last Rate: 200 mL/hr at 04/14/21 0940, 500 mg at 04/14/21 0940    ondansetron (ZOFRAN) injection 4 mg, 4 mg, Intravenous, Q6H PRN, Ab Hayley, DPM, 4 mg at 04/13/21 0900    oxyCODONE (ROXICODONE) IR tablet 5 mg, 5 mg, Oral, Q4H PRN **OR** oxyCODONE (ROXICODONE) immediate release tablet 10 mg, 10 mg, Oral, Q4H PRN, Omer Angulo PA-C    sertraline (ZOLOFT) tablet 100 mg, 100 mg, Oral, Daily, Ab Hayley, DPM, 100 mg at 04/14/21 1889    Past Medical History:   Diagnosis Date    Bipolar disorder ([facility] [address] )     Depression     Diabetes mellitus [facility] [address] [facility]     History of MRSA infection     Hypertension        Past Surgical History:   Procedure Laterality Date    APPENDECTOMY      INCISION AND DRAINAGE OF WOUND Right 7/1/2018    Procedure: INCISION AND DRAINAGE (I&D) RIGHT FOREARM;  Surgeon: Abeilno Venegas MD;  [facility];  Service: Orthopedics    IL AMPUTATION FOOT,TRANSMETATARSAL Right 4/13/2021    Procedure: AMPUTATION TRANSMETATARSAL (TMA);  Surgeon: Susanne Grewal DPM;  [facility];  Service: Podiatry    IL SPLIT [address] <100 SQCM Right 10/17/2018    Procedure: RIGHT ARM SKIN GRAFT SPLIT THICKNESS (STSG);   Surgeon: Bipin Yen MD;  [facility];  Service: Plastics    IL SPLIT [address] <100 SQCM Right 9/12/2018    Procedure: Jinny Rojas;  Surgeon: Bipin Yen MD;  [facility];  Service: Plastics    TOE AMPUTATION Right 12/10/2019    Procedure: AMPUTATION TOE;  Surgeon: Susanne Grewal DPM;  [facility];  Service: Podiatry    TOE AMPUTATION Right 5/29/2020    Procedure: AMPUTATION TOE, 5TH TOE;  Surgeon: Leanne House DPM;  [facility];  Service: Podiatry    TONSILLECTOMY      VAC DRESSING APPLICATION Right 10/11/7958    Procedure: Elliot Pham;  Surgeon: Bipin Yen MD;  [facility];  Service: Plastics    WOUND DEBRIDEMENT Right 7/7/2018    Procedure: DEBRIDEMENT UPPER EXTREMITY ([address]) W/ APPLICATION OF WOUND VAC;  Surgeon: Price Donnelly MD;  [facility];  Service: Orthopedics    WOUND DEBRIDEMENT Right 7/11/2018    Procedure: DEBRIDEMENT UPPER EXTREMITY WITH WOUND VAC EXCHANGE;  Surgeon: Kaylah Caruso DO;  [facility];  Service: Orthopedics    WOUND DEBRIDEMENT Right 9/12/2018    Procedure: DEBRIDEMENT  [facility] OUT) FOREARM with Vac dressing change;  Surgeon: Bipin Yen MD;  [facility];  Service: Plastics    WOUND DEBRIDEMENT Right 5/29/2020 Procedure: Complex Irrigation and DEBRIDEMENT WOUND ([address] OUT), ANKLE;  Surgeon: Don George DPM;  [facility];  Service: Podiatry      Family History   Problem Relation Age of Onset    Hypertension Mother         heart attack    Dementia Father         heart atttack/hypertention       Social History available currently in EMR:  (See additional SH as outlined above)   Social History     Socioeconomic History    Marital status: Single     Spouse name: None    Number of children: None    Years of education: None    Highest education level: None   Occupational History    None   Social Needs    Financial resource strain: Somewhat hard    Food insecurity     Worry: Never true     Inability: Never true    Transportation needs     Medical: No     Non-medical: No   Tobacco Use    Smoking status: Never Smoker    Smokeless tobacco: Never Used   Substance and Sexual Activity    Alcohol use: No     Alcohol/week: 0 0 standard drinks     Frequency: Never     Binge frequency: Never    Drug use: No     Comment: on methadone, Hx herion addiction    Sexual activity: Not Currently   Lifestyle    Physical activity     Days per week: 0 days     Minutes per session: None    Stress:  To some extent   Relationships    Social connections     Talks on phone: None     Gets together: None     Attends Taoist service: None     Active member of club or organization: No     Attends meetings of clubs or organizations: Never     Relationship status: None    Intimate partner violence     Fear of current or ex partner: Patient refused     Emotionally abused: Patient refused     Physically abused: Patient refused     Forced sexual activity: Patient refused   Other Topics Concern    None   Social History Narrative    None       Physical Examination:   Temp:  [97 2 °F (36 2 °C)-98 3 °F (36 8 °C)] 98 3 °F (36 8 °C)  HR:  [60-93] 89  Resp:  [16-18] 18  BP: (121-187)/(63-87) 140/79  General: Awake, alert in NAD  HENT: NCAT, MMM, poor dentition   Neck: Supple, no lymphadenopathy  Respiratory: Unlabored breathing, breath sounds equal, Lungs CTA, no wheezes, rales, or rhonchi  Cardiovascular: Regular rate and rhythm, no murmurs, rubs, or gallops  Gastrointestinal: Soft, non-tender, non-distended, normoactive bowel sounds  Genitourinary: No harp  SkiN/MSK/Extremities:    RLE with surgical dressing/wrap in place; no calf edema, no calf tenderness to palpation  Neurologic/Psych:   MENTAL STATUS: awake, oriented to person, place, time, and situation  Affect: Euthymic   CN II-XII: L 3rd CN palsy  otherwise intact   Strength/MMT:  Near full throughout    Data:  Lab Results   Component Value Date    HGB 10 9 (L) 04/14/2021    HCT 33 2 (L) 04/14/2021    WBC 7 47 04/14/2021    K 3 9 04/14/2021    K 4 4 10/04/2018     04/14/2021     10/04/2018    CREATININE 1 13 04/14/2021    BUN 16 04/14/2021    BUN 13 10/04/2018       Cta Head And Neck W Wo Contrast    Result Date: 4/13/2021  Impression: 1  No acute intracranial CT abnormality  2   Patent major vasculature of [place] without high-grade stenosis  No aneurysm  Mild atherosclerotic disease of intracranial internal carotid arteries  3   Right greater than left atherosclerotic disease of cervical ICA without hemodynamically significant stenosis  Workstation performed: [id number]     Xr Foot 3+ Vw Right    Result Date: 4/14/2021  Impression: Expected postoperative appearance after 1st through 5th transmetatarsal amputations  Workstation performed: [id number]     Xr Foot 3+ Views Right    Result Date: 4/13/2021  Impression: Right 4th toe soft tissue atrophy  Rarefaction of bone mineral density of the 4th proximal phalanx  This may be indicative of a subacute nondisplaced fracture, or osteomyelitis  Workstation performed: [id number]       Pertinent labs and imaging reviewed  Thank you for allowing the PM&R service to participate in the care of this patient   We will continue to follow \[facility] [place] [facility]\"" MARK Camp's progress with you  Please do not hesitate to call with questions or concerns      Beronica Hill MD, [address] [facility]  Physical Medicine and Rehabilitation  Brain Injury Medicine

## 2021-04-14 NOTE — OCCUPATIONAL THERAPY NOTE
Occupational Therapy Evaluation (Time= 8684-0058)     Patient Name: Nathan Valentino  Today's Date: 4/14/2021  Problem List  Principal Problem:    O[facility]omyel[facility] of right foot ([facility] )  Active Problems:    Benign essential hypertension    Hyperlipidemia    Type 2 diabetes mellitus with diabetic neuropathy, with long-term current use of insulin (HCC)    Bipolar disorder (HCC)    Uncomplicated opioid dependence ([facility] )    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, left    JOYCE (acute kidney injury) [facility])    Past Medical History  Past Medical History:   Diagnosis Date    Bipolar disorder ([place] [address] )     Depression     Diabetes mellitus ([place] [address] )     History of MRSA infection     Hypertension      Past Surgical History  Past Surgical History:   Procedure Laterality Date    APPENDECTOMY      INCISION AND DRAINAGE OF WOUND Right 7/1/2018    Procedure: INCISION AND DRAINAGE (I&D) RIGHT FOREARM;  Surgeon: Ari Mccullough MD;  [facility];  Service: Orthopedics    CA AMPUTATION FOOT,TRANSMETATARSAL Right 4/13/2021    Procedure: AMPUTATION TRANSMETATARSAL (TMA);  Surgeon: Judith Chiu DPM;  [facility];  Service: Podiatry    CA SPLIT [address] <100 SQCM Right 10/17/2018    Procedure: RIGHT ARM SKIN GRAFT SPLIT THICKNESS (STSG);   Surgeon: Flor Lowery MD;  [facility];  Service: Plastics    CA SPLIT [address] <100 SQCM Right 9/12/2018    Procedure: Yehuda Cambric;  Surgeon: Flor Lowery MD;  [facility];  Service: Plastics    TOE AMPUTATION Right 12/10/2019    Procedure: AMPUTATION TOE;  Surgeon: Judith Chiu DPM;  [facility];  Service: Podiatry    TOE AMPUTATION Right 5/29/2020    Procedure: AMPUTATION TOE, 5TH TOE;  Surgeon: Hong Licona DPM;  [facility];  Service: Podiatry    TONSILLECTOMY      VAC DRESSING APPLICATION Right 19/09/3064    Procedure: Monserrat Quintero;  Surgeon: Flor Lowery MD;  [facility];  Service: Plastics    WOUND DEBRIDEMENT Right 7/7/2018    Procedure: DEBRIDEMENT UPPER EXTREMITY ([address]) W/ APPLICATION OF WOUND VAC;  Surgeon: Jesika Cao MD;  [facility];  Service: Orthopedics    WOUND DEBRIDEMENT Right 7/11/2018    Procedure: DEBRIDEMENT UPPER EXTREMITY WITH WOUND VAC EXCHANGE;  Surgeon: Kat Powers DO;  [facility];  Service: Orthopedics    WOUND DEBRIDEMENT Right 9/12/2018    Procedure: DEBRIDEMENT  [facility] OUT) FOREARM with Vac dressing change;  Surgeon: Corie Reynoso MD;  [facility];  Service: Plastics    WOUND DEBRIDEMENT Right 5/29/2020    Procedure: Complex Irrigation and DEBRIDEMENT WOUND ([address] OUT), ANKLE;  Surgeon: Irasema Pires DPM;  [facility];  Service: Podiatry      04/14/21 0940   OT Last Visit   OT Visit Date 04/14/21   Note Type   Note type Evaluation   Restrictions/Precautions   Weight Bearing Precautions Per Order Yes   RLE Weight Bearing Per Order NWB   Other Precautions WBS; Multiple lines; Fall Risk;Pain;Visual impairment   Pain Assessment   Pain Assessment Tool 0-10   Pain Score 2   Pain Location/Orientation Orientation: Right;Location: [facility] Pain Intervention(s) Repositioned; Ambulation/increased activity; Emotional support   Pain Rating: FLACC (Rest) - Face 1   Pain Rating: FLACC (Rest) - Legs 0   Pain Rating: FLACC (Rest) - Activity 0   Pain Rating: FLACC (Rest) - Cry 0   Pain Rating: FLACC (Rest) - Consolability 0   Score: FLACC (Rest) 1   Home Living   Type of Home Apartment   Home Layout One level;Stairs to enter with rails; Performs ADLs on one level  (FOS (13 jasmina))   Bathroom Shower/Tub Tub/shower unit   Bathroom Toilet Standard   Bathroom Equipment Shower chair;Commode   Bathroom Accessibility Accessible   Home Equipment Walker;Cane   Prior Function   Level of [place] Independent with ADLs and functional mobility   Lives With Spouse   Receives Help From Family   ADL Assistance Independent   IADLs Independent   Falls in the last 6 months 0   Vocational   (not working)   Lifestyle   Autonomy At baseline PTA, pt states independence with all aspects of her ADLs, transfers, ambulation with no AD; neg falls, neg , + home alone  Reciprocal Relationships supportive family   Service to Others Helps c grandchildren   Intrinsic Gratification Reading; spending time c family   Psychosocial   Psychosocial (WDL) WDL   Subjective   Subjective "I've gotten used to the balance change from losing other toes"   ADL   Where Assessed Edge of bed   Eating Assistance 6  Modified independent   Grooming Assistance 6  Modified Independent   UB Bathing Assistance 5  Supervision/Setup   LB Bathing Assistance 3  Moderate Assistance   UB Dressing Assistance 5  Supervision/Setup    [facility] [address] 3  Moderate [address]  4  Minimal Assistance   Bed Mobility   Supine to Sit 5  Supervision   Additional items HOB elevated; Increased time required; Bedrails   Sit to Supine Unable to assess   Additional Comments Pt was OOB in chair at the end of the OT session   Transfers   Sit to Stand 4  Minimal assistance   Additional items Assist x 1; Increased time required;Verbal cues; Bedrails  (RW)   Stand to Sit 4  Minimal assistance   Additional items Assist x 1; Increased time required;Verbal cues  (RW)   Functional Mobility   Functional Mobility 4  Minimal assistance   Additional Comments x2 for balance and line management   Additional items Rolling walker   Balance   Static Sitting Fair +   Dynamic Sitting Fair   Static Standing Fair -   Dynamic Standing Poor +   Ambulatory Poor +   Activity Tolerance   Activity Tolerance Patient limited by fatigue;Treatment limited secondary to medical complications (Comment)  (NWB status)   Medical Staff Made Aware P T   [facility]; Kim Grace   Nurse Made Aware Yes   RUE Assessment   RUE Assessment WFL   RUE Strength   RUE Overall Strength Within Functional Limits - strength 5/5   LUE Assessment   LUE Assessment WFL   LUE Strength   LUE Overall Strength Within Functional Limits - strength 5/5   Hand Function   Gross Motor Coordination Functional   Fine Motor Coordination Functional   Sensation   Light Touch No apparent deficits   Proprioception   Proprioception No apparent deficits   Vision-Basic Assessment   Current Vision   (has glasses for reading; currently has blepharoptosis)   Vision - Complex Assessment   Ocular Range of Motion Restricted on the right;Restricted looking down  (Left eye only)   Tracking L eye does not track medially   Visual Fields Difficulty detecting stimulus with OS RLQ; Difficulty detecting stimulus with OS RUQ   Acuity Able to read employee name badge without difficulty  (Unable to read board on wall; states needing glasses (dist ))   Additional Comments Limited L functional eyelid   Perception   Inattention/Neglect Appears intact   Cognition   Overall Cognitive Status [facility]   Arousal/Participation Alert; Responsive; Cooperative   Attention Within functional limits   Orientation Level Oriented X4   Memory Within functional limits   Following Commands Follows all commands and directions without difficulty   Assessment   Limitation Decreased ADL status; Decreased Safe judgement during ADL;Decreased endurance;Visual deficit; Decreased self-care trans   Prognosis Fair   Assessment Pt is a 60 y o  female admitted to [address] [place] [address] on 4/12/21 with c/o R 4th digit toe/foot pain  Pt noted with osteomyelitis of R foot, 3rd L cranial nerve palsy, JOYCE, and ptosis of L eyelid  Pt is s/p TMA of 4th toe (4/13)  Pt is currently NWB RLE  Pt PMH includes uncontrolled DM, HLD, HTN, bipolar disorder, opioid dependence on methadone, hx of MRSA, and R toe amputations  At baseline PTA, pt states independence with all aspects of her ADLs, transfers, ambulation with no AD; neg falls, neg , + home alone   During evaluation of functional mobility, pt demonstrated min A c sit<>stand transfers and ambulation using RW with cueing throughout for safety and positioning  For the initial eval, pt demonstrated deficits c activity tolerance (currently fair, 10-15 min), functional mobility, functional balance, vision, ADL status, and transfer safety  Pt would benefit from continued OT tx to improve their level of independence and address the above deficits, 3-5xwk/1-2 wks  Currently recommend D/C to STR when medically appropriate  OT will continue to follow pt on OT caseload with the following goals  Goals   Patient Goals For her foot to heal and get better   STG Time Frame   (1-7)   Short Term Goal #1 Pt will complete all UE and LE bathing/dressing ADL activities with Mod I   Short Term Goal #2 Pt will improve functional mobility to mod I c safe transfer technique for ADL/IADL/leisure tasks with AD while adhering to NWB status   Short Term Goal  Pt will demonstrate improved activity tolerance to G (20-30 minutes) with 1-2 rest breaks throughout and standing tolerance for 5-7 minutes c adherence to NWB status for improved engagement with functional tasks  LTG Time Frame   (7-14)   Long Term Goal #1 Pt will tolerate education for visual compensatory strategies and demonstrate knowledge and application of those strategies while performing ADL tasks  Long Term Goal #2 Pt will demonstrate improved functional balance by 1 grade to assist with ADLs   Long Term Goal Pt will independently identify 2-3 fall risks and their appropriate corrective actions to decrease falls risk in their home environment   Plan   Treatment Interventions ADL retraining;Visual perceptual retraining;Functional transfer training;UE strengthening/ROM; Endurance training;Patient/family training;Equipment evaluation/education; Compensatory technique education; Energy conservation; Activityengagement   Goal Expiration Date 04/28/21   OT Treatment Day 0   OT Frequency 3-5x/wk   Recommendation   OT Discharge Recommendation Post acute rehabilitation services   OT - OK to Discharge Yes  (Recommend d/c to STR when medically appropriate)   AM-PAC Daily Activity Inpatient   Lower Body Dressing 2   Bathing 2   Toileting 3   Upper Body Dressing 3   Grooming 4   Eating 4   Daily Activity Raw Score 18   Daily Activity Standardized Score (Calc for Raw Score >=11) 38 66   Trena Kanner

## 2021-04-14 NOTE — CASE MANAGEMENT
LOS: 1 GMLOS: N/A RISK OF READMISSION: 15 BUNDLE: NO    CM met with pt at bedside to discuss discharge needs  Pt lives in an apartment with her spouse  ADL's are completed independently with a Rw or a cane  Pt also has a BSC and shower chair  PCP identified as Dr Sarah Enriquez  Pharmacy identified as [facility]   Pt has hx of VNA; Sn and PT  Pt also has hx of STR at [facility]  Pt was referred to [facility] last admission but did not have a bed avail  PMR to evaluate this pt to determine LOC  Pt is interested in [facility] at discharge  Pt would require insurance authorization if accepted  Pt was driving PTA; denies POA  CM will continue to follow for discharge planning needs  A post acute care recommendation was made by your care team for STR  Discussed Freedom of Choice with patient  List of facilities given to patient via in person  patient aware the list is custom filtered for them by preference  and that [facility] post acute providers are designated  CM reviewed d/c planning process including the following: identifying help at home, patient preference for d/c planning needs, Discharge Lounge, [facility] Meds to Bed program, availability of treatment team to discuss questions or concerns patient and/or family may have regarding understanding medications and recognizing signs and symptoms once discharged  CM also encouraged patient to follow up with all recommended appointments after discharge  Patient advised of importance for patient and family to participate in managing patients medical well being

## 2021-04-14 NOTE — UTILIZATION REVIEW
Initial Clinical Review    Admission: Date/Time/Statement:   Admission Orders (From admission, onward)     Ordered        04/13/21 0047  Inpatient Admission  Once                   Orders Placed This Encounter   Procedures    Inpatient Admission     Standing Status:   Standing     Number of Occurrences:   1     Order Specific Question:   Level of Care     Answer:   Med Surg [16]     Order Specific Question:   Estimated length of stay     Answer:   More than 2 Midnights     Order Specific Question:   Certification     Answer:   I certify that inpatient services are medically necessary for this patient for a duration of greater than two midnights  See H&P and MD Progress Notes for additional information about the patient's course of treatment  ED Arrival Information     Expected Arrival Acuity Means of Arrival Escorted By Service Admission Type    - 4/12/2021 20:20 Urgent Walk-In Self General Medicine Urgent    Arrival Complaint    toe pain        Chief Complaint   Patient presents with    Toe Injury     reports infection to right 4th toe seen by PCP and told needs amputation hx DM  also reports blurry vision since last week seen at [facility] for this  Assessment/Plan:    60  Y O female presents to ED from home with a blister and pain on Left 4th digit toe for past week,  Became necrotic  About  3 days  Prior to admission  Complains of severe pain  Saw PCP on  4/9 and MRI recommended  PCP note indicated that patient  Has been on     Po antibiotics for 1 week prior to visit  X ray show  Concern for  Osteo  Labs  Unremarkable other than  Creatinine  1 4  Also noted is ptosis of left eye, states this is also present for past week  Admits to blurry vision and headaches for about  3 weeks, did see PCP and found with very high BP, SBP in the  180's to 200's  PCP felt  BP was cause of  Symptoms  Was also seen at  and  No  Ptosis noted    On exam, marked ptosis and  Inability to move left eye medially with difficulty with upward gaze, concern for possible cranial nerve 3 palsy  Has  Not seen an ophthalmologist for some time  PMH  Is  Uncontrolled DM, HTN, Bipolar, Hs  MRSA and opioid dependence on  Methadone  Admit  Ip with  Osteo  Of right foot,  Ptosis of left eyelid and JOYCE and plan is  Neuro consult, ARACELI, monitor labs, IVF  100/hr  For  1 5 L,  podiatry consult,   And  Monitor BP  Neuro consult  ( 4/13)     Patient has  Pupillary sparing left 3rd nerve  Palsy consistent with diabetic 3rd nerve palsy  No other suggestion of intracranial mass lesion  CTA  Excludes any aneurysmal  Compression  MRI not warranted  Podiatry consult   ( 4/13)    Found with forefoot gangrene and acute infection  Will require  TMA  Right foot, high risk for sepsis with highly elevated WBC  SURGERY DATE: 4/13/2021    Procedure(s) (LRB):  AMPUTATION TRANSMETATARSAL (TMA) (Right)    Operative Findings:  Consistent with diagnosis - necrotic, malodorous, grossly infected right 4th toe    4/14  POD  #  1  Right  TMA      Continue local wound care  Continue  PT/OT  Monitor labs  Surgical site  Appears stable  Wait final operative cultures  Remains NWB  RLE    Continue  ARACELI>  Will need  5 days  ARACELI  If  Operative cultures  Negative         ED Triage Vitals   Temperature Pulse Respirations Blood Pressure SpO2   04/12/21 2048 04/12/21 2048 04/12/21 2048 04/12/21 2048 04/12/21 2048   98 5 °F (36 9 °C) 100 16 168/80 95 %      Temp Source Heart Rate Source Patient Position - Orthostatic VS BP Location FiO2 (%)   04/12/21 2048 04/12/21 2305 04/12/21 2048 04/12/21 2048 --   Oral Monitor Sitting Right arm       Pain Score       04/12/21 2048       Worst Possible Pain          Wt Readings from Last 1 Encounters:   04/13/21 66 4 kg (146 lb 6 2 oz)     Additional Vital Signs:   04/13/21 1805  --  --  --  145/63  --  --  --  --  --   04/13/21 1735  --  83  --  183/80Abnormal   --  99 %  --  --  --   04/13/21 1708  --  --  -- 187/87Abnormal    --  --  --  --  --   BP: hydralazine given at 04/13/21 1708   04/13/21 1705  --  66  --  187/87Abnormal   --  98 %  --  --  --   04/13/21 1616  97 9 °F (36 6 °C)  62  16  121/67  --  97 %  None (Room air)  --  --   04/13/21 1415  --  60  16  130/67  --  96 %  None (Room air)  --  --   04/13/21 1345  --  62  17  113/57  --  98 %  None (Room air)  --  --   04/13/21 1306  --  63  12  159/79  --  97 %  None (Room air)  --  --   04/13/21 1248  97 7 °F (36 5 °C)  64  12  157/72  --  96 %  None (Room air)  --  --   04/13/21 1233  --  64  8Abnormal   139/61  --  95 %  None (Room air)  --  --   04/13/21 1218  --  64  12  141/67  --  96 %  None (Room air)  --  --   04/13/21 1205  97 6 °F (36 4 °C)  72  14  172/84Abnormal   --  97 %  None (Room air)  WDL  --   04/13/21 1200  97 6 °F (36 4 °C)  --  --  --  --  --  --  --  --   04/13/21 0706  98 7 °F (37 1 °C)  75  16  159/69  --  97 %  None (Room air)  --  Lying   04/13/21 0230  --  80  18  183/76Abnormal   109  98 %  None (Room air)  --  Lying   04/13/21 0041  98 5 °F (36 9 °C)  85  18  146/70  --  97 %  None (Room air)  --  Lying   04/12/21 2305  --  91  18  194/86Abnormal   --  99 %  None (Room air)  --  Lying   04/12/21 2048  98 5 °F (36 9 °C)  100  16  168/80  --  95 %  None (Room air)  --           Pertinent Labs/Diagnostic Test Results:   CTA  Head/neck ( 4/13)      No acute intracranial CT abnormality  2   Patent major vasculature of [place] of dunaway without high-grade stenosis   No aneurysm   Mild atherosclerotic disease of intracranial internal carotid arteries  3   Right greater than left atherosclerotic disease of cervical ICA without hemodynamically significant stenosis  X ray  R foot ( 4/13)    Right 4th toe soft tissue atrophy   Rarefaction of bone mineral density of the 4th proximal phalanx   This may be indicative of a subacute nondisplaced fracture, or osteomyelitis         Results from last 7 days   Lab Units 04/14/21  3438 04/13/21  0517 04/12/21  2338   WBC Thousand/uL 7 47 8 28 11 29*   HEMOGLOBIN g/dL 10 9* 11 0* 13 9   HEMATOCRIT % 33 2* 32 5* 41 3   PLATELETS Thousands/uL 289 300 297   NEUTROS ABS Thousands/µL 4 99 5 38 7 54         Results from last 7 days   Lab Units 04/14/21  0653 04/13/21  0517 04/12/21  2338   SODIUM mmol/L 142 140 136   POTASSIUM mmol/L 3 9 4 2 4 1   CHLORIDE mmol/L 102 100 93*   CO2 mmol/L 33* 33* 31   ANION GAP mmol/L 7 7 12   BUN mg/dL 16 23 26*   CREATININE mg/dL 1 13 1 17 1 40*   EGFR ml/min/1 73sq m 51 49 40   CALCIUM mg/dL 9 5 9 2 11 0*         Results from last 7 days   Lab Units 04/14/21  0627 04/13/21  2335 04/13/21  1706 04/13/21  1211 04/13/21  0647 04/13/21  0227   POC GLUCOSE mg/dl 75 199* 93 129 128 133     Results from last 7 days   Lab Units 04/14/21  0653 04/13/21  0517 04/12/21  2338   GLUCOSE RANDOM mg/dL 97 134 186*         Results from last 7 days   Lab Units 04/13/21  0251   HEMOGLOBIN A1C % 11 7*   EAG mg/dl 289              Results from last 7 days   Lab Units 04/13/21  0251   LACTIC ACID mmol/L 1 5           Results from last 7 days   Lab Units 04/12/21  2338   SED RATE mm/hour 68*           Results from last 7 days   Lab Units 04/13/21  0946 04/13/21  0027 04/12/21  2338   BLOOD CULTURE   --  No Growth at 24 hrs  No Growth at 24 hrs     GRAM STAIN RESULT  1+ Polys*  4+ Gram negative rods*  1+ Gram positive cocci in pairs*  --   --                ED Treatment:   Medication Administration from 04/12/2021 2020 to 04/13/2021 1114       Date/Time Order Dose Route Action Comments     04/13/2021 0207 sodium chloride 0 9 % bolus 1,000 mL 0 mL Intravenous Stopped      04/13/2021 0039 sodium chloride 0 9 % bolus 1,000 mL 1,000 mL Intravenous New Bag      04/13/2021 0249 ceFAZolin (ANCEF) IVPB (premix in dextrose) 2,000 mg 50 mL 0 mg Intravenous Stopped      04/13/2021 0219 ceFAZolin (ANCEF) IVPB (premix in dextrose) 2,000 mg 50 mL 2,000 mg Intravenous New Bag      04/13/2021 0207 metroNIDAZOLE (FLAGYL) IVPB (premix) 500 mg 100 mL 0 mg Intravenous Stopped      04/13/2021 0040 metroNIDAZOLE (FLAGYL) IVPB (premix) 500 mg 100 mL 500 mg Intravenous New Bag      04/13/2021 0854 atorvastatin (LIPITOR) tablet 40 mg 0 mg Oral Hold pt became naseous     04/13/2021 0854 clonazePAM (KlonoPIN) tablet 1 mg 1 mg Oral Given      04/13/2021 0854 DULoxetine (CYMBALTA) delayed release capsule 60 mg 60 mg Oral Given      04/13/2021 0853 gabapentin (NEURONTIN) capsule 600 mg 600 mg Oral Given      04/13/2021 0216 hydrOXYzine HCL (ATARAX) tablet 50 mg 50 mg Oral Given      04/13/2021 0851 methadone (DOLOPHINE) tablet 90 mg 90 mg Oral Given      04/13/2021 0904 sertraline (ZOLOFT) tablet 100 mg 0 mg Oral Hold pt became nauseous     04/13/2021 0854 sertraline (ZOLOFT) tablet 100 mg 100 mg Oral Given      04/13/2021 0215 lactated ringers infusion 100 mL/hr Intravenous New Bag      04/13/2021 0658 acetaminophen (TYLENOL) tablet 650 mg 650 mg Oral Given      04/13/2021 0900 ondansetron (ZOFRAN) injection 4 mg 4 mg Intravenous Given      04/13/2021 0958 metroNIDAZOLE (FLAGYL) IVPB (premix) 500 mg 100 mL 0 mg Intravenous Stopped      04/13/2021 0817 metroNIDAZOLE (FLAGYL) IVPB (premix) 500 mg 100 mL 500 mg Intravenous New Bag      04/13/2021 1110 ceFAZolin (ANCEF) IVPB (premix in dextrose) 2,000 mg 50 mL 0 mg Intravenous Stopped      04/13/2021 1001 ceFAZolin (ANCEF) IVPB (premix in dextrose) 2,000 mg 50 mL 2,000 mg Intravenous New Bag      04/13/2021 0854 lamoTRIgine (LaMICtal) tablet 150 mg 0 mg Oral Hold pt became nauseous     04/13/2021 1110 insulin lispro (HumaLOG) 100 units/mL subcutaneous injection 1-5 Units 1 Units Subcutaneous Not Given      04/13/2021 0651 insulin lispro (HumaLOG) 100 units/mL subcutaneous injection 1-5 Units 1 Units Subcutaneous Not Given      04/13/2021 0234 insulin lispro (HumaLOG) 100 units/mL subcutaneous injection 1-5 Units 1 Units Subcutaneous Not Given      04/13/2021 0225 iohexol (OMNIPAQUE) 350 MG/ML injection (MULTI-DOSE) 85 mL 85 mL Intravenous Given      04/13/2021 0658 heparin (porcine) subcutaneous injection 5,000 Units 5,000 Units Subcutaneous Given      04/13/2021 0708 ketorolac (TORADOL) injection 15 mg 15 mg Intravenous Given         Present on Admission:   Uncomplicated opioid dependence ([facility] )   Osteomyelitis of right foot ([facility] )   Hyperlipidemia   Bipolar disorder ([place] [address] )   Benign essential hypertension      Admitting Diagnosis: Toe injury [S99 929A]  Ptosis of left eyelid [H02 402]  Osteomyelitis of toe (HCC) [M86 9]  JOYCE (acute kidney injury) ([place] [address] ) [N17 9]  Age/Sex: 60 y o  female  Admission Orders:  Scheduled Medications:  atorvastatin, 40 mg, Oral, Daily  cefazolin, 2,000 mg, Intravenous, Q8H  clonazePAM, 1 mg, Oral, BID  DULoxetine, 60 mg, Oral, Daily  gabapentin, 600 mg, Oral, TID  heparin (porcine), 5,000 Units, Subcutaneous, Q8H ЮЛИЯ  hydrOXYzine HCL, 50 mg, Oral, HS  insulin glargine, 22 Units, Subcutaneous, HS  insulin lispro, 1-5 Units, Subcutaneous, Q6H ЮЛИЯ  lamoTRIgine, 150 mg, Oral, Daily  methadone, 90 mg, Oral, Daily  metroNIDAZOLE, 500 mg, Intravenous, Q8H  sertraline, 100 mg, Oral, Daily      Continuous IV Infusions:     PRN Meds:  acetaminophen, 650 mg, Oral, Q6H PRN  calcium carbonate, 1,000 mg, Oral, Daily PRN  hydrALAZINE, 5 mg, Intravenous, Q6H PRN    Or  hydrALAZINE, 10 mg, Intravenous, Q6H PRN  ondansetron, 4 mg, Intravenous, Q6H PRN  oxyCODONE, 5 mg, Oral, Q4H PRN    Or  oxyCODONE, 10 mg, Oral, Q4H PRN        IP CONSULT TO PODIATRY  IP CONSULT TO ENDOCRINOLOGY  IP CONSULT TO NEUROLOGY    Network Utilization Review Department  ATTENTION: Please call with any questions or concerns to [phone] and carefully listen to the prompts so that you are directed to the right person   All voicemails are confidential   Ольга Ramirez all requests for admission clinical reviews, approved or denied determinations and any other requests to dedicated fax number below belonging to the campus where the patient is receiving treatment   List of dedicated fax numbers for the Facilities:  [address] [place] [address] DENIALS (Administrative/Medical Necessity) [phone]   [address]  (Maternity/NICU/Pediatrics) [phone] [address] Александр Felipe [address] (  Kayleigh Pacheco "Saadia" 103) [address] [place] [address] Willem Ramirez [address] [place] [address] [place] [address] [phone]

## 2021-04-14 NOTE — PLAN OF CARE
Problem: Potential for Falls  Goal: Patient will remain free of falls  Description: INTERVENTIONS:  - Assess patient frequently for physical needs  -  Identify cognitive and physical deficits and behaviors that affect risk of falls    -  [place] fall precautions as indicated by assessment   - Educate patient/family on patient safety including physical limitations  - Instruct patient to call for assistance with activity based on assessment  - Modify environment to reduce risk of injury  - Consider OT/PT consult to assist with strengthening/mobility  Outcome: Progressing     Problem: PAIN - ADULT  Goal: Verbalizes/displays adequate comfort level or baseline comfort level  Description: Interventions:  - Encourage patient to monitor pain and request assistance  - Assess pain using appropriate pain scale  - Administer analgesics based on type and severity of pain and evaluate response  - Implement non-pharmacological measures as appropriate and evaluate response  - Consider cultural and social influences on pain and pain management  - Notify physician/advanced practitioner if interventions unsuccessful or patient reports new pain  Outcome: Progressing     Problem: INFECTION - ADULT  Goal: Absence or prevention of progression during hospitalization  Description: INTERVENTIONS:  - Assess and monitor for signs and symptoms of infection  - Monitor lab/diagnostic results  - Monitor all insertion sites, i e  indwelling lines, tubes, and drains  - Monitor endotracheal if appropriate and nasal secretions for changes in amount and color  - [place] appropriate cooling/warming therapies per order  - Administer medications as ordered  - Instruct and encourage patient and family to use good hand hygiene technique  - Identify and instruct in appropriate isolation precautions for identified infection/condition  Outcome: Progressing  Goal: Absence of fever/infection during neutropenic period  Description: INTERVENTIONS:  - Monitor WBC    Outcome: Progressing     Problem: SAFETY ADULT  Goal: Patient will remain free of falls  Description: INTERVENTIONS:  - Assess patient frequently for physical needs  -  Identify cognitive and physical deficits and behaviors that affect risk of falls    -  [place] fall precautions as indicated by assessment   - Educate patient/family on patient safety including physical limitations  - Instruct patient to call for assistance with activity based on assessment  - Modify environment to reduce risk of injury  - Consider OT/PT consult to assist with strengthening/mobility  Outcome: Progressing  Goal: Maintain or return to baseline ADL function  Description: INTERVENTIONS:  -  Assess patient's ability to carry out ADLs; assess patient's baseline for ADL function and identify physical deficits which impact ability to perform ADLs (bathing, care of mouth/teeth, toileting, grooming, dressing, etc )  - Assess/evaluate cause of self-care deficits   - Assess range of motion  - Assess patient's mobility; develop plan if impaired  - Assess patient's need for assistive devices and provide as appropriate  - Encourage maximum independence but intervene and supervise when necessary  - Involve family in performance of ADLs  - Assess for home care needs following discharge   - Consider OT consult to assist with ADL evaluation and planning for discharge  - Provide patient education as appropriate  Outcome: Progressing  Goal: Maintain or return mobility status to optimal level  Description: INTERVENTIONS:  - Assess patient's baseline mobility status (ambulation, transfers, stairs, etc )    - Identify cognitive and physical deficits and behaviors that affect mobility  - Identify mobility aids required to assist with transfers and/or ambulation (gait belt, sit-to-stand, lift, walker, cane, etc )  - [place] fall precautions as indicated by assessment  - Record patient progress and toleration of activity level on Mobility SBAR; progress patient to next Phase/Stage  - Instruct patient to call for assistance with activity based on assessment  - Consider rehabilitation consult to assist with strengthening/weightbearing, etc   Outcome: Progressing     Problem: DISCHARGE PLANNING  Goal: Discharge to home or other facility with appropriate resources  Description: INTERVENTIONS:  - Identify barriers to discharge w/patient and caregiver  - Arrange for needed discharge resources and transportation as appropriate  - Identify discharge learning needs (meds, wound care, etc )  - Arrange for interpretive services to assist at discharge as needed  - Refer to Case Management Department for coordinating discharge planning if the patient needs post-hospital services based on physician/advanced practitioner order or complex needs related to functional status, cognitive ability, or social support system  Outcome: Progressing     Problem: Knowledge Deficit  Goal: Patient/family/caregiver demonstrates understanding of disease process, treatment plan, medications, and discharge instructions  Description: Complete learning assessment and assess knowledge base    Interventions:  - Provide teaching at level of understanding  - Provide teaching via preferred learning methods  Outcome: Progressing     Problem: SKIN/TISSUE INTEGRITY - ADULT  Goal: Skin integrity remains intact  Description: INTERVENTIONS  - Identify patients at risk for skin breakdown  - Assess and monitor skin integrity  - Assess and monitor nutrition and hydration status  - Monitor labs (i e  albumin)  - Assess for incontinence   - Turn and reposition patient  - Assist with mobility/ambulation  - Relieve pressure over bony prominences  - Avoid friction and shearing  - Provide appropriate hygiene as needed including keeping skin clean and dry  - Evaluate need for skin moisturizer/barrier cream  - Collaborate with interdisciplinary team (i e  Nutrition, Rehabilitation, etc )   - Patient/family teaching  Outcome: Progressing  Goal: Incision(s), wounds(s) or drain site(s) healing without S/S of infection  Description: INTERVENTIONS  - Assess and document risk factors for skin impairment   - Assess and document dressing, incision, wound bed, drain sites and surrounding tissue  - Consider nutrition services referral as needed  - Oral mucous membranes remain intact  - Provide patient/ family education  Outcome: Progressing     Problem: MUSCULOSKELETAL - ADULT  Goal: Maintain or return mobility to safest level of function  Description: INTERVENTIONS:  - Assess patient's ability to carry out ADLs; assess patient's baseline for ADL function and identify physical deficits which impact ability to perform ADLs (bathing, care of mouth/teeth, toileting, grooming, dressing, etc )  - Assess/evaluate cause of self-care deficits   - Assess range of motion  - Assess patient's mobility  - Assess patient's need for assistive devices and provide as appropriate  - Encourage maximum independence but intervene and supervise when necessary  - Involve family in performance of ADLs  - Assess for home care needs following discharge   - Consider OT consult to assist with ADL evaluation and planning for discharge  - Provide patient education as appropriate  Outcome: Progressing  Goal: Maintain proper alignment of affected body part  Description: INTERVENTIONS:  - Support, maintain and protect limb and body alignment  - Provide patient/ family with appropriate education  Outcome: Progressing     Problem: CHANGE IN BODY IMAGE  Goal: Pt/Family communicate acceptance of loss or change in body image and expresses psychological comfort and peace  Description: INTERVENTIONS:  - Assess patient/family anxiety and grief process related to change in body image, loss of functional status and loss of sense of self  - Assess patient/family's coping skills and provide emotional, spiritual and psychosocial support  - Provide information about the patient's health status with consideration of family and cultural values  - Communicate willingness to discuss loss and facilitate grief process with patient/family as appropriate  - Emphasize sustaining relationships within family system and community, or [place]/spiritual traditions  - Refer to community support groups as appropriate  - [address], Pastoral care or other ancillary consults as needed  Outcome: Progressing

## 2021-04-14 NOTE — ASSESSMENT & PLAN NOTE
Baseline creat between 0 8-1 1    Patient presented with acute kidney injury with a creatinine of 1 4  She was given IV fluids, treated for sepsis with antibiotics, and her lisinopril/HCTZ was held  Creatinine improved to 1 13  Patient received IV contrast for her CTA of the head in the ER:  Continue to monitor creatinine closely      Lab Results   Component Value Date    CREATININE 1 13 04/14/2021    CREATININE 1 17 04/13/2021    CREATININE 1 40 (H) 04/12/2021

## 2021-04-14 NOTE — ASSESSMENT & PLAN NOTE
Patient has a history of essential hypertension  Was noted to be accelerated as an outpatient, with associated headache and blurred vision  Patient takes hydrochlorothiazide and lisinopril 5 mg as an outpatient which were held on admission due to acute kidney injury  Blood pressures been variable since presenting in the ER  Blood pressure currently elevated 143/64  Initiated on Norvasc 5 mg daily  Continue hydralazine p r n    Continue to titrate and monitor

## 2021-04-14 NOTE — ASSESSMENT & PLAN NOTE
Patient has history of bipolar disorder  Continue home medications with Lamictal 150 mg daily, cymbalta 60 mg daily, and Zoloft 100 mg daily  Patient was initially placed on clonazepam 1 mg p o  T i d  Scheduled: The [facility] website was queried and patient is prescribed 60 tablets of clonazepam per month  Dosing was changed to reflect b i d   Administration

## 2021-04-14 NOTE — PROGRESS NOTES
The metronidazole has been converted to Oral per [facility] IV-to-PO Auto-Conversion Protocol for Adults as approved by the Pharmacy and Therapeutics Committee  The patient met all eligible criteria:  3 Age = 26years old   2) Received at least one dose of the IV form   3) Receiving at least one other scheduled oral/enteral medication   4) Tolerating an oral/enteral diet   and did not have any exclusions:   1) Critical care patient   2) Active GI bleed (IF assessing H2RAs or PPIs)   3) Continuous tube feeding (IF assessing cipro, doxycycline, levofloxacin, minocycline, rifampin, or voriconazole)   4) Receiving PO vancomycin (IF assessing metronidazole)   5) Persistent nausea and/or vomiting   6) Ileus or gastrointestinal obstruction   7) [place]/nasogastric tube set for continuous suction   8) Specific order not to automatically convert to PO (in the order's comments or if discussed in the most recent Infectious Disease or primary team's progress notes)

## 2021-04-14 NOTE — ASSESSMENT & PLAN NOTE
Lab Results   Component Value Date    HGBA1C 11 7 (H) 04/13/2021       Recent Labs     04/13/21  1706 04/13/21  2335 04/14/21  0627 04/14/21  1123   POCGLU 93 199* 75 208*       Blood Sugar Average: Last 72 hrs:  (P) 137 [id number]     Patient has a history of diabetes type 2, with long-term use of insulin, with associated diabetic neuropathy  Uncontrolled diabetes  Last A1c 13  9 -improved to 11  Home regimen: Trulicity weekly (started 1 month ago, receives inj on sundays), Lantus 45 units qHS, Aspart 10 units with meals, metformin 850 mg BID  Will hold oral antihyperglycemics, especially metformin is patient received IV dye  Pt states she only takes 20 of lantus a day since starting on Trulicity, and 40 units/day will cause hypoglycemia  Patient started on 22 units Lantus nightly  Continue Accuchecks and SSI for coverage     Continue monitor glycemic control  Recommend outpatient Endocrinology follow-up

## 2021-04-14 NOTE — PHYSICAL THERAPY NOTE
PHYSICAL THERAPY EVALUATION          Patient Name: Hima Rosado  [id number] Date: 4/14/2021   PT EVALUATION    60 y o     [id number]    Toe injury [S99 929A]  Ptosis of left eyelid [H02 402]  Osteomyelitis of toe ([place] [address] ) [M86 9]  JOYCE (acute kidney injury) ([place] [address] ) [N17 9]    Past Medical History:   Diagnosis Date    Bipolar disorder ([place] [address] )     Depression     Diabetes mellitus ([place] [address] )     History of MRSA infection     Hypertension      Past Surgical History:   Procedure Laterality Date    APPENDECTOMY      INCISION AND DRAINAGE OF WOUND Right 7/1/2018    Procedure: INCISION AND DRAINAGE (I&D) RIGHT FOREARM;  Surgeon: Cristina Verduzco MD;  [facility];  Service: Orthopedics    GA AMPUTATION FOOT,TRANSMETATARSAL Right 4/13/2021    Procedure: AMPUTATION TRANSMETATARSAL (TMA);  Surgeon: Jami Porter DPM;  [facility];  Service: Podiatry    GA SPLIT [address] <100 SQCM Right 10/17/2018    Procedure: RIGHT ARM SKIN GRAFT SPLIT THICKNESS (STSG);   Surgeon: Destin Britt MD;  [facility];  Service: Plastics    GA SPLIT [address] <100 SQCM Right 9/12/2018    Procedure: Ang Brewster;  Surgeon: Destin Britt MD;  [facility];  Service: Plastics    TOE AMPUTATION Right 12/10/2019    Procedure: AMPUTATION TOE;  Surgeon: Jami Porter DPM;  [facility];  Service: Podiatry    TOE AMPUTATION Right 5/29/2020    Procedure: AMPUTATION TOE, 5TH TOE;  Surgeon: Lexis Maher DPM;  [facility];  Service: Podiatry    TONSILLECTOMY      VAC DRESSING APPLICATION Right 11/84/0395    Procedure: Henrene Jamal;  Surgeon: Destin Britt MD;  [facility];  Service: Plastics    WOUND DEBRIDEMENT Right 7/7/2018    Procedure: DEBRIDEMENT UPPER EXTREMITY ([address]) W/ APPLICATION OF WOUND VAC;  Surgeon: Nhung Crawley MD;  [facility];  Service: Orthopedics    WOUND DEBRIDEMENT Right 7/11/2018    Procedure: DEBRIDEMENT UPPER EXTREMITY WITH WOUND VAC EXCHANGE;  Surgeon: Gayla Massey DO;  [facility];  Service: Orthopedics    WOUND DEBRIDEMENT Right 9/12/2018    Procedure: DEBRIDEMENT  [facility] OUT) FOREARM with Vac dressing change;  Surgeon: Flor Lowery MD;  [facility];  Service: Plastics    WOUND DEBRIDEMENT Right 5/29/2020    Procedure: Complex Irrigation and DEBRIDEMENT WOUND ([address] OUT), ANKLE;  Surgeon: Hong Licona DPM;  [facility];  Service: Podiatry        04/14/21 0935   PT Last Visit   PT Visit Date 04/14/21   Note Type   Note type Evaluation   Pain Assessment   Pain Assessment Tool 0-10   Pain Score 2   Pain Location/Orientation Orientation: Right;Location: Foot   Hospital Pain Intervention(s) Repositioned; Ambulation/increased activity; Elevated; Emotional support; Rest   Home Living   Type of [address] One level;Stairs to enter with rails   Bathroom Shower/Tub Tub/shower unit   Bathroom Toilet Standard   Bathroom Equipment Commode; Shower chair   Bathroom Accessibility Accessible   Home Equipment Walker;Cane   Additional Comments 13 TRUPTI w RHR  Prior Function   Level of [place] Independent with ADLs and functional mobility   Lives With Spouse   ADL Assistance Independent   IADLs Independent   Falls in the last 6 months 0   Comments pt reports being indep pta  admits to furniture walking in the home, use of [facility] for community mobility  has not been driving d/t acute visual problems  spouse works during days so patient (+) home alone   Restrictions/Precautions   Weight Bearing Precautions Per Order Yes   RLE Weight Bearing Per Order NWB   Other Precautions WBS; Multiple lines; Fall Risk;Pain   General   Additional Pertinent History pt admitted 4/12/21 for OM of R foot  s/p TMA 4/13  up w assist orders and nwb per podiatry   hx of bipolar, depression, DM  experiencing acute L eye blurry/double vision- eye patch recommended prn   Cognition   Overall Cognitive Status WFL   Arousal/Participation Cooperative   Orientation Level Oriented X4   Memory Within functional limits   Following Commands Follows all commands and directions without difficulty   RLE Assessment   RLE Assessment WFL  (4+ (3/5 DF, not formally MMT d/t incision site))   LLE Assessment   LLE Assessment WFL  (4+)   Coordination   Sensation X  (R foot)   Light Touch   RLE Light Touch Grossly intact   LLE Light Touch Grossly intact   Bed Mobility   Supine to Sit 5  Supervision   Additional items HOB elevated; Bedrails   Transfers   Sit to Stand 4  Minimal assistance   Additional items Assist x 1; Increased time required;Verbal cues; Other;Bedrails  (RW)   Stand to Sit 4  Minimal assistance   Additional items Assist x 1; Armrests; Increased time required;Verbal cues; Other  (RW)   Stand pivot 4  Minimal assistance   Additional items Assist x 1; Increased time required; Other;Verbal cues  (RW)   Additional Comments cues for safe technique including hand placement, position of RLE to ensure NWB   Ambulation/Elevation   Gait pattern Narrow TIM; Decreased foot clearance; Short stride; Excessively slow   Gait Assistance 4  Minimal assist   Additional items Assist x 1;Assist x 2;Verbal cues  (Ax1 to guard patient, additional Ax1 for lines)   Assistive Device Rolling walker   Distance 10'   Stair Management Assistance Not tested   Balance   Static Standing Fair -   Dynamic Standing Poor +  (difficulty w nwb when just unilateral UE support on RW)   Ambulatory Poor +   Endurance Deficit   Endurance Deficit No   Activity Tolerance   Activity Tolerance Patient tolerated treatment well;Treatment limited secondary to medical complications (Comment)  (wbs)   Medical Staff Made Aware  OT: Maria D Strickland OTS   Nurse Made Aware chema VEGA   Assessment   Prognosis Good   Problem List Impaired balance;Decreased mobility; Decreased safety awareness; Impaired sensation;Decreased skin integrity;Orthopedic restrictions;Pain   Assessment Stephania Matos is a 60 y o  female [facility] on 4/12/2021 for Osteomyelitis of right foot ([address] )  POD#1 R TMA  PT was consulted and pt was seen on 4/14/2021 for mobility assessment and d/c planning  Pt presents multiple lines, NWB RLE, pain, fall risk  Pt is currently functioning at a supervision assistance x1 level for bed mobility, minimum assistance x1 level for transfers, minimum assistance x1 level for ambulation with Rolling Walker  Pt demonstrated good understanding of NWB RLE  Overall demonstrates good compliance w WBS however did note brief foot placement on ground for balance during dynamic standing tasks (LBD) where pt was only using one arm for support in standing  Able to readjust w cues for safety/ reinforcement of WBS  Did require min A for transf and ambulation for safety given fall risk and multiple lines  Pt will benefit from continued skilled IP PT to address the above mentioned impairments  in order to maximize recovery and increase functional independence when completing mobility and ADLs  At this time PT recommendations for d/c are STR given environmental barriers and available support at home, risk for falls and need for further mobility training given NWB status  End of session pt seated OOB, ble elevated, all needs in reach  Pt verbalize understanding to fall risk precautions  RN present  Barriers to Discharge Inaccessible home environment;Decreased caregiver support   Barriers to Discharge Comments 13 jasmina, home alone   Goals   Patient Goals to be able to keep wbs for foot to heal   STG Expiration Date 04/28/21   Short Term Goal #1 1)  Pt will perform bed mobility with Soham demonstrating appropriate technique 100% of the time in order to improve function  2)  Perform all transfers with Soham demonstrating safe and appropriate technique 100% of the time in order to improve ability to negotiate safely in home environment  3) Amb with least restrictive AD > 25'x1 with mod I in order to demonstrate ability to negotiate in home environment  4)  Improve overall strength and balance 1/2 grade in order to optimize ability to perform functional tasks and reduce fall risk  5) Increase activity tolerance to 45 minutes in order to improve endurance to functional tasks  6)  Negotiate stairs using most appropriate technique and min A in order to be able to negotiate safely in home environment  7) PT for ongoing patient and family/caregiver education, DME needs and d/c planning in order to promote highest level of function in least restrictive environment  8) Maintain WBS RLE during all functional tasks  9) Wc mobility >50' at mod I level to negotiate household distances  PT Treatment Day 0   Plan   Treatment/Interventions Functional transfer training;LE strengthening/ROM; Elevations; Therapeutic exercise;Patient/family training;Equipment eval/education; Bed mobility;Gait training; Compensatory technique education;Continued evaluation;Spoke to nursing;OT  (wc mobility)   PT Frequency Other (Comment)  (3-5x)   Recommendation   PT Discharge Recommendation Post acute rehabilitation services   PT - OK to Discharge Yes   Additional Comments to STR when medically stable   AM-PAC Basic Mobility Inpatient   Turning in Bed Without Bedrails 4   Lying on Back to Sitting on Edge of Flat Bed 4   Moving Bed to Chair 3   Standing Up From Chair 3   Walk in Room 3   Climb 3-5 Stairs 2   Basic Mobility Inpatient Raw Score 19   Basic Mobility Standardized Score 42 48   History: co - morbidities, age, social background,  fall risk, use of assistive device, multiple lines, WBS  Exam: impairments in systems including musculoskeletal (strength), neuromuscular (balance, gait, transfers, motor function and sensation), AM-PAC, joint integrity, cognition  Clinical: unstable/unpredictable  Complexity:high      Debra Bae, PT     The patient's AM-PAC Basic Mobility Inpatient Short Form Raw Score is 19, Standardized Score is 42  A standardized score less than 42 9 suggests the patient may benefit from discharge to post-acute rehabilitation services  Please also refer to the recommendation of the Physical Therapist for safe discharge planning  Pt does not currently demonstrate the endurance to maintain NWB for a full flight of steps to enter apartment, nor is she indep (mod I) as she will need to be upon dc given +home alone

## 2021-04-14 NOTE — UTILIZATION REVIEW
Notification of Inpatient Admission/Inpatient Authorization Request   This is a Notification of Inpatient Admission for [address]  Be advised that this patient was admitted to our facility under Inpatient Status  Contact Dorita Mccord at [phone] for additional admission information  [facility]  DEDICATED -[phone]  Patient Name:   Jami Rae   YOB: 1955       [address]   [address]:   [facility]  Tax ID: [id number]  NPI: [id number] Attending Provider/NPI:  Phone:  Address: Bassem Andujar [[id number]]  [phone]  Same as the facility   [facility] Code[facility]   Start Date: 4/13/21 0047 Discharge Date & Time: No discharge date for patient encounter  Type of Admission: Inpatient Status Discharge Disposition   (if discharged): Left against medical advice or discontinued care   Patient Diagnoses: Toe injury [S99 929A]  Ptosis of left eyelid [H02 402]  Osteomyelitis of toe ([facility] [address] ) [M86 9]  JOYCE (acute kidney injury) ([facility] [address] ) [N17 9]     Orders: Admission Orders (From admission, onward)     Ordered        04/13/21 0047  Inpatient Admission  Once                    Assigned Utilization Review Contact: Debo Ayala  Utilization   Network Utilization Review Department  Phone: [phone]; Fax [phone]  Email: [email or web address]   ATTENTION PAYERS: Please call the assigned Utilization  directly with any questions or concerns ALL voicemails in the department are confidential  Send all requests for admission clinical reviews, approved or denied determinations and any other requests to dedicated fax number belonging to the campus where the patient is receiving treatment

## 2021-04-14 NOTE — PROGRESS NOTES
[facility] - Progress Note  Patient:  60 y o  female   MRN: [id number]  PCP: Josué Pierce MD  Unit/Bed#: E2 -23 Encounter: [id number]  Date Of Visit: 21    ASSESSMENT:    Anurag Friday is a 60 y o  female with:    1  Right 4th digit ulceration with gangrene and osteomyelitis- [place] 4  - S/p right transmetatarsal amputation (DOS 2021)  2  Cellulitis  3  Type 2 diabetes mellitus with neuropathy  4  H/o opioid  dependence      PLAN:    · Postop dressing changed at bedside today  Surgical site appears stable  · Continue local wound care at this time  AVS will be updated to include nursing instructions for discharge  · WBC normal, AVSS, bcx neg at 24 hrs, Wcx 4+ GNRs 1+GPCs  Continue antibiotic therapy  Await final susceptibilities and tailor accordingly  Recommend hold 5-7 days postoperative therapy for residual cellulitis  · PT/OT eval pending  · PMR consulted  · Rest of care per primary team     Weight bearing status: NWB RLE  Antibiotics: Ancef/Flagyl    SUBJECTIVE:     The patient was seen, evaluated, and assessed at bedside today  The patient was awake, alert, and in no acute distress  No acute events overnight  The patient reports no pain or discomfort, normal appetite and BM  Patient denies N/V/F/chills/SOB/CP  OBJECTIVE:     Vitals:   /64 (BP Location: Right arm)   Pulse 93   Temp 97 8 °F (36 6 °C) (Temporal)   Resp 18   Ht 5' 6" (1 676 m)   Wt 66 4 kg (146 lb 6 2 oz)   SpO2 100%   BMI 23 63 kg/m²     Temp (24hrs), Av 6 °F (36 4 °C), Min:97 2 °F (36 2 °C), Max:97 9 °F (36 6 °C)      Physical Exam:     General:  Alert, cooperative, and in no distress  Lower extremity exam:  Cardiovascular status at baseline  Neurological status at baseline  Musculoskeletal status at baseline  No calf tenderness noted  Surgical site located distal right foot stable  Skin margins well approximated with sutures and staples intact    No clinical signs of dehiscence or acute infection noted  There is some residual periwound erythema and edema noted  Trace sanguinous drainage noted from skin margins  Capillary fill time to the flaps brisk  Clinical Images 04/14/21:    Right foot    Additional Data:     Labs:    Results from last 7 days   Lab Units 04/14/21  0653   WBC Thousand/uL 7 47   HEMOGLOBIN g/dL 10 9*   HEMATOCRIT % 33 2*   PLATELETS Thousands/uL 289   NEUTROS PCT % 67   LYMPHS PCT % 24   MONOS PCT % 7   EOS PCT % 1     Results from last 7 days   Lab Units 04/14/21  0653   POTASSIUM mmol/L 3 9   CHLORIDE mmol/L 102   CO2 mmol/L 33*   BUN mg/dL 16   CREATININE mg/dL 1 13   CALCIUM mg/dL 9 5           * I Have Reviewed All Lab Data Listed Above  Recent Cultures (last 7 days):     Results from last 7 days   Lab Units 04/13/21  0946 04/13/21  0027 04/12/21  2338   BLOOD CULTURE   --  No Growth at 24 hrs  No Growth at 24 hrs  GRAM STAIN RESULT  1+ Polys*  4+ Gram negative rods*  1+ Gram positive cocci in pairs*  --   --            Imaging: I have personally reviewed pertinent films in PACS  EKG, Pathology, and Other Studies: I have personally reviewed pertinent reports  ** Please Note: Portions of the record may have been created with voice recognition software  Occasional wrong word or "sound a like" substitutions may have occurred due to the inherent limitations of voice recognition software  Read the chart carefully and recognize, using context, where substitutions have occurred   **

## 2021-04-15 LAB
ANION GAP SERPL CALCULATED.3IONS-SCNC: 7 MMOL/L (ref 4–13)
BASOPHILS # BLD AUTO: 0.03 THOUSANDS/ΜL (ref 0–0.1)
BASOPHILS NFR BLD AUTO: 1 % (ref 0–1)
BUN SERPL-MCNC: 15 MG/DL (ref 5–25)
CALCIUM SERPL-MCNC: 9.5 MG/DL (ref 8.3–10.1)
CHLORIDE SERPL-SCNC: 101 MMOL/L (ref 100–108)
CO2 SERPL-SCNC: 30 MMOL/L (ref 21–32)
CREAT SERPL-MCNC: 0.86 MG/DL (ref 0.6–1.3)
EOSINOPHIL # BLD AUTO: 0.13 THOUSAND/ΜL (ref 0–0.61)
EOSINOPHIL NFR BLD AUTO: 2 % (ref 0–6)
ERYTHROCYTE [DISTWIDTH] IN BLOOD BY AUTOMATED COUNT: 12.7 % (ref 11.6–15.1)
FLUAV RNA RESP QL NAA+PROBE: NEGATIVE
FLUBV RNA RESP QL NAA+PROBE: NEGATIVE
GFR SERPL CREATININE-BSD FRML MDRD: 72 ML/MIN/1.73SQ M
GLUCOSE SERPL-MCNC: 112 MG/DL (ref 65–140)
GLUCOSE SERPL-MCNC: 136 MG/DL (ref 65–140)
GLUCOSE SERPL-MCNC: 142 MG/DL (ref 65–140)
GLUCOSE SERPL-MCNC: 170 MG/DL (ref 65–140)
GLUCOSE SERPL-MCNC: 229 MG/DL (ref 65–140)
HCT VFR BLD AUTO: 31.5 % (ref 34.8–46.1)
HGB BLD-MCNC: 10.3 G/DL (ref 11.5–15.4)
IMM GRANULOCYTES # BLD AUTO: 0.01 THOUSAND/UL (ref 0–0.2)
IMM GRANULOCYTES NFR BLD AUTO: 0 % (ref 0–2)
LYMPHOCYTES # BLD AUTO: 1.95 THOUSANDS/ΜL (ref 0.6–4.47)
LYMPHOCYTES NFR BLD AUTO: 32 % (ref 14–44)
MCH RBC QN AUTO: 28.8 PG (ref 26.8–34.3)
MCHC RBC AUTO-ENTMCNC: 32.7 G/DL (ref 31.4–37.4)
MCV RBC AUTO: 88 FL (ref 82–98)
MONOCYTES # BLD AUTO: 0.58 THOUSAND/ΜL (ref 0.17–1.22)
MONOCYTES NFR BLD AUTO: 9 % (ref 4–12)
NEUTROPHILS # BLD AUTO: 3.47 THOUSANDS/ΜL (ref 1.85–7.62)
NEUTS SEG NFR BLD AUTO: 56 % (ref 43–75)
NRBC BLD AUTO-RTO: 0 /100 WBCS
PLATELET # BLD AUTO: 261 THOUSANDS/UL (ref 149–390)
PMV BLD AUTO: 9.5 FL (ref 8.9–12.7)
POTASSIUM SERPL-SCNC: 4.4 MMOL/L (ref 3.5–5.3)
RBC # BLD AUTO: 3.58 MILLION/UL (ref 3.81–5.12)
RSV RNA RESP QL NAA+PROBE: NEGATIVE
SARS-COV-2 RNA RESP QL NAA+PROBE: NEGATIVE
SODIUM SERPL-SCNC: 138 MMOL/L (ref 136–145)
WBC # BLD AUTO: 6.17 THOUSAND/UL (ref 4.31–10.16)

## 2021-04-15 PROCEDURE — 97535 SELF CARE MNGMENT TRAINING: CPT

## 2021-04-15 PROCEDURE — 97530 THERAPEUTIC ACTIVITIES: CPT

## 2021-04-15 PROCEDURE — 97116 GAIT TRAINING THERAPY: CPT

## 2021-04-15 PROCEDURE — 97110 THERAPEUTIC EXERCISES: CPT

## 2021-04-15 PROCEDURE — 85025 COMPLETE CBC W/AUTO DIFF WBC: CPT | Performed by: PHYSICIAN ASSISTANT

## 2021-04-15 PROCEDURE — 0241U HB NFCT DS VIR RESP RNA 4 TRGT: CPT | Performed by: NURSE PRACTITIONER

## 2021-04-15 PROCEDURE — 99232 SBSQ HOSP IP/OBS MODERATE 35: CPT | Performed by: NURSE PRACTITIONER

## 2021-04-15 PROCEDURE — 82948 REAGENT STRIP/BLOOD GLUCOSE: CPT

## 2021-04-15 PROCEDURE — 80048 BASIC METABOLIC PNL TOTAL CA: CPT | Performed by: PHYSICIAN ASSISTANT

## 2021-04-15 RX ORDER — LISINOPRIL 5 MG/1
5 TABLET ORAL DAILY
Status: DISCONTINUED | OUTPATIENT
Start: 2021-04-15 | End: 2021-04-17 | Stop reason: HOSPADM

## 2021-04-15 RX ADMIN — LISINOPRIL 5 MG: 5 TABLET ORAL at 16:20

## 2021-04-15 RX ADMIN — GABAPENTIN 600 MG: 300 CAPSULE ORAL at 09:57

## 2021-04-15 RX ADMIN — HEPARIN SODIUM 5000 UNITS: 5000 INJECTION INTRAVENOUS; SUBCUTANEOUS at 13:06

## 2021-04-15 RX ADMIN — GABAPENTIN 600 MG: 300 CAPSULE ORAL at 23:00

## 2021-04-15 RX ADMIN — GABAPENTIN 600 MG: 300 CAPSULE ORAL at 00:26

## 2021-04-15 RX ADMIN — INSULIN GLARGINE 22 UNITS: 100 INJECTION, SOLUTION SUBCUTANEOUS at 23:00

## 2021-04-15 RX ADMIN — CEFAZOLIN SODIUM 2000 MG: 2 SOLUTION INTRAVENOUS at 02:26

## 2021-04-15 RX ADMIN — ATORVASTATIN CALCIUM 40 MG: 40 TABLET, FILM COATED ORAL at 09:56

## 2021-04-15 RX ADMIN — LAMOTRIGINE 150 MG: 25 TABLET ORAL at 09:56

## 2021-04-15 RX ADMIN — HEPARIN SODIUM 5000 UNITS: 5000 INJECTION INTRAVENOUS; SUBCUTANEOUS at 23:00

## 2021-04-15 RX ADMIN — METRONIDAZOLE 500 MG: 500 TABLET ORAL at 13:06

## 2021-04-15 RX ADMIN — CEFAZOLIN SODIUM 2000 MG: 2 SOLUTION INTRAVENOUS at 09:56

## 2021-04-15 RX ADMIN — HEPARIN SODIUM 5000 UNITS: 5000 INJECTION INTRAVENOUS; SUBCUTANEOUS at 06:11

## 2021-04-15 RX ADMIN — INSULIN LISPRO 3 UNITS: 100 INJECTION, SOLUTION INTRAVENOUS; SUBCUTANEOUS at 16:22

## 2021-04-15 RX ADMIN — OXYCODONE HYDROCHLORIDE 5 MG: 5 TABLET ORAL at 16:20

## 2021-04-15 RX ADMIN — METHADONE HYDROCHLORIDE 90 MG: 10 TABLET ORAL at 09:56

## 2021-04-15 RX ADMIN — HYDROXYZINE HYDROCHLORIDE 50 MG: 25 TABLET ORAL at 22:59

## 2021-04-15 RX ADMIN — METRONIDAZOLE 500 MG: 500 TABLET ORAL at 23:00

## 2021-04-15 RX ADMIN — DULOXETINE HYDROCHLORIDE 60 MG: 60 CAPSULE, DELAYED RELEASE ORAL at 09:56

## 2021-04-15 RX ADMIN — OXYCODONE HYDROCHLORIDE 10 MG: 10 TABLET ORAL at 02:25

## 2021-04-15 RX ADMIN — OXYCODONE HYDROCHLORIDE 10 MG: 10 TABLET ORAL at 22:59

## 2021-04-15 RX ADMIN — CEFAZOLIN SODIUM 2000 MG: 2 SOLUTION INTRAVENOUS at 17:35

## 2021-04-15 RX ADMIN — GABAPENTIN 600 MG: 300 CAPSULE ORAL at 16:20

## 2021-04-15 RX ADMIN — CLONAZEPAM 1 MG: 1 TABLET ORAL at 17:35

## 2021-04-15 RX ADMIN — METRONIDAZOLE 500 MG: 500 TABLET ORAL at 06:11

## 2021-04-15 RX ADMIN — CLONAZEPAM 1 MG: 1 TABLET ORAL at 09:56

## 2021-04-15 RX ADMIN — SERTRALINE HYDROCHLORIDE 100 MG: 100 TABLET ORAL at 09:57

## 2021-04-15 NOTE — OCCUPATIONAL THERAPY NOTE
Occupational Therapy Treatment Note:         04/15/21 1453   OT Last Visit   OT Visit Date 04/15/21   Note Type   Note Type Treatment   Restrictions/Precautions   Weight Bearing Precautions Per Order Yes   RLE Weight Bearing Per Order NWB   Other Precautions Fall Risk;Pain;WBS; Visual impairment   Pain Assessment   Pain Assessment Tool 0-10   Pain Score 7   Pain Location/Orientation Orientation: Right;Location: Foot   ADL   Where Assessed Edge of bed   Grooming Assistance 5  Supervision/Setup   Grooming Deficit Setup   UB Bathing Assistance 5  Supervision/Setup   UB Bathing Deficit Setup   LB Bathing Assistance 4  Minimal Assistance   LB Bathing Deficit Setup;Steadying;Verbal cueing;Supervision/safety; Increased time to complete;Perineal area; Buttocks; Left lower leg including foot;Right lower leg including foot   LB Bathing Comments hands on A required to maintain NWB into RLE with functional reach instance  UB Dressing Assistance 5  Supervision/Setup   UB Dressing Deficit Setup   LB Dressing Assistance 4  Minimal Assistance   LB Dressing Deficit Setup;Steadying; Requires assistive device for steadying;Verbal cueing;Supervision/safety; Increased time to complete; Don/doff R sock; Don/doff L sock; Thread RLE into underwear; Thread LLE into underwear;Pull up over hips; Thread LLE into pants; Thread RLE into pants   LB Dressing Comments Poor safety awrareness with impulsive movements this tx session    [address]  4  Minimal Assistance   Toileting Deficit Steadying;Setup;Supervison/safety;Verbal cueing; Increased time to complete;Perineal hygiene;Clothing management down;Clothing management up; Bedside commode   Toileting Comments increased A required for safety to maintain NWB due to impulsive movements  Functional Standing Tolerance   Time 2 mins   Activity self care tasks, self toileting  Comments cues for safety required with activities      Bed Mobility   Supine to Sit 6  Modified independent   Additional items Impulsive;Verbal cues   Sit to Supine 5  Supervision   Additional items Bedrails; Impulsive;Verbal cues   Additional Comments cues for safety required with activity  Transfers   Sit to Stand 4  Minimal assistance   Additional items Assist x 1; Increased time required;Verbal cues; Bedrails;Armrests   Stand to Sit 4  Minimal assistance   Additional items Assist x 1;Verbal cues; Increased time required;Armrests; Bedrails   Stand pivot 4  Minimal assistance   Additional items Assist x 1;Verbal cues; Increased time required;Armrests; Bedrails   Toilet transfer 4  Minimal assistance   Additional items Assist x 1; Increased time required;Verbal cues; Commode   Additional Comments cues for safe hand placement and footing required  Functional Mobility   Functional Mobility 4  Minimal assistance   Additional Comments x1   Additional items Rolling walker   Toilet Transfers   Toilet Transfer From Dinos Rule walker   Toilet Transfer Type To and from   Toilet Transfer to Standard bedside commode   Toilet Transfer Technique Stand pivot; Ambulating   Toilet Transfers Verbal cues; Minimal assistance   Toilet Transfers Comments cues for safety required with activities  Cognition   Overall Cognitive Status WFL   Arousal/Participation Cooperative;Responsive   Attention Attends with cues to redirect   Orientation Level Oriented X4   Memory Decreased recall of precautions; Within functional limits   Following Commands Follows multistep commands with increased time or repetition   Comments increased redirection required to maintain NWB balance with activities  Additional Activities   Additional Activities Other (Comment)  (reviewed current plan of care  )   Additional Activities Comments Pt reports having F understanding  Activity Tolerance   Activity Tolerance Patient limited by fatigue;Patient limited by pain   Medical Staff Made Aware reported all findings to nursing staff      Assessment   Assessment Pt was seen for skilled OT with focus on completion of self care tasks, functional transfers, review of RW safety, fall prevention and review of current plan of care  Pt with need for initial use of bedside commode  Min A required overall with impulsive movements noted warranting need for redirection to maintain NWB into RLE  Pt returned to EOB positioning following self toileting on bedside commode with Min A overall  Pt agreeable to completion of ADL routine while seated EOB  Increased A for LE bathing/dressing to maintain NWB status in RLE  Min A overall for LE self care  The patient's raw score on the AM-PAC Daily Activity inpatient short form is 18, standardized score is 38 66, less than 39 4  Patients at this level are likely to benefit from discharge to post-acute rehabilitation services  Plan   Treatment Interventions ADL retraining;Functional transfer training;UE strengthening/ROM; Endurance training;Cognitive reorientation;Patient/family training   Goal Expiration Date 04/28/21   OT Treatment Day 1   OT Frequency 3-5x/wk   Recommendation   OT Discharge Recommendation Post acute rehabilitation services   OT - OK to Discharge Yes  (when medically cleared   )   [facility] Daily Activity Inpatient   Lower Body Dressing 2   Bathing 2   Toileting 3   Upper Body Dressing 3   Grooming 4   Eating 4   Daily Activity Raw Score 18   Daily Activity Standardized Score (Calc for Raw Score >=11) 38 66   Lexa Tamayo, [address]

## 2021-04-15 NOTE — CASE MANAGEMENT
Pt now reporting that she does not want to go to [facility]  She would like a referral sent to [facility]  CM has submitted a referral  CM will advise if they are able to accept  Will need insurance auth  CM will advise

## 2021-04-15 NOTE — PLAN OF CARE
Problem: Potential for Falls  Goal: Patient will remain free of falls  Description: INTERVENTIONS:  - Assess patient frequently for physical needs  -  Identify cognitive and physical deficits and behaviors that affect risk of falls    -  Rule fall precautions as indicated by assessment   - Educate patient/family on patient safety including physical limitations  - Instruct patient to call for assistance with activity based on assessment  - Modify environment to reduce risk of injury  - Consider OT/PT consult to assist with strengthening/mobility  Outcome: Progressing     Problem: PAIN - ADULT  Goal: Verbalizes/displays adequate comfort level or baseline comfort level  Description: Interventions:  - Encourage patient to monitor pain and request assistance  - Assess pain using appropriate pain scale  - Administer analgesics based on type and severity of pain and evaluate response  - Implement non-pharmacological measures as appropriate and evaluate response  - Consider cultural and social influences on pain and pain management  - Notify physician/advanced practitioner if interventions unsuccessful or patient reports new pain  Outcome: Progressing     Problem: INFECTION - ADULT  Goal: Absence or prevention of progression during hospitalization  Description: INTERVENTIONS:  - Assess and monitor for signs and symptoms of infection  - Monitor lab/diagnostic results  - Monitor all insertion sites, i e  indwelling lines, tubes, and drains  - Monitor endotracheal if appropriate and nasal secretions for changes in amount and color  - Rule appropriate cooling/warming therapies per order  - Administer medications as ordered  - Instruct and encourage patient and family to use good hand hygiene technique  - Identify and instruct in appropriate isolation precautions for identified infection/condition  Outcome: Progressing  Goal: Absence of fever/infection during neutropenic period  Description: INTERVENTIONS:  - Monitor WBC    Outcome: Progressing     Problem: SAFETY ADULT  Goal: Patient will remain free of falls  Description: INTERVENTIONS:  - Assess patient frequently for physical needs  -  Identify cognitive and physical deficits and behaviors that affect risk of falls    -  [place] fall precautions as indicated by assessment   - Educate patient/family on patient safety including physical limitations  - Instruct patient to call for assistance with activity based on assessment  - Modify environment to reduce risk of injury  - Consider OT/PT consult to assist with strengthening/mobility  Outcome: Progressing  Goal: Maintain or return to baseline ADL function  Description: INTERVENTIONS:  -  Assess patient's ability to carry out ADLs; assess patient's baseline for ADL function and identify physical deficits which impact ability to perform ADLs (bathing, care of mouth/teeth, toileting, grooming, dressing, etc )  - Assess/evaluate cause of self-care deficits   - Assess range of motion  - Assess patient's mobility; develop plan if impaired  - Assess patient's need for assistive devices and provide as appropriate  - Encourage maximum independence but intervene and supervise when necessary  - Involve family in performance of ADLs  - Assess for home care needs following discharge   - Consider OT consult to assist with ADL evaluation and planning for discharge  - Provide patient education as appropriate  Outcome: Progressing  Goal: Maintain or return mobility status to optimal level  Description: INTERVENTIONS:  - Assess patient's baseline mobility status (ambulation, transfers, stairs, etc )    - Identify cognitive and physical deficits and behaviors that affect mobility  - Identify mobility aids required to assist with transfers and/or ambulation (gait belt, sit-to-stand, lift, walker, cane, etc )  - [place] fall precautions as indicated by assessment  - Record patient progress and toleration of activity level on Mobility SBAR; progress patient to next Phase/Stage  - Instruct patient to call for assistance with activity based on assessment  - Consider rehabilitation consult to assist with strengthening/weightbearing, etc   Outcome: Progressing     Problem: DISCHARGE PLANNING  Goal: Discharge to home or other facility with appropriate resources  Description: INTERVENTIONS:  - Identify barriers to discharge w/patient and caregiver  - Arrange for needed discharge resources and transportation as appropriate  - Identify discharge learning needs (meds, wound care, etc )  - Arrange for interpretive services to assist at discharge as needed  - Refer to Case Management Department for coordinating discharge planning if the patient needs post-hospital services based on physician/advanced practitioner order or complex needs related to functional status, cognitive ability, or social support system  Outcome: Progressing     Problem: Knowledge Deficit  Goal: Patient/family/caregiver demonstrates understanding of disease process, treatment plan, medications, and discharge instructions  Description: Complete learning assessment and assess knowledge base    Interventions:  - Provide teaching at level of understanding  - Provide teaching via preferred learning methods  Outcome: Progressing     Problem: SKIN/TISSUE INTEGRITY - ADULT  Goal: Skin integrity remains intact  Description: INTERVENTIONS  - Identify patients at risk for skin breakdown  - Assess and monitor skin integrity  - Assess and monitor nutrition and hydration status  - Monitor labs (i e  albumin)  - Assess for incontinence   - Turn and reposition patient  - Assist with mobility/ambulation  - Relieve pressure over bony prominences  - Avoid friction and shearing  - Provide appropriate hygiene as needed including keeping skin clean and dry  - Evaluate need for skin moisturizer/barrier cream  - Collaborate with interdisciplinary team (i e  Nutrition, Rehabilitation, etc )   - Patient/family teaching  Outcome: Progressing  Goal: Incision(s), wounds(s) or drain site(s) healing without S/S of infection  Description: INTERVENTIONS  - Assess and document risk factors for skin impairment   - Assess and document dressing, incision, wound bed, drain sites and surrounding tissue  - Consider nutrition services referral as needed  - Oral mucous membranes remain intact  - Provide patient/ family education  Outcome: Progressing     Problem: MUSCULOSKELETAL - ADULT  Goal: Maintain or return mobility to safest level of function  Description: INTERVENTIONS:  - Assess patient's ability to carry out ADLs; assess patient's baseline for ADL function and identify physical deficits which impact ability to perform ADLs (bathing, care of mouth/teeth, toileting, grooming, dressing, etc )  - Assess/evaluate cause of self-care deficits   - Assess range of motion  - Assess patient's mobility  - Assess patient's need for assistive devices and provide as appropriate  - Encourage maximum independence but intervene and supervise when necessary  - Involve family in performance of ADLs  - Assess for home care needs following discharge   - Consider OT consult to assist with ADL evaluation and planning for discharge  - Provide patient education as appropriate  Outcome: Progressing  Goal: Maintain proper alignment of affected body part  Description: INTERVENTIONS:  - Support, maintain and protect limb and body alignment  - Provide patient/ family with appropriate education  Outcome: Progressing     Problem: CHANGE IN BODY IMAGE  Goal: Pt/Family communicate acceptance of loss or change in body image and expresses psychological comfort and peace  Description: INTERVENTIONS:  - Assess patient/family anxiety and grief process related to change in body image, loss of functional status and loss of sense of self  - Assess patient/family's coping skills and provide emotional, spiritual and psychosocial support  - Provide information about the patient's health status with consideration of family and cultural values  - Communicate willingness to discuss loss and facilitate grief process with patient/family as appropriate  - Emphasize sustaining relationships within family system and community, or [place]/spiritual traditions  - Refer to community support groups as appropriate  - [id number], Pastoral care or other ancillary consults as needed  Outcome: Progressing     Problem: Prexisting or High Potential for Compromised Skin Integrity  Goal: Skin integrity is maintained or improved  Description: INTERVENTIONS:  - Identify patients at risk for skin breakdown  - Assess and monitor skin integrity  - Assess and monitor nutrition and hydration status  - Monitor labs   - Assess for incontinence   - Turn and reposition patient  - Assist with mobility/ambulation  - Relieve pressure over bony prominences  - Avoid friction and shearing  - Provide appropriate hygiene as needed including keeping skin clean and dry  - Evaluate need for skin moisturizer/barrier cream  - Collaborate with interdisciplinary team   - Patient/family teaching  - Consider wound care consult   Outcome: Progressing

## 2021-04-15 NOTE — PLAN OF CARE
Problem: OCCUPATIONAL THERAPY ADULT  Goal: Performs self-care activities at highest level of function for planned discharge setting  See evaluation for individualized goals  Description: Treatment Interventions: ADL retraining, Visual perceptual retraining, Functional transfer training, UE strengthening/ROM, Endurance training, Patient/family training, Equipment evaluation/education, Compensatory technique education, Energy conservation, Activityengagement          See flowsheet documentation for full assessment, interventions and recommendations  Outcome: Progressing  Note: Limitation: Decreased ADL status, Decreased Safe judgement during ADL, Decreased endurance, Visual deficit, Decreased self-care trans  Prognosis: Fair  Assessment: Pt was seen for skilled OT with focus on completion of self care tasks, functional transfers, review of RW safety, fall prevention and review of current plan of care  Pt with need for initial use of bedside commode  Min A required overall with impulsive movements noted warranting need for redirection to maintain NWB into RLE  Pt returned to EOB positioning following self toileting on bedside commode with Min A overall  Pt agreeable to completion of ADL routine while seated EOB  Increased A for LE bathing/dressing to maintain NWB status in RLE  Min A overall for LE self care  The patient's raw score on the AM-PAC Daily Activity inpatient short form is 18, standardized score is 38 66, less than 39 4  Patients at this level are likely to benefit from discharge to post-acute rehabilitation services        OT Discharge Recommendation: Post acute rehabilitation services  OT - OK to Discharge: Yes(when medically cleared  )

## 2021-04-15 NOTE — ASSESSMENT & PLAN NOTE
Lab Results   Component Value Date    HGBA1C 11 7 (H) 04/13/2021       Recent Labs     04/14/21  1632 04/14/21  2059 04/15/21  0745 04/15/21  1102   POCGLU 193* 306* 142* 229*       Blood Sugar Average: Last 72 hrs:  (P) 390 [id number]     Patient has a history of diabetes type 2, with long-term use of insulin, with associated diabetic neuropathy  Uncontrolled diabetes  Last A1c 13  9 -improved to 11  Home regimen: Trulicity weekly (started 1 month ago, receives inj on sundays), Lantus 45 units qHS, Aspart 10 units with meals, metformin 850 mg BID  Hold oral antihyperglycemics  Pt states she only takes 20 of lantus a day since starting on Trulicity, and 40 units/day will cause hypoglycemia  Patient started on 22 units Lantus nightly  Continue Accuchecks and SSI for coverage     Continue monitor glycemic control  Recommend outpatient Endocrinology follow-up

## 2021-04-15 NOTE — ASSESSMENT & PLAN NOTE
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

## 2021-04-15 NOTE — PROGRESS NOTES
Referral received for [facility] post-acute rehab placement  PM&R currently recommending in-patient acute rehab, patient has been approved by [facility] for [facility] pending medical stability, insurance authorization and bed availability  [facility] will continue to follow        Celine Mai Ruddy 88, OTR/L   [facility] Admissions   [phone]

## 2021-04-15 NOTE — PLAN OF CARE
Problem: PHYSICAL THERAPY ADULT  Goal: Performs mobility at highest level of function for planned discharge setting  See evaluation for individualized goals  Description: Treatment/Interventions: Functional transfer training, LE strengthening/ROM, Elevations, Therapeutic exercise, Patient/family training, Equipment eval/education, Bed mobility, Gait training, Compensatory technique education, Continued evaluation, Spoke to nursing, OT(wc mobility)          See flowsheet documentation for full assessment, interventions and recommendations  Outcome: Progressing  Note: Prognosis: Good  Problem List: Decreased strength, Decreased endurance, Impaired balance, Decreased mobility, Decreased coordination, Decreased safety awareness, Impaired vision, Pain, Orthopedic restrictions  Assessment: Pt tolerated tx well despite c/o pain in her right foot  Pt was able to progress her endurance with gait training to 40'x1 with RW AND MIN ASSIST X 1  Pt c/o UEs getting tired  Pt is able to maintain NWB status on right LE   Pt completed all exercises as directed this session  Pt not ready to attempt stair training secondary to UE weakness and NWB status the patient would benefit from rehab to maximize her strength and functional mobility The current medical regimen is effective;  continue present plan and medications  With current POC  Progress pt as tolerated  Barriers to Discharge: Inaccessible home environment  Barriers to Discharge Comments: 13 TRUPTI      PT Discharge Recommendation: Post acute rehabilitation services     PT - OK to Discharge: Yes(to rehab once medically cleared)    See flowsheet documentation for full assessment

## 2021-04-15 NOTE — PHYSICAL THERAPY NOTE
04/15/21 1536   PT Last Visit   PT Visit Date 04/15/21   Note Type   Note Type Treatment   Pain Assessment   Pain Assessment Tool 0-10   Pain Score 8   Pain Location/Orientation Orientation: Right;Location: Foot   Restrictions/Precautions   Weight Bearing Precautions Per Order Yes   RLE Weight Bearing Per Order NWB   Other Precautions WBS; Fall Risk;Pain;Visual impairment   General   Chart Reviewed Yes   Family/Caregiver Present No   Cognition   Overall Cognitive Status WFL   Orientation Level Oriented X4   Subjective   Subjective pt agreeable to particpate in PT tx today  Bed Mobility   Supine to Sit 6  Modified independent   Additional items Increased time required   Sit to Supine 6  Modified independent   Additional items Increased time required   Transfers   Sit to Stand 5  Supervision   Additional items Assist x 1   Stand to Sit 5  Supervision   Additional items Assist x 1   Ambulation/Elevation   Gait pattern   (hops on left LE)   Gait Assistance 4  Minimal assist   Additional items Assist x 1;Verbal cues   Assistive Device Rolling walker   Distance 40'X1   Stair Management Assistance Not tested   Balance   Static Sitting Good   Dynamic Sitting Fair +   Static Standing Fair -   Dynamic Standing Poor +   Ambulatory Poor +   Endurance Deficit   Endurance Deficit Yes   Endurance Deficit Description UE Fatigue   Activity Tolerance   Activity Tolerance Patient tolerated treatment well;Patient limited by fatigue;Patient limited by pain   Nurse Made Aware Pradeep VEGA   Exercises   TKR Supine;Sitting;15 reps;AROM; Bilateral   Assessment   Prognosis Good   Problem List Decreased strength;Decreased endurance; Impaired balance;Decreased mobility; Decreased coordination;Decreased safety awareness; Impaired vision;Pain;Orthopedic restrictions   Assessment Pt tolerated tx well despite c/o pain in her right foot  Pt was able to progress her endurance with gait training to 40'x1 with RW AND MIN ASSIST X 1   Pt c/o UEs getting tired  Pt is able to maintain NWB status on right LE   Pt completed all exercises as directed this session  Pt not ready to attempt stair training secondary to UE weakness and NWB status the patient would benefit from rehab to maximize her strength and functional mobility The current medical regimen is effective;  continue present plan and medications  With current POC  Progress pt as tolerated  Barriers to Discharge Inaccessible home environment   Barriers to Discharge Comments 13 TRUPTI    Goals   Patient Goals to go home   STG Expiration Date 04/28/21   PT Treatment Day 1   Plan   Treatment/Interventions Functional transfer training;LE strengthening/ROM; Elevations; Therapeutic exercise; Endurance training;Patient/family training;Equipment eval/education; Bed mobility;Gait training;Spoke to nursing   Progress Slow progress, decreased activity tolerance   PT Frequency   (3-5x/week)   Recommendation   PT Discharge Recommendation Post acute rehabilitation services   Equipment Recommended   (pt has walker at home)   PT - OK to Discharge Yes  (to rehab once medically cleared)   AM-PAC Basic Mobility Inpatient   Turning in Bed Without Bedrails 4   Lying on Back to Sitting on Edge of Flat Bed 4   Moving Bed to Chair 3   Standing Up From Chair 3   Walk in Room 3   Climb 3-5 Stairs 1   Basic Mobility Inpatient Raw Score 18   Basic Mobility Standardized Score 41 05   Fatuma Brochure, PTA

## 2021-04-15 NOTE — ASSESSMENT & PLAN NOTE
Patient has history of bipolar disorder  Continue home medications with Lamictal 150 mg daily, cymbalta 60 mg daily, and Zoloft 100 mg daily  Patient was initially placed on clonazepam 1 mg p o  T i d  Scheduled: The [facility] website was queried and patient is prescribed 60 tablets of clonazepam per month  Dosing was changed to reflect b i d   Administration

## 2021-04-15 NOTE — ASSESSMENT & PLAN NOTE
Uncontrolled hypertension   Home regimen hydrochlorothiazide and lisinopril 5 mg held on admission due to acute kidney injury  Resume Lisinopril 5 mg daily  Continue hydralazine p r n    Continue to titrate and monitor

## 2021-04-15 NOTE — ASSESSMENT & PLAN NOTE
-s/p right transmetatarsal amputation on 4/13/2021 for gangrene  -continue antibiotic with Ancef/Flagyl  -finalized wound cultures pending- 4+ beta hemolytic streptococcus group b, 3+ staph aureus  -finalized blood cultures pending- no growth thus far  Will need rehabilitation placement- refusing Gurpreet MUNOZ sharif authorization pending

## 2021-04-15 NOTE — ASSESSMENT & PLAN NOTE
Baseline creat between 0 8-1 1    Patient presented with acute kidney injury with a creatinine of 1 4  Resolved 4/15- CR 0 86    Lab Results   Component Value Date    CREATININE 0 86 04/15/2021    CREATININE 1 13 04/14/2021    CREATININE 1 17 04/13/2021

## 2021-04-15 NOTE — PLAN OF CARE
Problem: Potential for Falls  Goal: Patient will remain free of falls  Description: INTERVENTIONS:  - Assess patient frequently for physical needs  -  Identify cognitive and physical deficits and behaviors that affect risk of falls    -  [place] fall precautions as indicated by assessment   - Educate patient/family on patient safety including physical limitations  - Instruct patient to call for assistance with activity based on assessment  - Modify environment to reduce risk of injury  - Consider OT/PT consult to assist with strengthening/mobility  Outcome: Progressing     Problem: PAIN - ADULT  Goal: Verbalizes/displays adequate comfort level or baseline comfort level  Description: Interventions:  - Encourage patient to monitor pain and request assistance  - Assess pain using appropriate pain scale  - Administer analgesics based on type and severity of pain and evaluate response  - Implement non-pharmacological measures as appropriate and evaluate response  - Consider cultural and social influences on pain and pain management  - Notify physician/advanced practitioner if interventions unsuccessful or patient reports new pain  Outcome: Progressing     Problem: INFECTION - ADULT  Goal: Absence or prevention of progression during hospitalization  Description: INTERVENTIONS:  - Assess and monitor for signs and symptoms of infection  - Monitor lab/diagnostic results  - Monitor all insertion sites, i e  indwelling lines, tubes, and drains  - Monitor endotracheal if appropriate and nasal secretions for changes in amount and color  - [place] appropriate cooling/warming therapies per order  - Administer medications as ordered  - Instruct and encourage patient and family to use good hand hygiene technique  - Identify and instruct in appropriate isolation precautions for identified infection/condition  Outcome: Progressing  Goal: Absence of fever/infection during neutropenic period  Description: INTERVENTIONS:  - Monitor WBC    Outcome: Progressing     Problem: SAFETY ADULT  Goal: Patient will remain free of falls  Description: INTERVENTIONS:  - Assess patient frequently for physical needs  -  Identify cognitive and physical deficits and behaviors that affect risk of falls    -  [place] fall precautions as indicated by assessment   - Educate patient/family on patient safety including physical limitations  - Instruct patient to call for assistance with activity based on assessment  - Modify environment to reduce risk of injury  - Consider OT/PT consult to assist with strengthening/mobility  Outcome: Progressing  Goal: Maintain or return to baseline ADL function  Description: INTERVENTIONS:  -  Assess patient's ability to carry out ADLs; assess patient's baseline for ADL function and identify physical deficits which impact ability to perform ADLs (bathing, care of mouth/teeth, toileting, grooming, dressing, etc )  - Assess/evaluate cause of self-care deficits   - Assess range of motion  - Assess patient's mobility; develop plan if impaired  - Assess patient's need for assistive devices and provide as appropriate  - Encourage maximum independence but intervene and supervise when necessary  - Involve family in performance of ADLs  - Assess for home care needs following discharge   - Consider OT consult to assist with ADL evaluation and planning for discharge  - Provide patient education as appropriate  Outcome: Progressing  Goal: Maintain or return mobility status to optimal level  Description: INTERVENTIONS:  - Assess patient's baseline mobility status (ambulation, transfers, stairs, etc )    - Identify cognitive and physical deficits and behaviors that affect mobility  - Identify mobility aids required to assist with transfers and/or ambulation (gait belt, sit-to-stand, lift, walker, cane, etc )  - [place] fall precautions as indicated by assessment  - Record patient progress and toleration of activity level on Mobility SBAR; progress patient to next Phase/Stage  - Instruct patient to call for assistance with activity based on assessment  - Consider rehabilitation consult to assist with strengthening/weightbearing, etc   Outcome: Progressing     Problem: DISCHARGE PLANNING  Goal: Discharge to home or other facility with appropriate resources  Description: INTERVENTIONS:  - Identify barriers to discharge w/patient and caregiver  - Arrange for needed discharge resources and transportation as appropriate  - Identify discharge learning needs (meds, wound care, etc )  - Arrange for interpretive services to assist at discharge as needed  - Refer to Case Management Department for coordinating discharge planning if the patient needs post-hospital services based on physician/advanced practitioner order or complex needs related to functional status, cognitive ability, or social support system  Outcome: Progressing     Problem: Knowledge Deficit  Goal: Patient/family/caregiver demonstrates understanding of disease process, treatment plan, medications, and discharge instructions  Description: Complete learning assessment and assess knowledge base    Interventions:  - Provide teaching at level of understanding  - Provide teaching via preferred learning methods  Outcome: Progressing     Problem: SKIN/TISSUE INTEGRITY - ADULT  Goal: Skin integrity remains intact  Description: INTERVENTIONS  - Identify patients at risk for skin breakdown  - Assess and monitor skin integrity  - Assess and monitor nutrition and hydration status  - Monitor labs (i e  albumin)  - Assess for incontinence   - Turn and reposition patient  - Assist with mobility/ambulation  - Relieve pressure over bony prominences  - Avoid friction and shearing  - Provide appropriate hygiene as needed including keeping skin clean and dry  - Evaluate need for skin moisturizer/barrier cream  - Collaborate with interdisciplinary team (i e  Nutrition, Rehabilitation, etc )   - Patient/family teaching  Outcome: Progressing  Goal: Incision(s), wounds(s) or drain site(s) healing without S/S of infection  Description: INTERVENTIONS  - Assess and document risk factors for skin impairment   - Assess and document dressing, incision, wound bed, drain sites and surrounding tissue  - Consider nutrition services referral as needed  - Oral mucous membranes remain intact  - Provide patient/ family education  Outcome: Progressing     Problem: MUSCULOSKELETAL - ADULT  Goal: Maintain or return mobility to safest level of function  Description: INTERVENTIONS:  - Assess patient's ability to carry out ADLs; assess patient's baseline for ADL function and identify physical deficits which impact ability to perform ADLs (bathing, care of mouth/teeth, toileting, grooming, dressing, etc )  - Assess/evaluate cause of self-care deficits   - Assess range of motion  - Assess patient's mobility  - Assess patient's need for assistive devices and provide as appropriate  - Encourage maximum independence but intervene and supervise when necessary  - Involve family in performance of ADLs  - Assess for home care needs following discharge   - Consider OT consult to assist with ADL evaluation and planning for discharge  - Provide patient education as appropriate  Outcome: Progressing  Goal: Maintain proper alignment of affected body part  Description: INTERVENTIONS:  - Support, maintain and protect limb and body alignment  - Provide patient/ family with appropriate education  Outcome: Progressing     Problem: CHANGE IN BODY IMAGE  Goal: Pt/Family communicate acceptance of loss or change in body image and expresses psychological comfort and peace  Description: INTERVENTIONS:  - Assess patient/family anxiety and grief process related to change in body image, loss of functional status and loss of sense of self  - Assess patient/family's coping skills and provide emotional, spiritual and psychosocial support  - Provide information about the patient's health status with consideration of family and cultural values  - Communicate willingness to discuss loss and facilitate grief process with patient/family as appropriate  - Emphasize sustaining relationships within family system and community, or [place]/spiritual traditions  - Refer to community support groups as appropriate  - [address], Pastoral care or other ancillary consults as needed  Outcome: Progressing     Problem: Prexisting or High Potential for Compromised Skin Integrity  Goal: Skin integrity is maintained or improved  Description: INTERVENTIONS:  - Identify patients at risk for skin breakdown  - Assess and monitor skin integrity  - Assess and monitor nutrition and hydration status  - Monitor labs   - Assess for incontinence   - Turn and reposition patient  - Assist with mobility/ambulation  - Relieve pressure over bony prominences  - Avoid friction and shearing  - Provide appropriate hygiene as needed including keeping skin clean and dry  - Evaluate need for skin moisturizer/barrier cream  - Collaborate with interdisciplinary team   - Patient/family teaching  - Consider wound care consult   Outcome: Progressing

## 2021-04-15 NOTE — PROGRESS NOTES
[facility]  Progress Note - Holly Sal 1955, 60 y o  female MRN: [id number]  Unit/Bed#: E2 -23 Encounter: [id number]  Primary Care Provider: Lida Kaplan MD   Date and time admitted to hospital: 4/12/2021 11:00 PM    * Osteomyelitis of right foot [facility])  Assessment & Plan  -s/p right transmetatarsal amputation on 4/13/2021 for gangrene  -continue antibiotic with Ancef/Flagyl  -finalized wound cultures pending- 4+ beta hemolytic streptococcus group b, 3+ staph aureus  -finalized blood cultures pending- no growth thus far  Will need rehabilitation placement- refusing [facility], [facility] authorization pending    JOYCE (acute kidney injury) ([facility] [address] )  Assessment & Plan  Baseline creat between 0 8-1 1  Patient presented with acute kidney injury with a creatinine of 1 4  Resolved 4/15- CR 0 86    Lab Results   Component Value Date    CREATININE 0 86 04/15/2021    CREATININE 1 13 04/14/2021    CREATININE 1 17 04/13/2021       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, left  Assessment & Plan  Patient presented with left eye ptosis  CTA of the head and neck without any significant abnormality, acute infarct, or significant stenosis  Neurology team diagnosed with a left 3rd nerve palsy, 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  Continue supportive measures  Offered eye patch, patient declined  MRI not indicated  Outpatient follow up     Uncomplicated opioid dependence [facility])  Assessment & Plan  Patient has a history of opioid dependence  Continue home regimen with methadone 90mg daily    Bipolar disorder [facility])  Assessment & Plan  Patient has history of bipolar disorder  Continue home medications with Lamictal 150 mg daily, cymbalta 60 mg daily, and Zoloft 100 mg daily  Patient was initially placed on clonazepam 1 mg p o  T i d  Scheduled: The [facility] website was queried and patient is prescribed 60 tablets of clonazepam per month  Dosing was changed to reflect b i d   Administration    Type 2 diabetes mellitus with diabetic neuropathy, with long-term current use of insulin [facility])  Assessment & Plan  Lab Results   Component Value Date    HGBA1C 11 7 (H) 04/13/2021       Recent Labs     04/14/21  1632 04/14/21  2059 04/15/21  0745 04/15/21  1102   POCGLU 193* 306* 142* 229*       Blood Sugar Average: Last 72 hrs:  (P) 431 [id number]     Patient has a history of diabetes type 2, with long-term use of insulin, with associated diabetic neuropathy  Uncontrolled diabetes  Last A1c 13  9 -improved to 11  Home regimen: Trulicity weekly (started 1 month ago, receives inj on sundays), Lantus 45 units qHS, Aspart 10 units with meals, metformin 850 mg BID  Hold oral antihyperglycemics  Pt states she only takes 20 of lantus a day since starting on Trulicity, and 40 units/day will cause hypoglycemia  Patient started on 22 units Lantus nightly  Continue Accuchecks and SSI for coverage   Continue monitor glycemic control  Recommend outpatient Endocrinology follow-up    Hyperlipidemia  Assessment & Plan  Continue atorvastatin 40 mg daily    Benign essential hypertension  Assessment & Plan  Uncontrolled hypertension   Home regimen hydrochlorothiazide and lisinopril 5 mg held on admission due to acute kidney injury  Resume Lisinopril 5 mg daily  Continue hydralazine p r n  Continue to titrate and monitor    VTE Pharmacologic Prophylaxis:   Pharmacologic: Heparin  Mechanical VTE Prophylaxis in Place: Yes    Patient Centered Rounds: I have performed bedside rounds with nursing staff today  Discussions with Specialists or Other Care Team Provider: case management    Education and Discussions with Family / Patient: pt     Time Spent for Care: 15 minutes  More than 50% of total time spent on counseling and coordination of care as described above      Current Length of Stay: 2 day(s)    Current Patient Status: Inpatient   Certification Statement: The patient will continue to require additional inpatient hospital stay due to pending rehab placement    Discharge Plan: physical rehab when bed available    Code Status: Level 1 - Full Code      Subjective:   C/O Mild right foot pain denies fever, chills, CP, Dyspnea, N/V/D    Objective:     Vitals:   Temp (24hrs), Av °F (36 1 °C), Min:96 2 °F (35 7 °C), Max:97 9 °F (36 6 °C)    Temp:  [96 2 °F (35 7 °C)-97 9 °F (36 6 °C)] 97 °F (36 1 °C)  HR:  [60-77] 71  Resp:  [16-18] 16  BP: (114-161)/(64-77) 150/75  SpO2:  [95 %-97 %] 96 %  Body mass index is 23 63 kg/m²  Input and Output Summary (last 24 hours):     No intake or output data in the 24 hours ending 04/15/21 1501    Physical Exam:     Physical Exam  Vitals signs and nursing note reviewed  Constitutional:       Appearance: Normal appearance  She is normal weight  HENT:      Head: Normocephalic and atraumatic  Eyes:      Conjunctiva/sclera: Conjunctivae normal    Neck:      Musculoskeletal: Normal range of motion and neck supple  Cardiovascular:      Rate and Rhythm: Normal rate and regular rhythm  Pulses: Normal pulses  Heart sounds: Normal heart sounds  Pulmonary:      Effort: Pulmonary effort is normal       Breath sounds: Normal breath sounds  Abdominal:      General: Abdomen is flat  Palpations: Abdomen is soft  Tenderness: There is no abdominal tenderness  Musculoskeletal: Normal range of motion  Comments: Right foot dressing intact   Skin:     General: Skin is warm and dry  Capillary Refill: Capillary refill takes less than 2 seconds  Neurological:      General: No focal deficit present  Mental Status: She is alert and oriented to person, place, and time  Cranial Nerves: Cranial nerve deficit present  Sensory: No sensory deficit  Motor: No weakness        Coordination: Coordination normal    Psychiatric:         Mood and Affect: Mood normal          Behavior: Behavior normal        Additional Data:     Labs:    Results from last 7 days   Lab Units 04/15/21  0640   WBC Thousand/uL 6 17   HEMOGLOBIN g/dL 10 3*   HEMATOCRIT % 31 5*   PLATELETS Thousands/uL 261   NEUTROS PCT % 56   LYMPHS PCT % 32   MONOS PCT % 9   EOS PCT % 2     Results from last 7 days   Lab Units 04/15/21  0640   SODIUM mmol/L 138   POTASSIUM mmol/L 4 4   CHLORIDE mmol/L 101   CO2 mmol/L 30   BUN mg/dL 15   CREATININE mg/dL 0 86   ANION GAP mmol/L 7   CALCIUM mg/dL 9 5   GLUCOSE RANDOM mg/dL 112         Results from last 7 days   Lab Units 04/15/21  1102 04/15/21  0745 04/14/21  2059 04/14/21  1632 04/14/21  1123 04/14/21  0627 04/13/21  2335 04/13/21  1706 04/13/21  1211 04/13/21  0647 04/13/21  0227   POC GLUCOSE mg/dl 229* 142* 306* 193* 208* 75 199* 93 129 128 133     Results from last 7 days   Lab Units 04/13/21  0251   HEMOGLOBIN A1C % 11 7*     Results from last 7 days   Lab Units 04/13/21  0251   LACTIC ACID mmol/L 1 5           * I Have Reviewed All Lab Data Listed Above  * Additional Pertinent Lab Tests Reviewed: [facility] [address] Admission Reviewed    Imaging:    Imaging Reports Reviewed Today Include: all  Imaging Personally Reviewed by Myself Includes:  None     Recent Cultures (last 7 days):     Results from last 7 days   Lab Units 04/13/21  0946 04/13/21  0027 04/12/21  2338   BLOOD CULTURE   --  No Growth at 48 hrs  No Growth at 48 hrs     GRAM STAIN RESULT  1+ Polys*  4+ Gram negative rods*  1+ Gram positive cocci in pairs*  --   --    WOUND CULTURE  4+ Growth of Beta Hemolytic Streptococcus Group B*  3+ Growth of Staphylococcus aureus*  4+ Growth of   --   --        Last 24 Hours Medication List:   Current Facility-Administered Medications   Medication Dose Route Frequency Provider Last Rate    acetaminophen  650 mg Oral Q6H PRN Laurie , DPM      atorvastatin  40 mg Oral Daily Laurie , DPM      calcium carbonate  1,000 mg Oral Daily PRN Laurie , DPM      cefazolin  2,000 mg Intravenous Q8H Laurie , DPM 2,000 mg (04/15/21 0956)    clonazePAM  1 mg Oral BID Ninoska Steward MD      DULoxetine  60 mg Oral Daily Pau Cui, [place]      gabapentin  600 mg Oral TID Pau Cui, DPM      heparin (porcine)  5,000 Units Subcutaneous [facility] Pau Cui, DPM      hydrALAZINE  5 mg Intravenous Q6H PRN Pau Cui, DPM      Or    hydrALAZINE  10 mg Intravenous Q6H PRN Pau Cui, DPM      hydrOXYzine HCL  50 mg Oral HS Pau Cui, DPM      insulin glargine  22 Units Subcutaneous HS Pau Cui, DPM      insulin lispro  1-5 Units Subcutaneous TID With Meals SKY Mac      insulin lispro  1-5 Units Subcutaneous HS [facility]      lamoTRIgine  150 mg Oral Daily Pau Cui, DPM      lisinopril  5 mg Oral Daily SKY Doe      methadone  90 mg Oral Daily Pau Cui, DPM      metroNIDAZOLE  500 mg Oral [facility] Pau Cui, DPM      ondansetron  4 mg Intravenous Q6H PRN Pau Cui, DPM      oxyCODONE  5 mg Oral Q4H PRN Cory Boas, PA-C      Or    oxyCODONE  10 mg Oral Q4H PRN Cory Boas, PA-C      sertraline  100 mg Oral Daily Pau Cui, DPM          Today, Patient Was Seen By: SKY Ritchie    ** Please Note: Dictation voice to text software may have been used in the creation of this document   **

## 2021-04-16 LAB
ANION GAP SERPL CALCULATED.3IONS-SCNC: 4 MMOL/L (ref 4–13)
BACTERIA WND AEROBE CULT: ABNORMAL
BUN SERPL-MCNC: 12 MG/DL (ref 5–25)
CALCIUM SERPL-MCNC: 10 MG/DL (ref 8.3–10.1)
CHLORIDE SERPL-SCNC: 102 MMOL/L (ref 100–108)
CO2 SERPL-SCNC: 33 MMOL/L (ref 21–32)
CREAT SERPL-MCNC: 0.99 MG/DL (ref 0.6–1.3)
ERYTHROCYTE [DISTWIDTH] IN BLOOD BY AUTOMATED COUNT: 13 % (ref 11.6–15.1)
GFR SERPL CREATININE-BSD FRML MDRD: 60 ML/MIN/1.73SQ M
GLUCOSE SERPL-MCNC: 128 MG/DL (ref 65–140)
GLUCOSE SERPL-MCNC: 141 MG/DL (ref 65–140)
GLUCOSE SERPL-MCNC: 210 MG/DL (ref 65–140)
GLUCOSE SERPL-MCNC: 80 MG/DL (ref 65–140)
GLUCOSE SERPL-MCNC: 85 MG/DL (ref 65–140)
GLUCOSE SERPL-MCNC: 89 MG/DL (ref 65–140)
GRAM STN SPEC: ABNORMAL
HCT VFR BLD AUTO: 31.7 % (ref 34.8–46.1)
HGB BLD-MCNC: 10.4 G/DL (ref 11.5–15.4)
MCH RBC QN AUTO: 29.5 PG (ref 26.8–34.3)
MCHC RBC AUTO-ENTMCNC: 32.8 G/DL (ref 31.4–37.4)
MCV RBC AUTO: 90 FL (ref 82–98)
PLATELET # BLD AUTO: 303 THOUSANDS/UL (ref 149–390)
PMV BLD AUTO: 10.1 FL (ref 8.9–12.7)
POTASSIUM SERPL-SCNC: 4.6 MMOL/L (ref 3.5–5.3)
RBC # BLD AUTO: 3.53 MILLION/UL (ref 3.81–5.12)
SODIUM SERPL-SCNC: 139 MMOL/L (ref 136–145)
WBC # BLD AUTO: 6.54 THOUSAND/UL (ref 4.31–10.16)

## 2021-04-16 PROCEDURE — 82948 REAGENT STRIP/BLOOD GLUCOSE: CPT

## 2021-04-16 PROCEDURE — 85027 COMPLETE CBC AUTOMATED: CPT | Performed by: NURSE PRACTITIONER

## 2021-04-16 PROCEDURE — 97530 THERAPEUTIC ACTIVITIES: CPT

## 2021-04-16 PROCEDURE — 99232 SBSQ HOSP IP/OBS MODERATE 35: CPT | Performed by: INTERNAL MEDICINE

## 2021-04-16 PROCEDURE — 97116 GAIT TRAINING THERAPY: CPT

## 2021-04-16 PROCEDURE — 97110 THERAPEUTIC EXERCISES: CPT

## 2021-04-16 PROCEDURE — 80048 BASIC METABOLIC PNL TOTAL CA: CPT | Performed by: NURSE PRACTITIONER

## 2021-04-16 PROCEDURE — 97535 SELF CARE MNGMENT TRAINING: CPT

## 2021-04-16 RX ADMIN — CEFAZOLIN SODIUM 2000 MG: 2 SOLUTION INTRAVENOUS at 09:11

## 2021-04-16 RX ADMIN — GABAPENTIN 600 MG: 300 CAPSULE ORAL at 22:49

## 2021-04-16 RX ADMIN — METRONIDAZOLE 500 MG: 500 TABLET ORAL at 22:50

## 2021-04-16 RX ADMIN — METRONIDAZOLE 500 MG: 500 TABLET ORAL at 06:04

## 2021-04-16 RX ADMIN — GABAPENTIN 600 MG: 300 CAPSULE ORAL at 17:45

## 2021-04-16 RX ADMIN — HEPARIN SODIUM 5000 UNITS: 5000 INJECTION INTRAVENOUS; SUBCUTANEOUS at 22:50

## 2021-04-16 RX ADMIN — METRONIDAZOLE 500 MG: 500 TABLET ORAL at 13:16

## 2021-04-16 RX ADMIN — SERTRALINE HYDROCHLORIDE 100 MG: 100 TABLET ORAL at 09:11

## 2021-04-16 RX ADMIN — CLONAZEPAM 1 MG: 1 TABLET ORAL at 09:11

## 2021-04-16 RX ADMIN — GABAPENTIN 600 MG: 300 CAPSULE ORAL at 09:11

## 2021-04-16 RX ADMIN — HEPARIN SODIUM 5000 UNITS: 5000 INJECTION INTRAVENOUS; SUBCUTANEOUS at 13:17

## 2021-04-16 RX ADMIN — ATORVASTATIN CALCIUM 40 MG: 40 TABLET, FILM COATED ORAL at 09:11

## 2021-04-16 RX ADMIN — HYDROXYZINE HYDROCHLORIDE 50 MG: 25 TABLET ORAL at 22:49

## 2021-04-16 RX ADMIN — DULOXETINE HYDROCHLORIDE 60 MG: 60 CAPSULE, DELAYED RELEASE ORAL at 09:11

## 2021-04-16 RX ADMIN — HEPARIN SODIUM 5000 UNITS: 5000 INJECTION INTRAVENOUS; SUBCUTANEOUS at 06:04

## 2021-04-16 RX ADMIN — LAMOTRIGINE 150 MG: 25 TABLET ORAL at 09:11

## 2021-04-16 RX ADMIN — INSULIN GLARGINE 22 UNITS: 100 INJECTION, SOLUTION SUBCUTANEOUS at 22:51

## 2021-04-16 RX ADMIN — CLONAZEPAM 1 MG: 1 TABLET ORAL at 17:45

## 2021-04-16 RX ADMIN — METHADONE HYDROCHLORIDE 90 MG: 10 TABLET ORAL at 09:11

## 2021-04-16 RX ADMIN — CEFAZOLIN SODIUM 2000 MG: 2 SOLUTION INTRAVENOUS at 17:44

## 2021-04-16 RX ADMIN — CEFAZOLIN SODIUM 2000 MG: 2 SOLUTION INTRAVENOUS at 02:41

## 2021-04-16 RX ADMIN — LISINOPRIL 5 MG: 5 TABLET ORAL at 09:11

## 2021-04-16 RX ADMIN — INSULIN LISPRO 3 UNITS: 100 INJECTION, SOLUTION INTRAVENOUS; SUBCUTANEOUS at 11:55

## 2021-04-16 RX ADMIN — OXYCODONE HYDROCHLORIDE 10 MG: 10 TABLET ORAL at 23:02

## 2021-04-16 NOTE — CASE MANAGEMENT
Pt was originally denied for acute rehab at [facility]  Dr Macario Case with PMR was able to overturn the denial    Pt now approved; RAJIV asked CHRIS to arrange a wcv transport  [facility] requesting anytime before 1PM   RAJIV will confirmed transport time once notified by CHRIS      Room: [address]-B Phone: [phone] Fax: [phone]

## 2021-04-16 NOTE — PROGRESS NOTES
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  cta head and neck with out abnormality  Neurology evaluated pt and likely left 3rd nerve palsy from diabetes    4  Opioid dependence- will continue methadone    5  Bipolar d/o- continue current treatment  6  Type 2 diabetes-uncontrolled with a1c of 11 7  continue current treatment  Follow up with endocrine outpt  7  Hyperlipidemia- continue lipitor    8  htn- stable  Continue current treatment  dispo- awaiting str  Subjective:   Pt seen and examined  Pt was sleeping and had to wake pt for interview  She states her foot is hurting  No other problems were reported  Objective:     Vitals: Blood pressure 121/67, pulse 97, temperature (!) 97 2 °F (36 2 °C), temperature source Temporal, resp  rate 18, height 5' 6" (1 676 m), weight 66 4 kg (146 lb 6 2 oz), SpO2 97 %  ,Body mass index is 23 63 kg/m²      Lab, Imaging and other studies:  Results from last 7 days   Lab Units 04/16/21  0432   WBC Thousand/uL 6 54   HEMOGLOBIN g/dL 10 4*   HEMATOCRIT % 31 7*   PLATELETS Thousands/uL 303     Results from last 7 days   Lab Units 04/16/21  0432   POTASSIUM mmol/L 4 6   CHLORIDE mmol/L 102   CO2 mmol/L 33*   BUN mg/dL 12   CREATININE mg/dL 0 99   CALCIUM mg/dL 10 0         Lab Results   Component Value Date    BLOODCX No Growth at 72 hrs  04/13/2021    BLOODCX No Growth at 72 hrs  04/12/2021    BLOODCX No Growth After 5 Days  10/06/2020    WOUNDCULT 4+ Growth of Beta Hemolytic Streptococcus Group B (A) 04/13/2021    WOUNDCULT (A) 04/13/2021     3+ Growth of Methicillin Resistant Staphylococcus aureus    WOUNDCULT 4+ Growth of  04/13/2021     Scheduled Meds:   Current Facility-Administered Medications   Medication Dose Route Frequency Provider Last Rate    acetaminophen  650 mg Oral Q6H PRN Yancy Mosleys, DPM      atorvastatin  40 mg Oral Daily Yancy Galo, DPM      calcium carbonate  1,000 mg Oral Daily PRN Yancy Galo, DPM      cefazolin  2,000 mg Intravenous Q8H Yancy Galo, DPM 2,000 mg (04/16/21 9474)    clonazePAM  1 mg Oral BID Lucius Schumacher MD      DULoxetine  60 mg Oral Daily Yancy Galo, DPM      gabapentin  600 mg Oral TID Yancy Galo DPM      heparin (porcine)  5,000 Units Subcutaneous [facility] Yancy Galo, DPM      hydrALAZINE  5 mg Intravenous Q6H PRN Yancy Galo DPM      Or    hydrALAZINE  10 mg Intravenous Q6H PRN Yancy Galo, DPM      hydrOXYzine HCL  50 mg Oral HS Yancy Galo, DPM      insulin glargine  22 Units Subcutaneous HS SINTIA IveyM      insulin lispro  1-5 Units Subcutaneous TID With Meals [facility], [facility]      insulin lispro  1-5 Units Subcutaneous HS Maryann Hunterman, SKY      insulin lispro  3 Units Subcutaneous TID With Meals Melita S Wendy, CRNP      lamoTRIgine  150 mg Oral Daily Yancy Galo, DPM      lisinopril  5 mg Oral Daily Melita S Wendy, CRNP      methadone  90 mg Oral Daily Yancy Galo, DPM      metroNIDAZOLE  500 mg Oral [facility] Yancy Galo, DPM      ondansetron  4 mg Intravenous Q6H PRN Yancy Galo, DPM      oxyCODONE  5 mg Oral Q4H PRN Alvera Jeff PA-C      Or    oxyCODONE  10 mg Oral Q4H PRN Alvera Jeff, PA-C      sertraline  100 mg Oral Daily Yancy Galo DPM       Continuous Infusions:    PRN Meds:   acetaminophen    calcium carbonate    hydrALAZINE **OR** hydrALAZINE    ondansetron    oxyCODONE **OR** oxyCODONE      Physical exam:  Physical Exam  General appearance: sleeping but awakens  Head: Normocephalic, without obvious abnormality, atraumatic  Eyes: conjunctivae/corneas clear  PERRL, EOM's intact  Fundi benign  Lungs: clear to auscultation bilaterally  Heart: regular rate and rhythm, S1, S2 normal, no murmur, click, rub or gallop  Abdomen: soft, non-tender; bowel sounds normal; no masses,  no organomegaly  Extremities: extremities normal, warm and well-perfused; no cyanosis, clubbing, or edema  Skin: Skin color, texture, turgor normal  No rashes or lesions  Neurologic: Mental status: sleeping but awakens   oriented x3      VTE Pharmacologic Prophylaxis: Heparin  VTE Mechanical Prophylaxis: sequential compression device    Counseling / Coordination of Care  Total floor / unit time spent today 20 minutes      Current Length of Stay: 3 day(s)    Current Patient Status: Inpatient     Code Status: Level 1 - Full Code

## 2021-04-16 NOTE — PLAN OF CARE
Problem: Potential for Falls  Goal: Patient will remain free of falls  Description: INTERVENTIONS:  - Assess patient frequently for physical needs  -  Identify cognitive and physical deficits and behaviors that affect risk of falls    -  [place] fall precautions as indicated by assessment   - Educate patient/family on patient safety including physical limitations  - Instruct patient to call for assistance with activity based on assessment  - Modify environment to reduce risk of injury  - Consider OT/PT consult to assist with strengthening/mobility  Outcome: Progressing     Problem: PAIN - ADULT  Goal: Verbalizes/displays adequate comfort level or baseline comfort level  Description: Interventions:  - Encourage patient to monitor pain and request assistance  - Assess pain using appropriate pain scale  - Administer analgesics based on type and severity of pain and evaluate response  - Implement non-pharmacological measures as appropriate and evaluate response  - Consider cultural and social influences on pain and pain management  - Notify physician/advanced practitioner if interventions unsuccessful or patient reports new pain  Outcome: Progressing     Problem: INFECTION - ADULT  Goal: Absence or prevention of progression during hospitalization  Description: INTERVENTIONS:  - Assess and monitor for signs and symptoms of infection  - Monitor lab/diagnostic results  - Monitor all insertion sites, i e  indwelling lines, tubes, and drains  - Monitor endotracheal if appropriate and nasal secretions for changes in amount and color  - [place] appropriate cooling/warming therapies per order  - Administer medications as ordered  - Instruct and encourage patient and family to use good hand hygiene technique  - Identify and instruct in appropriate isolation precautions for identified infection/condition  Outcome: Progressing  Goal: Absence of fever/infection during neutropenic period  Description: INTERVENTIONS:  - Monitor WBC    Outcome: Progressing     Problem: SAFETY ADULT  Goal: Patient will remain free of falls  Description: INTERVENTIONS:  - Assess patient frequently for physical needs  -  Identify cognitive and physical deficits and behaviors that affect risk of falls    -  [place] fall precautions as indicated by assessment   - Educate patient/family on patient safety including physical limitations  - Instruct patient to call for assistance with activity based on assessment  - Modify environment to reduce risk of injury  - Consider OT/PT consult to assist with strengthening/mobility  Outcome: Progressing  Goal: Maintain or return to baseline ADL function  Description: INTERVENTIONS:  -  Assess patient's ability to carry out ADLs; assess patient's baseline for ADL function and identify physical deficits which impact ability to perform ADLs (bathing, care of mouth/teeth, toileting, grooming, dressing, etc )  - Assess/evaluate cause of self-care deficits   - Assess range of motion  - Assess patient's mobility; develop plan if impaired  - Assess patient's need for assistive devices and provide as appropriate  - Encourage maximum independence but intervene and supervise when necessary  - Involve family in performance of ADLs  - Assess for home care needs following discharge   - Consider OT consult to assist with ADL evaluation and planning for discharge  - Provide patient education as appropriate  Outcome: Progressing  Goal: Maintain or return mobility status to optimal level  Description: INTERVENTIONS:  - Assess patient's baseline mobility status (ambulation, transfers, stairs, etc )    - Identify cognitive and physical deficits and behaviors that affect mobility  - Identify mobility aids required to assist with transfers and/or ambulation (gait belt, sit-to-stand, lift, walker, cane, etc )  - [place] fall precautions as indicated by assessment  - Record patient progress and toleration of activity level on Mobility SBAR; progress patient to next Phase/Stage  - Instruct patient to call for assistance with activity based on assessment  - Consider rehabilitation consult to assist with strengthening/weightbearing, etc   Outcome: Progressing     Problem: DISCHARGE PLANNING  Goal: Discharge to home or other facility with appropriate resources  Description: INTERVENTIONS:  - Identify barriers to discharge w/patient and caregiver  - Arrange for needed discharge resources and transportation as appropriate  - Identify discharge learning needs (meds, wound care, etc )  - Arrange for interpretive services to assist at discharge as needed  - Refer to Case Management Department for coordinating discharge planning if the patient needs post-hospital services based on physician/advanced practitioner order or complex needs related to functional status, cognitive ability, or social support system  Outcome: Progressing     Problem: Knowledge Deficit  Goal: Patient/family/caregiver demonstrates understanding of disease process, treatment plan, medications, and discharge instructions  Description: Complete learning assessment and assess knowledge base    Interventions:  - Provide teaching at level of understanding  - Provide teaching via preferred learning methods  Outcome: Progressing     Problem: SKIN/TISSUE INTEGRITY - ADULT  Goal: Skin integrity remains intact  Description: INTERVENTIONS  - Identify patients at risk for skin breakdown  - Assess and monitor skin integrity  - Assess and monitor nutrition and hydration status  - Monitor labs (i e  albumin)  - Assess for incontinence   - Turn and reposition patient  - Assist with mobility/ambulation  - Relieve pressure over bony prominences  - Avoid friction and shearing  - Provide appropriate hygiene as needed including keeping skin clean and dry  - Evaluate need for skin moisturizer/barrier cream  - Collaborate with interdisciplinary team (i e  Nutrition, Rehabilitation, etc )   - Patient/family teaching  Outcome: Progressing  Goal: Incision(s), wounds(s) or drain site(s) healing without S/S of infection  Description: INTERVENTIONS  - Assess and document risk factors for skin impairment   - Assess and document dressing, incision, wound bed, drain sites and surrounding tissue  - Consider nutrition services referral as needed  - Oral mucous membranes remain intact  - Provide patient/ family education  Outcome: Progressing     Problem: MUSCULOSKELETAL - ADULT  Goal: Maintain or return mobility to safest level of function  Description: INTERVENTIONS:  - Assess patient's ability to carry out ADLs; assess patient's baseline for ADL function and identify physical deficits which impact ability to perform ADLs (bathing, care of mouth/teeth, toileting, grooming, dressing, etc )  - Assess/evaluate cause of self-care deficits   - Assess range of motion  - Assess patient's mobility  - Assess patient's need for assistive devices and provide as appropriate  - Encourage maximum independence but intervene and supervise when necessary  - Involve family in performance of ADLs  - Assess for home care needs following discharge   - Consider OT consult to assist with ADL evaluation and planning for discharge  - Provide patient education as appropriate  Outcome: Progressing  Goal: Maintain proper alignment of affected body part  Description: INTERVENTIONS:  - Support, maintain and protect limb and body alignment  - Provide patient/ family with appropriate education  Outcome: Progressing     Problem: CHANGE IN BODY IMAGE  Goal: Pt/Family communicate acceptance of loss or change in body image and expresses psychological comfort and peace  Description: INTERVENTIONS:  - Assess patient/family anxiety and grief process related to change in body image, loss of functional status and loss of sense of self  - Assess patient/family's coping skills and provide emotional, spiritual and psychosocial support  - Provide information about the patient's health status with consideration of family and cultural values  - Communicate willingness to discuss loss and facilitate grief process with patient/family as appropriate  - Emphasize sustaining relationships within family system and community, or [place]/spiritual traditions  - Refer to community support groups as appropriate  - [address], Pastoral care or other ancillary consults as needed  Outcome: Progressing     Problem: Prexisting or High Potential for Compromised Skin Integrity  Goal: Skin integrity is maintained or improved  Description: INTERVENTIONS:  - Identify patients at risk for skin breakdown  - Assess and monitor skin integrity  - Assess and monitor nutrition and hydration status  - Monitor labs   - Assess for incontinence   - Turn and reposition patient  - Assist with mobility/ambulation  - Relieve pressure over bony prominences  - Avoid friction and shearing  - Provide appropriate hygiene as needed including keeping skin clean and dry  - Evaluate need for skin moisturizer/barrier cream  - Collaborate with interdisciplinary team   - Patient/family teaching  - Consider wound care consult   Outcome: Progressing

## 2021-04-16 NOTE — PHYSICAL THERAPY NOTE
Physical Therapy Treatment Note       04/16/21 0910   Note Type   Note Type Treatment   Pain Assessment   Pain Assessment Tool 0-10   Pain Score 5   Pain Location/Orientation Orientation: Right;Location: Foot   Hospital Pain Intervention(s) Ambulation/increased activity; Emotional support   Restrictions/Precautions   RLE Weight Bearing Per Order NWB   Other Precautions WBS; Bed Alarm;Pain; Fall Risk   General   Chart Reviewed Yes   Family/Caregiver Present No   Cognition   Overall Cognitive Status WFL   Arousal/Participation Responsive; Cooperative   Attention Attends with cues to redirect   Orientation Level Oriented X4   Memory Decreased recall of precautions   Following Commands Follows multistep commands with increased time or repetition   Subjective   Subjective " I have to go to the bathroom "     Bed Mobility   Supine to Sit 5  Supervision   Additional items Assist x 1;HOB elevated; Increased time required   Transfers   Sit to Stand 4  Minimal assistance   Additional items Assist x 1; Increased time required;Verbal cues;Armrests   Stand to Sit 4  Minimal assistance   Additional items Assist x 1; Armrests; Increased time required;Verbal cues   Toilet transfer 4  Minimal assistance   Additional items Assist x 1; Increased time required;Verbal cues;Standard toilet  (use of grab bar)   Additional Comments cues for hand placement and safe techniques  Ambulation/Elevation   Gait pattern Decreased foot clearance;Poor UE support; Inconsistent meryl; Redundant gait at times; Short stride; Excessively slow; Improper Weight shift  (hop tp gait pattern)   Gait Assistance 4  Minimal assist   Additional items Assist x 1;Verbal cues   Assistive Device Rolling walker   Distance 20' x1, 10' x2   Balance   Static Sitting Good   Dynamic Sitting Fair   Static Standing Fair -   Dynamic Standing Poor +   Ambulatory Poor +   Endurance Deficit   Endurance Deficit Yes   Endurance Deficit Description ue fatigue, generalized fatigue  Activity Tolerance   Activity Tolerance Patient limited by fatigue   Medical Staff Made Aware latisha samano   Nurse Made Aware rn, lindsay   Exercises   Hip Flexion Sitting;10 reps;Bilateral  (with manual resistance )   Hip Abduction Sitting;10 reps;Bilateral  (with manual resistance)   Hip Adduction Sitting;10 reps;Bilateral  (isometric hip add  )   Knee AROM Long Arc Quad Sitting;10 reps;AROM; Bilateral   Ankle Pumps Sitting;15 reps;AROM; Bilateral   Balance training  dynamic stadning balacne activites while performing functional activites  x 1 minute x2 and 2 minutes x1 with support of rw and min assist x1  Assessment   Prognosis Good   Problem List Decreased strength;Decreased range of motion;Decreased endurance; Impaired balance;Decreased mobility; Decreased safety awareness;Decreased skin integrity;Orthopedic restrictions;Pain   Assessment Pt Seen for PT treatment interventions as per PT POC  Pt  Performs supine to sit with supervision assist   Pt  Performs transfers to and from bed, chair and toilet with min assist x1 and verbal cues for safe transfer techniques and proper hand placement required  Pt requires use of grab bar in bathroom for safety and steadying assistance  Pt  Ambulates 20' x1 and 10' x2 with min assist x1,  Pt  Perform hop gait pattern and maintains NWB to  R le during ambulation  Noted decreased balance, ue fatigue, redundant gait, decreased foot clearance on L, short stride length on L, generalized fatigue, decreased activity tolerance, functional endurance and decreased mobility, safety and locomotion  Pt  remains at increased risk for falls due to deficits as noted  Pt demonstrates decreased balance during static and dynamic standing balance activities requiring min assist x1 and verbal reminders to maintain NWB to R le with standing balance activities and while performing functional tasks  Pt remained seated out of bed in chair with call bell in reach  SCD to l le    The patient's AM-PAC Basic Mobility Inpatient Short Form Raw Score is 18, Standardized Score is 41 05  A standardized score less than 42 9 suggests the patient may benefit from discharge to post-acute rehabilitation services  Please also refer to the recommendation of the Physical Therapist for safe discharge planning  STR is recommended at d/c in order to maximize functional outcomes,mobility, and independence  Goals   Patient Goals " to go home  STG Expiration Date 04/28/21   PT Treatment Day 2   Plan   Treatment/Interventions Functional transfer training;LE strengthening/ROM; Therapeutic exercise; Endurance training;Patient/family training;Equipment eval/education; Bed mobility;Gait training;Spoke to nursing;OT   Progress Progressing toward goals   Recommendation   PT Discharge Recommendation Post acute rehabilitation services   PT - OK to Discharge Yes   [address] Mobility Inpatient   Turning in Bed Without Bedrails 4   Lying on Back to Sitting on Edge of Flat Bed 4   Moving Bed to Chair 3   Standing Up From Chair 3   Walk in Room 3   Climb 3-5 Stairs 1   Basic Mobility Inpatient Raw Score 18   Basic Mobility Standardized Score 41 05      04/16/21 0910   Note Type   Note Type Treatment   Pain Assessment   Pain Assessment Tool 0-10   Pain Score 5   Pain Location/Orientation Orientation: Right;Location: [facility]   Hospital Pain Intervention(s) Ambulation/increased activity; Emotional support   Restrictions/Precautions   RLE Weight Bearing Per Order NWB   Other Precautions WBS; Bed Alarm;Pain; Fall Risk   General   Chart Reviewed Yes   Family/Caregiver Present No   Cognition   Overall Cognitive Status WFL   Arousal/Participation Responsive; Cooperative   Attention Attends with cues to redirect   Orientation Level Oriented X4   Memory Decreased recall of precautions   Following Commands Follows multistep commands with increased time or repetition   Subjective   Subjective " I have to go to the bathroom "     Bed Mobility   Supine to Sit 5  Supervision   Additional items Assist x 1;HOB elevated; Increased time required   Transfers   Sit to Stand 4  Minimal assistance   Additional items Assist x 1; Increased time required;Verbal cues;Armrests   Stand to Sit 4  Minimal assistance   Additional items Assist x 1; Armrests; Increased time required;Verbal cues   Toilet transfer 4  Minimal assistance   Additional items Assist x 1; Increased time required;Verbal cues;Standard toilet  (use of grab bar)   Additional Comments cues for hand placement and safe techniques  Ambulation/Elevation   Gait pattern Decreased foot clearance;Poor UE support; Inconsistent meryl; Redundant gait at times; Short stride; Excessively slow; Improper Weight shift  (hop tp gait pattern)   Gait Assistance 4  Minimal assist   Additional items Assist x 1;Verbal cues   Assistive Device Rolling walker   Distance 20' x1, 10' x2   Balance   Static Sitting Good   Dynamic Sitting Fair   Static Standing Fair -   Dynamic Standing Poor +   Ambulatory Poor +   Endurance Deficit   Endurance Deficit Yes   Endurance Deficit Description ue fatigue, generalized fatigue  Activity Tolerance   Activity Tolerance Patient limited by fatigue   Medical Staff Made Aware latisha samano   Nurse Made Aware lindsay parra   Exercises   Hip Flexion Sitting;10 reps;Bilateral  (with manual resistance )   Hip Abduction Sitting;10 reps;Bilateral  (with manual resistance)   Hip Adduction Sitting;10 reps;Bilateral  (isometric hip add  )   Knee AROM Long Arc Quad Sitting;10 reps;AROM; Bilateral   Ankle Pumps Sitting;15 reps;AROM; Bilateral   Balance training  dynamic stadning balacne activites while performing functional activites  x 1 minute x2 and 2 minutes x1 with support of rw and min assist x1  Assessment   Prognosis Good   Problem List Decreased strength;Decreased range of motion;Decreased endurance; Impaired balance;Decreased mobility; Decreased safety awareness;Decreased skin integrity;Orthopedic restrictions;Pain   Assessment Pt Seen for PT treatment interventions as per PT POC  Pt  Performs supine to sit with supervision assist   Pt  Performs transfers to and from bed, chair and toilet with min assist x1 and verbal cues for safe transfer techniques and proper hand placement required  Pt requires use of grab bar in bathroom for safety and steadying assistance  Pt  Ambulates 20' x1 and 10' x2 with min assist x1,  Pt  Perform hop gait pattern and maintains NWB to  R le during ambulation  Noted decreased balance, ue fatigue, redundant gait, decreased foot clearance on L, short stride length on L, generalized fatigue, decreased activity tolerance, functional endurance and decreased mobility, safety and locomotion  Pt  remains at increased risk for falls due to deficits as noted  Pt demonstrates decreased balance during static and dynamic standing balance activities requiring min assist x1 and verbal reminders to maintain NWB to R le with standing balance activities and while performing functional tasks  Pt remained seated out of bed in chair with call bell in reach  SCD to l le  The patient's AM-PAC Basic Mobility Inpatient Short Form Raw Score is 18, Standardized Score is 41 05  A standardized score less than 42 9 suggests the patient may benefit from discharge to post-acute rehabilitation services  Please also refer to the recommendation of the Physical Therapist for safe discharge planning  STR is recommended at d/c in order to maximize functional outcomes,mobility, and independence  Goals   Patient Goals " to go home  STG Expiration Date 04/28/21   PT Treatment Day 2   Plan   Treatment/Interventions Functional transfer training;LE strengthening/ROM; Therapeutic exercise; Endurance training;Patient/family training;Equipment eval/education; Bed mobility;Gait training;Spoke to nursing;OT   Progress Progressing toward goals   Recommendation   PT Discharge Recommendation Post acute rehabilitation services   PT - OK to Discharge Yes   AM-PAC Basic Mobility Inpatient   Turning in Bed Without Bedrails 4   Lying on Back to Sitting on Edge of Flat Bed 4   Moving Bed to Chair 3   Standing Up From Chair 3   Walk in Room 3   Climb 3-5 Stairs 1   Basic Mobility Inpatient Raw Score 18   Basic Mobility Standardized Score 41 05       Renato Diallo, PTA

## 2021-04-16 NOTE — PLAN OF CARE
Problem: OCCUPATIONAL THERAPY ADULT  Goal: Performs self-care activities at highest level of function for planned discharge setting  See evaluation for individualized goals  Description: Treatment Interventions: ADL retraining, Visual perceptual retraining, Functional transfer training, UE strengthening/ROM, Endurance training, Patient/family training, Equipment evaluation/education, Compensatory technique education, Energy conservation, Activityengagement          See flowsheet documentation for full assessment, interventions and recommendations  Outcome: Progressing  Note: Limitation: Decreased ADL status, Decreased Safe judgement during ADL, Decreased endurance, Visual deficit, Decreased self-care trans  Prognosis: Fair  Assessment: Pt was seen for skilled OT with focus on completion of self care tasks, functional transfers, review of RW safety, fall prevention and review of current plan of care  Pt agreeable to completion of ADL routine, while seated EOB  Min A required overall with impulsive movements noted warranting need for redirection to maintain NWB into RLE  Increased A for LE bathing/dressing to maintain NWB status in RLE  Min A overall for LE self care  The patient's raw score on the AM-PAC Daily Activity inpatient short form is 18, standardized score is 38 66, less than 39 4  Patients at this level are likely to benefit from discharge to post-acute rehabilitation services        OT Discharge Recommendation: Post acute rehabilitation services  OT - OK to Discharge: Yes(when medically cleared  )

## 2021-04-16 NOTE — OCCUPATIONAL THERAPY NOTE
Occupational Therapy Treatment Note:         04/16/21 0943   OT Last Visit   OT Visit Date 04/16/21   Note Type   Note Type Treatment   Restrictions/Precautions   Weight Bearing Precautions Per Order Yes   RLE Weight Bearing Per Order NWB   Other Precautions Fall Risk;Pain;WBS   Pain Assessment   Pain Assessment Tool 0-10   Pain Score 5   Pain Location/Orientation Orientation: Right;Location: Foot   ADL   Where Assessed Edge of bed   Grooming Assistance 5  Supervision/Setup   Grooming Deficit Setup;Steadying;Supervision/safety;Verbal cueing; Increased time to complete;Wash/dry hands; Wash/dry face;Brushing hair   UB Bathing Assistance 5  Supervision/Setup   UB Bathing Deficit Setup   LB Bathing Assistance 4  Minimal Assistance   LB Bathing Deficit Setup;Steadying;Verbal cueing;Supervision/safety; Increased time to complete; Buttocks; Perineal area; Left lower leg including foot;Right lower leg including foot   LB Bathing Comments cues for safety required with activities  UB Dressing Assistance 5  Supervision/Setup   UB Dressing Deficit Setup   LB Dressing Assistance 4  Minimal Assistance   LB Dressing Deficit Setup;Steadying; Requires assistive device for steadying;Verbal cueing; Increased time to complete;Supervision/safety; Don/doff L sock; Don/doff R sock;Pull up over hips; Thread LLE into underwear; Thread RLE into underwear; Thread LLE into pants; Thread RLE into pants   LB Dressing Comments increased A for clothing management instance  Toileting Assistance  4  Minimal Assistance   Toileting Deficit Setup;Steadying;Verbal cueing;Supervison/safety; Increased time to complete;Clothing management down;Clothing management up;Grab bar use;Bedside commode;Perineal hygiene   Toileting Comments cues for safe hand placement required  Functional Standing Tolerance   Time 2 mins   Activity dynamic stand balance activity  Comments cues for safety to inhibit WB into RLE      Bed Mobility   Supine to Sit 5  Supervision Additional items Assist x 1   Sit to Supine 5  Supervision   Additional items Assist x 1   Transfers   Sit to Stand 4  Minimal assistance   Additional items Assist x 1;Bedrails;Armrests; Increased time required;Verbal cues   Stand to Sit 4  Minimal assistance   Additional items Assist x 1;Bedrails;Armrests; Increased time required;Verbal cues   Stand pivot 4  Minimal assistance   Additional items Assist x 1;Bedrails;Armrests; Increased time required;Verbal cues   Toilet transfer 4  Minimal assistance   Additional items Assist x 1; Armrests; Increased time required;Verbal cues; Commode   Additional Comments cues for safety required  Functional Mobility   Functional Mobility 4  Minimal assistance   Additional Comments x1   Additional items Rolling walker   Toilet Transfers   Toilet Transfer From [place] walker   Toilet Transfer Type To and from   Toilet Transfer to Standard bedside commode   Toilet Transfer Technique Stand pivot; Ambulating   Toilet Transfers Verbal cues; Minimal assistance   Toilet Transfers Comments cues for safety required with activities  Cognition   Overall Cognitive Status Impaired   Arousal/Participation Responsive   Attention Attends with cues to redirect   Orientation Level Oriented X4   Memory Decreased short term memory;Decreased recall of recent events;Decreased recall of precautions   Following Commands Follows multistep commands with increased time or repetition   Comments increased A due to inconsistent carry over of newly learned information  Additional Activities   Additional Activities Other (Comment)  (reviewed current plan of care  )   Additional Activities Comments Pt will benefit from furthe review to encourage carry over of fall prevention and RW safety  Activity Tolerance   Activity Tolerance Patient limited by fatigue;Patient limited by pain   Medical Staff Made Aware reported all findings to nursing staff      Assessment   Assessment Pt was seen for skilled OT with focus on completion of self care tasks, functional transfers, review of RW safety, fall prevention and review of current plan of care  Pt agreeable to completion of ADL routine, while seated EOB  Min A required overall with impulsive movements noted warranting need for redirection to maintain NWB into RLE  Increased A for LE bathing/dressing to maintain NWB status in RLE  Min A overall for LE self care  The patient's raw score on the AM-PAC Daily Activity inpatient short form is 18, standardized score is 38 66, less than 39 4  Patients at this level are likely to benefit from discharge to post-acute rehabilitation services  Plan   Treatment Interventions ADL retraining;Functional transfer training;UE strengthening/ROM; Endurance training;Cognitive reorientation;Patient/family training   Goal Expiration Date 04/28/21   OT Treatment Day 2   OT Frequency 3-5x/wk   Recommendation   OT Discharge Recommendation Post acute rehabilitation services   OT - OK to Discharge Yes  (when medically cleared   )   [facility] Daily Activity Inpatient   Lower Body Dressing 2   Bathing 2   Toileting 3   Upper Body Dressing 3   Grooming 4   Eating 4   Daily Activity Raw Score 18   Daily Activity Standardized Score (Calc for Raw Score >=11) 38 66   Sheree Ellis, [address]

## 2021-04-16 NOTE — PLAN OF CARE
Problem: Potential for Falls  Goal: Patient will remain free of falls  Description: INTERVENTIONS:  - Assess patient frequently for physical needs  -  Identify cognitive and physical deficits and behaviors that affect risk of falls    -  [place] fall precautions as indicated by assessment   - Educate patient/family on patient safety including physical limitations  - Instruct patient to call for assistance with activity based on assessment  - Modify environment to reduce risk of injury  - Consider OT/PT consult to assist with strengthening/mobility  Outcome: Progressing     Problem: PAIN - ADULT  Goal: Verbalizes/displays adequate comfort level or baseline comfort level  Description: Interventions:  - Encourage patient to monitor pain and request assistance  - Assess pain using appropriate pain scale  - Administer analgesics based on type and severity of pain and evaluate response  - Implement non-pharmacological measures as appropriate and evaluate response  - Consider cultural and social influences on pain and pain management  - Notify physician/advanced practitioner if interventions unsuccessful or patient reports new pain  Outcome: Progressing     Problem: INFECTION - ADULT  Goal: Absence or prevention of progression during hospitalization  Description: INTERVENTIONS:  - Assess and monitor for signs and symptoms of infection  - Monitor lab/diagnostic results  - Monitor all insertion sites, i e  indwelling lines, tubes, and drains  - Monitor endotracheal if appropriate and nasal secretions for changes in amount and color  - [place] appropriate cooling/warming therapies per order  - Administer medications as ordered  - Instruct and encourage patient and family to use good hand hygiene technique  - Identify and instruct in appropriate isolation precautions for identified infection/condition  Outcome: Progressing  Goal: Absence of fever/infection during neutropenic period  Description: INTERVENTIONS:  - Monitor WBC    Outcome: Progressing     Problem: SAFETY ADULT  Goal: Patient will remain free of falls  Description: INTERVENTIONS:  - Assess patient frequently for physical needs  -  Identify cognitive and physical deficits and behaviors that affect risk of falls    -  [place] fall precautions as indicated by assessment   - Educate patient/family on patient safety including physical limitations  - Instruct patient to call for assistance with activity based on assessment  - Modify environment to reduce risk of injury  - Consider OT/PT consult to assist with strengthening/mobility  Outcome: Progressing  Goal: Maintain or return to baseline ADL function  Description: INTERVENTIONS:  -  Assess patient's ability to carry out ADLs; assess patient's baseline for ADL function and identify physical deficits which impact ability to perform ADLs (bathing, care of mouth/teeth, toileting, grooming, dressing, etc )  - Assess/evaluate cause of self-care deficits   - Assess range of motion  - Assess patient's mobility; develop plan if impaired  - Assess patient's need for assistive devices and provide as appropriate  - Encourage maximum independence but intervene and supervise when necessary  - Involve family in performance of ADLs  - Assess for home care needs following discharge   - Consider OT consult to assist with ADL evaluation and planning for discharge  - Provide patient education as appropriate  Outcome: Progressing  Goal: Maintain or return mobility status to optimal level  Description: INTERVENTIONS:  - Assess patient's baseline mobility status (ambulation, transfers, stairs, etc )    - Identify cognitive and physical deficits and behaviors that affect mobility  - Identify mobility aids required to assist with transfers and/or ambulation (gait belt, sit-to-stand, lift, walker, cane, etc )  - [place] fall precautions as indicated by assessment  - Record patient progress and toleration of activity level on Mobility SBAR; progress patient to next Phase/Stage  - Instruct patient to call for assistance with activity based on assessment  - Consider rehabilitation consult to assist with strengthening/weightbearing, etc   Outcome: Progressing     Problem: DISCHARGE PLANNING  Goal: Discharge to home or other facility with appropriate resources  Description: INTERVENTIONS:  - Identify barriers to discharge w/patient and caregiver  - Arrange for needed discharge resources and transportation as appropriate  - Identify discharge learning needs (meds, wound care, etc )  - Arrange for interpretive services to assist at discharge as needed  - Refer to Case Management Department for coordinating discharge planning if the patient needs post-hospital services based on physician/advanced practitioner order or complex needs related to functional status, cognitive ability, or social support system  Outcome: Progressing     Problem: Knowledge Deficit  Goal: Patient/family/caregiver demonstrates understanding of disease process, treatment plan, medications, and discharge instructions  Description: Complete learning assessment and assess knowledge base    Interventions:  - Provide teaching at level of understanding  - Provide teaching via preferred learning methods  Outcome: Progressing     Problem: SKIN/TISSUE INTEGRITY - ADULT  Goal: Skin integrity remains intact  Description: INTERVENTIONS  - Identify patients at risk for skin breakdown  - Assess and monitor skin integrity  - Assess and monitor nutrition and hydration status  - Monitor labs (i e  albumin)  - Assess for incontinence   - Turn and reposition patient  - Assist with mobility/ambulation  - Relieve pressure over bony prominences  - Avoid friction and shearing  - Provide appropriate hygiene as needed including keeping skin clean and dry  - Evaluate need for skin moisturizer/barrier cream  - Collaborate with interdisciplinary team (i e  Nutrition, Rehabilitation, etc )   - Patient/family teaching  Outcome: Progressing  Goal: Incision(s), wounds(s) or drain site(s) healing without S/S of infection  Description: INTERVENTIONS  - Assess and document risk factors for skin impairment   - Assess and document dressing, incision, wound bed, drain sites and surrounding tissue  - Consider nutrition services referral as needed  - Oral mucous membranes remain intact  - Provide patient/ family education  Outcome: Progressing     Problem: MUSCULOSKELETAL - ADULT  Goal: Maintain or return mobility to safest level of function  Description: INTERVENTIONS:  - Assess patient's ability to carry out ADLs; assess patient's baseline for ADL function and identify physical deficits which impact ability to perform ADLs (bathing, care of mouth/teeth, toileting, grooming, dressing, etc )  - Assess/evaluate cause of self-care deficits   - Assess range of motion  - Assess patient's mobility  - Assess patient's need for assistive devices and provide as appropriate  - Encourage maximum independence but intervene and supervise when necessary  - Involve family in performance of ADLs  - Assess for home care needs following discharge   - Consider OT consult to assist with ADL evaluation and planning for discharge  - Provide patient education as appropriate  Outcome: Progressing  Goal: Maintain proper alignment of affected body part  Description: INTERVENTIONS:  - Support, maintain and protect limb and body alignment  - Provide patient/ family with appropriate education  Outcome: Progressing     Problem: CHANGE IN BODY IMAGE  Goal: Pt/Family communicate acceptance of loss or change in body image and expresses psychological comfort and peace  Description: INTERVENTIONS:  - Assess patient/family anxiety and grief process related to change in body image, loss of functional status and loss of sense of self  - Assess patient/family's coping skills and provide emotional, spiritual and psychosocial support  - Provide information about the patient's health status with consideration of family and cultural values  - Communicate willingness to discuss loss and facilitate grief process with patient/family as appropriate  - Emphasize sustaining relationships within family system and community, or [place]/spiritual traditions  - Refer to community support groups as appropriate  - [id number], Pastoral care or other ancillary consults as needed  Outcome: Progressing     Problem: Prexisting or High Potential for Compromised Skin Integrity  Goal: Skin integrity is maintained or improved  Description: INTERVENTIONS:  - Identify patients at risk for skin breakdown  - Assess and monitor skin integrity  - Assess and monitor nutrition and hydration status  - Monitor labs   - Assess for incontinence   - Turn and reposition patient  - Assist with mobility/ambulation  - Relieve pressure over bony prominences  - Avoid friction and shearing  - Provide appropriate hygiene as needed including keeping skin clean and dry  - Evaluate need for skin moisturizer/barrier cream  - Collaborate with interdisciplinary team   - Patient/family teaching  - Consider wound care consult   Outcome: Progressing

## 2021-04-17 VITALS
HEART RATE: 79 BPM | BODY MASS INDEX: 23.53 KG/M2 | OXYGEN SATURATION: 97 % | RESPIRATION RATE: 18 BRPM | HEIGHT: 66 IN | SYSTOLIC BLOOD PRESSURE: 130 MMHG | WEIGHT: 146.39 LBS | DIASTOLIC BLOOD PRESSURE: 72 MMHG | TEMPERATURE: 96.7 F

## 2021-04-17 PROBLEM — M86.9 OSTEOMYELITIS OF RIGHT FOOT (HCC): Status: RESOLVED | Noted: 2019-12-07 | Resolved: 2021-04-17

## 2021-04-17 PROBLEM — N17.9 AKI (ACUTE KIDNEY INJURY) (HCC): Status: RESOLVED | Noted: 2021-04-13 | Resolved: 2021-04-17

## 2021-04-17 LAB — GLUCOSE SERPL-MCNC: 69 MG/DL (ref 65–140)

## 2021-04-17 PROCEDURE — 99239 HOSP IP/OBS DSCHRG MGMT >30: CPT | Performed by: INTERNAL MEDICINE

## 2021-04-17 PROCEDURE — 99024 POSTOP FOLLOW-UP VISIT: CPT | Performed by: PODIATRIST

## 2021-04-17 PROCEDURE — 82948 REAGENT STRIP/BLOOD GLUCOSE: CPT

## 2021-04-17 RX ORDER — METHADONE HYDROCHLORIDE 10 MG/1
90 TABLET ORAL DAILY
Qty: 27 TABLET | Refills: 0 | Status: SHIPPED | OUTPATIENT
Start: 2021-04-18 | End: 2021-04-21

## 2021-04-17 RX ORDER — METRONIDAZOLE 500 MG/1
500 TABLET ORAL EVERY 8 HOURS SCHEDULED
Qty: 2 TABLET | Refills: 0
Start: 2021-04-17 | End: 2021-04-18

## 2021-04-17 RX ORDER — OXYCODONE HYDROCHLORIDE 5 MG/1
5 TABLET ORAL EVERY 4 HOURS PRN
Qty: 10 TABLET | Refills: 0 | Status: SHIPPED | OUTPATIENT
Start: 2021-04-17 | End: 2021-04-27

## 2021-04-17 RX ORDER — ACETAMINOPHEN 325 MG/1
650 TABLET ORAL EVERY 6 HOURS PRN
Refills: 0
Start: 2021-04-17

## 2021-04-17 RX ORDER — CEPHALEXIN 500 MG/1
500 CAPSULE ORAL EVERY 6 HOURS SCHEDULED
Qty: 2 CAPSULE | Refills: 0
Start: 2021-04-17 | End: 2021-04-18

## 2021-04-17 RX ORDER — CLONAZEPAM 1 MG/1
1 TABLET ORAL 2 TIMES DAILY
Qty: 10 TABLET | Refills: 0 | Status: SHIPPED | OUTPATIENT
Start: 2021-04-17 | End: 2021-12-14

## 2021-04-17 RX ADMIN — GABAPENTIN 600 MG: 300 CAPSULE ORAL at 09:59

## 2021-04-17 RX ADMIN — HEPARIN SODIUM 5000 UNITS: 5000 INJECTION INTRAVENOUS; SUBCUTANEOUS at 06:03

## 2021-04-17 RX ADMIN — LAMOTRIGINE 150 MG: 25 TABLET ORAL at 09:49

## 2021-04-17 RX ADMIN — CEFAZOLIN SODIUM 2000 MG: 2 SOLUTION INTRAVENOUS at 03:16

## 2021-04-17 RX ADMIN — CEFAZOLIN SODIUM 2000 MG: 2 SOLUTION INTRAVENOUS at 09:48

## 2021-04-17 RX ADMIN — METHADONE HYDROCHLORIDE 90 MG: 10 TABLET ORAL at 09:58

## 2021-04-17 RX ADMIN — METRONIDAZOLE 500 MG: 500 TABLET ORAL at 06:03

## 2021-04-17 RX ADMIN — CLONAZEPAM 1 MG: 1 TABLET ORAL at 09:59

## 2021-04-17 RX ADMIN — LISINOPRIL 5 MG: 5 TABLET ORAL at 10:00

## 2021-04-17 RX ADMIN — ATORVASTATIN CALCIUM 40 MG: 40 TABLET, FILM COATED ORAL at 09:59

## 2021-04-17 RX ADMIN — SERTRALINE HYDROCHLORIDE 100 MG: 100 TABLET ORAL at 10:00

## 2021-04-17 RX ADMIN — DULOXETINE HYDROCHLORIDE 60 MG: 60 CAPSULE, DELAYED RELEASE ORAL at 09:59

## 2021-04-17 NOTE — PROGRESS NOTES
[facility] Internal Medicine Progress Note  Patient: Matthew Funez 60 y o  female   MRN: [id number]  PCP: Leslie Jett MD  Unit/Bed#: E2 -51 Encounter: [id number]  Date Of Visit: 04/17/21      Assessment/plan  1  Right foot osteomyelitis- s/p right transmetatarsal amputation on 4/13 for gangrene  She will require 5 days of antibiotics  She is currently on day 5  will change ancef to keflex to complete today requirements of antibiotics  She will follow up with podiatry outpt  She is nwb right lower ext       2  judy- baseline creatinine is 0 8-1  1  pt presented with a creatinine of 1 4  it has since resolved and pt is back to her baseline     3  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  cta head and neck with out abnormality  Neurology evaluated pt and likely left 3rd nerve palsy from diabetes     4  Opioid dependence- will continue methadone     5  Bipolar d/o- continue current treatment       6  Type 2 diabetes-uncontrolled with a1c of 11 7  continue current treatment  Follow up with endocrine outpt       7  Hyperlipidemia- continue lipitor     8  htn- stable  Continue current treatment       dispo- she will be discharged to str at [facility]  Subjective:   Pt seen and examined  Pt doing well  She still has some pain in her foot but she was sleeping again prior to interview  I had to wake pt up to talk  No other problems reported  Objective:     Vitals: Blood pressure 130/72, pulse 79, temperature (!) 96 7 °F (35 9 °C), temperature source Tympanic, resp  rate 18, height 5' 6" (1 676 m), weight 66 4 kg (146 lb 6 2 oz), SpO2 97 %  ,Body mass index is 23 63 kg/m²      Lab, Imaging and other studies:  Results from last 7 days   Lab Units 04/16/21  0432   WBC Thousand/uL 6 54   HEMOGLOBIN g/dL 10 4*   HEMATOCRIT % 31 7*   PLATELETS Thousands/uL 303     Results from last 7 days   Lab Units 04/16/21  0432   POTASSIUM mmol/L 4 6   CHLORIDE mmol/L 102   CO2 mmol/L 33*   BUN mg/dL 12   CREATININE mg/dL 0 99 CALCIUM mg/dL 10 0         Lab Results   Component Value Date    BLOODCX No Growth After 4 Days  04/13/2021    BLOODCX No Growth After 4 Days  04/12/2021    BLOODCX No Growth After 5 Days  10/06/2020    WOUNDCULT 4+ Growth of Beta Hemolytic Streptococcus Group B (A) 04/13/2021    WOUNDCULT (A) 04/13/2021     3+ Growth of Methicillin Resistant Staphylococcus aureus    WOUNDCULT 4+ Growth of  04/13/2021         Cta Head And Neck W Wo Contrast    Result Date: 4/13/2021  Narrative: CTA NECK AND BRAIN WITH AND WITHOUT CONTRAST INDICATION: Neuro deficit, acute, stroke suspected unilateral left gaze COMPARISON:   Head CT 7/19/2018 TECHNIQUE:  Routine CT imaging of the Brain without contrast   Post contrast imaging was performed after administration of iodinated contrast through the neck and brain  Post contrast axial 0 625 mm images timed to opacify the arterial system  3D rendering was performed on an independent workstation  MIP reconstructions performed  Coronal reconstructions were performed of the noncontrast portion of the brain  Radiation dose length product (DLP) for this visit:  1285 mGy-cm   This examination, like all CT scans performed in the [facility], was performed utilizing techniques to minimize radiation dose exposure, including the use of iterative reconstruction and automated exposure control  IV Contrast:  85 mL of iohexol (OMNIPAQUE)  IMAGE QUALITY:   Diagnostic FINDINGS: NONCONTRAST BRAIN PARENCHYMA:  No intracranial masslike lesion, mass effect or midline shift  No CT signs of acute infarction  No acute parenchymal hemorrhage  Frontal CSF density focus inferior to the left basal ganglia appears grossly stable and favored to represent enlarged perivascular space  VENTRICLES AND EXTRA-AXIAL SPACES:  Normal for the patient's age   There is a calcified extra-axial lesion along the right greater wing of the sphenoid measuring 0 6 x 0 8 cm (series 2 image 16) stable from head CT 7/19/2018 most consistent with meningioma  VISUALIZED ORBITS AND PARANASAL SINUSES:  Unremarkable  CERVICAL VASCULATURE AORTIC ARCH AND GREAT VESSELS: Aortic arch and great vessel origins are unremarkable  RIGHT VERTEBRAL ARTERY CERVICAL SEGMENT:The vessel origin is unremarkable  The vessel is patent throughout the neck without significant stenosis  LEFT VERTEBRAL ARTERY CERVICAL SEGMENT: The vessel origin is unremarkable  The vessel is patent throughout the neck without significant stenosis  RIGHT EXTRACRANIAL CAROTID SEGMENT:Atherosclerotic plaque at the bifurcation and proximal internal carotid artery  No hemodynamically significant stenosis of cervical ICA by NASCET criteria ( less than 50%) LEFT EXTRACRANIAL CAROTID SEGMENT: Atherosclerotic plaque at the bifurcation and proximal internal carotid artery  No hemodynamically significant stenosis of cervical ICA by NASCET criteria  INTRACRANIAL VASCULATURE INTERNAL CAROTID ARTERIES: Mild atherosclerotic disease of cavernous and supraclinoid segments of bilateral internal carotid arteries without critical stenosis  Normal ophthalmic artery origins  ANTERIOR CIRCULATION:  Normal A1 segments  Bilateral anterior cerebral arteries are unremarkable  Normal anterior comminuting artery  MIDDLE CEREBRAL ARTERY CIRCULATION:  Bilateral M1 segments and major M2 branches are patent without high-grade stenosis  DISTAL VERTEBRAL ARTERIES:  Distal vertebral arteries are patent without high-grade stenosis  Normal right PICA origin  Left PICA origin is not well seen, likely extradural origin  BASILAR ARTERY:  Basilar artery is unremarkable  Normal superior cerebellar arteries  POSTERIOR CEREBRAL ARTERIES:    Bilateral posterior cerebral arteries are patent without critical stenosis  Hypoplastic left posterior communicating artery  DURAL VENOUS SINUSES:  Unremarkable  NON VASCULAR ANATOMY BONY STRUCTURES: No acute or aggressive appearing osseous abnormality     SOFT TISSUES OF THE NECK:  Heterogeneous multinodular thyroid  Correlate with prior thyroid workup     THORACIC INLET:  Unremarkable  Impression: 1  No acute intracranial CT abnormality  2   Patent major vasculature of [place] without high-grade stenosis  No aneurysm  Mild atherosclerotic disease of intracranial internal carotid arteries  3   Right greater than left atherosclerotic disease of cervical ICA without hemodynamically significant stenosis  Workstation performed: [id number]     Xr Foot 3+ Vw Right    Result Date: 4/14/2021  Narrative: RIGHT FOOT INDICATION:   post TMA  COMPARISON:  4/12/2021 VIEWS:  XR FOOT 3+ VW RIGHT FINDINGS: Status post 1st through 5th transmetatarsal amputations with expected postoperative appearance  Skin staples noted  No significant degenerative changes  No lytic or blastic osseous lesion  Soft tissues are unremarkable  Impression: Expected postoperative appearance after 1st through 5th transmetatarsal amputations  Workstation performed: [id number]     Xr Foot 3+ Views Right    Result Date: 4/13/2021  Narrative: RIGHT FOOT INDICATION:   necrotic 4th toe  COMPARISON:  10/6/2020 VIEWS:  XR FOOT 3+ VW RIGHT FINDINGS: Patient is status post previous amputation of the 1st toe at the level of the metatarsophalangeal joint  There is interval change in the appearance of the 4th proximal phalanx, with rarefaction of bony density of the proximal shaft  Changes of arthritis are seen in the mid foot There is no soft tissue air  There is soft tissue atrophy of the 4th toe     Impression: Right 4th toe soft tissue atrophy  Rarefaction of bone mineral density of the 4th proximal phalanx  This may be indicative of a subacute nondisplaced fracture, or osteomyelitis   Workstation performed: [id number]       Scheduled Meds:   Current Facility-Administered Medications   Medication Dose Route Frequency Provider Last Rate    acetaminophen  650 mg Oral Q6H PRN Misty Tierney DPM      atorvastatin  40 mg Oral Daily Pau Cui, DPM      calcium carbonate  1,000 mg Oral Daily PRN Pau Cui, DPM      cefazolin  2,000 mg Intravenous Q8H Pau Cui, DPM 2,000 mg (04/17/21 0948)    clonazePAM  1 mg Oral BID Ninoska Steward MD      DULoxetine  60 mg Oral Daily Pau Cui, DPM      gabapentin  600 mg Oral TID Pau Cui, DPM      heparin (porcine)  5,000 Units Subcutaneous [facility] Pau Cui, DPM      hydrALAZINE  5 mg Intravenous Q6H PRN Pau Cui, DPM      Or    hydrALAZINE  10 mg Intravenous Q6H PRN Pau Cui, DPM      hydrOXYzine HCL  50 mg Oral HS Pau Cui, DPM      insulin glargine  22 Units Subcutaneous HS Pau Cui, DPM      insulin lispro  1-5 Units Subcutaneous TID With Meals [facility], CRNP      insulin lispro  1-5 Units Subcutaneous HS Maryann Schneider, CRNP      insulin lispro  3 Units Subcutaneous TID With Meals Melita S Wendy, CRNP      lamoTRIgine  150 mg Oral Daily Pau Cui, DPM      lisinopril  5 mg Oral Daily Melita S Wendy, CRNP      methadone  90 mg Oral Daily Pau Cui, DPM      metroNIDAZOLE  500 mg Oral [facility] Pau Cui, DPM      ondansetron  4 mg Intravenous Q6H PRN Pau Cui, DPM      oxyCODONE  5 mg Oral Q4H PRN Cory Boas, PA-C      Or    oxyCODONE  10 mg Oral Q4H PRN Cory Boas, PA-C      sertraline  100 mg Oral Daily Pau Cui, DPM       Continuous Infusions:    PRN Meds:   acetaminophen    calcium carbonate    hydrALAZINE **OR** hydrALAZINE    ondansetron    oxyCODONE **OR** oxyCODONE      Physical exam:  Physical Exam  General appearance: sleeping but awakens easily  Head: Normocephalic, without obvious abnormality, atraumatic  Eyes: conjunctivae/corneas clear  PERRL, EOM's intact  Fundi benign    Lungs: clear to auscultation bilaterally  Heart: regular rate and rhythm, S1, S2 normal, no murmur, click, rub or gallop  Abdomen: soft, non-tender; bowel sounds normal; no masses,  no organomegaly  Extremities: extremities normal, warm and well-perfused; no cyanosis, clubbing, or edema  Neurologic: Mental status: sleeping but awakens easily oriented x3

## 2021-04-17 NOTE — PLAN OF CARE
Problem: Potential for Falls  Goal: Patient will remain free of falls  Description: INTERVENTIONS:  - Assess patient frequently for physical needs  -  Identify cognitive and physical deficits and behaviors that affect risk of falls    -  [place] fall precautions as indicated by assessment   - Educate patient/family on patient safety including physical limitations  - Instruct patient to call for assistance with activity based on assessment  - Modify environment to reduce risk of injury  - Consider OT/PT consult to assist with strengthening/mobility  Outcome: Progressing     Problem: PAIN - ADULT  Goal: Verbalizes/displays adequate comfort level or baseline comfort level  Description: Interventions:  - Encourage patient to monitor pain and request assistance  - Assess pain using appropriate pain scale  - Administer analgesics based on type and severity of pain and evaluate response  - Implement non-pharmacological measures as appropriate and evaluate response  - Consider cultural and social influences on pain and pain management  - Notify physician/advanced practitioner if interventions unsuccessful or patient reports new pain  Outcome: Progressing     Problem: INFECTION - ADULT  Goal: Absence or prevention of progression during hospitalization  Description: INTERVENTIONS:  - Assess and monitor for signs and symptoms of infection  - Monitor lab/diagnostic results  - Monitor all insertion sites, i e  indwelling lines, tubes, and drains  - Monitor endotracheal if appropriate and nasal secretions for changes in amount and color  - [place] appropriate cooling/warming therapies per order  - Administer medications as ordered  - Instruct and encourage patient and family to use good hand hygiene technique  - Identify and instruct in appropriate isolation precautions for identified infection/condition  Outcome: Progressing  Goal: Absence of fever/infection during neutropenic period  Description: INTERVENTIONS:  - Monitor WBC    Outcome: Progressing     Problem: SAFETY ADULT  Goal: Patient will remain free of falls  Description: INTERVENTIONS:  - Assess patient frequently for physical needs  -  Identify cognitive and physical deficits and behaviors that affect risk of falls    -  [place] fall precautions as indicated by assessment   - Educate patient/family on patient safety including physical limitations  - Instruct patient to call for assistance with activity based on assessment  - Modify environment to reduce risk of injury  - Consider OT/PT consult to assist with strengthening/mobility  Outcome: Progressing  Goal: Maintain or return to baseline ADL function  Description: INTERVENTIONS:  -  Assess patient's ability to carry out ADLs; assess patient's baseline for ADL function and identify physical deficits which impact ability to perform ADLs (bathing, care of mouth/teeth, toileting, grooming, dressing, etc )  - Assess/evaluate cause of self-care deficits   - Assess range of motion  - Assess patient's mobility; develop plan if impaired  - Assess patient's need for assistive devices and provide as appropriate  - Encourage maximum independence but intervene and supervise when necessary  - Involve family in performance of ADLs  - Assess for home care needs following discharge   - Consider OT consult to assist with ADL evaluation and planning for discharge  - Provide patient education as appropriate  Outcome: Progressing  Goal: Maintain or return mobility status to optimal level  Description: INTERVENTIONS:  - Assess patient's baseline mobility status (ambulation, transfers, stairs, etc )    - Identify cognitive and physical deficits and behaviors that affect mobility  - Identify mobility aids required to assist with transfers and/or ambulation (gait belt, sit-to-stand, lift, walker, cane, etc )  - [place] fall precautions as indicated by assessment  - Record patient progress and toleration of activity level on Mobility SBAR; progress patient to next Phase/Stage  - Instruct patient to call for assistance with activity based on assessment  - Consider rehabilitation consult to assist with strengthening/weightbearing, etc   Outcome: Progressing     Problem: DISCHARGE PLANNING  Goal: Discharge to home or other facility with appropriate resources  Description: INTERVENTIONS:  - Identify barriers to discharge w/patient and caregiver  - Arrange for needed discharge resources and transportation as appropriate  - Identify discharge learning needs (meds, wound care, etc )  - Arrange for interpretive services to assist at discharge as needed  - Refer to Case Management Department for coordinating discharge planning if the patient needs post-hospital services based on physician/advanced practitioner order or complex needs related to functional status, cognitive ability, or social support system  Outcome: Progressing     Problem: Knowledge Deficit  Goal: Patient/family/caregiver demonstrates understanding of disease process, treatment plan, medications, and discharge instructions  Description: Complete learning assessment and assess knowledge base    Interventions:  - Provide teaching at level of understanding  - Provide teaching via preferred learning methods  Outcome: Progressing     Problem: SKIN/TISSUE INTEGRITY - ADULT  Goal: Skin integrity remains intact  Description: INTERVENTIONS  - Identify patients at risk for skin breakdown  - Assess and monitor skin integrity  - Assess and monitor nutrition and hydration status  - Monitor labs (i e  albumin)  - Assess for incontinence   - Turn and reposition patient  - Assist with mobility/ambulation  - Relieve pressure over bony prominences  - Avoid friction and shearing  - Provide appropriate hygiene as needed including keeping skin clean and dry  - Evaluate need for skin moisturizer/barrier cream  - Collaborate with interdisciplinary team (i e  Nutrition, Rehabilitation, etc )   - Patient/family teaching  Outcome: Progressing  Goal: Incision(s), wounds(s) or drain site(s) healing without S/S of infection  Description: INTERVENTIONS  - Assess and document risk factors for skin impairment   - Assess and document dressing, incision, wound bed, drain sites and surrounding tissue  - Consider nutrition services referral as needed  - Oral mucous membranes remain intact  - Provide patient/ family education  Outcome: Progressing     Problem: MUSCULOSKELETAL - ADULT  Goal: Maintain or return mobility to safest level of function  Description: INTERVENTIONS:  - Assess patient's ability to carry out ADLs; assess patient's baseline for ADL function and identify physical deficits which impact ability to perform ADLs (bathing, care of mouth/teeth, toileting, grooming, dressing, etc )  - Assess/evaluate cause of self-care deficits   - Assess range of motion  - Assess patient's mobility  - Assess patient's need for assistive devices and provide as appropriate  - Encourage maximum independence but intervene and supervise when necessary  - Involve family in performance of ADLs  - Assess for home care needs following discharge   - Consider OT consult to assist with ADL evaluation and planning for discharge  - Provide patient education as appropriate  Outcome: Progressing  Goal: Maintain proper alignment of affected body part  Description: INTERVENTIONS:  - Support, maintain and protect limb and body alignment  - Provide patient/ family with appropriate education  Outcome: Progressing     Problem: CHANGE IN BODY IMAGE  Goal: Pt/Family communicate acceptance of loss or change in body image and expresses psychological comfort and peace  Description: INTERVENTIONS:  - Assess patient/family anxiety and grief process related to change in body image, loss of functional status and loss of sense of self  - Assess patient/family's coping skills and provide emotional, spiritual and psychosocial support  - Provide information about the patient's health status with consideration of family and cultural values  - Communicate willingness to discuss loss and facilitate grief process with patient/family as appropriate  - Emphasize sustaining relationships within family system and community, or [place]/spiritual traditions  - Refer to community support groups as appropriate  - [id number], Pastoral care or other ancillary consults as needed  Outcome: Progressing     Problem: Prexisting or High Potential for Compromised Skin Integrity  Goal: Skin integrity is maintained or improved  Description: INTERVENTIONS:  - Identify patients at risk for skin breakdown  - Assess and monitor skin integrity  - Assess and monitor nutrition and hydration status  - Monitor labs   - Assess for incontinence   - Turn and reposition patient  - Assist with mobility/ambulation  - Relieve pressure over bony prominences  - Avoid friction and shearing  - Provide appropriate hygiene as needed including keeping skin clean and dry  - Evaluate need for skin moisturizer/barrier cream  - Collaborate with interdisciplinary team   - Patient/family teaching  - Consider wound care consult   Outcome: Progressing

## 2021-04-17 NOTE — PLAN OF CARE
Problem: Potential for Falls  Goal: Patient will remain free of falls  Description: INTERVENTIONS:  - Assess patient frequently for physical needs  -  Identify cognitive and physical deficits and behaviors that affect risk of falls    -  [place] fall precautions as indicated by assessment   - Educate patient/family on patient safety including physical limitations  - Instruct patient to call for assistance with activity based on assessment  - Modify environment to reduce risk of injury  - Consider OT/PT consult to assist with strengthening/mobility  Outcome: Progressing     Problem: PAIN - ADULT  Goal: Verbalizes/displays adequate comfort level or baseline comfort level  Description: Interventions:  - Encourage patient to monitor pain and request assistance  - Assess pain using appropriate pain scale  - Administer analgesics based on type and severity of pain and evaluate response  - Implement non-pharmacological measures as appropriate and evaluate response  - Consider cultural and social influences on pain and pain management  - Notify physician/advanced practitioner if interventions unsuccessful or patient reports new pain  Outcome: Progressing     Problem: INFECTION - ADULT  Goal: Absence or prevention of progression during hospitalization  Description: INTERVENTIONS:  - Assess and monitor for signs and symptoms of infection  - Monitor lab/diagnostic results  - Monitor all insertion sites, i e  indwelling lines, tubes, and drains  - Monitor endotracheal if appropriate and nasal secretions for changes in amount and color  - [place] appropriate cooling/warming therapies per order  - Administer medications as ordered  - Instruct and encourage patient and family to use good hand hygiene technique  - Identify and instruct in appropriate isolation precautions for identified infection/condition  Outcome: Progressing  Goal: Absence of fever/infection during neutropenic period  Description: INTERVENTIONS:  - Monitor WBC    Outcome: Progressing     Problem: SAFETY ADULT  Goal: Patient will remain free of falls  Description: INTERVENTIONS:  - Assess patient frequently for physical needs  -  Identify cognitive and physical deficits and behaviors that affect risk of falls    -  [place] fall precautions as indicated by assessment   - Educate patient/family on patient safety including physical limitations  - Instruct patient to call for assistance with activity based on assessment  - Modify environment to reduce risk of injury  - Consider OT/PT consult to assist with strengthening/mobility  Outcome: Progressing  Goal: Maintain or return to baseline ADL function  Description: INTERVENTIONS:  -  Assess patient's ability to carry out ADLs; assess patient's baseline for ADL function and identify physical deficits which impact ability to perform ADLs (bathing, care of mouth/teeth, toileting, grooming, dressing, etc )  - Assess/evaluate cause of self-care deficits   - Assess range of motion  - Assess patient's mobility; develop plan if impaired  - Assess patient's need for assistive devices and provide as appropriate  - Encourage maximum independence but intervene and supervise when necessary  - Involve family in performance of ADLs  - Assess for home care needs following discharge   - Consider OT consult to assist with ADL evaluation and planning for discharge  - Provide patient education as appropriate  Outcome: Progressing  Goal: Maintain or return mobility status to optimal level  Description: INTERVENTIONS:  - Assess patient's baseline mobility status (ambulation, transfers, stairs, etc )    - Identify cognitive and physical deficits and behaviors that affect mobility  - Identify mobility aids required to assist with transfers and/or ambulation (gait belt, sit-to-stand, lift, walker, cane, etc )  - [place] fall precautions as indicated by assessment  - Record patient progress and toleration of activity level on Mobility SBAR; progress patient to next Phase/Stage  - Instruct patient to call for assistance with activity based on assessment  - Consider rehabilitation consult to assist with strengthening/weightbearing, etc   Outcome: Progressing     Problem: DISCHARGE PLANNING  Goal: Discharge to home or other facility with appropriate resources  Description: INTERVENTIONS:  - Identify barriers to discharge w/patient and caregiver  - Arrange for needed discharge resources and transportation as appropriate  - Identify discharge learning needs (meds, wound care, etc )  - Arrange for interpretive services to assist at discharge as needed  - Refer to Case Management Department for coordinating discharge planning if the patient needs post-hospital services based on physician/advanced practitioner order or complex needs related to functional status, cognitive ability, or social support system  Outcome: Progressing     Problem: Knowledge Deficit  Goal: Patient/family/caregiver demonstrates understanding of disease process, treatment plan, medications, and discharge instructions  Description: Complete learning assessment and assess knowledge base    Interventions:  - Provide teaching at level of understanding  - Provide teaching via preferred learning methods  Outcome: Progressing     Problem: SKIN/TISSUE INTEGRITY - ADULT  Goal: Skin integrity remains intact  Description: INTERVENTIONS  - Identify patients at risk for skin breakdown  - Assess and monitor skin integrity  - Assess and monitor nutrition and hydration status  - Monitor labs (i e  albumin)  - Assess for incontinence   - Turn and reposition patient  - Assist with mobility/ambulation  - Relieve pressure over bony prominences  - Avoid friction and shearing  - Provide appropriate hygiene as needed including keeping skin clean and dry  - Evaluate need for skin moisturizer/barrier cream  - Collaborate with interdisciplinary team (i e  Nutrition, Rehabilitation, etc )   - Patient/family teaching  Outcome: Progressing  Goal: Incision(s), wounds(s) or drain site(s) healing without S/S of infection  Description: INTERVENTIONS  - Assess and document risk factors for skin impairment   - Assess and document dressing, incision, wound bed, drain sites and surrounding tissue  - Consider nutrition services referral as needed  - Oral mucous membranes remain intact  - Provide patient/ family education  Outcome: Progressing     Problem: MUSCULOSKELETAL - ADULT  Goal: Maintain or return mobility to safest level of function  Description: INTERVENTIONS:  - Assess patient's ability to carry out ADLs; assess patient's baseline for ADL function and identify physical deficits which impact ability to perform ADLs (bathing, care of mouth/teeth, toileting, grooming, dressing, etc )  - Assess/evaluate cause of self-care deficits   - Assess range of motion  - Assess patient's mobility  - Assess patient's need for assistive devices and provide as appropriate  - Encourage maximum independence but intervene and supervise when necessary  - Involve family in performance of ADLs  - Assess for home care needs following discharge   - Consider OT consult to assist with ADL evaluation and planning for discharge  - Provide patient education as appropriate  Outcome: Progressing  Goal: Maintain proper alignment of affected body part  Description: INTERVENTIONS:  - Support, maintain and protect limb and body alignment  - Provide patient/ family with appropriate education  Outcome: Progressing     Problem: CHANGE IN BODY IMAGE  Goal: Pt/Family communicate acceptance of loss or change in body image and expresses psychological comfort and peace  Description: INTERVENTIONS:  - Assess patient/family anxiety and grief process related to change in body image, loss of functional status and loss of sense of self  - Assess patient/family's coping skills and provide emotional, spiritual and psychosocial support  - Provide information about the patient's health status with consideration of family and cultural values  - Communicate willingness to discuss loss and facilitate grief process with patient/family as appropriate  - Emphasize sustaining relationships within family system and community, or [place]/spiritual traditions  - Refer to community support groups as appropriate  - [id number], Pastoral care or other ancillary consults as needed  Outcome: Progressing     Problem: Prexisting or High Potential for Compromised Skin Integrity  Goal: Skin integrity is maintained or improved  Description: INTERVENTIONS:  - Identify patients at risk for skin breakdown  - Assess and monitor skin integrity  - Assess and monitor nutrition and hydration status  - Monitor labs   - Assess for incontinence   - Turn and reposition patient  - Assist with mobility/ambulation  - Relieve pressure over bony prominences  - Avoid friction and shearing  - Provide appropriate hygiene as needed including keeping skin clean and dry  - Evaluate need for skin moisturizer/barrier cream  - Collaborate with interdisciplinary team   - Patient/family teaching  - Consider wound care consult   Outcome: Progressing

## 2021-04-17 NOTE — DISCHARGE SUMMARY
Discharge Summary - [facility] Internal Medicine    Patient Information: Nathan Cole 60 y o  female MRN: [id number]  Unit/Bed#: E2 -69 Encounter: [id number]    Discharging Physician / Practitioner: Massiel Lucia DO  PCP: Zhao Hernandez MD  Admission Date: 4/12/2021  Discharge Date: 04/17/21    Disposition:     Discharged to [facility] for physical therapy    Reason for Admission: om of right foot    Discharge Diagnoses:     Principal Problem (Resolved):    Osteomyelitis of right foot ([facility] )  Active Problems:    Benign essential hypertension    Hyperlipidemia    Type 2 diabetes mellitus with diabetic neuropathy, with long-term current use of insulin (HCC)    Bipolar disorder ([facility] [address] )    Uncomplicated opioid dependence ([facility] )    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, left  Resolved Problems:    JOYCE (acute kidney injury) [facility])      Consultations During Hospital Stay:  · Neurology  · podiatry    Procedures Performed:     s/p right transmetatarsal amputation    Significant Findings / Test Results:   Cta Head And Neck W Wo Contrast  Result Date: 4/13/2021  Impression: 1  No acute intracranial CT abnormality  2   Patent major vasculature of [place] without high-grade stenosis  No aneurysm  Mild atherosclerotic disease of intracranial internal carotid arteries  3   Right greater than left atherosclerotic disease of cervical ICA without hemodynamically significant stenosis  Xr Foot 3+ Vw Right  Result Date: 4/14/2021  Impression: Expected postoperative appearance after 1st through 5th transmetatarsal amputations  Xr Foot 3+ Views Right  Result Date: 4/13/2021  Impression: Right 4th toe soft tissue atrophy  Rarefaction of bone mineral density of the 4th proximal phalanx  This may be indicative of a subacute nondisplaced fracture, or osteomyelitis  Incidental Findings:   · none    Test Results Pending at Discharge (will require follow up):   · none     Outpatient Tests Requested:  none    Hospital Course:      Omer Elmore TRINA is a 60 y o  female patient who originally presented to the hospital on 4/12/2021 due to right foot osteomyelitis in which she was assessed by podiatry and is s/p right transmetatarsal amputation on 4/13 for gangrene/om  She is on day 5 of her keflex/flagyl  Her blood cultures are negative  She will follow up with podiatry as outpt  Pt is nwb to right lower ext  She had judy during her hospital stay in which her creatinine is 1 4  Her baseline creatinine is 0 8-1 1 and her creatinine has since resolved back to baseline   Pt has left 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/ left eye ptosis  cta head and neck with out abnormality  Neurology evaluated pt and likely left 3rd nerve palsy from diabetes   She does have Opioid dependence and she was continued on  methadone   Pt has type 2 diabetes that is uncontrolled with an a1c of 11 7  she will continue metformin and lantus of 22 units  She will require follow up with endocrine outpt  She was discharged to [facility] for pt  Discharge Day Visit / Exam:     * Please refer to separate progress note for these details *    Discharge instructions/Information to patient and family:   See after visit summary for information provided to patient and family  Provisions for Follow-Up Care:  See after visit summary for information related to follow-up care and any pertinent home health orders  Discharge Statement:  I spent 33 minutes discharging the patient  This time was spent on the day of discharge  I had direct contact with the patient on the day of discharge  Greater than 50% of the total time was spent examining patient, answering all patient questions, arranging and discussing plan of care with patient as well as directly providing post-discharge instructions  Additional time then spent on discharge activities  Discharge Medications:  See after visit summary for reconciled discharge medications provided to patient and family

## 2021-04-17 NOTE — CASE MANAGEMENT
KAT made a PC to [facility] regarding pt's transport to [facility] today  [facility] was able to set up transport time today for 11:30M via [facility] and pt's assigned nurse have been notified  IMM reviewed with patient  patient agree with discharge determination  IMM placed in scan bin  At this time there are no other CM needs

## 2021-04-17 NOTE — PROGRESS NOTES
Podiatry - Progress Note  Patient: Mell Sullivan 60 y o  female   MRN: [id number]  PCP: Jama Cooks, MD  Unit/Bed#: E2 -68 Encounter: [id number]  Date Of Visit: 21    ASSESSMENT:    Mell Sullivan is a 60 y o  female with:    1  Right 4th digit ulceration with gangrene and osteomyelitis- [place] 4  - S/p right transmetatarsal amputation (DOS 2021)  2  Cellulitis  3  Type 2 diabetes mellitus with neuropathy  4  H/o opioid dependence    PLAN:    · Local wound care consisting of Xeroform, DSD to right foot incision site  Incision site appears stable and well coapted with skin margins well approximated and all sutures and staples in proper place and intact  No signs of dehiscence, maceration, or acute infection noted  Plantar and dorsal flaps appear pink in color with brisk capillary refill time  · Patient stable for discharge from Podiatry standpoint, to [facility]  · Appreciate PT/OT and PM&R consult  · Elevation on green foam wedges or pillows when non-ambulatory  · Rest of care per primary team      Weightbearing status: Strict nonweightbearing to right lower extremity  SUBJECTIVE:     The patient was seen, evaluated, and assessed at bedside today  The patient was awake, alert, and in no acute distress  No acute events overnight  The patient reports no pain to her right foot  Patient denies N/V/F/chills/SOB/CP  OBJECTIVE:     Vitals:   /56   Pulse 89   Temp 97 8 °F (36 6 °C) (Temporal)   Resp 20   Ht 5' 6" (1 676 m)   Wt 66 4 kg (146 lb 6 2 oz)   SpO2 98%   BMI 23 63 kg/m²     Temp (24hrs), Av 6 °F (36 4 °C), Min:97 2 °F (36 2 °C), Max:97 9 °F (36 6 °C)      Physical Exam:     General: Alert, cooperative and no distress  Lungs: Non labored breathing  Abdomen: Soft, non-tender  · Lower extremity exam:  Cardiovascular status at baseline  Neurological status at baseline  Musculoskeletal status at baseline   No calf tenderness noted  Incision site appears stable and well coapted with skin margins well approximated and all sutures and staples in proper place and intact  No signs of dehiscence, maceration, or acute infection noted  Plantar and dorsal flaps appear pink in color with brisk capillary refill time  Clinical Images 04/17/21:      Additional Data:     Labs:    Results from last 7 days   Lab Units 04/16/21  0432 04/15/21  0640   WBC Thousand/uL 6 54 6 17   HEMOGLOBIN g/dL 10 4* 10 3*   HEMATOCRIT % 31 7* 31 5*   PLATELETS Thousands/uL 303 261   NEUTROS PCT %  --  56   LYMPHS PCT %  --  32   MONOS PCT %  --  9   EOS PCT %  --  2     Results from last 7 days   Lab Units 04/16/21  0432   POTASSIUM mmol/L 4 6   CHLORIDE mmol/L 102   CO2 mmol/L 33*   BUN mg/dL 12   CREATININE mg/dL 0 99   CALCIUM mg/dL 10 0           * I Have Reviewed All Lab Data Listed Above  Recent Cultures (last 7 days):     Results from last 7 days   Lab Units 04/13/21  0946 04/13/21  0027 04/12/21  2338   BLOOD CULTURE   --  No Growth at 72 hrs  No Growth at 72 hrs  GRAM STAIN RESULT  1+ Polys*  4+ Gram negative rods*  1+ Gram positive cocci in pairs*  --   --    WOUND CULTURE  4+ Growth of Beta Hemolytic Streptococcus Group B*  3+ Growth of Methicillin Resistant Staphylococcus aureus*  4+ Growth of   --   --            Imaging: I have personally reviewed pertinent films in PACS  EKG, Pathology, and Other Studies: I have personally reviewed pertinent reports  ** Please Note: Portions of the record may have been created with voice recognition software  Occasional wrong word or "sound a like" substitutions may have occurred due to the inherent limitations of voice recognition software  Read the chart carefully and recognize, using context, where substitutions have occurred   **

## 2021-04-18 LAB
BACTERIA BLD CULT: NORMAL
BACTERIA BLD CULT: NORMAL

## 2021-04-27 ENCOUNTER — OFFICE VISIT (OUTPATIENT)
Dept: WOUND CARE | Facility: HOSPITAL | Age: 66
End: 2021-04-27
Payer: COMMERCIAL

## 2021-04-27 VITALS
HEART RATE: 80 BPM | TEMPERATURE: 98.6 F | DIASTOLIC BLOOD PRESSURE: 64 MMHG | RESPIRATION RATE: 16 BRPM | SYSTOLIC BLOOD PRESSURE: 118 MMHG

## 2021-04-27 DIAGNOSIS — E11.40 TYPE 2 DIABETES MELLITUS WITH DIABETIC NEUROPATHY, WITH LONG-TERM CURRENT USE OF INSULIN (HCC): Primary | ICD-10-CM

## 2021-04-27 DIAGNOSIS — Z79.4 TYPE 2 DIABETES MELLITUS WITH DIABETIC NEUROPATHY, WITH LONG-TERM CURRENT USE OF INSULIN (HCC): Primary | ICD-10-CM

## 2021-04-27 DIAGNOSIS — Z89.431 S/P TRANSMETATARSAL AMPUTATION OF FOOT, RIGHT (HCC): ICD-10-CM

## 2021-04-27 PROCEDURE — 99024 POSTOP FOLLOW-UP VISIT: CPT | Performed by: PODIATRIST

## 2021-04-27 PROCEDURE — 99213 OFFICE O/P EST LOW 20 MIN: CPT | Performed by: PODIATRIST

## 2021-04-27 NOTE — PROGRESS NOTES
Patient ID: Mike Puente is a 60 y o  female Date of Birth 1955     Diagnosis:  1  Type 2 diabetes mellitus with diabetic neuropathy, with long-term current use of insulin ([address] )  -     Diabetic Shoe    2  S/P transmetatarsal amputation of foot, right ([facility])  -     Wound cleansing and dressings; Future  -     Diabetic Shoe        Diagnosis ICD-10-CM Associated Orders   1  Type 2 diabetes mellitus with diabetic neuropathy, with long-term current use of insulin ([facility])  E11 40 Diabetic Shoe    Z79 4    2  S/P transmetatarsal amputation of foot, right ([facility])  Z89 431 Wound cleansing and dressings     Diabetic Shoe          Assessment & Plan:  Patient's amputation is healing well  Sutures and staples were removed  There is no sign of any significant complication  She may heel touch but should stay off the front of her foot for at least 2 more weeks  Recommend she come to my private office in 2-4 weeks to begin at-risk foot care  Stressed compliance  Stressed tight blood sugar control  Prescription given for diabetic shoes and inserts  Chief Complaint   Patient presents with   [facility] Patient Visit     Patient had a right TMA on 4/13/21, here for follow up appt  Subjective:   Patient arrives for 1st wound care visit following transmetatarsal amputation of right foot  Medical history significant for poorly controlled type 2 diabetes, substance abuse  Patient was admitted the hospital 3 weeks ago with gangrene to her right forefoot  She previously had other toes amputated  It was decided she would need a transmetatarsal amputation  Surgery was performed on April 13, 2021  Patient's hemoglobin A1c at the time was 11 7  Patient states she has tried to stay off of her right foot        The following portions of the patient's history were reviewed and updated as appropriate:   Patient Active Problem List   Diagnosis    Hyponatremia    Right arm cellulitis    Abscess of right forearm    Bipolar affective disorder ([facility] )    Corn or callus    Benign essential hypertension    Hyperlipidemia    Hypothyroidism    Persistent insomnia    Type 2 diabetes mellitus with diabetic neuropathy, with long-term current use of insulin (HCC)    Visual loss    Abscess of tendon sheath of forearm, right    Anemia    Methadone use (HCC)    Chronic hepatitis C without hepatic coma (HCC)    Abscess of right arm    Abscess of tendon sheath of right forearm    History of MRSA infection    Diabetes mellitus (HCC)    Depression    Bipolar disorder (HCC)    Cellulitis of right foot    Right ankle cellulitis    Constipation    Uncomplicated opioid dependence ([facility] )    IV drug abuse ([place] [address] )    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, left    S/P transmetatarsal amputation of foot, right (HCC)     Past Medical History:   Diagnosis Date    Bipolar disorder ([facility] )     Depression     Diabetes mellitus ([facility] )     History of MRSA infection     Hypertension      Past Surgical History:   Procedure Laterality Date    APPENDECTOMY      INCISION AND DRAINAGE OF WOUND Right 7/1/2018    Procedure: INCISION AND DRAINAGE (I&D) RIGHT FOREARM;  Surgeon: Ari Mccullough MD;  [facility];  Service: Orthopedics    MN AMPUTATION FOOT,TRANSMETATARSAL Right 4/13/2021    Procedure: AMPUTATION TRANSMETATARSAL (TMA);  Surgeon: Judith Chiu DPM;  [facility];  Service: Podiatry    MN SPLIT GRFT TRUNK,ARM,LEG <100 SQCM Right 10/17/2018    Procedure: RIGHT ARM SKIN GRAFT SPLIT THICKNESS (STSG);   Surgeon: Flor Lowery MD;  [facility];  Service: Plastics    MN SPLIT [address] <100 SQCM Right 9/12/2018    Procedure: Yehuda Cambric;  Surgeon: Flor Lowery MD;  [facility];  Service: Plastics    TOE AMPUTATION Right 12/10/2019    Procedure: AMPUTATION TOE;  Surgeon: Judith Chiu DPM;  [facility];  Service: Podiatry    TOE AMPUTATION Right 5/29/2020 Procedure: AMPUTATION TOE, 5TH TOE;  Surgeon: Estrella James DPM;  [facility];  Service: Podiatry    TONSILLECTOMY      VAC DRESSING APPLICATION Right 65/04/3886    Procedure: Shayna Arellano;  Surgeon: Chi Gardiner MD;  [facility];  Service: Plastics    WOUND DEBRIDEMENT Right 7/7/2018    Procedure: DEBRIDEMENT UPPER EXTREMITY ([address]) W/ APPLICATION OF WOUND VAC;  Surgeon: Kenrick Bledsoe MD;  [facility];  Service: Orthopedics    WOUND DEBRIDEMENT Right 7/11/2018    Procedure: DEBRIDEMENT UPPER EXTREMITY WITH WOUND VAC EXCHANGE;  Surgeon: Marya Jean-Baptiste DO;  [facility];  Service: Orthopedics    WOUND DEBRIDEMENT Right 9/12/2018    Procedure: DEBRIDEMENT  [facility] OUT) FOREARM with Vac dressing change;  Surgeon: Chi Gardiner MD;  [facility];  Service: Plastics    WOUND DEBRIDEMENT Right 5/29/2020    Procedure: Complex Irrigation and DEBRIDEMENT WOUND ([facility] OUT), ANKLE;  Surgeon: Estrella James DPM;  [facility];  Service: Podiatry     Social History     Socioeconomic History    Marital status: Single     Spouse name: None    Number of children: None    Years of education: None    Highest education level: None   Occupational History    None   Social Needs    Financial resource strain: Somewhat hard    Food insecurity     Worry: Never true     Inability: Never true    Transportation needs     Medical: No     Non-medical: No   Tobacco Use    Smoking status: Never Smoker    Smokeless tobacco: Never Used   Substance and Sexual Activity    Alcohol use: Never     Alcohol/week: 0 0 standard drinks     Frequency: Never     Binge frequency: Never    Drug use: Never     Comment: on methadone, Hx herion addiction    Sexual activity: Not Currently   Lifestyle    Physical activity     Days per week: 0 days     Minutes per session: None    Stress:  To some extent   Relationships    Social connections     Talks on phone: None     Gets together: None     Attends Confucianist service: None     Active member of club or organization: No     Attends meetings of clubs or organizations: Never     Relationship status: None    Intimate partner violence     Fear of current or ex partner: Patient refused     Emotionally abused: Patient refused     Physically abused: Patient refused     Forced sexual activity: Patient refused   Other Topics Concern    None   Social History Narrative    None        Current Outpatient Medications:     acetaminophen (TYLENOL) 325 mg tablet, Take 2 tablets (650 mg total) by mouth every 6 (six) hours as needed for mild pain, headaches or fever, Disp: , Rfl: 0    atorvastatin (LIPITOR) 40 mg tablet, Take 40 mg by mouth daily , Disp: , Rfl:     clonazePAM (KlonoPIN) 1 mg tablet, Take 1 tablet (1 mg total) by mouth 2 (two) times a day for 10 days, Disp: 10 tablet, Rfl: 0    DULoxetine (CYMBALTA) 60 mg delayed release capsule, Take 60 mg by mouth, Disp: , Rfl:     gabapentin (NEURONTIN) 300 mg capsule, Take 600 mg by mouth 3 (three) times a day  , Disp: , Rfl:     hydrOXYzine HCL (ATARAX) 50 mg tablet, Take 50 mg by mouth daily at bedtime, Disp: , Rfl:     insulin glargine (Lantus SoloStar) 100 units/mL injection pen, Inject 22 Units under the skin every 12 (twelve) hours, Disp: 5 pen, Rfl: 0    Insulin Pen Needle 31G X 4 MM MISC, by Does not apply route see administration instructions Please use with novolog and lantus to administer insulin three times daily before meals and daily at bedtime  , Disp: 120 each, Rfl: 0    lamoTRIgine (LaMICtal) 150 MG tablet, Take 150 mg by mouth daily, Disp: , Rfl:     lisinopril (ZESTRIL) 5 mg tablet, Take 1 tablet (5 mg total) by mouth daily, Disp: 30 tablet, Rfl: 0    metFORMIN (GLUCOPHAGE) 850 mg tablet, Take 850 mg by mouth 2 (two) times a day, Disp: , Rfl:     oxyCODONE (ROXICODONE) 5 mg immediate release tablet, Take 1 tablet (5 mg total) by mouth every 4 (four) hours as needed for severe pain for up to 10 daysMax Daily Amount: 30 mg, Disp: 10 tablet, Rfl: 0    sertraline (ZOLOFT) 50 mg tablet, Take 100 mg by mouth daily  , Disp: , Rfl:   Family History   Problem Relation Age of Onset    Hypertension Mother         heart attack    Dementia Father         heart atttack/hypertention      Review of Systems   Constitutional: Negative  Allergies  Patient has no known allergies  Objective:  /64   Pulse 80   Temp 98 6 °F (37 °C)   Resp 16     Physical Exam  Vitals signs reviewed  Constitutional:       General: She is not in acute distress  Appearance: She is normal weight  She is not toxic-appearing  Cardiovascular:      Pulses:           Dorsalis pedis pulses are 2+ on the right side and 2+ on the left side  Posterior tibial pulses are 2+ on the right side and 2+ on the left side  Musculoskeletal:      Right foot: Deformity (TMA) present  Left foot: No deformity  Feet:      Right foot:      Protective Sensation: 5 sites tested  0 sites sensed  Skin integrity: Skin breakdown (Surgical incision and transmetatarsal amputation is healed  Sutures and staples intact ) present  No ulcer  Left foot:      Protective Sensation: 10 sites tested  0 sites sensed  Neurological:      Mental Status: She is alert  Wound 05/28/20 Traumatic Leg Right; Lower (Active)       Wound 05/29/20 Incision Foot Right (Active)       Wound 05/29/20 Incision Ankle Right (Active)       Wound 04/13/21 Soft Tissue Necrosis Toe (Comment  which one) Right (Active)       Wound 04/13/21 Surgical Foot Right (Active)   Wound Image    04/27/21 0937                             Procedures removed sutures and staples, applied steri strips      Results from last 6 Months   Lab Units 04/13/21  0946   WOUND CULTURE  4+ Growth of Beta Hemolytic Streptococcus Group B*  3+ Growth of Methicillin Resistant Staphylococcus aureus*  4+ Growth of        Wound Instructions:  Orders Placed This Encounter   Procedures    Wound cleansing and dressings     Right foot incision:   Wash your hands with soap and water  Remove old dressing, discard into plastic bag and place in trash  Cleanse the wound with gentle soap and water prior  to applying a clean dressing  Do not use tissue or cotton balls  Do not scrub the wound  Pat dry using gauze  Shower yes  NO SOAKING FOOT  Apply light gauze dressing to the wound  C  Secure with louis and tape  Change dressing daily after shower  Today's treatment: Staples removed  Benzoin Tincture applied around incision and steri strips applied  By Dr Hector Sharp  Wound care done per above  Allow steri strips to fall off on their own  Do not peel off  Limit walking  Able to ambulate with heel touch  Need diabetic shoe with insert  Follow up at Dr Hector Sharp at personal office in 3 weeks  [facility] [address] [place] [address] [facility]  [phone]     Standing Status:   Future     Standing Expiration Date:   4/27/2022    Diabetic Shoe     1 pair DM shoes with 3 pair custom inserts  Need toe filler for right foot, patient now s/p transmetatarsal amputation     Order Specific Question:   Type     Answer:   Custom Molded         Christine Hanna DPM    Portions of the record may have been created with voice recognition software  Occasional wrong word or "sound a like" substitutions may have occurred due to the inherent limitations of voice recognition software  Read the chart carefully and recognize, using context, where substitutions have occurred

## 2021-04-27 NOTE — PATIENT INSTRUCTIONS
Orders Placed This Encounter   Procedures    Wound cleansing and dressings     Right foot incision:   Wash your hands with soap and water  Remove old dressing, discard into plastic bag and place in trash  Cleanse the wound with gentle soap and water prior  to applying a clean dressing  Do not use tissue or cotton balls  Do not scrub the wound  Pat dry using gauze  Shower yes  NO SOAKING FOOT  Apply light gauze dressing to the wound  C  Secure with louis and tape  Change dressing daily after shower  Today's treatment: Staples removed  Benzoin Tincture applied around incision and steri strips applied  By Dr Stefanie Felton  Wound care done per above  Allow steri strips to fall off on their own  Do not peel off  Limit walking  Able to ambulate with heel touch  Need diabetic shoe with insert  Follow up at Dr Stefanie Felton at personal office in 3 weeks    [address] [facility] [place], [address] [facility]  [phone]     Standing Status:   Future     Standing Expiration Date:   4/27/2022

## 2021-05-12 ENCOUNTER — OFFICE VISIT (OUTPATIENT)
Dept: PODIATRY | Facility: CLINIC | Age: 66
End: 2021-05-12

## 2021-05-12 VITALS
BODY MASS INDEX: 23.63 KG/M2 | SYSTOLIC BLOOD PRESSURE: 122 MMHG | WEIGHT: 147 LBS | HEIGHT: 66 IN | DIASTOLIC BLOOD PRESSURE: 90 MMHG

## 2021-05-12 DIAGNOSIS — E11.40 TYPE 2 DIABETES MELLITUS WITH DIABETIC NEUROPATHY, WITH LONG-TERM CURRENT USE OF INSULIN (HCC): ICD-10-CM

## 2021-05-12 DIAGNOSIS — Z89.431 S/P TRANSMETATARSAL AMPUTATION OF FOOT, RIGHT (HCC): Primary | ICD-10-CM

## 2021-05-12 DIAGNOSIS — Z79.4 TYPE 2 DIABETES MELLITUS WITH DIABETIC NEUROPATHY, WITH LONG-TERM CURRENT USE OF INSULIN (HCC): ICD-10-CM

## 2021-05-12 PROCEDURE — 99024 POSTOP FOLLOW-UP VISIT: CPT | Performed by: PODIATRIST

## 2021-05-12 RX ORDER — LAMOTRIGINE 200 MG/1
TABLET ORAL
COMMUNITY
Start: 2021-03-10 | End: 2021-05-12 | Stop reason: SDUPTHER

## 2021-05-12 RX ORDER — DULAGLUTIDE 0.75 MG/.5ML
0.75 INJECTION, SOLUTION SUBCUTANEOUS WEEKLY
COMMUNITY
Start: 2021-05-11

## 2021-05-12 RX ORDER — SERTRALINE HYDROCHLORIDE 100 MG/1
100 TABLET, FILM COATED ORAL DAILY
COMMUNITY
Start: 2021-03-10

## 2021-05-12 RX ORDER — METHADONE HYDROCHLORIDE 5 MG/1
100 TABLET ORAL
COMMUNITY

## 2021-05-12 RX ORDER — ROPINIROLE 5 MG/1
5 TABLET, FILM COATED ORAL
COMMUNITY
Start: 2021-04-12

## 2021-05-12 NOTE — PROGRESS NOTES
Assessment/Plan:      Diagnoses and all orders for this visit:    S/P transmetatarsal amputation of foot, right ([address] )  -     Diabetic Shoe    Type 2 diabetes mellitus with diabetic neuropathy, with long-term current use of insulin (HCC)  -     Diabetic Shoe    Other orders  -     NovoLOG 100 UNIT/ML injection  -     Trulicity 9 82 FO/4 1BH SOPN  -     Discontinue: lamoTRIgine (LaMICtal) 200 MG tablet  -     methadone (DOLOPHINE) 5 mg tablet; Take 60 mg by mouth  -     rOPINIRole (REQUIP) 5 MG tablet  -     sertraline (ZOLOFT) 100 mg tablet        Patient's TMA is stable and there is no sign of complication  Prescription given for diabetic shoes and inserts  She should walk flat-footed and slowly until she gets her new inserts  I would like to see every 9-12 weeks for at-risk foot care  Subjective:     Patient ID: Jolynn Mercado is a 60 y o  female  Patient had right TMA 5/29/2020  She was seen in wound care  She healed and is here to have her foot checked  Review of Systems      Objective:     Physical Exam    Right foot TMa is healed  Pedal pulse sintact  NO drainage  No pain CRT brisk  Normal ankle ROM  LOPS

## 2021-12-13 ENCOUNTER — TELEPHONE (OUTPATIENT)
Dept: PODIATRY | Facility: CLINIC | Age: 66
End: 2021-12-13

## 2021-12-14 ENCOUNTER — APPOINTMENT (EMERGENCY)
Dept: RADIOLOGY | Facility: HOSPITAL | Age: 66
End: 2021-12-14
Payer: COMMERCIAL

## 2021-12-14 ENCOUNTER — HOSPITAL ENCOUNTER (EMERGENCY)
Facility: HOSPITAL | Age: 66
Discharge: HOME | End: 2021-12-14
Attending: EMERGENCY MEDICINE | Admitting: EMERGENCY MEDICINE
Payer: COMMERCIAL

## 2021-12-14 VITALS
BODY MASS INDEX: 24.77 KG/M2 | HEART RATE: 79 BPM | RESPIRATION RATE: 18 BRPM | SYSTOLIC BLOOD PRESSURE: 159 MMHG | WEIGHT: 153.44 LBS | TEMPERATURE: 98.2 F | OXYGEN SATURATION: 100 % | DIASTOLIC BLOOD PRESSURE: 74 MMHG

## 2021-12-14 DIAGNOSIS — E11.621 DIABETIC ULCER OF RIGHT FOOT (HCC): Primary | ICD-10-CM

## 2021-12-14 DIAGNOSIS — L97.519 DIABETIC ULCER OF RIGHT FOOT (HCC): Primary | ICD-10-CM

## 2021-12-14 LAB
ANION GAP SERPL CALCULATED.3IONS-SCNC: 8 MMOL/L (ref 4–13)
BASOPHILS # BLD AUTO: 0.03 THOUSANDS/ΜL (ref 0–0.1)
BASOPHILS NFR BLD AUTO: 0 % (ref 0–1)
BUN SERPL-MCNC: 29 MG/DL (ref 5–25)
CALCIUM SERPL-MCNC: 9.3 MG/DL (ref 8.3–10.1)
CHLORIDE SERPL-SCNC: 99 MMOL/L (ref 100–108)
CO2 SERPL-SCNC: 29 MMOL/L (ref 21–32)
CREAT SERPL-MCNC: 1.31 MG/DL (ref 0.6–1.3)
CRP SERPL HS-MCNC: >90 MG/L
EOSINOPHIL # BLD AUTO: 0.12 THOUSAND/ΜL (ref 0–0.61)
EOSINOPHIL NFR BLD AUTO: 1 % (ref 0–6)
ERYTHROCYTE [DISTWIDTH] IN BLOOD BY AUTOMATED COUNT: 13.8 % (ref 11.6–15.1)
ERYTHROCYTE [SEDIMENTATION RATE] IN BLOOD: 41 MM/HOUR (ref 0–29)
GFR SERPL CREATININE-BSD FRML MDRD: 42 ML/MIN/1.73SQ M
GLUCOSE SERPL-MCNC: 282 MG/DL (ref 65–140)
HCT VFR BLD AUTO: 34.3 % (ref 34.8–46.1)
HGB BLD-MCNC: 11.4 G/DL (ref 11.5–15.4)
IMM GRANULOCYTES # BLD AUTO: 0.03 THOUSAND/UL (ref 0–0.2)
IMM GRANULOCYTES NFR BLD AUTO: 0 % (ref 0–2)
LYMPHOCYTES # BLD AUTO: 2.47 THOUSANDS/ΜL (ref 0.6–4.47)
LYMPHOCYTES NFR BLD AUTO: 28 % (ref 14–44)
MCH RBC QN AUTO: 30.6 PG (ref 26.8–34.3)
MCHC RBC AUTO-ENTMCNC: 33.2 G/DL (ref 31.4–37.4)
MCV RBC AUTO: 92 FL (ref 82–98)
MONOCYTES # BLD AUTO: 0.61 THOUSAND/ΜL (ref 0.17–1.22)
MONOCYTES NFR BLD AUTO: 7 % (ref 4–12)
NEUTROPHILS # BLD AUTO: 5.47 THOUSANDS/ΜL (ref 1.85–7.62)
NEUTS SEG NFR BLD AUTO: 64 % (ref 43–75)
NRBC BLD AUTO-RTO: 0 /100 WBCS
PLATELET # BLD AUTO: 193 THOUSANDS/UL (ref 149–390)
PMV BLD AUTO: 11.6 FL (ref 8.9–12.7)
POTASSIUM SERPL-SCNC: 4.7 MMOL/L (ref 3.5–5.3)
RBC # BLD AUTO: 3.72 MILLION/UL (ref 3.81–5.12)
SODIUM SERPL-SCNC: 136 MMOL/L (ref 136–145)
WBC # BLD AUTO: 8.73 THOUSAND/UL (ref 4.31–10.16)

## 2021-12-14 PROCEDURE — 85652 RBC SED RATE AUTOMATED: CPT | Performed by: PHYSICIAN ASSISTANT

## 2021-12-14 PROCEDURE — 99244 OFF/OP CNSLTJ NEW/EST MOD 40: CPT | Performed by: PODIATRIST

## 2021-12-14 PROCEDURE — 99284 EMERGENCY DEPT VISIT MOD MDM: CPT | Performed by: PHYSICIAN ASSISTANT

## 2021-12-14 PROCEDURE — 36415 COLL VENOUS BLD VENIPUNCTURE: CPT | Performed by: PHYSICIAN ASSISTANT

## 2021-12-14 PROCEDURE — 73630 X-RAY EXAM OF FOOT: CPT

## 2021-12-14 PROCEDURE — 99283 EMERGENCY DEPT VISIT LOW MDM: CPT

## 2021-12-14 PROCEDURE — 86141 C-REACTIVE PROTEIN HS: CPT | Performed by: PHYSICIAN ASSISTANT

## 2021-12-14 PROCEDURE — 11042 DBRDMT SUBQ TIS 1ST 20SQCM/<: CPT | Performed by: PODIATRIST

## 2021-12-14 PROCEDURE — 85025 COMPLETE CBC W/AUTO DIFF WBC: CPT | Performed by: PHYSICIAN ASSISTANT

## 2021-12-14 PROCEDURE — 80048 BASIC METABOLIC PNL TOTAL CA: CPT | Performed by: PHYSICIAN ASSISTANT

## 2021-12-14 RX ORDER — LISINOPRIL AND HYDROCHLOROTHIAZIDE 25; 20 MG/1; MG/1
1 TABLET ORAL DAILY
COMMUNITY

## 2021-12-16 ENCOUNTER — TELEPHONE (OUTPATIENT)
Dept: OBGYN CLINIC | Facility: HOSPITAL | Age: 66
End: 2021-12-16

## 2022-01-11 ENCOUNTER — TELEPHONE (OUTPATIENT)
Dept: PODIATRY | Facility: CLINIC | Age: 67
End: 2022-01-11

## 2022-01-11 DIAGNOSIS — E11.621 DIABETIC ULCER OF MIDFOOT ASSOCIATED WITH TYPE 2 DIABETES MELLITUS, UNSPECIFIED LATERALITY, UNSPECIFIED ULCER STAGE (HCC): Primary | ICD-10-CM

## 2022-01-11 DIAGNOSIS — L97.409 DIABETIC ULCER OF MIDFOOT ASSOCIATED WITH TYPE 2 DIABETES MELLITUS, UNSPECIFIED LATERALITY, UNSPECIFIED ULCER STAGE (HCC): Primary | ICD-10-CM

## 2022-01-11 RX ORDER — DOXYCYCLINE 100 MG/1
100 TABLET ORAL 2 TIMES DAILY
Qty: 14 TABLET | Refills: 0 | Status: SHIPPED | OUTPATIENT
Start: 2022-01-11 | End: 2022-01-18

## 2022-01-11 NOTE — TELEPHONE ENCOUNTER
Patient called  Has a "hole" in the right foot  Is weeping and there is an odor  And its painful  She said Dr Cade Cutler said to call if her foot started to bother her  Can we squeeze her in at some point tomorrow to see him?     Thank you!!!!

## 2022-04-07 ENCOUNTER — HOSPITAL ENCOUNTER (EMERGENCY)
Facility: HOSPITAL | Age: 67
Discharge: HOME | End: 2022-04-07
Attending: EMERGENCY MEDICINE | Admitting: EMERGENCY MEDICINE
Payer: COMMERCIAL

## 2022-04-07 ENCOUNTER — APPOINTMENT (EMERGENCY)
Dept: RADIOLOGY | Facility: HOSPITAL | Age: 67
End: 2022-04-07
Payer: COMMERCIAL

## 2022-04-07 ENCOUNTER — APPOINTMENT (EMERGENCY)
Dept: CT IMAGING | Facility: HOSPITAL | Age: 67
End: 2022-04-07
Payer: COMMERCIAL

## 2022-04-07 VITALS
TEMPERATURE: 98 F | WEIGHT: 154.32 LBS | DIASTOLIC BLOOD PRESSURE: 87 MMHG | OXYGEN SATURATION: 96 % | RESPIRATION RATE: 17 BRPM | HEART RATE: 76 BPM | SYSTOLIC BLOOD PRESSURE: 175 MMHG | BODY MASS INDEX: 24.91 KG/M2

## 2022-04-07 DIAGNOSIS — S29.012A UPPER BACK STRAIN: ICD-10-CM

## 2022-04-07 DIAGNOSIS — S16.1XXA CERVICAL STRAIN, ACUTE: ICD-10-CM

## 2022-04-07 DIAGNOSIS — S22.41XA: ICD-10-CM
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
## 2022-04-07 DIAGNOSIS — S40.011A CONTUSION OF RIGHT SHOULDER: ICD-10-CM

## 2022-04-07 DIAGNOSIS — S09.90XA CHI (CLOSED HEAD INJURY): Primary | ICD-10-CM

## 2022-04-07 DIAGNOSIS — S39.012A LOW BACK STRAIN: ICD-10-CM

## 2022-04-07 DIAGNOSIS — S83.91XA RIGHT KNEE SPRAIN: ICD-10-CM

## 2022-04-07 PROCEDURE — 72100 X-RAY EXAM L-S SPINE 2/3 VWS: CPT

## 2022-04-07 PROCEDURE — 99284 EMERGENCY DEPT VISIT MOD MDM: CPT

## 2022-04-07 PROCEDURE — 70450 CT HEAD/BRAIN W/O DYE: CPT

## 2022-04-07 PROCEDURE — 99284 EMERGENCY DEPT VISIT MOD MDM: CPT | Performed by: EMERGENCY MEDICINE

## 2022-04-07 PROCEDURE — 71111 X-RAY EXAM RIBS/CHEST4/> VWS: CPT

## 2022-04-07 PROCEDURE — G1004 CDSM NDSC: HCPCS

## 2022-04-07 PROCEDURE — 73564 X-RAY EXAM KNEE 4 OR MORE: CPT

## 2022-04-07 PROCEDURE — 73030 X-RAY EXAM OF SHOULDER: CPT

## 2022-04-07 PROCEDURE — 72125 CT NECK SPINE W/O DYE: CPT

## 2022-04-07 RX ORDER — SENNOSIDES 8.6 MG
17.2 TABLET ORAL
Qty: 30 TABLET | Refills: 0 | Status: SHIPPED | OUTPATIENT
Start: 2022-04-07

## 2022-04-07 RX ORDER — OXYCODONE HYDROCHLORIDE 5 MG/1
5 TABLET ORAL EVERY 4 HOURS PRN
Qty: 20 TABLET | Refills: 0 | Status: SHIPPED | OUTPATIENT
Start: 2022-04-07

## 2022-04-07 RX ORDER — IBUPROFEN 600 MG/1
600 TABLET ORAL ONCE
Status: COMPLETED | OUTPATIENT
Start: 2022-04-07 | End: 2022-04-07

## 2022-04-07 RX ORDER — DOCUSATE SODIUM 100 MG/1
100 CAPSULE, LIQUID FILLED ORAL EVERY 12 HOURS
Qty: 60 CAPSULE | Refills: 0 | Status: SHIPPED | OUTPATIENT
Start: 2022-04-07

## 2022-04-07 RX ADMIN — IBUPROFEN 600 MG: 600 TABLET ORAL at 18:51

## 2022-04-07 NOTE — DISCHARGE INSTRUCTIONS
You can alternate acetaminophen 650mg and ibuprofen 400mg every three hours as needed for pain  If you are still having significant pain despite this regimen, you can use the oxycodone 5mg every 4 hours as needed for more severe pain  The oxycodone will cause constipation so be sure to start a stool softener / laxative regimen immediately  Additionally, this medication will make you sleepy, so do not drive or operate any machinery  Use the incentive spirometer as follows:  5-10 deep breaths (have it rise to 1750) every 2-3 hours while awake until you are able to resume your regular activities  Your CT scan showed the following:   Heterogeneous multinodular thyroid gland  There are partially visualized bilateral pleural effusions  Mild multilevel cervical degenerative changes are noted without critical central canal stenosis  You should follow up with Endocrinology for further evaluation of your thyroid  You should follow up with Pulmonology for further evaluation of the pleural effusions  You should follow up with Orthopedics for any worsening / continued pain to your shoulders or knee after 2 weeks of conservative treatment (RICE - instructions attached)  We have provided you with the [employer] number - this service should be able to assist you with finding a new primary care provider  Return for any trouble breathing, persistent vomiting, fever more than 101, worsening symptoms or or any concerns

## 2022-04-07 NOTE — ED PROVIDER NOTES
History  Chief Complaint   Patient presents with    Fall     Patient slipped on wet surface and landed on her back  Complains of pain in right shoulder pain, back, chest ,and leg pain  71y F here for evaluation after a fall  Slipped on wet floor at [facility] yesterday, fell onto her right side, hit her head  Denies any LOC  Was unable to get up right away due to pain  Was finally able to get up but didn't come in for evaluation b/c she had her granddaughter with her so she figured she would just go home and take it easy  Today feeling worse  Reports global headache, pounding, nothing makes it better/worse  Denies any blurred / double vision  C/o diffuse neck and back pain (both upper and lower), as well as right shoulder pain, diffuse chest wall pain, right knee pain  Denies any numbness or weakness, no vomiting, no changes in vision or speech  Does report increased chest wall pain - especially the right - with certain movements and deep inspiration  Hasn't taken anything for pain  Not on any OAC or antiplatelet agents  No other complaint      History provided by:  Patient   used: No    Fall  Mechanism of injury: fall    Injury location:  Head/neck, shoulder/arm, torso and leg  Head/neck injury location:  Head  Shoulder/arm injury location:  R shoulder and L shoulder  Torso injury location:  R chest  Leg injury location:  R knee  Incident location: at [facility]  Time since incident:  1 day  Arrived directly from scene: no    Fall:     Fall occurred: slipped on wet floor  Height of fall:  Ground level fall    Impact surface:  Hard floor    Point of impact: right side of body      Entrapped after fall: no    Protective equipment: none    Suspicion of alcohol use: no    Suspicion of drug use: no    Prior to arrival data:     Bystander interventions:  None    Blood loss:  None    Responsiveness at scene:  Alert    Orientation at scene:  Person, place, situation and time    Loss of consciousness: no      Amnesic to event: no      Medications administered:  None  Associated symptoms: back pain, chest pain, headaches and neck pain    Associated symptoms: no abdominal pain, no blindness, no difficulty breathing, no hearing loss, no loss of consciousness, no nausea and no vomiting    Risk factors: diabetes    Risk factors: no anticoagulation therapy        Prior to Admission Medications   Prescriptions Last Dose Informant Patient Reported? Taking? DULoxetine (CYMBALTA) 60 mg delayed release capsule   Yes No   Sig: Take 60 mg by mouth   Insulin Pen Needle 31G X 4 MM MISC   No No   Sig: by Does not apply route see administration instructions Please use with novolog and lantus to administer insulin three times daily before meals and daily at bedtime     NovoLOG 100 UNIT/ML injection   Yes No   Sig: Inject 8 Units under the skin 3 (three) times a day with meals     Trulicity 1 29 RS/6 2UR SOPN   Yes No   Sig: Inject 0 75 mg under the skin once a week On Sunday    acetaminophen (TYLENOL) 325 mg tablet   No No   Sig: Take 2 tablets (650 mg total) by mouth every 6 (six) hours as needed for mild pain, headaches or fever   Patient not taking: Reported on 5/12/2021   atorvastatin (LIPITOR) 40 mg tablet   Yes No   Sig: Take 40 mg by mouth daily    clonazePAM (KlonoPIN) 1 mg tablet   No No   Sig: Take 1 tablet (1 mg total) by mouth 2 (two) times a day for 10 days   gabapentin (NEURONTIN) 300 mg capsule  Self Yes No   Sig: Take 600 mg by mouth 3 (three) times a day     hydrOXYzine HCL (ATARAX) 50 mg tablet  Self Yes No   Sig: Take 50 mg by mouth daily at bedtime   Patient not taking: Reported on 12/14/2021    insulin glargine (Lantus SoloStar) 100 units/mL injection pen   No No   Sig: Inject 22 Units under the skin every 12 (twelve) hours   lamoTRIgine (LaMICtal) 150 MG tablet  Self Yes No   Sig: Take 150 mg by mouth daily   lisinopril (ZESTRIL) 5 mg tablet   No No   Sig: Take 1 tablet (5 mg total) by mouth daily   Patient not taking: Reported on 12/14/2021    lisinopril-hydrochlorothiazide (PRINZIDE,ZESTORETIC) 20-25 MG per tablet   Yes No   Sig: Take 1 tablet by mouth daily   metFORMIN (GLUCOPHAGE) 850 mg tablet   Yes No   Sig: Take 850 mg by mouth 2 (two) times a day   methadone (DOLOPHINE) 5 mg tablet   Yes No   Sig: Take 100 mg by mouth     rOPINIRole (REQUIP) 5 MG tablet   Yes No   Sig: Take 5 mg by mouth daily at bedtime     sertraline (ZOLOFT) 100 mg tablet   Yes No   Sig: Take 100 mg by mouth daily        Facility-Administered Medications: None       Past Medical History:   Diagnosis Date    [facility]     Depression     Diabetes mellitus ([facility] )     History of MRSA infection     Hypertension        Past Surgical History:   Procedure Laterality Date    APPENDECTOMY      INCISION AND DRAINAGE OF WOUND Right 7/1/2018    Procedure: INCISION AND DRAINAGE (I&D) RIGHT FOREARM;  Surgeon: Lorenzo Kilgore MD;  [facility];  Service: Orthopedics    [place] AMPUTATION FOOT,TRANSMETATARSAL Right 4/13/2021    Procedure: AMPUTATION TRANSMETATARSAL (TMA);  Surgeon: Carson Painting DPM;  [facility];  Service: Podiatry    [place] SPLIT [address] <100 SQCM Right 10/17/2018    Procedure: RIGHT ARM SKIN GRAFT SPLIT THICKNESS (STSG);   Surgeon: Ramón Hogan MD;  [facility];  Service: Plastics    [place] [address] <100 SQCM Right 9/12/2018    Procedure: Bonnye Ripper;  Surgeon: Ramón Hogan MD;  [facility];  Service: Plastics    TOE AMPUTATION Right 12/10/2019    Procedure: AMPUTATION TOE;  Surgeon: Carson Painting DPM;  [facility];  Service: Podiatry    TOE AMPUTATION Right 5/29/2020    Procedure: AMPUTATION TOE, 5TH TOE;  Surgeon: Abhishek Saenz DPM;  [facility];  Service: Podiatry    TONSILLECTOMY      VAC DRESSING APPLICATION Right 81/70/6592    Procedure: Sanjuana Cole;  Surgeon: Maite Inman Dylon Chaudhary MD;  [facility];  Service: Plastics    WOUND DEBRIDEMENT Right 7/7/2018    Procedure: DEBRIDEMENT UPPER EXTREMITY ([address]) W/ APPLICATION OF WOUND VAC;  Surgeon: Margarita Maharaj MD;  [facility];  Service: Orthopedics    WOUND DEBRIDEMENT Right 7/11/2018    Procedure: DEBRIDEMENT UPPER EXTREMITY WITH WOUND VAC EXCHANGE;  Surgeon: Moon Norris DO;  [facility];  Service: Orthopedics    WOUND DEBRIDEMENT Right 9/12/2018    Procedure: DEBRIDEMENT  [facility] OUT) FOREARM with Vac dressing change;  Surgeon: Eloina Luna MD;  [facility];  Service: Plastics    WOUND DEBRIDEMENT Right 5/29/2020    Procedure: Complex Irrigation and DEBRIDEMENT WOUND ([address] OUT), ANKLE;  Surgeon: Khris Langford DPM;  [facility];  Service: Podiatry       Family History   Problem Relation Age of Onset    Hypertension Mother         heart attack    Dementia Father         heart atttack/hypertention     I have reviewed and agree with the history as documented  E-Cigarette/Vaping    E-Cigarette Use Never User      E-Cigarette/Vaping Substances     Social History     Tobacco Use    Smoking status: Never Smoker    Smokeless tobacco: Never Used   Vaping Use    Vaping Use: Never used   Substance Use Topics    Alcohol use: Never     Alcohol/week: 0 0 standard drinks    Drug use: Never     Comment: on methadone, Hx herion addiction       Review of Systems   HENT: Negative for hearing loss  Eyes: Negative for blindness  Cardiovascular: Positive for chest pain  Gastrointestinal: Negative for abdominal pain, nausea and vomiting  Musculoskeletal: Positive for back pain and neck pain  Neurological: Positive for headaches  Negative for loss of consciousness  All other systems reviewed and are negative  Physical Exam  Physical Exam  Vitals and nursing note reviewed  Constitutional:       Appearance: Normal appearance     HENT:      Nose: Nose normal       Mouth/Throat:      Mouth: Mucous membranes are moist    Eyes:      Conjunctiva/sclera: Conjunctivae normal       Pupils: Pupils are equal, round, and reactive to light  Cardiovascular:      Rate and Rhythm: Normal rate and regular rhythm  Heart sounds: No murmur heard  Pulmonary:      Effort: Pulmonary effort is normal       Breath sounds: Normal breath sounds  Comments: Some splinting with deep inspiration  Chest:      Chest wall: Tenderness (diffuse w/ increased ttp right lateral/upper ribs with no crepitus or step off appreciated) present  Abdominal:      Palpations: Abdomen is soft  Tenderness: There is no abdominal tenderness  Musculoskeletal:         General: Tenderness present  Right shoulder: Tenderness and bony tenderness present  No swelling, deformity or laceration  Normal range of motion  Normal strength  Normal pulse  Left shoulder: Tenderness and bony tenderness present  No swelling, deformity or laceration  Normal range of motion  Normal strength  Normal pulse  Right wrist: No tenderness  Normal pulse  Left wrist: No tenderness  Normal pulse  Cervical back: Tenderness and bony tenderness present  Spinous process tenderness (diffuse  no crepitus or step off appreciated) and muscular tenderness present  Thoracic back: Tenderness and bony tenderness present  Lumbar back: Tenderness and bony tenderness present  Back:       Right hip: Normal       Left hip: Normal       Right knee: Swelling, ecchymosis and bony tenderness present  No effusion or crepitus  Decreased range of motion  Tenderness present over the medial joint line, lateral joint line and patellar tendon  Normal pulse  Left knee: No swelling, effusion, ecchymosis, bony tenderness or crepitus  Normal range of motion  Tenderness present  No medial joint line, lateral joint line or patellar tendon tenderness  Normal pulse        Right ankle: Normal       Left ankle: Normal       Comments: Pelvis stable  Skin:     General: Skin is warm  Neurological:      General: No focal deficit present  Mental Status: She is alert and oriented to person, place, and time  Psychiatric:         Mood and Affect: Mood normal          Vital Signs  ED Triage Vitals   Temperature Pulse Respirations Blood Pressure SpO2   04/07/22 1504 04/07/22 1504 04/07/22 1504 04/07/22 1504 04/07/22 1504   98 °F (36 7 °C) 82 16 (!) 197/81 98 %      Temp Source Heart Rate Source Patient Position - Orthostatic VS BP Location FiO2 (%)   04/07/22 1504 04/07/22 1714 04/07/22 1714 04/07/22 1714 --   Oral Monitor Lying Right arm       Pain Score       04/07/22 1504       7           Vitals:    04/07/22 1504 04/07/22 1714   BP: (!) 197/81 (!) 175/87   Pulse: 82 76   Patient Position - Orthostatic VS:  Lying         Visual Acuity      ED Medications  Medications   ibuprofen (MOTRIN) tablet 600 mg (600 mg Oral Given 4/7/22 1851)       Diagnostic Studies  Results Reviewed     None                 CT head without contrast   ED Interpretation by Cristóbal Gipson DO (04/07 1912)   No acute findings      Final Result by Donal Trevizo MD (04/07 1857)      No acute intracranial abnormality  Workstation performed: [id number]         CT spine cervical without contrast   ED Interpretation by Cristóbal Gipson DO (04/07 1912)   Again noted is a heterogeneous multinodular thyroid gland also described on a prior CT neck dated April 13, 2021    There are partially visualized bilateral pleural effusions    Mild multilevel cervical degenerative changes are noted without critical central canal stenosis          Final Result by Donal Trevizo MD (04/07 1902)      No cervical spine fracture or traumatic malalignment  Partially visualized bilateral pleural effusions               Workstation performed: [id number]         XR ribs bilateral 4+ vw w pa chest   ED Interpretation by Cristóbal Gipson  (04/07 1910)   Right sided rib fractures ? #2, +#3, +#4  No other acute findings      XR shoulder 2+ views LEFT   ED Interpretation by Matt Evans DO (04/07 1911)   No acute findings      XR lumbar spine 2 or 3 views   ED Interpretation by Matt Evans DO (04/07 1911)   No acute findings      XR shoulder 2+ views RIGHT   ED Interpretation by Matt Evans DO (04/07 1911)   No acute findings      XR knee 4+ vw right injury   ED Interpretation by Matt Evans DO (04/07 1911)   No acute findings                 Procedures  Procedures         ED Course                                             MDM  Number of Diagnoses or Management Options  Cervical strain, acute: new and requires workup  CHI (closed head injury): new and requires workup  Closed fracture of two ribs of right side: new and requires workup  Contusion of right shoulder: new and requires workup  Fall from slip, trip, or stumble: new and requires workup  Low back strain: new and requires workup  Right knee sprain: new and requires workup  Upper back strain: new and requires workup     Amount and/or Complexity of Data Reviewed  Tests in the radiology section of CPT®: ordered and reviewed  Independent visualization of images, tracings, or specimens: yes        Disposition  Final diagnoses:   CHI (closed head injury)   Cervical strain, acute   Closed fracture of two ribs of right side   Right knee sprain   Contusion of right shoulder   Upper back strain   Low back strain   Fall from slip, trip, or stumble     Time reflects when diagnosis was documented in both MDM as applicable and the Disposition within this note     Time User Action Codes Description Comment    4/7/2022  7:13 PM Humble Cosme Add [S09 90XA] CHI (closed head injury)     4/7/2022  7:13 PM Reta Baugh Rose Hui  1XXA] Cervical strain, acute     4/7/2022  7:14 PM Reta Baugh [S22 41XA] Closed fracture of two ribs of right side     4/7/2022  7:15 PM Nika Pouch Add Foster Bauman Right knee sprain     4/7/2022  7:15 PM Reta Baugh Add [S40 011A] Contusion of right shoulder     4/7/2022  7:15 PM Reta Baugh Add [Q11 823I] Upper back strain     4/7/2022  7:15 PM Reta Baugh Add [S39 012A] Low back strain     4/7/2022  7:16 PM Anastasia Baugh Add [W01  0XXA] Fall from slip, trip, or stumble       ED Disposition     ED Disposition Condition Date/Time Comment    Discharge Stable Thu Apr 7, 2022  7:13 PM Charbel Ferguson discharge to home/self care              Follow-up Information     Follow up With Specialties Details Why Contact Info Additional Information    Infolink  Call  for assistance finding a primary care provider Gustavo Hermosillo Pulmonology Schedule an appointment as soon as possible for a visit  for further evaluation and treatment of the pleural effusions [address] [facility]  [place] [address] [phone]  [id number] [place], [place], [address] [address] [facility] Endocrinology, [address]  (EDM) Endocrinology Schedule an appointment as soon as possible for a visit  for further evaluation and treatment of the CT findings regarding your Thyroid [address] [facility] [address] [phone] [facility] ([facility]), [address], [address] [phone]    [address] Orthopedic Surgery Schedule an appointment as soon as possible for a visit in 2 weeks for further evaluation and treatment, If symptoms worsen or if no improvement (shoulders and knee) [address] [facility]  [place] [address] [phone]  [address] [facility], [address] [facility], [place] [address], [facility], [place], [address]   [phone]          Discharge Medication List as of 4/7/2022  7:56 PM      START taking these medications    Details   docusate sodium (COLACE) 100 mg capsule Take 1 capsule (100 mg total) by mouth every 12 (twelve) hours, Starting Thu 4/7/2022, Normal      oxyCODONE (Roxicodone) 5 immediate release tablet Take 1 tablet (5 mg total) by mouth every 4 (four) hours as needed for severe pain Max Daily Amount: 30 mg, Starting Thu 4/7/2022, Normal      senna (SENOKOT) 8 6 mg Take 2 tablets (17 2 mg total) by mouth daily at bedtime as needed for constipation, Starting Thu 4/7/2022, Normal         CONTINUE these medications which have NOT CHANGED    Details   acetaminophen (TYLENOL) 325 mg tablet Take 2 tablets (650 mg total) by mouth every 6 (six) hours as needed for mild pain, headaches or fever, Starting Sat 4/17/2021, No Print      atorvastatin (LIPITOR) 40 mg tablet Take 40 mg by mouth daily , Starting Tue 10/30/2018, Until Tue 12/14/2021, Historical Med      clonazePAM (KlonoPIN) 1 mg tablet Take 1 tablet (1 mg total) by mouth 2 (two) times a day for 10 days, Starting Sat 4/17/2021, Until Tue 12/14/2021, Print      DULoxetine (CYMBALTA) 60 mg delayed release capsule Take 60 mg by mouth, Starting Wed 12/26/2018, Until Tue 12/14/2021 at 2359, Historical Med      gabapentin (NEURONTIN) 300 mg capsule Take 600 mg by mouth 3 (three) times a day  , Historical Med      hydrOXYzine HCL (ATARAX) 50 mg tablet Take 50 mg by mouth daily at bedtime, Historical Med      insulin glargine (Lantus SoloStar) 100 units/mL injection pen Inject 22 Units under the skin every 12 (twelve) hours, Starting Wed 6/3/2020, Until Tue 12/14/2021, Print      Insulin Pen Needle 31G X 4 MM MISC by Does not apply route see administration instructions Please use with novolog and lantus to administer insulin three times daily before meals and daily at bedtime  , Starting Thu 12/12/2019, Print      lamoTRIgine (LaMICtal) 150 MG tablet Take 150 mg by mouth daily, Historical Med      lisinopril (ZESTRIL) 5 mg tablet Take 1 tablet (5 mg total) by mouth daily, Starting Thu 12/12/2019, Print      lisinopril-hydrochlorothiazide (PRINZIDE,ZESTORETIC) 20-25 MG per tablet Take 1 tablet by mouth daily, Historical Med      metFORMIN (GLUCOPHAGE) 850 mg tablet Take 850 mg by mouth 2 (two) times a day, Starting Wed 3/10/2021, Historical Med      methadone (DOLOPHINE) 5 mg tablet Take 100 mg by mouth  , Historical Med      NovoLOG 100 UNIT/ML injection Inject 8 Units under the skin 3 (three) times a day with meals  , Starting Tue 3/9/2021, Historical Med      rOPINIRole (REQUIP) 5 MG tablet Take 5 mg by mouth daily at bedtime  , Starting Mon 4/12/2021, Historical Med      sertraline (ZOLOFT) 100 mg tablet Take 100 mg by mouth daily  , Starting Wed 3/10/2021, Historical Med      Trulicity 9 27 XN/1 9CX SOPN Inject 0 75 mg under the skin once a week On Sunday , Starting Tue 5/11/2021, Historical Med             No discharge procedures on file      PDMP Review       Value Time User    PDMP Reviewed  Yes 4/13/2021  5:14 PM Anel Samaniego MD          ED Provider  Electronically Signed by           Raul Messina DO  04/07/22 7031

## 2022-04-28 ENCOUNTER — TELEPHONE (OUTPATIENT)
Dept: PULMONOLOGY | Facility: CLINIC | Age: 67
End: 2022-04-28

## 2023-06-12 NOTE — ANESTHESIA PREPROCEDURE EVALUATION
Review of Systems/Medical History  Patient summary reviewed  Chart reviewed  No history of anesthetic complications     Cardiovascular  Hyperlipidemia, Hypertension poorly controlled,    Pulmonary  Negative pulmonary ROS        GI/Hepatic  Negative GI/hepatic ROS          Negative  ROS        Endo/Other  Diabetes poorly controlled type 1 Insulin, History of thyroid disease , hypothyroidism,   Comment: HgbA1C  13 4     GYN       Hematology  Negative hematology ROS Anemia ,     Musculoskeletal    Comment: Right arm cellulitis      Neurology      Comment: On methadone therapy  Psychology   Anxiety, Depression , bipolar disorder and being treated for depression,              Physical Exam    Airway    Mallampati score: I  TM Distance: <3 FB       Dental   upper dentures,     Cardiovascular  Rhythm: regular, Rate: normal, Cardiovascular exam normal    Pulmonary  Pulmonary exam normal     Other Findings        Anesthesia Plan  ASA Score- 3     Anesthesia Type- general with ASA Monitors  Additional Monitors:   Airway Plan: LMA  Comment: Hgb trending down 6 3 today     IM wrote transfusion orders prior to OR today   Plan Factors- Patient instructed to abstain from smoking on day of procedure  Patient did not smoke on day of surgery  Induction- intravenous  Postoperative Plan- Plan for postoperative opioid use  Informed Consent- Anesthetic plan and risks discussed with patient 
[Follow-Up: _____] : a [unfilled] follow-up visit

## 2024-04-20 ENCOUNTER — APPOINTMENT (INPATIENT)
Dept: CT IMAGING | Facility: HOSPITAL | Age: 69
DRG: 481 | End: 2024-04-20
Payer: COMMERCIAL

## 2024-04-20 ENCOUNTER — ANESTHESIA EVENT (INPATIENT)
Dept: PERIOP | Facility: HOSPITAL | Age: 69
DRG: 481 | End: 2024-04-20
Payer: COMMERCIAL

## 2024-04-20 ENCOUNTER — HOSPITAL ENCOUNTER (INPATIENT)
Facility: HOSPITAL | Age: 69
LOS: 5 days | DRG: 481 | End: 2024-04-25
Attending: EMERGENCY MEDICINE | Admitting: HOSPITALIST
Payer: COMMERCIAL

## 2024-04-20 ENCOUNTER — APPOINTMENT (EMERGENCY)
Dept: RADIOLOGY | Facility: HOSPITAL | Age: 69
DRG: 481 | End: 2024-04-20
Payer: COMMERCIAL

## 2024-04-20 ENCOUNTER — APPOINTMENT (INPATIENT)
Dept: RADIOLOGY | Facility: HOSPITAL | Age: 69
DRG: 481 | End: 2024-04-20
Payer: COMMERCIAL

## 2024-04-20 DIAGNOSIS — R55 NEAR SYNCOPE: ICD-10-CM

## 2024-04-20 DIAGNOSIS — S72.002A CLOSED LEFT HIP FRACTURE (HCC): Primary | ICD-10-CM

## 2024-04-20 DIAGNOSIS — E11.9 DM (DIABETES MELLITUS), TYPE 2 (HCC): ICD-10-CM

## 2024-04-20 DIAGNOSIS — R42 DIZZINESS: ICD-10-CM

## 2024-04-20 DIAGNOSIS — S72.002D CLOSED FRACTURE OF LEFT HIP WITH ROUTINE HEALING: ICD-10-CM

## 2024-04-20 DIAGNOSIS — S22.41XA: ICD-10-CM

## 2024-04-20 DIAGNOSIS — N17.9 AKI (ACUTE KIDNEY INJURY) (HCC): ICD-10-CM

## 2024-04-20 DIAGNOSIS — D62 ACUTE BLOOD LOSS ANEMIA: ICD-10-CM

## 2024-04-20 PROBLEM — Z01.810 PREOP CARDIOVASCULAR EXAM: Status: ACTIVE | Noted: 2024-04-20

## 2024-04-20 LAB
ANION GAP SERPL CALCULATED.3IONS-SCNC: 7 MMOL/L (ref 4–13)
BASOPHILS # BLD AUTO: 0.04 THOUSANDS/ÂΜL (ref 0–0.1)
BASOPHILS NFR BLD AUTO: 1 % (ref 0–1)
BUN SERPL-MCNC: 54 MG/DL (ref 5–25)
CALCIUM SERPL-MCNC: 10 MG/DL (ref 8.4–10.2)
CARDIAC TROPONIN I PNL SERPL HS: 4 NG/L
CHLORIDE SERPL-SCNC: 105 MMOL/L (ref 96–108)
CO2 SERPL-SCNC: 24 MMOL/L (ref 21–32)
CREAT SERPL-MCNC: 1.95 MG/DL (ref 0.6–1.3)
EOSINOPHIL # BLD AUTO: 0.11 THOUSAND/ÂΜL (ref 0–0.61)
EOSINOPHIL NFR BLD AUTO: 1 % (ref 0–6)
ERYTHROCYTE [DISTWIDTH] IN BLOOD BY AUTOMATED COUNT: 15.4 % (ref 11.6–15.1)
GFR SERPL CREATININE-BSD FRML MDRD: 26 ML/MIN/1.73SQ M
GLUCOSE SERPL-MCNC: 140 MG/DL (ref 65–140)
GLUCOSE SERPL-MCNC: 144 MG/DL (ref 65–140)
HCT VFR BLD AUTO: 31.5 % (ref 34.8–46.1)
HGB BLD-MCNC: 10.3 G/DL (ref 11.5–15.4)
IMM GRANULOCYTES # BLD AUTO: 0.03 THOUSAND/UL (ref 0–0.2)
IMM GRANULOCYTES NFR BLD AUTO: 0 % (ref 0–2)
LYMPHOCYTES # BLD AUTO: 3.51 THOUSANDS/ÂΜL (ref 0.6–4.47)
LYMPHOCYTES NFR BLD AUTO: 42 % (ref 14–44)
MCH RBC QN AUTO: 28.4 PG (ref 26.8–34.3)
MCHC RBC AUTO-ENTMCNC: 32.7 G/DL (ref 31.4–37.4)
MCV RBC AUTO: 87 FL (ref 82–98)
MONOCYTES # BLD AUTO: 0.44 THOUSAND/ÂΜL (ref 0.17–1.22)
MONOCYTES NFR BLD AUTO: 5 % (ref 4–12)
NEUTROPHILS # BLD AUTO: 4.22 THOUSANDS/ÂΜL (ref 1.85–7.62)
NEUTS SEG NFR BLD AUTO: 51 % (ref 43–75)
NRBC BLD AUTO-RTO: 0 /100 WBCS
PLATELET # BLD AUTO: 250 THOUSANDS/UL (ref 149–390)
PMV BLD AUTO: 9.5 FL (ref 8.9–12.7)
POTASSIUM SERPL-SCNC: 4.1 MMOL/L (ref 3.5–5.3)
RBC # BLD AUTO: 3.63 MILLION/UL (ref 3.81–5.12)
SODIUM SERPL-SCNC: 136 MMOL/L (ref 135–147)
WBC # BLD AUTO: 8.35 THOUSAND/UL (ref 4.31–10.16)

## 2024-04-20 PROCEDURE — 73700 CT LOWER EXTREMITY W/O DYE: CPT

## 2024-04-20 PROCEDURE — 99223 1ST HOSP IP/OBS HIGH 75: CPT | Performed by: HOSPITALIST

## 2024-04-20 PROCEDURE — 82948 REAGENT STRIP/BLOOD GLUCOSE: CPT

## 2024-04-20 PROCEDURE — 99285 EMERGENCY DEPT VISIT HI MDM: CPT

## 2024-04-20 PROCEDURE — 96374 THER/PROPH/DIAG INJ IV PUSH: CPT

## 2024-04-20 PROCEDURE — 93005 ELECTROCARDIOGRAM TRACING: CPT

## 2024-04-20 PROCEDURE — 73552 X-RAY EXAM OF FEMUR 2/>: CPT

## 2024-04-20 PROCEDURE — 85025 COMPLETE CBC W/AUTO DIFF WBC: CPT | Performed by: EMERGENCY MEDICINE

## 2024-04-20 PROCEDURE — 99285 EMERGENCY DEPT VISIT HI MDM: CPT | Performed by: EMERGENCY MEDICINE

## 2024-04-20 PROCEDURE — 84484 ASSAY OF TROPONIN QUANT: CPT | Performed by: EMERGENCY MEDICINE

## 2024-04-20 PROCEDURE — 99223 1ST HOSP IP/OBS HIGH 75: CPT

## 2024-04-20 PROCEDURE — 71045 X-RAY EXAM CHEST 1 VIEW: CPT

## 2024-04-20 PROCEDURE — 73502 X-RAY EXAM HIP UNI 2-3 VIEWS: CPT

## 2024-04-20 PROCEDURE — 36415 COLL VENOUS BLD VENIPUNCTURE: CPT | Performed by: EMERGENCY MEDICINE

## 2024-04-20 PROCEDURE — 80048 BASIC METABOLIC PNL TOTAL CA: CPT | Performed by: EMERGENCY MEDICINE

## 2024-04-20 RX ORDER — DULOXETIN HYDROCHLORIDE 30 MG/1
60 CAPSULE, DELAYED RELEASE ORAL DAILY
Status: DISCONTINUED | OUTPATIENT
Start: 2024-04-20 | End: 2024-04-25 | Stop reason: HOSPADM

## 2024-04-20 RX ORDER — OXYCODONE HYDROCHLORIDE 10 MG/1
10 TABLET ORAL EVERY 4 HOURS PRN
Status: DISCONTINUED | OUTPATIENT
Start: 2024-04-20 | End: 2024-04-25 | Stop reason: HOSPADM

## 2024-04-20 RX ORDER — LAMOTRIGINE 100 MG/1
150 TABLET ORAL DAILY
Status: DISCONTINUED | OUTPATIENT
Start: 2024-04-20 | End: 2024-04-25 | Stop reason: HOSPADM

## 2024-04-20 RX ORDER — HYDROCODONE BITARTRATE AND ACETAMINOPHEN 5; 325 MG/1; MG/1
1 TABLET ORAL EVERY 6 HOURS PRN
Status: DISCONTINUED | OUTPATIENT
Start: 2024-04-20 | End: 2024-04-24

## 2024-04-20 RX ORDER — LISINOPRIL 20 MG/1
20 TABLET ORAL DAILY
Status: DISCONTINUED | OUTPATIENT
Start: 2024-04-20 | End: 2024-04-21

## 2024-04-20 RX ORDER — DOCUSATE SODIUM 100 MG/1
100 CAPSULE, LIQUID FILLED ORAL EVERY 12 HOURS
Status: DISCONTINUED | OUTPATIENT
Start: 2024-04-20 | End: 2024-04-25 | Stop reason: HOSPADM

## 2024-04-20 RX ORDER — INSULIN LISPRO 100 [IU]/ML
8 INJECTION, SOLUTION INTRAVENOUS; SUBCUTANEOUS
Status: DISCONTINUED | OUTPATIENT
Start: 2024-04-20 | End: 2024-04-25 | Stop reason: HOSPADM

## 2024-04-20 RX ORDER — CLONAZEPAM 1 MG/1
1 TABLET ORAL 2 TIMES DAILY
Status: DISCONTINUED | OUTPATIENT
Start: 2024-04-20 | End: 2024-04-25 | Stop reason: HOSPADM

## 2024-04-20 RX ORDER — SERTRALINE HYDROCHLORIDE 100 MG/1
100 TABLET, FILM COATED ORAL DAILY
Status: DISCONTINUED | OUTPATIENT
Start: 2024-04-20 | End: 2024-04-25 | Stop reason: HOSPADM

## 2024-04-20 RX ORDER — INSULIN LISPRO 100 [IU]/ML
1-5 INJECTION, SOLUTION INTRAVENOUS; SUBCUTANEOUS
Status: DISCONTINUED | OUTPATIENT
Start: 2024-04-20 | End: 2024-04-25 | Stop reason: HOSPADM

## 2024-04-20 RX ORDER — FENTANYL CITRATE 50 UG/ML
1 INJECTION, SOLUTION INTRAMUSCULAR; INTRAVENOUS ONCE
Status: COMPLETED | OUTPATIENT
Start: 2024-04-20 | End: 2024-04-20

## 2024-04-20 RX ORDER — SENNOSIDES 8.6 MG
17.2 TABLET ORAL
Status: DISCONTINUED | OUTPATIENT
Start: 2024-04-20 | End: 2024-04-25 | Stop reason: HOSPADM

## 2024-04-20 RX ORDER — ACETAMINOPHEN 325 MG/1
650 TABLET ORAL EVERY 6 HOURS PRN
Status: DISCONTINUED | OUTPATIENT
Start: 2024-04-20 | End: 2024-04-25 | Stop reason: HOSPADM

## 2024-04-20 RX ORDER — ATORVASTATIN CALCIUM 40 MG/1
40 TABLET, FILM COATED ORAL
Status: DISCONTINUED | OUTPATIENT
Start: 2024-04-20 | End: 2024-04-25 | Stop reason: HOSPADM

## 2024-04-20 RX ORDER — INSULIN LISPRO 100 [IU]/ML
1-6 INJECTION, SOLUTION INTRAVENOUS; SUBCUTANEOUS
Status: DISCONTINUED | OUTPATIENT
Start: 2024-04-20 | End: 2024-04-25 | Stop reason: HOSPADM

## 2024-04-20 RX ORDER — HYDROMORPHONE HCL/PF 1 MG/ML
0.2 SYRINGE (ML) INJECTION EVERY 6 HOURS PRN
Status: DISCONTINUED | OUTPATIENT
Start: 2024-04-20 | End: 2024-04-24

## 2024-04-20 RX ORDER — INSULIN GLARGINE 100 [IU]/ML
22 INJECTION, SOLUTION SUBCUTANEOUS
Status: DISCONTINUED | OUTPATIENT
Start: 2024-04-20 | End: 2024-04-25 | Stop reason: HOSPADM

## 2024-04-20 RX ORDER — HYDROCHLOROTHIAZIDE 25 MG/1
25 TABLET ORAL DAILY
Status: DISCONTINUED | OUTPATIENT
Start: 2024-04-20 | End: 2024-04-21

## 2024-04-20 RX ORDER — HYDROMORPHONE HCL/PF 1 MG/ML
0.5 SYRINGE (ML) INJECTION ONCE
Status: COMPLETED | OUTPATIENT
Start: 2024-04-20 | End: 2024-04-20

## 2024-04-20 RX ORDER — GABAPENTIN 300 MG/1
600 CAPSULE ORAL 3 TIMES DAILY
Status: DISCONTINUED | OUTPATIENT
Start: 2024-04-20 | End: 2024-04-21

## 2024-04-20 RX ADMIN — LISINOPRIL 20 MG: 20 TABLET ORAL at 17:22

## 2024-04-20 RX ADMIN — DOCUSATE SODIUM 100 MG: 100 CAPSULE, LIQUID FILLED ORAL at 17:22

## 2024-04-20 RX ADMIN — SODIUM CHLORIDE 1000 ML: 0.9 INJECTION, SOLUTION INTRAVENOUS at 14:33

## 2024-04-20 RX ADMIN — HYDROMORPHONE HYDROCHLORIDE 0.5 MG: 1 INJECTION, SOLUTION INTRAMUSCULAR; INTRAVENOUS; SUBCUTANEOUS at 13:03

## 2024-04-20 RX ADMIN — LAMOTRIGINE 150 MG: 100 TABLET ORAL at 17:22

## 2024-04-20 RX ADMIN — HYDROCHLOROTHIAZIDE 25 MG: 25 TABLET ORAL at 17:22

## 2024-04-20 RX ADMIN — INSULIN GLARGINE 22 UNITS: 100 INJECTION, SOLUTION SUBCUTANEOUS at 21:30

## 2024-04-20 RX ADMIN — ATORVASTATIN CALCIUM 40 MG: 40 TABLET, FILM COATED ORAL at 17:22

## 2024-04-20 RX ADMIN — OXYCODONE HYDROCHLORIDE 10 MG: 10 TABLET ORAL at 17:21

## 2024-04-20 RX ADMIN — GABAPENTIN 600 MG: 300 CAPSULE ORAL at 17:22

## 2024-04-20 RX ADMIN — DULOXETINE HYDROCHLORIDE 60 MG: 30 CAPSULE, DELAYED RELEASE ORAL at 17:22

## 2024-04-20 RX ADMIN — HYDROMORPHONE HYDROCHLORIDE 0.2 MG: 0.5 INJECTION, SOLUTION INTRAMUSCULAR; INTRAVENOUS; SUBCUTANEOUS at 16:14

## 2024-04-20 RX ADMIN — CLONAZEPAM 1 MG: 1 TABLET ORAL at 17:22

## 2024-04-20 RX ADMIN — SERTRALINE HYDROCHLORIDE 100 MG: 100 TABLET ORAL at 17:22

## 2024-04-20 RX ADMIN — INSULIN LISPRO 8 UNITS: 100 INJECTION, SOLUTION INTRAVENOUS; SUBCUTANEOUS at 17:46

## 2024-04-20 NOTE — ANESTHESIA PREPROCEDURE EVALUATION
Procedure:  OPEN REDUCTION W/ INTERNAL FIXATION (ORIF) FEMUR (Left: Leg Upper)    Relevant Problems   ANESTHESIA (within normal limits)      CARDIO   (+) Benign essential hypertension   (+) Hyperlipidemia      ENDO   (+) Hypothyroidism   (+) Type 2 diabetes mellitus with diabetic neuropathy, with long-term current use of insulin (HCC)      GI/HEPATIC   (+) Chronic hepatitis C without hepatic coma (HCC)      HEMATOLOGY   (+) Anemia      NEURO/PSYCH   (+) Depression      Behavioral Health   (+) Bipolar affective disorder (HCC)   (+) IV drug abuse (HCC)   (+) Methadone use   (+) Uncomplicated opioid dependence (HCC)      Orthopedic/Musculoskeletal   (+) Closed fracture of left hip with routine healing   (+) S/P transmetatarsal amputation of foot, right (HCC)             Anesthesia Plan  ASA Score- 3 Emergent    Anesthesia Type-     Plan Factors-    Chart reviewed. EKG reviewed.  Existing labs reviewed. Patient summary reviewed.    Patient is not a current smoker.              Induction-     Postoperative Plan-     Informed Consent-

## 2024-04-20 NOTE — ED PROVIDER NOTES
History  Chief Complaint   Patient presents with    Fall     Patient here via EMS states that she got up from sitting, felt dizzy and fell onto left hip. -LOC -head strike -thinners. Left leg shortened and externally rotated. 50mcg fentanyl given in route.    Hip Pain     68y F here for evaluation after syncopal episode and for left hip pain.  Pt reports she got up from sitting became acutely lightheaded and fainted.  Fell onto her left side and has severe left hip pain.  Denies hitting her head. Denies neck and back pain.   Has severe left hip pain, noted to be externally rotated.  Pt resists any attempts at movement of the left leg reporting severe pain in the hip region w/ some radiation into the thigh. Denies numbness to the lower leg.  Denies other extremity pain.  Not on any antiplatelet agents or OACs. No bleeding disorders      History provided by:  Patient   used: No    Fall  Hip Pain      Prior to Admission Medications   Prescriptions Last Dose Informant Patient Reported? Taking?   DULoxetine (CYMBALTA) 60 mg delayed release capsule   Yes No   Sig: Take 60 mg by mouth   Insulin Pen Needle 31G X 4 MM MISC   No No   Sig: by Does not apply route see administration instructions Please use with novolog and lantus to administer insulin three times daily before meals and daily at bedtime.   NovoLOG 100 UNIT/ML injection Not Taking  Yes No   Sig: Inject 8 Units under the skin 3 (three) times a day with meals     Patient not taking: Reported on 4/20/2024   Trulicity 0.75 MG/0.5ML SOPN Past Week  Yes Yes   Sig: Inject 0.75 mg under the skin once a week On Sunday    acetaminophen (TYLENOL) 325 mg tablet   No No   Sig: Take 2 tablets (650 mg total) by mouth every 6 (six) hours as needed for mild pain, headaches or fever   Patient not taking: Reported on 5/12/2021   atorvastatin (LIPITOR) 40 mg tablet   Yes No   Sig: Take 40 mg by mouth daily    clonazePAM (KlonoPIN) 1 mg tablet   No No   Sig: Take  1 tablet (1 mg total) by mouth 2 (two) times a day for 10 days   docusate sodium (COLACE) 100 mg capsule Not Taking  No No   Sig: Take 1 capsule (100 mg total) by mouth every 12 (twelve) hours   Patient not taking: Reported on 4/20/2024   gabapentin (NEURONTIN) 300 mg capsule Not Taking Self Yes No   Sig: Take 600 mg by mouth 3 (three) times a day     Patient not taking: Reported on 4/20/2024   hydrOXYzine HCL (ATARAX) 50 mg tablet  Self Yes No   Sig: Take 50 mg by mouth daily at bedtime   Patient not taking: Reported on 12/14/2021    insulin glargine (Lantus SoloStar) 100 units/mL injection pen   No No   Sig: Inject 22 Units under the skin every 12 (twelve) hours   lamoTRIgine (LaMICtal) 150 MG tablet 4/19/2024 Self Yes Yes   Sig: Take 150 mg by mouth daily   lisinopril (ZESTRIL) 5 mg tablet   No No   Sig: Take 1 tablet (5 mg total) by mouth daily   Patient not taking: Reported on 12/14/2021    lisinopril-hydrochlorothiazide (PRINZIDE,ZESTORETIC) 20-25 MG per tablet 4/19/2024  Yes Yes   Sig: Take 1 tablet by mouth daily   metFORMIN (GLUCOPHAGE) 850 mg tablet Not Taking  Yes No   Sig: Take 850 mg by mouth 2 (two) times a day   Patient not taking: Reported on 4/20/2024   methadone (DOLOPHINE) 5 mg tablet 4/20/2024  Yes Yes   Sig: Take 100 mg by mouth     oxyCODONE (Roxicodone) 5 immediate release tablet Not Taking  No No   Sig: Take 1 tablet (5 mg total) by mouth every 4 (four) hours as needed for severe pain Max Daily Amount: 30 mg   Patient not taking: Reported on 4/20/2024   rOPINIRole (REQUIP) 5 MG tablet Not Taking  Yes No   Sig: Take 5 mg by mouth daily at bedtime     Patient not taking: Reported on 4/20/2024   senna (SENOKOT) 8.6 mg Not Taking  No No   Sig: Take 2 tablets (17.2 mg total) by mouth daily at bedtime as needed for constipation   Patient not taking: Reported on 4/20/2024   sertraline (ZOLOFT) 100 mg tablet 4/19/2024  Yes Yes   Sig: Take 100 mg by mouth daily        Facility-Administered  Medications: None       Past Medical History:   Diagnosis Date    Bipolar disorder (HCC)     Depression     Diabetes mellitus (HCC)     History of MRSA infection     Hypertension        Past Surgical History:   Procedure Laterality Date    APPENDECTOMY      INCISION AND DRAINAGE OF WOUND Right 7/1/2018    Procedure: INCISION AND DRAINAGE (I&D) RIGHT FOREARM;  Surgeon: Naldo Skinner MD;  [facility];  Service: Orthopedics    IN AMPUTATION FOOT TRANSMETARSAL Right 4/13/2021    Procedure: AMPUTATION TRANSMETATARSAL (TMA);  Surgeon: Noah Hough DPM;  [facility];  Service: Podiatry    IN SPLIT AGRFT T/A/L 1ST 100 CM/&/1% BDY INFT/CHLD Right 10/17/2018    Procedure: RIGHT ARM SKIN GRAFT SPLIT THICKNESS (STSG);  Surgeon: Chetan Fisher MD;  [facility];  Service: Plastics    IN SPLIT AGRFT T/A/L 1ST 100 CM/&/1% BDY INFT/CHLD Right 9/12/2018    Procedure: INTEGRA PLACEMENT;  Surgeon: Chetan Fisher MD;  [facility];  Service: Plastics    TOE AMPUTATION Right 12/10/2019    Procedure: AMPUTATION TOE;  Surgeon: Noah Hough DPM;  [facility];  Service: Podiatry    TOE AMPUTATION Right 5/29/2020    Procedure: AMPUTATION TOE, 5TH TOE;  Surgeon: Grey Myrick DPM;  [facility];  Service: Podiatry    TONSILLECTOMY      VAC DRESSING APPLICATION Right 10/17/2018    Procedure: VAC PLACEMENT ARM;  Surgeon: Chetan Fisher MD;  [facility];  Service: Plastics    WOUND DEBRIDEMENT Right 7/7/2018    Procedure: DEBRIDEMENT UPPER EXTREMITY (WASH OUT) W/ APPLICATION OF WOUND VAC;  Surgeon: Guero Ortiz MD;  [facility];  Service: Orthopedics    WOUND DEBRIDEMENT Right 7/11/2018    Procedure: DEBRIDEMENT UPPER EXTREMITY WITH WOUND VAC EXCHANGE;  Surgeon: Roula Birch DO;  [facility];  Service: Orthopedics    WOUND DEBRIDEMENT Right 9/12/2018    Procedure: DEBRIDEMENT  (WASH OUT) FOREARM with Vac dressing  change;  Surgeon: Chetan Fisher MD;  [facility];  Service: Plastics    WOUND DEBRIDEMENT Right 5/29/2020    Procedure: Complex Irrigation and DEBRIDEMENT WOUND (WASH OUT), ANKLE;  Surgeon: Grey Myrick DPM;  [facility];  Service: Podiatry       Family History   Problem Relation Age of Onset    Hypertension Mother         heart attack    Dementia Father         heart atttack/hypertention     I have reviewed and agree with the history as documented.    E-Cigarette/Vaping    E-Cigarette Use Never User      E-Cigarette/Vaping Substances     Social History     Tobacco Use    Smoking status: Never    Smokeless tobacco: Never   Vaping Use    Vaping status: Never Used   Substance Use Topics    Alcohol use: Never     Alcohol/week: 0.0 standard drinks of alcohol    Drug use: Never     Comment: on methadone, Hx herion addiction       Review of Systems   All other systems reviewed and are negative.      Physical Exam  Physical Exam  Vitals and nursing note reviewed.   Constitutional:       General: She is in acute distress.      Appearance: Normal appearance. She is not ill-appearing, toxic-appearing or diaphoretic.   HENT:      Head: Normocephalic and atraumatic.   Eyes:      Conjunctiva/sclera: Conjunctivae normal.      Pupils: Pupils are equal, round, and reactive to light.   Cardiovascular:      Rate and Rhythm: Normal rate.   Pulmonary:      Effort: Pulmonary effort is normal.   Chest:      Chest wall: No tenderness.   Abdominal:      Palpations: Abdomen is soft.      Tenderness: There is no abdominal tenderness.   Musculoskeletal:      Cervical back: No tenderness.      Left hip: Deformity, tenderness and bony tenderness present. Decreased range of motion.      Left knee: No bony tenderness. No tenderness.   Skin:     Findings: No bruising.   Neurological:      General: No focal deficit present.      Mental Status: She is alert.      Sensory: No sensory deficit.      Motor: No weakness.          Vital Signs  ED Triage Vitals   Temperature Pulse Respirations Blood Pressure SpO2   04/20/24 1304 04/20/24 1254 04/20/24 1254 04/20/24 1254 04/20/24 1254   98.3 °F (36.8 °C) 91 18 125/84 98 %      Temp Source Heart Rate Source Patient Position - Orthostatic VS BP Location FiO2 (%)   04/20/24 1304 04/20/24 1254 04/20/24 1254 04/20/24 1254 --   Oral Monitor Sitting Right arm       Pain Score       04/20/24 1254       10 - Worst Possible Pain           Vitals:    04/20/24 1254 04/20/24 1529 04/20/24 2318 04/21/24 0758   BP: 125/84 158/72 135/61 120/58   Pulse: 91 102 95 101   Patient Position - Orthostatic VS: Sitting Lying Lying Lying         Visual Acuity      ED Medications  Medications   atorvastatin (LIPITOR) tablet 40 mg (40 mg Oral Given 4/20/24 1722)   clonazePAM (KlonoPIN) tablet 1 mg (1 mg Oral Given 4/20/24 1722)   docusate sodium (COLACE) capsule 100 mg (100 mg Oral Not Given 4/21/24 0440)   DULoxetine (CYMBALTA) delayed release capsule 60 mg (60 mg Oral Given 4/20/24 1722)   insulin glargine (LANTUS) subcutaneous injection 22 Units 0.22 mL (22 Units Subcutaneous Given 4/20/24 2130)   lamoTRIgine (LaMICtal) tablet 150 mg (150 mg Oral Given 4/20/24 1722)   insulin lispro (HumALOG/ADMELOG) 100 units/mL subcutaneous injection 8 Units (8 Units Subcutaneous Not Given 4/21/24 0810)   senna (SENOKOT) tablet 17.2 mg (has no administration in time range)   sertraline (ZOLOFT) tablet 100 mg (100 mg Oral Given 4/20/24 1722)   insulin lispro (HumALOG/ADMELOG) 100 units/mL subcutaneous injection 1-6 Units ( Subcutaneous Dose Auto Held 4/26/24 1600)   insulin lispro (HumALOG/ADMELOG) 100 units/mL subcutaneous injection 1-5 Units ( Subcutaneous Dose Auto Held 4/26/24 2200)   acetaminophen (TYLENOL) tablet 650 mg ( Oral MAR Hold 4/21/24 1006)   HYDROcodone-acetaminophen (NORCO) 5-325 mg per tablet 1 tablet ( Oral MAR Hold 4/21/24 1006)   oxyCODONE (ROXICODONE) immediate release tablet 10 mg ( Oral MAR Hold 4/21/24  1006)   HYDROmorphone (DILAUDID) injection 0.2 mg ( Intravenous MAR Hold 4/21/24 1006)   sodium chloride 0.9 % infusion (75 mL/hr Intravenous New Bag 4/21/24 0952)   fentanyl citrate (PF) (FOR EMS ONLY) 100 mcg/2 mL injection 100 mcg (0 mcg Does not apply Given to EMS 4/20/24 1308)   HYDROmorphone (DILAUDID) injection 0.5 mg (0.5 mg Intravenous Given 4/20/24 1303)   sodium chloride 0.9 % bolus 1,000 mL (0 mL Intravenous Stopped 4/20/24 2228)       Diagnostic Studies  Results Reviewed       Procedure Component Value Units Date/Time    Basic metabolic panel [[id number]]  (Abnormal) Collected: 04/21/24 0504    Lab Status: Final result Specimen: Blood from Arm, Left Updated: 04/21/24 0624     Sodium 138 mmol/L      Potassium 5.0 mmol/L      Chloride 108 mmol/L      CO2 23 mmol/L      ANION GAP 7 mmol/L      BUN 49 mg/dL      Creatinine 1.94 mg/dL      Glucose 100 mg/dL      Calcium 9.6 mg/dL      eGFR 26 ml/min/1.73sq m     Narrative:      National Kidney Disease Foundation guidelines for Chronic Kidney Disease (CKD):     Stage 1 with normal or high GFR (GFR > 90 mL/min/1.73 square meters)    Stage 2 Mild CKD (GFR = 60-89 mL/min/1.73 square meters)    Stage 3A Moderate CKD (GFR = 45-59 mL/min/1.73 square meters)    Stage 3B Moderate CKD (GFR = 30-44 mL/min/1.73 square meters)    Stage 4 Severe CKD (GFR = 15-29 mL/min/1.73 square meters)    Stage 5 End Stage CKD (GFR <15 mL/min/1.73 square meters)  Note: GFR calculation is accurate only with a steady state creatinine    Magnesium [[id number]]  (Normal) Collected: 04/21/24 0504    Lab Status: Final result Specimen: Blood from Arm, Left Updated: 04/21/24 0624     Magnesium 1.9 mg/dL     CBC and differential [[id number]]  (Abnormal) Collected: 04/21/24 0504    Lab Status: Final result Specimen: Blood from Arm, Left Updated: 04/21/24 0602     WBC 10.44 Thousand/uL      RBC 3.45 Million/uL      Hemoglobin 9.7 g/dL      Hematocrit 30.2 %      MCV 88 fL      MCH 28.1 pg      MCHC  32.1 g/dL      RDW 15.8 %      MPV 9.9 fL      Platelets 217 Thousands/uL      nRBC 0 /100 WBCs      Segmented % 66 %      Immature Grans % 1 %      Lymphocytes % 25 %      Monocytes % 8 %      Eosinophils Relative 0 %      Basophils Relative 0 %      Absolute Neutrophils 6.83 Thousands/µL      Absolute Immature Grans 0.05 Thousand/uL      Absolute Lymphocytes 2.63 Thousands/µL      Absolute Monocytes 0.88 Thousand/µL      Eosinophils Absolute 0.03 Thousand/µL      Basophils Absolute 0.02 Thousands/µL     HS Troponin I 4hr [[id number]]     Lab Status: No result Specimen: Blood     HS Troponin 0hr (reflex protocol) [[id number]]  (Normal) Collected: 04/20/24 1308    Lab Status: Final result Specimen: Blood from Arm, Right Updated: 04/20/24 1343     hs TnI 0hr 4 ng/L     HS Troponin I 2hr [[id number]]     Lab Status: No result Specimen: Blood     Basic metabolic panel [[id number]]  (Abnormal) Collected: 04/20/24 1308    Lab Status: Final result Specimen: Blood from Arm, Right Updated: 04/20/24 1338     Sodium 136 mmol/L      Potassium 4.1 mmol/L      Chloride 105 mmol/L      CO2 24 mmol/L      ANION GAP 7 mmol/L      BUN 54 mg/dL      Creatinine 1.95 mg/dL      Glucose 140 mg/dL      Calcium 10.0 mg/dL      eGFR 26 ml/min/1.73sq m     Narrative:      National Kidney Disease Foundation guidelines for Chronic Kidney Disease (CKD):     Stage 1 with normal or high GFR (GFR > 90 mL/min/1.73 square meters)    Stage 2 Mild CKD (GFR = 60-89 mL/min/1.73 square meters)    Stage 3A Moderate CKD (GFR = 45-59 mL/min/1.73 square meters)    Stage 3B Moderate CKD (GFR = 30-44 mL/min/1.73 square meters)    Stage 4 Severe CKD (GFR = 15-29 mL/min/1.73 square meters)    Stage 5 End Stage CKD (GFR <15 mL/min/1.73 square meters)  Note: GFR calculation is accurate only with a steady state creatinine    CBC and differential [[id number]]  (Abnormal) Collected: 04/20/24 1308    Lab Status: Final result Specimen: Blood from Arm, Right Updated:  04/20/24 1314     WBC 8.35 Thousand/uL      RBC 3.63 Million/uL      Hemoglobin 10.3 g/dL      Hematocrit 31.5 %      MCV 87 fL      MCH 28.4 pg      MCHC 32.7 g/dL      RDW 15.4 %      MPV 9.5 fL      Platelets 250 Thousands/uL      nRBC 0 /100 WBCs      Segmented % 51 %      Immature Grans % 0 %      Lymphocytes % 42 %      Monocytes % 5 %      Eosinophils Relative 1 %      Basophils Relative 1 %      Absolute Neutrophils 4.22 Thousands/µL      Absolute Immature Grans 0.03 Thousand/uL      Absolute Lymphocytes 3.51 Thousands/µL      Absolute Monocytes 0.44 Thousand/µL      Eosinophils Absolute 0.11 Thousand/µL      Basophils Absolute 0.04 Thousands/µL                    CT lower extremity wo contrast left   Final Result by Cabrera Hernandez MD (04/20 1729)      Comminuted intertrochanteric fracture of the left proximal femur with involvement of the greater and lesser trochanter.         Workstation performed: [id number]         XR femur 2 views LEFT   Final Result by Wade Jackson MD (04/21 0757)      No acute osseous abnormality.      Workstation performed: [id number]         XR hip/pelv 2-3 vws left   ED Interpretation by Reta Baugh DO (04/20 9684)   Abnormal   Xray reviewed and independently interpreted by me: comminuted left hip fracture      Final Result by Wade Jackson MD (04/21 8947)   Comminuted left intertrochanteric hip fracture.         Workstation performed: [id number]         XR chest 1 view   Final Result by Beena Arnold MD (04/20 1710)      No acute cardiopulmonary disease.            Workstation performed: [id number]         FL < 1 hour    (Results Pending)              Procedures  ECG 12 Lead Documentation Only    Date/Time: 4/20/2024 1:05 PM    Performed by: Reta Baugh DO  Authorized by: Reta Baugh DO    Indications / Diagnosis:  Syncope  ECG reviewed by me, the ED Provider: yes    Patient location:  ED  Previous ECG:     Previous ECG:  Compared to current     Similarity:  No change  Interpretation:     Interpretation: non-specific    Rate:     ECG rate:  87    ECG rate assessment: normal    Rhythm:     Rhythm: sinus rhythm    ST segments:     ST segments:  Normal  T waves:     T waves: non-specific             ED Course  ED Course as of 04/21/24 1037   Sat Apr 20, 2024   1319 Hemoglobin(!): 10.3  11.4 two years ago   1346 Creatinine(!): 1.95  1.29 one year ago   1426 TT sent to [facility] for admission   1433 Ortho PA responded, asked for additional imaging (femur and if possible AP hip).  Femur ordered.  Informed that additional hip views limited due to pain   1435 Discussed with [facility].  We had a detailed discussion of the patient's condition and case, including the need for admission.  Accepted to their service.  Bed request / bridging orders placed.        1459 Ortho updated about the completion of the additional films and pt's admission status                               SBIRT 20yo+      Flowsheet Row Most Recent Value   Initial Alcohol Screen: US AUDIT-C     1. How often do you have a drink containing alcohol? 0 Filed at: 04/20/2024 1256   2. How many drinks containing alcohol do you have on a typical day you are drinking?  0 Filed at: 04/20/2024 1256   3a. Male UNDER 65: How often do you have five or more drinks on one occasion? 0 Filed at: 04/20/2024 1256   3b. FEMALE Any Age, or MALE 65+: How often do you have 4 or more drinks on one occassion? 0 Filed at: 04/20/2024 1256   Audit-C Score 0 Filed at: 04/20/2024 1256   CLAY: How many times in the past year have you...    Used an illegal drug or used a prescription medication for non-medical reasons? Never Filed at: 04/20/2024 1256                      Medical Decision Making  Will get EKG to r/o arrhythmia, ischemic changes.  Will get labs to r/o acute life threatening metabolic abnl, cardiac ischemia, significant anemia.  Will get CXR to r/o occult pna, will get hip xray to r/o fracture,     Problems Addressed:  JOYCE  (acute kidney injury) (HCC): acute illness or injury that poses a threat to life or bodily functions  Closed left hip fracture (HCC): acute illness or injury  Near syncope: acute illness or injury     Details: No evidence of acute arrhytmia    Amount and/or Complexity of Data Reviewed  Labs: ordered. Decision-making details documented in ED Course.  Radiology: ordered and independent interpretation performed.    Risk  Prescription drug management.  Decision regarding hospitalization.             Disposition  Final diagnoses:   Closed left hip fracture (HCC)   JOYCE (acute kidney injury) (HCC)   Near syncope     Time reflects when diagnosis was documented in both MDM as applicable and the Disposition within this note       Time User Action Codes Description Comment    4/20/2024  2:32 PM Reta Baugh Add [S72.002A] Closed left hip fracture (HCC)     4/20/2024  2:32 PM Reta Baugh Add [N17.9] JOYCE (acute kidney injury) (HCC)     4/20/2024  2:32 PM Reta Baugh Add [R55] Near syncope           ED Disposition       ED Disposition   Admit    Condition   Stable    Date/Time   Sat Apr 20, 2024 1432    Comment   Case was discussed with  and the patient's admission status was agreed to be  to the service of   .               Follow-up Information    None         Current Discharge Medication List        CONTINUE these medications which have NOT CHANGED    Details   lamoTRIgine (LaMICtal) 150 MG tablet Take 150 mg by mouth daily      lisinopril-hydrochlorothiazide (PRINZIDE,ZESTORETIC) 20-25 MG per tablet Take 1 tablet by mouth daily      methadone (DOLOPHINE) 5 mg tablet Take 100 mg by mouth        sertraline (ZOLOFT) 100 mg tablet Take 100 mg by mouth daily        Trulicity 0.75 MG/0.5ML SOPN Inject 0.75 mg under the skin once a week On Sunday       acetaminophen (TYLENOL) 325 mg tablet Take 2 tablets (650 mg total) by mouth every 6 (six) hours as needed for mild pain, headaches or fever  Refills: 0     Associated Diagnoses: Osteomyelitis of right foot (HCC)      atorvastatin (LIPITOR) 40 mg tablet Take 40 mg by mouth daily       clonazePAM (KlonoPIN) 1 mg tablet Take 1 tablet (1 mg total) by mouth 2 (two) times a day for 10 days  Qty: 10 tablet, Refills: 0    Associated Diagnoses: Anxiety      docusate sodium (COLACE) 100 mg capsule Take 1 capsule (100 mg total) by mouth every 12 (twelve) hours  Qty: 60 capsule, Refills: 0    Associated Diagnoses: Closed fracture of two ribs of right side      DULoxetine (CYMBALTA) 60 mg delayed release capsule Take 60 mg by mouth      gabapentin (NEURONTIN) 300 mg capsule Take 600 mg by mouth 3 (three) times a day        hydrOXYzine HCL (ATARAX) 50 mg tablet Take 50 mg by mouth daily at bedtime      insulin glargine (Lantus SoloStar) 100 units/mL injection pen Inject 22 Units under the skin every 12 (twelve) hours  Qty: 5 pen, Refills: 0    Associated Diagnoses: Type 2 diabetes mellitus, uncontrolled      Insulin Pen Needle 31G X 4 MM MISC by Does not apply route see administration instructions Please use with novolog and lantus to administer insulin three times daily before meals and daily at bedtime.  Qty: 120 each, Refills: 0    Associated Diagnoses: Type 2 diabetes mellitus, uncontrolled      lisinopril (ZESTRIL) 5 mg tablet Take 1 tablet (5 mg total) by mouth daily  Qty: 30 tablet, Refills: 0    Associated Diagnoses: HTN (hypertension)      metFORMIN (GLUCOPHAGE) 850 mg tablet Take 850 mg by mouth 2 (two) times a day      NovoLOG 100 UNIT/ML injection Inject 8 Units under the skin 3 (three) times a day with meals        oxyCODONE (Roxicodone) 5 immediate release tablet Take 1 tablet (5 mg total) by mouth every 4 (four) hours as needed for severe pain Max Daily Amount: 30 mg  Qty: 20 tablet, Refills: 0    Associated Diagnoses: Closed fracture of two ribs of right side      rOPINIRole (REQUIP) 5 MG tablet Take 5 mg by mouth daily at bedtime        senna (SENOKOT) 8.6 mg Take 2  tablets (17.2 mg total) by mouth daily at bedtime as needed for constipation  Qty: 30 tablet, Refills: 0    Associated Diagnoses: Closed fracture of two ribs of right side             No discharge procedures on file.    PDMP Review         Value Time User    PDMP Reviewed  Yes 4/13/2021  5:14 PM Diamante Keita MD            ED Provider  Electronically Signed by             Reta Baugh DO  04/21/24 1037

## 2024-04-20 NOTE — ASSESSMENT & PLAN NOTE
Patient has no history of any heart disease    Normally can walk up a flight of stairs with no chest pain, no shortness of breath, no dyspnea    She can perform greater than 4 METS without chest pain    She does not need any further cardiac workup prior to surgery.  Risk of surgery is due to anesthesia and the procedure.  She is accepting of these risks and wishes to proceed

## 2024-04-20 NOTE — H&P
"[facility]  H&P  Name: Jaclyn Camp 68 y.o. female I MRN: [id number]  Unit/Bed#: E2 -01 I Date of Admission: 4/20/2024   Date of Service: 4/20/2024 I Hospital Day: 0      Assessment/Plan   * Closed fracture of left hip with routine healing  Assessment & Plan  Patient became lightheaded and fell on a hardwood floor.  No loss of consciousness but unfortunately fractured her left hip    Will work on pain control    Consult Ortho for surgery    Keep n.p.o. after midnight in case they can do it tomorrow    Preop cardiovascular exam  Assessment & Plan  Patient has no history of any heart disease    Normally can walk up a flight of stairs with no chest pain, no shortness of breath, no dyspnea    She can perform greater than 4 METS without chest pain    She does not need any further cardiac workup prior to surgery.  Risk of surgery is due to anesthesia and the procedure.  She is accepting of these risks and wishes to proceed    Type 2 diabetes mellitus with diabetic neuropathy, with long-term current use of insulin (LT[facility], located within [facility])  Assessment & Plan  Lab Results   Component Value Date    HGBA1C 11.7 (H) 04/13/2021       No results for input(s): \"POCGLU\" in the last 72 hours.    Blood Sugar Average: Last 72 hrs:  continue lantus, humalog and HISS      Bipolar affective disorder (HCC)  Assessment & Plan  Stable  Continue home meds           Chief Complaint:   Fall with left hip pain      History of Present Illness:    Jaclyn Camp is a 68 y.o. female who presents with fall with left hip pain.  She states she stood up very quickly got a little lightheaded and lost her balance and fell.  No loss of consciousness.  But she fell on her left hip on hardwood floor and unfortunately fractured it.  She is in a lot of pain from it.  She chronically takes methadone but is not helping with the pain.  She does not usually have lightheadedness.  She thinks she just stood up too quickly..      Review of " Systems:    Review of Systems   Constitutional:  Negative for chills and fever.   HENT:  Negative for ear pain and sore throat.    Eyes:  Negative for pain and visual disturbance.   Respiratory:  Negative for cough and shortness of breath.    Cardiovascular:  Negative for chest pain and palpitations.   Gastrointestinal:  Negative for abdominal pain and vomiting.   Genitourinary:  Negative for dysuria and hematuria.   Musculoskeletal:  Negative for arthralgias and back pain.        Left hip pain   Skin:  Negative for color change and rash.   Neurological:  Negative for seizures and syncope.   All other systems reviewed and are negative.        Past Medical and Surgical History:     Past Medical History:   Diagnosis Date    Bipolar disorder (HCC)     Depression     Diabetes mellitus (HCC)     History of MRSA infection     Hypertension        Past Surgical History:   Procedure Laterality Date    APPENDECTOMY      INCISION AND DRAINAGE OF WOUND Right 7/1/2018    Procedure: INCISION AND DRAINAGE (I&D) RIGHT FOREARM;  Surgeon: Naldo Skinner MD;  [facility];  Service: Orthopedics    IA AMPUTATION FOOT TRANSMETARSAL Right 4/13/2021    Procedure: AMPUTATION TRANSMETATARSAL (TMA);  Surgeon: Noah Hough DPM;  [facility];  Service: Podiatry    IA SPLIT AGRFT T/A/L 1ST 100 CM/&/1% BDY INFT/CHLD Right 10/17/2018    Procedure: RIGHT ARM SKIN GRAFT SPLIT THICKNESS (STSG);  Surgeon: Chetan Fisher MD;  [facility];  Service: Plastics    IA SPLIT AGRFT T/A/L 1ST 100 CM/&/1% BDY INFT/CHLD Right 9/12/2018    Procedure: INTEGRA PLACEMENT;  Surgeon: Chetan Fisher MD;  [facility];  Service: Plastics    TOE AMPUTATION Right 12/10/2019    Procedure: AMPUTATION TOE;  Surgeon: Noah Hough DPM;  [facility];  Service: Podiatry    TOE AMPUTATION Right 5/29/2020    Procedure: AMPUTATION TOE, 5TH TOE;  Surgeon: Grey Myrick DPM;  [facility];   Service: Podiatry    TONSILLECTOMY      VAC DRESSING APPLICATION Right 10/17/2018    Procedure: VAC PLACEMENT ARM;  Surgeon: Chetan Fisher MD;  [facility];  Service: Plastics    WOUND DEBRIDEMENT Right 7/7/2018    Procedure: DEBRIDEMENT UPPER EXTREMITY (WASH OUT) W/ APPLICATION OF WOUND VAC;  Surgeon: Guero Ortiz MD;  [facility];  Service: Orthopedics    WOUND DEBRIDEMENT Right 7/11/2018    Procedure: DEBRIDEMENT UPPER EXTREMITY WITH WOUND VAC EXCHANGE;  Surgeon: Roula Birch DO;  [facility];  Service: Orthopedics    WOUND DEBRIDEMENT Right 9/12/2018    Procedure: DEBRIDEMENT  (WASH OUT) FOREARM with Vac dressing change;  Surgeon: Chetan Fisher MD;  [facility];  Service: Plastics    WOUND DEBRIDEMENT Right 5/29/2020    Procedure: Complex Irrigation and DEBRIDEMENT WOUND (WASH OUT), ANKLE;  Surgeon: Grey Myrick DPM;  [facility];  Service: Podiatry         Home Medications:    Prior to Admission medications    Medication Sig Start Date End Date Taking? Authorizing Provider   acetaminophen (TYLENOL) 325 mg tablet Take 2 tablets (650 mg total) by mouth every 6 (six) hours as needed for mild pain, headaches or fever  Patient not taking: Reported on 5/12/2021 4/17/21   Lia Lu DO   atorvastatin (LIPITOR) 40 mg tablet Take 40 mg by mouth daily  10/30/18 12/14/21  Historical Provider, MD   clonazePAM (KlonoPIN) 1 mg tablet Take 1 tablet (1 mg total) by mouth 2 (two) times a day for 10 days 4/17/21 12/14/21  Lia Lu DO   docusate sodium (COLACE) 100 mg capsule Take 1 capsule (100 mg total) by mouth every 12 (twelve) hours 4/7/22   Reta Baugh DO   DULoxetine (CYMBALTA) 60 mg delayed release capsule Take 60 mg by mouth 12/26/18 12/14/21  Historical Provider, MD   gabapentin (NEURONTIN) 300 mg capsule Take 600 mg by mouth 3 (three) times a day      Historical Provider, MD   hydrOXYzine HCL (ATARAX) 50 mg tablet Take 50  mg by mouth daily at bedtime  Patient not taking: Reported on 12/14/2021     Historical Provider, MD   insulin glargine (Lantus SoloStar) 100 units/mL injection pen Inject 22 Units under the skin every 12 (twelve) hours 6/3/20 12/14/21  Tahmina Rivers MD   Insulin Pen Needle 31G X 4 MM MISC by Does not apply route see administration instructions Please use with novolog and lantus to administer insulin three times daily before meals and daily at bedtime. 12/12/19   Parul Shah PA-C   lamoTRIgine (LaMICtal) 150 MG tablet Take 150 mg by mouth daily    Historical Provider, MD   lisinopril (ZESTRIL) 5 mg tablet Take 1 tablet (5 mg total) by mouth daily  Patient not taking: Reported on 12/14/2021 12/12/19   Parul Shah PA-C   lisinopril-hydrochlorothiazide (PRINZIDE,ZESTORETIC) 20-25 MG per tablet Take 1 tablet by mouth daily    Historical Provider, MD   metFORMIN (GLUCOPHAGE) 850 mg tablet Take 850 mg by mouth 2 (two) times a day 3/10/21   Historical Provider, MD   methadone (DOLOPHINE) 5 mg tablet Take 100 mg by mouth      Historical Provider, MD   NovoLOG 100 UNIT/ML injection Inject 8 Units under the skin 3 (three) times a day with meals   3/9/21   Historical Provider, MD   oxyCODONE (Roxicodone) 5 immediate release tablet Take 1 tablet (5 mg total) by mouth every 4 (four) hours as needed for severe pain Max Daily Amount: 30 mg  Patient not taking: Reported on 4/20/2024 4/7/22   Reta Baugh DO   rOPINIRole (REQUIP) 5 MG tablet Take 5 mg by mouth daily at bedtime    Patient not taking: Reported on 4/20/2024 4/12/21   Historical Provider, MD   senna (SENOKOT) 8.6 mg Take 2 tablets (17.2 mg total) by mouth daily at bedtime as needed for constipation 4/7/22   Reta Baugh DO   sertraline (ZOLOFT) 100 mg tablet Take 100 mg by mouth daily   3/10/21   Historical Provider, MD   Trulicity 0.75 MG/0.5ML SOPN Inject 0.75 mg under the skin once a week On Sunday 5/11/21   Historical Provider, MD JOHNSON  have reviewed home medications with patient personally.      Allergies: No Known Allergies      Social History:    Substance Use History:   Social History     Substance and Sexual Activity   Alcohol Use Never    Alcohol/week: 0.0 standard drinks of alcohol     Social History     Tobacco Use   Smoking Status Never   Smokeless Tobacco Never     Social History     Substance and Sexual Activity   Drug Use Never    Comment: on methadone, Hx herion addiction         Family History:    non-contributory      Physical Exam:     Vitals:   Blood Pressure: 125/84 (04/20/24 1254)  Pulse: 91 (04/20/24 1254)  Temperature: 98.3 °F (36.8 °C) (04/20/24 1304)  Temp Source: Oral (04/20/24 1304)  Respirations: 18 (04/20/24 1254)  SpO2: 98 % (04/20/24 1254)    Physical Exam    .    Additional Data:     Lab Results: I have personally reviewed pertinent reports.      Results from last 7 days   Lab Units 04/20/24  1308   WBC Thousand/uL 8.35   HEMOGLOBIN g/dL 10.3*   HEMATOCRIT % 31.5*   PLATELETS Thousands/uL 250   SEGS PCT % 51   LYMPHO PCT % 42   MONO PCT % 5   EOS PCT % 1     Results from last 7 days   Lab Units 04/20/24  1308   POTASSIUM mmol/L 4.1   CHLORIDE mmol/L 105   CO2 mmol/L 24   BUN mg/dL 54*   CREATININE mg/dL 1.95*   CALCIUM mg/dL 10.0                     Imaging: I have personally reviewed pertinent reports.      XR hip/pelv 2-3 vws left   ED Interpretation by Reta Baugh DO (04/20 1454)   Abnormal   Xray reviewed and independently interpreted by me: comminuted left hip fracture      XR chest 1 view    (Results Pending)   XR femur 2 views LEFT    (Results Pending)   CT lower extremity wo contrast left    (Results Pending)           VTE Prophylaxis:  defer to ortho         Anticipated Length of Stay:  Patient will be admitted on an Inpatient basis with an anticipated length of stay of greater than 2 midnights.   Justification for Hospital Stay: Patient has fall with hip fracture.  She needs hip fracture repair.  Length  of stay will be greater than 2 midnights.            ** Please Note: This note has been constructed using a voice recognition system. **

## 2024-04-20 NOTE — CONSULTS
Consult Note - Orthopedics   Jaclyn Camp 68 y.o. female MRN: [id number]  Unit/Bed#: E2 -01 Encounter: [id number]      Assessment & Plan     Closed fracture of the left hip  Discussion had with the patient about the benefits and risk of left hip surgical fixation.  Patient is agreeable to surgery.  Consent obtained at bedside  N.p.o. midnight  CT of left hip to better characterize fracture  Appreciate medical clearance from primary team  Pain management per primary team  Plan for operative management tomorrow with Dr. Murray  She declined left tib/fib x-ray  Orthopedics will follow    Chief Complaint     Left femur fracture    History of the Present Illness     Jaclyn Camp is a 68 y.o. female seen in orthopedic consultation at the request of Jerome Mcintyre DO for evaluation of left femur fracture.  Patient said that today she tripped and fell onto her left hip when she became lightheaded.  Patient endorses extreme pain and numbness and tingling down the leg since the injury.  She endorses the majority the pain diffusely around her hip.  She said she typically ambulated without assistance prior to the injury.  Patient endorses baseline numbness and tingling which went down both legs, more so on the right than the left.  She denies prior hip injuries, hip surgeries, and knee surgeries.  She admits to baseline left shin pain but denies change in this after the fall. She any other new onset pain in other joints.      Physical Exam     /72 (BP Location: Right arm)   Pulse 102   Temp 98.3 °F (36.8 °C) (Oral)   Resp 18   SpO2 99%     Left hip exam:  Inspection reveals shortened and externally rotated left lower extremity  Extreme tenderness to palpation throughout left hip region  Thigh soft and compressible  Motor intact distally  Decreased sensation noted distally  Toes warm perfused    General orthopedic exam:  No tenderness to palpate, bony deformities, or crepitus felt over bilateral  clavicles, bilateral shoulders, bilateral humerus's, bilateral elbows, bilateral forearms, bilateral wrist, and bilateral hands and fingers  No tenderness to palpate, bony deformities, crepitus over right hip, bilateral femurs, bilateral knees, right shin, bilateral ankles, and bilateral feet and toes. TTP across left shin but no obvious bony deformities noted.    Data Review     I have personally reviewed pertinent films in PACS, and my interpretation follows:    Left hip x-rays appears to show subtrochanteric hip fracture but this is difficult to visualize with current films.  CT is currently in the process of being ordered to better characterize.    I have personally reviewed pertinent lab results.  Lab Results   Component Value Date    K 4.1 04/20/2024    K 4.3 07/29/2022     04/20/2024     07/29/2022    CO2 24 04/20/2024    CO2 25 07/29/2022    BUN 54 (H) 04/20/2024    BUN 42 (H) 07/29/2022    CREATININE 1.95 (H) 04/20/2024    CREATININE 1.29 (H) 07/29/2022     Lab Results   Component Value Date    WBC 8.35 04/20/2024    HGB 10.3 (L) 04/20/2024     04/20/2024     Lab Results   Component Value Date    PT 11.9 (L) 07/29/2022    INR 0.9 07/29/2022    PTT 24 10/06/2020       Past Medical History:   Diagnosis Date    Bipolar disorder (HCC)     Depression     Diabetes mellitus (HCC)     History of MRSA infection     Hypertension        Past Surgical History:   Procedure Laterality Date    APPENDECTOMY      INCISION AND DRAINAGE OF WOUND Right 7/1/2018    Procedure: INCISION AND DRAINAGE (I&D) RIGHT FOREARM;  Surgeon: Naldo Skinner MD;  [facility];  Service: Orthopedics    MS AMPUTATION FOOT TRANSMETARSAL Right 4/13/2021    Procedure: AMPUTATION TRANSMETATARSAL (TMA);  Surgeon: Noah Hough DPM;  [facility];  Service: Podiatry    MS SPLIT AGRFT T/A/L 1ST 100 CM/&/1% BDY INFT/CHLD Right 10/17/2018    Procedure: RIGHT ARM SKIN GRAFT SPLIT THICKNESS (STSG);  Surgeon: Chetan DE JESUS  Jesus Fisher MD;  [facility];  Service: Plastics    ME SPLIT AGRFT T/A/L 1ST 100 CM/&/1% BDY INFT/CHLD Right 9/12/2018    Procedure: INTEGRA PLACEMENT;  Surgeon: Chetan Fisher MD;  [facility];  Service: Plastics    TOE AMPUTATION Right 12/10/2019    Procedure: AMPUTATION TOE;  Surgeon: Noah Hough DPM;  [facility];  Service: Podiatry    TOE AMPUTATION Right 5/29/2020    Procedure: AMPUTATION TOE, 5TH TOE;  Surgeon: Grey Myrick DPM;  [facility];  Service: Podiatry    TONSILLECTOMY      VAC DRESSING APPLICATION Right 10/17/2018    Procedure: VAC PLACEMENT ARM;  Surgeon: Chetan Fisher MD;  [facility];  Service: Plastics    WOUND DEBRIDEMENT Right 7/7/2018    Procedure: DEBRIDEMENT UPPER EXTREMITY (WASH OUT) W/ APPLICATION OF WOUND VAC;  Surgeon: Guero Ortiz MD;  [facility];  Service: Orthopedics    WOUND DEBRIDEMENT Right 7/11/2018    Procedure: DEBRIDEMENT UPPER EXTREMITY WITH WOUND VAC EXCHANGE;  Surgeon: Roula Birch DO;  [facility];  Service: Orthopedics    WOUND DEBRIDEMENT Right 9/12/2018    Procedure: DEBRIDEMENT  (WASH OUT) FOREARM with Vac dressing change;  Surgeon: Chetan Fisher MD;  [facility];  Service: Plastics    WOUND DEBRIDEMENT Right 5/29/2020    Procedure: Complex Irrigation and DEBRIDEMENT WOUND (WASH OUT), ANKLE;  Surgeon: Grey Myrick DPM;  [facility];  Service: Podiatry       No Known Allergies    No current facility-administered medications on file prior to encounter.     Current Outpatient Medications on File Prior to Encounter   Medication Sig Dispense Refill    lamoTRIgine (LaMICtal) 150 MG tablet Take 150 mg by mouth daily      lisinopril-hydrochlorothiazide (PRINZIDE,ZESTORETIC) 20-25 MG per tablet Take 1 tablet by mouth daily      methadone (DOLOPHINE) 5 mg tablet Take 100 mg by mouth        sertraline (ZOLOFT) 100 mg tablet Take 100 mg  by mouth daily        Trulicity 0.75 MG/0.5ML SOPN Inject 0.75 mg under the skin once a week On Sunday       acetaminophen (TYLENOL) 325 mg tablet Take 2 tablets (650 mg total) by mouth every 6 (six) hours as needed for mild pain, headaches or fever (Patient not taking: Reported on 5/12/2021)  0    atorvastatin (LIPITOR) 40 mg tablet Take 40 mg by mouth daily       clonazePAM (KlonoPIN) 1 mg tablet Take 1 tablet (1 mg total) by mouth 2 (two) times a day for 10 days 10 tablet 0    docusate sodium (COLACE) 100 mg capsule Take 1 capsule (100 mg total) by mouth every 12 (twelve) hours (Patient not taking: Reported on 4/20/2024) 60 capsule 0    DULoxetine (CYMBALTA) 60 mg delayed release capsule Take 60 mg by mouth      gabapentin (NEURONTIN) 300 mg capsule Take 600 mg by mouth 3 (three) times a day   (Patient not taking: Reported on 4/20/2024)      hydrOXYzine HCL (ATARAX) 50 mg tablet Take 50 mg by mouth daily at bedtime (Patient not taking: Reported on 12/14/2021 )      insulin glargine (Lantus SoloStar) 100 units/mL injection pen Inject 22 Units under the skin every 12 (twelve) hours 5 pen 0    Insulin Pen Needle 31G X 4 MM MISC by Does not apply route see administration instructions Please use with novolog and lantus to administer insulin three times daily before meals and daily at bedtime. 120 each 0    lisinopril (ZESTRIL) 5 mg tablet Take 1 tablet (5 mg total) by mouth daily (Patient not taking: Reported on 12/14/2021 ) 30 tablet 0    metFORMIN (GLUCOPHAGE) 850 mg tablet Take 850 mg by mouth 2 (two) times a day (Patient not taking: Reported on 4/20/2024)      NovoLOG 100 UNIT/ML injection Inject 8 Units under the skin 3 (three) times a day with meals   (Patient not taking: Reported on 4/20/2024)      oxyCODONE (Roxicodone) 5 immediate release tablet Take 1 tablet (5 mg total) by mouth every 4 (four) hours as needed for severe pain Max Daily Amount: 30 mg (Patient not taking: Reported on 4/20/2024) 20 tablet 0     rOPINIRole (REQUIP) 5 MG tablet Take 5 mg by mouth daily at bedtime   (Patient not taking: Reported on 4/20/2024)      senna (SENOKOT) 8.6 mg Take 2 tablets (17.2 mg total) by mouth daily at bedtime as needed for constipation (Patient not taking: Reported on 4/20/2024) 30 tablet 0       Social History     Tobacco Use    Smoking status: Never    Smokeless tobacco: Never   Vaping Use    Vaping status: Never Used   Substance Use Topics    Alcohol use: Never     Alcohol/week: 0.0 standard drinks of alcohol    Drug use: Never     Comment: on methadone, Hx herion addiction       Family History   Problem Relation Age of Onset    Hypertension Mother         heart attack    Dementia Father         heart atttack/hypertention       Review of Systems     As stated in the HPI. All other systems reviewed and are negative.

## 2024-04-20 NOTE — ASSESSMENT & PLAN NOTE
Patient became lightheaded and fell on a hardwood floor.  No loss of consciousness but unfortunately fractured her left hip    Will work on pain control    Consult Ortho for surgery    Keep n.p.o. after midnight in case they can do it tomorrow

## 2024-04-20 NOTE — PLAN OF CARE
Problem: Potential for Falls  Goal: Patient will remain free of falls  Description: INTERVENTIONS:  - Educate patient/family on patient safety including physical limitations  - Instruct patient to call for assistance with activity   - Consult OT/PT to assist with strengthening/mobility   - Keep Call bell within reach  - Keep bed low and locked with side rails adjusted as appropriate  - Keep care items and personal belongings within reach  - Initiate and maintain comfort rounds  - Make Fall Risk Sign visible to staff  - Offer Toileting every 2 Hours, in advance of need  - Initiate/Maintain bed alarm  - Obtain necessary fall risk management equipment: bed alarm, call bell,  socks  - Apply yellow socks and bracelet for high fall risk patients  - Consider moving patient to room near nurses station  Outcome: Progressing     Problem: Prexisting or High Potential for Compromised Skin Integrity  Goal: Skin integrity is maintained or improved  Description: INTERVENTIONS:  - Identify patients at risk for skin breakdown  - Assess and monitor skin integrity  - Assess and monitor nutrition and hydration status  - Monitor labs   - Assess for incontinence   - Turn and reposition patient  - Assist with mobility/ambulation  - Relieve pressure over bony prominences  - Avoid friction and shearing  - Provide appropriate hygiene as needed including keeping skin clean and dry  - Evaluate need for skin moisturizer/barrier cream  - Collaborate with interdisciplinary team   - Patient/family teaching  - Consider wound care consult   Outcome: Progressing     Problem: PAIN - ADULT  Goal: Verbalizes/displays adequate comfort level or baseline comfort level  Description: Interventions:  - Encourage patient to monitor pain and request assistance  - Assess pain using appropriate pain scale  - Administer analgesics based on type and severity of pain and evaluate response  - Implement non-pharmacological measures as appropriate and evaluate  response  - Consider cultural and social influences on pain and pain management  - Notify physician/advanced practitioner if interventions unsuccessful or patient reports new pain  Outcome: Progressing     Problem: INFECTION - ADULT  Goal: Absence or prevention of progression during hospitalization  Description: INTERVENTIONS:  - Assess and monitor for signs and symptoms of infection  - Monitor lab/diagnostic results  - Monitor all insertion sites, i.e. indwelling lines, tubes, and drains  - Monitor endotracheal if appropriate and nasal secretions for changes in amount and color  - [place] appropriate cooling/warming therapies per order  - Administer medications as ordered  - Instruct and encourage patient and family to use good hand hygiene technique  - Identify and instruct in appropriate isolation precautions for identified infection/condition  Outcome: Progressing     Problem: SAFETY ADULT  Goal: Maintain or return to baseline ADL function  Description: INTERVENTIONS:  -  Assess patient's ability to carry out ADLs; assess patient's baseline for ADL function and identify physical deficits which impact ability to perform ADLs (bathing, care of mouth/teeth, toileting, grooming, dressing, etc.)  - Assess/evaluate cause of self-care deficits   - Assess range of motion  - Assess patient's mobility; develop plan if impaired  - Assess patient's need for assistive devices and provide as appropriate  - Encourage maximum independence but intervene and supervise when necessary  - Involve family in performance of ADLs  - Assess for home care needs following discharge   - Consider OT consult to assist with ADL evaluation and planning for discharge  - Provide patient education as appropriate  Outcome: Progressing

## 2024-04-20 NOTE — ASSESSMENT & PLAN NOTE
"Lab Results   Component Value Date    HGBA1C 11.7 (H) 04/13/2021       No results for input(s): \"POCGLU\" in the last 72 hours.    Blood Sugar Average: Last 72 hrs:  continue lantus, humalog and HISS    "

## 2024-04-21 ENCOUNTER — ANESTHESIA (INPATIENT)
Dept: PERIOP | Facility: HOSPITAL | Age: 69
DRG: 481 | End: 2024-04-21
Payer: COMMERCIAL

## 2024-04-21 ENCOUNTER — APPOINTMENT (INPATIENT)
Dept: RADIOLOGY | Facility: HOSPITAL | Age: 69
DRG: 481 | End: 2024-04-21
Payer: COMMERCIAL

## 2024-04-21 PROBLEM — I10 HTN (HYPERTENSION): Status: ACTIVE | Noted: 2024-04-21

## 2024-04-21 LAB
ANION GAP SERPL CALCULATED.3IONS-SCNC: 7 MMOL/L (ref 4–13)
ATRIAL RATE: 87 BPM
BACTERIA UR QL AUTO: ABNORMAL /HPF
BASOPHILS # BLD AUTO: 0.02 THOUSANDS/ÂΜL (ref 0–0.1)
BASOPHILS NFR BLD AUTO: 0 % (ref 0–1)
BILIRUB UR QL STRIP: NEGATIVE
BUN SERPL-MCNC: 49 MG/DL (ref 5–25)
CALCIUM SERPL-MCNC: 9.6 MG/DL (ref 8.4–10.2)
CHLORIDE SERPL-SCNC: 108 MMOL/L (ref 96–108)
CLARITY UR: ABNORMAL
CO2 SERPL-SCNC: 23 MMOL/L (ref 21–32)
COLOR UR: YELLOW
CREAT SERPL-MCNC: 1.94 MG/DL (ref 0.6–1.3)
EOSINOPHIL # BLD AUTO: 0.03 THOUSAND/ÂΜL (ref 0–0.61)
EOSINOPHIL NFR BLD AUTO: 0 % (ref 0–6)
ERYTHROCYTE [DISTWIDTH] IN BLOOD BY AUTOMATED COUNT: 15.8 % (ref 11.6–15.1)
GFR SERPL CREATININE-BSD FRML MDRD: 26 ML/MIN/1.73SQ M
GLUCOSE SERPL-MCNC: 100 MG/DL (ref 65–140)
GLUCOSE SERPL-MCNC: 103 MG/DL (ref 65–140)
GLUCOSE SERPL-MCNC: 108 MG/DL (ref 65–140)
GLUCOSE SERPL-MCNC: 140 MG/DL (ref 65–140)
GLUCOSE SERPL-MCNC: 236 MG/DL (ref 65–140)
GLUCOSE UR STRIP-MCNC: NEGATIVE MG/DL
HCT VFR BLD AUTO: 30.2 % (ref 34.8–46.1)
HGB BLD-MCNC: 9.7 G/DL (ref 11.5–15.4)
HGB UR QL STRIP.AUTO: ABNORMAL
HYALINE CASTS #/AREA URNS LPF: ABNORMAL /LPF
IMM GRANULOCYTES # BLD AUTO: 0.05 THOUSAND/UL (ref 0–0.2)
IMM GRANULOCYTES NFR BLD AUTO: 1 % (ref 0–2)
KETONES UR STRIP-MCNC: NEGATIVE MG/DL
LEUKOCYTE ESTERASE UR QL STRIP: ABNORMAL
LYMPHOCYTES # BLD AUTO: 2.63 THOUSANDS/ÂΜL (ref 0.6–4.47)
LYMPHOCYTES NFR BLD AUTO: 25 % (ref 14–44)
MAGNESIUM SERPL-MCNC: 1.9 MG/DL (ref 1.9–2.7)
MCH RBC QN AUTO: 28.1 PG (ref 26.8–34.3)
MCHC RBC AUTO-ENTMCNC: 32.1 G/DL (ref 31.4–37.4)
MCV RBC AUTO: 88 FL (ref 82–98)
MONOCYTES # BLD AUTO: 0.88 THOUSAND/ÂΜL (ref 0.17–1.22)
MONOCYTES NFR BLD AUTO: 8 % (ref 4–12)
NEUTROPHILS # BLD AUTO: 6.83 THOUSANDS/ÂΜL (ref 1.85–7.62)
NEUTS SEG NFR BLD AUTO: 66 % (ref 43–75)
NITRITE UR QL STRIP: NEGATIVE
NON-SQ EPI CELLS URNS QL MICRO: ABNORMAL /HPF
NRBC BLD AUTO-RTO: 0 /100 WBCS
P AXIS: 14 DEGREES
PH UR STRIP.AUTO: 5 [PH]
PLATELET # BLD AUTO: 217 THOUSANDS/UL (ref 149–390)
PMV BLD AUTO: 9.9 FL (ref 8.9–12.7)
POTASSIUM SERPL-SCNC: 5 MMOL/L (ref 3.5–5.3)
PR INTERVAL: 166 MS
PROT UR STRIP-MCNC: ABNORMAL MG/DL
QRS AXIS: 30 DEGREES
QRSD INTERVAL: 88 MS
QT INTERVAL: 358 MS
QTC INTERVAL: 430 MS
RBC # BLD AUTO: 3.45 MILLION/UL (ref 3.81–5.12)
RBC #/AREA URNS AUTO: ABNORMAL /HPF
SODIUM SERPL-SCNC: 138 MMOL/L (ref 135–147)
SP GR UR STRIP.AUTO: 1.01 (ref 1–1.03)
T WAVE AXIS: 88 DEGREES
UROBILINOGEN UR QL STRIP.AUTO: 0.2 E.U./DL
VENTRICULAR RATE: 87 BPM
WBC # BLD AUTO: 10.44 THOUSAND/UL (ref 4.31–10.16)
WBC #/AREA URNS AUTO: ABNORMAL /HPF

## 2024-04-21 PROCEDURE — 99232 SBSQ HOSP IP/OBS MODERATE 35: CPT | Performed by: INTERNAL MEDICINE

## 2024-04-21 PROCEDURE — 80048 BASIC METABOLIC PNL TOTAL CA: CPT | Performed by: HOSPITALIST

## 2024-04-21 PROCEDURE — 82948 REAGENT STRIP/BLOOD GLUCOSE: CPT

## 2024-04-21 PROCEDURE — C1713 ANCHOR/SCREW BN/BN,TIS/BN: HCPCS | Performed by: ORTHOPAEDIC SURGERY

## 2024-04-21 PROCEDURE — 27245 TREAT THIGH FRACTURE: CPT

## 2024-04-21 PROCEDURE — 93010 ELECTROCARDIOGRAM REPORT: CPT | Performed by: INTERNAL MEDICINE

## 2024-04-21 PROCEDURE — 85025 COMPLETE CBC W/AUTO DIFF WBC: CPT | Performed by: HOSPITALIST

## 2024-04-21 PROCEDURE — 81001 URINALYSIS AUTO W/SCOPE: CPT

## 2024-04-21 PROCEDURE — 27245 TREAT THIGH FRACTURE: CPT | Performed by: ORTHOPAEDIC SURGERY

## 2024-04-21 PROCEDURE — 0QS704Z REPOSITION LEFT UPPER FEMUR WITH INTERNAL FIXATION DEVICE, OPEN APPROACH: ICD-10-PCS | Performed by: ORTHOPAEDIC SURGERY

## 2024-04-21 PROCEDURE — 73552 X-RAY EXAM OF FEMUR 2/>: CPT

## 2024-04-21 PROCEDURE — 83735 ASSAY OF MAGNESIUM: CPT | Performed by: HOSPITALIST

## 2024-04-21 DEVICE — TFNA FENESTRATED SCREW 90MM - STERILE
Type: IMPLANTABLE DEVICE | Status: FUNCTIONAL
Brand: TFN-ADVANCE

## 2024-04-21 DEVICE — 11MM/130 DEG TI CANN TFNA 360MM/LEFT - STERILE
Type: IMPLANTABLE DEVICE | Status: FUNCTIONAL
Brand: TFN-ADVANCE

## 2024-04-21 DEVICE — LOCKING SCREW FOR IM NAIL Ø 5MM/ 36MM/ XL25/ STERILE: Type: IMPLANTABLE DEVICE | Status: FUNCTIONAL

## 2024-04-21 DEVICE — LOCKING SCREW FOR IM NAIL Ø 5MM/ 38MM/ XL25/ STERILE: Type: IMPLANTABLE DEVICE | Status: FUNCTIONAL

## 2024-04-21 RX ORDER — FENTANYL CITRATE/PF 50 MCG/ML
50 SYRINGE (ML) INJECTION
Status: DISCONTINUED | OUTPATIENT
Start: 2024-04-21 | End: 2024-04-21 | Stop reason: HOSPADM

## 2024-04-21 RX ORDER — MIDAZOLAM HYDROCHLORIDE 2 MG/2ML
INJECTION, SOLUTION INTRAMUSCULAR; INTRAVENOUS AS NEEDED
Status: DISCONTINUED | OUTPATIENT
Start: 2024-04-21 | End: 2024-04-21

## 2024-04-21 RX ORDER — MAGNESIUM HYDROXIDE 1200 MG/15ML
LIQUID ORAL AS NEEDED
Status: DISCONTINUED | OUTPATIENT
Start: 2024-04-21 | End: 2024-04-21 | Stop reason: HOSPADM

## 2024-04-21 RX ORDER — CEFAZOLIN SODIUM 1 G/50ML
SOLUTION INTRAVENOUS AS NEEDED
Status: DISCONTINUED | OUTPATIENT
Start: 2024-04-21 | End: 2024-04-21

## 2024-04-21 RX ORDER — ACETAMINOPHEN 325 MG/1
325 TABLET ORAL EVERY 6 HOURS PRN
Qty: 30 TABLET | Refills: 0 | Status: SHIPPED | OUTPATIENT
Start: 2024-04-21

## 2024-04-21 RX ORDER — HYDROMORPHONE HCL/PF 1 MG/ML
SYRINGE (ML) INJECTION AS NEEDED
Status: DISCONTINUED | OUTPATIENT
Start: 2024-04-21 | End: 2024-04-21

## 2024-04-21 RX ORDER — LIDOCAINE HYDROCHLORIDE 20 MG/ML
INJECTION, SOLUTION EPIDURAL; INFILTRATION; INTRACAUDAL; PERINEURAL AS NEEDED
Status: DISCONTINUED | OUTPATIENT
Start: 2024-04-21 | End: 2024-04-21

## 2024-04-21 RX ORDER — SODIUM CHLORIDE 9 MG/ML
75 INJECTION, SOLUTION INTRAVENOUS CONTINUOUS
Status: DISCONTINUED | OUTPATIENT
Start: 2024-04-21 | End: 2024-04-24

## 2024-04-21 RX ORDER — ENOXAPARIN SODIUM 100 MG/ML
40 INJECTION SUBCUTANEOUS DAILY
Status: DISCONTINUED | OUTPATIENT
Start: 2024-04-21 | End: 2024-04-21

## 2024-04-21 RX ORDER — HYDROMORPHONE HCL/PF 1 MG/ML
1 SYRINGE (ML) INJECTION ONCE
Status: COMPLETED | OUTPATIENT
Start: 2024-04-21 | End: 2024-04-21

## 2024-04-21 RX ORDER — HYDROMORPHONE HCL/PF 1 MG/ML
0.5 SYRINGE (ML) INJECTION
Status: DISCONTINUED | OUTPATIENT
Start: 2024-04-21 | End: 2024-04-21 | Stop reason: HOSPADM

## 2024-04-21 RX ORDER — ONDANSETRON 2 MG/ML
4 INJECTION INTRAMUSCULAR; INTRAVENOUS ONCE AS NEEDED
Status: DISCONTINUED | OUTPATIENT
Start: 2024-04-21 | End: 2024-04-21 | Stop reason: HOSPADM

## 2024-04-21 RX ORDER — ENOXAPARIN SODIUM 100 MG/ML
40 INJECTION SUBCUTANEOUS DAILY
Qty: 12 ML | Refills: 0 | Status: SHIPPED | OUTPATIENT
Start: 2024-04-21 | End: 2024-05-21

## 2024-04-21 RX ORDER — ROCURONIUM BROMIDE 10 MG/ML
INJECTION, SOLUTION INTRAVENOUS AS NEEDED
Status: DISCONTINUED | OUTPATIENT
Start: 2024-04-21 | End: 2024-04-21

## 2024-04-21 RX ORDER — HEPARIN SODIUM 5000 [USP'U]/ML
5000 INJECTION, SOLUTION INTRAVENOUS; SUBCUTANEOUS EVERY 8 HOURS SCHEDULED
Status: DISCONTINUED | OUTPATIENT
Start: 2024-04-21 | End: 2024-04-25 | Stop reason: HOSPADM

## 2024-04-21 RX ORDER — FENTANYL CITRATE 50 UG/ML
INJECTION, SOLUTION INTRAMUSCULAR; INTRAVENOUS AS NEEDED
Status: DISCONTINUED | OUTPATIENT
Start: 2024-04-21 | End: 2024-04-21

## 2024-04-21 RX ORDER — ONDANSETRON 2 MG/ML
INJECTION INTRAMUSCULAR; INTRAVENOUS AS NEEDED
Status: DISCONTINUED | OUTPATIENT
Start: 2024-04-21 | End: 2024-04-21

## 2024-04-21 RX ORDER — PROPOFOL 10 MG/ML
INJECTION, EMULSION INTRAVENOUS AS NEEDED
Status: DISCONTINUED | OUTPATIENT
Start: 2024-04-21 | End: 2024-04-21

## 2024-04-21 RX ORDER — CEFAZOLIN SODIUM 2 G/50ML
2000 SOLUTION INTRAVENOUS EVERY 8 HOURS
Status: COMPLETED | OUTPATIENT
Start: 2024-04-21 | End: 2024-04-22

## 2024-04-21 RX ORDER — METHADONE HYDROCHLORIDE 10 MG/ML
139 CONCENTRATE ORAL DAILY
Status: DISCONTINUED | OUTPATIENT
Start: 2024-04-21 | End: 2024-04-25 | Stop reason: HOSPADM

## 2024-04-21 RX ADMIN — ONDANSETRON 4 MG: 2 INJECTION INTRAMUSCULAR; INTRAVENOUS at 10:38

## 2024-04-21 RX ADMIN — CEFAZOLIN SODIUM 2000 MG: 2 SOLUTION INTRAVENOUS at 18:24

## 2024-04-21 RX ADMIN — SERTRALINE HYDROCHLORIDE 100 MG: 100 TABLET ORAL at 13:43

## 2024-04-21 RX ADMIN — LAMOTRIGINE 150 MG: 100 TABLET ORAL at 13:44

## 2024-04-21 RX ADMIN — PROPOFOL 150 MG: 10 INJECTION, EMULSION INTRAVENOUS at 10:32

## 2024-04-21 RX ADMIN — FENTANYL CITRATE 50 MCG: 50 INJECTION INTRAMUSCULAR; INTRAVENOUS at 12:35

## 2024-04-21 RX ADMIN — INSULIN LISPRO 3 UNITS: 100 INJECTION, SOLUTION INTRAVENOUS; SUBCUTANEOUS at 17:08

## 2024-04-21 RX ADMIN — ROCURONIUM BROMIDE 50 MG: 10 INJECTION, SOLUTION INTRAVENOUS at 10:32

## 2024-04-21 RX ADMIN — HYDROMORPHONE HYDROCHLORIDE 0.5 MG: 1 INJECTION, SOLUTION INTRAMUSCULAR; INTRAVENOUS; SUBCUTANEOUS at 12:48

## 2024-04-21 RX ADMIN — FENTANYL CITRATE 50 MCG: 50 INJECTION INTRAMUSCULAR; INTRAVENOUS at 10:32

## 2024-04-21 RX ADMIN — INSULIN LISPRO 8 UNITS: 100 INJECTION, SOLUTION INTRAVENOUS; SUBCUTANEOUS at 17:08

## 2024-04-21 RX ADMIN — CLONAZEPAM 1 MG: 1 TABLET ORAL at 13:43

## 2024-04-21 RX ADMIN — HYDROMORPHONE HYDROCHLORIDE 0.5 MG: 1 INJECTION, SOLUTION INTRAMUSCULAR; INTRAVENOUS; SUBCUTANEOUS at 12:58

## 2024-04-21 RX ADMIN — ATORVASTATIN CALCIUM 40 MG: 40 TABLET, FILM COATED ORAL at 17:07

## 2024-04-21 RX ADMIN — SUGAMMADEX 200 MG: 100 INJECTION, SOLUTION INTRAVENOUS at 12:13

## 2024-04-21 RX ADMIN — SODIUM CHLORIDE: 0.9 INJECTION, SOLUTION INTRAVENOUS at 12:29

## 2024-04-21 RX ADMIN — DULOXETINE HYDROCHLORIDE 60 MG: 30 CAPSULE, DELAYED RELEASE ORAL at 13:43

## 2024-04-21 RX ADMIN — HYDROMORPHONE HYDROCHLORIDE 1 MG: 1 INJECTION, SOLUTION INTRAMUSCULAR; INTRAVENOUS; SUBCUTANEOUS at 13:15

## 2024-04-21 RX ADMIN — OXYCODONE HYDROCHLORIDE 10 MG: 10 TABLET ORAL at 22:02

## 2024-04-21 RX ADMIN — LIDOCAINE HYDROCHLORIDE 100 MG: 20 INJECTION, SOLUTION EPIDURAL; INFILTRATION; INTRACAUDAL at 10:32

## 2024-04-21 RX ADMIN — ACETAMINOPHEN 650 MG: 325 TABLET, FILM COATED ORAL at 22:02

## 2024-04-21 RX ADMIN — INSULIN GLARGINE 22 UNITS: 100 INJECTION, SOLUTION SUBCUTANEOUS at 21:58

## 2024-04-21 RX ADMIN — FENTANYL CITRATE 50 MCG: 50 INJECTION INTRAMUSCULAR; INTRAVENOUS at 12:10

## 2024-04-21 RX ADMIN — SODIUM CHLORIDE 75 ML/HR: 0.9 INJECTION, SOLUTION INTRAVENOUS at 09:52

## 2024-04-21 RX ADMIN — TRANEXAMIC ACID 1000 MG: 100 INJECTION, SOLUTION INTRAVENOUS at 11:04

## 2024-04-21 RX ADMIN — HYDROMORPHONE HYDROCHLORIDE 0.5 MG: 1 INJECTION, SOLUTION INTRAMUSCULAR; INTRAVENOUS; SUBCUTANEOUS at 12:21

## 2024-04-21 RX ADMIN — HEPARIN SODIUM 5000 UNITS: 5000 INJECTION INTRAVENOUS; SUBCUTANEOUS at 14:43

## 2024-04-21 RX ADMIN — CEFAZOLIN SODIUM 2000 MG: 1 SOLUTION INTRAVENOUS at 10:49

## 2024-04-21 RX ADMIN — MIDAZOLAM 2 MG: 1 INJECTION INTRAMUSCULAR; INTRAVENOUS at 10:29

## 2024-04-21 RX ADMIN — DOCUSATE SODIUM 100 MG: 100 CAPSULE, LIQUID FILLED ORAL at 14:43

## 2024-04-21 RX ADMIN — FENTANYL CITRATE 50 MCG: 50 INJECTION INTRAMUSCULAR; INTRAVENOUS at 12:40

## 2024-04-21 RX ADMIN — HEPARIN SODIUM 5000 UNITS: 5000 INJECTION INTRAVENOUS; SUBCUTANEOUS at 21:58

## 2024-04-21 RX ADMIN — METHADONE HYDROCHLORIDE 139 MG: 10 CONCENTRATE ORAL at 14:35

## 2024-04-21 NOTE — ASSESSMENT & PLAN NOTE
Maintained on methadone 139 mg daily, dose confirmed with [facility]  Patient will need further prn pain medications given hip fracture

## 2024-04-21 NOTE — OCCUPATIONAL THERAPY NOTE
Occupational Therapy Cancellation        Patient Name: Jaclyn Camp  Today's Date: 4/21/2024 04/21/24 0722   Note Type   Note type Cancelled Session   Cancel Reasons Patient to operating room   Additional Comments OT consult received pt w/ fracture of left hip and to go to OR pallavi follow postop.     Documentation completed by: Dori Liriano MS, OTR/L

## 2024-04-21 NOTE — PLAN OF CARE
Problem: Potential for Falls  Goal: Patient will remain free of falls  Description: INTERVENTIONS:  - Educate patient/family on patient safety including physical limitations  - Instruct patient to call for assistance with activity   - Consult OT/PT to assist with strengthening/mobility   - Keep Call bell within reach  - Keep bed low and locked with side rails adjusted as appropriate  - Keep care items and personal belongings within reach  - Initiate and maintain comfort rounds  - Make Fall Risk Sign visible to staff  - Offer Toileting every 2 Hours, in advance of need  - Initiate/Maintain bed alarm  - Obtain necessary fall risk management equipment: yellow socks  - Apply yellow socks and bracelet for high fall risk patients  - Consider moving patient to room near nurses station  Outcome: Progressing     Problem: Prexisting or High Potential for Compromised Skin Integrity  Goal: Skin integrity is maintained or improved  Description: INTERVENTIONS:  - Identify patients at risk for skin breakdown  - Assess and monitor skin integrity  - Assess and monitor nutrition and hydration status  - Monitor labs   - Assess for incontinence   - Turn and reposition patient  - Assist with mobility/ambulation  - Relieve pressure over bony prominences  - Avoid friction and shearing  - Provide appropriate hygiene as needed including keeping skin clean and dry  - Evaluate need for skin moisturizer/barrier cream  - Collaborate with interdisciplinary team   - Patient/family teaching  - Consider wound care consult   Outcome: Progressing     Problem: PAIN - ADULT  Goal: Verbalizes/displays adequate comfort level or baseline comfort level  Description: Interventions:  - Encourage patient to monitor pain and request assistance  - Assess pain using appropriate pain scale  - Administer analgesics based on type and severity of pain and evaluate response  - Implement non-pharmacological measures as appropriate and evaluate response  - Consider  cultural and social influences on pain and pain management  - Notify physician/advanced practitioner if interventions unsuccessful or patient reports new pain  Outcome: Progressing     Problem: SAFETY ADULT  Goal: Maintain or return to baseline ADL function  Description: INTERVENTIONS:  -  Assess patient's ability to carry out ADLs; assess patient's baseline for ADL function and identify physical deficits which impact ability to perform ADLs (bathing, care of mouth/teeth, toileting, grooming, dressing, etc.)  - Assess/evaluate cause of self-care deficits   - Assess range of motion  - Assess patient's mobility; develop plan if impaired  - Assess patient's need for assistive devices and provide as appropriate  - Encourage maximum independence but intervene and supervise when necessary  - Involve family in performance of ADLs  - Assess for home care needs following discharge   - Consider OT consult to assist with ADL evaluation and planning for discharge  - Provide patient education as appropriate  Outcome: Progressing  Goal: Maintains/Returns to pre admission functional level  Description: INTERVENTIONS:  - Perform AM-PAC 6 Click Basic Mobility/ Daily Activity assessment daily.  - Set and communicate daily mobility goal to care team and patient/family/caregiver.   - Collaborate with rehabilitation services on mobility goals if consulted  - Perform Range of Motion 3 times a day.  - Reposition patient every 2 hours.  - Dangle patient 3 times a day  - Stand patient 3 times a day  - Ambulate patient 3 times a day  - Out of bed to chair 3 times a day   - Out of bed for meals 3 times a day  - Out of bed for toileting  - Record patient progress and toleration of activity level   Outcome: Progressing     Problem: DISCHARGE PLANNING  Goal: Discharge to home or other facility with appropriate resources  Description: INTERVENTIONS:  - Identify barriers to discharge w/patient and caregiver  - Arrange for needed discharge  resources and transportation as appropriate  - Identify discharge learning needs (meds, wound care, etc.)  - Arrange for interpretive services to assist at discharge as needed  - Refer to Case Management Department for coordinating discharge planning if the patient needs post-hospital services based on physician/advanced practitioner order or complex needs related to functional status, cognitive ability, or social support system  Outcome: Progressing     Problem: Knowledge Deficit  Goal: Patient/family/caregiver demonstrates understanding of disease process, treatment plan, medications, and discharge instructions  Description: Complete learning assessment and assess knowledge base.  Interventions:  - Provide teaching at level of understanding  - Provide teaching via preferred learning methods  Outcome: Progressing

## 2024-04-21 NOTE — ASSESSMENT & PLAN NOTE
Patient with acute kidney injury, baseline creatinine 0.8-1.1   postoperatively  [place] component of dehydration as patient was having poor p.o. intake especially fluids in combination of urinary retention  Continue gentle IV fluids  Urinary retention protocol  Hold ACE inhibitor and HCTZ  Monitor renal function

## 2024-04-21 NOTE — ASSESSMENT & PLAN NOTE
Patient became lightheaded and fell on a hardwood floor.  No loss of consciousness but unfortunately fractured her left hip    CT with comminuted intertrochanteric fracture left proximal femur  Surgery on board planning OR today for left hip surgical fixation  N.p.o., continue pain control  DVT prophylaxis, PT/OT

## 2024-04-21 NOTE — ASSESSMENT & PLAN NOTE
Lab Results   Component Value Date    HGBA1C 11.7 (H) 04/13/2021       Recent Labs     04/20/24  2125 04/21/24  0616 04/21/24  1229   POCGLU 144* 108 103       Continue Lantus 22 units twice daily, insulin lispro 8 units 3 times daily with meals  Monitor Accu-Cheks, sliding scale for coverage

## 2024-04-21 NOTE — OP NOTE
OPERATIVE REPORT  PATIENT NAME: Jaclyn Camp  : 1955  MRN: [id number]  Pt Location:  [facility] OR ROOM 02    Surgery Date: 2024    Surgeons and Role:     * Leonardo Murray MD - Primary     * Seferino Stanton PA-C - Assisting     Preop Diagnosis:  Closed left hip fracture (HCC) S72.002A    Post-Op Diagnosis Codes:     * Closed left hip fracture (HCC) [S72.002A]    Procedure(s):  Left - OPEN REDUCTION W/ INTERNAL FIXATION (ORIF) FEMUR    Specimens:  * No specimens in log *    Estimated Blood Loss:   Minimal    Drains:  * No LDAs found *    Anesthesia Type:   General w/ Regional     Operative Indications:  Closed left hip fracture (HCC) S72.002A    Operative Findings:  Left hip IT/subtrochanteric fracture    Complications:   None    Procedure and Technique:  Jaclyn Camp was brought to the operating room on the day of surgery and identified by Anesthesia as the correct patient. After smooth induction of regional anesthesia, the patient was placed supine position on the traction table.Traction was applied on the operative lower extremity, and closed reduction of the intertrochanteric fracture was performed using the fracture table and confirmed on fluoro. The lower extremity was prepped and draped in the usual sterile fashion. A standard operative time-out was performed, confirming the patient and the operative site. The level of the greater trochanter was marked out using fluoroscopic guidance. An incision approximately 5 cm in length 3 fingerbreadths proximal to the level of the greater trochanter in line with the femoral shaft was made. Dissection was carried sharply through skin and fascia. A sharp guidewire was inserted into the incision, and a starting point on the tip of the greater trochanter in line with the femoral shaft was confirmed on AP and lateral fluoro. The guidewire was advanced to past the level of lesser trochanter. The opening reamer was then used and passed down to the level of the  lesser trochanter. Next, the long ball-tipped wire was passed distally in the knee and measured 360 mm length.  Next using sequential reamers 5.5 mm increments the intramedullary canal was reamed up to a 12.5 with good chatter.  A long nail size 360 x 11 was malleted into the fracture, bypassing the fracture. The position of the nail was confirmed on AP and lateral fluoroscopy. The lateral jig for the screw was then assembled. The triple sleeve was inserted through the jig to sol the second incision. Incision was carried sharply through the skin and through the fascia. The triple sleeve was inserted and compressed down to bone. A threaded guidewire was then drilled through the triple sleeve and into the femoral head. The guidewire was confirmed on AP and lateral fluoroscopy to be within 5 mm of the femoral head chondral surface. The middle sleeve of the arm was then taken out and the full length reamer was then used and reamed to 90 for the path of the screw. The screw was then screwed down into position. The screw was locked through the proximal jig. Fluoro of the fracture site confirmed excellent reduction. The jig was then removed. Attention was then turned to the distal interlock screw.  Using perfect circles technique, the 2 distal interlock holes were drilled and measured to be 36 and 38 mm.  Screws were replaced and final fluoroscopic images were taken, confirming acceptable reduction, hardware placement, as well as appropriate hip femoral anteversion. All wounds were copiously irrigated. The incisions were closed with 0-Vicryl for the fascia, 2-0 Vicryl for the subcutaneous layer, followed by staples for the skin. Sterile dressing was placed over all incisions. All needle and lap counts were correct at the end of the procedure. The patient was then safely transferred to the PACU in stable condition.      I was present for the entire procedure.     Patient Disposition:  PACU     SIGNATURE: Leonardo Murray  MD  DATE: April 21, 2024  TIME: 12:16 PM

## 2024-04-21 NOTE — DISCHARGE INSTR - AVS FIRST PAGE
"POSTOPERATIVE INSTRUCTIONS    MEDICATIONS:  Resume all home medications unless otherwise instructed by your surgeon.  Anti-Inflammatory:  Tylenol 325 mg, 1-2 tablets every 6 hours   Take with food.  Stop if you experience nausea, reflux, or stomach pain.  Nausea Medication:  None  Fill prescription ONLY if you expericnce severe nausea.  Blood Clot Prevention: Lovenox 40 mg subcutaneously daily for 30 days  Pump your foot up and down 20 times per hour while you are less mobile.    WOUND CARE:  Keep the dressing clean and dry.  Light drainage may occur the first 2 days postop.  DO NOT REMOVE THE DRESSINGS until you are seen in our office.  Cover the bandages appropriately while washing to keep them from getting wet.  Please call our office ([phone]) if you experience any of the following:  Sudden increase in swelling, redness, or warmth at the surgical site  Excessive incisional drainage that persists beyond the 3rd day after surgery  Oral temperature greater than 101 degrees, not relieved with Tylenol  Shortness of breath, chest pain, nausea, or any other concerning symptoms    SWELLING CONTROL:  Cold Therapy:  Apply ice (20 min on, 20 min off) as often as you feel is necessary.  Elevation:  Elevate the entire leg above heart level as much as possible.  Compression:  Apply ACE wraps or a compression stocking as needed.    RANGE OF MOTION:  You are allowed FULL RANGE OF MOTION as tolerated.    IMMOBILIZATION:  None.  You are allowed full range of motion as tolerated.    ACTIVITY:  BEAR FULL WEIGHT AS TOLERATED on the operative leg.  Use crutches to assist only as needed.  Using Crutches on Stairs:  Going up, lead with your \"good\" (nonoperative) leg.  Going down, lead with your \"bad\" (operative) leg.  Use a hand rail when available.  Quad Sets:  Sit or lie with your knee straight.  Tighten your quadriceps (front thigh) muscle.  Hold for 3 seconds, then relax.  Repeat 20 times per hour while awake.    PHYSICAL " THERAPY:  Begin therapy on THE DAY AFTER SURGERY.  You were given a prescription for therapy at your preoperative office visit.  If you do not have physical therapy scheduled yet, please call our office for assistance.    FOLLOW-UP APPOINTMENT:  10-14 days after surgery with:    Dr. Leonardo Murray MD

## 2024-04-21 NOTE — PHYSICAL THERAPY NOTE
Physical Therapy Cancellation Note             04/21/24 0719   PT Last Visit   PT Visit Date 04/21/24   Note Type   Note type Cancelled Session   Cancel Reasons Patient to operating room   Additional Comments will evaluate post operatively pending ortho recommendations and medical stability.       Tamy Lowe, PT

## 2024-04-21 NOTE — PROGRESS NOTES
[facility]  Progress Note  Name: Jaclyn Camp I  MRN: [id number]  Unit/Bed#: E2 -01 I Date of Admission: 4/20/2024   Date of Service: 4/21/2024 I Hospital Day: 1    Assessment/Plan   * Closed fracture of left hip with routine healing  Assessment & Plan  Patient became lightheaded and fell on a hardwood floor.  No loss of consciousness but unfortunately fractured her left hip    CT with comminuted intertrochanteric fracture left proximal femur  Surgery on board planning OR today for left hip surgical fixation  N.p.o., continue pain control  DVT prophylaxis, PT/OT    HTN (hypertension)  Assessment & Plan  Hold ACE inhibitor and HCTZ given acute kidney injury  Monitor blood pressure closely    JOYCE (acute kidney injury) ([facility])  Assessment & Plan  Patient with acute kidney injury, baseline creatinine 0.8-1.1   postoperatively  [place] component of dehydration as patient was having poor p.o. intake especially fluids in combination of urinary retention  Continue gentle IV fluids  Urinary retention protocol  Hold ACE inhibitor and HCTZ  Monitor renal function    Methadone use  Assessment & Plan  Maintained on methadone 139 mg daily, dose confirmed with [facility]  Patient will need further prn pain medications given hip fracture    Type 2 diabetes mellitus with diabetic neuropathy, with long-term current use of insulin ([facility])  Assessment & Plan  Lab Results   Component Value Date    HGBA1C 11.7 (H) 04/13/2021       Recent Labs     04/20/24  2125 04/21/24  0616 04/21/24  1229   POCGLU 144* 108 103       Continue Lantus 22 units twice daily, insulin lispro 8 units 3 times daily with meals  Monitor Accu-Cheks, sliding scale for coverage      Bipolar affective disorder ([facility])  Assessment & Plan  Continue duloxetine, sertraline, clonazepam             Mobility:  Basic Mobility Inpatient Raw Score: 8  -[facility] Goal: 3: Sit at edge of bed  -HL Achieved: 2: Bed  activities/Dependent transfer  J[facility]-HLM Goal NOT achieved. Continue with multidisciplinary rounding and encourage appropriate mobility to improve upon -HLM goals.    VTE Pharmacologic Prophylaxis:   Pharmacologicheparin     Patient Centered Rounds: I have performed bedside rounds with nursing staff today.    Education and Discussions with Family / Patient: patient    Time Spent for Care:   More than 50% of total time spent on counseling and coordination of care as described above.    Current Length of Stay: 1 day(s)    Current Patient Status: Inpatient   Certification Statement: The patient will continue to require additional inpatient hospital stay due to hip fracture    Discharge Plan / Estimated Discharge Date: TBD    Code Status: Level 1 - Full Code      Subjective:   Patient seen and examined at bedside, denies any chest pain, dyspnea, palpitations    Objective:     Vitals:   Temp (24hrs), Av.1 °F (36.7 °C), Min:97.6 °F (36.4 °C), Max:98.6 °F (37 °C)    Temp:  [97.6 °F (36.4 °C)-98.6 °F (37 °C)] 98.4 °F (36.9 °C)  HR:  [] 101  Resp:  [16-18] 18  BP: (120-165)/(57-83) 130/81  SpO2:  [97 %-99 %] 97 %  There is no height or weight on file to calculate BMI.     Input and Output Summary (last 24 hours):       Intake/Output Summary (Last 24 hours) at 2024 1435  Last data filed at 2024 1422  Gross per 24 hour   Intake 1490 ml   Output 545 ml   Net 945 ml       Physical Exam:    Constitutional: Patient is oriented to person, place and time, no acute distress  HEENT:  Normocephalic, atraumatic  Cardiovascular: Normal S1S2, RRR, No murmurs/rubs/gallops appreciated.  Pulmonary:  Bilateral air entry, No rhonchi/rales/wheezing appreciated  Abdominal: Soft, Bowel sounds present, Non-tender, Non-distended  Extremities:  No cyanosis, clubbing or edema.  Left lower extremity shortened and externally rotated  Neurological:awake , Alert  Skin:  Warm, dry    Additional Data:     Labs:    Results from last 7  days   Lab Units 04/21/24  0504   WBC Thousand/uL 10.44*   HEMOGLOBIN g/dL 9.7*   HEMATOCRIT % 30.2*   PLATELETS Thousands/uL 217   SEGS PCT % 66   LYMPHO PCT % 25   MONO PCT % 8   EOS PCT % 0     Results from last 7 days   Lab Units 04/21/24  0504   POTASSIUM mmol/L 5.0   CHLORIDE mmol/L 108   CO2 mmol/L 23   BUN mg/dL 49*   CREATININE mg/dL 1.94*   CALCIUM mg/dL 9.6            I Have Reviewed All Lab Data Listed Above.    Invasive Devices       Peripheral Intravenous Line  Duration             Peripheral IV 04/20/24 Right Antecubital 1 day                      Recent Cultures (last 7 days):           Last 24 Hours Medication List:   Current Facility-Administered Medications   Medication Dose Route Frequency Provider Last Rate    acetaminophen  650 mg Oral Q6H PRN Seferino Stanton PA-C      atorvastatin  40 mg Oral Daily With Dinner Seferino Stanton PA-C      cefazolin  2,000 mg Intravenous Q8H Seferino Stanton PA-C      clonazePAM  1 mg Oral BID Seferino Stanton PA-C      docusate sodium  100 mg Oral Q12H Seferino Stanton PA-C      DULoxetine  60 mg Oral Daily Seferino Stanton PA-C      heparin (porcine)  5,000 Units Subcutaneous Q8H ЮЛИЯ Tahmina Rivers MD      HYDROcodone-acetaminophen  1 tablet Oral Q6H PRN Seferino Stanton PA-C      HYDROmorphone  0.2 mg Intravenous Q6H PRN Seferino Stanton PA-C      insulin glargine  22 Units Subcutaneous HS Seferino Stanton PA-C      insulin lispro  1-5 Units Subcutaneous HS Seferino Stanton PA-C      insulin lispro  1-6 Units Subcutaneous TID AC Seferino Stanton PA-C      insulin lispro  8 Units Subcutaneous TID With Meals Seferino Stanton PA-C      lamoTRIgine  150 mg Oral Daily Seferino Stanton PA-C      methadone  139 mg Oral Daily Tahmina Rivers MD      oxyCODONE  10 mg Oral Q4H PRN Seferino Stanton PA-C      senna  17.2 mg Oral HS PRN Seferino Stanton PA-C      sertraline  100 mg Oral Daily Seferino Stanton PA-C      sodium  chloride  75 mL/hr Intravenous Continuous Seferino Stanton PA-C 75 mL/hr (04/21/24 0958)        Today, Patient Was Seen By: Tahmina Rivers MD

## 2024-04-22 PROBLEM — R42 DIZZINESS: Status: ACTIVE | Noted: 2024-04-22

## 2024-04-22 PROBLEM — D62 ACUTE BLOOD LOSS ANEMIA: Status: ACTIVE | Noted: 2024-04-22

## 2024-04-22 LAB
ANION GAP SERPL CALCULATED.3IONS-SCNC: 5 MMOL/L (ref 4–13)
BASOPHILS # BLD AUTO: 0.03 THOUSANDS/ÂΜL (ref 0–0.1)
BASOPHILS NFR BLD AUTO: 0 % (ref 0–1)
BUN SERPL-MCNC: 40 MG/DL (ref 5–25)
CALCIUM SERPL-MCNC: 8.4 MG/DL (ref 8.4–10.2)
CHLORIDE SERPL-SCNC: 111 MMOL/L (ref 96–108)
CO2 SERPL-SCNC: 22 MMOL/L (ref 21–32)
CREAT SERPL-MCNC: 1.85 MG/DL (ref 0.6–1.3)
EOSINOPHIL # BLD AUTO: 0.04 THOUSAND/ÂΜL (ref 0–0.61)
EOSINOPHIL NFR BLD AUTO: 1 % (ref 0–6)
ERYTHROCYTE [DISTWIDTH] IN BLOOD BY AUTOMATED COUNT: 15.8 % (ref 11.6–15.1)
GFR SERPL CREATININE-BSD FRML MDRD: 27 ML/MIN/1.73SQ M
GLUCOSE SERPL-MCNC: 118 MG/DL (ref 65–140)
GLUCOSE SERPL-MCNC: 122 MG/DL (ref 65–140)
GLUCOSE SERPL-MCNC: 129 MG/DL (ref 65–140)
GLUCOSE SERPL-MCNC: 141 MG/DL (ref 65–140)
GLUCOSE SERPL-MCNC: 159 MG/DL (ref 65–140)
HCT VFR BLD AUTO: 23 % (ref 34.8–46.1)
HGB BLD-MCNC: 7.3 G/DL (ref 11.5–15.4)
IMM GRANULOCYTES # BLD AUTO: 0.03 THOUSAND/UL (ref 0–0.2)
IMM GRANULOCYTES NFR BLD AUTO: 0 % (ref 0–2)
LYMPHOCYTES # BLD AUTO: 1.78 THOUSANDS/ÂΜL (ref 0.6–4.47)
LYMPHOCYTES NFR BLD AUTO: 22 % (ref 14–44)
MCH RBC QN AUTO: 29.2 PG (ref 26.8–34.3)
MCHC RBC AUTO-ENTMCNC: 31.7 G/DL (ref 31.4–37.4)
MCV RBC AUTO: 92 FL (ref 82–98)
MONOCYTES # BLD AUTO: 0.8 THOUSAND/ÂΜL (ref 0.17–1.22)
MONOCYTES NFR BLD AUTO: 10 % (ref 4–12)
NEUTROPHILS # BLD AUTO: 5.46 THOUSANDS/ÂΜL (ref 1.85–7.62)
NEUTS SEG NFR BLD AUTO: 67 % (ref 43–75)
NRBC BLD AUTO-RTO: 0 /100 WBCS
PLATELET # BLD AUTO: 153 THOUSANDS/UL (ref 149–390)
PMV BLD AUTO: 9.6 FL (ref 8.9–12.7)
POTASSIUM SERPL-SCNC: 4.6 MMOL/L (ref 3.5–5.3)
RBC # BLD AUTO: 2.5 MILLION/UL (ref 3.81–5.12)
SODIUM SERPL-SCNC: 138 MMOL/L (ref 135–147)
WBC # BLD AUTO: 8.14 THOUSAND/UL (ref 4.31–10.16)

## 2024-04-22 PROCEDURE — 97163 PT EVAL HIGH COMPLEX 45 MIN: CPT

## 2024-04-22 PROCEDURE — 85025 COMPLETE CBC W/AUTO DIFF WBC: CPT | Performed by: INTERNAL MEDICINE

## 2024-04-22 PROCEDURE — 97110 THERAPEUTIC EXERCISES: CPT

## 2024-04-22 PROCEDURE — NC001 PR NO CHARGE

## 2024-04-22 PROCEDURE — 99024 POSTOP FOLLOW-UP VISIT: CPT

## 2024-04-22 PROCEDURE — 97167 OT EVAL HIGH COMPLEX 60 MIN: CPT

## 2024-04-22 PROCEDURE — 82948 REAGENT STRIP/BLOOD GLUCOSE: CPT

## 2024-04-22 PROCEDURE — 99232 SBSQ HOSP IP/OBS MODERATE 35: CPT | Performed by: INTERNAL MEDICINE

## 2024-04-22 PROCEDURE — 80048 BASIC METABOLIC PNL TOTAL CA: CPT | Performed by: INTERNAL MEDICINE

## 2024-04-22 RX ADMIN — INSULIN LISPRO 8 UNITS: 100 INJECTION, SOLUTION INTRAVENOUS; SUBCUTANEOUS at 13:19

## 2024-04-22 RX ADMIN — INSULIN LISPRO 8 UNITS: 100 INJECTION, SOLUTION INTRAVENOUS; SUBCUTANEOUS at 17:32

## 2024-04-22 RX ADMIN — INSULIN LISPRO 1 UNITS: 100 INJECTION, SOLUTION INTRAVENOUS; SUBCUTANEOUS at 21:31

## 2024-04-22 RX ADMIN — LAMOTRIGINE 150 MG: 100 TABLET ORAL at 08:43

## 2024-04-22 RX ADMIN — HEPARIN SODIUM 5000 UNITS: 5000 INJECTION INTRAVENOUS; SUBCUTANEOUS at 13:20

## 2024-04-22 RX ADMIN — SERTRALINE HYDROCHLORIDE 100 MG: 100 TABLET ORAL at 08:43

## 2024-04-22 RX ADMIN — SODIUM CHLORIDE 75 ML/HR: 0.9 INJECTION, SOLUTION INTRAVENOUS at 00:51

## 2024-04-22 RX ADMIN — OXYCODONE HYDROCHLORIDE 10 MG: 10 TABLET ORAL at 08:43

## 2024-04-22 RX ADMIN — CLONAZEPAM 1 MG: 1 TABLET ORAL at 17:32

## 2024-04-22 RX ADMIN — ATORVASTATIN CALCIUM 40 MG: 40 TABLET, FILM COATED ORAL at 16:45

## 2024-04-22 RX ADMIN — METHADONE HYDROCHLORIDE 139 MG: 10 CONCENTRATE ORAL at 08:43

## 2024-04-22 RX ADMIN — HEPARIN SODIUM 5000 UNITS: 5000 INJECTION INTRAVENOUS; SUBCUTANEOUS at 05:34

## 2024-04-22 RX ADMIN — CEFAZOLIN SODIUM 2000 MG: 2 SOLUTION INTRAVENOUS at 03:05

## 2024-04-22 RX ADMIN — IRON SUCROSE 100 MG: 20 INJECTION, SOLUTION INTRAVENOUS at 17:31

## 2024-04-22 RX ADMIN — OXYCODONE HYDROCHLORIDE 10 MG: 10 TABLET ORAL at 16:45

## 2024-04-22 RX ADMIN — CLONAZEPAM 1 MG: 1 TABLET ORAL at 08:43

## 2024-04-22 RX ADMIN — HEPARIN SODIUM 5000 UNITS: 5000 INJECTION INTRAVENOUS; SUBCUTANEOUS at 21:32

## 2024-04-22 RX ADMIN — INSULIN GLARGINE 22 UNITS: 100 INJECTION, SOLUTION SUBCUTANEOUS at 21:32

## 2024-04-22 RX ADMIN — DULOXETINE HYDROCHLORIDE 60 MG: 30 CAPSULE, DELAYED RELEASE ORAL at 08:43

## 2024-04-22 RX ADMIN — OXYCODONE HYDROCHLORIDE 10 MG: 10 TABLET ORAL at 21:31

## 2024-04-22 RX ADMIN — DOCUSATE SODIUM 100 MG: 100 CAPSULE, LIQUID FILLED ORAL at 03:06

## 2024-04-22 RX ADMIN — DOCUSATE SODIUM 100 MG: 100 CAPSULE, LIQUID FILLED ORAL at 16:46

## 2024-04-22 RX ADMIN — INSULIN LISPRO 8 UNITS: 100 INJECTION, SOLUTION INTRAVENOUS; SUBCUTANEOUS at 08:44

## 2024-04-22 NOTE — UTILIZATION REVIEW
Initial Clinical Review    Admission: Date/Time/Statement:   Admission Orders (From admission, onward)       Ordered        04/20/24 1436  INPATIENT ADMISSION  Once                          Orders Placed This Encounter   Procedures    INPATIENT ADMISSION     Standing Status:   Standing     Number of Occurrences:   1     Order Specific Question:   Level of Care     Answer:   Med Surg [16]     Order Specific Question:   Estimated length of stay     Answer:   More than 2 Midnights     Order Specific Question:   Certification     Answer:   I certify that inpatient services are medically necessary for this patient for a duration of greater than two midnights. See H&P and MD Progress Notes for additional information about the patient's course of treatment.     ED Arrival Information       Expected   -    Arrival   4/20/2024 12:50    Acuity   Urgent              Means of arrival   Ambulance    Escorted by   [facility] ([facility])    Service   Hospitalist    Admission type   Emergency              Arrival complaint   Fall; Dizziness             Chief Complaint   Patient presents with    Fall     Patient here via EMS states that she got up from sitting, felt dizzy and fell onto left hip. -LOC -head strike -thinners. Left leg shortened and externally rotated. 50mcg fentanyl given in route.    Hip Pain       Initial Presentation: 68 y.o. female presents to ED via  EMS with left hip pain  after a fall. States  she stood up quickly, became lightheaded, lost her balance and fell.  No LOC.  Fell on left hip on hardwood floor and imaging  reveals fracture.  Takes methadone chronically, not helping pain.   PMH  is  DM2.  Admit  Ip with  Closed fracture left hip and plan is  ortho consult, pain control  and surgical intervention.    Ortho consult  Plan OR  4/21.     Date:    4/21   Day 2:   Surgery  Procedure(s):  Left - OPEN REDUCTION W/ INTERNAL FIXATION (ORIF) FEMUR    Operative Findings:  Left hip IT/subtrochanteric  fracture    Date:    4/22  Day 3: Has surpassed a 2nd midnight with active treatments and services.  POD  #  1.  Continue post op care.  Monitor labs,  hemoglobin this am  7.3, was   9.7  4/21.  Needs  PT/OT.  Continue pain control as needed.    LLE  tender at incision site. Sensation intact.  Dressings  surgical site dry and intact.         ED Triage Vitals   Temperature Pulse Respirations Blood Pressure SpO2   04/20/24 1304 04/20/24 1254 04/20/24 1254 04/20/24 1254 04/20/24 1254   98.3 °F (36.8 °C) 91 18 125/84 98 %      Temp Source Heart Rate Source Patient Position - Orthostatic VS BP Location FiO2 (%)   04/20/24 1304 04/20/24 1254 04/20/24 1254 04/20/24 1254 --   Oral Monitor Sitting Right arm       Pain Score       04/20/24 1254       10 - Worst Possible Pain          Wt Readings from Last 1 Encounters:   04/07/22 70 kg (154 lb 5.2 oz)     Additional Vital Signs:   04/21/24 1635 -- 88 16 111/62 -- -- -- --   04/21/24 1535 98.3 °F (36.8 °C) 98 18 164/75 -- 99 % -- --   04/21/24 1435 98.2 °F (36.8 °C) 99 18 150/79 -- 99 % -- --   04/21/24 1335 98.4 °F (36.9 °C) 101 18 130/81 -- 97 % None (Room air) --   04/21/24 1315 98.3 °F (36.8 °C) 95 16 165/57 -- 99 % None (Room air) --   04/21/24 1258 98.6 °F (37 °C) 95 16 145/80 96 98 % None (Room air) --   04/21/24 1245 -- 100 16 155/82 119 98 % None (Room air) --   04/21/24 1235 -- 102 16 159/83 121 97 % None (Room air) --   04/21/24 1224 97.9 °F (36.6 °C) 109 Abnormal  16 159/63 83 97 % None (Room air) --   04/21/24 0758 97.6 °F (36.4 °C) 101 16 120/58 83 97 % None (Room air) Lying   04/20/24 2318 98 °F (36.7 °C) 95 18 135/61 88 97 % None (Room air) Lying   04/20/24 1529 -- 102 18 158/72 -- 99 % None (Room air) Lying   04/20/24 1304 98.3 °F (36.8 °C) -- -- -- -- -- -- --   04/20/24 1254 -- 91 18 125/84 -- 98 % None (Room air) Sitting     Pertinent Labs/Diagnostic Test Results:   XR femur 2 vw left   Final Result by Eron Campuzano MD (04/21 3872)       Fluoroscopy provided for procedure guidance.      Please refer to the separate procedure note for additional details.                  Workstation performed: [id number]         CT lower extremity wo contrast left   Final Result by Cabrera Hernandez MD (04/20 1729)      Comminuted intertrochanteric fracture of the left proximal femur with involvement of the greater and lesser trochanter.         Workstation performed: [id number]         XR femur 2 views LEFT   Final Result by Wade Jackson MD (04/21 0757)      No acute osseous abnormality.      Workstation performed: [id number]         XR hip/pelv 2-3 vws left   ED Interpretation by Reta Baugh DO (04/20 2458)   Abnormal   Xray reviewed and independently interpreted by me: comminuted left hip fracture      Final Result by Wade Jackson MD (04/21 0751)   Comminuted left intertrochanteric hip fracture.         Workstation performed: [id number]         XR chest 1 view   Final Result by Beena Arnold MD (04/20 1710)      No acute cardiopulmonary disease.            Workstation performed: [id number]               Results from last 7 days   Lab Units 04/22/24  0454 04/21/24  0504 04/20/24  1308   WBC Thousand/uL 8.14 10.44* 8.35   HEMOGLOBIN g/dL 7.3* 9.7* 10.3*   HEMATOCRIT % 23.0* 30.2* 31.5*   PLATELETS Thousands/uL 153 217 250   TOTAL NEUT ABS Thousands/µL 5.46 6.83 4.22         Results from last 7 days   Lab Units 04/22/24  0454 04/21/24  0504 04/20/24  1308   SODIUM mmol/L 138 138 136   POTASSIUM mmol/L 4.6 5.0 4.1   CHLORIDE mmol/L 111* 108 105   CO2 mmol/L 22 23 24   ANION GAP mmol/L 5 7 7   BUN mg/dL 40* 49* 54*   CREATININE mg/dL 1.85* 1.94* 1.95*   EGFR ml/min/1.73sq m 27 26 26   CALCIUM mg/dL 8.4 9.6 10.0   MAGNESIUM mg/dL  --  1.9  --          Results from last 7 days   Lab Units 04/22/24  0626 04/21/24  2146 04/21/24  1629 04/21/24  1229 04/21/24  0616 04/20/24  2125   POC GLUCOSE mg/dl 141* 140 236* 103 108 144*     Results from last 7 days    Lab Units 04/22/24  0454 04/21/24  0504 04/20/24  1308   GLUCOSE RANDOM mg/dL 129 100 140               Results from last 7 days   Lab Units 04/20/24  1308   HS TNI 0HR ng/L 4               Results from last 7 days   Lab Units 04/21/24  1440   CLARITY UA  Slightly Cloudy   COLOR UA  Yellow   SPEC GRAV UA  1.015   PH UA  5.0   GLUCOSE UA mg/dl Negative   KETONES UA mg/dl Negative   BLOOD UA  Trace*   PROTEIN UA mg/dl 30 (1+)*   NITRITE UA  Negative   BILIRUBIN UA  Negative   UROBILINOGEN UA E.U./dl 0.2   LEUKOCYTES UA  Large*   WBC UA /hpf Innumerable*   RBC UA /hpf 10-20*   BACTERIA UA /hpf Occasional   EPITHELIAL CELLS WET PREP /hpf Moderate*             ED Treatment:   Medication Administration from 04/20/2024 1250 to 04/20/2024 1527         Date/Time Order Dose Route Action Comments     04/20/2024 1308 EDT fentanyl citrate (PF) (FOR EMS ONLY) 100 mcg/2 mL injection 100 mcg 0 mcg Does not apply Given to EMS --     04/20/2024 1303 EDT HYDROmorphone (DILAUDID) injection 0.5 mg 0.5 mg Intravenous Given --     04/20/2024 1433 EDT sodium chloride 0.9 % bolus 1,000 mL 1,000 mL Intravenous New Bag --            Present on Admission:   Bipolar affective disorder ([facility])   Methadone use   JOYCE (acute kidney injury) (Formerly [facility])      Admitting Diagnosis: Hip pain [M25.559]  JOYCE (acute kidney injury) ([facility]) [N17.9]  Near syncope [R55]  Closed left hip fracture ([facility]) [S72.002A]  Age/Sex: 68 y.o. female  Admission Orders:  Scheduled Medications:  atorvastatin, 40 mg, Oral, Daily With Dinner  clonazePAM, 1 mg, Oral, BID  docusate sodium, 100 mg, Oral, Q12H  DULoxetine, 60 mg, Oral, Daily  heparin (porcine), 5,000 Units, Subcutaneous, Q8H ЮЛИЯ  insulin glargine, 22 Units, Subcutaneous, HS  insulin lispro, 1-5 Units, Subcutaneous, HS  insulin lispro, 1-6 Units, Subcutaneous, TID AC  insulin lispro, 8 Units, Subcutaneous, TID With Meals  lamoTRIgine, 150 mg, Oral, Daily  methadone, 139 mg, Oral, Daily  sertraline, 100 mg, Oral,  Daily      Continuous IV Infusions:  sodium chloride, 75 mL/hr, Intravenous, Continuous      PRN Meds:  acetaminophen, 650 mg, Oral, Q6H PRN  HYDROcodone-acetaminophen, 1 tablet, Oral, Q6H PRN  HYDROmorphone, 0.2 mg, Intravenous, Q6H PRN  oxyCODONE, 10 mg, Oral, Q4H PRN  senna, 17.2 mg, Oral, HS PRN        IP CONSULT TO ORTHOPEDIC SURGERY  IP CONSULT TO ORTHOPEDIC SURGERY    Network Utilization Review Department  ATTENTION: Please call with any questions or concerns to [phone] and carefully listen to the prompts so that you are directed to the right person. All voicemails are confidential.   For Discharge needs, contact Care Management DC Support Team at [phone] opt. [phone]  Send all requests for admission clinical reviews, approved or denied determinations and any other requests to dedicated fax number below belonging to the campus where the patient is receiving treatment. List of dedicated fax numbers for the Facilities:  FACILITY NAME UR FAX NUMBER   ADMISSION DENIALS (Administrative/Medical Necessity) [phone]   DISCHARGE SUPPORT TEAM (NETWORK) [phone]   PARENT CHILD HEALTH (Maternity/NICU/Pediatrics) [phone]   [facility] [phone]   [facility] [phone]   [facility] [phone]   [facility] [phone]   On license of [facility] [phone]   [facility] [phone]   [facility] [phone]   [facility] [phone]   [facility] [phone]   [facility] [phone]   [facility] [phone]   [facility] [phone]

## 2024-04-22 NOTE — OCCUPATIONAL THERAPY NOTE
Occupational Therapy Evaluation     Patient Name: Jaclyn Camp  Today's Date: 4/22/2024  Problem List  Principal Problem:    Closed fracture of left hip with routine healing  Active Problems:    Bipolar affective disorder (HCC)    Type 2 diabetes mellitus with diabetic neuropathy, with long-term current use of insulin (HCC)    Methadone use    JOYCE (acute kidney injury) (HCC)    Preop cardiovascular exam    HTN (hypertension)    Past Medical History  Past Medical History:   Diagnosis Date    Bipolar disorder (HCC)     Depression     Diabetes mellitus (HCC)     History of MRSA infection     Hypertension      Past Surgical History  Past Surgical History:   Procedure Laterality Date    APPENDECTOMY      INCISION AND DRAINAGE OF WOUND Right 7/1/2018    Procedure: INCISION AND DRAINAGE (I&D) RIGHT FOREARM;  Surgeon: Naldo Skinner MD;  [facility];  Service: Orthopedics    ORIF FEMUR FRACTURE Left 4/21/2024    Procedure: OPEN REDUCTION W/ INTERNAL FIXATION (ORIF) FEMUR;  Surgeon: Leonardo Murray MD;  [facility];  Service: Orthopedics    VT AMPUTATION FOOT TRANSMETARSAL Right 4/13/2021    Procedure: AMPUTATION TRANSMETATARSAL (TMA);  Surgeon: Noah Hough DPM;  [facility];  Service: Podiatry    VT SPLIT AGRFT T/A/L 1ST 100 CM/&/1% BDY INFT/CHLD Right 10/17/2018    Procedure: RIGHT ARM SKIN GRAFT SPLIT THICKNESS (STSG);  Surgeon: Chetan Fisher MD;  [facility];  Service: Plastics    VT SPLIT AGRFT T/A/L 1ST 100 CM/&/1% BDY INFT/CHLD Right 9/12/2018    Procedure: INTEGRA PLACEMENT;  Surgeon: Chetan Fisher MD;  [facility];  Service: Plastics    TOE AMPUTATION Right 12/10/2019    Procedure: AMPUTATION TOE;  Surgeon: Noah Hough DPM;  [facility];  Service: Podiatry    TOE AMPUTATION Right 5/29/2020    Procedure: AMPUTATION TOE, 5TH TOE;  Surgeon: Grey Myrick DPM;  [facility];  Service: Podiatry    TONSILLECTOMY       VAC DRESSING APPLICATION Right 10/17/2018    Procedure: VAC PLACEMENT ARM;  Surgeon: Chetan Fisher MD;  [facility];  Service: Plastics    WOUND DEBRIDEMENT Right 7/7/2018    Procedure: DEBRIDEMENT UPPER EXTREMITY (WASH OUT) W/ APPLICATION OF WOUND VAC;  Surgeon: Guero Ortiz MD;  [facility];  Service: Orthopedics    WOUND DEBRIDEMENT Right 7/11/2018    Procedure: DEBRIDEMENT UPPER EXTREMITY WITH WOUND VAC EXCHANGE;  Surgeon: Roula Birch DO;  [facility];  Service: Orthopedics    WOUND DEBRIDEMENT Right 9/12/2018    Procedure: DEBRIDEMENT  (WASH OUT) FOREARM with Vac dressing change;  Surgeon: Chetan Fisher MD;  [facility];  Service: Plastics    WOUND DEBRIDEMENT Right 5/29/2020    Procedure: Complex Irrigation and DEBRIDEMENT WOUND (WASH OUT), ANKLE;  Surgeon: Grey Myrick DPM;  [facility];  Service: Podiatry           04/22/24 0943   Note Type   Note type Evaluation   Pain Assessment   Pain Assessment Tool 0-10   Pain Score 8   Pain Location/Orientation Orientation: Left;Location: Leg   Restrictions/Precautions   Weight Bearing Precautions Per Order Yes   LLE Weight Bearing Per Order WBAT   Other Precautions Fall Risk;Pain;Chair Alarm;Bed Alarm  (orthostatic hypotension noted with standing)   Home Living   Type of Home Apartment   Home Layout One level  (13 jasmina with railing)   Bathroom Shower/Tub Tub/shower unit   Bathroom Toilet Standard   Bathroom Equipment Commode;Shower chair   Home Equipment   (denies)   Prior Function   Level of [place] Independent with ADLs;Independent with functional mobility   Lives With Spouse   Falls in the last 6 months 1 to 4  (1)   Comments PTA pt states independence with all aspects of her ADLs, transfers, ambulation--w/o device; +, +falls=1, ocassionally home alone   Lifestyle   Autonomy PTA pt states independence with all aspects of her ADLs, transfers, ambulation--w/o device; +,  "+falls=1, ocassionally home alone   Reciprocal Relationships supportive children   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   Intrinsic Gratification watching TV   Subjective   Subjective \"I would like to get up.\"   ADL   Where Assessed Edge of bed   Eating Assistance 6  Modified independent   Grooming Assistance 6  Modified Independent   UB Bathing Assistance 5  Supervision/Setup   LB Bathing Assistance 2  Maximal Assistance   UB Dressing Deficit Supervision/safety   LB Dressing Assistance 2  Maximal Assistance   Functional Assistance 2  Maximal Assistance   Bed Mobility   Rolling R 3  Moderate assistance   Additional items Assist x 1;Increased time required   Rolling L 3  Moderate assistance   Additional items Assist x 1;Increased time required;Verbal cues;LE management   Supine to Sit 3  Moderate assistance   Additional items Assist x 1;Increased time required;Verbal cues;LE management   Transfers   Sit to Stand 3  Moderate assistance   Additional items Assist x 2;Increased time required;Verbal cues   Stand to Sit 3  Moderate assistance   Additional items Assist x 2;Increased time required;Verbal cues   Additional Comments bp's=129/72(supine), 129/64(EOB), 83/72(after standing), 118/65(sitting in recliner)   Functional Mobility   Functional Mobility 3  Moderate assistance   Additional Comments x2   Additional items   (with \"quick-move\")   Balance   Static Sitting Fair +   Dynamic Sitting Fair   Static Standing Poor +   Dynamic Standing Poor   Activity Tolerance   Activity Tolerance Patient limited by fatigue;Patient limited by pain   RUE Assessment   RUE Assessment WFL   RUE Strength   RUE Overall Strength Within Functional Limits - able to perform ADL tasks with strength  (4/5 throughout)   LUE Assessment   LUE Assessment WFL   LUE Strength   LUE Overall Strength Within Functional Limits - able to perform ADL tasks with strength  (4/5 throughout)   Hand Function   Gross Motor Coordination Functional   Fine Motor " "Coordination Functional   Sensation   Light Touch No apparent deficits   Proprioception   Proprioception No apparent deficits   Vision-Basic Assessment   Current Vision   (glasses)   Vision - Complex Assessment   Acuity   (impaired)   Psychosocial   Psychosocial (WDL) WDL   Perception   Inattention/Neglect Appears intact   Cognition   Overall Cognitive Status WFL   Arousal/Participation Alert   Attention Within functional limits   Orientation Level Oriented X4   Memory Within functional limits   Following Commands Follows one step commands without difficulty   Assessment   Limitation Decreased ADL status;Decreased UE strength;Decreased Safe judgement during ADL;Decreased cognition;Decreased endurance;Decreased high-level ADLs   Prognosis Fair   Assessment Pt is a 67y/o female admitted to the hospital after falling at home; was \"seeing dots\" then went down. Pt noted with L hip fx, requiring a s/p ORIF L hip(4/21). Pt is WBAT L LE. Pt with PMH bipolar, DM, HTN, R TMA. PTA pt states independence with all aspects of her ADLs, transfers, ambulation--w/o device; +, +falls=1, ocassionally home alone. During initial eval pt demonstrated deficits with her functional balance, functional mobility, ADL status, transfer safety, b/l UE, and activity tolerance(currently fair=15-20mins). Pt would benefit from continued OT tx for the above deficits.3-5xwk/1-2wks. The patient's raw score on the AM-PAC Daily Activity Inpatient Short Form is 17. A raw score of less than 19 suggests the patient may benefit from discharge to post-acute rehabilitation services. Please refer to the recommendation of the Occupational Therapist for safe discharge planning.   Goals   Patient Goals \"to get better\"   STG Time Frame   (1-7 days)   Short Term Goal #1 Pt will demonstrate supervision with their bed mobility to facilitate EOB ADLs.   Short Term Goal #2 Pt will demonstrate improved activity tolerance to good(20-30mins) and standing tolerance to " 3-5mins to assist with ADLs.   Short Term Goal  Pt will demonstrate proper walker/transfer safety 100% of the time.   LTG Time Frame   (7-14 days)   Long Term Goal #1 Pt will demonstrate improved functional balance by 1 grade to assist with ADLs/transfers.   Long Term Goal #2 Pt will demonstrate Joseluis with her LE bathing/dressing.   Long Term Goal Pt will demonstrate mod I with their sit-stand transfers to assist with completion of their LE dressing.   Plan   Treatment Interventions ADL retraining;Functional transfer training;UE strengthening/ROM;Endurance training;Patient/family training;Equipment evaluation/education;Compensatory technique education;Continued evaluation   Goal Expiration Date 05/06/24   OT Treatment Day 0   OT Frequency 3-5x/wk   Discharge Recommendation   Rehab Resource Intensity Level, OT I (Maximum Resource Intensity)   AM-PAC Daily Activity Inpatient   Lower Body Dressing 2   Bathing 2   Toileting 2   Upper Body Dressing 3   Grooming 4   Eating 4   Daily Activity Raw Score 17   Daily Activity Standardized Score (Calc for Raw Score >=11) 37.26   AM-PAC Applied Cognition Inpatient   Following a Speech/Presentation 4   Understanding Ordinary Conversation 4   Taking Medications 3   Remembering Where Things Are Placed or Put Away 3   Remembering List of 4-5 Errands 3   Taking Care of Complicated Tasks 3   Applied Cognition Raw Score 20   Applied Cognition Standardized Score 41.76   Adam Bourgeois

## 2024-04-22 NOTE — ASSESSMENT & PLAN NOTE
Lab Results   Component Value Date    HGBA1C 11.7 (H) 04/13/2021       Recent Labs     04/21/24  1629 04/21/24  2146 04/22/24  0626 04/22/24  1105   POCGLU 236* 140 141* 118         Continue Lantus 22 units twice daily, insulin lispro 8 units 3 times daily with meals  Monitor Accu-Cheks, sliding scale for coverage  Sugars adequate controlled on this regimen

## 2024-04-22 NOTE — CASE MANAGEMENT
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, left 2021    JOYCE (acute kidney injury) (HCC) 2021    Uncomplicated opioid dependence (HCC) 2020    IV drug abuse (HCC) 2020    Constipation 2019    Right ankle cellulitis 2019    Cellulitis of right foot 2018    History of MRSA infection     Diabetes mellitus (HCC)     Depression     Bipolar disorder (HCC)     Abscess of right arm 2018    Abscess of tendon sheath of right forearm 2018    Methadone use 2018    Chronic hepatitis C without hepatic coma (HCC) 2018    Anemia 2018    Hyponatremia 2018    Right arm cellulitis 2018    Abscess of right forearm 2018    Abscess of tendon sheath of forearm, right 2018    Corn or callus 2014    Hyperlipidemia 2014    Type 2 diabetes mellitus with diabetic neuropathy, with long-term current use of insulin (HCC) 2014    Visual loss 2014    Persistent insomnia 2013    Bipolar affective disorder (HCC) 2013    Benign essential hypertension 2013    Hypothyroidism 2007      LOS (days): 2  Geometric Mean LOS (GMLOS) (days): 4.5  Days to GMLOS:2.5     OBJECTIVE:    Risk of Unplanned Readmission Score: 23.62       Current admission status: Inpatient     Preferred Pharmacy:   [id number] - [place] - [address]  [address] [address]  Phone:  [phone] Fax: [phone]    Primary Care Provider: Juan Daniel Echevarria MD    Primary Insurance: [employer] [facility] [employer]  Secondary Insurance: [employer] [facility]    ASSESSMENT:  Active Health Care Proxies    There are no active Health Care Proxies on file.       Advance Directives  Does patient have a Health Care POA?: No  Was patient offered paperwork?: Yes (would not like information)  Does patient currently have a Health Care decision maker?: Yes, please see Health Care Proxy section  Does patient have Advance Directives?: No  Was patient offered paperwork?: Yes (declined)  Primary Contact: Harpreet Casey (son) [phone]     Readmission Root Cause  30 Day Readmission: No    Patient Information  Admitted from:: Home  Mental Status: Alert  During Assessment patient was accompanied by: Not accompanied during assessment  Assessment information provided by:: Patient  Primary Caregiver: Self  Support Systems: Self, Spouse/significant other, Friend  County of Residence: [place]  [place] [facility] do you live in?: [place]  Type of Current Residence: Apartment  Floor Level: 2  Upon entering residence, is there a bedroom on the main floor (no further steps)?: Yes  Upon entering residence, is there a bathroom on the main floor (no further steps)?: Yes  Living Arrangements: Lives Alone, Lives w/ Spouse/significant other  Is patient a ?: No    Activities of Daily Living Prior to Admission  Completes ADLs independently?: Yes  Ambulates independently?: Yes  Does patient use assisted devices?: No  Does patient have a history of Outpatient Therapy (PT/OT)?: Yes ([facility])  Does the patient have a history of Short-Term Rehab?: No  Does patient have a history of HHC?: No  Does patient currently have HHC?: No     Patient Information Continued  Income Source: Unemployed  Does patient have prescription coverage?: Yes  Does patient receive dialysis treatments?: No  Does patient have a history of substance  abuse?: Yes (Heroin- sober one year)  Historical substance use preference: Heroin (Heroin- sober one year)  History of Withdrawal Symptoms: Denies past symptoms  Is patient currently in treatment for substance abuse?: No. Treatment options provided  Does patient have a history of Mental Health Diagnosis?: No    PHQ 2/9 Screening   Reviewed PHQ 2/9 Depression Screening Score?: No    Means of Transportation  Means of Transport to Appts:: Drives Self    Social Determinants of Health (SDOH)      Flowsheet Row Most Recent Value   Housing Stability    In the last 12 months, was there a time when you were not able to pay the mortgage or rent on time? N   In the last 12 months, how many places have you lived? 1   In the last 12 months, was there a time when you did not have a steady place to sleep or slept in a shelter (including now)? N   Transportation Needs    In the past 12 months, has lack of transportation kept you from medical appointments or from getting medications? no   In the past 12 months, has lack of transportation kept you from meetings, work, or from getting things needed for daily living? No   Food Insecurity    Within the past 12 months, you worried that your food would run out before you got the money to buy more. Never true   Within the past 12 months, the food you bought just didn't last and you didn't have money to get more. Never true   Utilities    In the past 12 months has the electric, gas, oil, or water company threatened to shut off services in your home? No          DISCHARGE DETAILS:    Discharge planning discussed with:: patient  Freedom of Choice: Yes  Comments - Freedom of Choice: Patient requested [facility] for rehab but is open to other facilities if [facility] is has no availability.  CM contacted family/caregiver?: Yes  Were Treatment Team discharge recommendations reviewed with patient/caregiver?: Yes  Did patient/caregiver verbalize understanding of patient care needs?:  Yes  Were patient/caregiver advised of the risks associated with not following Treatment Team discharge recommendations?: Yes    Contacts  Patient Contacts: Harpreet Casey ( son) [phone]  Relationship to Patient:: Family  Contact Method: Phone  Phone Number: [phone]  Reason/Outcome: Emergency Contact, Discharge Planning    Requested Home Health Care         Is the patient interested in HHC at discharge?: No    DME Referral Provided  Referral made for DME?: No     Would you like to participate in our [facility] service program?  : No - Declined    Treatment Team Recommendation: Short Term Rehab  Discharge Destination Plan:: Short Term Rehab       Additional Comments: CM met with the patient for discharge planning and intake.  Patient made aware of the Physical Therapist recommendation for rehab.  Patient verbalized uderstanding and informed Care Manager that the therapist had made her aware.  Patient states she prefers to go to [facility].  Patient is open to going to other facilities for rehabilitation if [facility] has no availability.  Referral placed for multiple facility including [facility].  CM department will follow the patient through discharge.

## 2024-04-22 NOTE — ASSESSMENT & PLAN NOTE
Patient is a 68-year-old female with past medical history significant for diabetes with peripheral neuropathy, hypertension, hypothyroidism, previous right TMA, schizoaffective disorder, bipolar disorder, who presented to the ER after a fall: Patient became lightheaded and fell on a hardwood floor.  No loss of consciousness but unfortunately fractured her left hip    CT with comminuted intertrochanteric fracture left proximal femur  Patient was evaluated by the orthopedic surgery team and underwent ORIF left femur on 4/21  Cont pain control, DVT prophylaxis, PT/OT  Will need STR

## 2024-04-22 NOTE — PLAN OF CARE
Problem: PHYSICAL THERAPY ADULT  Goal: Performs mobility at highest level of function for planned discharge setting.  See evaluation for individualized goals.  Description: Treatment/Interventions: Functional transfer training, LE strengthening/ROM, Elevations, Therapeutic exercise, Endurance training, Patient/family training, Equipment eval/education, Bed mobility, Gait training, Compensatory technique education, Spoke to nursing, OT, Continued evaluation          See flowsheet documentation for full assessment, interventions and recommendations.  Note: Prognosis: Good  Problem List: Decreased strength, Decreased range of motion, Decreased endurance, Impaired balance, Decreased mobility  Assessment: Pt is a 68 y.o. female y/o presenting [facility]24 with Primary dx: Closed fracture of left hip with routine healing following fall with lightheadedness. S/p left hip IM nail with Dr. Murray on 4/21/24. WBS: WBAT and LLE. Significant pmhx per chart: HTN, JOYCE, methadone use, DM2, bipolar affective disorder, chronic hep C, s/p chronic R TMA (2021). PT consulted to assess strength/functional mobility, activity tolerance and d/c needs. Active PT orders and activity orders for Activity Beginning POD0 . PTA, pt reports functioning at I w/o AD for community distances, I w/ADLs, and (+) . Occasional times alone at home. Pt greeted supine in bed. Pt amenable to PT session. During PT IE, pt presenting with above (see flowsheet) outlined functional impairments including dec strength, balance, gait, and deficits that limit functional mobility and activity tolerance relative to baseline. Pt currently requires moderate assistance x1 for bed mobility, moderate assistance x2 for transfers, and unable to complete stepping.  Pt currently demonstrating inc fall risk 2/2 hx of falls, impaired balance, use of ambulatory aid, WBS, polypharmacy, limited sensation/neuropathy, multiple co-morbidities, varying  levels of pain, acuity of medical illness, and ongoing medical treatment of primary dx.  Fall risk education provided with good understanding. Pt with dec in BP to 83/72 post-standing, with quick recovery to 135/66 in seated w/ ankle pumps. 8/10 pain. Standing tolerance ~30 seconds with Ax2 w/ R knee buckle.  Recommend quickmove for OOB to chair. No change in sxs except pain. Denied additional sxs throughout session. Recommend continued mobilization of pt with nsg staff as tolerated to prevent further decline in function. Pt will benefit from continued PT services to progress mobility independence necessary for return to PLOF. Based on pt presentation and impairments, pt would most appropriately benefit from level I (max) rehab intensity resources pending progress to promote safe transition to home.  Additional tx session following IE, assessment below. The patient's AM-PAC Basic Mobility Inpatient Short Form Raw Score is 8. A Raw score of less than or equal to 16 suggests the patient may benefit from discharge to post-acute rehabilitation services. Please also refer to the recommendation of the Physical Therapist for safe discharge planning.  Barriers to Discharge: Inaccessible home environment, Decreased caregiver support ((+) TRUPTI, limited physical assistance at home)     Rehab Resource Intensity Level, PT: I (Maximum Resource Intensity)    See flowsheet documentation for full assessment.

## 2024-04-22 NOTE — PHYSICAL THERAPY NOTE
PHYSICAL THERAPY EVALUATION ([id number]) and Treatment ([id number])    NAME:  Jaclyn Camp  DATE: 04/22/24    AGE:   68 y.o.  Mrn:   [id number]  ADMIT DX:  Hip pain [M25.559]  JOYCE (acute kidney injury) (HCC) [N17.9]  Near syncope [R55]  Closed left hip fracture (HCC) [S72.002A]    Patient Active Problem List   Diagnosis    Hyponatremia    Right arm cellulitis    Abscess of right forearm    Bipolar affective disorder (HCC)    Corn or callus    Benign essential hypertension    Hyperlipidemia    Hypothyroidism    Persistent insomnia    Type 2 diabetes mellitus with diabetic neuropathy, with long-term current use of insulin (HCC)    Visual loss    Abscess of tendon sheath of forearm, right    Anemia    Methadone use    Chronic hepatitis C without hepatic coma (HCC)    Abscess of right arm    Abscess of tendon sheath of right forearm    History of MRSA infection    Diabetes mellitus (HCC)    Depression    Bipolar disorder (HCC)    Cellulitis of right foot    Right ankle cellulitis    Constipation    Uncomplicated opioid dependence (HCC)    IV drug abuse (HCC)    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, left    JOYCE (acute kidney injury) (HCC)    S/P transmetatarsal amputation of foot, right (HCC)    Closed fracture of left hip with routine healing    Preop cardiovascular exam    HTN (hypertension)       Past Medical History:   Diagnosis Date    Bipolar disorder (HCC)     Depression     Diabetes mellitus (HCC)     History of MRSA infection     Hypertension        Past Surgical History:   Procedure Laterality Date    APPENDECTOMY      INCISION AND DRAINAGE OF WOUND Right 7/1/2018    Procedure: INCISION AND DRAINAGE (I&D) RIGHT FOREARM;  Surgeon: Naldo Skinner MD;  [facility];  Service: Orthopedics    ORIF FEMUR FRACTURE Left 4/21/2024    Procedure: OPEN REDUCTION W/ INTERNAL FIXATION (ORIF) FEMUR;  Surgeon: Loenardo Murray MD;  [facility];  Service: Orthopedics    MT AMPUTATION FOOT TRANSMETARSAL Right 4/13/2021     Procedure: AMPUTATION TRANSMETATARSAL (TMA);  Surgeon: Noah Hough DPM;  [facility];  Service: Podiatry    MO SPLIT AGRFT T/A/L 1ST 100 CM/&/1% BDY INFT/CHLD Right 10/17/2018    Procedure: RIGHT ARM SKIN GRAFT SPLIT THICKNESS (STSG);  Surgeon: Chetan Fisher MD;  [facility];  Service: Plastics    MO SPLIT AGRFT T/A/L 1ST 100 CM/&/1% BDY INFT/CHLD Right 9/12/2018    Procedure: INTEGRA PLACEMENT;  Surgeon: Chetan Fisher MD;  [facility];  Service: Plastics    TOE AMPUTATION Right 12/10/2019    Procedure: AMPUTATION TOE;  Surgeon: Noah Hough DPM;  [facility];  Service: Podiatry    TOE AMPUTATION Right 5/29/2020    Procedure: AMPUTATION TOE, 5TH TOE;  Surgeon: Grey Myrick DPM;  [facility];  Service: Podiatry    TONSILLECTOMY      VAC DRESSING APPLICATION Right 10/17/2018    Procedure: VAC PLACEMENT ARM;  Surgeon: Chetan Fisher MD;  [facility];  Service: Plastics    WOUND DEBRIDEMENT Right 7/7/2018    Procedure: DEBRIDEMENT UPPER EXTREMITY (WASH OUT) W/ APPLICATION OF WOUND VAC;  Surgeon: Guero Ortiz MD;  [facility];  Service: Orthopedics    WOUND DEBRIDEMENT Right 7/11/2018    Procedure: DEBRIDEMENT UPPER EXTREMITY WITH WOUND VAC EXCHANGE;  Surgeon: Roula Birch DO;  [facility];  Service: Orthopedics    WOUND DEBRIDEMENT Right 9/12/2018    Procedure: DEBRIDEMENT  (WASH OUT) FOREARM with Vac dressing change;  Surgeon: Chetan Fisher MD;  [facility];  Service: Plastics    WOUND DEBRIDEMENT Right 5/29/2020    Procedure: Complex Irrigation and DEBRIDEMENT WOUND (WASH OUT), ANKLE;  Surgeon: Grey Myrick DPM;  [facility];  Service: Podiatry       Imaging Studies:  XR femur 2 vw left   Final Result by Eron Campuzano MD (04/21 0710)      Fluoroscopy provided for procedure guidance.      Please refer to the separate procedure note for  additional details.                  Workstation performed: [id number]         CT lower extremity wo contrast left   Final Result by Cabrera Hernandez MD (04/20 1729)      Comminuted intertrochanteric fracture of the left proximal femur with involvement of the greater and lesser trochanter.         Workstation performed: [id number]         XR femur 2 views LEFT   Final Result by Wade Jackson MD (04/21 2913)      No acute osseous abnormality.      Workstation performed: [id number]         XR hip/pelv 2-3 vws left   ED Interpretation by Reta Baugh DO (04/20 1546)   Abnormal   Xray reviewed and independently interpreted by me: comminuted left hip fracture      Final Result by Wade Jackson MD (04/21 0456)   Comminuted left intertrochanteric hip fracture.         Workstation performed: [id number]         XR chest 1 view   Final Result by Beena Arnold MD (04/20 1710)      No acute cardiopulmonary disease.            Workstation performed: [id number]             Past Medical History:   Diagnosis Date    Bipolar disorder (HCC)     Depression     Diabetes mellitus (HCC)     History of MRSA infection     Hypertension      Length Of Stay: 2  Performed at least 2 patient identifiers during session: Name and Birthday  PHYSICAL THERAPY EVALUATION :        04/22/24 1017   PT Last Visit   PT Visit Date 04/22/24   Note Type   Note type Evaluation  (and treatment)   Additional Comments Pt greeted supine in bed, amenable to evaluation.   Pain Assessment   Pain Assessment Tool 0-10   Pain Score 8   Pain Location/Orientation Orientation: Left;Location: Hip   Hospital Pain Intervention(s) Repositioned;Ambulation/increased activity;Rest;Emotional support   Restrictions/Precautions   Weight Bearing Precautions Per Order Yes   LLE Weight Bearing Per Order WBAT   Other Precautions Fall Risk;Pain;Chair Alarm;Bed Alarm;Multiple lines  (orthostatic hypotension in standing)   Home Living   Type of Home Apartment   Home Layout  "One level  (13 TRUPTI with R rail)   Bathroom Shower/Tub Tub only   Bathroom Toilet Standard   Bathroom Equipment Commode;Shower chair   Home Equipment   (denies per pt)   Prior Function   Level of [place] Independent with ADLs;Independent with functional mobility   Lives With Spouse   Falls in the last 6 months 1 to 4  (1x)   Comments PTA, pt reports functioning at I w/o AD for community distances, I w/ADLs, and (+) . Occasional times alone at home.   General   Additional Pertinent History S/p L IM nail   Cognition   Overall Cognitive Status WFL   Arousal/Participation Alert   Orientation Level Oriented X4   Following Commands Follows one step commands without difficulty   Subjective   Subjective \"oh my gosh it hurts.\"   RUE Assessment   RUE Assessment   (Refer to OT)   LUE Assessment   LUE Assessment   (Refer to OT)   RLE Assessment   RLE Assessment WFL  (4+/5)   LLE Assessment   LLE Assessment X  (ankle 3+/5 limited 2/2 pain, knee ext 2+/5 at EOB limited 2/2 pain w/ AROM ~45 deg ext, hip deferred)   Coordination   Sensation X  (reports h/o neuropathy b/l feet)   Bed Mobility   Rolling R 3  Moderate assistance   Additional items Assist x 1;Increased time required;Verbal cues;Bedrails;LE management;HOB elevated  (hob 60)   Additional Comments Cued for safe technique   Transfers   Sit to Stand 3  Moderate assistance   Additional items Assist x 2;Increased time required;Verbal cues  (bed elevated)   Additional Comments (+) Dec in BP following stand, denies sxs except pain. BP in activity tolerance. RW for stability. Cued for safe technique/hand placement, upright posture. Able to achieve 75% upright w/ R knee buckle 2/2 L hip pain per pt. Standing tolerance with Ax2 for ~30 seconds. Recommend quickmove for OOB to chair   Ambulation/Elevation   Ambulation/Elevation Additional Comments Unsafe to assess 2/2 instability and dec static standing tolerance   Endurance Deficit   Endurance Deficit Yes   Endurance " Deficit Description pain, fatigue, weakness   Activity Tolerance   Activity Tolerance (S)  Patient limited by pain;Patient limited by fatigue;Other (Comment)  (Supine  /72, /64, seated post stand 83/72, seated EOB w/in 2 minutes 135/66, end of session 118/65. Denies sxs except pain throughout)   Medical Staff Made Aware MARKEL Medina RN   Nurse Made Aware RN cleared/updated   Assessment   Prognosis Good   Problem List Decreased strength;Decreased range of motion;Decreased endurance;Impaired balance;Decreased mobility   Assessment Pt is a 68 y.o. female y/o presenting to [facility] on [facility]20/2024 with Primary dx: Closed fracture of left hip with routine healing following fall with lightheadedness. S/p left hip IM nail with Dr. Murray on 4/21/24. WBS: WBAT and LLE. Significant pmhx per chart: HTN, JOYCE, methadone use, DM2, bipolar affective disorder, chronic hep C, s/p chronic R TMA (2021). PT consulted to assess strength/functional mobility, activity tolerance and d/c needs. Active PT orders and activity orders for Activity Beginning POD0 . PTA, pt reports functioning at I w/o AD for community distances, I w/ADLs, and (+) . Occasional times alone at home. Pt greeted supine in bed. Pt amenable to PT session. During PT IE, pt presenting with above (see flowsheet) outlined functional impairments including dec strength, balance, gait, and deficits that limit functional mobility and activity tolerance relative to baseline. Pt currently requires moderate assistance x1 for bed mobility, moderate assistance x2 for transfers, and unable to complete stepping.  Pt currently demonstrating inc fall risk 2/2 hx of falls, impaired balance, use of ambulatory aid, WBS, polypharmacy, limited sensation/neuropathy, multiple co-morbidities, varying levels of pain, acuity of medical illness, and ongoing medical treatment of primary dx.  Fall risk education provided with good understanding. Pt with dec in BP to  "83/72 post-standing, with quick recovery to 135/66 in seated w/ ankle pumps. 8/10 pain. Standing tolerance ~30 seconds with Ax2 w/ R knee buckle.  Recommend quickmove for OOB to chair. No change in sxs except pain. Denied additional sxs throughout session. Recommend continued mobilization of pt with nsg staff as tolerated to prevent further decline in function. Pt will benefit from continued PT services to progress mobility independence necessary for return to PLOF. Based on pt presentation and impairments, pt would most appropriately benefit from level I (max) rehab intensity resources pending progress to promote safe transition to home.  Additional tx session following IE, assessment below. The patient's AM-PAC Basic Mobility Inpatient Short Form Raw Score is 8. A Raw score of less than or equal to 16 suggests the patient may benefit from discharge to post-acute rehabilitation services. Please also refer to the recommendation of the Physical Therapist for safe discharge planning.   Barriers to Discharge Inaccessible home environment;Decreased caregiver support  ((+) TRUPTI, limited physical assistance at home)   Goals   Patient Goals \"have less pain and get stronger\"   [facility] Expiration Date 05/06/24   Short Term Goal #1 1).  Pt will perform bed mobility with supervision demonstrating appropriate technique 100% of the time in order to improve function. 2)  Perform all transfers with min Ax1 demonstrating safe and appropriate technique 100% of the time in order to improve ability to negotiate safely in home environment. 3) Pt will be able to complete EOB and pre-gait activities to facilitate progression to ambulation and increase standing tolerance with min Ax1. 4)  Improve overall strength and balance 1/2 grade in order to optimize ability to perform functional tasks and reduce fall risk.5) Increase activity tolerance to 45 minutes in order to improve endurance to functional tasks. 7) PT for ongoing patient and " family/caregiver education, DME needs and d/c planning in order to promote highest level of function in least restrictive environment. 9) Further functional assessment required. Will review and update goals following further assessment.   PT Treatment Day 0   Plan   Treatment/Interventions Functional transfer training;LE strengthening/ROM;Elevations;Therapeutic exercise;Endurance training;Patient/family training;Equipment eval/education;Bed mobility;Gait training;Compensatory technique education;Spoke to nursing;OT;Continued evaluation   PT Frequency 5-7x/wk   Discharge Recommendation   Rehab Resource Intensity Level, PT I (Maximum Resource Intensity)   Additional Comments Quickmove for OOB to chair   AM-PAC Basic Mobility Inpatient   Turning in Flat Bed Without Bedrails 2   Lying on Back to Sitting on Edge of Flat Bed Without Bedrails 2   Moving Bed to Chair 1   Standing Up From Chair Using Arms 1   Walk in Room 1   Climb 3-5 Stairs With Railing 1   Basic Mobility Inpatient Raw Score 8   Turning Head Towards Sound 4   Follow Simple Instructions 4   Low Function Basic Mobility Raw Score  16   Low Function Basic Mobility Standardized Score  25.72   [facility] Highest Level Of Mobility   -[facility] Goal 3: Sit at edge of bed   -[facility] Achieved 3: Sit at edge of bed   Additional Treatment Session   Start Time 1005   End Time 1017   Treatment Assessment Following IE, additional tx session performed with focus on therapeutic exercise  and pt education per PT POC.  Pt with fair tolerance to session with limitations of fatigue, weakness, inc pain, and dec endurance and activity tolerance. Pt demonstrating progress towards functional goals with OOB to chair using quick move and completion of therapeutic exercise for LE strengthening/ROM. Pt stable at end of session. Please refer to flowsheet for objective data above. Pt continues to demonstrate deficits relative to PLOF and would benefit from continued skilled PT services per  POC to improve indep and safety with mobility. PT d/c recommendations: Anticipate level I (max) rehab intensity resources at d/c when medically appropriate. [facility] 8. Recommend PT to see for further OOB mobility next 1-2 sessions.   Equipment Use Ankle pumps x20, alternating UE squeezes x20, LAQ 2x10 with LLE pain, gluteal sets 10 reps in seated. quick move for OOB to chair. EOB tolerance x5 minutes with intermittent UE assist and improved upright posture. Edu: PT goals/POC, involved anatomy/physiology , fall risk , proper use of AD, activity pacing/energy conservation techniques, benefits of EOB/OOB mobility , progressive walking/exercise program, potential negative effects of immobility , modalities for sx management, positioning for sx management, and purpose of Acute care PT   Additional Treatment Day 1   End of Consult   Patient Position at End of Consult Bedside chair;Bed/Chair alarm activated;All needs within reach;Other (comment)   End of Consult Comments Pt returned educated on fall risk , Instructed to use call bell for assistance, All PT questions/concerns addressed, pt stable, offering no complaints, and RN updated on pt performance.     Pt requires PT/OT co-eval for pt's best interest due to medical complexity, safety concerns, fall risk, dec activity tolerance which is decline from PLOF, significant assistance with mobility.    (Please find full objective findings from PT assessment regarding body systems outlined above).     Hx/personal factors: step(s) to enter environment, recent fall(s)/fall history, and use of more restrictive AD, co-morbidities, inaccessible home, dec caregiver support, mutliple lines, use of AD, pain, recent orthopedic procedure , fall risk, and h/o of falls, (+) time home alone  Examination: assessed/impairments of systems including multiple body structures involved; dec mobility, dec balance, dec endurance, dec amb, risk for falls, pain, dec cognition, impairements in  locomotion, musculoskeletal, balance, endurance, posture, coordination, assessed cognition, activity limitations (difficulties executing an action) , participation restrictions (problems associate w/ involvement in life situations), AM-PAC score suggesting inc assistance/supervision vs baseline, dec activity tolerance/endurance vs baseline , hypotension   Clinical: unpredictable (ongoing medical status, risk for falls, hypotension, need for input for mobility technique/safety, and bed/chair alarm, )  Complexity: high       Perry Etienne, PT,DPT   04/22/24

## 2024-04-22 NOTE — ASSESSMENT & PLAN NOTE
Patient has previous history of bipolar disorder, as well as schizoaffective disorder  Continue duloxetine, sertraline, clonazepam  Clonazepam bid confirmed on PDMP website

## 2024-04-22 NOTE — ASSESSMENT & PLAN NOTE
Patient with acute kidney injury, with a creatinine that peaked at 1.95  Baseline creatinine 0.8-1.1  Etiology of acute kidney injury likely multifactorial  Patient had obstructive uropathy:  postoperatively  Also likely component of dehydration as patient was having poor p.o. intake especially fluids  Urinary retention protocol  Hold ACE inhibitor and HCTZ  Continue gentle IV fluids and monitor  Will consult nephrology if does not improve

## 2024-04-22 NOTE — PROGRESS NOTES
Progress Note - Orthopedics   Jaclyn Camp 68 y.o. female MRN: [id number]  Unit/Bed#: E2 -01      Assessment:  68 y.o.female s/p left hip IM nail with Dr. Murray on 4/21. POD1      Plan:  WBAT LLE  Hgb 7.3. Yesterday Hgb was 9.7 . We will Continue to monitor H&H and vitals for signs and symptoms of ABLA. Stable this morning.  PT/OT  Pain control   DVT ppx - lovenox  Follow up with Dr. Murray in 2 weeks  Dispo: Ortho will follow      Subjective:    68 y.o.female Seen and examined at bedside. Patient states she is eager to leave and excited to work with PT. Patient states no acute events or complaints overnight. Denies fevers, chills, CP, SOB, N/V, numbness or tingling.     Labs:  0   Lab Value Date/Time    HCT 23.0 (L) 04/22/2024 0454    HCT 30.2 (L) 04/21/2024 0504    HCT 31.5 (L) 04/20/2024 1308    HGB 7.3 (L) 04/22/2024 0454    HGB 9.7 (L) 04/21/2024 0504    HGB 10.3 (L) 04/20/2024 1308    PT 11.9 (L) 07/29/2022 0824    INR 0.9 07/29/2022 0824    WBC 8.14 04/22/2024 0454    WBC 10.44 (H) 04/21/2024 0504    WBC 8.35 04/20/2024 1308    ESR 41 (H) 12/14/2021 1828    CRP 80.5 (H) 12/16/2021 1615       Meds:    Current Facility-Administered Medications:     acetaminophen (TYLENOL) tablet 650 mg, 650 mg, Oral, Q6H PRN, Seferino Stanton PA-C, 650 mg at 04/21/24 2202    atorvastatin (LIPITOR) tablet 40 mg, 40 mg, Oral, Daily With Dinner, Seferino Stanton PA-C, 40 mg at 04/21/24 1707    clonazePAM (KlonoPIN) tablet 1 mg, 1 mg, Oral, BID, Seferino Stanton PA-C, 1 mg at 04/21/24 1343    docusate sodium (COLACE) capsule 100 mg, 100 mg, Oral, Q12H, Seferino Stanton PA-C, 100 mg at 04/22/24 0306    DULoxetine (CYMBALTA) delayed release capsule 60 mg, 60 mg, Oral, Daily, Seferino Stanton PA-C, 60 mg at 04/21/24 1343    heparin (porcine) subcutaneous injection 5,000 Units, 5,000 Units, Subcutaneous, Q8H [facility], Tahmina Rivers MD, 5,000 Units at 04/22/24 0534    HYDROcodone-acetaminophen (NORCO) 5-325 mg per  tablet 1 tablet, 1 tablet, Oral, Q6H PRN, Seferino Stanton PA-C    HYDROmorphone (DILAUDID) injection 0.2 mg, 0.2 mg, Intravenous, Q6H PRN, Seferino Stanton PA-C, 0.2 mg at 04/20/24 1614    insulin glargine (LANTUS) subcutaneous injection 22 Units 0.22 mL, 22 Units, Subcutaneous, HS, Seferino Stanton PA-C, 22 Units at 04/21/24 2158    insulin lispro (HumALOG/ADMELOG) 100 units/mL subcutaneous injection 1-5 Units, 1-5 Units, Subcutaneous, HS, Seferino Stanton PA-C    insulin lispro (HumALOG/ADMELOG) 100 units/mL subcutaneous injection 1-6 Units, 1-6 Units, Subcutaneous, TID AC, 3 Units at 04/21/24 1708 **AND** Fingerstick Glucose (POCT), , , TID AC, Seferino Stanton PA-C    insulin lispro (HumALOG/ADMELOG) 100 units/mL subcutaneous injection 8 Units, 8 Units, Subcutaneous, TID With Meals, Seferino Stanton PA-C, 8 Units at 04/21/24 1708    lamoTRIgine (LaMICtal) tablet 150 mg, 150 mg, Oral, Daily, Seferino Stanton PA-C, 150 mg at 04/21/24 1344    methadone (DOLOPHINE) oral concentrated solution 139 mg, 139 mg, Oral, Daily, Tahmina Rivers MD, 139 mg at 04/21/24 1435    oxyCODONE (ROXICODONE) immediate release tablet 10 mg, 10 mg, Oral, Q4H PRN, Seferino Stanton PA-C, 10 mg at 04/21/24 2202    senna (SENOKOT) tablet 17.2 mg, 17.2 mg, Oral, HS PRN, Seferino Stanton PA-C    sertraline (ZOLOFT) tablet 100 mg, 100 mg, Oral, Daily, Seferino Stanton PA-C, 100 mg at 04/21/24 1343    sodium chloride 0.9 % infusion, 75 mL/hr, Intravenous, Continuous, Seferino Stanton PA-C, Last Rate: 75 mL/hr at 04/22/24 0051, 75 mL/hr at 04/22/24 0051    Blood Culture:   Lab Results   Component Value Date    BLOODCX No Growth After 5 Days. 04/13/2021       Wound Culture:   Lab Results   Component Value Date    WOUNDCULT 4+ Growth of Beta Hemolytic Streptococcus Group B (A) 04/13/2021    WOUNDCULT (A) 04/13/2021     3+ Growth of Methicillin Resistant Staphylococcus aureus    WOUNDCULT 4+ Growth of 04/13/2021        Ins and Outs:  I/O last 24 hours:  In: 1380 [P.O.:180; I.V.:1000; IV Piggyback:200]  Out: 870 [Urine:820; Blood:50]          Physical:  Vitals:    04/22/24 0726   BP: 117/61   Pulse: 94   Resp: 18   Temp: 98.1 °F (36.7 °C)   SpO2: 100%     Musculoskeletal: left Lower Extremity  Skin - . No erythema or ecchymosis. Compartments soft and compressible.   Dressing - c/d/I, covered with mepilex  TTP at incision site  Sensation intact to saphenous, sural, tibial, superficial peroneal nerve, and deep peroneal  Motor intact to +FHL/EHL, +ankle dorsi/plantar flexion  2+ DP pulse, symmetric bilaterally  Digits warm and well perfused  Capillary refill < 2 seconds      Steve Garsia PA-C

## 2024-04-22 NOTE — ASSESSMENT & PLAN NOTE
Patient notes her fall prior to admission was preceded by an episode of dizziness  Patient relates that she was in her usual state of health, stood up from the recliner, felt dizzy, had spots in front of her eyes, and fell striking her left hip.  She denies any head trauma.  She denies any loss of consciousness.  Patient notes the days preceding this she was not dehydrated.  She denies any previous orthostatic symptoms.  Patient denies that she had any chest pain, pressure, discomfort, shortness of breath, dyspnea on exertion, or neurologic symptoms  Will check the echocardiogram and telemetry relation  Possibly related to orthostasis however patient denied being dehydrated  No focal neurologic signs or symptoms on exam

## 2024-04-22 NOTE — ASSESSMENT & PLAN NOTE
Patient appears to have a mild chronic anemia with baseline hemoglobin approximately 10  Patient developed an acute blood loss anemia: Likely secondary to expected blood loss from her intertrochanteric femur fracture, as well as expected procedural blood loss  Will start IV Venofer  No current hypotension  Monitor closely  Does not meet criteria for transfusion at this time

## 2024-04-22 NOTE — ASSESSMENT & PLAN NOTE
Maintained on methadone 139 mg daily, dose confirmed with [facility] treatment [place]  Patient will need further prn pain medications given hip fracture, during her recovery  Continue outpatient treatment center follow-up after discharge

## 2024-04-22 NOTE — PLAN OF CARE
Problem: Potential for Falls  Goal: Patient will remain free of falls  Description: INTERVENTIONS:  - Educate patient/family on patient safety including physical limitations  - Instruct patient to call for assistance with activity   - Consult OT/PT to assist with strengthening/mobility   - Keep Call bell within reach  - Keep bed low and locked with side rails adjusted as appropriate  - Keep care items and personal belongings within reach  - Initiate and maintain comfort rounds  - Make Fall Risk Sign visible to staff  - Offer Toileting every 2 Hours, in advance of need  - Initiate/Maintain bed alarm  - Obtain necessary fall risk management equipment: yellow socks  - Apply yellow socks and bracelet for high fall risk patients  - Consider moving patient to room near nurses station  Outcome: Progressing     Problem: Prexisting or High Potential for Compromised Skin Integrity  Goal: Skin integrity is maintained or improved  Description: INTERVENTIONS:  - Identify patients at risk for skin breakdown  - Assess and monitor skin integrity  - Assess and monitor nutrition and hydration status  - Monitor labs   - Assess for incontinence   - Turn and reposition patient  - Assist with mobility/ambulation  - Relieve pressure over bony prominences  - Avoid friction and shearing  - Provide appropriate hygiene as needed including keeping skin clean and dry  - Evaluate need for skin moisturizer/barrier cream  - Collaborate with interdisciplinary team   - Patient/family teaching  - Consider wound care consult   Outcome: Progressing     Problem: PAIN - ADULT  Goal: Verbalizes/displays adequate comfort level or baseline comfort level  Description: Interventions:  - Encourage patient to monitor pain and request assistance  - Assess pain using appropriate pain scale  - Administer analgesics based on type and severity of pain and evaluate response  - Implement non-pharmacological measures as appropriate and evaluate response  - Consider  cultural and social influences on pain and pain management  - Notify physician/advanced practitioner if interventions unsuccessful or patient reports new pain  Outcome: Progressing     Problem: INFECTION - ADULT  Goal: Absence or prevention of progression during hospitalization  Description: INTERVENTIONS:  - Assess and monitor for signs and symptoms of infection  - Monitor lab/diagnostic results  - Monitor all insertion sites, i.e. indwelling lines, tubes, and drains  - Monitor endotracheal if appropriate and nasal secretions for changes in amount and color  - Sarona appropriate cooling/warming therapies per order  - Administer medications as ordered  - Instruct and encourage patient and family to use good hand hygiene technique  - Identify and instruct in appropriate isolation precautions for identified infection/condition  Outcome: Progressing  Goal: Absence of fever/infection during neutropenic period  Description: INTERVENTIONS:  - Monitor WBC    Outcome: Progressing     Problem: SAFETY ADULT  Goal: Maintain or return to baseline ADL function  Description: INTERVENTIONS:  -  Assess patient's ability to carry out ADLs; assess patient's baseline for ADL function and identify physical deficits which impact ability to perform ADLs (bathing, care of mouth/teeth, toileting, grooming, dressing, etc.)  - Assess/evaluate cause of self-care deficits   - Assess range of motion  - Assess patient's mobility; develop plan if impaired  - Assess patient's need for assistive devices and provide as appropriate  - Encourage maximum independence but intervene and supervise when necessary  - Involve family in performance of ADLs  - Assess for home care needs following discharge   - Consider OT consult to assist with ADL evaluation and planning for discharge  - Provide patient education as appropriate  Outcome: Progressing  Goal: Maintains/Returns to pre admission functional level  Description: INTERVENTIONS:  - Perform AM-PAC 6  Click Basic Mobility/ Daily Activity assessment daily.  - Set and communicate daily mobility goal to care team and patient/family/caregiver.   - Collaborate with rehabilitation services on mobility goals if consulted  - Perform Range of Motion 3 times a day.  - Reposition patient every 2 hours.  - Dangle patient 3 times a day  - Stand patient 3 times a day  - Ambulate patient 3 times a day  - Out of bed to chair 3 times a day   - Out of bed for meals 3 times a day  - Out of bed for toileting  - Record patient progress and toleration of activity level   Outcome: Progressing     Problem: DISCHARGE PLANNING  Goal: Discharge to home or other facility with appropriate resources  Description: INTERVENTIONS:  - Identify barriers to discharge w/patient and caregiver  - Arrange for needed discharge resources and transportation as appropriate  - Identify discharge learning needs (meds, wound care, etc.)  - Arrange for interpretive services to assist at discharge as needed  - Refer to Case Management Department for coordinating discharge planning if the patient needs post-hospital services based on physician/advanced practitioner order or complex needs related to functional status, cognitive ability, or social support system  Outcome: Progressing     Problem: Knowledge Deficit  Goal: Patient/family/caregiver demonstrates understanding of disease process, treatment plan, medications, and discharge instructions  Description: Complete learning assessment and assess knowledge base.  Interventions:  - Provide teaching at level of understanding  - Provide teaching via preferred learning methods  Outcome: Progressing

## 2024-04-22 NOTE — PROGRESS NOTES
[facility]  Progress Note  Name: Jaclyn Camp I  MRN: [id number]  Unit/Bed#: E2 -01 I Date of Admission: 4/20/2024   Date of Service: 4/22/2024 I Hospital Day: 2    Assessment/Plan   * Closed fracture of left hip with routine healing  Assessment & Plan  Patient is a 68-year-old female with past medical history significant for diabetes with peripheral neuropathy, hypertension, hypothyroidism, previous right TMA, schizoaffective disorder, bipolar disorder, who presented to the ER after a fall: Patient became lightheaded and fell on a hardwood floor.  No loss of consciousness but unfortunately fractured her left hip    CT with comminuted intertrochanteric fracture left proximal femur  Patient was evaluated by the orthopedic surgery team and underwent ORIF left femur on 4/21  Cont pain control, DVT prophylaxis, PT/OT  Will need STR    HTN (hypertension)  Assessment & Plan  Patient has previous history of essential hypertension  Outpatient medications include lisinopril-HCTZ 20-25 mg daily plus lisinopril 5 mg daily  Hold ACE inhibitor and HCTZ given acute kidney injury  Blood pressures currently adequate, 104/67, 164/72  Continue to monitor: Will initiate antihypertensive if needed however will avoid ACE inhibitors and diuretics due to acute kidney injury    JOYCE (acute kidney injury) (HCC)  Assessment & Plan  Patient with acute kidney injury, with a creatinine that peaked at 1.95  Baseline creatinine 0.8-1.1  Etiology of acute kidney injury likely multifactorial  Patient had obstructive uropathy:  postoperatively  Also likely component of dehydration as patient was having poor p.o. intake especially fluids  Urinary retention protocol  Hold ACE inhibitor and HCTZ  Continue gentle IV fluids and monitor  Will consult nephrology if does not improve    Acute blood loss anemia  Assessment & Plan  Patient appears to have a mild chronic anemia with baseline hemoglobin approximately  10  Patient developed an acute blood loss anemia: Likely secondary to expected blood loss from her intertrochanteric femur fracture, as well as expected procedural blood loss  Will start IV Venofer  No current hypotension  Monitor closely  Does not meet criteria for transfusion at this time    Dizziness  Assessment & Plan  Patient notes her fall prior to admission was preceded by an episode of dizziness  Patient relates that she was in her usual state of health, stood up from the recliner, felt dizzy, had spots in front of her eyes, and fell striking her left hip.  She denies any head trauma.  She denies any loss of consciousness.  Patient notes the days preceding this she was not dehydrated.  She denies any previous orthostatic symptoms.  Patient denies that she had any chest pain, pressure, discomfort, shortness of breath, dyspnea on exertion, or neurologic symptoms  Will check the echocardiogram and telemetry relation  Possibly related to orthostasis however patient denied being dehydrated  No focal neurologic signs or symptoms on exam    Methadone use  Assessment & Plan  Maintained on methadone 139 mg daily, dose confirmed with [facility]  Patient will need further prn pain medications given hip fracture, during her recovery  Continue outpatient treatment center follow-up after discharge    Type 2 diabetes mellitus with diabetic neuropathy, with long-term current use of insulin ([facility])  Assessment & Plan  Lab Results   Component Value Date    HGBA1C 11.7 (H) 04/13/2021       Recent Labs     04/21/24  1629 04/21/24  2146 04/22/24  0626 04/22/24  1105   POCGLU 236* 140 141* 118         Continue Lantus 22 units twice daily, insulin lispro 8 units 3 times daily with meals  Monitor Accu-Cheks, sliding scale for coverage  Sugars adequate controlled on this regimen      Hypothyroidism  Assessment & Plan  Continue Synthroid    Bipolar affective disorder (HCC)  Assessment & Plan  Patient has  previous history of bipolar disorder, as well as schizoaffective disorder  Continue duloxetine, sertraline, clonazepam  Clonazepam bid confirmed on PDMP website                 Family:  called son Harpreet Casey and LM to call me for update    Dw pts nurse and CM    VTE Pharmacologic Prophylaxis: Heparin  VTE Mechanical Prophylaxis: sequential compression device        Certification Statement: The patient will continue to require additional inpatient hospital stay due to need for further acute intervention for str    Status: inpatient     ===================================================================    Subjective:  Patient relates that she feels better.  She notes pain in her left hip.  She relates the Vicodin made her nauseous in the past so she declines the Norco.  She is taking the oxycodone for pain: Only took 1 dose thus far today.    sHe denies any pain anywhere else.  She states when she fell prior to admission she did not lose consciousness.  She notes she was sitting in a recliner with her great granddaughter.  She stood up quickly.  She notes when she stood up she felt dizzy and saw spots in front of her eyes and then lost her balance and fell.  She notes she fell to the left and landed on her hip.  She states she did not hit her head and neck.  She did not have any pain anywhere else apart from her left hip.    Patient notes she did not have any previous or current chest pain, chest pressure or Chest discomfort.  She did not have any previous or current difficulty breathing.  She also notes she did not have any focal weakness, numbness.  She notes she did not completely lose consciousness.  She denies any previous history of cardiac events.  Denies any prior history of syncope.  She notes the week prior to the episode she did not feel dehydrated, she was tolerating p.o. without any difficulties.      Physical Exam:   Temp:  [97.9 °F (36.6 °C)-98.5 °F (36.9 °C)] 98.4 °F (36.9 °C)  HR:  []  110  Resp:  [16-20] 20  BP: (104-164)/(61-83) 164/72    Gen:  Pleasant, non-tachypnic, non-dyspnic.  Conversant.  Heart: regular rate and rhythm, S1S2 present, no murmur, rub or gallop  Lungs: clear to ausculatation bilaterally.  No wheezing, crackles, or rhonchi. No accessory muscle use or respiratory distress.  Abd: soft, non-tender, non-distended. NABS, no guarding, rebound or peritoneal signs.  Extremities: no clubbing, cyanosis or edema.  2+pedal pulses bilaterally.   Neuro: awake, alert and oriented.  Fluent speech.  Interactive.  Makes eye contact.  Muscles of facial expression intact.  No facial droop.  Cooperates with exam.  No somnolence or lethargy  Skin: warm and dry: no petechiae, purpura and rash.    LABS:   Results from last 7 days   Lab Units 04/22/24  0454 04/21/24  0504 04/20/24  1308   WBC Thousand/uL 8.14 10.44* 8.35   HEMOGLOBIN g/dL 7.3* 9.7* 10.3*   HEMATOCRIT % 23.0* 30.2* 31.5*   PLATELETS Thousands/uL 153 217 250     Results from last 7 days   Lab Units 04/22/24  0454 04/21/24  0504 04/20/24  1308   POTASSIUM mmol/L 4.6 5.0 4.1   CHLORIDE mmol/L 111* 108 105   CO2 mmol/L 22 23 24   BUN mg/dL 40* 49* 54*   CREATININE mg/dL 1.85* 1.94* 1.95*   CALCIUM mg/dL 8.4 9.6 10.0       Hospital Data:    4/20 CT left lower extremity Comminuted intertrochanteric fracture of the left proximal femur with involvement of the greater and lesser trochanter.     4/20: X-ray left femur: No acute osseous abnormality     4/20: Chest x-ray: No acute cardiopulmonary disease.     4/20 left hip/pelvis  Comminuted left intertrochanteric hip fracture.     Procedures  4/21: Open reduction internal fixation left femur          ---------------------------------------------------------------------------------------------------------------  This note has been constructed using a voice recognition system.

## 2024-04-22 NOTE — CASE MANAGEMENT
Case Management Progress Note    Patient name Jaclyn Camp  Location [facility] 2 /E2 -* MRN [id number]  : 1955 Date 2024       LOS (days): 2  Geometric Mean LOS (GMLOS) (days): 4.5  Days to GMLOS:2.4        OBJECTIVE:        Current admission status: Inpatient  Preferred Pharmacy:   [id number] [place] [place] [address]  [address] [address]  Phone: [phone] Fax: [phone]    Primary Care Provider: Juan Daniel Echevarria MD    Primary Insurance: [facility]  Secondary Insurance: [place] [employer] AND [employer] [facility]    PROGRESS NOTE:    CM informed patient that [facility] has availability and inquired if patient is open to waiting for other facilities to respond. Patient prefers if CM reserves the facility. Facility reserved as requested with the referral for other facilities remaining open as a back up.

## 2024-04-22 NOTE — UTILIZATION REVIEW
Notification of Unplanned, Urgent, or   Emergency Inpatient Admission   AUTHORIZATION REQUEST   [place] [address]  Tax ID: [id number]  NPI: [id number]  [facility] of Service: [facility]  Admission Level of Care: Inpatient  Place of Service Code: 21     Attending Physician Information  Attending Name and NPI#: Diamante Keita Md [[id number]]  Phone: [phone]     Admission Information  Inpatient Admission Date/Time: 4/20/24  2:36 PM  Discharge Date/Time: No discharge date for patient encounter.  Admitting Diagnosis Code/Description:  Hip pain [M25.559]  JOYCE (acute kidney injury) (HCC) [N17.9]  Near syncope [R55]  Closed left hip fracture (HCC) [S72.002A]     Utilization Review Contact  [employer]   Phone: [phone]  Fax: [phone]  Email: [email or web address]  Contact for approvals/pending authorizations, clinical reviews, and discharge.     Physician Advisory Services Contact  Medical Necessity Denial & [facility] Discussion  Phone: [phone]  Fax: [phone]  Email: [email or web address]     DISCHARGE SUPPORT TEAM:  For Patients Discharge Needs & Updates  Phone: [phone] opt. 2 Fax: [phone]  Email: [email or web address]

## 2024-04-22 NOTE — ASSESSMENT & PLAN NOTE
Patient has previous history of essential hypertension  Outpatient medications include lisinopril-HCTZ 20-25 mg daily plus lisinopril 5 mg daily  Hold ACE inhibitor and HCTZ given acute kidney injury  Blood pressures currently adequate, 104/67, 164/72  Continue to monitor: Will initiate antihypertensive if needed however will avoid ACE inhibitors and diuretics due to acute kidney injury

## 2024-04-23 ENCOUNTER — APPOINTMENT (INPATIENT)
Dept: NON INVASIVE DIAGNOSTICS | Facility: HOSPITAL | Age: 69
DRG: 481 | End: 2024-04-23
Payer: COMMERCIAL

## 2024-04-23 LAB
ABO GROUP BLD: NORMAL
ANION GAP SERPL CALCULATED.3IONS-SCNC: 7 MMOL/L (ref 4–13)
AORTIC ROOT: 3.2 CM
AORTIC VALVE MEAN VELOCITY: 9.3 M/S
APICAL FOUR CHAMBER EJECTION FRACTION: 56 %
AV AREA BY CONTINUOUS VTI: 1.4 CM2
AV AREA PEAK VELOCITY: 1.4 CM2
AV LVOT MEAN GRADIENT: 2 MMHG
AV LVOT PEAK GRADIENT: 3 MMHG
AV MEAN GRADIENT: 4 MMHG
AV PEAK GRADIENT: 7 MMHG
AV VALVE AREA: 1.43 CM2
AV VELOCITY RATIO: 0.7
BASOPHILS # BLD AUTO: 0.02 THOUSANDS/ÂΜL (ref 0–0.1)
BASOPHILS NFR BLD AUTO: 0 % (ref 0–1)
BLD GP AB SCN SERPL QL: NEGATIVE
BSA FOR ECHO PROCEDURE: 1.79 M2
BUN SERPL-MCNC: 31 MG/DL (ref 5–25)
CALCIUM SERPL-MCNC: 8.9 MG/DL (ref 8.4–10.2)
CHLORIDE SERPL-SCNC: 108 MMOL/L (ref 96–108)
CO2 SERPL-SCNC: 22 MMOL/L (ref 21–32)
CREAT SERPL-MCNC: 1.64 MG/DL (ref 0.6–1.3)
DOP CALC AO PEAK VEL: 1.3 M/S
DOP CALC AO VTI: 26.62 CM
DOP CALC LVOT AREA: 2.01 CM2
DOP CALC LVOT CARDIAC INDEX: 1.8 L/MIN/M2
DOP CALC LVOT CARDIAC OUTPUT: 3.21 L/MIN
DOP CALC LVOT DIAMETER: 1.6 CM
DOP CALC LVOT PEAK VEL VTI: 18.88 CM
DOP CALC LVOT PEAK VEL: 0.91 M/S
DOP CALC LVOT STROKE INDEX: 20.1 ML/M2
DOP CALC LVOT STROKE VOLUME: 37.94
EOSINOPHIL # BLD AUTO: 0.04 THOUSAND/ÂΜL (ref 0–0.61)
EOSINOPHIL NFR BLD AUTO: 1 % (ref 0–6)
ERYTHROCYTE [DISTWIDTH] IN BLOOD BY AUTOMATED COUNT: 15.7 % (ref 11.6–15.1)
FRACTIONAL SHORTENING: 29 (ref 28–44)
GFR SERPL CREATININE-BSD FRML MDRD: 32 ML/MIN/1.73SQ M
GLUCOSE SERPL-MCNC: 108 MG/DL (ref 65–140)
GLUCOSE SERPL-MCNC: 127 MG/DL (ref 65–140)
GLUCOSE SERPL-MCNC: 130 MG/DL (ref 65–140)
GLUCOSE SERPL-MCNC: 213 MG/DL (ref 65–140)
GLUCOSE SERPL-MCNC: 76 MG/DL (ref 65–140)
HCT VFR BLD AUTO: 22.7 % (ref 34.8–46.1)
HGB BLD-MCNC: 7.4 G/DL (ref 11.5–15.4)
IMM GRANULOCYTES # BLD AUTO: 0.03 THOUSAND/UL (ref 0–0.2)
IMM GRANULOCYTES NFR BLD AUTO: 0 % (ref 0–2)
INTERVENTRICULAR SEPTUM IN DIASTOLE (PARASTERNAL SHORT AXIS VIEW): 1.2 CM
INTERVENTRICULAR SEPTUM: 1.2 CM (ref 0.6–1.1)
LAAS-AP2: 20.8 CM2
LAAS-AP4: 23.5 CM2
LEFT ATRIUM AREA SYSTOLE SINGLE PLANE A4C: 22.7 CM2
LEFT ATRIUM SIZE: 3.1 CM
LEFT ATRIUM VOLUME (MOD BIPLANE): 75 ML
LEFT ATRIUM VOLUME INDEX (MOD BIPLANE): 41.9 ML/M2
LEFT INTERNAL DIMENSION IN SYSTOLE: 3.2 CM (ref 2.1–4)
LEFT VENTRICULAR INTERNAL DIMENSION IN DIASTOLE: 4.5 CM (ref 3.5–6)
LEFT VENTRICULAR POSTERIOR WALL IN END DIASTOLE: 1.2 CM
LEFT VENTRICULAR STROKE VOLUME: 53 ML
LVSV (TEICH): 53 ML
LYMPHOCYTES # BLD AUTO: 1.64 THOUSANDS/ÂΜL (ref 0.6–4.47)
LYMPHOCYTES NFR BLD AUTO: 20 % (ref 14–44)
MCH RBC QN AUTO: 29.1 PG (ref 26.8–34.3)
MCHC RBC AUTO-ENTMCNC: 32.6 G/DL (ref 31.4–37.4)
MCV RBC AUTO: 89 FL (ref 82–98)
MONOCYTES # BLD AUTO: 0.76 THOUSAND/ÂΜL (ref 0.17–1.22)
MONOCYTES NFR BLD AUTO: 9 % (ref 4–12)
MV E'TISSUE VEL-LAT: 11 CM/S
NEUTROPHILS # BLD AUTO: 5.76 THOUSANDS/ÂΜL (ref 1.85–7.62)
NEUTS SEG NFR BLD AUTO: 70 % (ref 43–75)
NRBC BLD AUTO-RTO: 0 /100 WBCS
PLATELET # BLD AUTO: 165 THOUSANDS/UL (ref 149–390)
PMV BLD AUTO: 10.8 FL (ref 8.9–12.7)
POTASSIUM SERPL-SCNC: 4.4 MMOL/L (ref 3.5–5.3)
RA PRESSURE ESTIMATED: 3 MMHG
RBC # BLD AUTO: 2.54 MILLION/UL (ref 3.81–5.12)
RH BLD: POSITIVE
RIGHT ATRIUM AREA SYSTOLE A4C: 17 CM2
RIGHT VENTRICLE ID DIMENSION: 3.9 CM
RV PSP: 15 MMHG
SL CV LEFT ATRIUM LENGTH A2C: 5.1 CM
SL CV LV EF: 56
SL CV PED ECHO LEFT VENTRICLE DIASTOLIC VOLUME (MOD BIPLANE) 2D: 93 ML
SL CV PED ECHO LEFT VENTRICLE SYSTOLIC VOLUME (MOD BIPLANE) 2D: 40 ML
SODIUM SERPL-SCNC: 137 MMOL/L (ref 135–147)
SPECIMEN EXPIRATION DATE: NORMAL
TR MAX PG: 12 MMHG
TR PEAK VELOCITY: 1.7 M/S
TRICUSPID ANNULAR PLANE SYSTOLIC EXCURSION: 2.4 CM
TRICUSPID VALVE PEAK REGURGITATION VELOCITY: 1.7 M/S
WBC # BLD AUTO: 8.25 THOUSAND/UL (ref 4.31–10.16)

## 2024-04-23 PROCEDURE — 82948 REAGENT STRIP/BLOOD GLUCOSE: CPT

## 2024-04-23 PROCEDURE — 99232 SBSQ HOSP IP/OBS MODERATE 35: CPT | Performed by: INTERNAL MEDICINE

## 2024-04-23 PROCEDURE — 86850 RBC ANTIBODY SCREEN: CPT | Performed by: INTERNAL MEDICINE

## 2024-04-23 PROCEDURE — 80048 BASIC METABOLIC PNL TOTAL CA: CPT | Performed by: INTERNAL MEDICINE

## 2024-04-23 PROCEDURE — 93306 TTE W/DOPPLER COMPLETE: CPT | Performed by: INTERNAL MEDICINE

## 2024-04-23 PROCEDURE — 86901 BLOOD TYPING SEROLOGIC RH(D): CPT | Performed by: INTERNAL MEDICINE

## 2024-04-23 PROCEDURE — 97530 THERAPEUTIC ACTIVITIES: CPT

## 2024-04-23 PROCEDURE — 85025 COMPLETE CBC W/AUTO DIFF WBC: CPT | Performed by: INTERNAL MEDICINE

## 2024-04-23 PROCEDURE — 93306 TTE W/DOPPLER COMPLETE: CPT

## 2024-04-23 PROCEDURE — 86900 BLOOD TYPING SEROLOGIC ABO: CPT | Performed by: INTERNAL MEDICINE

## 2024-04-23 PROCEDURE — 99024 POSTOP FOLLOW-UP VISIT: CPT

## 2024-04-23 RX ADMIN — HEPARIN SODIUM 5000 UNITS: 5000 INJECTION INTRAVENOUS; SUBCUTANEOUS at 15:03

## 2024-04-23 RX ADMIN — DOCUSATE SODIUM 100 MG: 100 CAPSULE, LIQUID FILLED ORAL at 03:12

## 2024-04-23 RX ADMIN — SERTRALINE HYDROCHLORIDE 100 MG: 100 TABLET ORAL at 09:33

## 2024-04-23 RX ADMIN — LAMOTRIGINE 150 MG: 100 TABLET ORAL at 09:33

## 2024-04-23 RX ADMIN — METHADONE HYDROCHLORIDE 139 MG: 10 CONCENTRATE ORAL at 09:42

## 2024-04-23 RX ADMIN — CLONAZEPAM 1 MG: 1 TABLET ORAL at 17:21

## 2024-04-23 RX ADMIN — INSULIN LISPRO 8 UNITS: 100 INJECTION, SOLUTION INTRAVENOUS; SUBCUTANEOUS at 12:16

## 2024-04-23 RX ADMIN — DULOXETINE HYDROCHLORIDE 60 MG: 30 CAPSULE, DELAYED RELEASE ORAL at 09:33

## 2024-04-23 RX ADMIN — ATORVASTATIN CALCIUM 40 MG: 40 TABLET, FILM COATED ORAL at 17:21

## 2024-04-23 RX ADMIN — HEPARIN SODIUM 5000 UNITS: 5000 INJECTION INTRAVENOUS; SUBCUTANEOUS at 21:44

## 2024-04-23 RX ADMIN — CLONAZEPAM 1 MG: 1 TABLET ORAL at 09:33

## 2024-04-23 RX ADMIN — DOCUSATE SODIUM 100 MG: 100 CAPSULE, LIQUID FILLED ORAL at 15:03

## 2024-04-23 RX ADMIN — OXYCODONE HYDROCHLORIDE 10 MG: 10 TABLET ORAL at 18:23

## 2024-04-23 RX ADMIN — INSULIN LISPRO 2 UNITS: 100 INJECTION, SOLUTION INTRAVENOUS; SUBCUTANEOUS at 21:44

## 2024-04-23 RX ADMIN — HEPARIN SODIUM 5000 UNITS: 5000 INJECTION INTRAVENOUS; SUBCUTANEOUS at 05:07

## 2024-04-23 RX ADMIN — IRON SUCROSE 100 MG: 20 INJECTION, SOLUTION INTRAVENOUS at 09:49

## 2024-04-23 RX ADMIN — INSULIN GLARGINE 22 UNITS: 100 INJECTION, SOLUTION SUBCUTANEOUS at 21:44

## 2024-04-23 RX ADMIN — OXYCODONE HYDROCHLORIDE 10 MG: 10 TABLET ORAL at 03:12

## 2024-04-23 NOTE — ASSESSMENT & PLAN NOTE
Patient has previous history of essential hypertension  Outpatient medications include lisinopril-HCTZ 20-25 mg daily plus lisinopril 5 mg daily  Hold ACE inhibitor and HCTZ given acute kidney injury  Blood pressures currently adequate, 137/67  Continue to monitor off antihypertensives to avoid hypotension and hypoperfusion  If blood pressure is elevated and she requires antihypertensive will start Norvasc

## 2024-04-23 NOTE — ASSESSMENT & PLAN NOTE
Lab Results   Component Value Date    HGBA1C 11.7 (H) 04/13/2021       Recent Labs     04/22/24  2103 04/23/24  0710 04/23/24  1118 04/23/24  1534   POCGLU 159* 76 108 130         Continue Lantus 22 units twice daily, insulin lispro 8 units 3 times daily with meals  Monitor Accu-Cheks, sliding scale for coverage  Sugars adequate controlled on this regimen

## 2024-04-23 NOTE — PLAN OF CARE
Problem: Potential for Falls  Goal: Patient will remain free of falls  Description: INTERVENTIONS:  - Educate patient/family on patient safety including physical limitations  - Instruct patient to call for assistance with activity   - Consult OT/PT to assist with strengthening/mobility   - Keep Call bell within reach  - Keep bed low and locked with side rails adjusted as appropriate  - Keep care items and personal belongings within reach  - Initiate and maintain comfort rounds  - Make Fall Risk Sign visible to staff  - Offer Toileting every 2 Hours, in advance of need  - Initiate/Maintain bed alarm  - Apply yellow socks and bracelet for high fall risk patients  - Consider moving patient to room near nurses station  Outcome: Progressing     Problem: Prexisting or High Potential for Compromised Skin Integrity  Goal: Skin integrity is maintained or improved  Description: INTERVENTIONS:  - Identify patients at risk for skin breakdown  - Assess and monitor skin integrity  - Assess and monitor nutrition and hydration status  - Monitor labs   - Assess for incontinence   - Turn and reposition patient  - Assist with mobility/ambulation  - Relieve pressure over bony prominences  - Avoid friction and shearing  - Provide appropriate hygiene as needed including keeping skin clean and dry  - Evaluate need for skin moisturizer/barrier cream  - Collaborate with interdisciplinary team   - Patient/family teaching  - Consider wound care consult   Outcome: Progressing     Problem: PAIN - ADULT  Goal: Verbalizes/displays adequate comfort level or baseline comfort level  Description: Interventions:  - Encourage patient to monitor pain and request assistance  - Assess pain using appropriate pain scale  - Administer analgesics based on type and severity of pain and evaluate response  - Implement non-pharmacological measures as appropriate and evaluate response  - Consider cultural and social influences on pain and pain management  -  Notify physician/advanced practitioner if interventions unsuccessful or patient reports new pain  Outcome: Progressing     Problem: CARDIOVASCULAR - ADULT  Goal: Maintains optimal cardiac output and hemodynamic stability  Description: INTERVENTIONS:  - Monitor I/O, vital signs and rhythm  - Monitor for S/S and trends of decreased cardiac output  - Administer and titrate ordered vasoactive medications to optimize hemodynamic stability  - Assess quality of pulses, skin color and temperature  - Assess for signs of decreased coronary artery perfusion  - Instruct patient to report change in severity of symptoms  Outcome: Progressing  Goal: Absence of cardiac dysrhythmias or at baseline rhythm  Description: INTERVENTIONS:  - Continuous cardiac monitoring, vital signs, obtain 12 lead EKG if ordered  - Administer antiarrhythmic and heart rate control medications as ordered  - Monitor electrolytes and administer replacement therapy as ordered  Outcome: Progressing

## 2024-04-23 NOTE — PHYSICAL THERAPY NOTE
PT PROGRESS NOTE    Name: Jaclyn Camp  AGE: 68 y.o.  MRN: [id number]  LENGTH OF STAY: 3    Admitting Dx:  Hip pain [M25.559]  JOYCE (acute kidney injury) (HCC) [N17.9]  Near syncope [R55]  Closed left hip fracture (HCC) [S72.002A]    Patient Active Problem List   Diagnosis    Hyponatremia    Right arm cellulitis    Abscess of right forearm    Bipolar affective disorder (HCC)    Corn or callus    Benign essential hypertension    Hyperlipidemia    Hypothyroidism    Persistent insomnia    Type 2 diabetes mellitus with diabetic neuropathy, with long-term current use of insulin (HCC)    Visual loss    Abscess of tendon sheath of forearm, right    Anemia    Methadone use    Chronic hepatitis C without hepatic coma (HCC)    Abscess of right arm    Abscess of tendon sheath of right forearm    History of MRSA infection    Diabetes mellitus (HCC)    Depression    Bipolar disorder (HCC)    Cellulitis of right foot    Right ankle cellulitis    Constipation    Uncomplicated opioid dependence (HCC)    IV drug abuse (HCC)    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, left    JOYCE (acute kidney injury) (HCC)    S/P transmetatarsal amputation of foot, right (HCC)    Closed fracture of left hip with routine healing    Preop cardiovascular exam    HTN (hypertension)    Dizziness    Acute blood loss anemia       Performed at least 2 patient identifiers during session: Name and Birthday       04/23/24 0918   PT Last Visit   PT Visit Date 04/23/24   Note Type   Note Type Treatment   Pain Assessment   Pain Assessment Tool 0-10   Pain Score 8   Pain Location/Orientation Orientation: Left;Location: Hip   Effect of Pain on Daily Activities limits mobility   Restrictions/Precautions   Weight Bearing Precautions Per Order Yes   LLE Weight Bearing Per Order WBAT   Other Precautions Chair Alarm;Bed Alarm;Multiple lines;Telemetry;Fall Risk;Pain  (iv,)   General   Chart Reviewed Yes   Family/Caregiver Present No   Cognition   Overall Cognitive Status WFL  "  Arousal/Participation Alert   Attention Within functional limits   Orientation Level Oriented X4   Memory Within functional limits   Comments Pt tearful at end of session 2/2 pain and limited mobilty. Emotional support provided by PT.   Subjective   Subjective (S)  \"I'm in pain but I can try.\" Pt reporting new onset L foot numbness/tingling since therapy yesterday. RN aware.   Bed Mobility   Supine to Sit 4  Minimal assistance   Additional items Assist x 1;Increased time required;Verbal cues;LE management   Additional Comments Multiple strategies attempted. Unable to tolerate log roll 2/2 pain.  completed long sit with UE/blanket support to maneuver LLE. ~12 minutes to complete 2/2 pain. Mod Ax1 for scooting w/ rails and UE assist. Cues throughout for safe tecnique. EOB tolerance with cues for upright/UE assistance x5 minutes.    Transfers   Sit to Stand 3  Moderate assistance   Additional items Assist x 2;Increased time required;Verbal cues;Armrests   Stand to Sit 4  Minimal assistance   Additional items Assist x 1;Increased time required;Verbal cues;Armrests   Additional Comments RW for stability. Cues for safe technique/hand placement and LE positioning to achieve full upright. Dec eccentric control.   Ambulation/Elevation   Gait pattern Improper Weight shift;Poor UE support;L Knee Angela;Antalgic;Wide TIM;Forward Flexion;Excessively slow;Step to;Decreased heel strike;Decreased hip extension;Decreased L stance;Inconsistent meryl   Gait Assistance   (Mod Ax1 and min Ax1(Ax2))   Additional items Assist x 2;Verbal cues;Tactile cues   Assistive Device Rolling walker   Distance 3'x1 with RW; improved tolerance with 3 steps backwards   Ambulation/Elevation Additional Comments Unsteady, step to patterning limited 2/2 pain. L knee buckling requiring knee block. Multimodal cues for gait mechanics/sequencing, weight shifting and quad facilitation to minimize buckling. Standing tolerance ~4-5 minutes   Balance   Static " Sitting Fair   Dynamic Sitting Fair -   Static Standing Poor +  (RW)   Dynamic Standing Poor  (RW)   Ambulatory Poor -  (RW)   Endurance Deficit   Endurance Deficit Yes   Endurance Deficit Description pain, weakness   Activity Tolerance   Activity Tolerance Patient limited by pain;Patient limited by fatigue   Medical Staff Made Aware Miranda VEGA   Nurse Made Aware cleared/updated   Assessment   Prognosis Good   Problem List Decreased strength;Decreased range of motion;Decreased endurance;Impaired balance;Decreased mobility   Assessment Pt seen per PT POC and goals updated. Pt amenable to PT session. Intervention with focus on  functional transfers, gait training , and pt education on  importance of OOB/continued mobility and therapy services on functional otucomes. Pt with fair tolerance to session with limitations of fatigue, weakness, inc pain, and dec endurance and activity tolerance. Pt is continuing to progress towards functional goals with ability to progress to forward ambulation. Currently requiring min Ax1-mod Ax2 to complete functional mobility with skilled cues for technique/safety throughout. Poor pain control noted throughout session. Pt reporting new onset L foot numbness/tingling since therapy yesterday. RN aware. Pt tearful at end of session requiring emotional support by PT. Denies reports of  dizziness or SOB t/o session. Edu provided on fall precautions and reinforced w/ good understanding. Please refer to flowsheet for objective data above. Pt continues to demonstrate deficits relative to PLOF and would benefit from continued skilled PT services per POC to improve indep and safety with mobility. PT d/c recommendations: Anticipate level I (max) rehab intensity resources at d/c when medically appropriate pending progress 2/2 inaccessible home environment and significant assistance to complete mobility.   Goals   Patient Goals Have less pain   STG Expiration Date 05/06/24   Short Term Goal  #1 1). Pt will perform bed mobility with supervision demonstrating appropriate technique 100% of the time in order to improve function. 2) Perform all transfers with min Ax1 demonstrating safe and appropriate technique 100% of the time in order to improve ability to negotiate safely in home environment. 3) Amb 50'x1 with min Ax1 to demonstrate improved ability to negotiate in home environment. 4) Improve overall strength and balance 1/2 grade in order to optimize ability to perform functional tasks and reduce fall risk.5) Increase activity tolerance to 45 minutes in order to improve endurance to functional tasks. 6) Complete >/= 13 stairs with min Ax1 and most appropriate technique to demonstrate improved ability to enter/exit apartment 7) PT for ongoing patient and family/caregiver education, DME needs and d/c planning in order to promote highest level of function in least restrictive environment. 9) Further functional assessment required. Will review and update goals following further assessment.   PT Treatment Day 1   Plan   Treatment/Interventions Functional transfer training;LE strengthening/ROM;Elevations;Therapeutic exercise;Endurance training;Patient/family training;Equipment eval/education;Bed mobility;Gait training;Compensatory technique education;Spoke to nursing;OT   Progress Slow progress, decreased activity tolerance  (pain)   PT Frequency 5-7x/wk   Discharge Recommendation   Rehab Resource Intensity Level, PT I (Maximum Resource Intensity)   Additional Comments The patient's AM-PAC Basic Mobility Inpatient Short Form Raw Score is 12. A Raw score of less than or equal to 16 suggests the patient may benefit from discharge to post-acute rehabilitation services. Please also refer to the recommendation of the Physical Therapist for safe discharge planning.   AM-PAC Basic Mobility Inpatient   Turning in Flat Bed Without Bedrails 2   Lying on Back to Sitting on Edge of Flat Bed Without Bedrails 3   Moving  Bed to Chair 2   Standing Up From Chair Using Arms 2   Walk in Room 2   Climb 3-5 Stairs With Railing 1   Basic Mobility Inpatient Raw Score 12   Basic Mobility Standardized Score 32.23   Turning Head Towards Sound 4   Follow Simple Instructions 4   Low Function Basic Mobility Raw Score  20   Low Function Basic Mobility Standardized Score  32.8   [facility] Highest Level Of Mobility   [facility] Goal 4: Move to chair/commode   -HL Achieved 5: Stand (1 or more minutes)   Education   Education Provided Mobility training;Assistive device;Other  (benefits of continued mobility/negative impact of immobility.)   Patient Demonstrates acceptance/verbal understanding;Reinforcement needed   End of Consult   Patient Position at End of Consult Bed/Chair alarm activated;All needs within reach;Bedside chair   End of Consult Comments Spoke to nursing regarding recommendations and importance of avoiding prolonged positioning on stiffness/pain management.     Perry Etienne, JAYNE   04/23/24

## 2024-04-23 NOTE — ASSESSMENT & PLAN NOTE
Patient notes her fall prior to admission was preceded by an episode of dizziness  Patient relates that she was in her usual state of health, stood up from the recliner, felt dizzy, had spots in front of her eyes, and fell striking her left hip.  She denies any head trauma.  She denies any loss of consciousness.  Patient notes the days preceding this she was not dehydrated.  She denies any previous orthostatic symptoms.  Patient denies that she had any chest pain, pressure, discomfort, shortness of breath, dyspnea on exertion, or neurologic symptoms  Possibly related to orthostasis: pt denied dehydration: However her BUN: Creatinine ratio was greater than 20 concerning for prerenal azotemia, volume depletion, placing her at risk for orthostatic hypotension--especially as her symptoms occurred upon standing rapidly  2D echocardiogram: No significant aortic stenosis.  No left ventricular outflow tract, no significant wall motion abnormality.    Moderately dilated left atrium:  no significant valvular heart disease:    will refer to cardiology as outpt, non-urgently for fu:  updated pt regarding this  Patient monitored on telemetry without any significant arrhythmia  No focal neurologic signs or symptoms on exam

## 2024-04-23 NOTE — ASSESSMENT & PLAN NOTE
Maintained on methadone 139 mg daily, dose confirmed with [facility] treatment [place]  Patient will need further prn pain medications given hip fracture, during her recovery  Continue outpatient treatment center follow-up after discharge

## 2024-04-23 NOTE — PROGRESS NOTES
[facility]  Progress Note  Name: Jaclyn Camp I  MRN: [id number]  Unit/Bed#: E2 -01 I Date of Admission: 4/20/2024   Date of Service: 4/23/2024 I Hospital Day: 3    Assessment/Plan   * Closed fracture of left hip with routine healing  Assessment & Plan  Patient is a 68-year-old female with past medical history significant for diabetes with peripheral neuropathy, hypertension, hypothyroidism, previous right TMA, schizoaffective disorder, bipolar disorder, who presented to the ER after a fall: Patient became lightheaded and fell on a hardwood floor.  No loss of consciousness but unfortunately fractured her left hip    CT with comminuted intertrochanteric fracture left proximal femur  Patient was evaluated by the orthopedic surgery team and underwent ORIF left femur on 4/21  Cont pain control, DVT prophylaxis, PT/OT  Will need STR    Dizziness  Assessment & Plan  Patient notes her fall prior to admission was preceded by an episode of dizziness  Patient relates that she was in her usual state of health, stood up from the recliner, felt dizzy, had spots in front of her eyes, and fell striking her left hip.  She denies any head trauma.  She denies any loss of consciousness.  Patient notes the days preceding this she was not dehydrated.  She denies any previous orthostatic symptoms.  Patient denies that she had any chest pain, pressure, discomfort, shortness of breath, dyspnea on exertion, or neurologic symptoms  Possibly related to orthostasis: pt denied dehydration: However her BUN: Creatinine ratio was greater than 20 concerning for prerenal azotemia, volume depletion, placing her at risk for orthostatic hypotension--especially as her symptoms occurred upon standing rapidly  2D echocardiogram: No significant aortic stenosis.  No left ventricular outflow tract, no significant wall motion abnormality.    Moderately dilated left atrium:  no significant valvular heart disease:    will  refer to cardiology as outpt, non-urgently for fu:  updated pt regarding this  Patient monitored on telemetry without any significant arrhythmia  No focal neurologic signs or symptoms on exam    HTN (hypertension)  Assessment & Plan  Patient has previous history of essential hypertension  Outpatient medications include lisinopril-HCTZ 20-25 mg daily plus lisinopril 5 mg daily  Hold ACE inhibitor and HCTZ given acute kidney injury  Blood pressures currently adequate, 137/67  Continue to monitor off antihypertensives to avoid hypotension and hypoperfusion  If blood pressure is elevated and she requires antihypertensive will start Norvasc    JOYCE (acute kidney injury) ([facility])  Assessment & Plan  Patient with acute kidney injury, with a creatinine that peaked at 1.95  Baseline creatinine 0.8-1.1  Etiology of acute kidney injury likely multifactorial  Patient had obstructive uropathy:  postoperatively  Also likely component of dehydration as patient was having poor p.o. intake especially fluids  Cont Urinary retention protocol  Cont to Hold ACE inhibitor and HCTZ  Continue gentle IV fluids:  creat improved today to 1.64  BUN: creat ratio still >20 c/w pre-renal, and volume depletion: cont ivf    Acute blood loss anemia  Assessment & Plan  Patient appears to have a mild chronic anemia with baseline hemoglobin approximately 10  Patient developed an acute blood loss anemia: Likely secondary to expected blood loss from her intertrochanteric femur fracture, as well as expected procedural blood loss  Will start IV Venofer  No current hypotension  Monitor closely  Does not meet criteria for transfusion at this time    Methadone use  Assessment & Plan  Maintained on methadone 139 mg daily, dose confirmed with [facility] treatment center  Patient will need further prn pain medications given hip fracture, during her recovery  Continue outpatient treatment center follow-up after discharge    Type 2 diabetes  mellitus with diabetic neuropathy, with long-term current use of insulin (HCC)  Assessment & Plan  Lab Results   Component Value Date    HGBA1C 11.7 (H) 04/13/2021       Recent Labs     04/22/24  2103 04/23/24  0710 04/23/24  1118 04/23/24  1534   POCGLU 159* 76 108 130         Continue Lantus 22 units twice daily, insulin lispro 8 units 3 times daily with meals  Monitor Accu-Cheks, sliding scale for coverage  Sugars adequate controlled on this regimen      Hypothyroidism  Assessment & Plan  Continue Synthroid    Bipolar affective disorder (HCC)  Assessment & Plan  Patient has previous history of bipolar disorder, as well as schizoaffective disorder  Continue duloxetine, sertraline, clonazepam  Clonazepam bid confirmed on PDMP website                 Family:  called son Harpreet Casey and gave update    VTE Pharmacologic Prophylaxis: Heparin  VTE Mechanical Prophylaxis: sequential compression device    Dw pts nurse and CM      Certification Statement: The patient will continue to require additional inpatient hospital stay due to need for further acute intervention for pain control, dc planning    Status: inpatient     ===================================================================    Subjective:  Patient relates she is currently having pain, she notes it is in her left knee, rating down her left leg.  She denies any pain anywhere else.  Denies any chest pain.  Denies any shortness of breath or cough.  Denies any abdominal pain.  Denies any nausea or vomiting.  Is tolerating p.o.  Notes she has not passed her bowels today.  Most recently passed her bowels the day prior to admission.  Notes she feels as though she is going to pass them in the near future and declines any additional laxatives.  She notes the pain medicine improves her pain when she takes the doses.  She is requesting a dose now.  She denies any other complaints      Physical Exam:   Temp:  [97 °F (36.1 °C)-98.7 °F (37.1 °C)] 98.2 °F (36.8  °C)  HR:  [] 92  Resp:  [18] 18  BP: (119-152)/(63-94) 137/67    Gen:  Pleasant, non-tachypnic, non-dyspnic.  Conversant.  Heart: regular rate and rhythm, S1S2 present, no murmur, rub or gallop  Lungs: clear to ausculatation bilaterally.  No wheezing, crackles, or rhonchi. No accessory muscle use or respiratory distress.  Abd: soft, non-tender, non-distended. NABS, no guarding, rebound or peritoneal signs.  Extremities: no clubbing, cyanosis or edema.  2+pedal pulses bilaterally.   Neuro: awake, alert.  Interactive.  Fluent speech  Skin: warm and dry: no petechiae, purpura and rash.    LABS:   Results from last 7 days   Lab Units 04/23/24  0436 04/22/24  0454 04/21/24  0504   WBC Thousand/uL 8.25 8.14 10.44*   HEMOGLOBIN g/dL 7.4* 7.3* 9.7*   HEMATOCRIT % 22.7* 23.0* 30.2*   PLATELETS Thousands/uL 165 153 217     Results from last 7 days   Lab Units 04/23/24  0436 04/22/24  0454 04/21/24  0504   POTASSIUM mmol/L 4.4 4.6 5.0   CHLORIDE mmol/L 108 111* 108   CO2 mmol/L 22 22 23   BUN mg/dL 31* 40* 49*   CREATININE mg/dL 1.64* 1.85* 1.94*   CALCIUM mg/dL 8.9 8.4 9.6       Hospital Data:  4/23: 2D echocardiogram    Left Ventricle: Left ventricular cavity size is normal. Wall thickness is mildly increased. There is mild concentric hypertrophy. The left ventricular ejection fraction is 56% by biplane measurement. Systolic function is normal. Wall motion is normal.    Left Atrium: The atrium is moderately dilated (42-48 mL/m2).    Mitral Valve: There is mild annular calcification.    Tricuspid Valve: The right ventricular systolic pressure is normal. The estimated right ventricular systolic pressure is 15.00 mmHg.    4/20 CT left lower extremity Comminuted intertrochanteric fracture of the left proximal femur with involvement of the greater and lesser trochanter.      4/20: X-ray left femur: No acute osseous abnormality      4/20: Chest x-ray: No acute cardiopulmonary disease.      4/20 left hip/pelvis  Comminuted  left intertrochanteric hip fracture.      Procedures  4/21: Open reduction internal fixation left femur        ---------------------------------------------------------------------------------------------------------------  This note has been constructed using a voice recognition system.

## 2024-04-23 NOTE — PROGRESS NOTES
Progress Note - Orthopedics   Jaclyn Camp 68 y.o. female MRN: [id number]  Unit/Bed#: AL CAR NON INV      Subjective:    68 y.o.female POD 2 s/p left femoral antegrade long IMN.  No new acute overnight events, no new complaints.  Pain today is an 8/10 but is reasonably well-controlled with medication.  She states she has had ongoing numbness and tingling in the left leg and foot since the injury.  She denies subjective CP, SOB, or N/V.    Labs:  0   Lab Value Date/Time    HCT 22.7 (L) 04/23/2024 0436    HCT 23.0 (L) 04/22/2024 0454    HCT 30.2 (L) 04/21/2024 0504    HGB 7.4 (L) 04/23/2024 0436    HGB 7.3 (L) 04/22/2024 0454    HGB 9.7 (L) 04/21/2024 0504    PT 11.9 (L) 07/29/2022 0824    INR 0.9 07/29/2022 0824    WBC 8.25 04/23/2024 0436    WBC 8.14 04/22/2024 0454    WBC 10.44 (H) 04/21/2024 0504    ESR 41 (H) 12/14/2021 1828    CRP 80.5 (H) 12/16/2021 1615       Meds:    Current Facility-Administered Medications:     acetaminophen (TYLENOL) tablet 650 mg, 650 mg, Oral, Q6H PRN, Seferino Stanton PA-C, 650 mg at 04/21/24 2202    atorvastatin (LIPITOR) tablet 40 mg, 40 mg, Oral, Daily With Dinner, Seferino Stanton PA-C, 40 mg at 04/22/24 1645    clonazePAM (KlonoPIN) tablet 1 mg, 1 mg, Oral, BID, Seferino Stanton PA-C, 1 mg at 04/23/24 0933    docusate sodium (COLACE) capsule 100 mg, 100 mg, Oral, Q12H, Seferino Stanton PA-C, 100 mg at 04/23/24 1503    DULoxetine (CYMBALTA) delayed release capsule 60 mg, 60 mg, Oral, Daily, Seferino Stanton PA-C, 60 mg at 04/23/24 0933    heparin (porcine) subcutaneous injection 5,000 Units, 5,000 Units, Subcutaneous, Q8H ЮЛИЯ, Tahmina Rivers MD, 5,000 Units at 04/23/24 1503    HYDROcodone-acetaminophen (NORCO) 5-325 mg per tablet 1 tablet, 1 tablet, Oral, Q6H PRN, Seferino Stanton PA-C    HYDROmorphone (DILAUDID) injection 0.2 mg, 0.2 mg, Intravenous, Q6H PRN, Seferino Stanton PA-C, 0.2 mg at 04/20/24 1614    insulin glargine (LANTUS) subcutaneous injection 22  Units 0.22 mL, 22 Units, Subcutaneous, HS, Seferino Stanton PA-C, 22 Units at 04/22/24 2132    insulin lispro (HumALOG/ADMELOG) 100 units/mL subcutaneous injection 1-5 Units, 1-5 Units, Subcutaneous, HS, Seferino Stanton PA-C, 1 Units at 04/22/24 2131    insulin lispro (HumALOG/ADMELOG) 100 units/mL subcutaneous injection 1-6 Units, 1-6 Units, Subcutaneous, TID AC, 3 Units at 04/21/24 1708 **AND** Fingerstick Glucose (POCT), , , TID AC, Seferino Stanton PA-C    insulin lispro (HumALOG/ADMELOG) 100 units/mL subcutaneous injection 8 Units, 8 Units, Subcutaneous, TID With Meals, Seferino Stanton PA-C, 8 Units at 04/23/24 1216    iron sucrose (VENOFER) 100 mg in sodium chloride 0.9 % 100 mL IVPB, 100 mg, Intravenous, Daily, Diamante Keita MD, 100 mg at 04/23/24 0949    lamoTRIgine (LaMICtal) tablet 150 mg, 150 mg, Oral, Daily, Seferino Stanton PA-C, 150 mg at 04/23/24 0933    methadone (DOLOPHINE) oral concentrated solution 139 mg, 139 mg, Oral, Daily, Tahmina Rivers MD, 139 mg at 04/23/24 0942    oxyCODONE (ROXICODONE) immediate release tablet 10 mg, 10 mg, Oral, Q4H PRN, Seferino Stanton PA-C, 10 mg at 04/23/24 0312    senna (SENOKOT) tablet 17.2 mg, 17.2 mg, Oral, HS PRN, Seferino Stanton PA-C    sertraline (ZOLOFT) tablet 100 mg, 100 mg, Oral, Daily, Seferino Stanton PA-C, 100 mg at 04/23/24 0933    sodium chloride 0.9 % infusion, 75 mL/hr, Intravenous, Continuous, Seferino Stanton PA-C, Last Rate: 75 mL/hr at 04/22/24 0051, 75 mL/hr at 04/22/24 0051    Blood Culture:   Lab Results   Component Value Date    BLOODCX No Growth After 5 Days. 04/13/2021       Wound Culture:   Lab Results   Component Value Date    WOUNDCULT 4+ Growth of Beta Hemolytic Streptococcus Group B (A) 04/13/2021    WOUNDCULT (A) 04/13/2021     3+ Growth of Methicillin Resistant Staphylococcus aureus    WOUNDCULT 4+ Growth of 04/13/2021       Ins and Outs:  I/O last 24 hours:  In: 100 [IV Piggyback:100]  Out: 1600  [Urine:1600]          Physical:  Vitals:    04/23/24 1531   BP: 137/67   Pulse: 92   Resp: 18   Temp: 98.2 °F (36.8 °C)   SpO2: 96%     Musculoskeletal: left Lower Extremity  Visible skin is without erythema or ecchymosis.  Dressing C/D/I  TTP over the incision sites.  The patient denies sensation over her toes.  Motor intact to +FHL/EHL, +ankle dorsi/plantar flexion  Digits warm and well perfused  Capillary refill < 2 seconds    Assessment:    68 y.o.female POD 2 s/p left femoral antegrade long IMN with Dr. Murray on 4/21/24.     Plan:  WBAT LLE.  Full ROM to the LLE.  Mepilex to be maintained until first post-op.   Will monitor for ABLA and administer IVF/prbc as indicated for greater than 2 gram Hgb drop or Hgb < 7.  Hgb this morning was 7.4, which is stable from yesterday.  No signs of active bleeding in the operative extremity.  PT/OT  Pain control  DVT ppx per primary team.  Medical co-morbidities are being managed per primary team  Dispo: Ortho will follow    Tamy Tejeda PA-C

## 2024-04-23 NOTE — PLAN OF CARE
Problem: PHYSICAL THERAPY ADULT  Goal: Performs mobility at highest level of function for planned discharge setting.  See evaluation for individualized goals.  Description: Treatment/Interventions: Functional transfer training, LE strengthening/ROM, Elevations, Therapeutic exercise, Endurance training, Patient/family training, Equipment eval/education, Bed mobility, Gait training, Compensatory technique education, Spoke to nursing, OT, Continued evaluation          See flowsheet documentation for full assessment, interventions and recommendations.  Outcome: Progressing  Note: Prognosis: Good  Problem List: Decreased strength, Decreased range of motion, Decreased endurance, Impaired balance, Decreased mobility  Assessment: Pt seen per PT POC and goals updated. Pt amenable to PT session. Intervention with focus on  functional transfers, gait training , and pt education on  importance of OOB/continued mobility and therapy services on functional otucomes. Pt with fair tolerance to session with limitations of fatigue, weakness, inc pain, and dec endurance and activity tolerance. Pt is continuing to progress towards functional goals with ability to progress to forward ambulation. Currently requiring min Ax1-mod Ax2 to complete functional mobility with skilled cues for technique/safety throughout. Poor pain control noted throughout session. Pt reporting new onset L foot numbness/tingling since therapy yesterday. RN aware. Pt tearful at end of session requiring emotional support by PT. Denies reports of  dizziness or SOB t/o session. Edu provided on fall precautions and reinforced w/ good understanding. Please refer to flowsheet for objective data above. Pt continues to demonstrate deficits relative to PLOF and would benefit from continued skilled PT services per POC to improve indep and safety with mobility. PT d/c recommendations: Anticipate level I (max) rehab intensity resources at d/c when medically appropriate  pending progress 2/2 inaccessible home environment and significant assistance to complete mobility.  Barriers to Discharge: Inaccessible home environment, Decreased caregiver support ((+) TRUPTI, limited physical assistance at home)     Rehab Resource Intensity Level, PT: I (Maximum Resource Intensity)    See flowsheet documentation for full assessment.

## 2024-04-23 NOTE — PLAN OF CARE
Problem: Potential for Falls  Goal: Patient will remain free of falls  Description: INTERVENTIONS:  - Educate patient/family on patient safety including physical limitations  - Instruct patient to call for assistance with activity   - Consult OT/PT to assist with strengthening/mobility   - Keep Call bell within reach  - Keep bed low and locked with side rails adjusted as appropriate  - Keep care items and personal belongings within reach  - Initiate and maintain comfort rounds  - Make Fall Risk Sign visible to staff  - Offer Toileting every 2 Hours, in advance of need  - Initiate/Maintain bed alarm  - Obtain necessary fall risk management equipment: bed and chair alarm  - Apply yellow socks and bracelet for high fall risk patients  - Consider moving patient to room near nurses station  Outcome: Progressing     Problem: PAIN - ADULT  Goal: Verbalizes/displays adequate comfort level or baseline comfort level  Description: Interventions:  - Encourage patient to monitor pain and request assistance  - Assess pain using appropriate pain scale  - Administer analgesics based on type and severity of pain and evaluate response  - Implement non-pharmacological measures as appropriate and evaluate response  - Consider cultural and social influences on pain and pain management  - Notify physician/advanced practitioner if interventions unsuccessful or patient reports new pain  Outcome: Progressing     Problem: CARDIOVASCULAR - ADULT  Goal: Maintains optimal cardiac output and hemodynamic stability  Description: INTERVENTIONS:  - Monitor I/O, vital signs and rhythm  - Monitor for S/S and trends of decreased cardiac output  - Administer and titrate ordered vasoactive medications to optimize hemodynamic stability  - Assess quality of pulses, skin color and temperature  - Assess for signs of decreased coronary artery perfusion  - Instruct patient to report change in severity of symptoms  Outcome: Progressing  Goal: Absence of  cardiac dysrhythmias or at baseline rhythm  Description: INTERVENTIONS:  - Continuous cardiac monitoring, vital signs, obtain 12 lead EKG if ordered  - Administer antiarrhythmic and heart rate control medications as ordered  - Monitor electrolytes and administer replacement therapy as ordered  Outcome: Progressing

## 2024-04-23 NOTE — ASSESSMENT & PLAN NOTE
Patient with acute kidney injury, with a creatinine that peaked at 1.95  Baseline creatinine 0.8-1.1  Etiology of acute kidney injury likely multifactorial  Patient had obstructive uropathy:  postoperatively  Also likely component of dehydration as patient was having poor p.o. intake especially fluids  Cont Urinary retention protocol  Cont to Hold ACE inhibitor and HCTZ  Continue gentle IV fluids:  creat improved today to 1.64  BUN: creat ratio still >20 c/w pre-renal, and volume depletion: cont ivf

## 2024-04-24 PROBLEM — R42 DIZZINESS: Status: RESOLVED | Noted: 2024-04-22 | Resolved: 2024-04-24

## 2024-04-24 PROBLEM — N17.9 AKI (ACUTE KIDNEY INJURY) (HCC): Status: RESOLVED | Noted: 2021-04-13 | Resolved: 2024-04-24

## 2024-04-24 LAB
ANION GAP SERPL CALCULATED.3IONS-SCNC: 6 MMOL/L (ref 4–13)
BASOPHILS # BLD AUTO: 0.03 THOUSANDS/ÂΜL (ref 0–0.1)
BASOPHILS NFR BLD AUTO: 0 % (ref 0–1)
BUN SERPL-MCNC: 25 MG/DL (ref 5–25)
CALCIUM SERPL-MCNC: 9.2 MG/DL (ref 8.4–10.2)
CHLORIDE SERPL-SCNC: 108 MMOL/L (ref 96–108)
CO2 SERPL-SCNC: 25 MMOL/L (ref 21–32)
CREAT SERPL-MCNC: 1.35 MG/DL (ref 0.6–1.3)
EOSINOPHIL # BLD AUTO: 0.12 THOUSAND/ÂΜL (ref 0–0.61)
EOSINOPHIL NFR BLD AUTO: 2 % (ref 0–6)
ERYTHROCYTE [DISTWIDTH] IN BLOOD BY AUTOMATED COUNT: 15.8 % (ref 11.6–15.1)
GFR SERPL CREATININE-BSD FRML MDRD: 40 ML/MIN/1.73SQ M
GLUCOSE SERPL-MCNC: 101 MG/DL (ref 65–140)
GLUCOSE SERPL-MCNC: 67 MG/DL (ref 65–140)
GLUCOSE SERPL-MCNC: 73 MG/DL (ref 65–140)
GLUCOSE SERPL-MCNC: 86 MG/DL (ref 65–140)
GLUCOSE SERPL-MCNC: 97 MG/DL (ref 65–140)
HCT VFR BLD AUTO: 24.2 % (ref 34.8–46.1)
HGB BLD-MCNC: 7.7 G/DL (ref 11.5–15.4)
IMM GRANULOCYTES # BLD AUTO: 0.02 THOUSAND/UL (ref 0–0.2)
IMM GRANULOCYTES NFR BLD AUTO: 0 % (ref 0–2)
LYMPHOCYTES # BLD AUTO: 1.83 THOUSANDS/ÂΜL (ref 0.6–4.47)
LYMPHOCYTES NFR BLD AUTO: 25 % (ref 14–44)
MCH RBC QN AUTO: 28.4 PG (ref 26.8–34.3)
MCHC RBC AUTO-ENTMCNC: 31.8 G/DL (ref 31.4–37.4)
MCV RBC AUTO: 89 FL (ref 82–98)
MONOCYTES # BLD AUTO: 0.66 THOUSAND/ÂΜL (ref 0.17–1.22)
MONOCYTES NFR BLD AUTO: 9 % (ref 4–12)
NEUTROPHILS # BLD AUTO: 4.56 THOUSANDS/ÂΜL (ref 1.85–7.62)
NEUTS SEG NFR BLD AUTO: 64 % (ref 43–75)
NRBC BLD AUTO-RTO: 0 /100 WBCS
PLATELET # BLD AUTO: 185 THOUSANDS/UL (ref 149–390)
PMV BLD AUTO: 10 FL (ref 8.9–12.7)
POTASSIUM SERPL-SCNC: 4.5 MMOL/L (ref 3.5–5.3)
RBC # BLD AUTO: 2.71 MILLION/UL (ref 3.81–5.12)
SARS-COV-2 RNA RESP QL NAA+PROBE: NEGATIVE
SODIUM SERPL-SCNC: 139 MMOL/L (ref 135–147)
WBC # BLD AUTO: 7.22 THOUSAND/UL (ref 4.31–10.16)

## 2024-04-24 PROCEDURE — 99024 POSTOP FOLLOW-UP VISIT: CPT

## 2024-04-24 PROCEDURE — 85025 COMPLETE CBC W/AUTO DIFF WBC: CPT | Performed by: INTERNAL MEDICINE

## 2024-04-24 PROCEDURE — 87635 SARS-COV-2 COVID-19 AMP PRB: CPT | Performed by: INTERNAL MEDICINE

## 2024-04-24 PROCEDURE — 99239 HOSP IP/OBS DSCHRG MGMT >30: CPT | Performed by: INTERNAL MEDICINE

## 2024-04-24 PROCEDURE — 80048 BASIC METABOLIC PNL TOTAL CA: CPT | Performed by: INTERNAL MEDICINE

## 2024-04-24 PROCEDURE — 82948 REAGENT STRIP/BLOOD GLUCOSE: CPT

## 2024-04-24 RX ORDER — BISACODYL 10 MG
10 SUPPOSITORY, RECTAL RECTAL DAILY PRN
Status: DISCONTINUED | OUTPATIENT
Start: 2024-04-24 | End: 2024-04-25 | Stop reason: HOSPADM

## 2024-04-24 RX ORDER — POLYETHYLENE GLYCOL 3350 17 G/17G
17 POWDER, FOR SOLUTION ORAL DAILY
Status: DISCONTINUED | OUTPATIENT
Start: 2024-04-24 | End: 2024-04-25 | Stop reason: HOSPADM

## 2024-04-24 RX ORDER — POLYETHYLENE GLYCOL 3350 17 G/17G
17 POWDER, FOR SOLUTION ORAL DAILY
Start: 2024-04-25

## 2024-04-24 RX ORDER — OXYCODONE HYDROCHLORIDE 5 MG/1
10 TABLET ORAL EVERY 6 HOURS PRN
Start: 2024-04-24 | End: 2024-04-26

## 2024-04-24 RX ORDER — HYDROMORPHONE HCL/PF 1 MG/ML
0.2 SYRINGE (ML) INJECTION ONCE
Status: COMPLETED | OUTPATIENT
Start: 2024-04-24 | End: 2024-04-24

## 2024-04-24 RX ADMIN — HEPARIN SODIUM 5000 UNITS: 5000 INJECTION INTRAVENOUS; SUBCUTANEOUS at 15:05

## 2024-04-24 RX ADMIN — OXYCODONE HYDROCHLORIDE 10 MG: 10 TABLET ORAL at 05:08

## 2024-04-24 RX ADMIN — DOCUSATE SODIUM 100 MG: 100 CAPSULE, LIQUID FILLED ORAL at 05:08

## 2024-04-24 RX ADMIN — METHADONE HYDROCHLORIDE 139 MG: 10 CONCENTRATE ORAL at 08:30

## 2024-04-24 RX ADMIN — HEPARIN SODIUM 5000 UNITS: 5000 INJECTION INTRAVENOUS; SUBCUTANEOUS at 21:32

## 2024-04-24 RX ADMIN — OXYCODONE HYDROCHLORIDE 10 MG: 10 TABLET ORAL at 20:02

## 2024-04-24 RX ADMIN — ATORVASTATIN CALCIUM 40 MG: 40 TABLET, FILM COATED ORAL at 17:35

## 2024-04-24 RX ADMIN — DOCUSATE SODIUM 100 MG: 100 CAPSULE, LIQUID FILLED ORAL at 15:05

## 2024-04-24 RX ADMIN — HYDROMORPHONE HYDROCHLORIDE 0.2 MG: 1 INJECTION, SOLUTION INTRAMUSCULAR; INTRAVENOUS; SUBCUTANEOUS at 17:35

## 2024-04-24 RX ADMIN — SENNOSIDES 17.2 MG: 8.6 TABLET, FILM COATED ORAL at 08:43

## 2024-04-24 RX ADMIN — LAMOTRIGINE 150 MG: 100 TABLET ORAL at 08:30

## 2024-04-24 RX ADMIN — CLONAZEPAM 1 MG: 1 TABLET ORAL at 17:35

## 2024-04-24 RX ADMIN — CLONAZEPAM 1 MG: 1 TABLET ORAL at 08:30

## 2024-04-24 RX ADMIN — HEPARIN SODIUM 5000 UNITS: 5000 INJECTION INTRAVENOUS; SUBCUTANEOUS at 05:08

## 2024-04-24 RX ADMIN — POLYETHYLENE GLYCOL 3350 17 G: 17 POWDER, FOR SOLUTION ORAL at 09:52

## 2024-04-24 RX ADMIN — IRON SUCROSE 100 MG: 20 INJECTION, SOLUTION INTRAVENOUS at 08:47

## 2024-04-24 RX ADMIN — DULOXETINE HYDROCHLORIDE 60 MG: 30 CAPSULE, DELAYED RELEASE ORAL at 08:30

## 2024-04-24 RX ADMIN — INSULIN GLARGINE 22 UNITS: 100 INJECTION, SOLUTION SUBCUTANEOUS at 21:32

## 2024-04-24 RX ADMIN — SERTRALINE HYDROCHLORIDE 100 MG: 100 TABLET ORAL at 08:30

## 2024-04-24 NOTE — ASSESSMENT & PLAN NOTE
Patient notes her fall prior to admission was preceded by an episode of dizziness  Patient relates that she was in her usual state of health, stood up from the recliner, felt dizzy, had spots in front of her eyes, and fell striking her left hip.  She denies any head trauma.  She denies any loss of consciousness.  Patient notes the days preceding this she was not dehydrated.  She denies any previous orthostatic symptoms.  Patient denies that she had any chest pain, pressure, discomfort, shortness of breath, dyspnea on exertion, or neurologic symptoms  2D echocardiogram: No significant aortic stenosis.  No left ventricular outflow tract, no significant wall motion abnormality.    Moderately dilated left atrium:  no significant valvular heart disease:    will refer to cardiology as outpt, non-urgently for fu:  updated pt regarding this  Patient monitored on telemetry without any significant arrhythmia  No focal neurologic signs or symptoms on exam  Suspect pt's episode of dizziness was most likely due to orthostasis as she was volume depleted on admission with JOYCE and pre-renal azotemia and BUN:creat ratio >20.  --especially as her symptoms occurred upon standing rapidly  Was rehydrated.  Would not be restarted on a diuretic.  Did not have any symptoms during her hospital stay.

## 2024-04-24 NOTE — ASSESSMENT & PLAN NOTE
Maintained on methadone 139 mg daily, dose confirmed with [facility] treatment [facility]  Patient will need further prn pain medications given hip fracture, during her recovery  Continue outpatient treatment center follow-up after discharge

## 2024-04-24 NOTE — ASSESSMENT & PLAN NOTE
Patient with acute kidney injury, with a creatinine that peaked at 1.95  Baseline creatinine 0.8-1.1  Etiology of acute kidney injury likely multifactorial  Patient had obstructive uropathy:  postoperatively  Also likely component of dehydration as patient was having poor p.o. intake especially fluids  Pt did not have any significant urinary retention on PVRs  Cont to Hold ACE inhibitor and HCTZ at discharge  She was continued on IV fluids as her BUN: Creatinine ratio was greater than 20, consistent with volume depletion and prerenal azotemia despite adequate oral intake  Patient's creatinine improved to 1.35 prior to discharge and her BUN: Creatinine ratio improved  Patient will be discharged to continue oral hydration

## 2024-04-24 NOTE — PLAN OF CARE
Problem: Potential for Falls  Goal: Patient will remain free of falls  Description: INTERVENTIONS:  - Educate patient/family on patient safety including physical limitations  - Instruct patient to call for assistance with activity   - Consult OT/PT to assist with strengthening/mobility   - Keep Call bell within reach  - Keep bed low and locked with side rails adjusted as appropriate  - Keep care items and personal belongings within reach  - Initiate and maintain comfort rounds  - Make Fall Risk Sign visible to staff  - Offer Toileting every 2 Hours, in advance of need  - Initiate/Maintain bed alarm  - Apply yellow socks and bracelet for high fall risk patients  - Consider moving patient to room near nurses station  Outcome: Progressing     Problem: Prexisting or High Potential for Compromised Skin Integrity  Goal: Skin integrity is maintained or improved  Description: INTERVENTIONS:  - Identify patients at risk for skin breakdown  - Assess and monitor skin integrity  - Assess and monitor nutrition and hydration status  - Monitor labs   - Assess for incontinence   - Turn and reposition patient  - Assist with mobility/ambulation  - Relieve pressure over bony prominences  - Avoid friction and shearing  - Provide appropriate hygiene as needed including keeping skin clean and dry  - Evaluate need for skin moisturizer/barrier cream  - Collaborate with interdisciplinary team   - Patient/family teaching  - Consider wound care consult   Outcome: Progressing     Problem: PAIN - ADULT  Goal: Verbalizes/displays adequate comfort level or baseline comfort level  Description: Interventions:  - Encourage patient to monitor pain and request assistance  - Assess pain using appropriate pain scale  - Administer analgesics based on type and severity of pain and evaluate response  - Implement non-pharmacological measures as appropriate and evaluate response  - Consider cultural and social influences on pain and pain management  -  Notify physician/advanced practitioner if interventions unsuccessful or patient reports new pain  Outcome: Progressing

## 2024-04-24 NOTE — PLAN OF CARE
Problem: Potential for Falls  Goal: Patient will remain free of falls  Description: INTERVENTIONS:  - Educate patient/family on patient safety including physical limitations  - Instruct patient to call for assistance with activity   - Consult OT/PT to assist with strengthening/mobility   - Keep Call bell within reach  - Keep bed low and locked with side rails adjusted as appropriate  - Keep care items and personal belongings within reach  - Initiate and maintain comfort rounds  - Make Fall Risk Sign visible to staff  - Offer Toileting every 2 Hours, in advance of need  - Initiate/Maintain bed and chair alarm  - Apply yellow socks and bracelet for high fall risk patients  - Consider moving patient to room near nurses station  Outcome: Progressing     Problem: PAIN - ADULT  Goal: Verbalizes/displays adequate comfort level or baseline comfort level  Description: Interventions:  - Encourage patient to monitor pain and request assistance  - Assess pain using appropriate pain scale  - Administer analgesics based on type and severity of pain and evaluate response  - Implement non-pharmacological measures as appropriate and evaluate response  - Consider cultural and social influences on pain and pain management  - Notify physician/advanced practitioner if interventions unsuccessful or patient reports new pain  Outcome: Progressing     Problem: Knowledge Deficit  Goal: Patient/family/caregiver demonstrates understanding of disease process, treatment plan, medications, and discharge instructions  Description: Complete learning assessment and assess knowledge base.  Interventions:  - Provide teaching at level of understanding  - Provide teaching via preferred learning methods  Outcome: Progressing     Problem: DISCHARGE PLANNING  Goal: Discharge to home or other facility with appropriate resources  Description: INTERVENTIONS:  - Identify barriers to discharge w/patient and caregiver  - Arrange for needed discharge resources  and transportation as appropriate  - Identify discharge learning needs (meds, wound care, etc.)  - Arrange for interpretive services to assist at discharge as needed  - Refer to Case Management Department for coordinating discharge planning if the patient needs post-hospital services based on physician/advanced practitioner order or complex needs related to functional status, cognitive ability, or social support system  Outcome: Progressing

## 2024-04-24 NOTE — DISCHARGE SUMMARY
[facility]  Discharge- Jaclyn Camp 1955, 68 y.o. female MRN: [id number]  Unit/Bed#: E2 -01 Encounter: [id number]  Primary Care Provider: Juan Daniel Echevarria MD   Date and time admitted to hospital: 4/20/2024 12:50 PM      Addendum:  pt declines dc now;  notes myalgias/arthralgias and sorethroat. Wishes to be dc tomorrow instead.  Negative covid.  D/w CM who will query GS.  Dc postponed.  Updated son.    Admission Date: 4/20/2024       Discharge Date: 4/24/2024        Primary Diagnoses  Principal Problem:    Closed fracture of left hip with routine healing  Active Problems:    JOYCE (acute kidney injury) (HCC)    HTN (hypertension)    Bipolar affective disorder (HCC)    Hypothyroidism    Type 2 diabetes mellitus with diabetic neuropathy, with long-term current use of insulin (HCC)    Methadone use    Preop cardiovascular exam    Acute blood loss anemia  Resolved Problems:    Dizziness      Hospital course by problem:  * Closed fracture of left hip with routine healing  Assessment & Plan  Patient is a 68-year-old female with past medical history significant for diabetes with peripheral neuropathy, hypertension, hypothyroidism, previous right TMA, schizoaffective disorder, bipolar disorder, who presented to the ER after a fall: Patient became lightheaded and fell on a hardwood floor.  No loss of consciousness but unfortunately fractured her left hip    CT with comminuted intertrochanteric fracture left proximal femur  Patient was evaluated by the orthopedic surgery team and underwent ORIF left femur on 4/21  Cont pain control, DVT prophylaxis, PT/OT  Will need STR    HTN (hypertension)  Assessment & Plan  Patient has previous history of essential hypertension  Outpatient medications include lisinopril-HCTZ 20-25 mg daily plus lisinopril 5 mg daily  ACE inhibitor and HCTZ were placed on hold given acute kidney injury  Blood pressures currently adequate off antihypertensives  Continue to  monitor off antihypertensives to avoid hypotension and hypoperfusion  Patient's acute kidney injury is resolving.  Her previous dehydration has resolved  If patient were to require antihypertensives to be reinitiated, especially now that she is adequately hydrated, would restart lisinopril 5 mg daily.  Would not restart diuretics    JOYCE (acute kidney injury) ([facility])  Assessment & Plan  Patient with acute kidney injury, with a creatinine that peaked at 1.95  Baseline creatinine 0.8-1.1  Etiology of acute kidney injury likely multifactorial  Patient had obstructive uropathy:  postoperatively  Also likely component of dehydration as patient was having poor p.o. intake especially fluids  Pt did not have any significant urinary retention on PVRs  Cont to Hold ACE inhibitor and HCTZ at discharge  She was continued on IV fluids as her BUN: Creatinine ratio was greater than 20, consistent with volume depletion and prerenal azotemia despite adequate oral intake  Patient's creatinine improved to 1.35 prior to discharge and her BUN: Creatinine ratio improved  Patient will be discharged to continue oral hydration    Dizziness-resolved as of 4/24/2024  Assessment & Plan  Patient notes her fall prior to admission was preceded by an episode of dizziness  Patient relates that she was in her usual state of health, stood up from the recliner, felt dizzy, had spots in front of her eyes, and fell striking her left hip.  She denies any head trauma.  She denies any loss of consciousness.  Patient notes the days preceding this she was not dehydrated.  She denies any previous orthostatic symptoms.  Patient denies that she had any chest pain, pressure, discomfort, shortness of breath, dyspnea on exertion, or neurologic symptoms  2D echocardiogram: No significant aortic stenosis.  No left ventricular outflow tract, no significant wall motion abnormality.    Moderately dilated left atrium:  no significant valvular heart disease:    will  refer to cardiology as outpt, non-urgently for fu:  updated pt regarding this  Patient monitored on telemetry without any significant arrhythmia  No focal neurologic signs or symptoms on exam  Suspect pt's episode of dizziness was most likely due to orthostasis as she was volume depleted on admission with JOYCE and pre-renal azotemia and BUN:creat ratio >20.  --especially as her symptoms occurred upon standing rapidly  Was rehydrated.  Would not be restarted on a diuretic.  Did not have any symptoms during her hospital stay.    Acute blood loss anemia  Assessment & Plan  Patient appears to have a mild chronic anemia with baseline hemoglobin approximately 10  Patient developed an acute blood loss anemia: Likely secondary to expected blood loss from her intertrochanteric femur fracture, as well as expected procedural blood loss  Received 3 days of IV Venofer  No current hypotension  Monitor closely  Does not meet criteria for transfusion at this time: Hemoglobin stable at 7.7  Continue iron supplements/vitamin C at discharge    Methadone use  Assessment & Plan  Maintained on methadone 139 mg daily, dose confirmed with [facility]  Patient will need further prn pain medications given hip fracture, during her recovery  Continue outpatient treatment center follow-up after discharge    Type 2 diabetes mellitus with diabetic neuropathy, with long-term current use of insulin ([facility])  Assessment & Plan  Lab Results   Component Value Date    HGBA1C 11.7 (H) 04/13/2021       Recent Labs     04/23/24  1118 04/23/24  1534 04/23/24  2109 04/24/24  0647   POCGLU 108 130 213* 73         Continue Lantus 22 units twice daily, insulin lispro 8 units 3 times daily with meals  Monitor Accu-Cheks, sliding scale for coverage  Blood sugars adequate controlled on this regimen:  will cont this at STR      Hypothyroidism  Assessment & Plan  Continue Synthroid    Bipolar affective disorder (HCC)  Assessment &  Plan  Patient has previous history of bipolar disorder, as well as schizoaffective disorder  Continue duloxetine, sertraline, clonazepam  Clonazepam bid confirmed on PDMP website      Service:  [facility], [facility].    Consulting Providers   Ortho: Dr. Murray    [facility] Studies:  4/23: 2D echocardiogram    Left Ventricle: Left ventricular cavity size is normal. Wall thickness is mildly increased. There is mild concentric hypertrophy. The left ventricular ejection fraction is 56% by biplane measurement. Systolic function is normal. Wall motion is normal.    Left Atrium: The atrium is moderately dilated (42-48 mL/m2).    Mitral Valve: There is mild annular calcification.    Tricuspid Valve: The right ventricular systolic pressure is normal. The estimated right ventricular systolic pressure is 15.00 mmHg.     4/20 CT left lower extremity Comminuted intertrochanteric fracture of the left proximal femur with involvement of the greater and lesser trochanter.      4/20: X-ray left femur: No acute osseous abnormality      4/20: Chest x-ray: No acute cardiopulmonary disease.      4/20 left hip/pelvis  Comminuted left intertrochanteric hip fracture.      Procedures  4/21: Open reduction internal fixation left femur    Results from last 7 days   Lab Units 04/24/24  0512 04/23/24  0436 04/22/24  0454   WBC Thousand/uL 7.22 8.25 8.14   HEMOGLOBIN g/dL 7.7* 7.4* 7.3*   HEMATOCRIT % 24.2* 22.7* 23.0*   PLATELETS Thousands/uL 185 165 153     Results from last 7 days   Lab Units 04/24/24  0512 04/23/24  0436 04/22/24  0454   POTASSIUM mmol/L 4.5 4.4 4.6   CHLORIDE mmol/L 108 108 111*   CO2 mmol/L 25 22 22   BUN mg/dL 25 31* 40*   CREATININE mg/dL 1.35* 1.64* 1.85*   CALCIUM mg/dL 9.2 8.9 8.4       History and Physical Exam:  Please refer to the Admission H&P note    Discharge Condition: Improved  Discharge Disposition: Acute Rehab at [facility]    Discharge Note and Physical Exam:   Patient  notes that she feels better.  She states her pain is controlled on her current pain regimen.  She notes her pain is most pronounced from the knee, extending to her foot.  She denies any pain anywhere else.  Denies any shortness of breath or cough.  Denies any abdominal pain.  Notes she is tolerating p.o.  Denies any nausea or vomiting.  Has not yet moved her bowels.  Is currently laxative dose from her nurse at the bedside.    Vitals:    04/24/24 0733   BP: 144/80   Pulse: 96   Resp: 18   Temp: 98.1 °F (36.7 °C)   SpO2: 97%     General:  Pleasant, non-tachypnic, non-dyspnic.  Conversant  Heart: Regular rate and rhythm, S1S2 present.  No murmur, rub or gallop.  Lungs: Clear to auscultation bilaterally, no wheezing, rhonchi, or crackles.  Good air movement.  No accessory muscle use or respiratory distress.  Abdomen: soft, non-tender, non-distended, NABS.  No rebound or guarding.  No mass or peritoneal signs.  Extremities: no clubbing, cyanosis.  Trace edema left knee extending to left toes.  Wiggles all toes.  1+ bilateral pedal pulses.  Neurologic: Wake and alert.  Fluent speech.  Interacts  Skin: warm and dry. No petechiae, purpura or rash.  Left foot, warm, dry.  Intact capillary refill.  No skin lesions.      Discharge Medications   Please see Medical Reconciliation Discharge Form    Discharge Follow Up Appointments:   Juan Daniel Echevarria MD: 1 week  Dr. Murray:  2 weeks    Discharge  Statement   Total Time Spent today including physical exam, discussion with patient and discharge arrangements/care = 40 minutes.    This note has been constructed using a voice recognition system

## 2024-04-24 NOTE — ASSESSMENT & PLAN NOTE
Patient appears to have a mild chronic anemia with baseline hemoglobin approximately 10  Patient developed an acute blood loss anemia: Likely secondary to expected blood loss from her intertrochanteric femur fracture, as well as expected procedural blood loss  Received 3 days of IV Venofer  No current hypotension  Monitor closely  Does not meet criteria for transfusion at this time: Hemoglobin stable at 7.7  Continue iron supplements/vitamin C at discharge

## 2024-04-24 NOTE — CASE MANAGEMENT
Case Management Discharge Planning Note    Patient name Jaclyn Camp  Location [facility]  /E2 -* MRN [id number]  : 1955 Date 2024       Current Admission Date: 2024  Current Admission Diagnosis:Closed fracture of left hip with routine healing   Patient Active Problem List    Diagnosis Date Noted    Dizziness 2024    Acute blood loss anemia 2024    HTN (hypertension) 2024    Closed fracture of left hip with routine healing 2024    Preop cardiovascular exam 2024    S/P transmetatarsal amputation of foot, right (HCC) 2021    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, left 2021    JOYCE (acute kidney injury) ([facility]) 2021    Uncomplicated opioid dependence ([facility]) 2020    IV drug abuse ([facility]) 2020    Constipation 2019    Right ankle cellulitis 2019    Cellulitis of right foot 2018    History of MRSA infection     Diabetes mellitus ([facility])     Depression     Bipolar disorder ([facility])     Abscess of right arm 2018    Abscess of tendon sheath of right forearm 2018    Methadone use 2018    Chronic hepatitis C without hepatic coma (HCC) 2018    Anemia 2018    Hyponatremia 2018    Right arm cellulitis 2018    Abscess of right forearm 2018    Abscess of tendon sheath of forearm, right 2018    Corn or callus 2014    Hyperlipidemia 2014    Type 2 diabetes mellitus with diabetic neuropathy, with long-term current use of insulin (HCC) 2014    Visual loss 2014    Persistent insomnia 2013    Bipolar affective disorder (HCC) 2013    Benign essential hypertension 2013    Hypothyroidism 2007      LOS (days): 4  Geometric Mean LOS (GMLOS) (days): 4.5  Days to GMLOS:0.8     OBJECTIVE:  Risk of Unplanned Readmission Score: 21.46         Current admission status: Inpatient   Preferred Pharmacy:   [id number] [place] [facility] [address]  [facility] [address]  Phone: [phone] Fax: [phone]    Primary Care Provider: Juan Daniel Echevarria MD    Primary Insurance: [facility] REP  Secondary Insurance: [employer] [facility]    DISCHARGE DETAILS:                                                                                                               Facility Insurance Auth Number: [id number]

## 2024-04-24 NOTE — CERTIFIED RECOVERY SPECIALIST
[facility] has received APPROVED authorization.  Insurance:   [employer]  Authorization received for: Acute Rehab  Facility: [facility]  Authorization #: [id number]  Start of Care: 4/24  Next Review Date: 2 business days  Continued Stay Care Coordinator: none given  Submit next review to: [phone]     Care Manager notified: Marya Mcdonald

## 2024-04-24 NOTE — PROGRESS NOTES
Progress Note - Orthopedics   Jaclyn Camp 68 y.o. female MRN: [id number]  Unit/Bed#: AL CAR NON INV      Subjective:    68 y.o.female POD 3 s/p left femoral antegrade long IMN.  No new acute overnight events, no new complaints.  Pain today is an 8/10, but she states her medication helps.  She reports numbness and tingling in the left leg.  When asked specifically if this is something she has at baseline, she does admit to decreased sensation at baseline.  She denies subjective CP, SOB, or N/V.     Labs:  0   Lab Value Date/Time    HCT 24.2 (L) 04/24/2024 0512    HCT 22.7 (L) 04/23/2024 0436    HCT 23.0 (L) 04/22/2024 0454    HGB 7.7 (L) 04/24/2024 0512    HGB 7.4 (L) 04/23/2024 0436    HGB 7.3 (L) 04/22/2024 0454    PT 11.9 (L) 07/29/2022 0824    INR 0.9 07/29/2022 0824    WBC 7.22 04/24/2024 0512    WBC 8.25 04/23/2024 0436    WBC 8.14 04/22/2024 0454    ESR 41 (H) 12/14/2021 1828    CRP 80.5 (H) 12/16/2021 1615       Meds:    Current Facility-Administered Medications:     acetaminophen (TYLENOL) tablet 650 mg, 650 mg, Oral, Q6H PRN, Seferino Stanton PA-C, 650 mg at 04/21/24 2202    atorvastatin (LIPITOR) tablet 40 mg, 40 mg, Oral, Daily With Dinner, Seferino Stanton PA-C, 40 mg at 04/23/24 1721    bisacodyl (DULCOLAX) rectal suppository 10 mg, 10 mg, Rectal, Daily PRN, Diamante Keita MD    clonazePAM (KlonoPIN) tablet 1 mg, 1 mg, Oral, BID, Seferino Stanton PA-C, 1 mg at 04/24/24 0830    docusate sodium (COLACE) capsule 100 mg, 100 mg, Oral, Q12H, Seferino Stanton PA-C, 100 mg at 04/24/24 0508    DULoxetine (CYMBALTA) delayed release capsule 60 mg, 60 mg, Oral, Daily, Seferino Stanton PA-C, 60 mg at 04/24/24 0830    heparin (porcine) subcutaneous injection 5,000 Units, 5,000 Units, Subcutaneous, Q8H ЮЛИЯ, Tahmina Rivers MD, 5,000 Units at 04/24/24 0508    HYDROcodone-acetaminophen (NORCO) 5-325 mg per tablet 1 tablet, 1 tablet, Oral, Q6H PRN, Seferino Stanton PA-C    HYDROmorphone (DILAUDID)  injection 0.2 mg, 0.2 mg, Intravenous, Q6H PRN, Seferino Stanton PA-C, 0.2 mg at 04/20/24 1614    insulin glargine (LANTUS) subcutaneous injection 22 Units 0.22 mL, 22 Units, Subcutaneous, HS, Seferino Stanton PA-C, 22 Units at 04/23/24 2144    insulin lispro (HumALOG/ADMELOG) 100 units/mL subcutaneous injection 1-5 Units, 1-5 Units, Subcutaneous, HS, Seferino Stanton PA-C, 2 Units at 04/23/24 2144    insulin lispro (HumALOG/ADMELOG) 100 units/mL subcutaneous injection 1-6 Units, 1-6 Units, Subcutaneous, TID AC, 3 Units at 04/21/24 1708 **AND** Fingerstick Glucose (POCT), , , TID AC, Seferino Stanton PA-C    insulin lispro (HumALOG/ADMELOG) 100 units/mL subcutaneous injection 8 Units, 8 Units, Subcutaneous, TID With Meals, Seferino Stanton PA-C, 8 Units at 04/23/24 1216    lamoTRIgine (LaMICtal) tablet 150 mg, 150 mg, Oral, Daily, Seferino Stanton PA-C, 150 mg at 04/24/24 0830    methadone (DOLOPHINE) oral concentrated solution 139 mg, 139 mg, Oral, Daily, Tahmina Rivers MD, 139 mg at 04/24/24 0830    oxyCODONE (ROXICODONE) immediate release tablet 10 mg, 10 mg, Oral, Q4H PRN, Seferino Stanton PA-C, 10 mg at 04/24/24 0508    polyethylene glycol (MIRALAX) packet 17 g, 17 g, Oral, Daily, Diamante Keita MD, 17 g at 04/24/24 0952    senna (SENOKOT) tablet 17.2 mg, 17.2 mg, Oral, HS PRN, Seferino Stanton PA-C, 17.2 mg at 04/24/24 0843    sertraline (ZOLOFT) tablet 100 mg, 100 mg, Oral, Daily, Seferino Stanton PA-C, 100 mg at 04/24/24 0830    Blood Culture:   Lab Results   Component Value Date    BLOODCX No Growth After 5 Days. 04/13/2021       Wound Culture:   Lab Results   Component Value Date    WOUNDCULT 4+ Growth of Beta Hemolytic Streptococcus Group B (A) 04/13/2021    WOUNDCULT (A) 04/13/2021     3+ Growth of Methicillin Resistant Staphylococcus aureus    WOUNDCULT 4+ Growth of 04/13/2021       Ins and Outs:  I/O last 24 hours:  In: -   Out: 650 [Urine:650]          Physical:  Vitals:     04/24/24 0733   BP: 144/80   Pulse: 96   Resp: 18   Temp: 98.1 °F (36.7 °C)   SpO2: 97%     Musculoskeletal: left Lower Extremity  Visible skin is without erythema or ecchymosis.  Dressing C/D/I  TTP over the incision sites.  The patient denies sensation over her toes.  She says this is her baseline.  Motor intact to +FHL/EHL, +ankle dorsi/plantar flexion  Digits warm and well perfused  Capillary refill < 2 seconds    Assessment:    68 y.o.female POD 3 s/p left femoral antegrade long IMN with Dr. Murray on 4/21/24.     Plan:  WBAT LLE.  Full ROM to the LLE.  Mepilex to be maintained until first post-op.   Will monitor for ABLA and administer IVF/prbc as indicated for greater than 2 gram Hgb drop or Hgb < 7.  Hgb this morning is 7.7, which is stable and improved from yesterday.  No signs of active bleeding in the operative extremity.  PT/OT  Pain control  DVT ppx per primary team.  Medical co-morbidities are being managed per primary team  Dispo: Ortho signing off  Follow-up with Dr. Murray in the clinic post-operatively    Tamy Tejeda PA-C

## 2024-04-24 NOTE — ASSESSMENT & PLAN NOTE
Lab Results   Component Value Date    HGBA1C 11.7 (H) 04/13/2021       Recent Labs     04/23/24  1118 04/23/24  1534 04/23/24  2109 04/24/24  0647   POCGLU 108 130 213* 73         Continue Lantus 22 units twice daily, insulin lispro 8 units 3 times daily with meals  Monitor Accu-Cheks, sliding scale for coverage  Blood sugars adequate controlled on this regimen:  will cont this at STR

## 2024-04-24 NOTE — CASE MANAGEMENT
Case Management Discharge Planning Note    Patient name Jaclyn Camp  Location [facility]  /E2 -* MRN [id number]  : 1955 Date 2024       Current Admission Date: 2024  Current Admission Diagnosis:Closed fracture of left hip with routine healing   Patient Active Problem List    Diagnosis Date Noted    Acute blood loss anemia 2024    HTN (hypertension) 2024    Closed fracture of left hip with routine healing 2024    Preop cardiovascular exam 2024    S/P transmetatarsal amputation of foot, right (HCC) 2021    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, left 2021    Uncomplicated opioid dependence (HCC) 2020    IV drug abuse (HCC) 2020    Constipation 2019    Right ankle cellulitis 2019    Cellulitis of right foot 2018    History of MRSA infection     Diabetes mellitus (HCC)     Depression     Bipolar disorder (HCC)     Abscess of right arm 2018    Abscess of tendon sheath of right forearm 2018    Methadone use 2018    Chronic hepatitis C without hepatic coma (HCC) 2018    Anemia 2018    Hyponatremia 2018    Right arm cellulitis 2018    Abscess of right forearm 2018    Abscess of tendon sheath of forearm, right 2018    Corn or callus 2014    Hyperlipidemia 2014    Type 2 diabetes mellitus with diabetic neuropathy, with long-term current use of insulin (HCC) 2014    Visual loss 2014    Persistent insomnia 2013    Bipolar affective disorder (HCC) 2013    Benign essential hypertension 2013    Hypothyroidism 2007      LOS (days): 4  Geometric Mean LOS (GMLOS) (days): 4.5  Days to GMLOS:0.4     OBJECTIVE:  Risk of Unplanned Readmission Score: 22.2         Current admission status: Inpatient   Preferred Pharmacy:   [id number] [place] - [address] [facility]  [address] [facility]  [place] [address]  Phone: [phone] Fax:  [phone]    Primary Care Provider: Juan Daniel Echevarria MD    Primary Insurance: [employer] REP  Secondary Insurance: [facility] AND [employer] [facility]    DISCHARGE DETAILS:    Discharge planning discussed with:: patient  Freedom of Choice: Yes  Comments - [place] of Choice: [facility]     Were Treatment Team discharge recommendations reviewed with patient/caregiver?: Yes  Did patient/caregiver verbalize understanding of patient care needs?: Yes  Were patient/caregiver advised of the risks associated with not following Treatment Team discharge recommendations?: Yes    Other Referral/Resources/Interventions Provided:  Interventions: Acute Rehab    Would you like to participate in our [facility] service program?  : No - Declined    Treatment Team Recommendation: Acute Rehab  Discharge Destination Plan:: Acute Rehab  Transport at Discharge : Wheelchair van    RNCM met with patient to discuss discharge planning. Patient was accepted at [facility]. Insurance auth was approved. Anticipate patient to dc tomorrow morning pending bed availability. RNCM following.

## 2024-04-24 NOTE — ASSESSMENT & PLAN NOTE
Patient has previous history of essential hypertension  Outpatient medications include lisinopril-HCTZ 20-25 mg daily plus lisinopril 5 mg daily  ACE inhibitor and HCTZ were placed on hold given acute kidney injury  Blood pressures currently adequate off antihypertensives  Continue to monitor off antihypertensives to avoid hypotension and hypoperfusion  Patient's acute kidney injury is resolving.  Her previous dehydration has resolved  If patient were to require antihypertensives to be reinitiated, especially now that she is adequately hydrated, would restart lisinopril 5 mg daily.  Would not restart diuretics

## 2024-04-25 VITALS
RESPIRATION RATE: 18 BRPM | SYSTOLIC BLOOD PRESSURE: 144 MMHG | HEIGHT: 66 IN | BODY MASS INDEX: 24.75 KG/M2 | DIASTOLIC BLOOD PRESSURE: 73 MMHG | HEART RATE: 85 BPM | OXYGEN SATURATION: 100 % | WEIGHT: 154 LBS | TEMPERATURE: 97.2 F

## 2024-04-25 LAB
GLUCOSE SERPL-MCNC: 54 MG/DL (ref 65–140)
GLUCOSE SERPL-MCNC: 72 MG/DL (ref 65–140)

## 2024-04-25 PROCEDURE — 97116 GAIT TRAINING THERAPY: CPT

## 2024-04-25 PROCEDURE — 97110 THERAPEUTIC EXERCISES: CPT

## 2024-04-25 PROCEDURE — 97530 THERAPEUTIC ACTIVITIES: CPT

## 2024-04-25 PROCEDURE — 99239 HOSP IP/OBS DSCHRG MGMT >30: CPT | Performed by: INTERNAL MEDICINE

## 2024-04-25 PROCEDURE — 82948 REAGENT STRIP/BLOOD GLUCOSE: CPT

## 2024-04-25 RX ORDER — FERROUS SULFATE 324(65)MG
324 TABLET, DELAYED RELEASE (ENTERIC COATED) ORAL
Start: 2024-04-25

## 2024-04-25 RX ORDER — MULTIVIT WITH MINERALS/LUTEIN
500 TABLET ORAL DAILY
Start: 2024-04-25

## 2024-04-25 RX ADMIN — OXYCODONE HYDROCHLORIDE 10 MG: 10 TABLET ORAL at 04:08

## 2024-04-25 RX ADMIN — OXYCODONE HYDROCHLORIDE 10 MG: 10 TABLET ORAL at 00:00

## 2024-04-25 RX ADMIN — DULOXETINE HYDROCHLORIDE 60 MG: 30 CAPSULE, DELAYED RELEASE ORAL at 08:36

## 2024-04-25 RX ADMIN — METHADONE HYDROCHLORIDE 139 MG: 10 CONCENTRATE ORAL at 08:37

## 2024-04-25 RX ADMIN — CLONAZEPAM 1 MG: 1 TABLET ORAL at 08:37

## 2024-04-25 RX ADMIN — POLYETHYLENE GLYCOL 3350 17 G: 17 POWDER, FOR SOLUTION ORAL at 08:39

## 2024-04-25 RX ADMIN — HEPARIN SODIUM 5000 UNITS: 5000 INJECTION INTRAVENOUS; SUBCUTANEOUS at 06:14

## 2024-04-25 RX ADMIN — SERTRALINE HYDROCHLORIDE 100 MG: 100 TABLET ORAL at 08:36

## 2024-04-25 RX ADMIN — DOCUSATE SODIUM 100 MG: 100 CAPSULE, LIQUID FILLED ORAL at 04:08

## 2024-04-25 RX ADMIN — LAMOTRIGINE 150 MG: 100 TABLET ORAL at 08:36

## 2024-04-25 NOTE — PLAN OF CARE
Problem: Potential for Falls  Goal: Patient will remain free of falls  Description: INTERVENTIONS:  - Educate patient/family on patient safety including physical limitations  - Instruct patient to call for assistance with activity   - Consult OT/PT to assist with strengthening/mobility   - Keep Call bell within reach  - Keep bed low and locked with side rails adjusted as appropriate  - Keep care items and personal belongings within reach  - Initiate and maintain comfort rounds  - Make Fall Risk Sign visible to staff  - Offer Toileting every 2 Hours, in advance of need  - Initiate/Maintain bed and chair alarm  - Obtain necessary fall risk management equipment: bed and chair alarm  - Apply yellow socks and bracelet for high fall risk patients  - Consider moving patient to room near nurses station  Outcome: Progressing     Problem: PAIN - ADULT  Goal: Verbalizes/displays adequate comfort level or baseline comfort level  Description: Interventions:  - Encourage patient to monitor pain and request assistance  - Assess pain using appropriate pain scale  - Administer analgesics based on type and severity of pain and evaluate response  - Implement non-pharmacological measures as appropriate and evaluate response  - Consider cultural and social influences on pain and pain management  - Notify physician/advanced practitioner if interventions unsuccessful or patient reports new pain  Outcome: Progressing

## 2024-04-25 NOTE — CASE MANAGEMENT
Case Management Discharge Planning Note    Patient name Jaclyn Camp  Location [facility]  /E2 -* MRN [id number]  : 1955 Date 2024       Current Admission Date: 2024  Current Admission Diagnosis:Closed fracture of left hip with routine healing   Patient Active Problem List    Diagnosis Date Noted    Acute blood loss anemia 2024    HTN (hypertension) 2024    Closed fracture of left hip with routine healing 2024    Preop cardiovascular exam 2024    S/P transmetatarsal amputation of foot, right (HCC) 2021    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, left 2021    Uncomplicated opioid dependence (HCC) 2020    IV drug abuse (HCC) 2020    Constipation 2019    Right ankle cellulitis 2019    Cellulitis of right foot 2018    History of MRSA infection     Diabetes mellitus (HCC)     Depression     Bipolar disorder (HCC)     Abscess of right arm 2018    Abscess of tendon sheath of right forearm 2018    Methadone use 2018    Chronic hepatitis C without hepatic coma (HCC) 2018    Anemia 2018    Hyponatremia 2018    Right arm cellulitis 2018    Abscess of right forearm 2018    Abscess of tendon sheath of forearm, right 2018    Corn or callus 2014    Hyperlipidemia 2014    Type 2 diabetes mellitus with diabetic neuropathy, with long-term current use of insulin (HCC) 2014    Visual loss 2014    Persistent insomnia 2013    Bipolar affective disorder (HCC) 2013    Benign essential hypertension 2013    Hypothyroidism 2007      LOS (days): 5  Geometric Mean LOS (GMLOS) (days): 4.5  Days to GMLOS:-0.3     OBJECTIVE:  Risk of Unplanned Readmission Score: 22.18         Current admission status: Inpatient   Preferred Pharmacy:   [id number] [place] - [address]  [address]  [place] [address]  Phone: [phone] Fax:  [phone]    Primary Care Provider: Juan Daniel Echevarria MD    Primary Insurance: [employer] REP  Secondary Insurance: [facility] AND [employer] [facility]    DISCHARGE DETAILS:    Discharge planning discussed with:: patient  Freedom of Choice: Yes  Comments - [place] of Choice: [facility]  CM contacted family/caregiver?: No- see comments (patient is alert with cell phone at bedside)  Were Treatment Team discharge recommendations reviewed with patient/caregiver?: Yes  Did patient/caregiver verbalize understanding of patient care needs?: Yes  Were patient/caregiver advised of the risks associated with not following Treatment Team discharge recommendations?: Yes    Other Referral/Resources/Interventions Provided:  Interventions: Acute Rehab    Treatment Team Recommendation: Acute Rehab  Discharge Destination Plan:: Acute Rehab  Transport at Discharge : Wheelchair van  Dispatcher Contacted: Yes  Number/Name of Dispatcher: CHRIS     Transfer Mode: Wheelchair     Receiving Facility/Agency Phone Number: [phone]  Facility/Agency Fax Number: [phone]    Patient is cleared for discharge today to [facility]. Patient will be going to Room 329. N2N Report # [phone]. Transport via [employer] van requested via [employer].    [facility] claimed the ride for Jaclyn Camp in unit/room EAST 2  bed E2 -01, and will arrive on 04/25/2024 at 11:30am EDT. Contact them at [phone].

## 2024-04-25 NOTE — PHYSICAL THERAPY NOTE
Physical Therapy Treatment Note     04/25/24 1057   PT Last Visit   PT Visit Date 04/25/24   Note Type   Note Type Treatment   Pain Assessment   Pain Assessment Tool 0-10   Pain Score 10 - Worst Possible Pain   Pain Location/Orientation Orientation: Left;Location: Hip;Location: Leg;Location: Knee   Restrictions/Precautions   Weight Bearing Precautions Per Order Yes   LLE Weight Bearing Per Order WBAT   Other Precautions Chair Alarm;Bed Alarm;Fall Risk;Pain;WBS   General   Chart Reviewed Yes   Subjective   Subjective Pt. agreeable to PT   Bed Mobility   Supine to Sit 4  Minimal assistance   Additional items Assist x 1;Bedrails;Increased time required;Verbal cues;LE management   Transfers   Sit to Stand 4  Minimal assistance   Additional items Assist x 1;Armrests;Increased time required;Bedrails;Verbal cues   Stand to Sit 5  Supervision   Additional items Assist x 1;Armrests;Increased time required;Verbal cues   Stand pivot 4  Minimal assistance   Additional items Assist x 1;Increased time required;Verbal cues   Toilet transfer 4  Minimal assistance   Additional items Assist x 1;Increased time required;Commode;Verbal cues;Armrests   Ambulation/Elevation   Gait pattern Improper Weight shift;Forward Flexion;Decreased foot clearance;Decreased L stance;Foward flexed;Short stride;Excessively slow;Decreased toe off;Decreased heel strike;Step to   Gait Assistance 4  Minimal assist   Additional items Assist x 1;Verbal cues   Assistive Device Rolling walker   Distance 6ft, 10ft   Balance   Static Sitting Good   Dynamic Sitting Fair   Static Standing Fair   Dynamic Standing Fair -   Ambulatory Fair -   Activity Tolerance   Activity Tolerance Patient tolerated treatment well;Other (Comment)  (self limiting)   Nurse Made Aware Yes   Exercises   THR Supine;Sitting;Bilateral;AROM;20 reps;AAROM   Assessment   Prognosis Good   Problem List Decreased strength;Decreased range of motion;Decreased endurance;Impaired balance;Decreased  mobility;Pain;Orthopedic restrictions;Impaired judgement   Assessment Pt. given cues for hand placement for trasnfers and Min A for LE management for STS transfers. Pt. given assistance for LLE ROM in supine position. Pt. ntoed with self limiting behaviors. Pt. able to perform pericare in sitting with S. No LOB noted session. Cues for relaxing while performing activities. Pt. took extended time to complete activities. Pt. seated on nathaly post session with alarm engaged and all needs within reach. Continue to follow per PT POC.   Barriers to Discharge None   Goals   Patient Goals None reproted   STG Expiration Date 05/06/24   PT Treatment Day 2   Plan   Treatment/Interventions Functional transfer training;LE strengthening/ROM;Therapeutic exercise;Gait training;Bed mobility;Equipment eval/education;Patient/family training;Spoke to nursing   Progress   (slow progress due to self limitng)   PT Frequency 5-7x/wk   Discharge Recommendation   Rehab Resource Intensity Level, PT I (Maximum Resource Intensity)   Equipment Recommended Walker   AM-PAC Basic Mobility Inpatient   Turning in Flat Bed Without Bedrails 3   Lying on Back to Sitting on Edge of Flat Bed Without Bedrails 3   Moving Bed to Chair 3   Standing Up From Chair Using Arms 3   Walk in Room 3   Climb 3-5 Stairs With Railing 3   Basic Mobility Inpatient Raw Score 18   Basic Mobility Standardized Score 41.05   Turning Head Towards Sound 4   Follow Simple Instructions 4   Low Function Basic Mobility Raw Score  26   Low Function Basic Mobility Standardized Score  41.2   [facility] Highest Level Of Mobility   -HLM Goal 6: Walk 10 steps or more   -HLM Achieved 6: Walk 10 steps or more   End of Consult   Patient Position at End of Consult All needs within reach;Bed/Chair alarm activated;Bedside chair           Maddie Huertas PTA    An AM-PAC basic mobility standardized score less than 42.9 suggest the patient may benefit from discharge to post-acute rehab  services.

## 2024-04-25 NOTE — PLAN OF CARE
Problem: PHYSICAL THERAPY ADULT  Goal: Performs mobility at highest level of function for planned discharge setting.  See evaluation for individualized goals.  Outcome: Progressing  Note: Prognosis: Good  Problem List: Decreased strength, Decreased range of motion, Decreased endurance, Impaired balance, Decreased mobility, Pain, Orthopedic restrictions, Impaired judgement  Assessment: Pt. given cues for hand placement for trasnfers and Min A for LE management for STS transfers. Pt. given assistance for LLE ROM in supine position. Pt. ntoed with self limiting behaviors. Pt. able to perform pericare in sitting with S. No LOB noted session. Cues for relaxing while performing activities. Pt. took extended time to complete activities. Pt. seated on nathaly post session with alarm engaged and all needs within reach. Continue to follow per PT POC.  Barriers to Discharge: None     Rehab Resource Intensity Level, PT: I (Maximum Resource Intensity)    See flowsheet documentation for full assessment.

## 2024-04-25 NOTE — DISCHARGE SUMMARY
[facility]  Discharge- Jaclyn Camp 1955, 68 y.o. female MRN: [id number]  Unit/Bed#: E2 -01 Encounter: [id number]  Primary Care Provider: Juan Daniel Echevarria MD   Date and time admitted to hospital: 4/20/2024 12:50 PM          Admission Date: 4/20/2024       Discharge Date: 4/25/2024    Primary Diagnoses  Principal Problem:    Closed fracture of left hip with routine healing  Active Problems:    JOYCE (acute kidney injury) (HCC)    HTN (hypertension)    Bipolar affective disorder (HCC)    Hypothyroidism    Type 2 diabetes mellitus with diabetic neuropathy, with long-term current use of insulin (HCC)    Methadone use    Preop cardiovascular exam    Acute blood loss anemia  Resolved Problems:    Dizziness        Hospital course by problem:  * Closed fracture of left hip with routine healing  Assessment & Plan  Patient is a 68-year-old female with past medical history significant for diabetes with peripheral neuropathy, hypertension, hypothyroidism, previous right TMA, schizoaffective disorder, bipolar disorder, who presented to the ER after a fall: Patient became lightheaded and fell on a hardwood floor.  No loss of consciousness but unfortunately fractured her left hip     CT with comminuted intertrochanteric fracture left proximal femur  Patient was evaluated by the orthopedic surgery team and underwent ORIF left femur on 4/21  Cont pain control, DVT prophylaxis, PT/OT  Patient worked with physical therapy today, and was agreeable to getting out of the bed to the chair  Notes her pain is controlled on her current pain medication regimens: Will be continued at her short-term rehab center.  Pt notes numbness from Left knee extending distally to L toes:  present since surgery:  d/w Ortho- not unexpected post-operative:  recommend re-eval upon Ortho follow up appt in office  Will be dc today to [facility]:  pt is agreeable  D/w Ortho- ok to dc     HTN (hypertension)  Assessment &  Plan  Patient has previous history of essential hypertension  Outpatient medications include lisinopril-HCTZ 20-25 mg daily plus lisinopril 5 mg daily  ACE inhibitor and HCTZ were placed on hold given acute kidney injury  Blood pressures currently adequate off antihypertensives  Continue to monitor off antihypertensives to avoid hypotension and hypoperfusion  Patient's acute kidney injury is resolving.  Her previous dehydration has resolved  If patient were to require antihypertensives to be reinitiated, especially now that she is adequately hydrated, would restart lisinopril 5 mg daily.  Would not restart diuretics  Bp adequate and will be dc off antihypertensives     JOYCE (acute kidney injury) ([facility])  Assessment & Plan  Patient with acute kidney injury, with a creatinine that peaked at 1.95  Baseline creatinine 0.8-1.1  Etiology of acute kidney injury likely multifactorial  Patient had obstructive uropathy:  postoperatively  Also likely component of dehydration as patient was having poor p.o. intake especially fluids  Pt did not have any significant urinary retention on PVRs  Cont to Hold ACE inhibitor and HCTZ at discharge  She was continued on IV fluids as her BUN: Creatinine ratio was greater than 20, consistent with volume depletion and prerenal azotemia despite adequate oral intake  Patient's creatinine improved to 1.35 prior to discharge and her BUN: Creatinine ratio improved  Patient will be discharged to continue oral hydration     Dizziness-resolved as of 4/24/2024  Assessment & Plan  Patient notes her fall prior to admission was preceded by an episode of dizziness  Patient relates that she was in her usual state of health, stood up from the recliner, felt dizzy, had spots in front of her eyes, and fell striking her left hip.  She denies any head trauma.  She denies any loss of consciousness.  Patient notes the days preceding this she was not dehydrated.  She denies any previous orthostatic  symptoms.  Patient denies that she had any chest pain, pressure, discomfort, shortness of breath, dyspnea on exertion, or neurologic symptoms  2D echocardiogram: No significant aortic stenosis.  No left ventricular outflow tract, no significant wall motion abnormality.    Moderately dilated left atrium:  no significant valvular heart disease:    will refer to cardiology as outpt, non-urgently for fu:  updated pt regarding this  Patient monitored on telemetry without any significant arrhythmia  No focal neurologic signs or symptoms on exam  Suspect pt's episode of dizziness was most likely due to orthostasis as she was volume depleted on admission with JOYCE and pre-renal azotemia and BUN:creat ratio >20.  --especially as her symptoms occurred upon standing rapidly  Was rehydrated.  Would not be restarted on a diuretic.  Did not have any symptoms during her hospital stay.  Ambulatory referral send for cardiology  Will also fu with PCP after DC from [facility]     Acute blood loss anemia  Assessment & Plan  Patient appears to have a mild chronic anemia with baseline hemoglobin approximately 10  Patient developed an acute blood loss anemia: Likely secondary to expected blood loss from her intertrochanteric femur fracture, as well as expected procedural blood loss  Received 3 days of IV Venofer  Does not meet criteria for transfusion at this time: Hemoglobin stable at 7.7 prior to dc  Continue iron supplements/vitamin C at discharge     Methadone use  Assessment & Plan  Maintained on methadone 139 mg daily, dose confirmed with [facility] treatment [facility]  Patient will need further prn pain medications given hip fracture, during her recovery  Continue outpatient treatment center follow-up after discharge     Type 2 diabetes mellitus with diabetic neuropathy, with long-term current use of insulin (HCC)  Assessment & Plan        Lab Results   Component Value Date     HGBA1C 11.7 (H) 04/13/2021                Recent  Labs     04/23/24  1118 04/23/24  1534 04/23/24  2109 04/24/24  0647   POCGLU 108 130 213* 73            Continue Lantus 22 units twice daily, insulin lispro 8 units 3 times daily with meals  Monitor Accu-Cheks, sliding scale for coverage  Blood sugars adequate controlled on this regimen:  will cont this at [facility]        Hypothyroidism  Assessment & Plan  Continue Synthroid     Bipolar affective disorder (HCC)  Assessment & Plan  Patient has previous history of bipolar disorder, as well as schizoaffective disorder  Continue duloxetine, sertraline, clonazepam  Clonazepam bid confirmed on PDMP website        Service:  [facility], [facility].     Consulting Providers   Ortho: Dr. Murray     [facility] Studies:  4/23: 2D echocardiogram    Left Ventricle: Left ventricular cavity size is normal. Wall thickness is mildly increased. There is mild concentric hypertrophy. The left ventricular ejection fraction is 56% by biplane measurement. Systolic function is normal. Wall motion is normal.    Left Atrium: The atrium is moderately dilated (42-48 mL/m2).    Mitral Valve: There is mild annular calcification.    Tricuspid Valve: The right ventricular systolic pressure is normal. The estimated right ventricular systolic pressure is 15.00 mmHg.     4/20 CT left lower extremity Comminuted intertrochanteric fracture of the left proximal femur with involvement of the greater and lesser trochanter.      4/20: X-ray left femur: No acute osseous abnormality      4/20: Chest x-ray: No acute cardiopulmonary disease.      4/20 left hip/pelvis  Comminuted left intertrochanteric hip fracture.      Procedures  4/21: Open reduction internal fixation left femur            Results from last 7 days   Lab Units 04/24/24  0512 04/23/24  0436 04/22/24  0454   WBC Thousand/uL 7.22 8.25 8.14   HEMOGLOBIN g/dL 7.7* 7.4* 7.3*   HEMATOCRIT % 24.2* 22.7* 23.0*   PLATELETS Thousands/uL 185 165 153             Results from last 7  days   Lab Units 04/24/24  0512 04/23/24  0436 04/22/24  0454   POTASSIUM mmol/L 4.5 4.4 4.6   CHLORIDE mmol/L 108 108 111*   CO2 mmol/L 25 22 22   BUN mg/dL 25 31* 40*   CREATININE mg/dL 1.35* 1.64* 1.85*   CALCIUM mg/dL 9.2 8.9 8.4         History and Physical Exam:  Please refer to the Admission H&P note     Discharge Condition: Improved  Discharge Disposition: Acute Rehab at [facility]     Discharge Note and Physical Exam:   She relates she feels well today.  She was examined and interviewed earlier today after she finished work with physical therapy.  She was sitting up in a chair at the bedside.  Patient noted her current dose of pain medication was controlling her pain.  She notes the pain was worse in her knee.  Denies any pain anywhere else.  She denies any chest pain.  Denies any shortness of breath or cough.  Denies any nausea or vomiting.  Is tolerating p.o.  Notes she has not passed a bowel movement, however declines additional laxative, suppository, or enema.  She notes she feels she is about to pass her bowels imminently.  Denies any other discomfort.     Vitals:    04/25/24 0818   BP: 144/73   Pulse: 85   Resp: 18   Temp: (!) 97.2 °F (36.2 °C)   SpO2: 100%     General:  Pleasant, non-tachypnic, non-dyspnic.  Conversant  Heart: Regular rate and rhythm, S1S2 present.  No murmur, rub or gallop.  Lungs: Clear to auscultation bilaterally, no wheezing, rhonchi, or crackles.  Good air movement.  No accessory muscle use or respiratory distress.  Abdomen: soft, non-tender, non-distended, NABS.  No rebound or guarding.  No mass or peritoneal signs.  Extremities: no clubbing, cyanosis.  Trace edema from left knee extending to left toes.  2+ pedal pulses bilaterally.  Neurologic: Awake and alert.  Oriented.  Fluent speech.  Interactive.  Skin: warm and dry. No petechiae, purpura or rash.     Discharge Medications   Please see Medical Reconciliation Discharge Form     Discharge Follow Up Appointments:   Juan Daniel  MD Swathi: 1 week  Dr. Murray:  2 weeks     Discharge  Statement   Total Time Spent today including physical exam, discussion with patient and discharge arrangements/care =32 minutes.    Discussed with patient's nurse and   Discussed with orthopedic surgery team:  [facility]: JOAN Tejeda PA-C:  ok for dc to [facility]:  notes pt may continue to  have numbness of R knee distally in the post-op period and this will be recheck at fu appt w Ortho     This note has been constructed using a voice recognition system

## 2024-04-26 NOTE — UTILIZATION REVIEW
NOTIFICATION OF ADMISSION DISCHARGE   This is a Notification of Discharge from [facility]. Please be advised that this patient has been discharge from our facility. Below you will find the admission and discharge date and time including the patient’s disposition.   UTILIZATION REVIEW CONTACT:  Bhumi Post  Utilization   Network Utilization Review Department  Phone: [phone] x carefully listen to the prompts. All voicemails are confidential.  Email: [email or web address]     ADMISSION INFORMATION  PRESENTATION DATE: 4/20/2024 12:50 PM  OBERVATION ADMISSION DATE:   INPATIENT ADMISSION DATE: 4/20/24  2:36 PM   DISCHARGE DATE: 4/25/2024 12:02 PM   DISPOSITION:Non [facility] Acute Rehab    Network Utilization Review Department  ATTENTION: Please call with any questions or concerns to [phone] and carefully listen to the prompts so that you are directed to the right person. All voicemails are confidential.   For Discharge needs, contact Care Management DC Support Team at [phone] opt. 2  Send all requests for admission clinical reviews, approved or denied determinations and any other requests to dedicated fax number below belonging to the campus where the patient is receiving treatment. List of dedicated fax numbers for the Facilities:  FACILITY NAME UR FAX NUMBER   ADMISSION DENIALS (Administrative/Medical Necessity) [phone]   DISCHARGE SUPPORT TEAM ([facility]) [phone]   PARENT CHILD HEALTH (Maternity/NICU/Pediatrics) [phone]   [facility] [phone]   [facility] [phone]   [facility] [phone]   [facility] [phone]   [facility] [phone]   [facility] [phone]   [facility] [phone]   [facility]  [phone]   [facility] [phone]   [facility] [phone]   [facility] [phone]   [facility] [phone]

## 2024-05-06 ENCOUNTER — TELEPHONE (OUTPATIENT)
Age: 69
End: 2024-05-06

## 2024-05-06 NOTE — TELEPHONE ENCOUNTER
Caller: Darlin at [facility]    Doctor: Trevor    Reason for call:     Patient had surgery on 4/20/24 for left hip and her post op appointment is scheduled for 5/13/24, but she needs her reginaldo removed, Darlin is asking if their facility can remove her  Staple for the doctor?  Apparently after we scheduled she put me on hold but the call dropped and I did not get their phone number.    Call back#: n/a

## 2024-05-09 ENCOUNTER — TELEPHONE (OUTPATIENT)
Age: 69
End: 2024-05-09

## 2024-05-09 NOTE — TELEPHONE ENCOUNTER
Caller: Adan Berg     Doctor: Trevor    Reason for call: Following up on previous message. Made aware of provider message that staples will be removed at 3week postop appt    Call back#: [phone]

## 2024-05-09 NOTE — TELEPHONE ENCOUNTER
Paula from [facility] called in to know if pt had an upcoming appt. I relayed to Paula that pt does not have an upcoming appt. She states that she will give pt our number to call since she is being discharged soon. Nothing further

## 2024-08-08 ENCOUNTER — APPOINTMENT (EMERGENCY)
Dept: RADIOLOGY | Facility: HOSPITAL | Age: 69
End: 2024-08-08
Payer: COMMERCIAL

## 2024-08-08 ENCOUNTER — HOSPITAL ENCOUNTER (EMERGENCY)
Facility: HOSPITAL | Age: 69
Discharge: HOME | End: 2024-08-08
Attending: EMERGENCY MEDICINE
Payer: COMMERCIAL

## 2024-08-08 ENCOUNTER — APPOINTMENT (EMERGENCY)
Dept: CT IMAGING | Facility: HOSPITAL | Age: 69
End: 2024-08-08
Payer: COMMERCIAL

## 2024-08-08 VITALS
DIASTOLIC BLOOD PRESSURE: 74 MMHG | WEIGHT: 146 LBS | TEMPERATURE: 97.8 F | HEIGHT: 66 IN | HEART RATE: 77 BPM | RESPIRATION RATE: 18 BRPM | BODY MASS INDEX: 23.46 KG/M2 | OXYGEN SATURATION: 97 % | SYSTOLIC BLOOD PRESSURE: 169 MMHG

## 2024-08-08 DIAGNOSIS — S42.209A PROXIMAL HUMERAL FRACTURE: Primary | ICD-10-CM

## 2024-08-08 DIAGNOSIS — R93.89 ABNORMAL CT SCAN: ICD-10-CM

## 2024-08-08 LAB
ABO GROUP BLD: NORMAL
ANION GAP SERPL CALCULATED.3IONS-SCNC: 8 MMOL/L (ref 4–13)
BASOPHILS # BLD AUTO: 0.05 THOUSANDS/ÂΜL (ref 0–0.1)
BASOPHILS NFR BLD AUTO: 1 % (ref 0–1)
BLD GP AB SCN SERPL QL: NEGATIVE
BUN SERPL-MCNC: 42 MG/DL (ref 5–25)
CALCIUM SERPL-MCNC: 9.8 MG/DL (ref 8.4–10.2)
CHLORIDE SERPL-SCNC: 99 MMOL/L (ref 96–108)
CO2 SERPL-SCNC: 26 MMOL/L (ref 21–32)
CREAT SERPL-MCNC: 1.56 MG/DL (ref 0.6–1.3)
EOSINOPHIL # BLD AUTO: 0.13 THOUSAND/ÂΜL (ref 0–0.61)
EOSINOPHIL NFR BLD AUTO: 2 % (ref 0–6)
ERYTHROCYTE [DISTWIDTH] IN BLOOD BY AUTOMATED COUNT: 13.5 % (ref 11.6–15.1)
GFR SERPL CREATININE-BSD FRML MDRD: 34 ML/MIN/1.73SQ M
GLUCOSE SERPL-MCNC: 442 MG/DL (ref 65–140)
GLUCOSE SERPL-MCNC: 444 MG/DL (ref 65–140)
HCT VFR BLD AUTO: 28.8 % (ref 34.8–46.1)
HGB BLD-MCNC: 9 G/DL (ref 11.5–15.4)
IMM GRANULOCYTES # BLD AUTO: 0.04 THOUSAND/UL (ref 0–0.2)
IMM GRANULOCYTES NFR BLD AUTO: 1 % (ref 0–2)
LYMPHOCYTES # BLD AUTO: 2.4 THOUSANDS/ÂΜL (ref 0.6–4.47)
LYMPHOCYTES NFR BLD AUTO: 29 % (ref 14–44)
MCH RBC QN AUTO: 28.9 PG (ref 26.8–34.3)
MCHC RBC AUTO-ENTMCNC: 31.3 G/DL (ref 31.4–37.4)
MCV RBC AUTO: 93 FL (ref 82–98)
MONOCYTES # BLD AUTO: 0.39 THOUSAND/ÂΜL (ref 0.17–1.22)
MONOCYTES NFR BLD AUTO: 5 % (ref 4–12)
NEUTROPHILS # BLD AUTO: 5.26 THOUSANDS/ÂΜL (ref 1.85–7.62)
NEUTS SEG NFR BLD AUTO: 62 % (ref 43–75)
NRBC BLD AUTO-RTO: 0 /100 WBCS
PLATELET # BLD AUTO: 261 THOUSANDS/UL (ref 149–390)
PMV BLD AUTO: 10.1 FL (ref 8.9–12.7)
POTASSIUM SERPL-SCNC: 5.3 MMOL/L (ref 3.5–5.3)
RBC # BLD AUTO: 3.11 MILLION/UL (ref 3.81–5.12)
RH BLD: POSITIVE
SODIUM SERPL-SCNC: 133 MMOL/L (ref 135–147)
SPECIMEN EXPIRATION DATE: NORMAL
WBC # BLD AUTO: 8.27 THOUSAND/UL (ref 4.31–10.16)

## 2024-08-08 PROCEDURE — 70450 CT HEAD/BRAIN W/O DYE: CPT

## 2024-08-08 PROCEDURE — 71045 X-RAY EXAM CHEST 1 VIEW: CPT

## 2024-08-08 PROCEDURE — 96374 THER/PROPH/DIAG INJ IV PUSH: CPT

## 2024-08-08 PROCEDURE — 99285 EMERGENCY DEPT VISIT HI MDM: CPT | Performed by: EMERGENCY MEDICINE

## 2024-08-08 PROCEDURE — 96376 TX/PRO/DX INJ SAME DRUG ADON: CPT

## 2024-08-08 PROCEDURE — 73030 X-RAY EXAM OF SHOULDER: CPT

## 2024-08-08 PROCEDURE — 86850 RBC ANTIBODY SCREEN: CPT | Performed by: EMERGENCY MEDICINE

## 2024-08-08 PROCEDURE — 36415 COLL VENOUS BLD VENIPUNCTURE: CPT | Performed by: EMERGENCY MEDICINE

## 2024-08-08 PROCEDURE — 71260 CT THORAX DX C+: CPT

## 2024-08-08 PROCEDURE — 82948 REAGENT STRIP/BLOOD GLUCOSE: CPT

## 2024-08-08 PROCEDURE — 96372 THER/PROPH/DIAG INJ SC/IM: CPT

## 2024-08-08 PROCEDURE — 99284 EMERGENCY DEPT VISIT MOD MDM: CPT

## 2024-08-08 PROCEDURE — 85025 COMPLETE CBC W/AUTO DIFF WBC: CPT | Performed by: EMERGENCY MEDICINE

## 2024-08-08 PROCEDURE — 86900 BLOOD TYPING SEROLOGIC ABO: CPT | Performed by: EMERGENCY MEDICINE

## 2024-08-08 PROCEDURE — 86901 BLOOD TYPING SEROLOGIC RH(D): CPT | Performed by: EMERGENCY MEDICINE

## 2024-08-08 PROCEDURE — 80048 BASIC METABOLIC PNL TOTAL CA: CPT | Performed by: EMERGENCY MEDICINE

## 2024-08-08 RX ORDER — INSULIN LISPRO 100 [IU]/ML
5 INJECTION, SOLUTION INTRAVENOUS; SUBCUTANEOUS ONCE
Status: COMPLETED | OUTPATIENT
Start: 2024-08-08 | End: 2024-08-08

## 2024-08-08 RX ORDER — APIXABAN 5 MG/1
5 TABLET, FILM COATED ORAL 2 TIMES DAILY
COMMUNITY
Start: 2024-05-11

## 2024-08-08 RX ORDER — HYDROMORPHONE HCL/PF 1 MG/ML
0.5 SYRINGE (ML) INJECTION ONCE
Status: COMPLETED | OUTPATIENT
Start: 2024-08-08 | End: 2024-08-08

## 2024-08-08 RX ORDER — OXYCODONE HYDROCHLORIDE 10 MG/1
10 TABLET ORAL ONCE
Status: COMPLETED | OUTPATIENT
Start: 2024-08-08 | End: 2024-08-08

## 2024-08-08 RX ORDER — OXYCODONE HYDROCHLORIDE 10 MG/1
10 TABLET ORAL EVERY 6 HOURS PRN
Qty: 12 TABLET | Refills: 0 | Status: SHIPPED | OUTPATIENT
Start: 2024-08-08 | End: 2024-08-13

## 2024-08-08 RX ADMIN — INSULIN LISPRO 5 UNITS: 100 INJECTION, SOLUTION INTRAVENOUS; SUBCUTANEOUS at 15:19

## 2024-08-08 RX ADMIN — HYDROMORPHONE HYDROCHLORIDE 0.5 MG: 1 INJECTION, SOLUTION INTRAMUSCULAR; INTRAVENOUS; SUBCUTANEOUS at 13:59

## 2024-08-08 RX ADMIN — OXYCODONE HYDROCHLORIDE 10 MG: 10 TABLET ORAL at 16:10

## 2024-08-08 RX ADMIN — IOHEXOL 85 ML: 350 INJECTION, SOLUTION INTRAVENOUS at 13:50

## 2024-08-08 RX ADMIN — HYDROMORPHONE HYDROCHLORIDE 0.5 MG: 1 INJECTION, SOLUTION INTRAMUSCULAR; INTRAVENOUS; SUBCUTANEOUS at 13:28

## 2024-08-08 NOTE — DISCHARGE INSTRUCTIONS
Your imaging shows a fracture of your left upper arm bone.  Use the sling as instructed and follow-up with orthopedics.  Take Tylenol for pain and oxycodone if needed for breakthrough pain.    Follow-up with orthopedics.    Your imaging also shows an incidental splenic lesion which may be a cyst.  Incidental finding such that these are common and most often benign however radiology does recommend an outpatient MRI to rule out neoplasm (cancer).  Discussed this with your primary physician.

## 2024-08-08 NOTE — ED PROVIDER NOTES
Emergency Department Trauma Note  Jaclyn Camp 68 y.o. female MRN: [id number]  Unit/Bed#: TR 03/TR 03 Encounter: [id number]      Trauma Alert: Trauma Acuity: Trauma Evaluation  Model of Arrival:   via    Trauma Team: Current Providers  Attending Provider: Ronan Quiroz DO  Registered Nurse: Allison A Schoener, RN  Registered Nurse: Heaven Ortega RN  Consultants:     None      History of Present Illness     Chief Complaint:   Chief Complaint   Patient presents with    Trauma    Shoulder Injury     HPI:  Jaclyn Camp is a 68 y.o. female who presents with fall.  Mechanism:Details of Incident: fall in shower Injury Date: 08/08/24 Injury Time: 1230 Injury Occurence Location - Specify County: [place]    68F fell in the shower. Hit head. No LOC. Left shoulder pain      Shoulder Injury    Review of Systems    Historical Information     Immunizations:   Immunization History   Administered Date(s) Administered    Influenza, recombinant, quadrivalent,injectable, preservative free 12/07/2019    Tdap 05/15/2019       Past Medical History:   Diagnosis Date    Bipolar disorder (HCC)     Depression     Diabetes mellitus (HCC)     History of MRSA infection     Hypertension        Family History   Problem Relation Age of Onset    Hypertension Mother         heart attack    Dementia Father         heart atttack/hypertention     Past Surgical History:   Procedure Laterality Date    APPENDECTOMY      INCISION AND DRAINAGE OF WOUND Right 7/1/2018    Procedure: INCISION AND DRAINAGE (I&D) RIGHT FOREARM;  Surgeon: Naldo Skinner MD;  [facility];  Service: Orthopedics    ORIF FEMUR FRACTURE Left 4/21/2024    Procedure: OPEN REDUCTION W/ INTERNAL FIXATION (ORIF) FEMUR;  Surgeon: Leonardo Murray MD;  [facility];  Service: Orthopedics    AK AMPUTATION FOOT TRANSMETARSAL Right 4/13/2021    Procedure: AMPUTATION TRANSMETATARSAL (TMA);  Surgeon: Noah Hough DPM;  [facility];  Service: Podiatry    AK  SPLIT AGRFT T/A/L 1ST 100 CM/&/1% BDY INFT/CHLD Right 10/17/2018    Procedure: RIGHT ARM SKIN GRAFT SPLIT THICKNESS (STSG);  Surgeon: Chetan Fisher MD;  [facility];  Service: Plastics    ME SPLIT AGRFT T/A/L 1ST 100 CM/&/1% BDY INFT/CHLD Right 9/12/2018    Procedure: INTEGRA PLACEMENT;  Surgeon: Chetan Fisher MD;  [facility];  Service: Plastics    TOE AMPUTATION Right 12/10/2019    Procedure: AMPUTATION TOE;  Surgeon: Noah Hough DPM;  [facility];  Service: Podiatry    TOE AMPUTATION Right 5/29/2020    Procedure: AMPUTATION TOE, 5TH TOE;  Surgeon: Grey Myrick DPM;  [facility];  Service: Podiatry    TONSILLECTOMY      VAC DRESSING APPLICATION Right 10/17/2018    Procedure: VAC PLACEMENT ARM;  Surgeon: Chetan Fisher MD;  [facility];  Service: Plastics    WOUND DEBRIDEMENT Right 7/7/2018    Procedure: DEBRIDEMENT UPPER EXTREMITY (WASH OUT) W/ APPLICATION OF WOUND VAC;  Surgeon: Guero Ortiz MD;  [facility];  Service: Orthopedics    WOUND DEBRIDEMENT Right 7/11/2018    Procedure: DEBRIDEMENT UPPER EXTREMITY WITH WOUND VAC EXCHANGE;  Surgeon: Roula Birch DO;  [facility];  Service: Orthopedics    WOUND DEBRIDEMENT Right 9/12/2018    Procedure: DEBRIDEMENT  (WASH OUT) FOREARM with Vac dressing change;  Surgeon: Chetan Fisher MD;  [facility];  Service: Plastics    WOUND DEBRIDEMENT Right 5/29/2020    Procedure: Complex Irrigation and DEBRIDEMENT WOUND (WASH OUT), ANKLE;  Surgeon: Grey Myrick DPM;  [facility];  Service: Podiatry     Social History     Tobacco Use    Smoking status: Never    Smokeless tobacco: Never   Vaping Use    Vaping status: Never Used   Substance Use Topics    Alcohol use: Never     Alcohol/week: 0.0 standard drinks of alcohol    Drug use: Never     Comment: on methadone, Hx herion addiction     E-Cigarette/Vaping    E-Cigarette Use Never User       E-Cigarette/Vaping Substances       Family History:   Family History   Problem Relation Age of Onset    Hypertension Mother         heart attack    Dementia Father         heart atttack/hypertention       Meds/Allergies   Prior to Admission Medications   Prescriptions Last Dose Informant Patient Reported? Taking?   DULoxetine (CYMBALTA) 60 mg delayed release capsule   Yes No   Sig: Take 60 mg by mouth   Eliquis 5 MG   Yes Yes   Sig: Take 5 mg by mouth 2 (two) times a day   Insulin Pen Needle 31G X 4 MM MISC   No No   Sig: by Does not apply route see administration instructions Please use with novolog and lantus to administer insulin three times daily before meals and daily at bedtime.   NovoLOG 100 UNIT/ML injection   Yes No   Sig: Inject 8 Units under the skin 3 (three) times a day with meals     Patient not taking: Reported on 4/20/2024   Trulicity 0.75 MG/0.5ML SOPN   Yes No   Sig: Inject 0.75 mg under the skin once a week On Sunday    acetaminophen (TYLENOL) 325 mg tablet   No No   Sig: Take 1 tablet (325 mg total) by mouth every 6 (six) hours as needed for mild pain   ascorbic acid (VITAMIN C) 250 mg tablet   No No   Sig: Take 2 tablets (500 mg total) by mouth daily   atorvastatin (LIPITOR) 40 mg tablet   Yes No   Sig: Take 40 mg by mouth daily    clonazePAM (KlonoPIN) 1 mg tablet   No No   Sig: Take 1 tablet (1 mg total) by mouth 2 (two) times a day for 10 days   docusate sodium (COLACE) 100 mg capsule   No No   Sig: Take 1 capsule (100 mg total) by mouth every 12 (twelve) hours   Patient not taking: Reported on 4/20/2024   enoxaparin (LOVENOX) 40 mg/0.4 mL   No No   Sig: Inject 0.4 mL (40 mg total) under the skin in the morning   ferrous sulfate 324 (65 Fe) mg   No No   Sig: Take 1 tablet (324 mg total) by mouth 2 (two) times a day before meals   insulin glargine (LANTUS SOLOSTAR) 100 units/mL injection pen   No No   Sig: Inject 22 Units under the skin daily at bedtime   lamoTRIgine (LaMICtal) 150 MG  tablet  Self Yes No   Sig: Take 150 mg by mouth daily   methadone (DOLOPHINE) 5 mg tablet   Yes No   Sig: Take 134 mg by mouth daily   polyethylene glycol (MIRALAX) 17 g packet   No No   Sig: Take 17 g by mouth daily   senna (SENOKOT) 8.6 mg   No No   Sig: Take 2 tablets (17.2 mg total) by mouth daily at bedtime as needed for constipation   Patient not taking: Reported on 4/20/2024   sertraline (ZOLOFT) 100 mg tablet   Yes No   Sig: Take 100 mg by mouth daily        Facility-Administered Medications: None       No Known Allergies    PHYSICAL EXAM    PE limited by: none    Objective   Vitals:   First set: Temperature: 97.8 °F (36.6 °C) (08/08/24 1320)  Pulse: 86 (08/08/24 1320)  Respirations: 18 (08/08/24 1320)  Blood Pressure: (S) (!) 200/81 (08/08/24 1320)  SpO2: 98 % (08/08/24 1320)    Primary Survey:   (A) Airway: intact  (B) Breathing: intact  (C) Circulation: Pulses:   normal  (D) Disabliity:  GCS Total:  15  (E) Expose:  Completed    Secondary Survey: (Click on Physical Exam tab above)  Physical Exam  Vitals and nursing note reviewed.   Constitutional:       Appearance: Normal appearance. She is well-developed.   HENT:      Head: Normocephalic and atraumatic.   Eyes:      Conjunctiva/sclera: Conjunctivae normal.      Pupils: Pupils are equal, round, and reactive to light.   Neck:      Trachea: No tracheal deviation.   Cardiovascular:      Rate and Rhythm: Normal rate and regular rhythm.      Heart sounds: Normal heart sounds. No murmur heard.  Pulmonary:      Effort: Pulmonary effort is normal. No respiratory distress.      Breath sounds: Normal breath sounds. No wheezing or rales.   Abdominal:      General: Bowel sounds are normal. There is no distension.      Palpations: Abdomen is soft.      Tenderness: There is no abdominal tenderness.   Musculoskeletal:         General: No deformity.      Cervical back: Normal range of motion and neck supple.      Comments: [place] and deformity to left shoulder with  tenderness  NVI distally  CTL spine intact/no ttp/stepoffs   Skin:     General: Skin is warm and dry.      Capillary Refill: Capillary refill takes less than 2 seconds.   Neurological:      General: No focal deficit present.      Mental Status: She is alert and oriented to person, place, and time.      Sensory: No sensory deficit.   Psychiatric:         Mood and Affect: Mood normal.         Judgment: Judgment normal.         Cervical spine cleared by clinical criteria? Yes     Invasive Devices       None                   Lab Results:   Results Reviewed       Procedure Component Value Units Date/Time    Fingerstick Glucose (POCT) [[id number]]  (Abnormal) Collected: 08/08/24 1555    Lab Status: Final result Specimen: Blood Updated: 08/08/24 1557     POC Glucose 442 mg/dl     Basic metabolic panel [[id number]]  (Abnormal) Collected: 08/08/24 1330    Lab Status: Final result Specimen: Blood from Arm, Right Updated: 08/08/24 1500     Sodium 133 mmol/L      Potassium 5.3 mmol/L      Chloride 99 mmol/L      CO2 26 mmol/L      ANION GAP 8 mmol/L      BUN 42 mg/dL      Creatinine 1.56 mg/dL      Glucose 444 mg/dL      Calcium 9.8 mg/dL      eGFR 34 ml/min/1.73sq m     Narrative:      National Kid[employer] guidelines for Chronic Kidney Disease (CKD):     Stage 1 with normal or high GFR (GFR > 90 mL/min/1.73 square meters)    Stage 2 Mild CKD (GFR = 60-89 mL/min/1.73 square meters)    Stage 3A Moderate CKD (GFR = 45-59 mL/min/1.73 square meters)    Stage 3B Moderate CKD (GFR = 30-44 mL/min/1.73 square meters)    Stage 4 Severe CKD (GFR = 15-29 mL/min/1.73 square meters)    Stage 5 End Stage CKD (GFR <15 mL/min/1.73 square meters)  Note: GFR calculation is accurate only with a steady state creatinine    CBC and differential [[id number]]  (Abnormal) Collected: 08/08/24 1330    Lab Status: Final result Specimen: Blood from Arm, Right Updated: 08/08/24 1334     WBC 8.27 Thousand/uL      RBC 3.11 Million/uL       Hemoglobin 9.0 g/dL      Hematocrit 28.8 %      MCV 93 fL      MCH 28.9 pg      MCHC 31.3 g/dL      RDW 13.5 %      MPV 10.1 fL      Platelets 261 Thousands/uL      nRBC 0 /100 WBCs      Segmented % 62 %      Immature Grans % 1 %      Lymphocytes % 29 %      Monocytes % 5 %      Eosinophils Relative 2 %      Basophils Relative 1 %      Absolute Neutrophils 5.26 Thousands/µL      Absolute Immature Grans 0.04 Thousand/uL      Absolute Lymphocytes 2.40 Thousands/µL      Absolute Monocytes 0.39 Thousand/µL      Eosinophils Absolute 0.13 Thousand/µL      Basophils Absolute 0.05 Thousands/µL                    Imaging Studies:   Direct to CT: No  XR shoulder 2+ views LEFT   Final Result by Noah Mcpherson MD (08/08 1510)      Left shoulder fracture         Computerized Assisted Algorithm (CAA) may have been used to analyze all applicable images.         Workstation performed: [id number]         CT chest with contrast   Final Result by Bong Martines MD (08/08 1413)      Acute comminuted nondisplaced fracture of left proximal humerus involving surgical neck, greater and lesser tuberosities, and humeral head.      No acute thoracic abnormality.      Small hypodense splenic lesion, indeterminate. Differential includes splenic cyst, hemangioma, hamartoma, among other differentials. Consider nonemergent outpatient follow-up MRI abdomen with and without contrast for further evaluation.      Additional chronic/incidental findings as detailed above.      The study was marked in EPIC for immediate notification.               Workstation performed: [id number]         TRAUMA - CT head wo contrast   Final Result by Bong Martines MD (08/08 1405)      No acute intracranial abnormality.      Unchanged 1.0 cm calcified meningioma along right greater wing of sphenoid.      The study was marked in EPIC for immediate notification.            Workstation performed: [id number]         XR Trauma chest portable   ED  Interpretation by Ronan Quiroz DO (08/08 1341)   Left proximal humerus fracture      Final Result by Dee Thomas MD (08/08 1409)      Cortical step-off in the left humeral neck consistent with acute humeral fracture.         Resident: Deidre White I, the attending radiologist, have reviewed the images and agree with the final report above.      Workstation performed: [id number]               Procedures  Procedures         ED Course  ED Course as of 08/08/24 1748   Thu Aug 08, 2024   1455 Waiting on chemistry           Medical Decision Making  68-year-old female presents after fall with head strike.  On Eliquis for prior DVT.  Will get CT scan to rule out intracranial hemorrhage.  Shoulder deformity on exam will CT chest to look for underlying chest wall and lung injury although clinically doubtful.  Will get an x-ray of the left shoulder as well.  Bedside chest x-ray negative for pneumothorax or hemothorax.  Did demonstrate comminuted and left humerus fracture which was better visualized on CT scan.  CT scans reassuring with some incidental findings patient will require follow-up with.  Patient placed in a shoulder immobilizer and instructed to follow-up with orthopedics.  Patient is on methadone and will likely require higher than normal doses of oxycodone for pain control.  Pain was controlled in the emergency department with 2 doses of IV Dilaudid.    Problems Addressed:  Abnormal CT scan: acute illness or injury  Proximal humeral fracture: acute illness or injury    Amount and/or Complexity of Data Reviewed  External Data Reviewed: notes.  Labs: ordered. Decision-making details documented in ED Course.  Radiology: ordered and independent interpretation performed. Decision-making details documented in ED Course.    Risk  Prescription drug management.  Parenteral controlled substances.                Disposition  [facility] Transfer: No  Final diagnoses:   Proximal humeral fracture    Abnormal CT scan     Time reflects when diagnosis was documented in both MDM as applicable and the Disposition within this note       Time User Action Codes Description Comment    8/8/2024  2:05 PM Ronan Quiroz [S42.209A] Proximal humeral fracture     8/8/2024  2:30 PM Ronan Quiroz [R93.89] Abnormal CT scan           ED Disposition       ED Disposition   Discharge    Condition   Stable    Date/Time   Thu Aug 8, 2024  2:21 PM    Comment   Jaclyn Camp discharge to home/self care.                   Follow-up Information       Follow up With Specialties Details Why Contact Info    Jcarlos Steel, DO Orthopedic Surgery Schedule an appointment as soon as possible for a visit   [address]  [place] [address]  [phone]            Discharge Medication List as of 8/8/2024  2:36 PM        START taking these medications    Details   oxyCODONE (ROXICODONE) 10 MG TABS Take 1 tablet (10 mg total) by mouth every 6 (six) hours as needed for moderate pain for up to 5 days Max Daily Amount: 40 mg, Starting u 8/8/2024, Until Tue 8/13/2024 at 2359, Normal           CONTINUE these medications which have NOT CHANGED    Details   Eliquis 5 MG Take 5 mg by mouth 2 (two) times a day, Starting Sat 5/11/2024, Historical Med      acetaminophen (TYLENOL) 325 mg tablet Take 1 tablet (325 mg total) by mouth every 6 (six) hours as needed for mild pain, Starting Sun 4/21/2024, Normal      ascorbic acid (VITAMIN C) 250 mg tablet Take 2 tablets (500 mg total) by mouth daily, Starting u 4/25/2024, No Print      atorvastatin (LIPITOR) 40 mg tablet Take 40 mg by mouth daily , Starting Tue 10/30/2018, Until Tue 12/14/2021, Historical Med      clonazePAM (KlonoPIN) 1 mg tablet Take 1 tablet (1 mg total) by mouth 2 (two) times a day for 10 days, Starting Sat 4/17/2021, Until Tue 12/14/2021, Print      docusate sodium (COLACE) 100 mg capsule Take 1 capsule (100 mg total) by mouth every 12 (twelve) hours,  Starting Thu 4/7/2022, Normal      DULoxetine (CYMBALTA) 60 mg delayed release capsule Take 60 mg by mouth, Starting Wed 12/26/2018, Until Tue 12/14/2021 at 2359, Historical Med      enoxaparin (LOVENOX) 40 mg/0.4 mL Inject 0.4 mL (40 mg total) under the skin in the morning, Starting Sun 4/21/2024, Until Tue 5/21/2024, Normal      ferrous sulfate 324 (65 Fe) mg Take 1 tablet (324 mg total) by mouth 2 (two) times a day before meals, Starting Thu 4/25/2024, No Print      insulin glargine (LANTUS SOLOSTAR) 100 units/mL injection pen Inject 22 Units under the skin daily at bedtime, Starting Wed 4/24/2024, Until Fri 5/24/2024, No Print      Insulin Pen Needle 31G X 4 MM MISC by Does not apply route see administration instructions Please use with novolog and lantus to administer insulin three times daily before meals and daily at bedtime., Starting Thu 12/12/2019, Print      lamoTRIgine (LaMICtal) 150 MG tablet Take 150 mg by mouth daily, Historical Med      methadone (DOLOPHINE) 5 mg tablet Take 134 mg by mouth daily, Historical Med      NovoLOG 100 UNIT/ML injection Inject 8 Units under the skin 3 (three) times a day with meals  , Starting Tue 3/9/2021, Historical Med      polyethylene glycol (MIRALAX) 17 g packet Take 17 g by mouth daily, Starting Thu 4/25/2024, No Print      senna (SENOKOT) 8.6 mg Take 2 tablets (17.2 mg total) by mouth daily at bedtime as needed for constipation, Starting Thu 4/7/2022, Normal      sertraline (ZOLOFT) 100 mg tablet Take 100 mg by mouth daily  , Starting Wed 3/10/2021, Historical Med      Trulicity 0.75 MG/0.5ML SOPN Inject 0.75 mg under the skin once a week On Sunday , Starting Tue 5/11/2021, Historical Med               PDMP Review         Value Time User    PDMP Reviewed  Yes 4/21/2024  1:41 PM Tahmina Rivers MD            ED Provider  Electronically Signed by           Ronan Quiroz DO  08/08/24 1743

## 2024-08-13 ENCOUNTER — OFFICE VISIT (OUTPATIENT)
Dept: OBGYN CLINIC | Facility: CLINIC | Age: 69
End: 2024-08-13
Payer: COMMERCIAL

## 2024-08-13 ENCOUNTER — APPOINTMENT (OUTPATIENT)
Dept: RADIOLOGY | Age: 69
End: 2024-08-13
Payer: COMMERCIAL

## 2024-08-13 VITALS
HEART RATE: 89 BPM | DIASTOLIC BLOOD PRESSURE: 76 MMHG | WEIGHT: 146 LBS | BODY MASS INDEX: 23.46 KG/M2 | HEIGHT: 66 IN | SYSTOLIC BLOOD PRESSURE: 167 MMHG

## 2024-08-13 DIAGNOSIS — Z98.890 STATUS POST HIP SURGERY: ICD-10-CM

## 2024-08-13 DIAGNOSIS — S42.202A CLOSED FRACTURE OF PROXIMAL END OF LEFT HUMERUS, UNSPECIFIED FRACTURE MORPHOLOGY, INITIAL ENCOUNTER: Primary | ICD-10-CM

## 2024-08-13 DIAGNOSIS — S42.202A CLOSED FRACTURE OF PROXIMAL END OF LEFT HUMERUS, UNSPECIFIED FRACTURE MORPHOLOGY, INITIAL ENCOUNTER: ICD-10-CM

## 2024-08-13 DIAGNOSIS — S42.209A PROXIMAL HUMERAL FRACTURE: ICD-10-CM

## 2024-08-13 PROCEDURE — 99204 OFFICE O/P NEW MOD 45 MIN: CPT | Performed by: ORTHOPAEDIC SURGERY

## 2024-08-13 PROCEDURE — 73030 X-RAY EXAM OF SHOULDER: CPT

## 2024-08-13 RX ORDER — AMLODIPINE BESYLATE 10 MG/1
10 TABLET ORAL DAILY
COMMUNITY
Start: 2024-05-21 | End: 2025-05-21

## 2024-08-13 RX ORDER — PANTOPRAZOLE SODIUM 40 MG/1
40 TABLET, DELAYED RELEASE ORAL 2 TIMES DAILY
COMMUNITY
Start: 2024-05-13

## 2024-08-13 RX ORDER — DULOXETIN HYDROCHLORIDE 30 MG/1
60 CAPSULE, DELAYED RELEASE ORAL DAILY
COMMUNITY
Start: 2024-05-11

## 2024-08-13 NOTE — PROGRESS NOTES
CHIEF COMPLAIN/REASON FOR VISIT  Chief Complaint   Patient presents with    Left Shoulder - Pain       HISTORY OF PRESENT ILLNESS  Jaclyn Camp is a RHD 68 y.o. female who presents for evaluation of their left shoulder.  Patient was seen in the ER on August 8, 2024 after she fell and landed on the left shoulder.  She had an x-ray done which showed proximal humerus fracture.  She is placed in a sling and advised to follow-up with orthopedics.  Today, patient comes in for further evaluation.  She describes pain diffusely on the right shoulder.  She has remained in the sling.    Patient said that she never came in for follow-up of the left hip because she is having no issues.  She denies any pain or trouble walking.    REVIEW OF SYSTEMS  Review of systems was performed and, woutside that mentioned in the HPI, it was negative for symptomology related to the integumentary, hematologic, immunologic, allergic, neurologic, cardiovascular, respiratory, GI or  systems.     MEDICAL HISTORY  Patient Active Problem List   Diagnosis    Hyponatremia    Right arm cellulitis    Abscess of right forearm    Bipolar affective disorder (HCC)    Corn or callus    Benign essential hypertension    Hyperlipidemia    Hypothyroidism    Persistent insomnia    Type 2 diabetes mellitus with diabetic neuropathy, with long-term current use of insulin (HCC)    Visual loss    Abscess of tendon sheath of forearm, right    Anemia    Methadone use    Chronic hepatitis C without hepatic coma (HCC)    Abscess of right arm    Abscess of tendon sheath of right forearm    History of MRSA infection    Diabetes mellitus (HCC)    Depression    Bipolar disorder (HCC)    Cellulitis of right foot    Right ankle cellulitis    Constipation    Uncomplicated opioid dependence (HCC)    IV drug abuse (HCC)    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, left    S/P transmetatarsal amputation of foot, right (HCC)    Closed fracture of left hip with routine healing    Preop  cardiovascular exam    HTN (hypertension)    Acute blood loss anemia       SURGICAL HISTORY  Past Surgical History:   Procedure Laterality Date    APPENDECTOMY      INCISION AND DRAINAGE OF WOUND Right 7/1/2018    Procedure: INCISION AND DRAINAGE (I&D) RIGHT FOREARM;  Surgeon: Naldo Skinner MD;  [facility];  Service: Orthopedics    ORIF FEMUR FRACTURE Left 4/21/2024    Procedure: OPEN REDUCTION W/ INTERNAL FIXATION (ORIF) FEMUR;  Surgeon: Leonardo Murray MD;  [facility];  Service: Orthopedics    WY AMPUTATION FOOT TRANSMETARSAL Right 4/13/2021    Procedure: AMPUTATION TRANSMETATARSAL (TMA);  Surgeon: Noah Hough DPM;  [facility];  Service: Podiatry    WY SPLIT AGRFT T/A/L 1ST 100 CM/&/1% BDY INFT/CHLD Right 10/17/2018    Procedure: RIGHT ARM SKIN GRAFT SPLIT THICKNESS (STSG);  Surgeon: Chetan Fisher MD;  [facility];  Service: Plastics    WY SPLIT AGRFT T/A/L 1ST 100 CM/&/1% BDY INFT/CHLD Right 9/12/2018    Procedure: INTEGRA PLACEMENT;  Surgeon: Chetan Fisher MD;  [facility];  Service: Plastics    TOE AMPUTATION Right 12/10/2019    Procedure: AMPUTATION TOE;  Surgeon: Noah Hough DPM;  [facility];  Service: Podiatry    TOE AMPUTATION Right 5/29/2020    Procedure: AMPUTATION TOE, 5TH TOE;  Surgeon: Grey Myrick DPM;  [facility];  Service: Podiatry    TONSILLECTOMY      VAC DRESSING APPLICATION Right 10/17/2018    Procedure: VAC PLACEMENT ARM;  Surgeon: Chetan Fisher MD;  [facility];  Service: Plastics    WOUND DEBRIDEMENT Right 7/7/2018    Procedure: DEBRIDEMENT UPPER EXTREMITY (WASH OUT) W/ APPLICATION OF WOUND VAC;  Surgeon: Guero Ortiz MD;  [facility];  Service: Orthopedics    WOUND DEBRIDEMENT Right 7/11/2018    Procedure: DEBRIDEMENT UPPER EXTREMITY WITH WOUND VAC EXCHANGE;  Surgeon: Roula Birch DO;  [facility];  Service: Orthopedics    WOUND  DEBRIDEMENT Right 9/12/2018    Procedure: DEBRIDEMENT  (WASH OUT) FOREARM with Vac dressing change;  Surgeon: Chetan Fisher MD;  [facility];  Service: Plastics    WOUND DEBRIDEMENT Right 5/29/2020    Procedure: Complex Irrigation and DEBRIDEMENT WOUND (WASH OUT), ANKLE;  Surgeon: Grey Myrick DPM;  [facility];  Service: Podiatry       CURRENT MEDICATIONS    Current Outpatient Medications:     acetaminophen (TYLENOL) 325 mg tablet, Take 1 tablet (325 mg total) by mouth every 6 (six) hours as needed for mild pain, Disp: 30 tablet, Rfl: 0    amitriptyline (ELAVIL) 25 mg tablet, Take 25 mg by mouth, Disp: , Rfl:     amLODIPine (NORVASC) 10 mg tablet, Take 10 mg by mouth daily, Disp: , Rfl:     ascorbic acid (VITAMIN C) 250 mg tablet, Take 2 tablets (500 mg total) by mouth daily, Disp: , Rfl:     docusate sodium (COLACE) 100 mg capsule, Take 1 capsule (100 mg total) by mouth every 12 (twelve) hours, Disp: 60 capsule, Rfl: 0    DULoxetine (CYMBALTA) 30 mg delayed release capsule, Take 60 mg by mouth daily, Disp: , Rfl:     Eliquis 5 MG, Take 5 mg by mouth 2 (two) times a day, Disp: , Rfl:     ferrous sulfate 324 (65 Fe) mg, Take 1 tablet (324 mg total) by mouth 2 (two) times a day before meals, Disp: , Rfl:     Insulin Pen Needle 31G X 4 MM MISC, by Does not apply route see administration instructions Please use with novolog and lantus to administer insulin three times daily before meals and daily at bedtime., Disp: 120 each, Rfl: 0    lamoTRIgine (LaMICtal) 150 MG tablet, Take 150 mg by mouth daily, Disp: , Rfl:     methadone (DOLOPHINE) 5 mg tablet, Take 134 mg by mouth daily, Disp: , Rfl:     oxyCODONE (ROXICODONE) 10 MG TABS, Take 1 tablet (10 mg total) by mouth every 6 (six) hours as needed for moderate pain for up to 5 days Max Daily Amount: 40 mg, Disp: 12 tablet, Rfl: 0    pantoprazole (PROTONIX) 40 mg tablet, Take 40 mg by mouth 2 (two) times a day, Disp: , Rfl:     polyethylene  glycol (MIRALAX) 17 g packet, Take 17 g by mouth daily, Disp: , Rfl:     sertraline (ZOLOFT) 100 mg tablet, Take 100 mg by mouth daily  , Disp: , Rfl:     Trulicity 0.75 MG/0.5ML SOPN, Inject 0.75 mg under the skin once a week On Sunday , Disp: , Rfl:     atorvastatin (LIPITOR) 40 mg tablet, Take 40 mg by mouth daily , Disp: , Rfl:     clonazePAM (KlonoPIN) 1 mg tablet, Take 1 tablet (1 mg total) by mouth 2 (two) times a day for 10 days, Disp: 10 tablet, Rfl: 0    enoxaparin (LOVENOX) 40 mg/0.4 mL, Inject 0.4 mL (40 mg total) under the skin in the morning, Disp: 12 mL, Rfl: 0    insulin glargine (LANTUS SOLOSTAR) 100 units/mL injection pen, Inject 22 Units under the skin daily at bedtime, Disp: , Rfl:     NovoLOG 100 UNIT/ML injection, Inject 8 Units under the skin 3 (three) times a day with meals   (Patient not taking: Reported on 4/20/2024), Disp: , Rfl:     senna (SENOKOT) 8.6 mg, Take 2 tablets (17.2 mg total) by mouth daily at bedtime as needed for constipation (Patient not taking: Reported on 4/20/2024), Disp: 30 tablet, Rfl: 0    SOCIAL HISTORY  Social History     Socioeconomic History    Marital status: Single     Spouse name: Not on file    Number of children: Not on file    Years of education: Not on file    Highest education level: Not on file   Occupational History    Not on file   Tobacco Use    Smoking status: Never    Smokeless tobacco: Never   Vaping Use    Vaping status: Never Used   Substance and Sexual Activity    Alcohol use: Never     Alcohol/week: 0.0 standard drinks of alcohol    Drug use: Never     Comment: on methadone, Hx herion addiction    Sexual activity: Not Currently   Other Topics Concern    Not on file   Social History Narrative    Not on file     Social Determinants of Health     Financial Resource Strain: Low Risk  (8/22/2023)    Received from [facility]    Financial Resource Strain     Do you have any trouble paying for your medications, or do you think you might in the future?: No  "    Does your family have trouble paying for medicine? (Household - for ages 0-17 years): Not on file   Food Insecurity: No Food Insecurity (4/22/2024)    Hunger Vital Sign     Worried About Running Out of Food in the Last Year: Never true     Ran Out of Food in the Last Year: Never true   Transportation Needs: No Transportation Needs (4/22/2024)    PRAPARE - Transportation     Lack of Transportation (Medical): No     Lack of Transportation (Non-Medical): No   Physical Activity: Unknown (3/7/2019)    Exercise Vital Sign     Days of Exercise per Week: 0 days     Minutes of Exercise per Session: Not on file   Stress: Stress Concern Present (3/7/2019)    [employer] - Occupational Stress Questionnaire     Feeling of Stress : To some extent   Social Connections: Socially Integrated (8/22/2023)    Received from [facility]     How often do you feel lonely or isolated from those around you?: Never   Intimate Partner Violence: Unknown (3/7/2019)    Humiliation, Afraid, Rape, and Kick questionnaire     Fear of Current or Ex-Partner: Patient declined     Emotionally Abused: Patient declined     Physically Abused: Patient declined     Sexually Abused: Patient declined   Housing Stability: Low Risk  (4/22/2024)    Housing Stability Vital Sign     Unable to Pay for Housing in the Last Year: No     Number of Times Moved in the Last Year: 1     Homeless in the Last Year: No       Objective     VITAL SIGNS  /76 (BP Location: Right arm, Patient Position: Sitting, Cuff Size: Adult)   Pulse 89   Ht 5' 6\" (1.676 m) Comment: verbal  Wt 66.2 kg (146 lb) Comment: verbal  BMI 23.57 kg/m²      PHYSICAL EXAM  General:   Well-appearing  No acute distress  Appears stated age    Left Shoulder  Inspection reveals no obvious ecchymosis, tenting, or other abnormalities  ROM of shoulder not tested  ROM of elbow, wrist, and hand intact  Fingers warm and perfused  NVID     RADIOGRAPHIC " EXAMINATION/DIAGNOSTICS:  Left shoulder x-ray shows fracture of humeral neck as well as greater tuberosity    ASSESSMENT/PLAN:  Left humeral neck fracture  Left greater tuberosity fracture  Status post left hip femur long nail    Given patient history, exam findings, diagnostic imaging, she appears to have sustained fractures to the proximal humerus.  At this time, we did discuss that we could hold off on any operative management.  We recommended conservative measures including remaining in the sling for the next 4 weeks.  We also recommended RICE and physical therapy as tolerated.  PT order placed.  We will plan to see the patient back in 4 weeks for repeat left shoulder x-rays.  Patient is understanding and agreeable above plan.    Patient is status post long nail femur on April 21, 2024.  Patient said she never came to follow-up because she was doing very well.  We did discuss ordering left hip x-rays today to evaluate her hardware but she would prefer to hold off until her next appointment when we reimage her shoulder.  Patient is understanding and agreeable to plan.    Patient is call with any other questions or concerns.    Fracture / Dislocation Treatment    Date/Time: 8/13/2024 12:45 PM    Performed by: Leonardo Murray MD  Authorized by: Leonardo Murray MD          Scribe Attestation      I,:  Seferino Stanton PA-C am acting as a scribe while in the presence of the attending physician.:       I,:  Leonardo Murray MD personally performed the services described in this documentation    as scribed in my presence.:

## (undated) DEVICE — CYSTO TUBING SINGLE IRRIGATION

## (undated) DEVICE — SPONGE LAP 18 X 18 IN

## (undated) DEVICE — SUT CHROMIC 4-0 PS-2 18 IN 1637G

## (undated) DEVICE — PROXIMATE SKIN STAPLERS (35 WIDE) CONTAINS 35 STAINLESS STEEL STAPLES (FIXED HEAD): Brand: PROXIMATE

## (undated) DEVICE — GLOVE SRG BIOGEL ECLIPSE 7.5

## (undated) DEVICE — ADHESIVE SKN CLSR HISTOACRYL FLEX 0.5ML LF

## (undated) DEVICE — INTENDED FOR TISSUE SEPARATION, AND OTHER PROCEDURES THAT REQUIRE A SHARP SURGICAL BLADE TO PUNCTURE OR CUT.: Brand: BARD-PARKER ® CARBON RIB-BACK BLADES

## (undated) DEVICE — ACE WRAP 4 IN UNSTERILE

## (undated) DEVICE — SYRINGE 10ML LL

## (undated) DEVICE — CURITY NON-ADHERENT STRIPS: Brand: CURITY

## (undated) DEVICE — ACE WRAP 3 IN UNSTERILE

## (undated) DEVICE — REM POLYHESIVE ADULT PATIENT RETURN ELECTRODE: Brand: VALLEYLAB

## (undated) DEVICE — PAD CAST 4 IN COTTON NON STERILE

## (undated) DEVICE — GLOVE SRG BIOGEL 7.5

## (undated) DEVICE — ABDOMINAL PAD: Brand: DERMACEA

## (undated) DEVICE — KERLIX BANDAGE ROLL: Brand: KERLIX

## (undated) DEVICE — SCD SEQUENTIAL COMPRESSION COMFORT SLEEVE MEDIUM KNEE LENGTH: Brand: KENDALL SCD

## (undated) DEVICE — TUBING SUCTION 5MM X 12 FT

## (undated) DEVICE — CULTURE TUBE AEROBIC

## (undated) DEVICE — NEEDLE 25G X 1 1/2

## (undated) DEVICE — CULTURE TUBE ANAEROBIC

## (undated) DEVICE — OCCLUSIVE GAUZE STRIP,3% BISMUTH TRIBROMOPHENATE IN PETROLATUM BLEND: Brand: XEROFORM

## (undated) DEVICE — GLOVE INDICATOR PI UNDERGLOVE SZ 8.5 BLUE

## (undated) DEVICE — NEEDLE 18 G X 1 1/2

## (undated) DEVICE — BLADE SAGITTAL 25.6 X 9.5MM

## (undated) DEVICE — STOCKINETTE REGULAR

## (undated) DEVICE — SPONGE LAP 18 X 18 IN STRL RFD

## (undated) DEVICE — CURITY STRETCH BANDAGE: Brand: CURITY

## (undated) DEVICE — INTENDED FOR TISSUE SEPARATION, AND OTHER PROCEDURES THAT REQUIRE A SHARP SURGICAL BLADE TO PUNCTURE OR CUT.: Brand: BARD-PARKER SAFETY BLADES SIZE 15, STERILE

## (undated) DEVICE — BETHLEHEM UNIVERSAL  MIONR EXT: Brand: CARDINAL HEALTH

## (undated) DEVICE — PADDING CAST 6IN COTTON STRL

## (undated) DEVICE — SUT ETHILON 3-0 PS-1 18 IN 1663G

## (undated) DEVICE — SUT VICRYL 2-0 REEL 54 IN J286G

## (undated) DEVICE — GLOVE SRG BIOGEL 7

## (undated) DEVICE — JP 3-SPRING RES W/10FR PVC DRAIN/TR: Brand: CARDINAL HEALTH

## (undated) DEVICE — 2000CC GUARDIAN II: Brand: GUARDIAN

## (undated) DEVICE — PREP PAD BNS: Brand: CONVERTORS

## (undated) DEVICE — MEDI-VAC NON-CONDUCTIVE SUCTION TUBING 6MM X 1.8M (6FT.) L: Brand: CARDINAL HEALTH

## (undated) DEVICE — DERMATOME BLADES: Brand: DERMATOME

## (undated) DEVICE — BULB SYRINGE,IRRIGATION WITH PROTECTIVE CAP: Brand: DOVER

## (undated) DEVICE — 6617 IOBAN II PATIENT ISOLATION DRAPE 5/BX,4BX/CS: Brand: STERI-DRAPE™ IOBAN™ 2

## (undated) DEVICE — CHLORAPREP HI-LITE 26ML ORANGE

## (undated) DEVICE — GLOVE SRG BIOGEL ECLIPSE 6.5

## (undated) DEVICE — PLUMEPEN PRO 10FT

## (undated) DEVICE — GLOVE INDICATOR PI UNDERGLOVE SZ 8 BLUE

## (undated) DEVICE — SUT VICRYL 0 CT-1 27 IN J260H

## (undated) DEVICE — 3M™ STERI-DRAPE™ U-DRAPE 1015: Brand: STERI-DRAPE™

## (undated) DEVICE — COBAN 4 IN STERILE

## (undated) DEVICE — GLOVE SRG BIOGEL 8

## (undated) DEVICE — GLOVE SRG BIOGEL ECLIPSE 8

## (undated) DEVICE — VAC DRESSING SENSATRAC LRG

## (undated) DEVICE — PADDING CAST 4 IN  COTTON STRL

## (undated) DEVICE — BETHLEHEM UNIVERSAL OUTPATIENT: Brand: CARDINAL HEALTH

## (undated) DEVICE — CUFF TOURNIQUET 18 X 4 IN QUICK CONNECT DISP 1 BLADDER

## (undated) DEVICE — MEDI-VAC YANKAUER SUCTION HANDLE W/BULBOUS AND CONTROL VENT: Brand: CARDINAL HEALTH

## (undated) DEVICE — KIT: SUTURE REMOVAL 50/CS: Brand: MEDICAL ACTION INDUSTRIES

## (undated) DEVICE — CYSTO TUBING TUR Y IRRIGATION

## (undated) DEVICE — SUT ETHILON 4-0 PS-2 18 IN 1667H

## (undated) DEVICE — ASTOUND STANDARD SURGICAL GOWN, XL: Brand: CONVERTORS

## (undated) DEVICE — GLOVE INDICATOR PI UNDERGLOVE SZ 7.5 BLUE

## (undated) DEVICE — INSULATED BLADE ELECTRODE: Brand: EDGE

## (undated) DEVICE — CAST PADDING 4 IN SYNTHETIC NON-STRL

## (undated) DEVICE — SPONGE STICK WITH PVP-I: Brand: KENDALL

## (undated) DEVICE — TONGUE DEPRESSOR STERILE

## (undated) DEVICE — INTENDED FOR TISSUE SEPARATION, AND OTHER PROCEDURES THAT REQUIRE A SHARP SURGICAL BLADE TO PUNCTURE OR CUT.: Brand: BARD-PARKER SAFETY BLADES SIZE 10, STERILE

## (undated) DEVICE — SUT PROLENE 0 CT-1 30 IN 8424H

## (undated) DEVICE — GLOVE SRG BIOGEL ECLIPSE 8.5

## (undated) DEVICE — 3M™ V.A.C.® GRANUFOAM™ DRESSING KIT, M8275052, MEDIUM: Brand: 3M™ V.A.C.® GRANUFOAM™

## (undated) DEVICE — SUT VICRYL 2-0 SH 27 IN UNDYED J417H

## (undated) DEVICE — GLOVE INDICATOR PI UNDERGLOVE SZ 6.5 BLUE

## (undated) DEVICE — COBAN 6 IN STERILE

## (undated) DEVICE — 10FR FRAZIER SUCTION HANDLE: Brand: CARDINAL HEALTH

## (undated) DEVICE — WEBRIL 6 IN UNSTERILE

## (undated) DEVICE — ACE WRAP 6 IN UNSTERILE

## (undated) DEVICE — WET SKIN PREP TRAY: Brand: MEDLINE INDUSTRIES, INC.

## (undated) DEVICE — BETHLEHEM TOTAL HIP, KIT: Brand: CARDINAL HEALTH

## (undated) DEVICE — BUTTON SWITCH PENCIL HOLSTER: Brand: VALLEYLAB

## (undated) DEVICE — PENCIL ELECTROSURG E-Z CLEAN -0035H

## (undated) DEVICE — VAC DRESSING SENSATRAC SMALL

## (undated) DEVICE — THE SIMPULSE SOLO SYSTEM WITH ULTREX RETRACTABLE SPLASH SHIELD TIP: Brand: SIMPULSE SOLO

## (undated) DEVICE — PAD GROUNDING ADULT

## (undated) DEVICE — DRESSING, ALGINATE, MAXORB II, 4"X4": Brand: MEDLINE INDUSTRIES, INC.

## (undated) DEVICE — SPLINT 4 X 15 IN FAST SET PLASTER

## (undated) DEVICE — GAUZE SPONGES,16 PLY: Brand: CURITY

## (undated) DEVICE — SUT VICRYL 2-0 CT-1 36 IN J945H

## (undated) DEVICE — DRESSING SILON DUAL-DRESS 50 FOAM 5.5 X 6 IN

## (undated) DEVICE — STERILE ALLENTOWN HAND PACK: Brand: CARDINAL HEALTH

## (undated) DEVICE — DRAPE SHEET THREE QUARTER

## (undated) DEVICE — GLOVE INDICATOR PI UNDERGLOVE SZ 7 BLUE

## (undated) DEVICE — VAC CANISTER 500ML

## (undated) DEVICE — SYRINGE BULB 2 OZ

## (undated) DEVICE — ACE WRAP 4 IN STERILE

## (undated) DEVICE — SUT ETHILON 2-0 FS 18 IN 664H

## (undated) DEVICE — GLOVE SRG BIOGEL 6.5

## (undated) DEVICE — 2.5MM REAMING ROD WITH BALL TIP/950MM-STERILE

## (undated) DEVICE — TOWEL SET X-RAY

## (undated) DEVICE — 3000CC GUARDIAN II: Brand: GUARDIAN